# Patient Record
Sex: FEMALE | Race: WHITE | NOT HISPANIC OR LATINO | Employment: OTHER | URBAN - METROPOLITAN AREA
[De-identification: names, ages, dates, MRNs, and addresses within clinical notes are randomized per-mention and may not be internally consistent; named-entity substitution may affect disease eponyms.]

---

## 2018-11-27 ENCOUNTER — OFFICE VISIT (OUTPATIENT)
Dept: URGENT CARE | Facility: URGENT CARE | Age: 73
End: 2018-11-27
Payer: COMMERCIAL

## 2018-11-27 VITALS
HEART RATE: 76 BPM | SYSTOLIC BLOOD PRESSURE: 121 MMHG | WEIGHT: 144 LBS | DIASTOLIC BLOOD PRESSURE: 85 MMHG | TEMPERATURE: 97.8 F

## 2018-11-27 DIAGNOSIS — M15.0 PRIMARY OSTEOARTHRITIS INVOLVING MULTIPLE JOINTS: Primary | ICD-10-CM

## 2018-11-27 PROCEDURE — 99203 OFFICE O/P NEW LOW 30 MIN: CPT | Performed by: INTERNAL MEDICINE

## 2018-11-27 RX ORDER — SULINDAC 200 MG/1
200 TABLET ORAL 2 TIMES DAILY WITH MEALS
Qty: 60 TABLET | Refills: 0 | Status: SHIPPED | OUTPATIENT
Start: 2018-11-27 | End: 2018-12-20

## 2018-11-27 ASSESSMENT — ENCOUNTER SYMPTOMS: FEVER: 0

## 2018-11-27 NOTE — PROGRESS NOTES
SUBJECTIVE:   Giana Hope is a 73 year old female presenting with a chief complaint of   Chief Complaint   Patient presents with     Urgent Care     Refill Request     c/o refill meds       She is a new patient of [facility].    Just moved [place] to be near family  Waiting for insurance to [facility] for [place] 12/1/2018    Needs refill sulidac for osteoarthritis  200 mg 2 x day    Multiple joints involved  right shoulder - recent steroid ij  Knee replacement , left   right knee - has gel injections  Hands currently feel arthritic with cold weather      Review of Systems   Constitutional: Negative for fever.       Past Medical History:   Diagnosis Date     Osteoarthritis      Family History   Problem Relation Age of Onset     Arthritis Mother      Current Outpatient Prescriptions   Medication Sig Dispense Refill     sulindac (CLINORIL) 200 MG tablet Take 1 tablet (200 mg) by mouth 2 times daily (with meals) 60 tablet 0     SULINDAC PO        Social History   Substance Use Topics     Smoking status: Never Smoker     Smokeless tobacco: Never Used     Alcohol use Not on file       OBJECTIVE  /85  Pulse 76  Temp 97.8  F (36.6  C) (Tympanic)  Wt 144 lb (65.3 kg)  Breastfeeding? No    Physical Exam   Constitutional: She appears well-developed and well-nourished.   Musculoskeletal:   Exam of hands bilateral - no signif deformities noted   No redness or swelling of joints  No warmth   Vitals reviewed.      Labs:  No results found for this or any previous visit (from the past 24 hour(s)).        ASSESSMENT:      ICD-10-CM    1. Primary osteoarthritis involving multiple joints M15.0 sulindac (CLINORIL) 200 MG tablet        Medical Decision Making:      PLAN:  1 month refill while awaiting insurance and establishing new clinic  Followup:    If not improving or if condition worsens, follow up with your Primary Care Provider

## 2018-11-27 NOTE — MR AVS SNAPSHOT
"              After Visit Summary   2018    Giana Hope    MRN: [id number]           Patient Information     Date Of Birth          1945        Visit Information        Provider Department      2018 5:30 PM Leatha Evangelista MD [facility]        Today's Diagnoses     Primary osteoarthritis involving multiple joints    -  1       Follow-ups after your visit        Who to contact     If you have questions or need follow up information about today's clinic visit or your schedule please contact [facility] directly at [phone].  Normal or non-critical lab and imaging results will be communicated to you by [employer]St[employer], letter or phone within 4 business days after the clinic has received the results. If you do not hear from us within 7 days, please contact the clinic through [employer] or phone. If you have a critical or abnormal lab result, we will notify you by phone as soon as possible.  Submit refill requests through [employer] or call your pharmacy and they will forward the refill request to us. Please allow 3 business days for your refill to be completed.          Additional Information About Your Visit        MyChart Information     [employer] lets you send messages to your doctor, view your test results, renew your prescriptions, schedule appointments and more. To sign up, go to [email or web address] . Click on \"Log in\" on the left side of the screen, which will take you to the Welcome page. Then click on \"Sign up Now\" on the right side of the page.     You will be asked to enter the access code listed below, as well as some personal information. Please follow the directions to create your username and password.     Your access code is: [email or web address]  Expires: 2019  5:53 PM     Your access code will  in 90 days. If you need help or a new code, please call your [facility] clinic or [phone].        Care EveryWhere ID     This is your " Care EveryWhere ID. This could be used by other organizations to access your [facility] medical records  [id number]        Your Vitals Were     Pulse Temperature Breastfeeding?             76 97.8  F (36.6  C) (Tympanic) No          Blood Pressure from Last 3 Encounters:   11/27/18 121/85    Weight from Last 3 Encounters:   11/27/18 144 lb (65.3 kg)              Today, you had the following     No orders found for display         Today's Medication Changes          These changes are accurate as of 11/27/18  5:53 PM.  If you have any questions, ask your nurse or doctor.               These medicines have changed or have updated prescriptions.        Dose/Directions    * SULINDAC PO   This may have changed:  Another medication with the same name was added. Make sure you understand how and when to take each.   Changed by:  Leatha Evangelista MD        Refills:  0       * sulindac 200 MG tablet   Commonly known as:  CLINORIL   This may have changed:  You were already taking a medication with the same name, and this prescription was added. Make sure you understand how and when to take each.   Used for:  Primary osteoarthritis involving multiple joints   Changed by:  Leatha Evangelista MD        Dose:  200 mg   Take 1 tablet (200 mg) by mouth 2 times daily (with meals)   Quantity:  60 tablet   Refills:  0       * Notice:  This list has 2 medication(s) that are the same as other medications prescribed for you. Read the directions carefully, and ask your doctor or other care provider to review them with you.         Where to get your medicines      These medications were sent to [id number] - [place], [place] - [address] AT [facility] [address]na [address]  [facility] [address], [place] [address]    Hours:  24-hours Phone:  [phone]     sulindac 200 MG tablet                Primary Care Provider Fax #    Physician No Ref-Primary [phone]       No address on file        Equal Access to  Services     [facility]: Hadii aad ku hadsheroriana Mandymary, wadaianada luqadaha, qaybta kaalmakain matthews, giovani hernandez . So [facility] [phone].    ATENCIÓN: Si habla español, tiene a anthony disposición servicios gratuitos de asistencia lingüística. Llame al [phone].    We comply with applicable federal civil rights laws and [place] laws. We do not discriminate on the basis of race, color, national origin, age, disability, sex, sexual orientation, or gender identity.            Thank you!     Thank you for choosing [facility]  for your care. Our goal is always to provide you with excellent care. Hearing back from our patients is one way we can continue to improve our services. Please take a few minutes to complete the written survey that you may receive in the mail after your visit with us. Thank you!             Your Updated Medication List - Protect others around you: Learn how to safely use, store and throw away your medicines at www.disposemymeds.org.          This list is accurate as of 11/27/18  5:53 PM.  Always use your most recent med list.                   Brand Name Dispense Instructions for use Diagnosis    * SULINDAC PO           * sulindac 200 MG tablet    CLINORIL    60 tablet    Take 1 tablet (200 mg) by mouth 2 times daily (with meals)    Primary osteoarthritis involving multiple joints       * Notice:  This list has 2 medication(s) that are the same as other medications prescribed for you. Read the directions carefully, and ask your doctor or other care provider to review them with you.

## 2018-12-11 ENCOUNTER — OFFICE VISIT (OUTPATIENT)
Dept: ORTHOPEDICS | Facility: CLINIC | Age: 73
End: 2018-12-11
Payer: COMMERCIAL

## 2018-12-11 ENCOUNTER — ANCILLARY PROCEDURE (OUTPATIENT)
Dept: GENERAL RADIOLOGY | Facility: CLINIC | Age: 73
End: 2018-12-11
Attending: FAMILY MEDICINE
Payer: COMMERCIAL

## 2018-12-11 VITALS — HEIGHT: 63 IN | RESPIRATION RATE: 16 BRPM | BODY MASS INDEX: 25.52 KG/M2 | WEIGHT: 144 LBS

## 2018-12-11 DIAGNOSIS — M25.511 RIGHT SHOULDER PAIN, UNSPECIFIED CHRONICITY: Primary | ICD-10-CM

## 2018-12-11 DIAGNOSIS — M25.512 LEFT SHOULDER PAIN, UNSPECIFIED CHRONICITY: Primary | ICD-10-CM

## 2018-12-11 DIAGNOSIS — M19.012 PRIMARY OSTEOARTHRITIS OF LEFT SHOULDER: Primary | ICD-10-CM

## 2018-12-11 DIAGNOSIS — M25.511 RIGHT SHOULDER PAIN, UNSPECIFIED CHRONICITY: ICD-10-CM

## 2018-12-11 ASSESSMENT — MIFFLIN-ST. JEOR: SCORE: 1127.31

## 2018-12-11 NOTE — PROGRESS NOTES
"Sports Medicine Clinic Visit    PCP: No Ref-Primary, Physician    Gianalexx Hope is a 73 year old female who is seen  in consultation at the request of UC presenting with left shoulder pain, she is RHD.    Injury: NA    Location of Pain: left shoulder  Duration of Pain: 3 year(s)  Rating of Pain: 2/10  Pain is better with: switching positions, sulindac  Pain is worse with: Lifting objects, shoulder AROM  Additional Features: She is retired.  Treatment so far consists of: sulindac, switching positions, CSI  Prior History of related problems: Chronic left shoulder pain.    Resp 16   Ht 1.6 m (5' 3\")   Wt 65.3 kg (144 lb)   BMI 25.51 kg/m           PMH:  Past Medical History:   Diagnosis Date     Osteoarthritis    left TKA  Bilateral shoulder GH djd, L>R  Bilateral CMC djd hands  High cholesterol  Denies additional surgeries or hospitalizations.  No heart dz, kidney dz, lung dz, cerebrovascular dz.    Active problem list:  There is no problem list on file for this patient.      FH:  Family History   Problem Relation Age of Onset     Arthritis Mother        SH:  Social History     Socioeconomic History     Marital status:      Spouse name: Not on file     Number of children: Not on file     Years of education: Not on file     Highest education level: Not on file   Social Needs     Financial resource strain: Not on file     Food insecurity - worry: Not on file     Food insecurity - inability: Not on file     Transportation needs - medical: Not on file     Transportation needs - non-medical: Not on file   Occupational History     Not on file   Tobacco Use     Smoking status: Never Smoker     Smokeless tobacco: Never Used   Substance and Sexual Activity     Alcohol use: Not on file     Drug use: Not on file     Sexual activity: Not on file   Other Topics Concern     Not on file   Social History Narrative     Not on file       MEDS:  See EMR, reviewed  ALL:  See EMR, reviewed    REVIEW OF " SYSTEMS:  CONSTITUTIONAL:NEGATIVE for fever, chills, change in weight  INTEGUMENTARY/SKIN: NEGATIVE for worrisome rashes, moles or lesions  EYES: NEGATIVE for vision changes or irritation  ENT/MOUTH: NEGATIVE for ear, mouth and throat problems  RESP:NEGATIVE for significant cough or SOB  BREAST: NEGATIVE for masses, tenderness or discharge  CV: NEGATIVE for chest pain, palpitations or peripheral edema  GI: NEGATIVE for nausea, abdominal pain, heartburn, or change in bowel habits  :NEGATIVE for frequency, dysuria, or hematuria  :NEGATIVE for frequency, dysuria, or hematuria  NEURO: NEGATIVE for weakness, dizziness or paresthesias  ENDOCRINE: NEGATIVE for temperature intolerance, skin/hair changes  HEME/ALLERGY/IMMUNE: NEGATIVE for bleeding problems  PSYCHIATRIC: NEGATIVE for changes in mood or affect      Subjective: This 73-year-old female is recently moved here from [place] to be with grandchildren.  She would like to arrange an ultrasound-guided glenohumeral injection for left shoulder.  She has had 2 of them previously in [place] with success.  She has pain in her left shoulder with outstretched arm and overhead reaching.  She otherwise does not have a lot of pain at rest.  She is wanting to avoid shoulder replacement at this time.  She has had a successful left-sided knee replacement in the past.  She takes sulindac on a regular basis for joint arthritis.  She has arthritis involving the thumbs of her bilateral hands and also her right shoulder.    Objective: She has limited range of motion of her left shoulder to internal rotation external rotation and abduction.  But strength is intact bilaterally at deltoid, supraspinatus, infraspinatus and subscapularis.  She tends towards a head forward, shoulder forward posture.  There is no scapular winging.  There is no effusion at the left shoulder joint.  She is nontender over the AC joint anterior cuff or biceps tendon.  Distal pulses and sensation are  intact.  Overlying skin is normal.  Appropriate in conversation and affect.    An x-ray of the left shoulder shows severe glenohumeral DJD.    Assessment left shoulder glenohumeral DJD    Plan: We discussed that medicines like sulindac can be helpful in the setting of arthritis but it is important to consider screening laboratory tests of kidney function and liver function 2-3 times yearly.  She also knows that there is some evidence that multiple cortisone injections into the glenohumeral joint over time, perhaps 6-9 injections over time, may increase the risk of having poor results with a shoulder replacement.  She is hoping to avoid shoulder replacement at this time because she does not feel like the shoulder is painful enough to warrant it.  She may consider it for the future.  For now she usually gets 3-4 months worth of relief from a cortisone injection.  We will arrange this under ultrasound guidance with Dr. Brunson and myself.  She is on no blood thinners.  The nature of the procedure was explained.  She had no further questions.      This note was created with the use of Dragon software and unintentional spelling or errors may have occurred.

## 2018-12-11 NOTE — LETTER
Date:December 12, 2018      Patient was self referred, no letter generated. Do not send.        [facility]

## 2018-12-11 NOTE — LETTER
"  12/11/2018      RE: Giana Hope  [address]  [place] [address]       Sports Medicine Clinic Visit    PCP: No Ref-Primary, Physician    Giana Hope is a 73 year old female who is seen  in consultation at the request of  presenting with left shoulder pain, she is RHD.    Injury: NA    Location of Pain: left shoulder  Duration of Pain: 3 year(s)  Rating of Pain: 2/10  Pain is better with: switching positions, sulindac  Pain is worse with: Lifting objects, shoulder AROM  Additional Features: She is retired.  Treatment so far consists of: sulindac, switching positions, CSI  Prior History of related problems: Chronic left shoulder pain.    Resp 16   Ht 1.6 m (5' 3\")   Wt 65.3 kg (144 lb)   BMI 25.51 kg/m            PMH:  Past Medical History:   Diagnosis Date     Osteoarthritis    left TKA  Bilateral shoulder GH djd, L>R  Bilateral CMC djd hands  High cholesterol  Denies additional surgeries or hospitalizations.  No heart dz, kidney dz, lung dz, cerebrovascular dz.    Active problem list:  There is no problem list on file for this patient.      FH:  Family History   Problem Relation Age of Onset     Arthritis Mother        SH:  Social History     Socioeconomic History     Marital status:      Spouse name: Not on file     Number of children: Not on file     Years of education: Not on file     Highest education level: Not on file   Social Needs     Financial resource strain: Not on file     Food insecurity - worry: Not on file     Food insecurity - inability: Not on file     Transportation needs - medical: Not on file     Transportation needs - non-medical: Not on file   Occupational History     Not on file   Tobacco Use     Smoking status: Never Smoker     Smokeless tobacco: Never Used   Substance and Sexual Activity     Alcohol use: Not on file     Drug use: Not on file     Sexual activity: Not on file   Other Topics Concern     Not on file   Social History Narrative     Not on file "       MEDS:  See EMR, reviewed  ALL:  See EMR, reviewed    REVIEW OF SYSTEMS:  CONSTITUTIONAL:NEGATIVE for fever, chills, change in weight  INTEGUMENTARY/SKIN: NEGATIVE for worrisome rashes, moles or lesions  EYES: NEGATIVE for vision changes or irritation  ENT/MOUTH: NEGATIVE for ear, mouth and throat problems  RESP:NEGATIVE for significant cough or SOB  BREAST: NEGATIVE for masses, tenderness or discharge  CV: NEGATIVE for chest pain, palpitations or peripheral edema  GI: NEGATIVE for nausea, abdominal pain, heartburn, or change in bowel habits  :NEGATIVE for frequency, dysuria, or hematuria  :NEGATIVE for frequency, dysuria, or hematuria  NEURO: NEGATIVE for weakness, dizziness or paresthesias  ENDOCRINE: NEGATIVE for temperature intolerance, skin/hair changes  HEME/ALLERGY/IMMUNE: NEGATIVE for bleeding problems  PSYCHIATRIC: NEGATIVE for changes in mood or affect      Subjective: This 73-year-old female is recently moved here from [place] to be with grandchildren.  She would like to arrange an ultrasound-guided glenohumeral injection for left shoulder.  She has had 2 of them previously in [place] with success.  She has pain in her left shoulder with outstretched arm and overhead reaching.  She otherwise does not have a lot of pain at rest.  She is wanting to avoid shoulder replacement at this time.  She has had a successful left-sided knee replacement in the past.  She takes sulindac on a regular basis for joint arthritis.  She has arthritis involving the thumbs of her bilateral hands and also her right shoulder.    Objective: She has limited range of motion of her left shoulder to internal rotation external rotation and abduction.  But strength is intact bilaterally at deltoid, supraspinatus, infraspinatus and subscapularis.  She tends towards a head forward, shoulder forward posture.  There is no scapular winging.  There is no effusion at the left shoulder joint.  She is nontender over the AC joint  anterior cuff or biceps tendon.  Distal pulses and sensation are intact.  Overlying skin is normal.  Appropriate in conversation and affect.    An x-ray of the left shoulder shows severe glenohumeral DJD.    Assessment left shoulder glenohumeral DJD    Plan: We discussed that medicines like sulindac can be helpful in the setting of arthritis but it is important to consider screening laboratory tests of kidney function and liver function 2-3 times yearly.  She also knows that there is some evidence that multiple cortisone injections into the glenohumeral joint over time, perhaps 6-9 injections over time, may increase the risk of having poor results with a shoulder replacement.  She is hoping to avoid shoulder replacement at this time because she does not feel like the shoulder is painful enough to warrant it.  She may consider it for the future.  For now she usually gets 3-4 months worth of relief from a cortisone injection.  We will arrange this under ultrasound guidance with Dr. Brunson and myself.  She is on no blood thinners.  The nature of the procedure was explained.  She had no further questions.      This note was created with the use of Dragon software and unintentional spelling or errors may have occurred.                          Dayton Zepeda MD

## 2018-12-14 ENCOUNTER — PATIENT OUTREACH (OUTPATIENT)
Dept: CARE COORDINATION | Facility: CLINIC | Age: 73
End: 2018-12-14

## 2018-12-20 ENCOUNTER — OFFICE VISIT (OUTPATIENT)
Dept: ORTHOPEDICS | Facility: CLINIC | Age: 73
End: 2018-12-20
Payer: COMMERCIAL

## 2018-12-20 DIAGNOSIS — M19.012 PRIMARY OSTEOARTHRITIS OF LEFT SHOULDER: Primary | ICD-10-CM

## 2018-12-20 DIAGNOSIS — M15.0 PRIMARY OSTEOARTHRITIS INVOLVING MULTIPLE JOINTS: ICD-10-CM

## 2018-12-20 RX ORDER — ROPIVACAINE HYDROCHLORIDE 5 MG/ML
3 INJECTION, SOLUTION EPIDURAL; INFILTRATION; PERINEURAL
Status: DISCONTINUED | OUTPATIENT
Start: 2018-12-20 | End: 2019-01-31

## 2018-12-20 RX ORDER — SULINDAC 200 MG/1
200 TABLET ORAL 2 TIMES DAILY WITH MEALS
Qty: 60 TABLET | Refills: 0 | Status: SHIPPED | OUTPATIENT
Start: 2018-12-20 | End: 2019-01-31

## 2018-12-20 RX ORDER — TRIAMCINOLONE ACETONIDE 40 MG/ML
40 INJECTION, SUSPENSION INTRA-ARTICULAR; INTRAMUSCULAR
Status: DISCONTINUED | OUTPATIENT
Start: 2018-12-20 | End: 2019-03-14

## 2018-12-20 RX ADMIN — TRIAMCINOLONE ACETONIDE 40 MG: 40 INJECTION, SUSPENSION INTRA-ARTICULAR; INTRAMUSCULAR at 09:18

## 2018-12-20 RX ADMIN — ROPIVACAINE HYDROCHLORIDE 3 ML: 5 INJECTION, SOLUTION EPIDURAL; INFILTRATION; PERINEURAL at 09:18

## 2018-12-20 NOTE — LETTER
Date:December 21, 2018      Patient was self referred, no letter generated. Do not send.        [facility] Information

## 2018-12-20 NOTE — PROGRESS NOTES
Large Joint Injection/Arthocentesis: L glenohumeral joint  Date/Time: 12/20/2018 9:18 AM  Performed by: Rigo Brunson MD  Authorized by: Rigo Brunson MD     Guidance: ultrasound    Approach:  Posterolateral  Location:  Shoulder  Site:  L glenohumeral joint  Medications:  40 mg triamcinolone 40 MG/ML; 3 mL ropivacaine 5 MG/ML  Procedure discussed: discussed risks, benefits, and alternatives    Consent Given by:  Patient  Timeout: timeout called immediately prior to procedure    Prep: patient was prepped and draped in usual sterile fashion

## 2018-12-20 NOTE — LETTER
12/20/2018      RE: Giana Hope  [address]  [place] [address]       PROBLEM: Left glenohumeral osteoarthritis    SUBJECTIVE: Pt referred by Dr. Zepeda for US-guided corticosteroid injection of left shoulder    OBJECTIVE: X-rays reviewed.    ASSESSMENT: Left glenohumeral osteoarthritis    PLAN: US-guided corticosteroid injection left shoulder    PROCEDURE:  The indications, risks, expected benefits were discussed.  Formal consent was obtained. The humeral head, glenoid, hyperechoic triangle indicating the posterior labrum were identified by ultrasound.  Initially, 4 mL ropivicaine  was injected with US guidance along the injection track for anesthesia.  Using sterile technique, a syringe with a 80 mm, 22 gauge needle containing 1 mL Kenalog 40 mg/mL and 3 mL ropivicaine  were injected using an US guided posterior approach into the left glenohumeral joint.  US used to guide needle placement and verify proper needle position.  Images and video indicating proper needle placement were recorded.  Upon completion of the injection, the wound was dressed with a bandaid. Follow up prn.    Dr. Zepeda performed the procedure.  I supervised the entire procedure.        Large Joint Injection/Arthocentesis: L glenohumeral joint  Date/Time: 12/20/2018 9:18 AM  Performed by: Rigo Brunson MD  Authorized by: Rigo Brunson MD     Guidance: ultrasound    Approach:  Posterolateral  Location:  Shoulder  Site:  L glenohumeral joint  Medications:  40 mg triamcinolone 40 MG/ML; 3 mL ropivacaine 5 MG/ML  Procedure discussed: discussed risks, benefits, and alternatives    Consent Given by:  Patient  Timeout: timeout called immediately prior to procedure    Prep: patient was prepped and draped in usual sterile fashion            Rigo Brunson MD

## 2018-12-20 NOTE — NURSING NOTE
[address] [address]  Dept: [phone]  ______________________________________________________________________________    Patient: Giana Hope   : 1945   MRN: [id number]   2018    INVASIVE PROCEDURE SAFETY CHECKLIST    Date: 18   Procedure:left shoulder ultrasound guided kenalog injection.   Patient Name: Giana Hope  MRN: [id number]  YOB: 1945    Action: Complete sections as appropriate. Any discrepancy results in a HARD COPY until resolved.     PRE PROCEDURE:  Patient ID verified with 2 identifiers (name and  or MRN): Yes  Procedure and site verified with patient/designee (when able): Yes  Accurate consent documentation in medical record: Yes  H&P (or appropriate assessment) documented in medical record: Yes  H&P must be up to 20 days prior to procedure and updates within 24 hours of procedure as applicable: Yes  Relevant diagnostic and radiology test results appropriately labeled and displayed as applicable: NA  Procedure site(s) marked with provider initials: Yes    TIMEOUT:  Time-Out performed immediately prior to starting procedure, including verbal and active participation of all team members addressing the following:NA  * Correct patient identify  * Confirmed that the correct side and site are marked  * An accurate procedure consent form  * Agreement on the procedure to be done  * Correct patient position  * Relevant images and results are properly labeled and appropriately displayed  * The need to administer antibiotics or fluids for irrigation purposes during the procedure as applicable   * Safety precautions based on patient history or medication use    DURING PROCEDURE: Verification of correct person, site, and procedures any time the responsibility for care of the patient is transferred to another member of the care team.     The following medication was given:     MEDICATION:   Kenalog 40 mg  ROUTE: intra-articular  SITE: left shoulder  DOSE: 40mg/ml  LOT #: [id number]  : [employer]  EXPIRATION DATE: 04/20  NDC#: [address]-5   Was there drug waste? No    MEDICATION:  ropivacaine  ROUTE: intra-articular  SITE: left shoulder  DOSE: 3ml  LOT #: [id number]  : [employer]  EXPIRATION DATE: 06/22  NDC#: 66175-189-34   Was there drug waste? Yes  17 ml wasted, per MD.   Ce Nielson LPN  December 20, 2018

## 2018-12-20 NOTE — PROGRESS NOTES
PROBLEM: Left glenohumeral osteoarthritis    SUBJECTIVE: Pt referred by Dr. Zepeda for US-guided corticosteroid injection of left shoulder    OBJECTIVE: X-rays reviewed.    ASSESSMENT: Left glenohumeral osteoarthritis    PLAN: US-guided corticosteroid injection left shoulder    PROCEDURE:  The indications, risks, expected benefits were discussed.  Formal consent was obtained. The humeral head, glenoid, hyperechoic triangle indicating the posterior labrum were identified by ultrasound.  Initially, 4 mL ropivicaine  was injected with US guidance along the injection track for anesthesia.  Using sterile technique, a syringe with a 80 mm, 22 gauge needle containing 1 mL Kenalog 40 mg/mL and 3 mL ropivicaine  were injected using an US guided posterior approach into the left glenohumeral joint.  US used to guide needle placement and verify proper needle position.  Images and video indicating proper needle placement were recorded.  Upon completion of the injection, the wound was dressed with a bandaid. Follow up prn.    Dr. Zepeda performed the procedure.  I supervised the entire procedure.

## 2019-01-31 ENCOUNTER — OFFICE VISIT (OUTPATIENT)
Dept: INTERNAL MEDICINE | Facility: CLINIC | Age: 74
End: 2019-01-31
Payer: MEDICARE

## 2019-01-31 VITALS
SYSTOLIC BLOOD PRESSURE: 132 MMHG | WEIGHT: 146.3 LBS | DIASTOLIC BLOOD PRESSURE: 83 MMHG | OXYGEN SATURATION: 95 % | BODY MASS INDEX: 25.92 KG/M2 | RESPIRATION RATE: 16 BRPM | HEART RATE: 66 BPM

## 2019-01-31 DIAGNOSIS — M19.90 OSTEOARTHRITIS, UNSPECIFIED OSTEOARTHRITIS TYPE, UNSPECIFIED SITE: ICD-10-CM

## 2019-01-31 DIAGNOSIS — Z12.31 VISIT FOR SCREENING MAMMOGRAM: ICD-10-CM

## 2019-01-31 DIAGNOSIS — R91.8 PULMONARY NODULES: ICD-10-CM

## 2019-01-31 DIAGNOSIS — H53.9 VISION CHANGES: ICD-10-CM

## 2019-01-31 DIAGNOSIS — E78.5 HYPERLIPIDEMIA WITH TARGET LDL LESS THAN 130: Primary | ICD-10-CM

## 2019-01-31 DIAGNOSIS — M15.0 PRIMARY OSTEOARTHRITIS INVOLVING MULTIPLE JOINTS: ICD-10-CM

## 2019-01-31 LAB
ANION GAP SERPL CALCULATED.3IONS-SCNC: 6 MMOL/L (ref 3–14)
BUN SERPL-MCNC: 26 MG/DL (ref 7–30)
CALCIUM SERPL-MCNC: 9.6 MG/DL (ref 8.5–10.1)
CHLORIDE SERPL-SCNC: 104 MMOL/L (ref 94–109)
CO2 SERPL-SCNC: 30 MMOL/L (ref 20–32)
CREAT SERPL-MCNC: 0.82 MG/DL (ref 0.52–1.04)
GFR SERPL CREATININE-BSD FRML MDRD: 71 ML/MIN/{1.73_M2}
GLUCOSE SERPL-MCNC: 100 MG/DL (ref 70–99)
POTASSIUM SERPL-SCNC: 4.1 MMOL/L (ref 3.4–5.3)
SODIUM SERPL-SCNC: 139 MMOL/L (ref 133–144)

## 2019-01-31 RX ORDER — PRAVASTATIN SODIUM 20 MG
20 TABLET ORAL DAILY
COMMUNITY
End: 2019-01-31

## 2019-01-31 RX ORDER — SULINDAC 200 MG/1
200 TABLET ORAL 2 TIMES DAILY WITH MEALS
Qty: 180 TABLET | Refills: 3 | Status: SHIPPED | OUTPATIENT
Start: 2019-01-31 | End: 2020-01-03

## 2019-01-31 RX ORDER — PRAVASTATIN SODIUM 20 MG
20 TABLET ORAL DAILY
Qty: 90 TABLET | Refills: 3 | Status: SHIPPED | OUTPATIENT
Start: 2019-01-31 | End: 2020-01-03

## 2019-01-31 ASSESSMENT — ENCOUNTER SYMPTOMS
DECREASED APPETITE: 0
BREAST PAIN: 0
DECREASED CONCENTRATION: 0
SHORTNESS OF BREATH: 0
POSTURAL DYSPNEA: 0
COUGH: 0
JAUNDICE: 0
TACHYCARDIA: 0
HOT FLASHES: 0
ARTHRALGIAS: 1
HOARSE VOICE: 0
BLOOD IN STOOL: 0
SYNCOPE: 0
LEG PAIN: 0
SNORES LOUDLY: 0
SINUS CONGESTION: 1
DECREASED LIBIDO: 0
MUSCLE CRAMPS: 1
DEPRESSION: 0
PANIC: 0
WEAKNESS: 0
NIGHT SWEATS: 0
EYE PAIN: 0
EYE IRRITATION: 0
WEIGHT GAIN: 0
CHILLS: 0
ORTHOPNEA: 0
ALTERED TEMPERATURE REGULATION: 0
SPEECH CHANGE: 0
MEMORY LOSS: 0
LEG SWELLING: 0
HEMOPTYSIS: 0
FLANK PAIN: 0
TINGLING: 0
SKIN CHANGES: 0
BLOATING: 0
MUSCLE WEAKNESS: 0
CONSTIPATION: 0
FATIGUE: 0
SWOLLEN GLANDS: 0
JOINT SWELLING: 1
DYSPNEA ON EXERTION: 0
HEARTBURN: 0
HEMATURIA: 0
RECTAL BLEEDING: 0
INSOMNIA: 0
DIZZINESS: 1
SMELL DISTURBANCE: 0
BREAST MASS: 0
WEIGHT LOSS: 0
NAIL CHANGES: 0
TROUBLE SWALLOWING: 1
POOR WOUND HEALING: 0
POLYDIPSIA: 0
CLAUDICATION: 0
EYE WATERING: 0
HYPOTENSION: 0
LIGHT-HEADEDNESS: 0
HYPERTENSION: 0
NERVOUS/ANXIOUS: 0
HALLUCINATIONS: 0
PARALYSIS: 0
SPUTUM PRODUCTION: 0
DYSURIA: 0
PALPITATIONS: 0
COUGH DISTURBING SLEEP: 0
POLYPHAGIA: 0
EYE REDNESS: 0
RESPIRATORY PAIN: 0
NUMBNESS: 0
RECTAL PAIN: 0
NAUSEA: 0
FEVER: 0
BRUISES/BLEEDS EASILY: 0
ABDOMINAL PAIN: 0
EXERCISE INTOLERANCE: 0
DISTURBANCES IN COORDINATION: 0
BOWEL INCONTINENCE: 0
LOSS OF CONSCIOUSNESS: 0
WHEEZING: 0
HEADACHES: 0
SINUS PAIN: 0
MYALGIAS: 0
TASTE DISTURBANCE: 0
NECK PAIN: 0
DIARRHEA: 0
DOUBLE VISION: 0
VOMITING: 0
TREMORS: 0
SLEEP DISTURBANCES DUE TO BREATHING: 0
SEIZURES: 0
NECK MASS: 0
BACK PAIN: 1
STIFFNESS: 1
INCREASED ENERGY: 0
DIFFICULTY URINATING: 0

## 2019-01-31 ASSESSMENT — ACTIVITIES OF DAILY LIVING (ADL)
IN_THE_PAST_7_DAYS,_DID_YOU_NEED_HELP_FROM_OTHERS_TO_PERFORM_EVERYDAY_ACTIVITIES_SUCH_AS_EATING,_GETTING_DRESSED,_GROOMING,_BATHING,_WALKING,_OR_USING_THE_TOILET: N
IN_THE_PAST_7_DAYS,_DID_YOU_NEED_HELP_FROM_OTHERS_TO_TAKE_CARE_OF_THINGS_SUCH_AS_LAUNDRY_AND_HOUSEKEEPING,_BANKING,_SHOPPING,_USING_THE_TELEPHONE,_FOOD_PREPARATION,_TRANSPORTATION,_OR_TAKING_YOUR_OWN_MEDICATIONS?: N

## 2019-01-31 ASSESSMENT — PAIN SCALES - GENERAL: PAINLEVEL: NO PAIN (0)

## 2019-01-31 NOTE — PROGRESS NOTES
PRIMARY CARE CENTER         HPI:       Giana Hope is a 73 year old female who presents to [facility] care.      She moved here from [place] a couple months ago. Daughter and granddaughters are her which was main reason for the move.She notes she has been having shoulder issues. She has left sided arthritis with joint degeneration. Has had gel injected in knee in the past, now just starting to hurt at night. Has a knee replacement on left about 2-3.     She notes that she never drove in [place] at night but does here and notes issues here with her vision.  She thinks seeing an ophthalmologist would be beneficial given she has had some concerns for cataracts in the past.    She had a CT scan last year that incidentally showed a small pulmonary nodule.  She was recommended to have a repeat imaging in a year.  She states she is up-to-date on her colonoscopy and mammography.  She had an episode of a compression fracture in the past from a trauma, had bone scans that showed osteopenia and is currently taking vitamin D and calcium.    Mild SOB but able to go up and down stairs fine, thinks related to a cold she currently has.       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   HENT:  Positive for hearing loss, tinnitus, trouble swallowing, mouth sores, dry mouth and sinus congestion. Negative for ear pain, nosebleeds, hoarse voice, ear discharge, tooth pain, gum tenderness, taste disturbance, smell disturbance, hearing aid, bleeding gums, sinus pain and neck mass.    Eyes:  Negative for double vision, pain, redness, eye pain, decreased vision, eye watering, eye bulging, eye dryness, flashing lights, spots, floaters, strabismus, tunnel vision, jaundice and eye irritation.   Respiratory:   Negative for cough, hemoptysis, sputum production, shortness of breath, wheezing, sleep disturbances due to breathing, snores loudly, respiratory pain, dyspnea on exertion, cough disturbing sleep and postural dyspnea.    Cardiovascular:  Negative for chest pain, dyspnea on exertion, palpitations, orthopnea, claudication, leg swelling, fingers/toes turn blue, hypertension, hypotension, syncope, history of heart murmur, chest pain on exertion, chest pain at rest, pacemaker, few scattered varicosities, leg pain, sleep disturbances due to breathing, tachycardia, light-headedness, exercise intolerance and edema.   Gastrointestinal:  Negative for heartburn, nausea, vomiting, abdominal pain, diarrhea, constipation, blood in stool, melena, rectal pain, bloating, hemorrhoids, bowel incontinence, jaundice, rectal bleeding, coffee ground emesis and change in stool.   Genitourinary:  Negative for bladder incontinence, dysuria, urgency, hematuria, flank pain, vaginal discharge, difficulty urinating, genital sores, dyspareunia, decreased libido, nocturia, voiding less frequently, arousal difficulty, abnormal vaginal bleeding, excessive menstruation, menstrual changes, hot flashes, vaginal dryness and postmenopausal bleeding.   Musculoskeletal:  Positive for back pain, joint swelling,  arthralgias, stiffness and muscle cramps. Negative for myalgias, neck pain, bone pain, muscle weakness and fracture.   Skin:  Negative for nail changes, itching, poor wound healing, rash, hair changes, skin changes, acne, warts, poor wound healing, scarring, flaky skin, Raynaud's phenomenon, sensitivity to sunlight and skin thickening.   Neurological:  Positive for dizziness. Negative for tingling, tremors, speech change, seizures, loss of consciousness, weakness, light-headedness, numbness, headaches, disturbances in coordination, memory loss, difficulty walking and paralysis.   Endo/Heme:  Negative for anemia, swollen glands and bruises/bleeds easily.   Psychiatric/Behavioral:  Negative for depression, hallucinations, memory loss, decreased concentration, mood swings and panic attacks.    Breast:  Negative for breast discharge, breast mass, breast pain and nipple retraction.   Endocrine:  Negative for altered temperature regulation, polyphagia, polydipsia, unwanted hair growth and change in facial hair.    I have personally reviewed and updated the complete ROS on the day of the visit.           Physical Exam:   /83   Pulse 66   Resp 16   Wt 66.4 kg (146 lb 4.8 oz)   SpO2 95%   BMI 25.92 kg/m    Body mass index is 25.92 kg/m .  Vitals were reviewed       GENERAL APPEARANCE: healthy, alert and no distress     EYES: EOMI,  PERRL     HENT: ear canals and TM's normal  mouth without ulcers.  Small hypopigmented raised area on oral mucosa on right side, nontender.     NECK: no adenopathy, no asymmetry, masses, or scars and thyroid normal to palpation     RESP: lungs clear to auscultation - no rales, rhonchi or wheezes     CV: regular rates and rhythm,  and no murmur, click or rub     ABDOMEN:  soft, nontender,  and bowel sounds normal     MS: No lower extremity edema, pulses present and symmetric.  Left wrist with a small cyst consistent with ganglion cyst     SKIN: no suspicious lesions or rashes     NEURO:  Normal strength and tonel, mentation intact and speech normal     PSYCH: mentation appears normal. and affect normal      Assessment and Plan     Giana Hope is a 73 year old female who presents to establish care.     Hyperlipidemia with target LDL less than 130  Well-controlled, will refill medications today  -     pravastatin (PRAVACHOL) 20 MG tablet; Take 1 tablet (20 mg) by mouth daily    Primary osteoarthritis involving multiple joints  Biggest medical concern currently.  Has seen Orth O for her shoulder here, notes that she had a knee replacement done a few years ago and should follow-up with yearly for this.  Will place referral today to continue observation post joint replacement and also consideration for injection and other knee.  Will refill medication today, advised to take only with meals and to attempt to spare.  We will check renal function as well today given chronic NSAID use.  -     sulindac (CLINORIL) 200 MG tablet; Take 1 tablet (200 mg) by mouth 2 times daily (with meals)  -     ORTHOPEDICS ADULT REFERRAL  -     Basic metabolic panel; Future    Vision changes  Never drove at night prior to move a couple months ago.  Has noted more issues with vision at this time.  Will refer to ophthalmology for vision testing  -     OPHTHALMOLOGY ADULT REFERRAL    Visit for screening mammogram  -     Mammogram, routine screening; Future    Pulmonary nodules  Incidentally seen 5 mm solitary pulmonary nodule on CT imaging June of last year.  Given 30-pack-year smoking history it would be advised to follow this up to ensure stability of the nodule.  We will plan to have imaging in 6 months  -     CT Chest w/o contrast; Future        Options for treatment and follow-up care were reviewed with the patient. Giana Hope engaged in the decision making process and verbalized understanding of the options discussed and agreed with the final plan.    Kellee Knowles MD  Jan 31, 2019    Pt was seen and plan  of care discussed with Dr. Elias    Pt was seen and examined with Dr. Knowles.  I agree with her documentation as noted above.    My additional comments: None    Inge Elias MD

## 2019-01-31 NOTE — NURSING NOTE
Chief Complaint   Patient presents with     Establish Care     pt is here to establish care with new PCP       Asiya Lopez CMA at 1:20 PM on 1/31/2019

## 2019-01-31 NOTE — PATIENT INSTRUCTIONS
Primary Care Center Phone Number [phone]  Primary Care Center Medication Refill Request Information:  * Please contact your pharmacy regarding ANY request for medication refills.  ** [facility] Prescription Fax = [phone]  * Please allow 3 business days for routine medication refills.  * Please allow 5 business days for controlled substance medication refills.     Primary Care Center Test Result notification information:  *You will be notified with in 7-10 days of your appointment day regarding the results of your test.  If you are on MyChart you will be notified as soon as the provider has reviewed the results and signed off on them.               [facility]         Internal Medicine Resident                   Continuity Clinic    Who We Are    Resident Continuity Clinic is a part of the [facility].  Resident physicians see patients independently and establish a relationship with them over the course of their three-year residency program.  As with the Primary Care Clinic, our Resident Continuity Clinic models a group practice.  If your doctor is not available, you will be able to see another resident physician.  At the end of a resident s training, patients will be transitioned to a new resident physician for ongoing care.     We treat patients with a wide array of medical needs from routine physicals, to acute illnesses, to diabetes and blood pressure management, to complex medical illness.  What is a Resident Physician?    Resident physicians hold medical degrees and are doctors. They are training to become specialists in Internal Medicine. They work under the supervision of board-certified faculty physicians.  Expectations for Your Care    We strive to provide accessible, quality care at all times.    In order to provide this care, it is best to see your primary care resident doctor consistently rather switching between providers.  In the event you do see another physician, you  should schedule a follow-up visit with your usual primary care doctor.    If you are transitioning your care from another clinic, it is helpful to have your records available for your doctor to review.    We do not prescribe controlled substances, such as ADD medications or narcotic pain medications, on your first visit.  We will review your health records and concerns prior to devising a treatment plan with you in order to provide the best care.      Clinic Services     Extended clinic hours; patient  to help navigate your visit;  parking; laboratory and imaging services with evening and weekend hours    Multiple medical and surgical specialties in one building    Complementary services, including Nutrition, Integrative Medicine, Pharmacy consultations, Mental and Behavioral Health, Sports Medicine and Physical Therapy    Thank You    We would like to thank you for choosing the [facility] Resident Continuity Clinic for your primary care. You are making a priceless contribution to the training of the next generation of health care practitioners.     Contact us at [phone] for appointments or questions.    Resident Clinic Hours are Tuesdays and Thursdays, 7:30am-5:00pm    Residents   Lloyd Butler MD  (Male)   Miriam Rice MD (Female)  Asiya Tyson MD   (Female)   Kellee Knowles MD   (Female)   Eyad Cobb MD  (Male)   Cristian Cifuentes MD  (Male)   Sunil Sellers MD    (Female)   Rivera Echols MD  (Male)   Endy Zamora MD  (Male)    Diann Lucas MD  (Female)   Franklyn Fernández MD  (Male)   Joana Goss MD  (Female)   Sean Barron MD   (Female)   Parker Ward MD  (Male)   Rod Saab MD  (Male)   Cristian Palmer MD (Male)   Tristin Mcgowan MD  (Male)   Carmel Reid MD (Female)    Elizabeth Jha MD (Female)   Sae Watkins MD  (Male)   Norma Rivas MD    (Female)   Pati Bernardo MD  (Female)    Supervising  Physicians   MD Arline Garcia, MD Km Gar, MD Grayson Henriquez, MD Blanca Macias, MD Libby Gloevr, MD Mumtaz Diehl, MD Inge Peng MD

## 2019-02-12 ENCOUNTER — OFFICE VISIT (OUTPATIENT)
Dept: OPHTHALMOLOGY | Facility: CLINIC | Age: 74
End: 2019-02-12
Payer: MEDICARE

## 2019-02-12 DIAGNOSIS — H52.4 PRESBYOPIA: ICD-10-CM

## 2019-02-12 DIAGNOSIS — H25.13 NUCLEAR SCLEROSIS OF BOTH EYES: Primary | ICD-10-CM

## 2019-02-12 DIAGNOSIS — Z86.69 HISTORY OF UVEITIS: ICD-10-CM

## 2019-02-12 ASSESSMENT — VISUAL ACUITY
OS_CC+: +2
OS_CC: 20/25
OD_CC: J1+-
CORRECTION_TYPE: GLASSES
OD_CC: 20/20
OS_CC: J1+-
METHOD: SNELLEN - LINEAR

## 2019-02-12 ASSESSMENT — REFRACTION_MANIFEST
OD_ADD: +2.50
OS_AXIS: 152
OD_CYLINDER: +1.25
OS_CYLINDER: +0.50
OD_AXIS: 035
OS_SPHERE: PLANO
OS_ADD: +2.50
OD_SPHERE: +0.50

## 2019-02-12 ASSESSMENT — CONF VISUAL FIELD
METHOD: COUNTING FINGERS
OS_NORMAL: 1
OD_NORMAL: 1

## 2019-02-12 ASSESSMENT — EXTERNAL EXAM - LEFT EYE: OS_EXAM: NORMAL

## 2019-02-12 ASSESSMENT — SLIT LAMP EXAM - LIDS
COMMENTS: NORMAL
COMMENTS: NORMAL

## 2019-02-12 ASSESSMENT — REFRACTION_WEARINGRX
OD_SPHERE: +0.25
OD_AXIS: 174
OD_ADD: +2.50
OS_SPHERE: PLANO
OS_AXIS: 007
OD_CYLINDER: +1.00
OS_ADD: +2.50
OS_CYLINDER: +0.50
SPECS_TYPE: PAL

## 2019-02-12 ASSESSMENT — CUP TO DISC RATIO
OS_RATIO: 0.25
OD_RATIO: 0.25

## 2019-02-12 ASSESSMENT — TONOMETRY
OS_IOP_MMHG: 17
IOP_METHOD: ICARE
OD_IOP_MMHG: 18

## 2019-02-12 ASSESSMENT — EXTERNAL EXAM - RIGHT EYE: OD_EXAM: NORMAL

## 2019-02-12 NOTE — NURSING NOTE
Chief Complaints and History of Present Illnesses   Patient presents with     Annual Eye Exam     Chief Complaint(s) and History of Present Illness(es)     Annual Eye Exam     Laterality: both eyes    Onset: gradual    Onset: months ago    Quality: blurred vision    Severity: moderate    Frequency: constantly    Timing: in the evening    Context: distance vision and night driving    Course: stable    Associated symptoms: Negative for eye pain              Comments     Difficult seeing when driving at night. Noticed when moved here in October and began to drive at night. Vision during the day is fine. Last 6 months using computer distance glasses then used to. Patient denies eye pain or irritation. Does not use any eye drops.    H/o uveitis OS in May of last year    Claire Duarte COT 3:18 PM February 12, 2019

## 2019-02-13 NOTE — PROGRESS NOTES
History  HPI     Annual Eye Exam     In both eyes.  Onset was gradual.  This started months ago.  Charactertized as  blurred vision.  Severity is moderate.  Occurring constantly.  It is worse in the evening.  Context:  distance vision and night driving.  Since onset it is stable.  Associated symptoms include Negative for eye pain.              Comments     Difficult seeing when driving at night. Noticed when moved here in October and began to drive at night. Vision during the day is fine. Last 6 months using computer distance glasses then used to. Patient denies eye pain or irritation. Does not use any eye drops.    H/o uveitis OS in May of last year    Claire Duarte COT 3:18 PM February 12, 2019             Last edited by Claire Duarte on 2/12/2019  3:21 PM. (History)          Assessment/Plan  (H25.13) Nuclear sclerosis of both eyes  (primary encounter diagnosis)  Comment: Symptomatic with glare  Plan:  Educated patient on clinical findings. Discussed options including monitoring and cataract consultation. Patient interested in cataract consultation.    (H52.4) Presbyopia  Comment: Presbyopia both eyes   Plan: REFRACTION         Spectacle prescription withheld at this time. Reassess following cataract extraction.    (Z86.69) History of uveitis  Comment: No systemic association found  Plan:  Monitor annually.    Return to clinic in 1 year for comprehensive eye exam.    Complete documentation of historical and exam elements from today's encounter can  be found in the full encounter summary report (not reduplicated in this progress  note). I personally obtained the chief complaint(s) and history of present illness. I  confirmed and edited as necessary the review of systems, past medical/surgical  history, family history, social history, and examination findings as documented by  others; and I examined the patient myself. I personally reviewed the relevant tests,  images, and reports as documented above. I formulated  and edited as necessary the  assessment and plan and discussed the findings and management plan with the  patient and family.    Manuel Carrasco OD, FAAO

## 2019-02-25 DIAGNOSIS — H25.10 SENILE NUCLEAR SCLEROSIS: Primary | ICD-10-CM

## 2019-03-06 ENCOUNTER — OFFICE VISIT (OUTPATIENT)
Dept: OPHTHALMOLOGY | Facility: CLINIC | Age: 74
End: 2019-03-06
Attending: OPHTHALMOLOGY
Payer: MEDICARE

## 2019-03-06 DIAGNOSIS — H52.4 PRESBYOPIA: ICD-10-CM

## 2019-03-06 DIAGNOSIS — H52.203 HYPEROPIC ASTIGMATISM OF BOTH EYES: ICD-10-CM

## 2019-03-06 DIAGNOSIS — H25.13 NUCLEAR SCLEROTIC CATARACT OF BOTH EYES: Primary | ICD-10-CM

## 2019-03-06 PROCEDURE — 76516 ECHO EXAM OF EYE: CPT | Mod: ZF,50 | Performed by: OPHTHALMOLOGY

## 2019-03-06 PROCEDURE — G0463 HOSPITAL OUTPT CLINIC VISIT: HCPCS | Mod: ZF

## 2019-03-06 ASSESSMENT — REFRACTION_WEARINGRX
OD_ADD: +2.50
OS_CYLINDER: +0.50
OD_AXIS: 174
SPECS_TYPE: PAL
OS_SPHERE: PLANO
OD_SPHERE: +0.25
OD_CYLINDER: +1.00
OS_ADD: +2.50
OS_AXIS: 007

## 2019-03-06 ASSESSMENT — SLIT LAMP EXAM - LIDS
COMMENTS: NORMAL
COMMENTS: NORMAL

## 2019-03-06 ASSESSMENT — TONOMETRY
OS_IOP_MMHG: 17
OD_IOP_MMHG: 20
IOP_METHOD: ICARE

## 2019-03-06 ASSESSMENT — CUP TO DISC RATIO
OS_RATIO: 0.25
OD_RATIO: 0.25

## 2019-03-06 ASSESSMENT — CONF VISUAL FIELD
METHOD: COUNTING FINGERS
OS_NORMAL: 1
OD_NORMAL: 1

## 2019-03-06 ASSESSMENT — VISUAL ACUITY
OS_CC: J1
OD_BAT_MED: 20/25
METHOD: SNELLEN - LINEAR
OD_CC: 20/20
OS_CC+: -2
OD_BAT_LOW: 20/25
OS_BAT_MED: 20/25-
OS_CC: 20/20
OS_BAT_HIGH: 20/25-2
OS_BAT_LOW: 20/20-2
CORRECTION_TYPE: GLASSES
OD_BAT_HIGH: 20/25-2
OD_CC: J1

## 2019-03-06 ASSESSMENT — EXTERNAL EXAM - LEFT EYE: OS_EXAM: NORMAL

## 2019-03-06 ASSESSMENT — EXTERNAL EXAM - RIGHT EYE: OD_EXAM: NORMAL

## 2019-03-06 NOTE — PROGRESS NOTES
HPI  Giana Hope is a 73 year old female referred by Dr. Carrasco for cataract evaluation. She moved to [place] from [place], and she didn't drive at night in [place]. But, since moving here and having to drive at night, she has been noticing severe problems with glare from lights and oncoming headlights. She is afraid to drive at night because of this. She notes more mild blurring of her daytime vision. No pain, redness, discharge. No flashes/floaters.    POH: No history of eye surgery or trauma  PMH: HL, no diabetes  FH: No FH of AMD or glc    Assessment & Plan      (H25.13) Nuclear sclerotic cataract of both eyes  (primary encounter diagnosis)  Comment: Visually significant with limitation of ADLs including night driving. BCVA of 20/20- right eye and left eye with BAT to 20/25-. NS and cortical changes on exam.    Dilates to: well  Alpha blockers/Flomax: None  Trauma/Pseudoxfoliation: None  Fuchs dystrophy/guttae: None    Diabetes: No  Anticoagulation: None    Cyl: 0.66 @ 006 right eye, 0.88 @ 173 OS    We discussed the risks and benefits of cataract surgery in the right eye, and informed consent was obtained.  Proceed with CE/IOL OD. Given overall good BCVA, will plan on right eye only to make sure she has improvement in symptoms prior to proceeding with left eye.    Surgical plan:  Topical  Aim emmetropia    (H52.203) Hyperopic astigmatism of both eyes  (H52.4) Presbyopia  Comment: Vision disturbed by cataract as above.  Plan: Hold off on new glasses Rx until after CE/IOL     -----------------------------------------------------------------------------------    Patient disposition:   Return for scheduled procedure. or sooner as needed.    Teaching statement:  Complete documentation of historical and exam elements from today's encounter can be found in the full encounter summary report (not reduplicated in this progress note). I personally obtained the chief complaint(s) and history of present  illness.  I confirmed and edited as necessary the review of systems, past medical/surgical history, family history, social history, and examination findings as documented by others; and I examined the patient myself. I personally reviewed the relevant tests, images, and reports as documented above.     I formulated and edited as necessary the assessment and plan and discussed the findings and management plan with the patient and family.    Trish Taylor MD  Comprehensive Ophthalmology & Ocular Pathology  Department of Ophthalmology and Visual Neurosciences  [email or web address]  Pager [phone]

## 2019-03-06 NOTE — NURSING NOTE
Chief Complaints and History of Present Illnesses   Patient presents with     Cataract Evaluation     Chief Complaint(s) and History of Present Illness(es)     Cataract Evaluation     Laterality: both eyes    Severity: moderate    Context: night driving and dim lighting    Course: stable    Activities affected: night driving and driving    Treatments tried: no treatments    Pain scale: 0/10              Comments     Referred by Dr Carrasco for cataract evaluation     Vision stable since LV. Confirms difficulty with night driving w/glare and halos to lights.   Did not update gls after LV.     Georgette Andrews COT 1:05 PM March 6, 2019

## 2019-03-14 ENCOUNTER — OFFICE VISIT (OUTPATIENT)
Dept: INTERNAL MEDICINE | Facility: CLINIC | Age: 74
End: 2019-03-14
Payer: MEDICARE

## 2019-03-14 VITALS
WEIGHT: 148.9 LBS | HEIGHT: 64 IN | OXYGEN SATURATION: 95 % | HEART RATE: 63 BPM | DIASTOLIC BLOOD PRESSURE: 83 MMHG | SYSTOLIC BLOOD PRESSURE: 134 MMHG | BODY MASS INDEX: 25.42 KG/M2

## 2019-03-14 DIAGNOSIS — E78.00 HYPERCHOLESTEROLEMIA: Chronic | ICD-10-CM

## 2019-03-14 DIAGNOSIS — L98.9 SKIN LESION: Primary | ICD-10-CM

## 2019-03-14 ASSESSMENT — ENCOUNTER SYMPTOMS
WEAKNESS: 0
MYALGIAS: 1
SMELL DISTURBANCE: 0
DIFFICULTY URINATING: 0
SINUS CONGESTION: 1
FLANK PAIN: 0
NECK PAIN: 0
NUMBNESS: 0
TROUBLE SWALLOWING: 0
HOARSE VOICE: 0
MEMORY LOSS: 0
STIFFNESS: 1
TINGLING: 0
HEADACHES: 0
DYSURIA: 0
TASTE DISTURBANCE: 0
EYE IRRITATION: 0
BACK PAIN: 1
TREMORS: 0
SPEECH CHANGE: 0
EYE PAIN: 0
DIZZINESS: 1
MUSCLE WEAKNESS: 0
NECK MASS: 0
MUSCLE CRAMPS: 1
EYE WATERING: 0
PARALYSIS: 0
DISTURBANCES IN COORDINATION: 0
JOINT SWELLING: 1
SEIZURES: 0
SORE THROAT: 0
EYE REDNESS: 0
ARTHRALGIAS: 1
DOUBLE VISION: 0
LOSS OF CONSCIOUSNESS: 0
HEMATURIA: 0
SINUS PAIN: 0

## 2019-03-14 ASSESSMENT — ACTIVITIES OF DAILY LIVING (ADL)
IN_THE_PAST_7_DAYS,_DID_YOU_NEED_HELP_FROM_OTHERS_TO_TAKE_CARE_OF_THINGS_SUCH_AS_LAUNDRY_AND_HOUSEKEEPING,_BANKING,_SHOPPING,_USING_THE_TELEPHONE,_FOOD_PREPARATION,_TRANSPORTATION,_OR_TAKING_YOUR_OWN_MEDICATIONS?: N
IN_THE_PAST_7_DAYS,_DID_YOU_NEED_HELP_FROM_OTHERS_TO_PERFORM_EVERYDAY_ACTIVITIES_SUCH_AS_EATING,_GETTING_DRESSED,_GROOMING,_BATHING,_WALKING,_OR_USING_THE_TOILET: N

## 2019-03-14 ASSESSMENT — MIFFLIN-ST. JEOR: SCORE: 1157.47

## 2019-03-14 ASSESSMENT — PAIN SCALES - GENERAL: PAINLEVEL: NO PAIN (0)

## 2019-03-14 NOTE — PROGRESS NOTES
[facility] [place]   Pippa CONNORSherie ArshNASIR lutz CNP  03/14/2019     Chief Complaint: Pre-Op Exam     History of Present Illness:   Giana Hope is 73 year old female here at the request of Dr. CHEVY Taylor for cardiovascular, pulmonary, and perioperative risk assessment prior to surgery.  The intended surgical procedure is right cataract removal with intraocular lens implant current scheduled for surgery on 3/22/19. A copy of this note will be sent to the surgeon.     Reason for surgery:   The patient has a history of nonsenile cataract and plans to have this removed on the right. She has trouble driving at night due to her vision and glare on the roads. She moved here from [place] and was using public transportation in the past. The patient is currently on Pravastatin and Sulindac. She has not been told to hold those medications. She denies recent cold symptoms, sore throat, ankle swelling, cough, ear pain, diarrhea, dysuria, urinary frequency, fever, or chills.     The patient notes a tendency to build up wax in her ears. She has used a wax softener in the past.     The patient reports a red spot on the tip of her nose that appeared around the same time she moved here in October. She denies it feeling rough.     Cardiovascular Risk:  This patient does not have chest pain with ambulation or walking up a flight of stairs. There is not any chest pain with exercise. She does have a history of high cholesterol. There is not a history of stroke or valvular disease.    Pulmonary Risk:  The patient does not have any history of lung problems.    Perioperative Complications:  The patient does not have a history of bleeding or clotting problems in the past, specifically has no history of DVT, PE, or bleeding disorder.  They are not currently anticoagulated.  She has not had complications from past surgeries. The patient does not have a family history of any anesthesia or surgical complications.    Review of  "Systems:   Pertinent items are noted in HPI or as in patient entered ROS below, remainder of complete ROS is negative.    Active Medications:      pravastatin (PRAVACHOL) 20 MG tablet, Take 1 tablet (20 mg) by mouth daily, Disp: 90 tablet, Rfl: 3     sulindac (CLINORIL) 200 MG tablet, Take 1 tablet (200 mg) by mouth 2 times daily (with meals), Disp: 180 tablet, Rfl: 3      Allergies: Patient has no known allergies.      Past Medical History:  Nonsenile cataract   Osteoarthritis   Uveitis     Past Surgical History: No pertinent past surgical history to note.     Family History:   Mother - arthritis     Social History:   Presents to clinic alone  Tobacco Use: No previous or current tobacco use.   Alcohol Use: No alcohol use.   PCP: Kellee Knowles      Physical Exam:   /83   Pulse 63   Ht 1.613 m (5' 3.5\")   Wt 67.5 kg (148 lb 14.4 oz)   SpO2 95%   BMI 25.96 kg/m       Constitutional: no distress, comfortable, pleasant   Eyes: anicteric, conjunctiva pink.  Ears, Nose and Throat: Bilateral TM's occluded by wax. After removal, tympanic membranes clear,throat clear.   Neck: no thyromegaly.   Cardiovascular: regular rate and rhythm, normal S1 and S2, no murmurs, rubs or gallops, no edema, peripheral pulses full and symmetric   Respiratory: clear to auscultation, no wheezes or crackles, normal breath sounds   Gastrointestinal: positive bowel sounds, nontender, no hepatosplenomegaly, no masses   Musculoskeletal: full range of motion, no edema   Skin: no concerning lesions, no jaundice, temp normal   Neurological:  normal gait, normal speech, no tremor. A and O x 3, good historian.  Psychological: appropriate mood, good eye contact, normal affect   Lymph: no  cervical,  supraclavicular, infraclavicular nodes.     Recent Labs:  Component      Latest Ref Rng & Units 1/31/2019   Sodium      133 - 144 mmol/L 139   Potassium      3.4 - 5.3 mmol/L 4.1   Chloride      94 - 109 mmol/L 104   Carbon Dioxide      20 - 32 " mmol/L 30   Anion Gap      3 - 14 mmol/L 6   Glucose      70 - 99 mg/dL 100 (H)   Urea Nitrogen      7 - 30 mg/dL 26   Creatinine      0.52 - 1.04 mg/dL 0.82   GFR Estimate      >60 mL/min/1.73:m2 71   GFR Estimate If Black      >60 mL/min/1.73:m2 82   Calcium      8.5 - 10.1 mg/dL 9.6     EKG:  EKG was not indicated based on risk assessment.      Pregnancy test needed: No     Assessment and Plan:  Hypercholesterolemia    Skin lesion - Referred to dermatology for further evaluation of the skin lesion on her nose.   - DERMATOLOGY REFERRAL    1. Pre-operative assessment for right cataract removal with intraocular lens implant current scheduled for surgery on 3/22/19.  The patient is at LOW risk for cardiovascular complications and at LOW risk for pulmonary complications of this LOW risk surgery.    --Approval given to proceed with proposed procedure, without further diagnostic evaluation    The patient has been told to not take any aspirin or NSAIDs for 10 days prior to surgery. The patient has been instructed as to what to do with medications prior to surgery. Use Tylenol if necessary for pain.     Follow-up: Return to clinic prn.         Scribe Disclosure:   IGeorgette, am serving as a scribe to document services personally performed by NASIR Baxter CNP at this visit, based upon the provider's statements to me. All documentation has been reviewed by the aforementioned provider prior to being entered into the official medical record.     Portions of this medical record were completed by a scribe. UPON MY REVIEW AND AUTHENTICATION BY ELECTRONIC SIGNATURE, this confirms (a) I performed the applicable clinical services, and (b) the record is accurate.      Answers for HPI/ROS submitted by the patient on 3/14/2019   General Symptoms: No  Skin Symptoms: No  HENT Symptoms: Yes  EYE SYMPTOMS: Yes  HEART SYMPTOMS: No  LUNG SYMPTOMS: No  INTESTINAL SYMPTOMS: No  URINARY SYMPTOMS: Yes  GYNECOLOGIC SYMPTOMS:  No  BREAST SYMPTOMS: No  SKELETAL SYMPTOMS: Yes  BLOOD SYMPTOMS: No  NERVOUS SYSTEM SYMPTOMS: Yes  MENTAL HEALTH SYMPTOMS: No  Ear pain: No  Ear discharge: No  Hearing loss: Yes  Tinnitus: Yes  Nosebleeds: No  Congestion: Yes  Sinus pain: No  Trouble swallowing: No   Voice hoarseness: No  Mouth sores: No  Sore throat: No  Tooth pain: No  Gum tenderness: No  Bleeding gums: No  Change in taste: No  Change in sense of smell: No  Dry mouth: Yes  Hearing aid used: No  Neck lump: No  Eye pain: No  Vision loss: No  Dry eyes: No  Watery eyes: No  Eye bulging: No  Double vision: No  Flashing of lights: No  Spots: No  Floaters: No  Redness: No  Crossed eyes: No  Tunnel Vision: No  Yellowing of eyes: No  Eye irritation: No  Trouble holding urine or incontinence: Yes  Pain or burning: No  Trouble starting or stopping: No  Increased frequency of urination: No  Blood in urine: No  Decreased frequency of urination: No  Frequent nighttime urination: No  Flank pain: No  Difficulty emptying bladder: No  Back pain: Yes  Muscle aches: Yes  Neck pain: No  Swollen joints: Yes  Joint pain: Yes  Bone pain: No  Muscle cramps: Yes  Muscle weakness: No  Joint stiffness: Yes  Bone fracture: No  Trouble with coordination: No  Dizziness or trouble with balance: Yes  Fainting or black-out spells: No  Memory loss: No  Headache: No  Seizures: No  Speech problems: No  Tingling: No  Tremor: No  Weakness: No  Difficulty walking: No  Paralysis: No  Numbness: No

## 2019-03-14 NOTE — NURSING NOTE
Surgeon (please enter first and last name):  Trish Taylor MD  Fax number for Preop Evaluation:  [facility]  Location of Surgery: [facility]  Date of Surgery:  3.22.19  Procedure:  Right Cataract Removal with Intraocular Lens Implant  History of reaction to anesthesia?  No

## 2019-03-14 NOTE — LETTER
3/14/2019      RE: Giana Hope  [address]  [place] [address]       [facility] [place]   NASIR Baxter CNP  03/14/2019     Chief Complaint: Pre-Op Exam     History of Present Illness:   Giana Hoep is 73 year old female here at the request of Dr. CHEVY Taylor for cardiovascular, pulmonary, and perioperative risk assessment prior to surgery.  The intended surgical procedure is right cataract removal with intraocular lens implant current scheduled for surgery on 3/22/19. A copy of this note will be sent to the surgeon.     Reason for surgery:   The patient has a history of nonsenile cataract and plans to have this removed on the right. She has trouble driving at night due to her vision and glare on the roads. She moved here from [place] and was using public transportation in the past. The patient is currently on Pravastatin and Sulindac. She has not been told to hold those medications. She denies recent cold symptoms, sore throat, ankle swelling, cough, ear pain, diarrhea, dysuria, urinary frequency, fever, or chills.     The patient notes a tendency to build up wax in her ears. She has used a wax softener in the past.     The patient reports a red spot on the tip of her nose that appeared around the same time she moved here in October. She denies it feeling rough.     Cardiovascular Risk:  This patient does not have chest pain with ambulation or walking up a flight of stairs. There is not any chest pain with exercise. She does have a history of high cholesterol. There is not a history of stroke or valvular disease.    Pulmonary Risk:  The patient does not have any history of lung problems.    Perioperative Complications:  The patient does not have a history of bleeding or clotting problems in the past, specifically has no history of DVT, PE, or bleeding disorder.  They are not currently anticoagulated.  She has not had complications from past surgeries. The patient does  "not have a family history of any anesthesia or surgical complications.    Review of Systems:   Pertinent items are noted in HPI or as in patient entered ROS below, remainder of complete ROS is negative.    Active Medications:      pravastatin (PRAVACHOL) 20 MG tablet, Take 1 tablet (20 mg) by mouth daily, Disp: 90 tablet, Rfl: 3     sulindac (CLINORIL) 200 MG tablet, Take 1 tablet (200 mg) by mouth 2 times daily (with meals), Disp: 180 tablet, Rfl: 3      Allergies: Patient has no known allergies.      Past Medical History:  Nonsenile cataract   Osteoarthritis   Uveitis     Past Surgical History: No pertinent past surgical history to note.     Family History:   Mother - arthritis     Social History:   Presents to clinic alone  Tobacco Use: No previous or current tobacco use.   Alcohol Use: No alcohol use.   PCP: Kellee Knowles      Physical Exam:   /83   Pulse 63   Ht 1.613 m (5' 3.5\")   Wt 67.5 kg (148 lb 14.4 oz)   SpO2 95%   BMI 25.96 kg/m        Constitutional: no distress, comfortable, pleasant   Eyes: anicteric, conjunctiva pink.  Ears, Nose and Throat: Bilateral TM's occluded by wax. After removal, tympanic membranes clear,throat clear.   Neck: no thyromegaly.   Cardiovascular: regular rate and rhythm, normal S1 and S2, no murmurs, rubs or gallops, no edema, peripheral pulses full and symmetric   Respiratory: clear to auscultation, no wheezes or crackles, normal breath sounds   Gastrointestinal: positive bowel sounds, nontender, no hepatosplenomegaly, no masses   Musculoskeletal: full range of motion, no edema   Skin: no concerning lesions, no jaundice, temp normal   Neurological:  normal gait, normal speech, no tremor. A and O x 3, good historian.  Psychological: appropriate mood, good eye contact, normal affect   Lymph: no  cervical,  supraclavicular, infraclavicular nodes.     Recent Labs:  Component      Latest Ref Rng & Units 1/31/2019   Sodium      133 - 144 mmol/L 139   Potassium      3.4 " - 5.3 mmol/L 4.1   Chloride      94 - 109 mmol/L 104   Carbon Dioxide      20 - 32 mmol/L 30   Anion Gap      3 - 14 mmol/L 6   Glucose      70 - 99 mg/dL 100 (H)   Urea Nitrogen      7 - 30 mg/dL 26   Creatinine      0.52 - 1.04 mg/dL 0.82   GFR Estimate      >60 mL/min/1.73:m2 71   GFR Estimate If Black      >60 mL/min/1.73:m2 82   Calcium      8.5 - 10.1 mg/dL 9.6     EKG:  EKG was not indicated based on risk assessment.      Pregnancy test needed: No     Assessment and Plan:  Hypercholesterolemia    Skin lesion - Referred to dermatology for further evaluation of the skin lesion on her nose.   - DERMATOLOGY REFERRAL    1. Pre-operative assessment for right cataract removal with intraocular lens implant current scheduled for surgery on 3/22/19.  The patient is at LOW risk for cardiovascular complications and at LOW risk for pulmonary complications of this LOW risk surgery.    --Approval given to proceed with proposed procedure, without further diagnostic evaluation    The patient has been told to not take any aspirin or NSAIDs for 10 days prior to surgery. The patient has been instructed as to what to do with medications prior to surgery. Use Tylenol if necessary for pain.     Follow-up: Return to clinic prn.         Scribe Disclosure:   I, Georgette Armstrong, am serving as a scribe to document services personally performed by NASIR Baxter CNP at this visit, based upon the provider's statements to me. All documentation has been reviewed by the aforementioned provider prior to being entered into the official medical record.     Portions of this medical record were completed by a scribe. UPON MY REVIEW AND AUTHENTICATION BY ELECTRONIC SIGNATURE, this confirms (a) I performed the applicable clinical services, and (b) the record is accurate.      Answers for HPI/ROS submitted by the patient on 3/14/2019   General Symptoms: No  Skin Symptoms: No  HENT Symptoms: Yes  EYE SYMPTOMS: Yes  HEART SYMPTOMS: No  LUNG  SYMPTOMS: No  INTESTINAL SYMPTOMS: No  URINARY SYMPTOMS: Yes  GYNECOLOGIC SYMPTOMS: No  BREAST SYMPTOMS: No  SKELETAL SYMPTOMS: Yes  BLOOD SYMPTOMS: No  NERVOUS SYSTEM SYMPTOMS: Yes  MENTAL HEALTH SYMPTOMS: No  Ear pain: No  Ear discharge: No  Hearing loss: Yes  Tinnitus: Yes  Nosebleeds: No  Congestion: Yes  Sinus pain: No  Trouble swallowing: No   Voice hoarseness: No  Mouth sores: No  Sore throat: No  Tooth pain: No  Gum tenderness: No  Bleeding gums: No  Change in taste: No  Change in sense of smell: No  Dry mouth: Yes  Hearing aid used: No  Neck lump: No  Eye pain: No  Vision loss: No  Dry eyes: No  Watery eyes: No  Eye bulging: No  Double vision: No  Flashing of lights: No  Spots: No  Floaters: No  Redness: No  Crossed eyes: No  Tunnel Vision: No  Yellowing of eyes: No  Eye irritation: No  Trouble holding urine or incontinence: Yes  Pain or burning: No  Trouble starting or stopping: No  Increased frequency of urination: No  Blood in urine: No  Decreased frequency of urination: No  Frequent nighttime urination: No  Flank pain: No  Difficulty emptying bladder: No  Back pain: Yes  Muscle aches: Yes  Neck pain: No  Swollen joints: Yes  Joint pain: Yes  Bone pain: No  Muscle cramps: Yes  Muscle weakness: No  Joint stiffness: Yes  Bone fracture: No  Trouble with coordination: No  Dizziness or trouble with balance: Yes  Fainting or black-out spells: No  Memory loss: No  Headache: No  Seizures: No  Speech problems: No  Tingling: No  Tremor: No  Weakness: No  Difficulty walking: No  Paralysis: No  Numbness: No        NASIR Baxter CNP

## 2019-03-14 NOTE — LETTER
3/14/2019      RE: Giana Hope  [address]  [place] [address]       [facility] [place]   NASIR Baxter CNP  03/14/2019     Chief Complaint: Pre-Op Exam     History of Present Illness:   Giana Hope is 73 year old female here at the request of Dr. CHEVY Taylor for cardiovascular, pulmonary, and perioperative risk assessment prior to surgery.  The intended surgical procedure is right cataract removal with intraocular lens implant current scheduled for surgery on 3/22/19. A copy of this note will be sent to the surgeon.     Reason for surgery:   The patient has a history of nonsenile cataract and plans to have this removed on the right. She has trouble driving at night due to her vision and glare on the roads. She moved here from [place] and was using public transportation in the past. The patient is currently on Pravastatin and Sulindac. She has not been told to hold those medications. She denies recent cold symptoms, sore throat, ankle swelling, cough, ear pain, diarrhea, dysuria, urinary frequency, fever, or chills.     The patient notes a tendency to build up wax in her ears. She has used a wax softener in the past.     The patient reports a red spot on the tip of her nose that appeared around the same time she moved here in October. She denies it feeling rough.     Cardiovascular Risk:  This patient does not have chest pain with ambulation or walking up a flight of stairs. There is not any chest pain with exercise. She does have a history of high cholesterol. There is not a history of stroke or valvular disease.    Pulmonary Risk:  The patient does not have any history of lung problems.    Perioperative Complications:  The patient does not have a history of bleeding or clotting problems in the past, specifically has no history of DVT, PE, or bleeding disorder.  They are not currently anticoagulated.  She has not had complications from past surgeries. The patient  "does not have a family history of any anesthesia or surgical complications.    Review of Systems:   Pertinent items are noted in HPI or as in patient entered ROS below, remainder of complete ROS is negative.    Active Medications:      pravastatin (PRAVACHOL) 20 MG tablet, Take 1 tablet (20 mg) by mouth daily, Disp: 90 tablet, Rfl: 3     sulindac (CLINORIL) 200 MG tablet, Take 1 tablet (200 mg) by mouth 2 times daily (with meals), Disp: 180 tablet, Rfl: 3      Allergies: Patient has no known allergies.      Past Medical History:  Nonsenile cataract   Osteoarthritis   Uveitis     Past Surgical History: No pertinent past surgical history to note.     Family History:   Mother - arthritis     Social History:   Presents to clinic alone  Tobacco Use: No previous or current tobacco use.   Alcohol Use: No alcohol use.   PCP: Kellee Knowles      Physical Exam:   /83   Pulse 63   Ht 1.613 m (5' 3.5\")   Wt 67.5 kg (148 lb 14.4 oz)   SpO2 95%   BMI 25.96 kg/m        Constitutional: no distress, comfortable, pleasant   Eyes: anicteric, conjunctiva pink.  Ears, Nose and Throat: Bilateral TM's occluded by wax. After removal, tympanic membranes clear,throat clear.   Neck: no thyromegaly.   Cardiovascular: regular rate and rhythm, normal S1 and S2, no murmurs, rubs or gallops, no edema, peripheral pulses full and symmetric   Respiratory: clear to auscultation, no wheezes or crackles, normal breath sounds   Gastrointestinal: positive bowel sounds, nontender, no hepatosplenomegaly, no masses   Musculoskeletal: full range of motion, no edema   Skin: no concerning lesions, no jaundice, temp normal   Neurological:  normal gait, normal speech, no tremor. A and O x 3, good historian.  Psychological: appropriate mood, good eye contact, normal affect   Lymph: no  cervical,  supraclavicular, infraclavicular nodes.     Recent Labs:  Component      Latest Ref Rng & Units 1/31/2019   Sodium      133 - 144 mmol/L 139   Potassium      " 3.4 - 5.3 mmol/L 4.1   Chloride      94 - 109 mmol/L 104   Carbon Dioxide      20 - 32 mmol/L 30   Anion Gap      3 - 14 mmol/L 6   Glucose      70 - 99 mg/dL 100 (H)   Urea Nitrogen      7 - 30 mg/dL 26   Creatinine      0.52 - 1.04 mg/dL 0.82   GFR Estimate      >60 mL/min/1.73:m2 71   GFR Estimate If Black      >60 mL/min/1.73:m2 82   Calcium      8.5 - 10.1 mg/dL 9.6     EKG:  EKG was not indicated based on risk assessment.      Pregnancy test needed: No     Assessment and Plan:  Hypercholesterolemia    Skin lesion - Referred to dermatology for further evaluation of the skin lesion on her nose.   - DERMATOLOGY REFERRAL    1. Pre-operative assessment for right cataract removal with intraocular lens implant current scheduled for surgery on 3/22/19.  The patient is at LOW risk for cardiovascular complications and at LOW risk for pulmonary complications of this LOW risk surgery.    --Approval given to proceed with proposed procedure, without further diagnostic evaluation    The patient has been told to not take any aspirin or NSAIDs for 10 days prior to surgery. The patient has been instructed as to what to do with medications prior to surgery. Use Tylenol if necessary for pain.     Follow-up: Return to clinic prn.         Scribe Disclosure:   I, Georgette Armstrong, am serving as a scribe to document services personally performed by NASIR Baxter CNP at this visit, based upon the provider's statements to me. All documentation has been reviewed by the aforementioned provider prior to being entered into the official medical record.     Portions of this medical record were completed by a scribe. UPON MY REVIEW AND AUTHENTICATION BY ELECTRONIC SIGNATURE, this confirms (a) I performed the applicable clinical services, and (b) the record is accurate.      Answers for HPI/ROS submitted by the patient on 3/14/2019   General Symptoms: No  Skin Symptoms: No  HENT Symptoms: Yes  EYE SYMPTOMS: Yes  HEART SYMPTOMS:  No  LUNG SYMPTOMS: No  INTESTINAL SYMPTOMS: No  URINARY SYMPTOMS: Yes  GYNECOLOGIC SYMPTOMS: No  BREAST SYMPTOMS: No  SKELETAL SYMPTOMS: Yes  BLOOD SYMPTOMS: No  NERVOUS SYSTEM SYMPTOMS: Yes  MENTAL HEALTH SYMPTOMS: No  Ear pain: No  Ear discharge: No  Hearing loss: Yes  Tinnitus: Yes  Nosebleeds: No  Congestion: Yes  Sinus pain: No  Trouble swallowing: No   Voice hoarseness: No  Mouth sores: No  Sore throat: No  Tooth pain: No  Gum tenderness: No  Bleeding gums: No  Change in taste: No  Change in sense of smell: No  Dry mouth: Yes  Hearing aid used: No  Neck lump: No  Eye pain: No  Vision loss: No  Dry eyes: No  Watery eyes: No  Eye bulging: No  Double vision: No  Flashing of lights: No  Spots: No  Floaters: No  Redness: No  Crossed eyes: No  Tunnel Vision: No  Yellowing of eyes: No  Eye irritation: No  Trouble holding urine or incontinence: Yes  Pain or burning: No  Trouble starting or stopping: No  Increased frequency of urination: No  Blood in urine: No  Decreased frequency of urination: No  Frequent nighttime urination: No  Flank pain: No  Difficulty emptying bladder: No  Back pain: Yes  Muscle aches: Yes  Neck pain: No  Swollen joints: Yes  Joint pain: Yes  Bone pain: No  Muscle cramps: Yes  Muscle weakness: No  Joint stiffness: Yes  Bone fracture: No  Trouble with coordination: No  Dizziness or trouble with balance: Yes  Fainting or black-out spells: No  Memory loss: No  Headache: No  Seizures: No  Speech problems: No  Tingling: No  Tremor: No  Weakness: No  Difficulty walking: No  Paralysis: No  Numbness: No        NASIR Baxter CNP

## 2019-03-15 NOTE — TELEPHONE ENCOUNTER
RECORDS RECEIVED FROM: Osteoarthritis,Left Knee pain,referred by Kellee Knowles, previous total left knee replacement at [facility] in [facility] 3 years ago, all records should be in system,appt per pt   DATE RECEIVED: 03/15/19    NOTES STATUS DETAILS   OFFICE NOTE from referring provider Internal Dr. Knowles 1/31/19   OFFICE NOTE from other specialist Care Everywhere    DISCHARGE SUMMARY from hospital N/A    DISCHARGE REPORT from the ER N/A    OPERATIVE REPORT Care Everywhere Dr. Pang 2/25/16   MEDICATION LIST Internal    IMPLANT RECORD/STICKER Care Everywhere    LABS     CBC/DIFF N/A    CULTURES N/A    INJECTIONS DONE IN RADIOLOGY N/A    MRI N/A    CT SCAN N/A    XRAYS (IMAGES & REPORTS) N/A    TUMOR     PATHOLOGY  Slides & report N/A      03/15/19 no recent imaging

## 2019-03-20 ENCOUNTER — PRE VISIT (OUTPATIENT)
Dept: ORTHOPEDICS | Facility: CLINIC | Age: 74
End: 2019-03-20

## 2019-03-20 DIAGNOSIS — H25.11 NUCLEAR SCLEROTIC CATARACT, RIGHT: Primary | ICD-10-CM

## 2019-03-20 RX ORDER — PREDNISOLONE ACETATE 10 MG/ML
SUSPENSION/ DROPS OPHTHALMIC
Qty: 1 BOTTLE | Refills: 1 | Status: SHIPPED | OUTPATIENT
Start: 2019-03-20 | End: 2020-01-13

## 2019-03-20 RX ORDER — OFLOXACIN 3 MG/ML
SOLUTION/ DROPS OPHTHALMIC
Qty: 1 BOTTLE | Refills: 0 | Status: SHIPPED | OUTPATIENT
Start: 2019-03-20 | End: 2019-04-15

## 2019-03-21 ENCOUNTER — ANESTHESIA EVENT (OUTPATIENT)
Dept: SURGERY | Facility: AMBULATORY SURGERY CENTER | Age: 74
End: 2019-03-21

## 2019-03-22 ENCOUNTER — HOSPITAL ENCOUNTER (OUTPATIENT)
Facility: AMBULATORY SURGERY CENTER | Age: 74
End: 2019-03-22
Attending: OPHTHALMOLOGY
Payer: MEDICARE

## 2019-03-22 ENCOUNTER — OFFICE VISIT (OUTPATIENT)
Dept: OPHTHALMOLOGY | Facility: CLINIC | Age: 74
End: 2019-03-22
Payer: MEDICARE

## 2019-03-22 ENCOUNTER — ANESTHESIA (OUTPATIENT)
Dept: SURGERY | Facility: AMBULATORY SURGERY CENTER | Age: 74
End: 2019-03-22

## 2019-03-22 VITALS
HEART RATE: 58 BPM | BODY MASS INDEX: 25.1 KG/M2 | OXYGEN SATURATION: 98 % | SYSTOLIC BLOOD PRESSURE: 129 MMHG | WEIGHT: 147 LBS | TEMPERATURE: 97.4 F | RESPIRATION RATE: 16 BRPM | HEIGHT: 64 IN | DIASTOLIC BLOOD PRESSURE: 77 MMHG

## 2019-03-22 DIAGNOSIS — Z96.1 PSEUDOPHAKIA: Primary | ICD-10-CM

## 2019-03-22 DIAGNOSIS — Z98.890 POSTOPERATIVE EYE STATE: ICD-10-CM

## 2019-03-22 DIAGNOSIS — H25.11 AGE-RELATED NUCLEAR CATARACT, RIGHT: Primary | ICD-10-CM

## 2019-03-22 DIAGNOSIS — H25.11 NUCLEAR SCLEROTIC CATARACT, RIGHT: Primary | ICD-10-CM

## 2019-03-22 DEVICE — EYE IMP IOL AMO PCL TECNIS ZCB00 20.0: Type: IMPLANTABLE DEVICE | Site: EYE | Status: FUNCTIONAL

## 2019-03-22 RX ORDER — FLURBIPROFEN SODIUM 0.3 MG/ML
1 SOLUTION/ DROPS OPHTHALMIC
Status: DISCONTINUED | OUTPATIENT
Start: 2019-03-22 | End: 2019-03-22 | Stop reason: HOSPADM

## 2019-03-22 RX ORDER — MOXIFLOXACIN IN NACL,ISO-OS/PF 0.3MG/0.3
SYRINGE (ML) INTRAOCULAR PRN
Status: DISCONTINUED | OUTPATIENT
Start: 2019-03-22 | End: 2019-03-22 | Stop reason: HOSPADM

## 2019-03-22 RX ORDER — OFLOXACIN 3 MG/ML
1 SOLUTION/ DROPS OPHTHALMIC
Status: COMPLETED | OUTPATIENT
Start: 2019-03-22 | End: 2019-03-22

## 2019-03-22 RX ORDER — PHENYLEPHRINE HYDROCHLORIDE 25 MG/ML
1 SOLUTION/ DROPS OPHTHALMIC
Status: COMPLETED | OUTPATIENT
Start: 2019-03-22 | End: 2019-03-22

## 2019-03-22 RX ORDER — TETRACAINE HYDROCHLORIDE 5 MG/ML
SOLUTION OPHTHALMIC PRN
Status: DISCONTINUED | OUTPATIENT
Start: 2019-03-22 | End: 2019-03-22 | Stop reason: HOSPADM

## 2019-03-22 RX ORDER — ONDANSETRON 2 MG/ML
4 INJECTION INTRAMUSCULAR; INTRAVENOUS EVERY 30 MIN PRN
Status: DISCONTINUED | OUTPATIENT
Start: 2019-03-22 | End: 2019-03-23 | Stop reason: HOSPADM

## 2019-03-22 RX ORDER — MEPERIDINE HYDROCHLORIDE 25 MG/ML
12.5 INJECTION INTRAMUSCULAR; INTRAVENOUS; SUBCUTANEOUS
Status: DISCONTINUED | OUTPATIENT
Start: 2019-03-22 | End: 2019-03-23 | Stop reason: HOSPADM

## 2019-03-22 RX ORDER — SODIUM CHLORIDE, SODIUM LACTATE, POTASSIUM CHLORIDE, CALCIUM CHLORIDE 600; 310; 30; 20 MG/100ML; MG/100ML; MG/100ML; MG/100ML
INJECTION, SOLUTION INTRAVENOUS CONTINUOUS PRN
Status: DISCONTINUED | OUTPATIENT
Start: 2019-03-22 | End: 2019-03-22

## 2019-03-22 RX ORDER — TIMOLOL MALEATE 5 MG/ML
1 SOLUTION/ DROPS OPHTHALMIC ONCE
Status: DISCONTINUED | OUTPATIENT
Start: 2019-03-22 | End: 2020-01-13

## 2019-03-22 RX ORDER — ONDANSETRON 4 MG/1
4 TABLET, ORALLY DISINTEGRATING ORAL EVERY 30 MIN PRN
Status: DISCONTINUED | OUTPATIENT
Start: 2019-03-22 | End: 2019-03-23 | Stop reason: HOSPADM

## 2019-03-22 RX ORDER — PROPARACAINE HYDROCHLORIDE 5 MG/ML
1 SOLUTION/ DROPS OPHTHALMIC ONCE
Status: COMPLETED | OUTPATIENT
Start: 2019-03-22 | End: 2019-03-22

## 2019-03-22 RX ORDER — BALANCED SALT SOLUTION 6.4; .75; .48; .3; 3.9; 1.7 MG/ML; MG/ML; MG/ML; MG/ML; MG/ML; MG/ML
SOLUTION OPHTHALMIC PRN
Status: DISCONTINUED | OUTPATIENT
Start: 2019-03-22 | End: 2019-03-22 | Stop reason: HOSPADM

## 2019-03-22 RX ORDER — SODIUM CHLORIDE, SODIUM LACTATE, POTASSIUM CHLORIDE, CALCIUM CHLORIDE 600; 310; 30; 20 MG/100ML; MG/100ML; MG/100ML; MG/100ML
INJECTION, SOLUTION INTRAVENOUS CONTINUOUS
Status: DISCONTINUED | OUTPATIENT
Start: 2019-03-22 | End: 2019-03-23 | Stop reason: HOSPADM

## 2019-03-22 RX ORDER — LIDOCAINE HYDROCHLORIDE 10 MG/ML
INJECTION, SOLUTION EPIDURAL; INFILTRATION; INTRACAUDAL; PERINEURAL PRN
Status: DISCONTINUED | OUTPATIENT
Start: 2019-03-22 | End: 2019-03-22 | Stop reason: HOSPADM

## 2019-03-22 RX ORDER — ACETAMINOPHEN 325 MG/1
975 TABLET ORAL ONCE
Status: COMPLETED | OUTPATIENT
Start: 2019-03-22 | End: 2019-03-22

## 2019-03-22 RX ORDER — CYCLOPENTOLATE HYDROCHLORIDE 10 MG/ML
1 SOLUTION/ DROPS OPHTHALMIC
Status: COMPLETED | OUTPATIENT
Start: 2019-03-22 | End: 2019-03-22

## 2019-03-22 RX ORDER — NALOXONE HYDROCHLORIDE 0.4 MG/ML
.1-.4 INJECTION, SOLUTION INTRAMUSCULAR; INTRAVENOUS; SUBCUTANEOUS
Status: DISCONTINUED | OUTPATIENT
Start: 2019-03-22 | End: 2019-03-23 | Stop reason: HOSPADM

## 2019-03-22 RX ORDER — FENTANYL CITRATE 50 UG/ML
25-50 INJECTION, SOLUTION INTRAMUSCULAR; INTRAVENOUS
Status: DISCONTINUED | OUTPATIENT
Start: 2019-03-22 | End: 2019-03-22 | Stop reason: HOSPADM

## 2019-03-22 RX ADMIN — FLURBIPROFEN SODIUM 1 DROP: 0.3 SOLUTION/ DROPS OPHTHALMIC at 07:41

## 2019-03-22 RX ADMIN — FLURBIPROFEN SODIUM 1 DROP: 0.3 SOLUTION/ DROPS OPHTHALMIC at 07:33

## 2019-03-22 RX ADMIN — OFLOXACIN 1 DROP: 3 SOLUTION/ DROPS OPHTHALMIC at 07:42

## 2019-03-22 RX ADMIN — CYCLOPENTOLATE HYDROCHLORIDE 1 DROP: 10 SOLUTION/ DROPS OPHTHALMIC at 07:41

## 2019-03-22 RX ADMIN — FLURBIPROFEN SODIUM 1 DROP: 0.3 SOLUTION/ DROPS OPHTHALMIC at 07:22

## 2019-03-22 RX ADMIN — OFLOXACIN 1 DROP: 3 SOLUTION/ DROPS OPHTHALMIC at 07:33

## 2019-03-22 RX ADMIN — OFLOXACIN 1 DROP: 3 SOLUTION/ DROPS OPHTHALMIC at 07:22

## 2019-03-22 RX ADMIN — CYCLOPENTOLATE HYDROCHLORIDE 1 DROP: 10 SOLUTION/ DROPS OPHTHALMIC at 07:22

## 2019-03-22 RX ADMIN — SODIUM CHLORIDE, SODIUM LACTATE, POTASSIUM CHLORIDE, CALCIUM CHLORIDE: 600; 310; 30; 20 INJECTION, SOLUTION INTRAVENOUS at 08:25

## 2019-03-22 RX ADMIN — PHENYLEPHRINE HYDROCHLORIDE 1 DROP: 25 SOLUTION/ DROPS OPHTHALMIC at 07:33

## 2019-03-22 RX ADMIN — CYCLOPENTOLATE HYDROCHLORIDE 1 DROP: 10 SOLUTION/ DROPS OPHTHALMIC at 07:33

## 2019-03-22 RX ADMIN — PHENYLEPHRINE HYDROCHLORIDE 1 DROP: 25 SOLUTION/ DROPS OPHTHALMIC at 07:22

## 2019-03-22 RX ADMIN — PHENYLEPHRINE HYDROCHLORIDE 1 DROP: 25 SOLUTION/ DROPS OPHTHALMIC at 07:41

## 2019-03-22 RX ADMIN — ACETAMINOPHEN 975 MG: 325 TABLET ORAL at 07:20

## 2019-03-22 RX ADMIN — PROPARACAINE HYDROCHLORIDE 1 DROP: 5 SOLUTION/ DROPS OPHTHALMIC at 07:21

## 2019-03-22 ASSESSMENT — VISUAL ACUITY
OD_SC: 20/25
METHOD: SNELLEN - LINEAR
OD_SC+: -3

## 2019-03-22 ASSESSMENT — EXTERNAL EXAM - RIGHT EYE: OD_EXAM: NORMAL

## 2019-03-22 ASSESSMENT — TONOMETRY
OD_IOP_MMHG: 33
IOP_METHOD: TONOPEN

## 2019-03-22 ASSESSMENT — MIFFLIN-ST. JEOR: SCORE: 1148.85

## 2019-03-22 ASSESSMENT — SLIT LAMP EXAM - LIDS: COMMENTS: NORMAL

## 2019-03-22 ASSESSMENT — CUP TO DISC RATIO: OD_RATIO: 0.25

## 2019-03-22 ASSESSMENT — LIFESTYLE VARIABLES: TOBACCO_USE: 0

## 2019-03-22 NOTE — NURSING NOTE
Chief Complaints and History of Present Illnesses   Patient presents with     Post Op (Ophthalmology) Right Eye     Chief Complaint(s) and History of Present Illness(es)     Post Op (Ophthalmology) Right Eye     Laterality: right eye    Quality: blurred vision    Severity: moderate    Course: stable    Associated symptoms: Negative for eye pain    Pain scale: 0/10              Comments     S/p Right Cataract Removal with Intraocular Lens Implant same day. No eye pain today. Vision is blurry. Patient states they have their post op drops from the pharmacy.     Claire Duarte COT 11:21 AM March 22, 2019

## 2019-03-22 NOTE — ANESTHESIA CARE TRANSFER NOTE
Patient: Giana Hope    Procedure(s):  Right Cataract Removal with Intraocular Lens Implant    Diagnosis: Visually Significant Cataract  Diagnosis Additional Information: No value filed.    Anesthesia Type:   No value filed.     Note:  Airway :Room Air  Patient transferred to:Phase II  Handoff Report: Identifed the Patient, Identified the Reponsible Provider, Reviewed the pertinent medical history, Discussed the surgical course, Reviewed Intra-OP anesthesia mangement and issues during anesthesia, Set expectations for post-procedure period and Allowed opportunity for questions and acknowledgement of understanding      Vitals: (Last set prior to Anesthesia Care Transfer)    CRNA VITALS  3/22/2019 0819 - 3/22/2019 0852      3/22/2019             Resp Rate (observed):  1  (Abnormal)     Resp Rate (set):  10                Electronically Signed By: NASIR Turner CRNA  March 22, 2019  8:52 AM

## 2019-03-22 NOTE — ANESTHESIA POSTPROCEDURE EVALUATION
Anesthesia POST Procedure Evaluation    Patient: Giana Hope   MRN:     [id number] Gender:   female   Age:    73 year old :      1945        Preoperative Diagnosis: Visually Significant Cataract   Procedure(s):  Right Cataract Removal with Intraocular Lens Implant   Postop Comments: No value filed.       Anesthesia Type:  MAC    Reportable Event: NO     PAIN: Uncomplicated   Sign Out status: Comfortable, Well controlled pain     PONV: No PONV   Sign Out status:  No Nausea or Vomiting     Neuro/Psych: Uneventful perioperative course   Sign Out Status: Preoperative baseline; Age appropriate mentation     Airway/Resp.: Uneventful perioperative course   Sign Out Status: Non labored breathing, age appropriate RR; Resp. Status within EXPECTED Parameters     CV: Uneventful perioperative course   Sign Out status: Appropriate BP and perfusion indices; Appropriate HR/Rhythm     Disposition:   Sign Out in:  PACU  Disposition:  Phase II; Home  Recovery Course: Uneventful  Follow-Up: Not required           Last Anesthesia Record Vitals:  CRNA VITALS  3/22/2019 0819 - 3/22/2019 0919      3/22/2019             Resp Rate (observed):  1  (Abnormal)     Resp Rate (set):  10          Last PACU/Preop Vitals:  Vitals:    19 0700 19 0851 19 0906   BP: 110/73 117/67 129/77   Pulse: 65  58   Resp: 16 16 16   Temp: 36.8  C (98.2  F) 36.5  C (97.7  F) 36.3  C (97.4  F)   SpO2: 93% 97% 98%         Electronically Signed By: Chago Knapp MD, 2019, 10:28 AM

## 2019-03-22 NOTE — ANESTHESIA PREPROCEDURE EVALUATION
Anesthesia Pre-Procedure Evaluation    Patient: Giana Hope   MRN:     [id number] Gender:   female   Age:    73 year old :      1945        Preoperative Diagnosis: Visually Significant Cataract   Procedure(s):  Right Cataract Removal with Intraocular Lens Implant     Past Medical History:   Diagnosis Date     Nonsenile cataract      Osteoarthritis      Uveitis       History reviewed. No pertinent surgical history.       Anesthesia Evaluation     . Pt has had prior anesthetic.     No history of anesthetic complications          ROS/MED HX    ENT/Pulmonary:      (-) tobacco use   Neurologic: Comment: Uveitis, cataract    Periodic concerns with dizziness/imbalance    Hearing loss - neg neurologic ROS     Cardiovascular:     (+) Dyslipidemia, ----. : . . . :. .       METS/Exercise Tolerance:  4 - Raking leaves, gardening   Hematologic:         Musculoskeletal:   (+) arthritis, , , -       GI/Hepatic:  - neg GI/hepatic ROS       Renal/Genitourinary: Comment: Incontinence - ROS Renal section negative       Endo:  - neg endo ROS       Psychiatric:  - neg psychiatric ROS       Infectious Disease:  - neg infectious disease ROS       Malignancy:      - no malignancy   Other:                         PHYSICAL EXAM:   Mental Status/Neuro: A/A/O   Airway: Facies: Feasible  Mallampati: II  Mouth/Opening: Full  TM distance: > 6 cm  Neck ROM: Full   Respiratory: Auscultation: CTAB     Resp. Rate: Normal     Resp. Effort: Normal      CV: Rhythm: Regular  Rate: Age appropriate  Heart: Normal Sounds   Comments:      Dental: Normal                  Lab Results   Component Value Date     2019    POTASSIUM 4.1 2019    CHLORIDE 104 2019    CO2 30 2019    BUN 26 2019    CR 0.82 2019     (H) 2019    LUIS E 9.6 2019       Preop Vitals  BP Readings from Last 3 Encounters:   19 110/73   19 134/83   19 132/83    Pulse Readings from Last 3 Encounters:  "  03/22/19 65   03/14/19 63   01/31/19 66      Resp Readings from Last 3 Encounters:   03/22/19 16   01/31/19 16   12/11/18 16    SpO2 Readings from Last 3 Encounters:   03/22/19 93%   03/14/19 95%   01/31/19 95%      Temp Readings from Last 1 Encounters:   03/22/19 36.8  C (98.2  F) (Oral)    Ht Readings from Last 1 Encounters:   03/22/19 1.613 m (5' 3.5\")      Wt Readings from Last 1 Encounters:   03/22/19 66.7 kg (147 lb)    Estimated body mass index is 25.63 kg/m  as calculated from the following:    Height as of this encounter: 1.613 m (5' 3.5\").    Weight as of this encounter: 66.7 kg (147 lb).     LDA:            Assessment:   ASA SCORE: 2    NPO Status: > 6 hours since completed Solid Foods   Documentation: H&P complete; Preop Testing complete; Consents complete   Proceeding: Proceed without further delay  Tobacco Use:  NO Active use of Tobacco/UNKNOWN Tobacco use status     Plan:   Anes. Type:  MAC      Induction:  IV (Standard)   Airway: Native Airway   Access/Monitoring: PIV   Maintenance: Propofol; IV   Emergence: Procedure Site   Logistics: Same Day Surgery     Postop Pain/Sedation Strategy:  Standard-Options: Opioids PRN     PONV Management:  Adult Risk Factors: Female, Non-Smoker, Postop Opioids  Prevention: Propofol Infusion; Ondansetron     CONSENT: Direct conversation   Plan and risks discussed with: Patient   Blood Products: Consent Deferred (Minimal Blood Loss)                         Jamshid Brito MD  "

## 2019-03-22 NOTE — DISCHARGE INSTRUCTIONS
[facility]  Home Care Following Anesthesia  For 24 hours after surgery:  1. Get plenty of rest.  A responsible adult must stay with you for at least 24 hours after you leave the surgery center.  2. Do not drive or use heavy equipment.  If you have weakness or tingling, don't drive or use heavy equipment until this feeling goes away.   3. Do not drink alcohol.   4. Avoid strenuous or risky activities.  Ask for help when climbing stairs.  5. You may feel lightheaded.  IF so, sit for a few minutes before standing.  Have someone help you get up.   6. If you have nausea (feel sick to your stomach): Drink only clear liquids such as apple juice, ginger ale, broth or 7-Up.  Rest may also help.  Be sure to drink enough fluids.  Move to a regular diet as you feel able.   7. You may have a slight fever.  Call the doctor if your fever is over 100 F (37.7 C) (taken under the tongue) or lasts longer than 24 hours.  8. You may have a dry mouth, a sore throat, muscle aches or trouble sleeping. These should go away after 24 hours.  9. Do not make important or legal decisions.               Tips for taking pain medications  To get the best pain relief possible, remember these points:    Take pain medications as directed, before pain becomes severe.    Pain medication can upset your stomach: taking it with food may help.    Constipation is a common side effect of pain medication. Drink plenty of  fluids.    Eat foods high in fiber. Take a stool softener if recommended by your doctor or pharmacist.    Do not drink alcohol, drive or operate machinery while taking pain medications.    Ask about other ways to control pain, such as with heat, ice or relaxation.    Tylenol/Acetaminophen Consumption  To help encourage the safe use of acetaminophen, the makers of TYLENOL  have lowered the maximum daily dose for single-ingredient Extra Strength TYLENOL  (acetaminophen) products sold in the [place] from 8  pills per day (4,000 mg) to 6 pills per day (3,000 mg). The dosing interval has also changed from 2 pills every 4-6 hours to 2 pills every 6 hours.    If you feel your pain relief is insufficient, you may take Tylenol/Acetaminophen in addition to your narcotic pain medication.     Be careful not to exceed 3,000 mg of Tylenol/Acetaminophen in a 24 hour period from all sources.    If you are taking extra strength Tylenol/acetaminophen (500 mg), the maximum dose is 6 tablets in 24 hours.    If you are taking regular strength acetaminophen (325 mg), the maximum dose is 9 tablets in 24 hours.    Call a doctor for any of the followin. Signs of infection (fever, growing tenderness at the surgery site, a large amount of drainage or bleeding, severe pain, foul-smelling drainage, redness, swelling).  2. It has been over 8 to 10 hours since surgery and you are still not able to urinate (pass water).  3. Headache for over 24 hours.  4. Numbness, tingling or weakness the day after surgery (if you had spinal anesthesia).  Your doctor is:    Dr. Trish Taylor, Ophthalmology: [phone]               Or dial [phone] and ask for the resident on call for:  Ophthalmology  For emergency care, call the:  [facility]:  [phone] (TTY for hearing impaired: [phone])  [facility]     Home Care Following Cataract Surgery    If you have a gauze eye patch on, please do not remove it until it is removed by your doctor at your first appointment after your surgery.  You will start your eye drops the next day.    OR    If you only have a clear eye shield on, you may remove the eye shield when you get home and begin eye drops today as directed by your doctor.      Wear the clear eye shield for protection when sleeping for the next 5 days.      Do not rub the eye that had the operation.      Your eye might be sensitive to light.  Wearing sunglasses may be more comfortable  for you.      You may have some discomfort and irritation.  Acetaminophen (Tylenol) or Ibuprofen (Advil) may be taken for discomfort. If pain persists please call your doctor s office.      Keep the eye that had the surgery dry. You may wash your hair, bathe or shower, but keep your eye closed while doing so.       Avoid bending over, strenuous activity or heavy lifting until this activity has been approved by your doctor.      You have a follow-up appointment with your doctor tomorrow at the [facility] ([phone]).  Bring all your prescribed eye drops with you to this follow-up appointment.        If you take glaucoma medications, bring them with you to your follow-up appointment tomorrow.      Use medication exactly as prescribed by your doctor. You may restart your regular home medications.       Occasional blood-tinged tears are normal the day or two after surgery. However, if there is large or persistent bleeding from the eye, that is not normal, and you should contact the clinic.      Call your doctor s office at [phone] if any of the following should occur:    - Any sudden vision changes, including decreased vision  - Nausea or severe headache  - Increase in pain that is not controlled with Acetaminophen (Tylenol) or Ibuprofen (Advil)  - Signs of infection (pus, increasing redness or tenderness)  - Severe sensitivity to light  - An increase in floaters (black spots in front of your vision)

## 2019-03-22 NOTE — PROCEDURES
Ophthalmology Post-op Procedure Note    Surgical Service:    Ophthalmology & Visual Sciences  Date Performed:      March 22, 2019  Location: [facility]      Pre-operative Diagnosis: Visually significant cataract, right eye  Post-operative Diagnosis:  Pseudophakia, right eye  Operative Procedure:  Phacoemulsification with intraocular lens implantation, right eye      Surgeon(s):  Fellow/Staff Surgeon:       Trish Taylor MD  Resident Surgeon:            Yakelin Sol MD    Anesthesia:   Topical/MAC  Findings:  No unusual findings   Blood Loss:    Minimal  Implants:  ZCB00 20.0 intraocular lens  Specimens:  None     Complications:  The patient did not experience any complications.   Condition: Stable    Operative Report Completion:    Description of Operation/Procedure:  After appropriate informed consent was obtained, the patient was brought to the operating room. The appropriate cardiac and blood pressure monitors were placed. A final pause occurred just before the start of the procedure during which the entire procedure team actively confirmed the correct patient, procedure, site, special equipment and special requirements. The patient was prepped and draped in the usual sterile fashion using 5% povidone/iodine.     An eyelid speculum was placed to open the eyelids. A paracentesis port was placed approximately sixty degrees to the left of the planned temporal incision location using the sideport blade. Approximately 0.8 cc of 1% nonpreserved lidocaine was placed into the anterior chamber. The anterior chamber was filled with dispersive viscoelastic. A clear corneal temporal incision was made with a metal 2.6 mm keratome. A round continuous tear capsulorhexis was initiated with a cystotome and completed with the Utrata forceps. Balanced salt solution on a cannula was used to perform hydrodissection. The nucleus was removed using phacoemulsification with a chop technique. The remaining cortical material was  removed using the irrigation/aspiration handpiece. The capsular bag was filled with dispersive viscoelastic. A lens of the  model and power listed above was placed into the capsular bag using the cartridge injection system. The remaining viscoelastic was removed using the irrigation aspiration handpiece. The paracentesis and temporal wounds were hydrated with balanced salt solution. Intracameral moxifloxacin was administered. At the conclusion of the case, the wounds were felt to be watertight, the pupil was round, the lens was centered, and the anterior chamber was deep. A few drops of antibiotic and prednisolone were given to the operative eye. The eyelid speculum was removed. A shield was placed over the operative eye.      Attending Attestation:  I was present for and performed the entire procedure.  Trish Taylor MD

## 2019-03-22 NOTE — PROGRESS NOTES
Assessment & Plan      Giana Hope is a 73 year old female with the following diagnoses:   1. Pseudophakia         right eye, day 0    Doing well  Keep shield in place at night for 5 days  Start post-operative drops and taper according to instructions  Post-operative do's and don'ts reviewed, questions answered    Recheck 2-3 weeks with refraction           Michelle Sol MD   Ophthalmology PGY-4    Teaching statement:  Complete documentation of historical and exam elements from today's encounter can be found in the full encounter summary report (not reduplicated in this progress note). I personally obtained the chief complaint(s) and history of present illness.  I confirmed and edited as necessary the review of systems, past medical/surgical history, family history, social history, and examination findings as documented by others; and I examined the patient myself. I personally reviewed the relevant tests, images, and reports as documented above.     I formulated and edited as necessary the assessment and plan and discussed the findings and management plan with the patient and family.    Trish Taylor MD  Comprehensive Ophthalmology & Ocular Pathology  Department of Ophthalmology and Visual Neurosciences  [email or web address]  Pager [phone]

## 2019-04-04 ENCOUNTER — TELEPHONE (OUTPATIENT)
Dept: OPHTHALMOLOGY | Facility: CLINIC | Age: 74
End: 2019-04-04

## 2019-04-08 DIAGNOSIS — H25.12 NUCLEAR SCLEROTIC CATARACT, LEFT: Primary | ICD-10-CM

## 2019-04-15 ENCOUNTER — OFFICE VISIT (OUTPATIENT)
Dept: OPHTHALMOLOGY | Facility: CLINIC | Age: 74
End: 2019-04-15
Attending: OPHTHALMOLOGY
Payer: MEDICARE

## 2019-04-15 DIAGNOSIS — H25.12 AGE-RELATED NUCLEAR CATARACT, LEFT: ICD-10-CM

## 2019-04-15 DIAGNOSIS — Z96.1 PSEUDOPHAKIA, RIGHT EYE: ICD-10-CM

## 2019-04-15 DIAGNOSIS — Z03.89 ENCOUNTER FOR OBSERVATION FOR OTHER SUSPECTED DISEASES AND CONDITIONS RULED OUT: Primary | ICD-10-CM

## 2019-04-15 PROCEDURE — G0463 HOSPITAL OUTPT CLINIC VISIT: HCPCS | Mod: 25,ZF

## 2019-04-15 PROCEDURE — 92134 CPTRZ OPH DX IMG PST SGM RTA: CPT | Mod: ZF | Performed by: OPHTHALMOLOGY

## 2019-04-15 PROCEDURE — 92015 DETERMINE REFRACTIVE STATE: CPT | Mod: 52,GY,ZF

## 2019-04-15 ASSESSMENT — REFRACTION_MANIFEST
OD_AXIS: 030
OD_ADD: +2.50
OD_CYLINDER: +0.50
OD_SPHERE: -0.50

## 2019-04-15 ASSESSMENT — CUP TO DISC RATIO
OD_RATIO: 0.25
OS_RATIO: 0.25

## 2019-04-15 ASSESSMENT — EXTERNAL EXAM - RIGHT EYE: OD_EXAM: NORMAL

## 2019-04-15 ASSESSMENT — TONOMETRY
OD_IOP_MMHG: 17
OS_IOP_MMHG: 18
IOP_METHOD: TONOPEN

## 2019-04-15 ASSESSMENT — VISUAL ACUITY
METHOD: SNELLEN - LINEAR
OS_CC: 20/30
OD_SC+: -1
OS_PH_CC: 20/20
OD_SC: 20/25
OS_PH_CC+: -1

## 2019-04-15 ASSESSMENT — SLIT LAMP EXAM - LIDS
COMMENTS: NORMAL
COMMENTS: NORMAL

## 2019-04-15 NOTE — PROGRESS NOTES
HPI  Giana Hope is a 73 year old female here for follow-up after CE/IOL right eye. She is pleased with the vision. No pain, redness, discharge. No flashes/floaters.    POH: No history of eye surgery or trauma  PMH: HL, no diabetes  FH: No FH of AMD or glc    Assessment & Plan    (Z96.1) Pseudophakia, right eye  Comment: Good post-op appearance, macula flat without edema.  Plan: Complete drop taper. Given updated glasses Rx.    (H25.12) Age-related nuclear cataract, left  Comment: Not bothersome for now.    Dilates to: well  Alpha blockers/Flomax: None  Trauma/Pseudoxfoliation: None  Fuchs dystrophy/guttae: None    Diabetes: No  Anticoagulation: None    Cyl: 0.88 @ 173 left eye    Plan: Monitor.     -----------------------------------------------------------------------------------    Patient disposition:   Return in about 1 year (around 4/15/2020). or sooner as needed.    Teaching statement:  Complete documentation of historical and exam elements from today's encounter can be found in the full encounter summary report (not reduplicated in this progress note). I personally obtained the chief complaint(s) and history of present illness.  I confirmed and edited as necessary the review of systems, past medical/surgical history, family history, social history, and examination findings as documented by others; and I examined the patient myself. I personally reviewed the relevant tests, images, and reports as documented above.     I formulated and edited as necessary the assessment and plan and discussed the findings and management plan with the patient and family.    Trish Taylor MD  Comprehensive Ophthalmology & Ocular Pathology  Department of Ophthalmology and Visual Neurosciences  [email or web address]  Pager [phone]

## 2019-04-16 ENCOUNTER — TELEPHONE (OUTPATIENT)
Dept: OPHTHALMOLOGY | Facility: CLINIC | Age: 74
End: 2019-04-16

## 2019-04-16 ENCOUNTER — ANCILLARY PROCEDURE (OUTPATIENT)
Dept: GENERAL RADIOLOGY | Facility: CLINIC | Age: 74
End: 2019-04-16
Payer: MEDICARE

## 2019-04-16 ENCOUNTER — OFFICE VISIT (OUTPATIENT)
Dept: ORTHOPEDICS | Facility: CLINIC | Age: 74
End: 2019-04-16
Payer: MEDICARE

## 2019-04-16 DIAGNOSIS — M25.562 PAIN IN BOTH KNEES, UNSPECIFIED CHRONICITY: ICD-10-CM

## 2019-04-16 DIAGNOSIS — M25.561 PAIN IN BOTH KNEES, UNSPECIFIED CHRONICITY: Primary | ICD-10-CM

## 2019-04-16 DIAGNOSIS — M25.562 PAIN IN BOTH KNEES, UNSPECIFIED CHRONICITY: Primary | ICD-10-CM

## 2019-04-16 DIAGNOSIS — Z96.652 HISTORY OF TOTAL LEFT KNEE REPLACEMENT: ICD-10-CM

## 2019-04-16 DIAGNOSIS — M25.561 PAIN IN BOTH KNEES, UNSPECIFIED CHRONICITY: ICD-10-CM

## 2019-04-16 DIAGNOSIS — M17.11 PRIMARY OSTEOARTHRITIS OF RIGHT KNEE: Primary | ICD-10-CM

## 2019-04-16 NOTE — LETTER
4/16/2019      RE: Giana Hope  [address]  [place] [address]       Sports Medicine Clinic Visit    PCP: Dorene    Giana Hope is a 73 year old female who is seen  in follow-up presenting with right knee djd and pain.  Occasional left knee pain.      Location of Pain: left Knee pain  Duration of Pain: 5-6  month(s)  Rating of Pain: 2/10  Pain is better with: Nothing  Pain is worse with: Sleeping  Additional Features: N/A  Treatment so far consists of: No Treatment tried to date  Prior History of related problems: N/A    There were no vitals taken for this visit.       PMH:  Past Medical History:   Diagnosis Date     Nonsenile cataract      Osteoarthritis      Uveitis        Active problem list:  Patient Active Problem List   Diagnosis     Hypercholesterolemia       FH:  Family History   Problem Relation Age of Onset     Arthritis Mother      Glaucoma No family hx of      Macular Degeneration No family hx of        SH:  Social History     Socioeconomic History     Marital status:      Spouse name: Not on file     Number of children: Not on file     Years of education: Not on file     Highest education level: Not on file   Occupational History     Not on file   Social Needs     Financial resource strain: Not on file     Food insecurity:     Worry: Not on file     Inability: Not on file     Transportation needs:     Medical: Not on file     Non-medical: Not on file   Tobacco Use     Smoking status: Former Smoker     Smokeless tobacco: Never Used     Tobacco comment: Quit 22 years ago   Substance and Sexual Activity     Alcohol use: Yes     Comment: Moderate     Drug use: No     Sexual activity: Not on file   Lifestyle     Physical activity:     Days per week: Not on file     Minutes per session: Not on file     Stress: Not on file   Relationships     Social connections:     Talks on phone: Not on file     Gets together: Not on file     Attends Caodaism service: Not on file     Active  member of club or organization: Not on file     Attends meetings of clubs or organizations: Not on file     Relationship status: Not on file     Intimate partner violence:     Fear of current or ex partner: Not on file     Emotionally abused: Not on file     Physically abused: Not on file     Forced sexual activity: Not on file   Other Topics Concern     Not on file   Social History Narrative     Not on file       MEDS:  See EMR, reviewed  ALL:  See EMR, reviewed    REVIEW OF SYSTEMS:  CONSTITUTIONAL:NEGATIVE for fever, chills, change in weight  INTEGUMENTARY/SKIN: NEGATIVE for worrisome rashes, moles or lesions  EYES: NEGATIVE for vision changes or irritation  ENT/MOUTH: NEGATIVE for ear, mouth and throat problems  RESP:NEGATIVE for significant cough or SOB  BREAST: NEGATIVE for masses, tenderness or discharge  CV: NEGATIVE for chest pain, palpitations or peripheral edema  GI: NEGATIVE for nausea, abdominal pain, heartburn, or change in bowel habits  :NEGATIVE for frequency, dysuria, or hematuria  :NEGATIVE for frequency, dysuria, or hematuria  NEURO: NEGATIVE for weakness, dizziness or paresthesias  ENDOCRINE: NEGATIVE for temperature intolerance, skin/hair changes  HEME/ALLERGY/IMMUNE: NEGATIVE for bleeding problems  PSYCHIATRIC: NEGATIVE for changes in mood or affect      Subjective: This 73-year-old female has a history of right-sided knee DJD.  [place] she had successful series of Supartz injections for the right knee.  She is anticipating a trip abroad in about a month and is hoping to get Supartz injections before she leaves.  Her supplemental insurance for her Medicare is changed since her last injections to a different company.  I told her it would be best to be certain through her insurance company that the Viscose injections are paid for, that they are allowed every 6 months, and that [employer] is the company its allowed.  She agrees to check on this and follow-up a month from now  for right-sided knee injections prior to her trip.  She has occasional left-sided anterior knee discomfort and is status post a successful left-sided knee replacement 3 years ago in [place].  She does not feel that the knee is loose.  Occasionally she will have some aching discomfort about the anterior knee in bed at night but is not consistent.  Is not associated with radicular discomfort in the extremity.  No swelling locking or catching.    Objective the right knee has full range of motion to flexion extension is nontender over the medial joint line  or lateral joint line, nontender over the pes anserine bursa or distal IT band.  Adequate range of motion at the right hip.  The left knee shows an anterior scar from her previous knee replacement.  There is no effusion.  She is nontender about the pes anserine bursa or distal IT band.  Anterior posterior drawer is negative.  Nontender over the patellar tendon.  Adequate range of motion at the left hip.  Distal pulses and sensation are intact.  Full range of motion from full extension to 120 degrees of flexion.    An x-ray of the left knee shows an intact knee replacement without signs of loosening of the hardware.  X-ray of the right knee shows a moderate to severe amount of medial joint space narrowing    Assessment DJD of the right knee.  Status post total knee replacement    Plan: She was reassured that I see no signs of loosening of her hardware.  We discussed the option of a training program that focuses on alignment.  She opted to avoid this for now.  She plans on checking if the insurance covers Supartz and following up in 3-4 weeks for Supartz injections.    This note was created with the use of Dragon software and unintentional spelling or errors may have occurred.          Dayton Zepeda MD

## 2019-04-16 NOTE — PROGRESS NOTES
Sports Medicine Clinic Visit    PCP: Dorene Faria TOM Hope is a 73 year old female who is seen  in follow-up presenting with right knee djd and pain.  Occasional left knee pain.      Location of Pain: left Knee pain  Duration of Pain: 5-6  month(s)  Rating of Pain: 2/10  Pain is better with: Nothing  Pain is worse with: Sleeping  Additional Features: N/A  Treatment so far consists of: No Treatment tried to date  Prior History of related problems: N/A    There were no vitals taken for this visit.       PMH:  Past Medical History:   Diagnosis Date     Nonsenile cataract      Osteoarthritis      Uveitis        Active problem list:  Patient Active Problem List   Diagnosis     Hypercholesterolemia       FH:  Family History   Problem Relation Age of Onset     Arthritis Mother      Glaucoma No family hx of      Macular Degeneration No family hx of        SH:  Social History     Socioeconomic History     Marital status:      Spouse name: Not on file     Number of children: Not on file     Years of education: Not on file     Highest education level: Not on file   Occupational History     Not on file   Social Needs     Financial resource strain: Not on file     Food insecurity:     Worry: Not on file     Inability: Not on file     Transportation needs:     Medical: Not on file     Non-medical: Not on file   Tobacco Use     Smoking status: Former Smoker     Smokeless tobacco: Never Used     Tobacco comment: Quit 22 years ago   Substance and Sexual Activity     Alcohol use: Yes     Comment: Moderate     Drug use: No     Sexual activity: Not on file   Lifestyle     Physical activity:     Days per week: Not on file     Minutes per session: Not on file     Stress: Not on file   Relationships     Social connections:     Talks on phone: Not on file     Gets together: Not on file     Attends Zoroastrian service: Not on file     Active member of club or organization: Not on file     Attends meetings of clubs or  organizations: Not on file     Relationship status: Not on file     Intimate partner violence:     Fear of current or ex partner: Not on file     Emotionally abused: Not on file     Physically abused: Not on file     Forced sexual activity: Not on file   Other Topics Concern     Not on file   Social History Narrative     Not on file       MEDS:  See EMR, reviewed  ALL:  See EMR, reviewed    REVIEW OF SYSTEMS:  CONSTITUTIONAL:NEGATIVE for fever, chills, change in weight  INTEGUMENTARY/SKIN: NEGATIVE for worrisome rashes, moles or lesions  EYES: NEGATIVE for vision changes or irritation  ENT/MOUTH: NEGATIVE for ear, mouth and throat problems  RESP:NEGATIVE for significant cough or SOB  BREAST: NEGATIVE for masses, tenderness or discharge  CV: NEGATIVE for chest pain, palpitations or peripheral edema  GI: NEGATIVE for nausea, abdominal pain, heartburn, or change in bowel habits  :NEGATIVE for frequency, dysuria, or hematuria  :NEGATIVE for frequency, dysuria, or hematuria  NEURO: NEGATIVE for weakness, dizziness or paresthesias  ENDOCRINE: NEGATIVE for temperature intolerance, skin/hair changes  HEME/ALLERGY/IMMUNE: NEGATIVE for bleeding problems  PSYCHIATRIC: NEGATIVE for changes in mood or affect      Subjective: This 73-year-old female has a history of right-sided knee DJD.  [place] she [place] successful series of Supartz injections for the right knee.  She is anticipating a trip abroad in about a month and is hoping to get Supartz injections before she leaves.  Her supplemental insurance for her Medicare is changed since her last injections to a different company.  I told her it would be best to be certain through her insurance company that the Viscose injections are paid for, that they are allowed every 6 months, and that [employer] is the company its allowed.  She agrees to check on this and follow-up a month from now for right-sided knee injections prior to her trip.  She has occasional  left-sided anterior knee discomfort and is status post a successful left-sided knee replacement 3 years ago in [place].  She does not feel that the knee is loose.  Occasionally she will have some aching discomfort about the anterior knee in bed at night but is not consistent.  Is not associated with radicular discomfort in the extremity.  No swelling locking or catching.    Objective the right knee has full range of motion to flexion extension is nontender over the medial joint line  or lateral joint line, nontender over the pes anserine bursa or distal IT band.  Adequate range of motion at the right hip.  The left knee shows an anterior scar from her previous knee replacement.  There is no effusion.  She is nontender about the pes anserine bursa or distal IT band.  Anterior posterior drawer is negative.  Nontender over the patellar tendon.  Adequate range of motion at the left hip.  Distal pulses and sensation are intact.  Full range of motion from full extension to 120 degrees of flexion.    An x-ray of the left knee shows an intact knee replacement without signs of loosening of the hardware.  X-ray of the right knee shows a moderate to severe amount of medial joint space narrowing    Assessment DJD of the right knee.  Status post total knee replacement    Plan: She was reassured that I see no signs of loosening of her hardware.  We discussed the option of a training program that focuses on alignment.  She opted to avoid this for now.  She plans on checking if the insurance covers Supartz and following up in 3-4 weeks for Supartz injections.    This note was created with the use of Dragon software and unintentional spelling or errors may have occurred.

## 2019-04-29 DIAGNOSIS — M17.11 PRIMARY OSTEOARTHRITIS OF RIGHT KNEE: Primary | ICD-10-CM

## 2019-04-30 ENCOUNTER — OFFICE VISIT (OUTPATIENT)
Dept: DERMATOLOGY | Facility: CLINIC | Age: 74
End: 2019-04-30
Payer: MEDICARE

## 2019-04-30 DIAGNOSIS — L57.0 AK (ACTINIC KERATOSIS): Primary | ICD-10-CM

## 2019-04-30 DIAGNOSIS — L81.4 SOLAR LENTIGO: ICD-10-CM

## 2019-04-30 DIAGNOSIS — L57.8 SUN-DAMAGED SKIN: ICD-10-CM

## 2019-04-30 DIAGNOSIS — D22.9 MULTIPLE BENIGN MELANOCYTIC NEVI: ICD-10-CM

## 2019-04-30 DIAGNOSIS — Z12.83 SCREENING EXAM FOR SKIN CANCER: ICD-10-CM

## 2019-04-30 DIAGNOSIS — L82.0 INFLAMED SEBORRHEIC KERATOSIS: ICD-10-CM

## 2019-04-30 ASSESSMENT — PAIN SCALES - GENERAL: PAINLEVEL: NO PAIN (0)

## 2019-04-30 NOTE — PATIENT INSTRUCTIONS
Cryotherapy    What is it?    Use of a very cold liquid, such as liquid nitrogen, to freeze and destroy abnormal skin cells that need to be removed    What should I expect?    Tenderness and redness    A small blister that might grow and fill with dark purple blood. There may be crusting.    More than one treatment may be needed if the lesions do not go away.    How do I care for the treated area?    Gently wash the area with your hands when bathing.    Use a thin layer of Vaseline to help with healing. You may use a Band-Aid.     The area should heal within 7-10 days and may leave behind a pink or lighter color.     Do not use an antibiotic or Neosporin ointment.     You may take acetaminophen (Tylenol) for pain.     Call your Doctor if you have:    Severe pain    Signs of infection (warmth, redness, cloudy yellow drainage, and or a bad smell)    Questions or concerns    Who should I call with questions?       [facility]: [phone]       [facility]: [phone]       For urgent needs outside of business hours call the [facility] at [phone]        and ask for the dermatology resident on call

## 2019-04-30 NOTE — LETTER
"4/30/2019       RE: Giana Hope  [address]  [place] [address]     Dear Colleague,    Thank you for referring your patient, Giana Hope, to the [facility] DERMATOLOGY at [facility]. Please see a copy of my visit note below.    [facility] Dermatology Note      Dermatology Problem List:  1. Actinic keratosis--nasal tip LN2 4/30/19  2. SKs    Encounter Date: Apr 30, 2019    CC:   Chief Complaint   Patient presents with     Derm Problem     Hawa is here today to have a red spot on the tip of her nose and one on forehead looked at.          History of Present Illness:  Ms. Giana Hope is a 73 year old female who presents as a referral from [facility].  Notes a red spot on the nose for 6 months, nontender, nonbleeding, nonscaly.  No h/o skin cancer.  No FH melanoma.  No personal history of skin disease.  Also wonders if something can be done about her \"ghastly aging spots\".  One on the right temple and one on the right ankle that itch.  She has not tried any intervention and she just scratches at it.    Past Medical History:   Patient Active Problem List   Diagnosis     Hypercholesterolemia     Past Medical History:   Diagnosis Date     Nonsenile cataract      Osteoarthritis      Uveitis      Past Surgical History:   Procedure Laterality Date     PHACOEMULSIFICATION WITH STANDARD INTRAOCULAR LENS IMPLANT Right 3/22/2019    Procedure: Right Cataract Removal with Intraocular Lens Implant;  Surgeon: Trish Taylor MD;  Location:  OR       Social History:  Patient reports that she has quit smoking. She has never used smokeless tobacco. She reports that she drinks alcohol. She reports that she does not use drugs.    Family History:  Family History   Problem Relation Age of Onset     Arthritis Mother      Glaucoma No family hx of      Macular Degeneration No family hx of        Medications:  Current Outpatient Medications "   Medication Sig Dispense Refill     pravastatin (PRAVACHOL) 20 MG tablet Take 1 tablet (20 mg) by mouth daily 90 tablet 3     prednisoLONE acetate (PRED FORTE) 1 % ophthalmic suspension 1 drop in surgical eye as directed, 4x daily after surgery for 1 week, 3x daily for 1 week, 2x daily for 1 week, daily for 1 week, then stop 1 Bottle 1     sodium hyaluronate (SUPARTZ) 25 MG/2.5ML injection 2.5 mLs (25 mg) by INTRA-ARTICULAR route once a week       sulindac (CLINORIL) 200 MG tablet Take 1 tablet (200 mg) by mouth 2 times daily (with meals) 180 tablet 3        No Known Allergies      Review of Systems:  -As per HPI  -Constitutional: Otherwise feeling well today, in usual state of health.  -HEENT: Patient denies nonhealing oral sores.  -Skin: As above in HPI. No additional skin concerns.    Physical exam:  Vitals: There were no vitals taken for this visit.  GEN: This is a well developed, well-nourished female in no acute distress, in a pleasant mood.    SKIN: Focused examination of the face, neck, upper chest, upper back, bilateral hands and forearms, right lower leg was performed.  -There is an erythematous macule without overyling adherent scale on the nasal tip  -There is a waxy stuck on tan to brown papule on the right temple and left ankle.  -Other brown circular even patches on the cheeks  -Scattered brown macules on sun exposed areas.  -No other lesions of concern on areas examined.     Impression/Plan:  1. Actinic keratosis    Cryotherapy procedure note: After verbal consent and discussion of risks and benefits including but no limited to dyspigmentation/scar, blister, and pain, 1 was(were) treated with 1-2mm freeze border for 2 cycles with liquid nitrogen. Post cryotherapy instructions were provided.      2. Seborrheic keratosis, symptomatic    Cryotherapy procedure note: After verbal consent and discussion of risks and benefits including but no limited to dyspigmentation/scar, blister, and pain, 2 was(were)  treated with 1-2mm freeze border for 2 cycles with liquid nitrogen. Post cryotherapy instructions were provided., Seborrheic keratosis: Discussed benign nature of lesions.    3. Benign melanocytic nevi of the arms and solar lentigines  - reassurance provided; no lesions concerning for malignancy  - photoprotection (regular use of SPF30+ broad spectrum sunscreen and sun protective clothing) recommended  - ABCDE of melanoma discussed    CC NASIR Nelson CNP  [address] [facility] [address]  [place] [address] on close of this encounter.  Follow-up prn for new or changing lesions      Dr. Chang staffed the patient.    Staff Involved:  Resident([facility])/Staff    Staff Physician Comments:   I saw and evaluated the patient with the resident and I agree with the assessment and plan.  I was present for the entire minor procedure and examination. I have made edits if needed.    Tex Chang MD  Staff Dermatologist and Dermatopathologist  , Department of Dermatology

## 2019-04-30 NOTE — NURSING NOTE
Chief Complaint   Patient presents with     Derm Problem     Hawa is here today to have a red spot on the tip of her nose and one on forehead looked at.      Cheyanne Sidhu LPN

## 2019-04-30 NOTE — PROGRESS NOTES
"[facility] Dermatology Note      Dermatology Problem List:  1. Actinic keratosis--nasal tip LN2 4/30/19  2. SKs    Encounter Date: Apr 30, 2019    CC:   Chief Complaint   Patient presents with     Derm Problem     Hawa is here today to have a red spot on the tip of her nose and one on forehead looked at.          History of Present Illness:  Ms. Giana Hope is a 73 year old female who presents as a referral from [facility].  Notes a red spot on the nose for 6 months, nontender, nonbleeding, nonscaly.  No h/o skin cancer.  No FH melanoma.  No personal history of skin disease.  Also wonders if something can be done about her \"ghastly aging spots\".  One on the right temple and one on the right ankle that itch.  She has not tried any intervention and she just scratches at it.    Past Medical History:   Patient Active Problem List   Diagnosis     Hypercholesterolemia     Past Medical History:   Diagnosis Date     Nonsenile cataract      Osteoarthritis      Uveitis      Past Surgical History:   Procedure Laterality Date     PHACOEMULSIFICATION WITH STANDARD INTRAOCULAR LENS IMPLANT Right 3/22/2019    Procedure: Right Cataract Removal with Intraocular Lens Implant;  Surgeon: Trish Taylor MD;  Location:  OR       Social History:  Patient reports that she has quit smoking. She has never used smokeless tobacco. She reports that she drinks alcohol. She reports that she does not use drugs.    Family History:  Family History   Problem Relation Age of Onset     Arthritis Mother      Glaucoma No family hx of      Macular Degeneration No family hx of        Medications:  Current Outpatient Medications   Medication Sig Dispense Refill     pravastatin (PRAVACHOL) 20 MG tablet Take 1 tablet (20 mg) by mouth daily 90 tablet 3     prednisoLONE acetate (PRED FORTE) 1 % ophthalmic suspension 1 drop in surgical eye as directed, 4x daily after surgery for 1 week, 3x daily for 1 week, 2x daily for 1 " week, daily for 1 week, then stop 1 Bottle 1     sodium hyaluronate (SUPARTZ) 25 MG/2.5ML injection 2.5 mLs (25 mg) by INTRA-ARTICULAR route once a week       sulindac (CLINORIL) 200 MG tablet Take 1 tablet (200 mg) by mouth 2 times daily (with meals) 180 tablet 3        No Known Allergies      Review of Systems:  -As per HPI  -Constitutional: Otherwise feeling well today, in usual state of health.  -HEENT: Patient denies nonhealing oral sores.  -Skin: As above in HPI. No additional skin concerns.    Physical exam:  Vitals: There were no vitals taken for this visit.  GEN: This is a well developed, well-nourished female in no acute distress, in a pleasant mood.    SKIN: Focused examination of the face, neck, upper chest, upper back, bilateral hands and forearms, right lower leg was performed.  -There is an erythematous macule without overyling adherent scale on the nasal tip  -There is a waxy stuck on tan to brown papule on the right temple and left ankle.  -Other brown circular even patches on the cheeks  -Scattered brown macules on sun exposed areas.  -No other lesions of concern on areas examined.     Impression/Plan:  1. Actinic keratosis    Cryotherapy procedure note: After verbal consent and discussion of risks and benefits including but no limited to dyspigmentation/scar, blister, and pain, 1 was(were) treated with 1-2mm freeze border for 2 cycles with liquid nitrogen. Post cryotherapy instructions were provided.      2. Seborrheic keratosis, symptomatic    Cryotherapy procedure note: After verbal consent and discussion of risks and benefits including but no limited to dyspigmentation/scar, blister, and pain, 2 was(were) treated with 1-2mm freeze border for 2 cycles with liquid nitrogen. Post cryotherapy instructions were provided., Seborrheic keratosis: Discussed benign nature of lesions.    3. Benign melanocytic nevi of the arms and solar lentigines  - reassurance provided; no lesions concerning for  malignancy  - photoprotection (regular use of SPF30+ broad spectrum sunscreen and sun protective clothing) recommended  - ABCDE of melanoma discussed    CC [facility], NASIR CNP  [address] [facility] [address]  [place] [address] on close of this encounter.  Follow-up prn for new or changing lesions      Dr. Chang staffed the patient.    Staff Involved:  Resident([facility])/Staff    Staff Physician Comments:   I saw and evaluated the patient with the resident and I agree with the assessment and plan.  I was present for the entire minor procedure and examination. I have made edits if needed.    Tex hCang MD  Staff Dermatologist and Dermatopathologist  , Department of Dermatology

## 2019-05-02 DIAGNOSIS — M17.11 PRIMARY OSTEOARTHRITIS OF RIGHT KNEE: Primary | ICD-10-CM

## 2019-05-06 ENCOUNTER — OFFICE VISIT (OUTPATIENT)
Dept: ORTHOPEDICS | Facility: CLINIC | Age: 74
End: 2019-05-06
Payer: MEDICARE

## 2019-05-06 VITALS — WEIGHT: 147 LBS | HEIGHT: 64 IN | BODY MASS INDEX: 25.1 KG/M2

## 2019-05-06 DIAGNOSIS — M17.11 PRIMARY OSTEOARTHRITIS OF RIGHT KNEE: Primary | ICD-10-CM

## 2019-05-06 ASSESSMENT — PAIN SCALES - GENERAL: PAINLEVEL: NO PAIN (0)

## 2019-05-06 ASSESSMENT — MIFFLIN-ST. JEOR: SCORE: 1148.85

## 2019-05-06 NOTE — LETTER
5/6/2019      RE: Giana Hope  [address]  [place] [address]       May 6, 2019: Giana Hope is a 73 year old female who is seen in f/u up for right knee pain/djd.         PMH:  Past Medical History:   Diagnosis Date     Nonsenile cataract      Osteoarthritis      Uveitis        Active problem list:  Patient Active Problem List   Diagnosis     Hypercholesterolemia       FH:  Family History   Problem Relation Age of Onset     Arthritis Mother      Glaucoma No family hx of      Macular Degeneration No family hx of        SH:  Social History     Socioeconomic History     Marital status:      Spouse name: Not on file     Number of children: Not on file     Years of education: Not on file     Highest education level: Not on file   Occupational History     Not on file   Social Needs     Financial resource strain: Not on file     Food insecurity:     Worry: Not on file     Inability: Not on file     Transportation needs:     Medical: Not on file     Non-medical: Not on file   Tobacco Use     Smoking status: Former Smoker     Smokeless tobacco: Never Used     Tobacco comment: Quit 22 years ago   Substance and Sexual Activity     Alcohol use: Yes     Comment: Moderate     Drug use: No     Sexual activity: Not on file   Lifestyle     Physical activity:     Days per week: Not on file     Minutes per session: Not on file     Stress: Not on file   Relationships     Social connections:     Talks on phone: Not on file     Gets together: Not on file     Attends Advent service: Not on file     Active member of club or organization: Not on file     Attends meetings of clubs or organizations: Not on file     Relationship status: Not on file     Intimate partner violence:     Fear of current or ex partner: Not on file     Emotionally abused: Not on file     Physically abused: Not on file     Forced sexual activity: Not on file   Other Topics Concern     Not on file   Social History Narrative     Not  on file       MEDS:  See EMR, reviewed  ALL:  See EMR, reviewed    REVIEW OF SYSTEMS:  CONSTITUTIONAL:NEGATIVE for fever, chills, change in weight  INTEGUMENTARY/SKIN: NEGATIVE for worrisome rashes, moles or lesions  EYES: NEGATIVE for vision changes or irritation  ENT/MOUTH: NEGATIVE for ear, mouth and throat problems  RESP:NEGATIVE for significant cough or SOB  BREAST: NEGATIVE for masses, tenderness or discharge  CV: NEGATIVE for chest pain, palpitations or peripheral edema  GI: NEGATIVE for nausea, abdominal pain, heartburn, or change in bowel habits  :NEGATIVE for frequency, dysuria, or hematuria  :NEGATIVE for frequency, dysuria, or hematuria  NEURO: NEGATIVE for weakness, dizziness or paresthesias  ENDOCRINE: NEGATIVE for temperature intolerance, skin/hair changes  HEME/ALLERGY/IMMUNE: NEGATIVE for bleeding problems  PSYCHIATRIC: NEGATIVE for changes in mood or affect        Subjective: This 73-year-old female is seen in follow-up for right knee DJD.  She is here for the first of 3 Supartz injections.  Please see previous note, reviewed.    Objective the right knee reveals no effusion.  No signs of cellulitis.  Mildly tender over the medial joint line.  Nontender over the lateral joint line.  Full range of motion at the knee at hip.  Nontender over the pes anserine bursa.  Distal pulses and sensation are intact.  Appropriate in conversation and affect.    Assessment right-sided knee DJD    Plan after informed consent about bleeding infection or allergic reaction and after prepping with surgical scrub she was injected in the right knee with Supartz from a lateral approach in seated position.  She moved her knee through full range of motion and left the clinic ambulatory.  She will follow-up in 1 week for the next injection.  She would like to talk about some shoulder impingement discomfort at the next visit from lifting luggage.  She is trying to prepare for a pleasure trip to [place].    Large Joint  Injection/Arthocentesis: R knee joint  Date/Time: 5/6/2019 1:36 PM  Performed by: Dayton Zepeda MD  Authorized by: Dayton Zepeda MD     Indications:  Osteoarthritis  Needle Size:  22 G  Guidance: landmark guided    Approach:  Lateral  Location:  Knee      Medications:  25 mg sodium hyaluronate 25 MG/2.5ML  Outcome:  Tolerated well, no immediate complications  Procedure discussed: discussed risks, benefits, and alternatives    Consent Given by:  Patient  Timeout: timeout called immediately prior to procedure    Prep: patient was prepped and draped in usual sterile fashion     Scribed by Catia Prince, MS, ATC for Dr. Zepeda on 1:39 PM at 5/6/2019, based on the provider s statements to me.                May 6, 2019: Giana Hope is a 73 year old female who is seen in f/u up for right knee pain/djd.         PMH:  Past Medical History:   Diagnosis Date     Nonsenile cataract      Osteoarthritis      Uveitis        Active problem list:  Patient Active Problem List   Diagnosis     Hypercholesterolemia       FH:  Family History   Problem Relation Age of Onset     Arthritis Mother      Glaucoma No family hx of      Macular Degeneration No family hx of        SH:  Social History     Socioeconomic History     Marital status:      Spouse name: Not on file     Number of children: Not on file     Years of education: Not on file     Highest education level: Not on file   Occupational History     Not on file   Social Needs     Financial resource strain: Not on file     Food insecurity:     Worry: Not on file     Inability: Not on file     Transportation needs:     Medical: Not on file     Non-medical: Not on file   Tobacco Use     Smoking status: Former Smoker     Smokeless tobacco: Never Used     Tobacco comment: Quit 22 years ago   Substance and Sexual Activity     Alcohol use: Yes     Comment: Moderate     Drug use: No     Sexual activity: Not on file   Lifestyle     Physical activity:     Days  per week: Not on file     Minutes per session: Not on file     Stress: Not on file   Relationships     Social connections:     Talks on phone: Not on file     Gets together: Not on file     Attends Gnosticism service: Not on file     Active member of club or organization: Not on file     Attends meetings of clubs or organizations: Not on file     Relationship status: Not on file     Intimate partner violence:     Fear of current or ex partner: Not on file     Emotionally abused: Not on file     Physically abused: Not on file     Forced sexual activity: Not on file   Other Topics Concern     Not on file   Social History Narrative     Not on file       MEDS:  See EMR, reviewed  ALL:  See EMR, reviewed    REVIEW OF SYSTEMS:  CONSTITUTIONAL:NEGATIVE for fever, chills, change in weight  INTEGUMENTARY/SKIN: NEGATIVE for worrisome rashes, moles or lesions  EYES: NEGATIVE for vision changes or irritation  ENT/MOUTH: NEGATIVE for ear, mouth and throat problems  RESP:NEGATIVE for significant cough or SOB  BREAST: NEGATIVE for masses, tenderness or discharge  CV: NEGATIVE for chest pain, palpitations or peripheral edema  GI: NEGATIVE for nausea, abdominal pain, heartburn, or change in bowel habits  :NEGATIVE for frequency, dysuria, or hematuria  :NEGATIVE for frequency, dysuria, or hematuria  NEURO: NEGATIVE for weakness, dizziness or paresthesias  ENDOCRINE: NEGATIVE for temperature intolerance, skin/hair changes  HEME/ALLERGY/IMMUNE: NEGATIVE for bleeding problems  PSYCHIATRIC: NEGATIVE for changes in mood or affect        Subjective: This 73-year-old female is seen in follow-up for right knee DJD.  She is here for the first of 3 Supartz injections.  Please see previous note, reviewed.    Objective the right knee reveals no effusion.  No signs of cellulitis.  Mildly tender over the medial joint line.  Nontender over the lateral joint line.  Full range of motion at the knee at hip.  Nontender over the pes anserine bursa.   Distal pulses and sensation are intact.  Appropriate in conversation and affect.    Assessment right-sided knee DJD    Plan after informed consent about bleeding infection or allergic reaction and after prepping with surgical scrub she was injected in the right knee with Supartz from a lateral approach in seated position.  She moved her knee through full range of motion and left the clinic ambulatory.  She will follow-up in 1 week for the next injection.  She would like to talk about some shoulder impingement discomfort at the next visit from lifting luggage.  She is trying to prepare for a pleasure trip to [place].          Dayton Zepeda MD

## 2019-05-06 NOTE — PROGRESS NOTES
May 6, 2019: Giana Hope is a 73 year old female who is seen in f/u up for right knee pain/djd.         PMH:  Past Medical History:   Diagnosis Date     Nonsenile cataract      Osteoarthritis      Uveitis        Active problem list:  Patient Active Problem List   Diagnosis     Hypercholesterolemia       FH:  Family History   Problem Relation Age of Onset     Arthritis Mother      Glaucoma No family hx of      Macular Degeneration No family hx of        SH:  Social History     Socioeconomic History     Marital status:      Spouse name: Not on file     Number of children: Not on file     Years of education: Not on file     Highest education level: Not on file   Occupational History     Not on file   Social Needs     Financial resource strain: Not on file     Food insecurity:     Worry: Not on file     Inability: Not on file     Transportation needs:     Medical: Not on file     Non-medical: Not on file   Tobacco Use     Smoking status: Former Smoker     Smokeless tobacco: Never Used     Tobacco comment: Quit 22 years ago   Substance and Sexual Activity     Alcohol use: Yes     Comment: Moderate     Drug use: No     Sexual activity: Not on file   Lifestyle     Physical activity:     Days per week: Not on file     Minutes per session: Not on file     Stress: Not on file   Relationships     Social connections:     Talks on phone: Not on file     Gets together: Not on file     Attends Episcopal service: Not on file     Active member of club or organization: Not on file     Attends meetings of clubs or organizations: Not on file     Relationship status: Not on file     Intimate partner violence:     Fear of current or ex partner: Not on file     Emotionally abused: Not on file     Physically abused: Not on file     Forced sexual activity: Not on file   Other Topics Concern     Not on file   Social History Narrative     Not on file       MEDS:  See EMR, reviewed  ALL:  See EMR, reviewed    REVIEW OF  SYSTEMS:  CONSTITUTIONAL:NEGATIVE for fever, chills, change in weight  INTEGUMENTARY/SKIN: NEGATIVE for worrisome rashes, moles or lesions  EYES: NEGATIVE for vision changes or irritation  ENT/MOUTH: NEGATIVE for ear, mouth and throat problems  RESP:NEGATIVE for significant cough or SOB  BREAST: NEGATIVE for masses, tenderness or discharge  CV: NEGATIVE for chest pain, palpitations or peripheral edema  GI: NEGATIVE for nausea, abdominal pain, heartburn, or change in bowel habits  :NEGATIVE for frequency, dysuria, or hematuria  :NEGATIVE for frequency, dysuria, or hematuria  NEURO: NEGATIVE for weakness, dizziness or paresthesias  ENDOCRINE: NEGATIVE for temperature intolerance, skin/hair changes  HEME/ALLERGY/IMMUNE: NEGATIVE for bleeding problems  PSYCHIATRIC: NEGATIVE for changes in mood or affect        Subjective: This 73-year-old female is seen in follow-up for right knee DJD.  She is here for the first of 3 Supartz injections.  Please see previous note, reviewed.    Objective the right knee reveals no effusion.  No signs of cellulitis.  Mildly tender over the medial joint line.  Nontender over the lateral joint line.  Full range of motion at the knee at hip.  Nontender over the pes anserine bursa.  Distal pulses and sensation are intact.  Appropriate in conversation and affect.    Assessment right-sided knee DJD    Plan after informed consent about bleeding infection or allergic reaction and after prepping with surgical scrub she was injected in the right knee with Supartz from a lateral approach in seated position.  She moved her knee through full range of motion and left the clinic ambulatory.  She will follow-up in 1 week for the next injection.  She would like to talk about some shoulder impingement discomfort at the next visit from lifting luggage.  She is trying to prepare for a pleasure trip to [place].    Large Joint Injection/Arthocentesis: R knee joint  Date/Time: 5/6/2019 1:36 PM  Performed  by: Dayton Zepeda MD  Authorized by: Dayton Zepeda MD     Indications:  Osteoarthritis  Needle Size:  22 G  Guidance: landmark guided    Approach:  Lateral  Location:  Knee      Medications:  25 mg sodium hyaluronate 25 MG/2.5ML  Outcome:  Tolerated well, no immediate complications  Procedure discussed: discussed risks, benefits, and alternatives    Consent Given by:  Patient  Timeout: timeout called immediately prior to procedure    Prep: patient was prepped and draped in usual sterile fashion     Scribed by Catia Prince MS, ATC for Dr. Zepeda on 1:39 PM at 5/6/2019, based on the provider's statements to me.

## 2019-05-06 NOTE — NURSING NOTE
[address] [address]  Dept: [phone]  ______________________________________________________________________________    Patient: Giana Hope   : 1945   MRN: [id number]   May 6, 2019    INVASIVE PROCEDURE SAFETY CHECKLIST    Date: 2019  Procedure: Right knee Supartz injection 1/3  Patient Name: Giana Hope  MRN: [id number]  YOB: 1945    Action: Complete sections as appropriate. Any discrepancy results in a HARD COPY until resolved.     PRE PROCEDURE:  Patient ID verified with 2 identifiers (name and  or MRN): Yes  Procedure and site verified with patient/designee (when able): Yes  Accurate consent documentation in medical record: Yes  H&P (or appropriate assessment) documented in medical record: Yes  H&P must be up to 20 days prior to procedure and updates within 24 hours of procedure as applicable: NA  Relevant diagnostic and radiology test results appropriately labeled and displayed as applicable: Yes  Procedure site(s) marked with provider initials: NA    TIMEOUT:  Time-Out performed immediately prior to starting procedure, including verbal and active participation of all team members addressing the following:Yes  * Correct patient identify  * Confirmed that the correct side and site are marked  * An accurate procedure consent form  * Agreement on the procedure to be done  * Correct patient position  * Relevant images and results are properly labeled and appropriately displayed  * The need to administer antibiotics or fluids for irrigation purposes during the procedure as applicable   * Safety precautions based on patient history or medication use    DURING PROCEDURE: Verification of correct person, site, and procedures any time the responsibility for care of the patient is transferred to another member of the care team.       Prior to injection, verified patient identity using patient's name and date of  birth.  Due to injection administration, patient instructed to remain in clinic for 15 minutes  afterwards, and to report any adverse reaction to me immediately.    Joint injection was performed.      Drug Amount Wasted:  None.  Vial/Syringe: Syringe  Expiration Date:  2021-02-28      Catia Prince, ATC  May 6, 2019

## 2019-05-13 ENCOUNTER — OFFICE VISIT (OUTPATIENT)
Dept: ORTHOPEDICS | Facility: CLINIC | Age: 74
End: 2019-05-13
Payer: MEDICARE

## 2019-05-13 VITALS — WEIGHT: 147 LBS | BODY MASS INDEX: 25.1 KG/M2 | HEIGHT: 64 IN

## 2019-05-13 DIAGNOSIS — M17.11 PRIMARY OSTEOARTHRITIS OF RIGHT KNEE: Primary | ICD-10-CM

## 2019-05-13 DIAGNOSIS — M75.41 IMPINGEMENT SYNDROME OF RIGHT SHOULDER: ICD-10-CM

## 2019-05-13 ASSESSMENT — MIFFLIN-ST. JEOR: SCORE: 1148.85

## 2019-05-13 NOTE — PROGRESS NOTES
May 13, 2019: Giana Hope is a 73 year old female who is seen in f/u up for her 2nd of 3 Supartz injection.    Her knee pain is located on the front of her knee. It wakes her up at night occasionally. Laying on her back is worst.        PMH:  Past Medical History:   Diagnosis Date     Nonsenile cataract      Osteoarthritis      Uveitis        Active problem list:  Patient Active Problem List   Diagnosis     Hypercholesterolemia       FH:  Family History   Problem Relation Age of Onset     Arthritis Mother      Glaucoma No family hx of      Macular Degeneration No family hx of        SH:  Social History     Socioeconomic History     Marital status:      Spouse name: Not on file     Number of children: Not on file     Years of education: Not on file     Highest education level: Not on file   Occupational History     Not on file   Social Needs     Financial resource strain: Not on file     Food insecurity:     Worry: Not on file     Inability: Not on file     Transportation needs:     Medical: Not on file     Non-medical: Not on file   Tobacco Use     Smoking status: Former Smoker     Smokeless tobacco: Never Used     Tobacco comment: Quit 22 years ago   Substance and Sexual Activity     Alcohol use: Yes     Comment: Moderate     Drug use: No     Sexual activity: Not on file   Lifestyle     Physical activity:     Days per week: Not on file     Minutes per session: Not on file     Stress: Not on file   Relationships     Social connections:     Talks on phone: Not on file     Gets together: Not on file     Attends Judaism service: Not on file     Active member of club or organization: Not on file     Attends meetings of clubs or organizations: Not on file     Relationship status: Not on file     Intimate partner violence:     Fear of current or ex partner: Not on file     Emotionally abused: Not on file     Physically abused: Not on file     Forced sexual activity: Not on file   Other Topics Concern      Not on file   Social History Narrative     Not on file       MEDS:  See EMR, reviewed  ALL:  See EMR, reviewed    REVIEW OF SYSTEMS:  CONSTITUTIONAL:NEGATIVE for fever, chills, change in weight  INTEGUMENTARY/SKIN: NEGATIVE for worrisome rashes, moles or lesions  EYES: NEGATIVE for vision changes or irritation  ENT/MOUTH: NEGATIVE for ear, mouth and throat problems  RESP:NEGATIVE for significant cough or SOB  BREAST: NEGATIVE for masses, tenderness or discharge  CV: NEGATIVE for chest pain, palpitations or peripheral edema  GI: NEGATIVE for nausea, abdominal pain, heartburn, or change in bowel habits  :NEGATIVE for frequency, dysuria, or hematuria  :NEGATIVE for frequency, dysuria, or hematuria  NEURO: NEGATIVE for weakness, dizziness or paresthesias  ENDOCRINE: NEGATIVE for temperature intolerance, skin/hair changes  HEME/ALLERGY/IMMUNE: NEGATIVE for bleeding problems  PSYCHIATRIC: NEGATIVE for changes in mood or affect    Subjective: 73-year-old female is seen in follow-up for right knee DJD.  She is here for her second Supartz injection.  She tolerated the first without difficulty.  She would like to discuss intermittent right shoulder impingement.  Can bother her with lifting luggage.  She points to the anterior and lateral right shoulder as the areas it can be uncomfortable.    Objective: She has limited range of motion to internal and external rotation in the bilateral shoulders today.  Overhead impingement signs are positive.  There is no effusion at the right shoulder joint and she is nontender over the AC joint anterior cuff or biceps tendon.  Strength is intact to the deltoid, supraspinatus infraspinatus and subscapularis.  Distal pulses and sensation are intact.  The right knee reveals no effusion.  I can flex and extend the knee fully.  Mildly tender over the medial joint line, nontender of the lateral joint line.  Overlying skin is intact.  Distal pulses and sensation are normal.  Appropriate in  conversation and affect.    Assessment: Right shoulder impingement syndrome.  Right knee DJD.    Plan: After informed consent about bleeding infection or allergic reaction after prepping with surgical scrub she was injected in the right knee from a lateral approach in seated position with Synvisc.  She left the clinic ambulatory.  She did not have time for shoulder x-ray to rule out arthritis today.  We agreed to get the x-ray at her next visit and talk about options for her shoulder.      Large Joint Injection/Arthocentesis: R knee joint  Date/Time: 5/13/2019 12:42 PM  Performed by: Dayton Zepeda MD  Authorized by: Dayton Zepeda MD     Indications:  Pain  Needle Size:  22 G  Guidance: landmark guided    Approach:  Anterolateral  Location:  Knee      Medications:  25 mg sodium hyaluronate 25 MG/2.5ML  Outcome:  Tolerated well, no immediate complications  Procedure discussed: discussed risks, benefits, and alternatives    Consent Given by:  Patient  Timeout: timeout called immediately prior to procedure    Prep: patient was prepped and draped in usual sterile fashion     Scribed by Terry Trinidad ATC for Dr. Zepeda on 5/13/19 at 12:15PM, based on the provider's statements to me.

## 2019-05-13 NOTE — LETTER
5/13/2019      RE: Giana Hope  [address]  [place] [address]       May 13, 2019: Giana Hope is a 73 year old female who is seen in f/u up for her 2nd of 3 Supartz injection.    Her knee pain is located on the front of her knee. It wakes her up at night occasionally. Laying on her back is worst.        PMH:  Past Medical History:   Diagnosis Date     Nonsenile cataract      Osteoarthritis      Uveitis        Active problem list:  Patient Active Problem List   Diagnosis     Hypercholesterolemia       FH:  Family History   Problem Relation Age of Onset     Arthritis Mother      Glaucoma No family hx of      Macular Degeneration No family hx of        SH:  Social History     Socioeconomic History     Marital status:      Spouse name: Not on file     Number of children: Not on file     Years of education: Not on file     Highest education level: Not on file   Occupational History     Not on file   Social Needs     Financial resource strain: Not on file     Food insecurity:     Worry: Not on file     Inability: Not on file     Transportation needs:     Medical: Not on file     Non-medical: Not on file   Tobacco Use     Smoking status: Former Smoker     Smokeless tobacco: Never Used     Tobacco comment: Quit 22 years ago   Substance and Sexual Activity     Alcohol use: Yes     Comment: Moderate     Drug use: No     Sexual activity: Not on file   Lifestyle     Physical activity:     Days per week: Not on file     Minutes per session: Not on file     Stress: Not on file   Relationships     Social connections:     Talks on phone: Not on file     Gets together: Not on file     Attends Gnosticist service: Not on file     Active member of club or organization: Not on file     Attends meetings of clubs or organizations: Not on file     Relationship status: Not on file     Intimate partner violence:     Fear of current or ex partner: Not on file     Emotionally abused: Not on file     Physically  abused: Not on file     Forced sexual activity: Not on file   Other Topics Concern     Not on file   Social History Narrative     Not on file       MEDS:  See EMR, reviewed  ALL:  See EMR, reviewed    REVIEW OF SYSTEMS:  CONSTITUTIONAL:NEGATIVE for fever, chills, change in weight  INTEGUMENTARY/SKIN: NEGATIVE for worrisome rashes, moles or lesions  EYES: NEGATIVE for vision changes or irritation  ENT/MOUTH: NEGATIVE for ear, mouth and throat problems  RESP:NEGATIVE for significant cough or SOB  BREAST: NEGATIVE for masses, tenderness or discharge  CV: NEGATIVE for chest pain, palpitations or peripheral edema  GI: NEGATIVE for nausea, abdominal pain, heartburn, or change in bowel habits  :NEGATIVE for frequency, dysuria, or hematuria  :NEGATIVE for frequency, dysuria, or hematuria  NEURO: NEGATIVE for weakness, dizziness or paresthesias  ENDOCRINE: NEGATIVE for temperature intolerance, skin/hair changes  HEME/ALLERGY/IMMUNE: NEGATIVE for bleeding problems  PSYCHIATRIC: NEGATIVE for changes in mood or affect    Subjective: 73-year-old female is seen in follow-up for right knee DJD.  She is here for her second Supartz injection.  She tolerated the first without difficulty.  She would like to discuss intermittent right shoulder impingement.  Can bother her with lifting luggage.  She points to the anterior and lateral right shoulder as the areas it can be uncomfortable.    Objective: She has limited range of motion to internal and external rotation in the bilateral shoulders today.  Overhead impingement signs are positive.  There is no effusion at the right shoulder joint and she is nontender over the AC joint anterior cuff or biceps tendon.  Strength is intact to the deltoid, supraspinatus infraspinatus and subscapularis.  Distal pulses and sensation are intact.  The right knee reveals no effusion.  I can flex and extend the knee fully.  Mildly tender over the medial joint line, nontender of the lateral joint line.   Overlying skin is intact.  Distal pulses and sensation are normal.  Appropriate in conversation and affect.    Assessment: Right shoulder impingement syndrome.  Right knee DJD.    Plan: After informed consent about bleeding infection or allergic reaction after prepping with surgical scrub she was injected in the right knee from a lateral approach in seated position with Synvisc.  She left the clinic ambulatory.  She did not have time for shoulder x-ray to rule out arthritis today.  We agreed to get the x-ray at her next visit and talk about options for her shoulder.      Large Joint Injection/Arthocentesis: R knee joint  Date/Time: 5/13/2019 12:42 PM  Performed by: Dayton Zepeda MD  Authorized by: Dayton Zepeda MD     Indications:  Pain  Needle Size:  22 G  Guidance: landmark guided    Approach:  Anterolateral  Location:  Knee      Medications:  25 mg sodium hyaluronate 25 MG/2.5ML  Outcome:  Tolerated well, no immediate complications  Procedure discussed: discussed risks, benefits, and alternatives    Consent Given by:  Patient  Timeout: timeout called immediately prior to procedure    Prep: patient was prepped and draped in usual sterile fashion     Scribed by Terry Trinidad ATC for Dr. Zepeda on 5/13/19 at 12:15PM, based on the provider's statements to me.      Dayton Zepeda MD

## 2019-05-13 NOTE — NURSING NOTE
[address] [address]  Dept: [phone]  ______________________________________________________________________________    Patient: Giana Hope   : 1945   MRN: [id number]   May 13, 2019    INVASIVE PROCEDURE SAFETY CHECKLIST    Date: 19   Procedure:Right knee Supartz injection  Patient Name: Giana Hope  MRN: [id number]  YOB: 1945    Action: Complete sections as appropriate. Any discrepancy results in a HARD COPY until resolved.     PRE PROCEDURE:  Patient ID verified with 2 identifiers (name and  or MRN): Yes  Procedure and site verified with patient/designee (when able): Yes  Accurate consent documentation in medical record: Yes  H&P (or appropriate assessment) documented in medical record: Yes  H&P must be up to 20 days prior to procedure and updates within 24 hours of procedure as applicable: NA  Relevant diagnostic and radiology test results appropriately labeled and displayed as applicable: Yes  Procedure site(s) marked with provider initials: Yes    TIMEOUT:  Time-Out performed immediately prior to starting procedure, including verbal and active participation of all team members addressing the following:Yes  * Correct patient identify  * Confirmed that the correct side and site are marked  * An accurate procedure consent form  * Agreement on the procedure to be done  * Correct patient position  * Relevant images and results are properly labeled and appropriately displayed  * The need to administer antibiotics or fluids for irrigation purposes during the procedure as applicable   * Safety precautions based on patient history or medication use    DURING PROCEDURE: Verification of correct person, site, and procedures any time the responsibility for care of the patient is transferred to another member of the care team.       Prior to injection, verified patient identity using patient's name and date of  birth.  Due to injection administration, patient instructed to remain in clinic for 15 minutes  afterwards, and to report any adverse reaction to me immediately.    Joint injection was performed.      Drug Amount Wasted:  None  Vial/Syringe: Single dose vial  Expiration Date:  2/28/21      Terry Amos, [facility]  May 13, 2019

## 2019-05-16 ENCOUNTER — OFFICE VISIT (OUTPATIENT)
Dept: INTERNAL MEDICINE | Facility: CLINIC | Age: 74
End: 2019-05-16
Payer: MEDICARE

## 2019-05-16 VITALS
WEIGHT: 148.5 LBS | BODY MASS INDEX: 25.35 KG/M2 | OXYGEN SATURATION: 92 % | DIASTOLIC BLOOD PRESSURE: 74 MMHG | HEART RATE: 73 BPM | HEIGHT: 64 IN | SYSTOLIC BLOOD PRESSURE: 115 MMHG

## 2019-05-16 DIAGNOSIS — Z71.84 COUNSELING FOR TRAVEL: Primary | ICD-10-CM

## 2019-05-16 RX ORDER — ZOLPIDEM TARTRATE 5 MG/1
5 TABLET ORAL
Qty: 10 TABLET | Refills: 0 | Status: SHIPPED | OUTPATIENT
Start: 2019-05-16 | End: 2019-12-13

## 2019-05-16 RX ORDER — ATOVAQUONE AND PROGUANIL HYDROCHLORIDE 250; 100 MG/1; MG/1
1 TABLET, FILM COATED ORAL DAILY
Qty: 27 TABLET | Refills: 0 | Status: SHIPPED | OUTPATIENT
Start: 2019-05-16 | End: 2019-09-23

## 2019-05-16 RX ORDER — AZITHROMYCIN 500 MG/1
TABLET, FILM COATED ORAL
Qty: 3 TABLET | Refills: 0 | Status: SHIPPED | OUTPATIENT
Start: 2019-05-16 | End: 2019-09-23

## 2019-05-16 ASSESSMENT — PAIN SCALES - GENERAL: PAINLEVEL: NO PAIN (0)

## 2019-05-16 ASSESSMENT — MIFFLIN-ST. JEOR: SCORE: 1155.65

## 2019-05-16 NOTE — PROGRESS NOTES
[facility]  Primary Care Center   Pippa Field, APRN CNP  5/16/2019     Chief Complaint:   Consult (Pt is here for a travel consult. Pt is traveling to [place] on May 26)    History of Present Illness:   Giana Hope is a 73 year old year old female seen today with unaccompanied in preparation for travel, leaving on 5//26/2019 and staying for 2 weeks.  Detailed itinerary: She will be traveling to [place] including [place], [place], and [place].  Patient will be staying in both urban and rural locations. In particular she [place] being [place] [facility] [place] [facility]. While in [facility], she will only be in [place] at the airport.  Reason for travel: vacation.  Country of birth[place]  They will be traveling with: travel group of 14-16 people.  Planned activities during travel: Safari trip. She has traveled to [place] before and is aware of being careful on the safari wit regards to interacting with animals that do not have much human exposure. She does have some concern while on the plane as she has trouble sleeping and can become very anxious.     Immunizations:  Immunization History   Administered Date(s) Administered     Flu, Unspecified 10/13/2009, 10/02/2014, 10/21/2015, 09/30/2016     HepA-ped 2 Dose 02/23/2018     Influenza (High Dose) 3 valent vaccine 10/28/2017     Pneumo Conj 13-V (2010&after) 12/11/2015     Pneumococcal 23 valent 03/18/2011     TD (ADULT, 7+) 02/23/2018     TDAP Vaccine (Boostrix) 08/16/2007        TB testing history: n/a    Review of Systems:   Pertinent items are noted in HPI.  All other systems are negative.    LMP/GYN history: No LMP recorded. Patient is postmenopausal.        Active Medications:      pravastatin (PRAVACHOL) 20 MG tablet, Take 1 tablet (20 mg) by mouth daily, Disp: 90 tablet, Rfl: 3     prednisoLONE acetate (PRED FORTE) 1 % ophthalmic suspension, 1 drop in surgical eye as directed, 4x daily after surgery for 1 week, 3x daily  "for 1 week, 2x daily for 1 week, daily for 1 week, then stop, Disp: 1 Bottle, Rfl: 1     sodium hyaluronate (SUPARTZ) 25 MG/2.5ML injection, 2.5 mLs (25 mg) by INTRA-ARTICULAR route once a week, Disp: , Rfl:      sodium hyaluronate (SUPARTZ) 25 MG/2.5ML injection, 2.5 mLs (25 mg) by INTRA-ARTICULAR route once a week, Disp: , Rfl:      sulindac (CLINORIL) 200 MG tablet, Take 1 tablet (200 mg) by mouth 2 times daily (with meals), Disp: 180 tablet, Rfl: 3     sodium hyaluronate (SUPARTZ) injection 25 mg, 25 mg, , , Dayton Zepeda MD, 25 mg at 05/13/19 1242     timolol maleate (TIMOPTIC) 0.5 % ophthalmic solution 1 drop, 1 drop, Right Eye, Once, Trish Taylor MD      Allergies:   Patient has no known allergies.      Past Medical History:  Nonsenile cataract  Osteoarthritis  Uveitis   Hypercholesteremia       Past Surgical History:  Phacoemulsification with standard intraocular lens implant    Family History:   Arthritis - mother       Social History:   This patient presented alone.   Smoking status: former smoker  Smokeless tobacco: never  Alcohol use: yes, moderate  Drug use: no    Physical Exam:   /74   Pulse 73   Ht 1.613 m (5' 3.5\")   Wt 67.4 kg (148 lb 8 oz)   SpO2 92%   BMI 25.89 kg/m    General: well-nourished, well-developed female in no acute distress.    Assessment and Plan:   Giana Hope 73 year old old female whom I had the pleasure of seeing today for counseling for international travel.    Counseling on vaccinations for travel:   Hepatitis A Vaccine - Please return on 11/12/19 or later for your 2nd and final dose.    Typhoid - injectable. This vaccine is valid for two years.     Yellow Fever - She had injection last year, and has yellow card indicating this.     Counseling on malaria and other insect borne diseases  Prescription for malaria: Malarone - Start the day before traveling and take it every day through seven days after the trip.     We also discussed general " precautions for mosquito, other insect borne and ecto-parasite avoidance.    Counseling on traveler's diarrhea  We discussed precautions for clean water and food preparation as follows:  Recommended bottled or boiled water, including ice, and for tooth brushing.  Peel it, boil it, cook it, or forget it  Gave handout on traveler's diarrhea.  Handwashing or use of sanitizers several times daily and prior to eating  We discussed medications for management of traveler's diarrhea, once symptomatic:  Gave handout to patient.  Recommended going to clinic, if dehydrated or if high fever and/or blood in stool.  Recommended loperamide (Imodium, an antimotility agent), for diarrhea without fever  Take 1 tablet of azithromycin if develop traveler's diarrhea, and if minor you can use Pepto Bismol.      Counseling on the epidemiology of travel related morbidity and mortality  Discussed precautions for accident avoidance.  Discussed flying: drinking plenty of fluids and exercising legs every 2 hours  Discussed health concerns overseas.  Gave copy of CDC recommendations.  Discussed safety and security:     Other counseling for travel  Recommended checking medicine cabinet - looking for any meds normally used at home for common illnesses.  Provided education regarding sun exposure and use of high SPF sunscreen.  Discussed avoidance of blood and body secretions.   High altitude sickness: not an issue.  Motion sickness.  Jet lag: Not an issue.  HIV Post Exposure : aware    Creating a Medical Travel Kit  Every busy traveler should have a kit containing personal care and medical items that can be easily tossed into a suitcase.  Quantities can be adjusted for the length of travel, for destinations, and availability of replacement at your destination.    Plan to see your Travel medicine Clinic 4-6 weeks prior to departure as some vaccines require time to become effective.     Items to Pack:  -Basic antibiotics  -Mild laxatives: Metamucil  or Senokot, available without a prescription.  -Anti-diarrheal medication: Imodium AD or Pepto Bismol, available in tablet form without a prescription.  -Medical thermometer  -Antacid tablets  -Band aides, guaze bandages, ace wrap, adhesive tape, antibiotic ointment  -Topical ointment or creams for rash or bites (over the counter hydrocortisone cream).    -Motion sickness: Dramamine or Antivert (Meclazine)tablets  -Aspirin, acetaminophen(tylenol) ibuprofen  -Cough medicine or drops  -Anti-fungal foot powder  -Nasal spray or decongestants  -Eyeglasses.  Take an extra pair or copy of your prescription.  -Insect repellent with 30% DEET time release, such as Ultrathon or Sanchez products. Available at [employer]  -Permethrin clothing insecticide treatment.Available at [employer]  -Ear Plugs  -Sunscreen  -Sunglasses, Hat  -Safety pins, toilet paper, money belt, padlock, duct tape    Regular Medications should be left in original containers and carried in your carry on bag. Take enough with you for the entire trip. You might need a letter to give to your insurance to obtain a sufficient amount for your trip.    Carry with you the following info: Known allergies, medical conditions, medications, name of your insurance company and policy number, blood type if known, and phone numbers for emergency contact.    Medical resources  Medical Alert [facility]  [phone]  Provides Medical Alert Tags    Passport Emergency Services [phone] (9 am TO 5 pm EST)  [phone] (after hours)  Deals with emergencies relating to passport problems.    [facility] Department [facility]  [phone]( Mon-Fri 8:30-10pm EST)  [phone] Emergencies only, 24 hour facilitator)  [phone] ( travel advisories recording) Provides information on current health conditions around the world.    Follow-Up Plan:   Please see after visit summary for  details regarding the patient's planned vaccination schedule.  For any labs ordered, we will contact the patient with a plan for further care.  The patient was also encouraged to be seen for any post-travel illness, or with future travel plans.    The total time spent during this visit for individual counseling was 30 minutes, all of which was spent in counseling time, including reviewing the past medical and vaccination history, the travel itinerary, the risks of travel and suggested precautions, and the recommended vaccines and medicines with their side effects -- all for upcoming travel.     Did not recommend using sleeping medication while on the plane, and save them for when you first get there and first get back to help reset sleeping schedule. Take Benadryl while on the plane if wanting to sleep.Take the prescription medications in the bottle that identifies what it is and only bring the amount you need for trip. Recommended leaving the over the counter supplements at home, but if you do bring them they also need to be in a labeled body.    Total time spent 25 minutes.  More than 50% of the time spent with Ms. Hope on counseling / coordinating her care.    Pippa BEST CNP          Scribe Disclosure:   I, Minerva Dixon, am serving as a scribe to document services personally performed by NASIR Baxter CNP at this visit, based upon the provider's statements to me. All documentation has been reviewed by the aforementioned provider prior to being entered into the official medical record.     Portions of this medical record were completed by a scribe. UPON MY REVIEW AND AUTHENTICATION BY ELECTRONIC SIGNATURE, this confirms (a) I performed the applicable clinical services, and (b) the record is accurate.

## 2019-05-16 NOTE — NURSING NOTE
"73 year old  Chief Complaint   Patient presents with     Consult     Pt is here for a travel consult. Pt is traveling to [place] on May 26       Blood pressure 115/74, pulse 73, height 1.613 m (5' 3.5\"), weight 67.4 kg (148 lb 8 oz), SpO2 92 %, not currently breastfeeding. Body mass index is 25.89 kg/m .  BP completed using cuff size:    Patient Active Problem List   Diagnosis     Hypercholesterolemia       Wt Readings from Last 2 Encounters:   05/16/19 67.4 kg (148 lb 8 oz)   05/13/19 66.7 kg (147 lb)     BP Readings from Last 3 Encounters:   05/16/19 115/74   03/22/19 129/77   03/14/19 134/83       No Known Allergies    Current Outpatient Medications   Medication     pravastatin (PRAVACHOL) 20 MG tablet     prednisoLONE acetate (PRED FORTE) 1 % ophthalmic suspension     sodium hyaluronate (SUPARTZ) 25 MG/2.5ML injection     sodium hyaluronate (SUPARTZ) 25 MG/2.5ML injection     sulindac (CLINORIL) 200 MG tablet     Current Facility-Administered Medications   Medication     sodium hyaluronate (SUPARTZ) injection 25 mg     timolol maleate (TIMOPTIC) 0.5 % ophthalmic solution 1 drop       Social History     Tobacco Use     Smoking status: Former Smoker     Smokeless tobacco: Never Used     Tobacco comment: Quit 22 years ago   Substance Use Topics     Alcohol use: Yes     Comment: Moderate     Drug use: No       Honoring Choices - Health Care Directive Guide offered to patient at time of visit.    Health Maintenance Due   Topic Date Due     HEPATITIS C SCREENING  07/28/1963     LIPID SCREEN Q5 YR FEMALE (SYSTEM ASSIGNED)  07/28/1990     COLON CANCER SCREEN (SYSTEM ASSIGNED)  07/28/1995     ZOSTER IMMUNIZATION (1 of 2) 07/28/1995     ADVANCE DIRECTIVE PLANNING Q5 YRS  07/28/2000     MEDICARE ANNUAL WELLNESS VISIT  07/28/2010     DEXA SCAN SCREENING (SYSTEM ASSIGNED)  07/28/2010       Immunization History   Administered Date(s) Administered     Flu, Unspecified 10/13/2009, 10/02/2014, 10/21/2015, 09/30/2016     HepA-ped " 2 Dose 02/23/2018     Influenza (High Dose) 3 valent vaccine 10/28/2017     Pneumo Conj 13-V (2010&after) 12/11/2015     Pneumococcal 23 valent 03/18/2011     TD (ADULT, 7+) 02/23/2018     TDAP Vaccine (Boostrix) 08/16/2007       No results found for: PAP      Recent Labs   Lab Test 01/31/19  1448   CR 0.82   GFRESTIMATED 71   GFRESTBLACK 82   POTASSIUM 4.1       PHQ-2 ( 1999 Pfizer) 1/31/2019   Q1: Little interest or pleasure in doing things 0   Q2: Feeling down, depressed or hopeless 1   PHQ-2 Score 1   Q1: Little interest or pleasure in doing things Not at all   Q2: Feeling down, depressed or hopeless Several days   PHQ-2 Score 1       No flowsheet data found.    No flowsheet data found.    No flowsheet data found.      Heather Tang, BRIANNE  May 16, 2019 3:56 PM

## 2019-05-16 NOTE — PATIENT INSTRUCTIONS
Primary Care Center Phone Number [phone]  Primary Care Center Medication Refill Request Information:  * Please contact your pharmacy regarding ANY request for medication refills.  ** [facility] Prescription Fax = [phone]  * Please allow 3 business days for routine medication refills.  * Please allow 5 business days for controlled substance medication refills.     Primary Care Center Test Result notification information:  *You will be notified with in 7-10 days of your appointment day regarding the results of your test.  If you are on MyChart you will be notified as soon as the provider has reviewed the results and signed off on them.          Creating a Medical Travel Kit    Every busy traveler should have a kit containing personal care and medical items that can be easily tossed into a suitcase.  Quantities can be adjusted for the length of travel, for destinations, and availability of replacement at your destination.    Plan to see your Travel medicine Clinic 4-6 weeks prior to departure as some vaccines require time to become effective.     Items to Pack:    -Basic antibiotics  -Mild laxatives: Metamucil or Senokot, available without a prescription.  -Anti-diarrheal medication: Imodium AD or Pepto Bismol tablets  -Medical thermometer  -Antacid tablets  -Band aides, guaze bandages, ace wrap, adhesive tape, antibiotic ointment  -Topical ointment or creams for rash or bites (over the counter hydrocortisone cream).    -Motion sickness: Dramamine or Antivert (Meclazine)tablets  -Aspirin, acetaminophen(tylenol) ibuprofen  -Cough medicine or drops  -Anti-fungal foot powder  -Nasal spray or decongestants  -Eyeglasses.  Take an extra pair or copy of your prescription.  _Insect repellent with 30% DEET time release, such as Ultrathon or Sanchez products. Available at [employer]  -Permethrin clothing insecticide treatment.Available at [employer] on line  -Ear Plugs  -Sunscreen  -Sunglasses,  Hat  -Safety pins, toilet paper, money belt, padlock, duct tape    Regular Medications should be left in original containers and carried in your carry on bag. Take enough with you for the entire trip. You might need a letter to give to your insurance to obtain a sufficient amount for your trip.      Carry with you the following info: Known allergies, medical conditions, medications, name of your insurance company and policy number, blood type if known, and phone numbers for emergency contacWwwnc.[email or web address]          Travel Websites:    [place]wwn[place].cdc/gov  [place].cdc.gov/travel  [place]wn[place].cdc.gov/travelt    Medical resources  [facility]  [phone]  Provides Medical Alert Tags    Passport Emergency Services [phone] (9 am TO 5 pm EST)  [phone] (after hours)  Deals with emergencies relating to passport problems.    [facility] Department [facility]  [phone]( Mon-Fri 8:30-10pm EST)  [phone] Emergencies only, 24 hour facilitator)  [phone] ( travel advisories recording) Provides information on current health conditions around the world.

## 2019-05-20 ENCOUNTER — OFFICE VISIT (OUTPATIENT)
Dept: ORTHOPEDICS | Facility: CLINIC | Age: 74
End: 2019-05-20
Payer: MEDICARE

## 2019-05-20 ENCOUNTER — ANCILLARY PROCEDURE (OUTPATIENT)
Dept: GENERAL RADIOLOGY | Facility: CLINIC | Age: 74
End: 2019-05-20
Payer: MEDICARE

## 2019-05-20 VITALS
HEIGHT: 64 IN | DIASTOLIC BLOOD PRESSURE: 74 MMHG | WEIGHT: 148 LBS | HEART RATE: 73 BPM | BODY MASS INDEX: 25.27 KG/M2 | SYSTOLIC BLOOD PRESSURE: 115 MMHG

## 2019-05-20 DIAGNOSIS — M17.11 PRIMARY OSTEOARTHRITIS OF RIGHT KNEE: ICD-10-CM

## 2019-05-20 DIAGNOSIS — M19.011 PRIMARY OSTEOARTHRITIS OF RIGHT SHOULDER: Primary | ICD-10-CM

## 2019-05-20 ASSESSMENT — MIFFLIN-ST. JEOR: SCORE: 1153.38

## 2019-05-20 NOTE — NURSING NOTE
[address] [address]  Dept: [phone]  ______________________________________________________________________________    Patient: Giana Hope   : 1945   MRN: [id number]   May 20, 2019    INVASIVE PROCEDURE SAFETY CHECKLIST    Date: 19   Procedure:3 of 3 Supartz injection in R knee  Patient Name: Giana Hope  MRN: [id number]  YOB: 1945    Action: Complete sections as appropriate. Any discrepancy results in a HARD COPY until resolved.     PRE PROCEDURE:  Patient ID verified with 2 identifiers (name and  or MRN): Yes  Procedure and site verified with patient/designee (when able): Yes  Accurate consent documentation in medical record: Yes  H&P (or appropriate assessment) documented in medical record: Yes  H&P must be up to 20 days prior to procedure and updates within 24 hours of procedure as applicable: NA  Relevant diagnostic and radiology test results appropriately labeled and displayed as applicable: Yes  Procedure site(s) marked with provider initials: NA    TIMEOUT:  Time-Out performed immediately prior to starting procedure, including verbal and active participation of all team members addressing the following:Yes  * Correct patient identify  * Confirmed that the correct side and site are marked  * An accurate procedure consent form  * Agreement on the procedure to be done  * Correct patient position  * Relevant images and results are properly labeled and appropriately displayed  * The need to administer antibiotics or fluids for irrigation purposes during the procedure as applicable   * Safety precautions based on patient history or medication use    DURING PROCEDURE: Verification of correct person, site, and procedures any time the responsibility for care of the patient is transferred to another member of the care team.       Prior to injection, verified patient identity using patient's name and date  of birth.  Due to injection administration, patient instructed to remain in clinic for 15 minutes  afterwards, and to report any adverse reaction to me immediately.    Joint injection was performed.      Drug Amount Wasted:  None.  Vial/Syringe: Single dose vial  Expiration Date:  8/21      Terry Amos, [facility]  May 20, 2019

## 2019-05-20 NOTE — LETTER
5/20/2019      RE: Giana Hope  [address]  [place] [address]       May 20, 2019: Giana Hope is a 73 year old female who is seen in f/u up for her right knee 3 of 3 Supartz injection.  Right shoulder impingement pain.  Worried about luggage lifting etc in [facility].  Left shoulder GH djd cortisone injection 12/2019 lasted with pain relief for 5 months.  Pain has now returned.  Right shoulder xray today shows severe GH djd.          PMH:  Past Medical History:   Diagnosis Date     Nonsenile cataract      Osteoarthritis      Uveitis        Active problem list:  Patient Active Problem List   Diagnosis     Hypercholesterolemia       FH:  Family History   Problem Relation Age of Onset     Arthritis Mother      Glaucoma No family hx of      Macular Degeneration No family hx of        SH:  Social History     Socioeconomic History     Marital status:      Spouse name: Not on file     Number of children: Not on file     Years of education: Not on file     Highest education level: Not on file   Occupational History     Not on file   Social Needs     Financial resource strain: Not on file     Food insecurity:     Worry: Not on file     Inability: Not on file     Transportation needs:     Medical: Not on file     Non-medical: Not on file   Tobacco Use     Smoking status: Former Smoker     Smokeless tobacco: Never Used     Tobacco comment: Quit 22 years ago   Substance and Sexual Activity     Alcohol use: Yes     Comment: Moderate     Drug use: No     Sexual activity: Not on file   Lifestyle     Physical activity:     Days per week: Not on file     Minutes per session: Not on file     Stress: Not on file   Relationships     Social connections:     Talks on phone: Not on file     Gets together: Not on file     Attends Shinto service: Not on file     Active member of club or organization: Not on file     Attends meetings of clubs or organizations: Not on file     Relationship status: Not on  file     Intimate partner violence:     Fear of current or ex partner: Not on file     Emotionally abused: Not on file     Physically abused: Not on file     Forced sexual activity: Not on file   Other Topics Concern     Not on file   Social History Narrative     Not on file       MEDS:  See EMR, reviewed  ALL:  See EMR, reviewed    REVIEW OF SYSTEMS:  CONSTITUTIONAL:NEGATIVE for fever, chills, change in weight  INTEGUMENTARY/SKIN: NEGATIVE for worrisome rashes, moles or lesions  EYES: NEGATIVE for vision changes or irritation  ENT/MOUTH: NEGATIVE for ear, mouth and throat problems  RESP:NEGATIVE for significant cough or SOB  BREAST: NEGATIVE for masses, tenderness or discharge  CV: NEGATIVE for chest pain, palpitations or peripheral edema  GI: NEGATIVE for nausea, abdominal pain, heartburn, or change in bowel habits  :NEGATIVE for frequency, dysuria, or hematuria  :NEGATIVE for frequency, dysuria, or hematuria  NEURO: NEGATIVE for weakness, dizziness or paresthesias  ENDOCRINE: NEGATIVE for temperature intolerance, skin/hair changes  HEME/ALLERGY/IMMUNE: NEGATIVE for bleeding problems  PSYCHIATRIC: NEGATIVE for changes in mood or affect          Objective: She has limited right shoulder range of clunking and crepitance noted.  Strength is intact bilaterally to deltoid, supraspinatus, infraspinatus and subscapularis.  There is no effusion at the right shoulder.  Nontender over the AC joint or biceps tendon.  Distal pulses and sensation are intact.  Right knee reveals no effusion.  Mildly tender over the medial joint line, nontender over the lateral joint line.  Nontender of the patellar tendon or pes anserine bursa.  Distal pulses and sensation are intact.  Appropriate in conversation and affect.    Assessment right shoulder severe glenohumeral DJD.  Left shoulder severe gleno humeral DJD.  Right knee DJD.    Plan: After informed consent about bleeding infection or allergic reaction she was injected with the  third and final Supartz injection into her right knee.  She move the knee through full range of motion and left the clinic ambulatory.  She would like bilateral cortisone injections in each glenohumeral joint before her trip to [place] in the next 2 weeks.  We will get this arranged under ultrasound guidance with Dr. Brunson.  We discussed the basics of shoulder replacement.  She knows that the reason to proceed with shoulder replacement would be for pain control.  Currently she is getting adequate control of pain with the injections.          Large Joint Injection/Arthocentesis: R knee joint  Date/Time: 5/20/2019 11:13 AM  Performed by: Dayton Zepeda MD  Authorized by: Dayton Zepeda MD     Indications:  Pain  Needle Size:  22 G  Guidance: landmark guided    Approach:  Anterolateral  Location:  Knee      Medications:  25 mg sodium hyaluronate 25 MG/2.5ML  Outcome:  Tolerated well, no immediate complications  Procedure discussed: discussed risks, benefits, and alternatives    Consent Given by:  Patient  Timeout: timeout called immediately prior to procedure    Prep: patient was prepped and draped in usual sterile fashion     Scribed by Terry Trinidad ATC for Dr. Zepeda on 5/20/19 at 10AM, based on the provider's statements to me.        Dayton Zepeda MD

## 2019-05-20 NOTE — PROGRESS NOTES
May 20, 2019: Giana Hope is a 73 year old female who is seen in f/u up for her right knee 3 of 3 Supartz injection.  Right shoulder impingement pain.  Worried about luggage lifting etc in [facility].  Left shoulder GH djd cortisone injection 12/2019 lasted with pain relief for 5 months.  Pain has now returned.  Right shoulder xray today shows severe GH djd.          PMH:  Past Medical History:   Diagnosis Date     Nonsenile cataract      Osteoarthritis      Uveitis        Active problem list:  Patient Active Problem List   Diagnosis     Hypercholesterolemia       FH:  Family History   Problem Relation Age of Onset     Arthritis Mother      Glaucoma No family hx of      Macular Degeneration No family hx of        SH:  Social History     Socioeconomic History     Marital status:      Spouse name: Not on file     Number of children: Not on file     Years of education: Not on file     Highest education level: Not on file   Occupational History     Not on file   Social Needs     Financial resource strain: Not on file     Food insecurity:     Worry: Not on file     Inability: Not on file     Transportation needs:     Medical: Not on file     Non-medical: Not on file   Tobacco Use     Smoking status: Former Smoker     Smokeless tobacco: Never Used     Tobacco comment: Quit 22 years ago   Substance and Sexual Activity     Alcohol use: Yes     Comment: Moderate     Drug use: No     Sexual activity: Not on file   Lifestyle     Physical activity:     Days per week: Not on file     Minutes per session: Not on file     Stress: Not on file   Relationships     Social connections:     Talks on phone: Not on file     Gets together: Not on file     Attends Restorationist service: Not on file     Active member of club or organization: Not on file     Attends meetings of clubs or organizations: Not on file     Relationship status: Not on file     Intimate partner violence:     Fear of current or ex partner: Not on file      Emotionally abused: Not on file     Physically abused: Not on file     Forced sexual activity: Not on file   Other Topics Concern     Not on file   Social History Narrative     Not on file       MEDS:  See EMR, reviewed  ALL:  See EMR, reviewed    REVIEW OF SYSTEMS:  CONSTITUTIONAL:NEGATIVE for fever, chills, change in weight  INTEGUMENTARY/SKIN: NEGATIVE for worrisome rashes, moles or lesions  EYES: NEGATIVE for vision changes or irritation  ENT/MOUTH: NEGATIVE for ear, mouth and throat problems  RESP:NEGATIVE for significant cough or SOB  BREAST: NEGATIVE for masses, tenderness or discharge  CV: NEGATIVE for chest pain, palpitations or peripheral edema  GI: NEGATIVE for nausea, abdominal pain, heartburn, or change in bowel habits  :NEGATIVE for frequency, dysuria, or hematuria  :NEGATIVE for frequency, dysuria, or hematuria  NEURO: NEGATIVE for weakness, dizziness or paresthesias  ENDOCRINE: NEGATIVE for temperature intolerance, skin/hair changes  HEME/ALLERGY/IMMUNE: NEGATIVE for bleeding problems  PSYCHIATRIC: NEGATIVE for changes in mood or affect          Objective: She has limited right shoulder range of clunking and crepitance noted.  Strength is intact bilaterally to deltoid, supraspinatus, infraspinatus and subscapularis.  There is no effusion at the right shoulder.  Nontender over the AC joint or biceps tendon.  Distal pulses and sensation are intact.  Right knee reveals no effusion.  Mildly tender over the medial joint line, nontender over the lateral joint line.  Nontender of the patellar tendon or pes anserine bursa.  Distal pulses and sensation are intact.  Appropriate in conversation and affect.    Assessment right shoulder severe glenohumeral DJD.  Left shoulder severe gleno humeral DJD.  Right knee DJD.    Plan: After informed consent about bleeding infection or allergic reaction she was injected with the third and final Supartz injection into her right knee.  She move the knee through full  range of motion and left the clinic ambulatory.  She would like bilateral cortisone injections in each glenohumeral joint before her trip to [place] in the next 2 weeks.  We will get this arranged under ultrasound guidance with Dr. Brunson.  We discussed the basics of shoulder replacement.  She knows that the reason to proceed with shoulder replacement would be for pain control.  Currently she is getting adequate control of pain with the injections.          Large Joint Injection/Arthocentesis: R knee joint  Date/Time: 5/20/2019 11:13 AM  Performed by: Dayton Zepeda MD  Authorized by: Dayton Zepeda MD     Indications:  Pain  Needle Size:  22 G  Guidance: landmark guided    Approach:  Anterolateral  Location:  Knee      Medications:  25 mg sodium hyaluronate 25 MG/2.5ML  Outcome:  Tolerated well, no immediate complications  Procedure discussed: discussed risks, benefits, and alternatives    Consent Given by:  Patient  Timeout: timeout called immediately prior to procedure    Prep: patient was prepped and draped in usual sterile fashion     Scribed by Terry Trinidad ATC for Dr. Zepeda on 5/20/19 at 10AM, based on the provider's statements to me.

## 2019-05-23 ENCOUNTER — OFFICE VISIT (OUTPATIENT)
Dept: ORTHOPEDICS | Facility: CLINIC | Age: 74
End: 2019-05-23
Payer: MEDICARE

## 2019-05-23 DIAGNOSIS — M19.011 PRIMARY OSTEOARTHRITIS OF RIGHT SHOULDER: Primary | ICD-10-CM

## 2019-05-23 DIAGNOSIS — M19.012 PRIMARY OSTEOARTHRITIS OF LEFT SHOULDER: ICD-10-CM

## 2019-05-23 RX ORDER — ROPIVACAINE HYDROCHLORIDE 5 MG/ML
3 INJECTION, SOLUTION EPIDURAL; INFILTRATION; PERINEURAL
Status: DISCONTINUED | OUTPATIENT
Start: 2019-05-23 | End: 2020-01-13

## 2019-05-23 RX ORDER — ROPIVACAINE HYDROCHLORIDE 5 MG/ML
1 INJECTION, SOLUTION EPIDURAL; INFILTRATION; PERINEURAL
Status: DISCONTINUED | OUTPATIENT
Start: 2019-05-23 | End: 2020-01-13

## 2019-05-23 RX ORDER — TRIAMCINOLONE ACETONIDE 40 MG/ML
40 INJECTION, SUSPENSION INTRA-ARTICULAR; INTRAMUSCULAR
Status: DISCONTINUED | OUTPATIENT
Start: 2019-05-23 | End: 2020-01-13

## 2019-05-23 RX ADMIN — ROPIVACAINE HYDROCHLORIDE 1 ML: 5 INJECTION, SOLUTION EPIDURAL; INFILTRATION; PERINEURAL at 08:37

## 2019-05-23 RX ADMIN — TRIAMCINOLONE ACETONIDE 40 MG: 40 INJECTION, SUSPENSION INTRA-ARTICULAR; INTRAMUSCULAR at 08:37

## 2019-05-23 RX ADMIN — ROPIVACAINE HYDROCHLORIDE 3 ML: 5 INJECTION, SOLUTION EPIDURAL; INFILTRATION; PERINEURAL at 08:37

## 2019-05-23 NOTE — LETTER
5/23/2019      RE: Giana Hope  [address]  [place] [address]       Large Joint Injection/Arthocentesis: bilateral glenohumeral  Date/Time: 5/23/2019 8:37 AM  Performed by: Rigo Brunson MD  Authorized by: Rigo Brunson MD     Needle Size:  22 G  Guidance: ultrasound    Approach:  Posterior  Location:  Shoulder  Laterality:  Bilateral      Site:  Bilateral glenohumeral  Medications (Right):  40 mg triamcinolone 40 MG/ML; 1 mL ropivacaine 5 MG/ML; 3 mL ropivacaine 5 MG/ML  Medications (Left):  40 mg triamcinolone 40 MG/ML; 1 mL ropivacaine 5 MG/ML; 3 mL ropivacaine 5 MG/ML  Outcome:  Tolerated well, no immediate complications  Procedure discussed: discussed risks, benefits, and alternatives    Consent Given by:  Patient  Timeout: timeout called immediately prior to procedure    Prep: patient was prepped and draped in usual sterile fashion     Scribed by Yakelin Connell ATC for Dr. Brunson on 5/23/19 at 8:40AM, based on the provider s statements to me.      PROBLEM: Bilateral glenohumeral osteoarthritis    SUBJECTIVE: Pt returns for bilateral glenohumeral injections for sx of glenohumeral OA.  She will be leaving for a 2-week trip to [facility] on Sunday and is here for pain management during the trip.  Last US-guided corticosteroid injection for her left shoulder 12/18.  No recent right shoulder injections.    OBJECTIVE: X-rays reviewed.    ASSESSMENT:Bilateral glenohumeral O    PLAN: Bilateral US-guided corticosteroid injections glenohumeral joints.    PROCEDURE: The indications, risks, expected benefits were discussed.  Formal consent was obtained. The humeral head, glenoid, hyperechoic triangle indicating the posterior labrum were identified by ultrasound.  Initially, 3 mL 0.5% ropivicaine  was injected with US guidance along the injection track for anesthesia.  Using sterile technique, a syringe with a 1 1/2 inch, 22 gauge needle containing 1 mL Kenalog 40 mg/mL and 4 mL 0.5%  ropivicaine  were injected using an US guided posterior approach into the right glenohumeral joint.  US used to guide needle placement and verify proper needle position.  Images and video indicating proper needle placement were recorded.  Upon completion of the injection, the wound was dressed with a bandaid. Follow up prn    .The indications, risks, expected benefits were discussed.  Formal consent was obtained. The humeral head, glenoid, hyperechoic triangle indicating the posterior labrum were identified by ultrasound.  Initially, 3 mL 0.5% ropivicaine  was injected with US guidance along the injection track for anesthesia.  Using sterile technique, a syringe with a 1 1/2 inch, 22 gauge needle containing 1 mL Kenalog 40 mg/mL and 4 mL 0.5% ropivicaine  were injected using an US guided posterior approach into the left glenohumeral joint.  US used to guide needle placement and verify proper needle position.  Images and video indicating proper needle placement were recorded.  Upon completion of the injection, the wound was dressed with a bandaid.  F/U prn    Pt left clinic in good condition.      Rigo Brunson MD

## 2019-05-23 NOTE — NURSING NOTE
[address] [address]  Dept: [phone]  ______________________________________________________________________________    Patient: Giana Hope   : 1945   MRN: [id number]   May 23, 2019    INVASIVE PROCEDURE SAFETY CHECKLIST    Date: 19   Procedure: Bilateral glenohumeral CSI with Kenalog under US guidance  Patient Name: Giana Hope  MRN: [id number]  YOB: 1945    Action: Complete sections as appropriate. Any discrepancy results in a HARD COPY until resolved.     PRE PROCEDURE:  Patient ID verified with 2 identifiers (name and  or MRN): Yes  Procedure and site verified with patient/designee (when able): Yes  Accurate consent documentation in medical record: Yes  H&P (or appropriate assessment) documented in medical record: Yes  H&P must be up to 20 days prior to procedure and updates within 24 hours of procedure as applicable: NA  Relevant diagnostic and radiology test results appropriately labeled and displayed as applicable: Yes  Procedure site(s) marked with provider initials: NA    TIMEOUT:  Time-Out performed immediately prior to starting procedure, including verbal and active participation of all team members addressing the following:Yes  * Correct patient identify  * Confirmed that the correct side and site are marked  * An accurate procedure consent form  * Agreement on the procedure to be done  * Correct patient position  * Relevant images and results are properly labeled and appropriately displayed  * The need to administer antibiotics or fluids for irrigation purposes during the procedure as applicable   * Safety precautions based on patient history or medication use    DURING PROCEDURE: Verification of correct person, site, and procedures any time the responsibility for care of the patient is transferred to another member of the care team.       Prior to injection, verified patient identity using  patient's name and date of birth.  Due to injection administration, patient instructed to remain in clinic for 15 minutes  afterwards, and to report any adverse reaction to me immediately.    Joint injection was performed.      Drug Amount Wasted:  11 of 20mg Ropivicaine wasted  Vial/Syringe: Multi dose vial  Expiration Date:  6/1/22      Yakelin Connell ATC  May 23, 2019

## 2019-05-23 NOTE — PROGRESS NOTES
Large Joint Injection/Arthocentesis: bilateral glenohumeral  Date/Time: 5/23/2019 8:37 AM  Performed by: Rigo Brunson MD  Authorized by: Rigo Brunson MD     Needle Size:  22 G  Guidance: ultrasound    Approach:  Posterior  Location:  Shoulder  Laterality:  Bilateral      Site:  Bilateral glenohumeral  Medications (Right):  40 mg triamcinolone 40 MG/ML; 1 mL ropivacaine 5 MG/ML; 3 mL ropivacaine 5 MG/ML  Medications (Left):  40 mg triamcinolone 40 MG/ML; 1 mL ropivacaine 5 MG/ML; 3 mL ropivacaine 5 MG/ML  Outcome:  Tolerated well, no immediate complications  Procedure discussed: discussed risks, benefits, and alternatives    Consent Given by:  Patient  Timeout: timeout called immediately prior to procedure    Prep: patient was prepped and draped in usual sterile fashion     Scribed by Yakelin Connell ATC for Dr. Brunson on 5/23/19 at 8:40AM, based on the provider s statements to me.      PROBLEM: Bilateral glenohumeral osteoarthritis    SUBJECTIVE: Pt returns for bilateral glenohumeral injections for sx of glenohumeral OA.  She will be leaving for a 2-week trip to [facility] on Sunday and is here for pain management during the trip.  Last US-guided corticosteroid injection for her left shoulder 12/18.  No recent right shoulder injections.    OBJECTIVE: X-rays reviewed.    ASSESSMENT:Bilateral glenohumeral O    PLAN: Bilateral US-guided corticosteroid injections glenohumeral joints.    PROCEDURE: The indications, risks, expected benefits were discussed.  Formal consent was obtained. The humeral head, glenoid, hyperechoic triangle indicating the posterior labrum were identified by ultrasound.  Initially, 3 mL 0.5% ropivicaine  was injected with US guidance along the injection track for anesthesia.  Using sterile technique, a syringe with a 1 1/2 inch, 22 gauge needle containing 1 mL Kenalog 40 mg/mL and 4 mL 0.5% ropivicaine  were injected using an US guided posterior approach into the right  glenohumeral joint.  US used to guide needle placement and verify proper needle position.  Images and video indicating proper needle placement were recorded.  Upon completion of the injection, the wound was dressed with a bandaid. Follow up prn    .The indications, risks, expected benefits were discussed.  Formal consent was obtained. The humeral head, glenoid, hyperechoic triangle indicating the posterior labrum were identified by ultrasound.  Initially, 3 mL 0.5% ropivicaine  was injected with US guidance along the injection track for anesthesia.  Using sterile technique, a syringe with a 1 1/2 inch, 22 gauge needle containing 1 mL Kenalog 40 mg/mL and 4 mL 0.5% ropivicaine  were injected using an US guided posterior approach into the left glenohumeral joint.  US used to guide needle placement and verify proper needle position.  Images and video indicating proper needle placement were recorded.  Upon completion of the injection, the wound was dressed with a bandaid.  F/U prn    Pt left clinic in good condition.

## 2019-08-26 ENCOUNTER — MYC MEDICAL ADVICE (OUTPATIENT)
Dept: INTERNAL MEDICINE | Facility: CLINIC | Age: 74
End: 2019-08-26

## 2019-08-28 NOTE — TELEPHONE ENCOUNTER
Responded to the patient via [facility], CT of lung ordered 1/31/19. Eri Del Rio Paramedic on 8/28/2019 at 2:30 PM

## 2019-08-30 ENCOUNTER — ANCILLARY PROCEDURE (OUTPATIENT)
Dept: CT IMAGING | Facility: CLINIC | Age: 74
End: 2019-08-30
Payer: MEDICARE

## 2019-08-30 ENCOUNTER — ANCILLARY PROCEDURE (OUTPATIENT)
Dept: MAMMOGRAPHY | Facility: CLINIC | Age: 74
End: 2019-08-30
Payer: MEDICARE

## 2019-08-30 DIAGNOSIS — R91.8 PULMONARY NODULES: ICD-10-CM

## 2019-08-30 DIAGNOSIS — Z12.31 VISIT FOR SCREENING MAMMOGRAM: ICD-10-CM

## 2019-09-11 ENCOUNTER — TELEPHONE (OUTPATIENT)
Dept: NURSING | Facility: CLINIC | Age: 74
End: 2019-09-11

## 2019-09-11 NOTE — TELEPHONE ENCOUNTER
".[facility]: Nurse Triage Note  SITUATION/BACKGROUND                                                      Giana Hope is a 74 year old female who calls to report having intermittent chest pressure for the past 7-10 days. Does not happen daily and does not radiate. Notices that it happens when lying down and subsides. Experienced  (1) episode of light - headedness upon getting up yesterday.   Patient stated that yesterday, she had been running errands, mowed the lawn and re-seeded the lawn. \"Maybe, it was due to overexerting myself. Last night lasted for approximately - 3 hrs until she had fallen asleep\". Patient reported feeling slightly tired.   Received results of her recent lung CT.  Per Dr. Salazar, showed a couple of small nodules - without concerning features.   Denies any SOB, pain dizziness or other S/S at this time.     Routed to Primary Care as High Priority to review and follow up.       MEDICATIONS:   Taking medication(s) as prescribed? Yes  Taking over the counter medication(s?) N/A  Any barriers to taking medication(s) as prescribed?  No  Medication(s) improving/managing symptoms?  N/A    Allergies: No Known Allergies    RECOMMENDATION/PLAN                                                      Routed to Primary Care as High Priority to review and follow up.     RECOMMENDED DISPOSITION:  Home care instructions given per protocol.     Seek ED care with continuous or intermittent pain, tightness, pressure or discomfort accompanied by shortness of breath; dizziness; cool, moist skin; N/V; pain in neck, shoulders, jaw, teeth, back, or arms,; blue or gray face, lips, earlobes, or fingernails; heart palpitations; severe pain.  Will comply with recommendation: Yes    If further questions/concerns or if symptoms do not improve, worsen or new symptoms develop, call your PCP or [phone] to talk with the Resident on call, as soon as possible.    Guideline used: chest pain  Telephone " Triage Protocols for Nurses, Fifth Edition, Judi Martinez, RN, RN

## 2019-09-23 ENCOUNTER — OFFICE VISIT (OUTPATIENT)
Dept: FAMILY MEDICINE | Facility: CLINIC | Age: 74
End: 2019-09-23
Payer: MEDICARE

## 2019-09-23 ENCOUNTER — TELEPHONE (OUTPATIENT)
Dept: NURSING | Facility: CLINIC | Age: 74
End: 2019-09-23

## 2019-09-23 VITALS
HEART RATE: 73 BPM | HEIGHT: 64 IN | DIASTOLIC BLOOD PRESSURE: 83 MMHG | SYSTOLIC BLOOD PRESSURE: 134 MMHG | OXYGEN SATURATION: 95 % | RESPIRATION RATE: 16 BRPM | TEMPERATURE: 98 F | BODY MASS INDEX: 25.03 KG/M2 | WEIGHT: 146.6 LBS

## 2019-09-23 DIAGNOSIS — R42 LIGHT HEADEDNESS: ICD-10-CM

## 2019-09-23 DIAGNOSIS — R07.89 INTERMITTENT LEFT-SIDED CHEST PAIN: Primary | ICD-10-CM

## 2019-09-23 DIAGNOSIS — R53.83 OTHER FATIGUE: ICD-10-CM

## 2019-09-23 DIAGNOSIS — E55.9 VITAMIN D DEFICIENCY: ICD-10-CM

## 2019-09-23 LAB
ANION GAP SERPL CALCULATED.3IONS-SCNC: 7 MMOL/L (ref 3–14)
BUN SERPL-MCNC: 20 MG/DL (ref 7–30)
CALCIUM SERPL-MCNC: 9.9 MG/DL (ref 8.5–10.1)
CHLORIDE SERPL-SCNC: 101 MMOL/L (ref 94–109)
CO2 SERPL-SCNC: 28 MMOL/L (ref 20–32)
CREAT SERPL-MCNC: 0.88 MG/DL (ref 0.52–1.04)
GFR SERPL CREATININE-BSD FRML MDRD: 65 ML/MIN/{1.73_M2}
GLUCOSE SERPL-MCNC: 94 MG/DL (ref 70–99)
HGB BLD-MCNC: 14.3 G/DL (ref 11.7–15.7)
MAGNESIUM SERPL-MCNC: 2.5 MG/DL (ref 1.6–2.3)
POTASSIUM SERPL-SCNC: 4.1 MMOL/L (ref 3.4–5.3)
SODIUM SERPL-SCNC: 136 MMOL/L (ref 133–144)
TSH SERPL DL<=0.005 MIU/L-ACNC: 1.34 MU/L (ref 0.4–4)

## 2019-09-23 PROCEDURE — 82306 VITAMIN D 25 HYDROXY: CPT | Performed by: NURSE PRACTITIONER

## 2019-09-23 ASSESSMENT — MIFFLIN-ST. JEOR: SCORE: 1142.03

## 2019-09-23 ASSESSMENT — ENCOUNTER SYMPTOMS
FATIGUE: 1
CHILLS: 0
DIZZINESS: 1
HEADACHES: 1
FEVER: 0

## 2019-09-23 ASSESSMENT — PAIN SCALES - GENERAL: PAINLEVEL: NO PAIN (0)

## 2019-09-23 NOTE — TELEPHONE ENCOUNTER
.[facility]: Nurse Triage Note  SITUATION/BACKGROUND                                                      Giana Hope is a 74 year old female who calls to report having symptoms for the 10 days. Seen in [place] for ongoing symptoms of intermittent light headedness, low energy and pressure in chest. Per patient, orthostatics were performed and were normal. In addition,ears were cleaned and felt much better.   Returned home on Saturday and has been experiencing the same symptoms.   Patient at home and has workers doing repairs and complaining of all the noise. Constantly moving to areas to have less noise. No SOB or speaking in short sentences.   Denies any dizziness or chest pressure at this time. However, stated that she is feeling slightly week.   Appointment scheduled in [facility] today for evaluation at 1:30 PM.     MEDICATIONS:   Taking medication(s) as prescribed? Yes  Taking over the counter medication(s?) N/A  Any barriers to taking medication(s) as prescribed?  N/A  Medication(s) improving/managing symptoms?  N/A    Allergies: No Known Allergies      RECOMMENDATION/PLAN                                                      RECOMMENDED DISPOSITION:  Home care instructions given per protocol. Seek ED care with sudden change in the ability to walk, stand, bear weight or move; weakness in face arm or legs; visual disturbances, speech and language problems.   Will comply with recommendation: Yes    If further questions/concerns or if symptoms do not improve, worsen or new symptoms develop, call your PCP or [phone] to talk with the Resident on call, as soon as possible.    Guideline used: weakness  Telephone Triage Protocols for Nurses, Fifth Edition, Judi Martinez RN, RN

## 2019-09-23 NOTE — NURSING NOTE
Chief Complaint   Patient presents with     Recheck Medication     Pt is here to discuss recent health concerns.         Amos Mireles, EMT on 9/23/2019 at 1:35 PM

## 2019-09-23 NOTE — PATIENT INSTRUCTIONS

## 2019-09-23 NOTE — PROGRESS NOTES
"       HPI       Giana Hope is a 74 year old woman. She fell in a boat about 3 weeks ago and hit her head against a seat. She was told she had a mild concussion. She was having pressure in the middle of her chest. Once she returned to [place] from [place] 2 days ago, she started feeling awful. She went into her house and she was supposed to have new plumbing replaced while she was out and it was not. Yesterday, she was very fatigued. This is not common for her.   Today, she feels \"worse and off.\" she continues to have some chest pain on her left side which is painful to palpation as well. She has severe arthritis. She takes medication for the arthritis and for the hyperlipidemia. Dad did die of a heart attack. Paternal grandfather also had heart disease. Mothers side didn't have any heart disease.   Chief Complaint   Patient presents with     Recheck Medication     Pt is here to discuss recent health concerns.     Problem, Medication and Allergy Lists were reviewed and updated if needed.    Patient is not an established patient of this clinic.           Review of Systems:   Review of Systems     Constitutional:  Positive for fatigue. Negative for fever and chills.   Cardiovascular:  Positive for chest pain.   Neurological:  Positive for dizziness and headaches.               Physical Exam:     Vitals:    09/23/19 1335   BP: 134/83   BP Location: Right arm   Patient Position: Sitting   Cuff Size: Adult Regular   Pulse: 73   Resp: 16   Temp: 98  F (36.7  C)   TempSrc: Oral   SpO2: 95%   Weight: 66.5 kg (146 lb 9.6 oz)   Height: 1.613 m (5' 3.5\")     Body mass index is 25.56 kg/m .  Vitals were reviewed and were normal     Physical Exam  Constitutional:       Appearance: Normal appearance.   HENT:      Head: Normocephalic.   Neck:      Musculoskeletal: Normal range of motion and neck supple.   Cardiovascular:      Rate and Rhythm: Normal rate and regular rhythm.      Pulses: Normal pulses.      Heart " sounds: Normal heart sounds.   Musculoskeletal: Normal range of motion.   Skin:     General: Skin is warm and dry.   Neurological:      General: No focal deficit present.      Mental Status: She is alert and oriented to person, place, and time.   Psychiatric:         Mood and Affect: Mood normal.         Behavior: Behavior normal.            Results:   ECG is normal, labs are pending.     Assessment and Plan     1. Intermittent left-sided chest pain    - EKG 12-lead complete w/read - Clinics    2. Light headedness    - Vitamin D Deficiency; Future  - Basic metabolic panel; Future  - Magnesium; Future  - Hemoglobin; Future  - TSH; Future    3. Other fatigue    - Vitamin D Deficiency; Future  - Basic metabolic panel; Future  - Magnesium; Future  - Hemoglobin; Future  - TSH; Future    4. Vitamin D deficiency    - Vitamin D Deficiency; Future      Medications Discontinued During This Encounter   Medication Reason     azithromycin (ZITHROMAX) 500 MG tablet      atovaquone-proguanil (MALARONE) 250-100 MG tablet      First, her ECG is normal. Next, she will have labs drawn today. Next, her careplan will be decided upon once her labs have been completed. Her symptoms may continue to resemble a mild concussion, however she is somewhat frustrated by feeling this way. Options for treatment and follow-up care were reviewed with the patient. Giana Hope  engaged in the decision making process and verbalized understanding of the options discussed and agreed with the final plan.  NASIR Figueroa, CNP

## 2019-09-24 LAB
DEPRECATED CALCIDIOL+CALCIFEROL SERPL-MC: 65 UG/L (ref 20–75)
INTERPRETATION ECG - MUSE: NORMAL

## 2019-10-29 ENCOUNTER — MYC MEDICAL ADVICE (OUTPATIENT)
Dept: ORTHOPEDICS | Facility: CLINIC | Age: 74
End: 2019-10-29

## 2019-10-29 DIAGNOSIS — M19.011 PRIMARY OSTEOARTHRITIS OF RIGHT SHOULDER: Primary | ICD-10-CM

## 2019-10-30 ENCOUNTER — DOCUMENTATION ONLY (OUTPATIENT)
Dept: CARE COORDINATION | Facility: CLINIC | Age: 74
End: 2019-10-30

## 2019-12-04 DIAGNOSIS — M25.512 BILATERAL SHOULDER PAIN, UNSPECIFIED CHRONICITY: Primary | ICD-10-CM

## 2019-12-04 DIAGNOSIS — M25.511 BILATERAL SHOULDER PAIN, UNSPECIFIED CHRONICITY: Primary | ICD-10-CM

## 2019-12-11 ASSESSMENT — ENCOUNTER SYMPTOMS
SEIZURES: 0
DIZZINESS: 1
PARALYSIS: 0
HEARTBURN: 1
TASTE DISTURBANCE: 0
SPEECH CHANGE: 0
SINUS CONGESTION: 0
NUMBNESS: 0
JAUNDICE: 0
NECK PAIN: 1
STIFFNESS: 1
ABDOMINAL PAIN: 0
LOSS OF CONSCIOUSNESS: 0
BOWEL INCONTINENCE: 0
MUSCLE CRAMPS: 1
JOINT SWELLING: 1
WEAKNESS: 0
TREMORS: 0
BLOATING: 0
DISTURBANCES IN COORDINATION: 0
SORE THROAT: 0
MUSCLE WEAKNESS: 1
TINGLING: 0
ARTHRALGIAS: 1
RECTAL PAIN: 0
HOARSE VOICE: 0
SINUS PAIN: 0
HEADACHES: 0
BACK PAIN: 1
VOMITING: 0
CONSTIPATION: 0
NAUSEA: 0
MEMORY LOSS: 0
TROUBLE SWALLOWING: 0
BLOOD IN STOOL: 0
SMELL DISTURBANCE: 0
DIARRHEA: 0
NECK MASS: 0

## 2019-12-13 ENCOUNTER — OFFICE VISIT (OUTPATIENT)
Dept: ORTHOPEDICS | Facility: CLINIC | Age: 74
End: 2019-12-13
Attending: FAMILY MEDICINE
Payer: MEDICARE

## 2019-12-13 ENCOUNTER — ANCILLARY PROCEDURE (OUTPATIENT)
Dept: GENERAL RADIOLOGY | Facility: CLINIC | Age: 74
End: 2019-12-13
Attending: ORTHOPAEDIC SURGERY
Payer: MEDICARE

## 2019-12-13 VITALS — BODY MASS INDEX: 25.56 KG/M2 | HEIGHT: 64 IN

## 2019-12-13 DIAGNOSIS — M19.011 ARTHRITIS OF SHOULDER REGION, RIGHT: ICD-10-CM

## 2019-12-13 DIAGNOSIS — M25.512 BILATERAL SHOULDER PAIN, UNSPECIFIED CHRONICITY: ICD-10-CM

## 2019-12-13 DIAGNOSIS — M19.012 ARTHRITIS OF SHOULDER REGION, LEFT: Primary | ICD-10-CM

## 2019-12-13 DIAGNOSIS — M25.511 BILATERAL SHOULDER PAIN, UNSPECIFIED CHRONICITY: ICD-10-CM

## 2019-12-13 NOTE — NURSING NOTE
"Reason For Visit:   Chief Complaint   Patient presents with     Consult     Bilateral shoulder pain.  Left shoulder is worse Ref. Dr. Zepeda.       PCP: Kellee Knowles  Ref: Dr. Zepeda    ?  No  Occupation  for international health. French to English   Currently working? Yes.  Work status?  On-call.  Date of injury: none/ chronic    Date of surgery: NA  Type of surgery: NA.  Smoker: No  Request smoking cessation information: No    Right hand dominant    SANE score  Affected shoulder: Bilateral mainly left  Right shoulder SANE: 90  Left shoulder SANE: 40-50    Ht 1.613 m (5' 3.5\")   BMI 25.56 kg/m        Pain Assessment  Patient Currently in Pain: Yes  0-10 Pain Scale: 3  Primary Pain Location: Shoulder(mainly left)  Pain Descriptors: Sore  Aggravating Factors: Other (comment), Movement(Lifing heavy things)    Ce Ferrari ATC        "

## 2019-12-13 NOTE — LETTER
12/13/2019       RE: Giana Hope  [address]  [place] [address]     Dear Colleague,    Thank you for referring your patient, Giana Hope, to the [facility] at [facility]. Please see a copy of my visit note below.    CHIEF CONCERN: B shoulder (L>R) shoulder pain    HISTORY OF PRESENT ILLNESS: Ms. Hope is a right handed 74 year old female who presents today with left shoulder pain. She reports that she has had symptoms for years with no significant injury or trauma. Notes that her left side is much worse than the right although she has pain in both. She has been previously seen by Dr. Zepdea this year and received a GH steroid injection (approx April) that provided her short term relief. She is unable to do water aerobics and exercise at the gym secondary to the pain. She endorses difficulty sleeping. No further concerns at this time.     Past Medical History:   Diagnosis Date     Nonsenile cataract      Osteoarthritis      Uveitis        Past Surgical History:   Procedure Laterality Date     PHACOEMULSIFICATION WITH STANDARD INTRAOCULAR LENS IMPLANT Right 3/22/2019    Procedure: Right Cataract Removal with Intraocular Lens Implant;  Surgeon: Trish Taylor MD;  Location:  OR       Current Outpatient Medications   Medication Sig Dispense Refill     pravastatin (PRAVACHOL) 20 MG tablet Take 1 tablet (20 mg) by mouth daily 90 tablet 3     prednisoLONE acetate (PRED FORTE) 1 % ophthalmic suspension 1 drop in surgical eye as directed, 4x daily after surgery for 1 week, 3x daily for 1 week, 2x daily for 1 week, daily for 1 week, then stop 1 Bottle 1     sodium hyaluronate (SUPARTZ) 25 MG/2.5ML injection 2.5 mLs (25 mg) by INTRA-ARTICULAR route once a week       sodium hyaluronate (SUPARTZ) 25 MG/2.5ML injection 2.5 mLs (25 mg) by INTRA-ARTICULAR route once a week       sulindac (CLINORIL) 200 MG tablet Take 1 tablet (200 mg) by mouth 2  times daily (with meals) 180 tablet 3        No Known Allergies    SOCIAL HISTORY:    Social History     Socioeconomic History     Marital status:      Spouse name: Not on file     Number of children: Not on file     Years of education: Not on file     Highest education level: Not on file   Occupational History     Not on file   Social Needs     Financial resource strain: Not on file     Food insecurity:     Worry: Not on file     Inability: Not on file     Transportation needs:     Medical: Not on file     Non-medical: Not on file   Tobacco Use     Smoking status: Former Smoker     Smokeless tobacco: Never Used     Tobacco comment: Quit 22 years ago   Substance and Sexual Activity     Alcohol use: Yes     Comment: Moderate     Drug use: No     Sexual activity: Not on file   Lifestyle     Physical activity:     Days per week: Not on file     Minutes per session: Not on file     Stress: Not on file   Relationships     Social connections:     Talks on phone: Not on file     Gets together: Not on file     Attends Zoroastrianism service: Not on file     Active member of club or organization: Not on file     Attends meetings of clubs or organizations: Not on file     Relationship status: Not on file     Intimate partner violence:     Fear of current or ex partner: Not on file     Emotionally abused: Not on file     Physically abused: Not on file     Forced sexual activity: Not on file   Other Topics Concern     Not on file   Social History Narrative     Not on file       FAMILY HISTORY: Reviewed in EMR      REVIEW OF SYSTEMS: Positive for that noted in past medical history and history of present illness and otherwise reviewed in EMR     PHYSICAL EXAM:    Adult female in no acute distress. Articulates and communicates with normal affect.  Respirations even and unlabored  Focused upper extremity exam: Skin intact. No erythema. Sensation intact all dermatomes into the hand to light touch. EPL, FPL, and Intrinsics intact.  Right shoulder active=passive motion is FE to 155, ER at side to 45, and IR to T12. Left shoulder active=passive motion is FE to 90, ER to 30, and IR to T12. No pain on palpation over the AC joint. Moderate pain on palpation over the long head of the biceps.     IMAGING:   XR of the bilateral shoulders - 2 views on 12/04/2019 shows:  Bilateral glenohumeral joint space loss. No superior humeral migration there is a large loose body on the right shoulder which was seen on previous imaging.     ASSESSMENT:  1. Right end stage glenohumeral arthritis.  2. Left end stage glenohumeral arthritis.    PLAN:    We discussed the finding of advanced shoulder arthritis. We discussed the non-operative and operative treatment options including the risks and potential benefits of each. These include activity modifications, cortisone injections, and PT for non-operative management, and total shoulder arthroplasty as the operative treatment option. We discussed the expectations for pain relief and/or motion improvement with each option. She does not feel that non-operatively management thus far has adequately improved symptoms and pain is very limiting at this time. She would like to proceed with surgical treatment. We reviewed postoperative rehab protocols and expectations. She would like to proceed with surgical scheduling and we will work with her in this regard. She will need to obtain an MRI and preoperative physical prior to surgery.    Scribe Disclosure:  I, Roverto Birch, am serving as a scribe to document services personally performed by Nadira López MD at this visit, based upon the provider's statements to me. All documentation has been reviewed by the aforementioned provider prior to being entered into the official medical record.    Again, thank you for allowing me to participate in the care of your patient.      Sincerely,    Nadira López MD

## 2019-12-13 NOTE — PROGRESS NOTES
CHIEF CONCERN: B shoulder (L>R) shoulder pain    HISTORY OF PRESENT ILLNESS: Ms. Hope is a right handed 74 year old female who presents today with left shoulder pain. She reports that she has had symptoms for years with no significant injury or trauma. Notes that her left side is much worse than the right although she has pain in both. She has been previously seen by Dr. Zepeda this year and received a GH steroid injection (approx April) that provided her short term relief. She is unable to do water aerobics and exercise at the gym secondary to the pain. She endorses difficulty sleeping. No further concerns at this time.     Past Medical History:   Diagnosis Date     Nonsenile cataract      Osteoarthritis      Uveitis        Past Surgical History:   Procedure Laterality Date     PHACOEMULSIFICATION WITH STANDARD INTRAOCULAR LENS IMPLANT Right 3/22/2019    Procedure: Right Cataract Removal with Intraocular Lens Implant;  Surgeon: Trish Taylor MD;  Location:  OR       Current Outpatient Medications   Medication Sig Dispense Refill     pravastatin (PRAVACHOL) 20 MG tablet Take 1 tablet (20 mg) by mouth daily 90 tablet 3     prednisoLONE acetate (PRED FORTE) 1 % ophthalmic suspension 1 drop in surgical eye as directed, 4x daily after surgery for 1 week, 3x daily for 1 week, 2x daily for 1 week, daily for 1 week, then stop 1 Bottle 1     sodium hyaluronate (SUPARTZ) 25 MG/2.5ML injection 2.5 mLs (25 mg) by INTRA-ARTICULAR route once a week       sodium hyaluronate (SUPARTZ) 25 MG/2.5ML injection 2.5 mLs (25 mg) by INTRA-ARTICULAR route once a week       sulindac (CLINORIL) 200 MG tablet Take 1 tablet (200 mg) by mouth 2 times daily (with meals) 180 tablet 3        No Known Allergies    SOCIAL HISTORY:    Social History     Socioeconomic History     Marital status:      Spouse name: Not on file     Number of children: Not on file     Years of education: Not on file     Highest education level: Not on file    Occupational History     Not on file   Social Needs     Financial resource strain: Not on file     Food insecurity:     Worry: Not on file     Inability: Not on file     Transportation needs:     Medical: Not on file     Non-medical: Not on file   Tobacco Use     Smoking status: Former Smoker     Smokeless tobacco: Never Used     Tobacco comment: Quit 22 years ago   Substance and Sexual Activity     Alcohol use: Yes     Comment: Moderate     Drug use: No     Sexual activity: Not on file   Lifestyle     Physical activity:     Days per week: Not on file     Minutes per session: Not on file     Stress: Not on file   Relationships     Social connections:     Talks on phone: Not on file     Gets together: Not on file     Attends Nondenominational service: Not on file     Active member of club or organization: Not on file     Attends meetings of clubs or organizations: Not on file     Relationship status: Not on file     Intimate partner violence:     Fear of current or ex partner: Not on file     Emotionally abused: Not on file     Physically abused: Not on file     Forced sexual activity: Not on file   Other Topics Concern     Not on file   Social History Narrative     Not on file       FAMILY HISTORY: Reviewed in EMR      REVIEW OF SYSTEMS: Positive for that noted in past medical history and history of present illness and otherwise reviewed in EMR     PHYSICAL EXAM:    Adult female in no acute distress. Articulates and communicates with normal affect.  Respirations even and unlabored  Focused upper extremity exam: Skin intact. No erythema. Sensation intact all dermatomes into the hand to light touch. EPL, FPL, and Intrinsics intact. Right shoulder active=passive motion is FE to 155, ER at side to 45, and IR to T12. Left shoulder active=passive motion is FE to 90, ER to 30, and IR to T12. No pain on palpation over the AC joint. Moderate pain on palpation over the long head of the biceps.     IMAGING:   XR of the bilateral  shoulders - 2 views on 12/04/2019 shows:  Bilateral glenohumeral joint space loss. No superior humeral migration there is a large loose body on the right shoulder which was seen on previous imaging.     ASSESSMENT:  1. Right end stage glenohumeral arthritis.  2. Left end stage glenohumeral arthritis.    PLAN:    We discussed the finding of advanced shoulder arthritis. We discussed the non-operative and operative treatment options including the risks and potential benefits of each. These include activity modifications, cortisone injections, and PT for non-operative management, and total shoulder arthroplasty as the operative treatment option. We discussed the expectations for pain relief and/or motion improvement with each option. She does not feel that non-operatively management thus far has adequately improved symptoms and pain is very limiting at this time. She would like to proceed with surgical treatment. We reviewed postoperative rehab protocols and expectations. She would like to proceed with surgical scheduling and we will work with her in this regard. She will need to obtain an MRI and preoperative physical prior to surgery.      Scribe Disclosure:  I, Roverto Birch, am serving as a scribe to document services personally performed by Nadira López MD at this visit, based upon the provider's statements to me. All documentation has been reviewed by the aforementioned provider prior to being entered into the official medical record.    Answers for HPI/ROS submitted by the patient on 12/11/2019   General Symptoms: No  Skin Symptoms: No  HENT Symptoms: Yes  EYE SYMPTOMS: No  HEART SYMPTOMS: No  LUNG SYMPTOMS: No  INTESTINAL SYMPTOMS: Yes  URINARY SYMPTOMS: No  GYNECOLOGIC SYMPTOMS: No  BREAST SYMPTOMS: No  SKELETAL SYMPTOMS: Yes  BLOOD SYMPTOMS: No  NERVOUS SYSTEM SYMPTOMS: Yes  MENTAL HEALTH SYMPTOMS: No  Ear pain: No  Ear discharge: No  Hearing loss: Yes  Tinnitus: Yes  Nosebleeds: No  Congestion:  No  Sinus pain: No  Trouble swallowing: No   Voice hoarseness: No  Mouth sores: No  Sore throat: No  Tooth pain: No  Gum tenderness: No  Bleeding gums: No  Change in taste: No  Change in sense of smell: No  Dry mouth: Yes  Hearing aid used: No  Neck lump: No  Heart burn or indigestion: Yes  Nausea: No  Vomiting: No  Abdominal pain: No  Bloating: No  Constipation: No  Diarrhea: No  Blood in stool: No  Black stools: No  Rectal or Anal pain: No  Fecal incontinence: No  Yellowing of skin or eyes: No  Vomit with blood: No  Change in stools: No  Back pain: Yes  Neck pain: Yes  Swollen joints: Yes  Joint pain: Yes  Bone pain: Yes  Muscle cramps: Yes  Muscle weakness: Yes  Joint stiffness: Yes  Bone fracture: No  Trouble with coordination: No  Dizziness or trouble with balance: Yes  Fainting or black-out spells: No  Memory loss: No  Headache: No  Seizures: No  Speech problems: No  Tingling: No  Tremor: No  Weakness: No  Difficulty walking: No  Paralysis: No  Numbness: No

## 2019-12-13 NOTE — NURSING NOTE
Teaching Flowsheet   Relevant Diagnosis: Shoulder arthritis  Teaching Topic: Pre-op for left TSA - surgery coordinator will call to schedule.  Patient requests surgery after April 10.  MRI before surgery     Person(s) involved in teaching:   Patient     Motivation Level:  Asks Questions: Yes  Eager to Learn: Yes  Cooperative: Yes  Receptive (willing/able to accept information): Yes  Any cultural factors/Mandaen beliefs that may influence understanding or compliance? No     Patient demonstrates understanding of the following:  Reason for the appointment, diagnosis and treatment plan: Yes  Knowledge of proper use of medications and conditions for which they are ordered (with special attention to potential side effects or drug interactions): Yes  Which situations necessitate calling provider and whom to contact: Yes     Teaching Concerns Addressed:   Pre-op by PMD  Family will assist with cares after surgery  Aware of post-op limitations     Proper use and care of sling x 6 weeks (medical equip, care aids, etc.): Yes  Nutritional needs and diet plan: Yes  Pain management techniques: Yes  Wound Care: Yes  How and/when to access community resources: Yes     Instructional Materials Used/Given: Pre-op packet, surgical soap, written guidelines for care after surgery

## 2019-12-16 ENCOUNTER — TELEPHONE (OUTPATIENT)
Dept: ORTHOPEDICS | Facility: CLINIC | Age: 74
End: 2019-12-16

## 2019-12-17 PROBLEM — M19.012 ARTHRITIS OF SHOULDER REGION, LEFT: Status: ACTIVE | Noted: 2019-12-17

## 2020-01-13 ENCOUNTER — TELEPHONE (OUTPATIENT)
Dept: INTERNAL MEDICINE | Facility: CLINIC | Age: 75
End: 2020-01-13

## 2020-01-13 ENCOUNTER — TELEPHONE (OUTPATIENT)
Dept: ORTHOPEDICS | Facility: CLINIC | Age: 75
End: 2020-01-13

## 2020-01-13 ENCOUNTER — OFFICE VISIT (OUTPATIENT)
Dept: INTERNAL MEDICINE | Facility: CLINIC | Age: 75
End: 2020-01-13
Payer: MEDICARE

## 2020-01-13 ENCOUNTER — ANCILLARY PROCEDURE (OUTPATIENT)
Dept: GENERAL RADIOLOGY | Facility: CLINIC | Age: 75
End: 2020-01-13
Attending: INTERNAL MEDICINE
Payer: MEDICARE

## 2020-01-13 VITALS
OXYGEN SATURATION: 97 % | HEART RATE: 73 BPM | BODY MASS INDEX: 25.78 KG/M2 | DIASTOLIC BLOOD PRESSURE: 74 MMHG | WEIGHT: 151 LBS | HEIGHT: 64 IN | SYSTOLIC BLOOD PRESSURE: 112 MMHG

## 2020-01-13 DIAGNOSIS — M54.50 LUMBAR PAIN: ICD-10-CM

## 2020-01-13 DIAGNOSIS — K21.9 GASTROESOPHAGEAL REFLUX DISEASE WITHOUT ESOPHAGITIS: Primary | ICD-10-CM

## 2020-01-13 RX ORDER — OMEPRAZOLE 20 MG/1
20 TABLET, DELAYED RELEASE ORAL
Qty: 60 TABLET | Refills: 0 | Status: SHIPPED | OUTPATIENT
Start: 2020-01-13 | End: 2020-04-20

## 2020-01-13 RX ORDER — CYCLOBENZAPRINE HCL 5 MG
5 TABLET ORAL 3 TIMES DAILY PRN
Qty: 21 TABLET | Refills: 0 | Status: SHIPPED | OUTPATIENT
Start: 2020-01-13 | End: 2021-11-15

## 2020-01-13 ASSESSMENT — PAIN SCALES - GENERAL: PAINLEVEL: NO PAIN (0)

## 2020-01-13 ASSESSMENT — MIFFLIN-ST. JEOR: SCORE: 1162.06

## 2020-01-13 NOTE — TELEPHONE ENCOUNTER
Health Call Center    Phone Message    May a detailed message be left on voicemail: yes    Reason for Call: Medication Question or concern regarding medication   Prescription Clarification  Name of Medication: omeprazole  Prescribing Provider: Dr. Mumtaz Diehl   Pharmacy: [id number] - [place], [place] - [address]   What on the order needs clarification? Pharmacy requesting a change please from tablets to capsules.  Please send this change to pharmacy, electronically.  If you have any questions, please call [facility] at pharmacy. Thank you.          Action Taken: Message routed to:  [facility] ([facility]  [facility]

## 2020-01-13 NOTE — PATIENT INSTRUCTIONS
[facility] Medication Refill Request Information:  * Please contact your pharmacy regarding ANY request for medication refills.  ** [facility] Prescription Fax = [phone]  * Please allow 3 business days for routine medication refills.  * Please allow 5 business days for controlled substance medication refills.     [facility] Test Result notification information:  *You will be notified with in 7-10 days of your appointment day regarding the results of your test.  If you are on MyChart you will be notified as soon as the provider has reviewed the results and signed off on them.    [facility]: [phone]

## 2020-01-13 NOTE — NURSING NOTE
Chief Complaint   Patient presents with     Back Pain     patinet has back pain that is flaring      Gastrophageal Reflux     patient has been having issues with acid reflux more often       Amparo Layton, EMT at 9:31 AM on 1/13/2020.

## 2020-01-13 NOTE — TELEPHONE ENCOUNTER
Assisted patient with rescheduling surgery from 4/21/20 to 5/5/20 due to patients daughter being out of town.

## 2020-01-14 NOTE — PROGRESS NOTES
"                     PRIMARY CARE CENTER       SUBJECTIVE:  Giana Hope is a 74 year old female with a PMHx of   Past Medical History:   Diagnosis Date     Nonsenile cataract      Osteoarthritis      Uveitis     who comes in  To establish care ,plus concerns for persistent low back pain since a fall las fall despite many PT interventions and sx of \"acid reflux\" intermittently with some occasional difficulty swallowing food  (she could not identify solid or liquid) . He back pain is worse standing or leaning forward , does not radiate  and is relieved by sitting does not interfere with sleep. She uses 2 Sulindac per day for back pain relief. 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  There. Lorenzo is traveling scheduled bony go  To  [place]  In April  And then back here for shoulder surgery in April.  Medications and allergies reviewed by me today.     ROS:   10 point ROS other wise neg except for long hx \"spinal arthritis  \" with degenerative changes       OBJECTIVE:    /74 (BP Location: Right arm, Patient Position: Sitting, Cuff Size: Adult Regular)   Pulse 73   Ht 1.613 m (5' 3.5\")   Wt 68.5 kg (151 lb)   LMP  (LMP Unknown)   SpO2 97%   Breastfeeding No   BMI 26.33 kg/m     Wt Readings from Last 1 Encounters:   01/13/20 68.5 kg (151 lb)    pleasant woman sitting in exam chair  Able to rise unassisted and walks with initially a slightly  hesitant gait  The is stable walking   HEENT sclera clear conjunctiva injected EOM intact ,reactive  Pharynx  Benign neck supple  No cervical adenopathy  LUNGS clear to P:&A   Heart   regular no Murmur or gallop  Abd bs present no guarding or rebound  Ext  L shoulder pain with passive movement tenderness with palpation over spinous processes lumbar area    Neuro no focal  weakness    DTR symmetrical  Romberg neg    ASSESSMENT/PLAN:    Giana was seen today for back pain and " gastrophageal reflux.    Diagnoses and all orders for this visit:    Gastroesophageal reflux disease without esophagitis  -     Helicobacter pylori Antigen Stool; Future  -     Comprehensive metabolic panel **(2 WKS); Future  -     CBC with platelets; Future  -     omeprazole (PRILOSEC OTC) 20 MG EC tablet; Take 1 tablet (20 mg) by mouth 2 times daily    Lumbar pain  -     XR Lumbar Spine 2-3 Views; Future  -     cyclobenzaprine (FLEXERIL) 5 MG tablet; Take 1 tablet (5 mg) by mouth 3 times daily as needed for muscle spasms     he lumbar x-ray revealed no acute abnormality but multi level disc  disease and  Degenerative changes . We discusse use of NSIAd  Re reflux sx and possible renal involvement  She will have fasting labs and return in 1 week then will establish for woman's health follow up and discussion re consultation foe  her back pain anatomy     Mumtaz Diehl MD  Jan 13, 2020    .

## 2020-01-16 DIAGNOSIS — K21.9 GASTROESOPHAGEAL REFLUX DISEASE WITHOUT ESOPHAGITIS: ICD-10-CM

## 2020-01-16 LAB
ALBUMIN SERPL-MCNC: 4 G/DL (ref 3.4–5)
ALP SERPL-CCNC: 95 U/L (ref 40–150)
ALT SERPL W P-5'-P-CCNC: 20 U/L (ref 0–50)
ANION GAP SERPL CALCULATED.3IONS-SCNC: 6 MMOL/L (ref 3–14)
AST SERPL W P-5'-P-CCNC: 15 U/L (ref 0–45)
BILIRUB SERPL-MCNC: 0.4 MG/DL (ref 0.2–1.3)
BUN SERPL-MCNC: 25 MG/DL (ref 7–30)
CALCIUM SERPL-MCNC: 9.9 MG/DL (ref 8.5–10.1)
CHLORIDE SERPL-SCNC: 108 MMOL/L (ref 94–109)
CO2 SERPL-SCNC: 26 MMOL/L (ref 20–32)
CREAT SERPL-MCNC: 0.94 MG/DL (ref 0.52–1.04)
ERYTHROCYTE [DISTWIDTH] IN BLOOD BY AUTOMATED COUNT: 12.6 % (ref 10–15)
GFR SERPL CREATININE-BSD FRML MDRD: 60 ML/MIN/{1.73_M2}
GLUCOSE SERPL-MCNC: 122 MG/DL (ref 70–99)
HCT VFR BLD AUTO: 45.7 % (ref 35–47)
HGB BLD-MCNC: 14.9 G/DL (ref 11.7–15.7)
MCH RBC QN AUTO: 31.6 PG (ref 26.5–33)
MCHC RBC AUTO-ENTMCNC: 32.6 G/DL (ref 31.5–36.5)
MCV RBC AUTO: 97 FL (ref 78–100)
PLATELET # BLD AUTO: 206 10E9/L (ref 150–450)
POTASSIUM SERPL-SCNC: 4.7 MMOL/L (ref 3.4–5.3)
PROT SERPL-MCNC: 7.5 G/DL (ref 6.8–8.8)
RBC # BLD AUTO: 4.72 10E12/L (ref 3.8–5.2)
SODIUM SERPL-SCNC: 140 MMOL/L (ref 133–144)
WBC # BLD AUTO: 5 10E9/L (ref 4–11)

## 2020-01-16 PROCEDURE — 80053 COMPREHEN METABOLIC PANEL: CPT | Performed by: INTERNAL MEDICINE

## 2020-01-16 PROCEDURE — 87338 HPYLORI STOOL AG IA: CPT | Performed by: INTERNAL MEDICINE

## 2020-01-16 PROCEDURE — 36415 COLL VENOUS BLD VENIPUNCTURE: CPT | Performed by: INTERNAL MEDICINE

## 2020-01-16 PROCEDURE — 85027 COMPLETE CBC AUTOMATED: CPT | Performed by: INTERNAL MEDICINE

## 2020-01-17 LAB — H PYLORI AG STL QL IA: NEGATIVE

## 2020-01-20 ENCOUNTER — OFFICE VISIT (OUTPATIENT)
Dept: INTERNAL MEDICINE | Facility: CLINIC | Age: 75
End: 2020-01-20
Payer: MEDICARE

## 2020-01-20 VITALS
OXYGEN SATURATION: 96 % | SYSTOLIC BLOOD PRESSURE: 119 MMHG | BODY MASS INDEX: 25.63 KG/M2 | WEIGHT: 147 LBS | HEART RATE: 76 BPM | DIASTOLIC BLOOD PRESSURE: 77 MMHG

## 2020-01-20 DIAGNOSIS — R73.01 IMPAIRED FASTING GLUCOSE: Primary | ICD-10-CM

## 2020-01-20 DIAGNOSIS — M48.062 SPINAL STENOSIS OF LUMBAR REGION WITH NEUROGENIC CLAUDICATION: ICD-10-CM

## 2020-01-20 ASSESSMENT — PAIN SCALES - GENERAL: PAINLEVEL: NO PAIN (0)

## 2020-01-20 NOTE — PATIENT INSTRUCTIONS
[facility] Medication Refill Request Information:  * Please contact your pharmacy regarding ANY request for medication refills.  ** [facility] Prescription Fax = [phone]  * Please allow 3 business days for routine medication refills.  * Please allow 5 business days for controlled substance medication refills.     [facility] Test Result notification information:  *You will be notified with in 7-10 days of your appointment day regarding the results of your test.  If you are on MyChart you will be notified as soon as the provider has reviewed the results and signed off on them.    [facility]: [phone]

## 2020-01-20 NOTE — NURSING NOTE
Chief Complaint   Patient presents with     Heartburn     Pt reports acid reflux and is here to follow up on the start of prilosec to treat it      KONSTANTIN Lazaro at 9:06 AM sign on 1/20/2020

## 2020-01-21 DIAGNOSIS — R73.01 IMPAIRED FASTING GLUCOSE: ICD-10-CM

## 2020-01-21 LAB
ANION GAP SERPL CALCULATED.3IONS-SCNC: 5 MMOL/L (ref 3–14)
BUN SERPL-MCNC: 20 MG/DL (ref 7–30)
CALCIUM SERPL-MCNC: 10.1 MG/DL (ref 8.5–10.1)
CHLORIDE SERPL-SCNC: 104 MMOL/L (ref 94–109)
CO2 SERPL-SCNC: 28 MMOL/L (ref 20–32)
CREAT SERPL-MCNC: 0.98 MG/DL (ref 0.52–1.04)
GFR SERPL CREATININE-BSD FRML MDRD: 57 ML/MIN/{1.73_M2}
GLUCOSE SERPL-MCNC: 113 MG/DL (ref 70–99)
HBA1C MFR BLD: 5.3 % (ref 0–5.6)
POTASSIUM SERPL-SCNC: 4.1 MMOL/L (ref 3.4–5.3)
SODIUM SERPL-SCNC: 137 MMOL/L (ref 133–144)

## 2020-01-21 PROCEDURE — 36415 COLL VENOUS BLD VENIPUNCTURE: CPT | Performed by: INTERNAL MEDICINE

## 2020-01-21 PROCEDURE — 80048 BASIC METABOLIC PNL TOTAL CA: CPT | Performed by: INTERNAL MEDICINE

## 2020-01-21 PROCEDURE — 83036 HEMOGLOBIN GLYCOSYLATED A1C: CPT | Performed by: INTERNAL MEDICINE

## 2020-01-21 NOTE — PROGRESS NOTES
PRIMARY CARE CENTER       SUBJECTIVE:  Giana Hope is a 74 year old female with a PMHx of   Past Medical History:   Diagnosis Date     Nonsenile cataract      Osteoarthritis      Uveitis     who comes in for  Follow up labs and  Medication adjustment.  She was seen a wek ago  Her back pain persists  Resistant to PT and helped by NSIADS BID  . Her reflux is reduced on omeprazole ,  She was pleased that the H pylori study ws neg.   She plan to continue traveling  And had no additional complaints.    Medications and allergies reviewed by me today.     ROS:   Ten point ros was the same a 1 week ago     OBJECTIVE:    /77 (BP Location: Right arm, Patient Position: Sitting, Cuff Size: Adult Regular)   Pulse 76   Wt 66.7 kg (147 lb)   LMP  (LMP Unknown)   SpO2 96%   Breastfeeding No   BMI 25.63 kg/m     Wt Readings from Last 1 Encounters:   01/20/20 66.7 kg (147 lb)     Pleasant woman in no distress able to move about exam room easily  Without help gait normal    HEENT conjunctiva injected bilat no icterus  Neck supple  Pharynx benign  Lungs respirations unlabored no adventitious sounds   Heart regular rhythm no m or gallop  ABD BS present no guarding /rebound  ASSESSMENT/PLAN:    Giana was seen today for heartburn.    Diagnoses and all orders for this visit:    Impaired fasting glucose  -     Hemoglobin A1c; Future    Increase in creatinine  -     Basic metabolic panel; Future    Spinal stenosis of lumbar region with neurogenic claudication  -     ORTHOPEDICS ADULT REFERRAL      Reflux sx decreased  ,she is concerned that she uses Sulindac twice daily for back pain  And is anxious that her creatinine clearance and glucose were increasing   . Her A1c was normal and will be rechecked  after  oral hydration . We discussed other back options but her x-ray would make surgical involvement unlikely . She did not wish to explore  Other pain control and strongly rejected a trial of  celebrex. She will see Dr Vasquez as a primary in the future and seen Dr Holman re her back to be complete .  Mumtaz Diehl MD  Jan 20, 2020

## 2020-01-23 NOTE — TELEPHONE ENCOUNTER
DIAGNOSIS: Spinal Stenosis of lumbar region with neurogenic claudication    APPOINTMENT DATE: 4.16.2020   NOTES STATUS DETAILS   OFFICE NOTE from referring provider Internal 1.20.20 Dr. Diehl, [facility]  1.13.20   OFFICE NOTE from other specialist N/A    DISCHARGE SUMMARY from hospital N/A    DISCHARGE REPORT from the ER N/A    OPERATIVE REPORT N/A    MEDICATION LIST Internal    IMPLANT RECORD/STICKER N/A    LABS     CBC/DIFF Internal 1.16.2020   CULTURES N/A    INJECTIONS DONE IN RADIOLOGY N/A    MRI In process 6.28.17 lumbar spine, [facility]   CT SCAN N/A    XRAYS (IMAGES & REPORTS) In process 1.13.2020 lumbar spine    1.11.17 thoracic spine, [facility]  6.7.17 scoliosis study, [facility]       TUMOR     PATHOLOGY  Slides & report N/A      03/30/20   10:56 AM   Images in PACS  Pre-visit marcelo Castillo CMA    02/06/20   2:05 PM   [facility] disc with images given to Radha Castillo, [facility]    1.23.2020 11:17 AM MJ  Requested images from [facility] fax number [phone]

## 2020-02-06 ENCOUNTER — MYC MEDICAL ADVICE (OUTPATIENT)
Dept: INTERNAL MEDICINE | Facility: CLINIC | Age: 75
End: 2020-02-06

## 2020-02-13 ENCOUNTER — MYC MEDICAL ADVICE (OUTPATIENT)
Dept: INTERNAL MEDICINE | Facility: CLINIC | Age: 75
End: 2020-02-13

## 2020-02-13 NOTE — TELEPHONE ENCOUNTER
Eri ,she has appt with Dr meggan solares back expert in mid April,I wanted him to meet her check exisitng x-ray and then decide next best course action

## 2020-02-18 ENCOUNTER — ANCILLARY PROCEDURE (OUTPATIENT)
Dept: MRI IMAGING | Facility: CLINIC | Age: 75
End: 2020-02-18
Attending: INTERNAL MEDICINE
Payer: MEDICARE

## 2020-02-18 DIAGNOSIS — M54.40 CHRONIC LOW BACK PAIN WITH SCIATICA, SCIATICA LATERALITY UNSPECIFIED, UNSPECIFIED BACK PAIN LATERALITY: ICD-10-CM

## 2020-02-18 DIAGNOSIS — G89.29 CHRONIC LOW BACK PAIN WITH SCIATICA, SCIATICA LATERALITY UNSPECIFIED, UNSPECIFIED BACK PAIN LATERALITY: ICD-10-CM

## 2020-02-26 DIAGNOSIS — E78.5 HYPERLIPIDEMIA WITH TARGET LDL LESS THAN 130: ICD-10-CM

## 2020-02-28 RX ORDER — PRAVASTATIN SODIUM 20 MG
20 TABLET ORAL DAILY
Qty: 30 TABLET | OUTPATIENT
Start: 2020-02-28

## 2020-02-28 NOTE — TELEPHONE ENCOUNTER
pravastatin (PRAVACHOL) 20 MG tablet      Last Written Prescription Date:  1/3/20  Last Fill Quantity: 90 tabs,   # refills: 0  Last Office Visit : 1/20/20  Future Office visit:  4/20/20    Routing refill request to provider for review/approval because:  Lab needed - LDL     30 day pended.

## 2020-03-02 NOTE — TELEPHONE ENCOUNTER
Patient called and stated she would like to cancel her surgery with Dr. López on 5/5/2020. Patient stated she did not want to have anything elective scheduled while the virus is going around. Offered to reschedule surgery instead of cancelling however patient said she did not want to have to keep rescheduling. She stated she will call when she is ready to schedule surgery with Dr. López again.

## 2020-03-11 ENCOUNTER — HEALTH MAINTENANCE LETTER (OUTPATIENT)
Age: 75
End: 2020-03-11

## 2020-03-28 DIAGNOSIS — M15.0 PRIMARY OSTEOARTHRITIS INVOLVING MULTIPLE JOINTS: ICD-10-CM

## 2020-03-30 RX ORDER — SULINDAC 200 MG/1
200 TABLET ORAL 2 TIMES DAILY WITH MEALS
Qty: 180 TABLET | Refills: 3 | Status: SHIPPED | OUTPATIENT
Start: 2020-03-30 | End: 2020-04-20

## 2020-03-30 NOTE — TELEPHONE ENCOUNTER
SULINDAC 200 MG TABLET     Last Written Prescription Date:  1/3/2020  Last Fill Quantity: 180,   # refills: 0  Last Office Visit : 1/20/2020  Future Office visit:  4/20/2020    Routing refill request to provider for review/approval because:  Refer to Provider due to Pt is not between the ages of 3-64.    Christine Pillai RN  Central Triage Red Flags/Med Refills

## 2020-04-16 ENCOUNTER — PRE VISIT (OUTPATIENT)
Dept: ORTHOPEDICS | Facility: CLINIC | Age: 75
End: 2020-04-16

## 2020-04-20 ENCOUNTER — VIRTUAL VISIT (OUTPATIENT)
Dept: FAMILY MEDICINE | Facility: CLINIC | Age: 75
End: 2020-04-20
Payer: MEDICARE

## 2020-04-20 DIAGNOSIS — M15.0 PRIMARY OSTEOARTHRITIS INVOLVING MULTIPLE JOINTS: ICD-10-CM

## 2020-04-20 DIAGNOSIS — E78.5 HYPERLIPIDEMIA WITH TARGET LDL LESS THAN 130: ICD-10-CM

## 2020-04-20 DIAGNOSIS — Z80.3 FAMILY HISTORY OF MALIGNANT NEOPLASM OF BREAST: ICD-10-CM

## 2020-04-20 DIAGNOSIS — Z76.89 ENCOUNTER TO ESTABLISH CARE: Primary | ICD-10-CM

## 2020-04-20 DIAGNOSIS — R73.9 HYPERGLYCEMIA: ICD-10-CM

## 2020-04-20 DIAGNOSIS — K21.9 GASTROESOPHAGEAL REFLUX DISEASE WITHOUT ESOPHAGITIS: ICD-10-CM

## 2020-04-20 DIAGNOSIS — R91.8 PULMONARY NODULES: ICD-10-CM

## 2020-04-20 DIAGNOSIS — Z12.31 ENCOUNTER FOR SCREENING MAMMOGRAM FOR MALIGNANT NEOPLASM OF BREAST: ICD-10-CM

## 2020-04-20 RX ORDER — SULINDAC 200 MG/1
200 TABLET ORAL DAILY PRN
Qty: 90 TABLET | Refills: 3 | Status: SHIPPED | OUTPATIENT
Start: 2020-04-20 | End: 2021-09-27

## 2020-04-20 RX ORDER — PRAVASTATIN SODIUM 20 MG
20 TABLET ORAL DAILY
Qty: 90 TABLET | Refills: 3 | Status: SHIPPED | OUTPATIENT
Start: 2020-04-20 | End: 2021-04-07

## 2020-04-20 RX ORDER — FAMOTIDINE 40 MG/1
40 TABLET, FILM COATED ORAL DAILY PRN
Qty: 90 TABLET | Refills: 3 | Status: SHIPPED | OUTPATIENT
Start: 2020-04-20 | End: 2021-03-26

## 2020-04-20 NOTE — PROGRESS NOTES
"Giana Hope is a 74 year old female who is being evaluated via a billable video visit.      The patient has been notified of following:     \"This video visit will be conducted via a call between you and your physician/provider. We have found that certain health care needs can be provided without the need for an in-person physical exam.  This service lets us provide the care you need with a video conversation.  If a prescription is necessary we can send it directly to your pharmacy.  If lab work is needed we can place an order for that and you can then stop by our lab to have the test done at a later time.    Video visits are billed at different rates depending on your insurance coverage.  Please reach out to your insurance provider with any questions.    If during the course of the call the physician/provider feels a video visit is not appropriate, you will not be charged for this service.\"    Patient has given verbal consent for Video visit? Yes    How would you like to obtain your AVS? Alber    Patient would like the video invitation sent by: Send to e-mail at: [email or web address]   Video Start Time: 12:55 pm  Stop 1:31 pm    Video-Visit Details    Type of service:  Video Visit    Video End Time (time video stopped): 1:31 pm    Originating Location (pt. Location): Home    Distant Location (provider location):  [facility] PRIMARY [facility]ARE [facility]INIC remote     Mode of Communication:  Video Conference via [facility]  HPI:  Giana Hope is a 74 year old female with history of arthritis, hyperlipidemia, previous history of tobacco use quit over 24 years ago with small pulmonary nodule who presents today via video call to establish primary care.  Hawa needs renewal of her pravastatin tolerating well for lipids but has not had a recent fasting lipids completed.  Gastroesophageal reflux.  She was started on omeprazole 20 mg daily for symptoms of heartburn presenting with feeling of reflux in the chest occasional " heaviness in the chest especially when lying down at night.  She was on omeprazole for approximately 3 months with improvement of her symptoms.  She is wondering what she should do next as she has intermittent symptoms recurring now that she is off omeprazole.  She does not have a history of ulcer.  She is learned that omeprazole may interfere with absorption of nutrients and contribute to arthritis as she has arthritis would like to discuss alternative medication.  She has not yet tried an H2 blocker and is willing to try.  Osteoarthritis  Hawa has significant osteoarthritis of her hands shoulders spine and knee in particular she has limitation of range of motion to her left arm due to significant osteoarthritis and was entertaining surgical intervention currently on hold due to pandemic concerns.  She has been using sulindac 200 mg up to twice daily but has been able to lower to once daily and is also trying to take just as needed skipping a few days as she had noted the above concerns related to reflux and slightly worsening renal function although still within normal range.  She has tried Naprosyn and Advil in the past but felt faint on these medications.  She is able to continue the sulindac on an as-needed basis after I reviewed we could monitor her kidney function.  Lipids  Currently tolerating pravastatin daily needs refill.  Family history of breast cancer  She needs annual mammogram last completed in August due to family history of breast cancer in mother and maternal grandmother in their 70s.  History of pulmonary nodule  She is a past cigarette smoker but quit over 24 years ago.  She was monitored in August 30, 2019 with CT scan for stable pulmonary nodule is due for another CT scan end of August beginning of September.  Patient Active Problem List   Diagnosis     Hypercholesterolemia     Arthritis of shoulder region, left     Encounter to establish care     Primary osteoarthritis involving multiple  joints     Gastroesophageal reflux disease without esophagitis     Pulmonary nodules     Family history of malignant neoplasm of breast       Past Medical History:   Diagnosis Date     Nonsenile cataract      Osteoarthritis      Pulmonary nodule      Uveitis          Past Surgical History:   Procedure Laterality Date     PHACOEMULSIFICATION WITH STANDARD INTRAOCULAR LENS IMPLANT Right 3/22/2019    Procedure: Right Cataract Removal with Intraocular Lens Implant;  Surgeon: Trish Taylor MD;  Location:  OR     Social History     Socioeconomic History     Marital status:      Spouse name: Not on file     Number of children: Not on file     Years of education: Not on file     Highest education level: Not on file   Occupational History     Not on file   Social Needs     Financial resource strain: Not on file     Food insecurity     Worry: Not on file     Inability: Not on file     Transportation needs     Medical: Not on file     Non-medical: Not on file   Tobacco Use     Smoking status: Former Smoker     Years: 35.00     Smokeless tobacco: Never Used     Tobacco comment: Quit 24 years ago 1996   Substance and Sexual Activity     Alcohol use: Yes     Comment: Moderate     Drug use: No     Sexual activity: Not Currently     Partners: Male   Lifestyle     Physical activity     Days per week: Not on file     Minutes per session: Not on file     Stress: Not on file   Relationships     Social connections     Talks on phone: Not on file     Gets together: Not on file     Attends Islam service: Not on file     Active member of club or organization: Not on file     Attends meetings of clubs or organizations: Not on file     Relationship status: Not on file     Intimate partner violence     Fear of current or ex partner: Not on file     Emotionally abused: Not on file     Physically abused: Not on file     Forced sexual activity: Not on file   Other Topics Concern     Not on file   Social History Narrative          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    Loves to travel. Moved to [place] to be near daughter Linda () son in law and 2 granddaughters. Her son Jeff ( ) lives in [place]iz[place]. She travels to visit him when able.           ROS: 5 point negative no chest, fever, rash, other GI concern, no bleeding  Physical Exam:  Vitals: LMP  (LMP Unknown)   BMI= There is no height or weight on file to calculate BMI.    GENERAL APPEARANCE: healthy, alert and no distress  EYES: Eyes grossly normal to inspection,conjunctivae and sclerae normal  RESP: appears in no distress  PSYCH: mentation appears normal and affect normal/bright    Giana was seen today for establish care.  Giana Hope is a 74 year old female with history of arthritis, hyperlipidemia, previous history of tobacco use quit over 24 years ago with small pulmonary nodule who presents today via video call to establish primary care.       Giana was seen today for establish care.    Diagnoses and all orders for this visit:    Encounter to establish care    Hyperlipidemia with target LDL less than 130  -     pravastatin (PRAVACHOL) 20 MG tablet; Take 1 tablet (20 mg) by mouth daily  -     Comprehensive metabolic panel; Future  -     Lipid panel reflex to direct LDL Fasting; Future    Primary osteoarthritis involving multiple joints    She is currently tolerating a lower dose and we could monitor her creatine and make sure she informs me if stomach pain    -     sulindac (CLINORIL) 200 MG tablet; Take 1 tablet (200 mg) by mouth daily as needed (arthritis)  -     CRP inflammation; Future    Gastroesophageal reflux disease without esophagitis    I reviewed could try Pepcid once daily as needed to assist with reflux contact me if symptoms not controlled.      -     famotidine (PEPCID) 40 MG tablet; Take 1 tablet (40 mg) by mouth daily as needed for heartburn  -     CBC with platelets differential; Future    Pulmonary nodules  -     CT Chest w/o contrast; Future    Family history of malignant neoplasm of breast  -     Mammogram, screening w denisha (3D); Future    Encounter for screening mammogram for malignant neoplasm of breast   -     Mammogram, screening w denisha (3D); Future    Hyperglycemia  -     Hemoglobin A1c; Future    Fasting Labs may be completed in July     She will f/u in September 2020 for follow-up and review results of mammogram and CT lung.  Options for treatment and follow-up care were reviewed with the patient. Giana Hope was engaged and actively involved in the decision making process and verbalized understanding of the options discussed and was satisfied with the final plan.    Gilberto Vasquez MD

## 2020-04-20 NOTE — NURSING NOTE
Chief Complaint   Patient presents with     Establish Care     Pt would like to establish care via video visit      KONSTANTIN Lazaro at 11:36 AM sign on 4/20/2020

## 2020-04-20 NOTE — Clinical Note
Needs mammogram and CT lung in September.  Could schedule  fasting labs as early as July but may do in September. Please call Tuesday to coordinate  Gilberto Vasquez MD

## 2020-05-20 ENCOUNTER — TELEPHONE (OUTPATIENT)
Dept: ORTHOPEDICS | Facility: CLINIC | Age: 75
End: 2020-05-20

## 2020-05-20 ENCOUNTER — DOCUMENTATION ONLY (OUTPATIENT)
Dept: CARE COORDINATION | Facility: CLINIC | Age: 75
End: 2020-05-20

## 2020-05-20 NOTE — TELEPHONE ENCOUNTER
Reached out to patient to discuss rescheduling surgery with Dr. López. Patient states at this time she would not like to reschedule. Patient states she will call me when ready to reschedule.

## 2020-06-03 DIAGNOSIS — M48.061 LUMBAR SPINAL STENOSIS: Primary | ICD-10-CM

## 2020-06-18 ENCOUNTER — TELEPHONE (OUTPATIENT)
Dept: DERMATOLOGY | Facility: CLINIC | Age: 75
End: 2020-06-18

## 2020-06-18 NOTE — TELEPHONE ENCOUNTER
Called pt and LVM. I let her know that we need to change her 6/23/20 appointment to a virtual visit. Or she can reschedule in August or September. I also mentioned that if we do not hear from her on 6/22/20 we will be canceling her appointment. Clinic number provided.  Sierra Anderson, BRUNO

## 2020-07-01 ENCOUNTER — MYC MEDICAL ADVICE (OUTPATIENT)
Dept: FAMILY MEDICINE | Facility: CLINIC | Age: 75
End: 2020-07-01

## 2020-07-07 ENCOUNTER — OFFICE VISIT (OUTPATIENT)
Dept: INTERNAL MEDICINE | Facility: CLINIC | Age: 75
End: 2020-07-07
Payer: MEDICARE

## 2020-07-07 VITALS
SYSTOLIC BLOOD PRESSURE: 145 MMHG | OXYGEN SATURATION: 95 % | DIASTOLIC BLOOD PRESSURE: 83 MMHG | BODY MASS INDEX: 26.34 KG/M2 | HEART RATE: 66 BPM | WEIGHT: 151.1 LBS

## 2020-07-07 DIAGNOSIS — R22.32 MASS OF LEFT UPPER EXTREMITY: Primary | ICD-10-CM

## 2020-07-07 NOTE — NURSING NOTE
Chief Complaint   Patient presents with     Mass     Pt reports mass on her upper left arm      KONSTANTIN Lazaro at 10:10 AM sign on 7/7/2020

## 2020-07-07 NOTE — PROGRESS NOTES
"                     PRIMARY CARE CENTER       SUBJECTIVE:  Giana Hope is a 74 year old woman with osteoarthritis, HLD, and GERD who presents for a lump in her upper left arm.    She states it has been there for about a year. It hasn't changed in size. She has some sunspots that have been stable, and had some \"precancerous lesions\" that were removed by prior dermatologists. She came in today because her daughter was concerned. She denies pain, trauma, numbness/tingling, bruising, skin color changes, new rashes, other new lumps/bumps, weight changes, night sweats.    She mentions prior cysts on her back and left wrist. She received most of her care at [facility] and moved to [place] within the past two years, and regularly sees Dr. Pavon. She reports her prior mammograms were negative. Denies family history of cancer/NF/autoimmune.    Current Outpatient Medications   Medication     cyclobenzaprine (FLEXERIL) 5 MG tablet     famotidine (PEPCID) 40 MG tablet     pravastatin (PRAVACHOL) 20 MG tablet     sulindac (CLINORIL) 200 MG tablet     No current facility-administered medications for this visit.         No Known Allergies    Review of systems:    General:  No weight loss, appetite loss, general weakness  Heme/lymph:  No lymph node swelling or easy bruising  Skin:  No skin lesions or concerning moles,rashes, jaundice.  M/S:  No new arthralgias, myalgias, joint swelling, redness or deformities  Neuro: No numbness, tingling, weakness.    OBJECTIVE:    LMP  (LMP Unknown)    Wt Readings from Last 1 Encounters:   01/20/20 66.7 kg (147 lb)     Physical Examination:    General:  Conversant, generally healthy appearing, no acute distress  CV: S1 S2 RRR no m/g/r  Resp: CTAB on room air  Neuro: Sensation intact in upper extremities. 5/5 strength for deltoids, biceps, triceps, hand muscles.   M/S:   Rubbery palpable, mobile mass about 4 cm in diameter in left lateral arm.  Skin:   Several raised sunspots, 2 cm erythematous " patch/macule on right dorsum.     ASSESSMENT/PLAN:    Giana was seen today for mass.    Diagnoses and all orders for this visit:    Mass of left upper extremity  Suspect benign soft-tissue mass such as lipoma, fibroma, angiofibroma, nerve-sheath tumor as no change in size, without pain/tenderness, or systemic features.  - Patient has dermatology follow-up in September. Will monitor until already scheduled dermatology appt for possible excisional biopsy in future.    Preventative Health  Colonoscopy - 11/03/2009. Negative. Recommended 10 year follow-up.  Mammogram - 08/30/2019 Negative; ordered for 10/12/2020  CT chest - 08/30/2019 with 5 mm nodule with 1 year follow-up per Fleischner criteria. ordered for 10/12/2020.    Pt should return to clinic for f/u with Dermatology and/or Dr. Zamora in 2 months. Appointments are already scheduled for 9/21 and 9/22.    Pt was seen and plan of care discussed with Dr. Connie Smith MD.    Saravanan Yanez MD  Jul 7, 2020  Internal Medicine  [phone]    Teaching Physician Note:  I was present during the visit and the patient was seen and examined by me.   I discussed the case with the resident and agree with the note as documented by the resident with the following exceptions:  None.    Connie Smith M.D.  Internal Medicine   pager [phone]

## 2020-07-10 ENCOUNTER — NURSE TRIAGE (OUTPATIENT)
Dept: NURSING | Facility: CLINIC | Age: 75
End: 2020-07-10

## 2020-07-10 NOTE — TELEPHONE ENCOUNTER
[facility]: Nurse Triage Note  SITUATION/BACKGROUND                                                      Giana Hope is a 74 year old female who calls with foot cramping ,light-headedness and dull discomfort left chest and armpit..    Description:  foot cramping ,light-headedness and dull discomfort left chest and armpit which have resolved  Onset/duration:  Foot cramping -during night  Light-headedness this morning and this afternoon when she noted dull discomfort in her left chest and armpit  Precip. factors:  Was seen in clinic 7/7/20 and her B/P was elevated  147/83  Improves/worsens symptoms:  Have improved      MEDICATIONS:   Taking medication(s) as prescribed? Yes  Taking over the counter medication(s?) No  Any barriers to taking medication(s) as prescribed?  No  Medication(s) improving/managing symptoms?  No    Allergies: No Known Allergies    ASSESSMENT       74 year old female who calls with foot cramping ,light-headedness and dull discomfort left chest and armpit..She states she was unable to sleep last night because her feet were cramping   She woke up this morning and felt light-headed.   This afternoon she felt a dull discomfort in her left chest and left armpit -at the same time felt light-headed   This has resolved.  Discussed dehydration   She states she feels dry and may not be drinking enough   States she has not urinated much today..  She also states her B/P was elevated when she was seen in clinic on   7/7/20- B/P 147/83   She states it usually runs 118/73.   She denies chest pain,SOB,weakness or fever.    Instructed her to push fluids  Drink 6-8 glasses of water a day and may be helpful to drink a sport drink such as some Gatoraid.   Recommended she purchase a B/P monitor from her drug store and journal her B/P readings over the weekend    Call back Monday if her B/P is consistently elevated..or symptoms return  If chest pain and light-headedness over the weekend -go  to ED      RECOMMENDATION/PLAN                                                      RECOMMENDED DISPOSITION:  See above..  Will comply with recommendation: Yes    If further questions/concerns or if symptoms do not improve, worsen or new symptoms develop, call your PCP or [phone] to talk with the Resident on call, as soon as possible.    Guideline used: dehydration  Telephone Triage Protocols for Nurses, Fifth Edition, Judi Camarena, RN, RN

## 2020-08-04 ENCOUNTER — VIRTUAL VISIT (OUTPATIENT)
Dept: DERMATOLOGY | Facility: CLINIC | Age: 75
End: 2020-08-04
Payer: MEDICARE

## 2020-08-04 DIAGNOSIS — L65.9 LOSS OF HAIR: Primary | ICD-10-CM

## 2020-08-04 DIAGNOSIS — E55.9 VITAMIN D DEFICIENCY, UNSPECIFIED: ICD-10-CM

## 2020-08-04 RX ORDER — FLUOCINOLONE ACETONIDE 0.11 MG/ML
OIL TOPICAL
Qty: 118.28 ML | Refills: 5 | Status: SHIPPED | OUTPATIENT
Start: 2020-08-04 | End: 2021-06-14 | Stop reason: ALTCHOICE

## 2020-08-04 RX ORDER — KETOCONAZOLE 20 MG/ML
SHAMPOO TOPICAL
Qty: 120 ML | Refills: 5 | Status: SHIPPED | OUTPATIENT
Start: 2020-08-04 | End: 2021-06-14

## 2020-08-04 ASSESSMENT — PAIN SCALES - GENERAL: PAINLEVEL: MODERATE PAIN (4)

## 2020-08-04 NOTE — PROGRESS NOTES
[facility] Dermatology Record:  Mychart Connected    Dermatology Problem List:    1. 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 AKs. Cryo.    Encounter Date: Aug 4, 2020    CC:  Chief Complaint   Patient presents with     Derm Problem     Patient reports that she has been developing a bald spot on the back of her head.          History of Present Illness:  Giana Hope is a 75 year old female who presents for hair loss  - granddaughter noticed it when she was helping do her hair  - used to wash her hair every other day  - is getting ready to do shoulder surgery, which is stressful  - lives close to [facility]  - states she probably need to be seen for AKs that were previously treated with cryotherapy    - duration: few months  - percentage of hair loss: <10%  - loss of eyebrows of body hair: no  - scalp symptoms including burning or itching: itch  - presence of severe dandruff on the scalp: no  - frequency of hair washing: every 3 days  - shampoos: Lissette   -state of overall health: overall good  - presence of severe life stress: moved to [place] about 1.5 years ago, COVID very stressful, hadn't made a lot of friend int the area, used to live in   - vitamins and supplements: vitamin D with calcium, another separate vitamin D, B12  - hirsutism: no  - menses: no  - autoimmune or endocrine disease: no  - family history of hair loss: mother at an older age, daughter with mild hair loss    ROS: Patient is generally feeling well today     Physical Examination:  General: Well-appearing, appropriately-developed individual.  Skin: Focused examination including face, neck, scalp was performed.   - mild scalp erythema  - diffuse loss of hair density  - eyebrows and eyelashes normal                         Past Medical History:    Patient Active Problem List   Diagnosis     Hypercholesterolemia     Arthritis of shoulder region, left     Encounter to establish care     Primary osteoarthritis involving multiple joints     Gastroesophageal reflux disease without esophagitis     Pulmonary nodules     Family history of malignant neoplasm of breast     Mass of left upper extremity     Past Medical History:   Diagnosis Date     Nonsenile cataract      Osteoarthritis      Pulmonary nodule      Uveitis      Past Surgical History:   Procedure Laterality Date     PHACOEMULSIFICATION WITH STANDARD INTRAOCULAR LENS IMPLANT Right 3/22/2019    Procedure: Right Cataract Removal with Intraocular Lens Implant;  Surgeon: Trish Taylor MD;  Location:  OR       Social History:  Moved to [place] from [place] in 2018  Patient reports that she has quit smoking. She quit after 35.00 years of use. She has never used smokeless tobacco. She reports current alcohol use. She reports that she does not use drugs.    Family History:  Family History   Problem Relation Age of Onset     Arthritis Mother      Breast Cancer Mother 78     Diabetes Type 2  Father      Heart Disease Father          age 75     Alzheimer Disease Father 60     Heart Disease Brother 68     Breast Cancer Maternal Grandmother 81     Heart Disease Maternal Grandfather 54         age 81     Other - See Comments Son         schizoaffective lives in [place]     Glaucoma No family hx of      Macular Degeneration No family hx of      Medications:  Current Outpatient Medications   Medication     cyclobenzaprine (FLEXERIL) 5 MG tablet     famotidine (PEPCID) 40 MG tablet     pravastatin (PRAVACHOL) 20 MG tablet     sulindac (CLINORIL) 200 MG tablet     No current facility-administered medications for this visit.        No Known Allergies    Impression and Recommendations (Patient Counseled on the Following):  1. Complex multi-factorial non-scarring hair loss. Suspect most likely telogen  effluvium +/- seborrheic dermatitis and/or androgenetic alopecia. Today we discussed natural history and etiology of telogen effluvium today. Counseled on expectations for anticipated gradual regrowth. Advised sometimes does not return 100% to baseline. Could consider Rogaine now or in future, she would like to start now. Additionally advised she may see more shedding after surgery. Will also check basic labs today to ensure has adequate building blocks and start topical management for seb derm as below.  - labs: CBC, TSH, iron, ferritin, vitamin D  - start Rogaine 5% foam nightly (2 applications 5 minutes apart) or BID. Counseled on initial shedding and expectations regarding ongoing use including loss of hair with cessation of treatment.  - start ketoconazole shampoo 2-3 times weekly  - start fluocinolone oil a few times per week PRN pruritus; counseled on use    2. History of AKs treated with cryos.  - Will plan for in-person FBSE at next visit    Faculty: Dr. Ramirez    Follow-up: 3 months, sooner if concerns     Staff and resident      Lakshmi Tejeda MD  Dermatology Resident  [facility]    Staff Physician Comments:   IBrenda MD, was with the resident on the video visit for entire time documented and agree with the resident's findings and plan of care as documented in the resident's note.    Brenda Ramirez MD    Department of Dermatology  [facility]: Phone: [phone], Fax: [phone]  8/5/2020      ==========================================    Teledermatology information:  - Location of patient: [place]  - Patient presented as: return  - Location of teledermatologist:  ([facility] DERMATOLOGY )  - Reason teledermatology is appropriate:  of National Emergency Regarding Coronavirus disease (COVID 19) Outbreak  - Image quality and interpretability: acceptable  - Physician has received verbal  consent for a Video/Photos Visit from the patient? Yes  - In-person dermatology visit recommendation: no  - Date of images: 8/4/20  - Service start time: 8:00 AM  - Service end time: 8:21 AM  - Date of report: 8/4/2020

## 2020-08-04 NOTE — PATIENT INSTRUCTIONS
[facility] Dermatology Visit    Thank you for allowing us to participate in your care. Your findings, instructions and follow-up plan are as follows:    - your hair loss might be due to telogen effluvium which means your body is slightly out of balance due to stress, illness, or vitamin or hormone abnormalities  - your type of hair loss is not scarring  - use ketoconazole shampoo a few times per week  - get some labs checked to make sure your vitamins are normal  - use fluocinolone oil a few times per week at night on the scalp  - use minoxidil foam 1-2 times daily; be aware that it can cause some initial shedding in the first 6 weeks, but this should improve over time    Good luck with your surgery!    We will do a full body check at your next visit.    Return in 3 months, sooner if concerns.    When should I call my doctor?    If you are worsening or not improving, please, contact us or seek urgent care as noted below.     Who should I call with questions (adults)?    [facility] (adult and pediatric): [phone]     [facility] (adult): [phone]    For urgent needs outside of business hours call the [facility] at [phone] and ask for the dermatology resident on call    If this is a medical emergency and you are unable to reach an ER, Call [phone]      Who should I call with questions (pediatric)?  [facility]- Pediatric Dermatology  Dr. Priyanka Ayala, Dr. Netta Hernandez, Dr. Jenny Glover, [facility]  Dr. Chanelle Harris, Dr. Mouna Gage & Dr. Jose Alberto Clark  Non Urgent  Nurse Triage Line; [phone]- Carli and [facility] Care Coordinators   Nohemy (/Complex ) [phone]    If you need a prescription refill, please contact your pharmacy. Refills are approved or denied by our Physicians during normal business hours, Monday through Fridays  Per office  policy, refills will not be granted if you have not been seen within the past year (or sooner depending on your child's condition)    Scheduling Information:  Pediatric Appointment Scheduling and Call Center [phone]  Radiology Scheduling- [phone]  Sedation Unit Scheduling- [phone]  [place] Scheduling- General [phone]; Pediatric Dermatology [phone]  Main  Services: [phone]  Slovak: [phone]  Emirati: [phone]  Hmong/Armenian/[place] citizen: [phone]  Preadmission Nursing Department Fax Number: [phone] (Fax all pre-operative paperwork to this number)    For urgent matters arising during evenings, weekends, or holidays that cannot wait for normal business hours please call [phone] and ask for the Dermatology Resident On-Call to be paged.

## 2020-08-04 NOTE — NURSING NOTE
Chief Complaint   Patient presents with     Derm Problem     Patient reports that she has been developing a bald spot on the back of her head.

## 2020-08-04 NOTE — LETTER
8/4/2020       RE: Giana Hope  [address]  [place] [address]     Dear Colleague,    Thank you for referring your patient, Giana Hope, to the [facility] DERMATOLOGY at [facility]. Please see a copy of my visit note below.    [facility] Dermatology Record:  Mychart Connected    Dermatology Problem List:    1. 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 AKs. Cryo.    Encounter Date: Aug 4, 2020    CC:  Chief Complaint   Patient presents with     Derm Problem     Patient reports that she has been developing a bald spot on the back of her head.          History of Present Illness:  Gaina Hope is a 75 year old female who presents for hair loss  - granddaughter noticed it when she was helping do her hair  - used to wash her hair every other day  - is getting ready to do shoulder surgery, which is stressful  - lives close to [facility]  - states she probably need to be seen for AKs that were previously treated with cryotherapy    - duration: few months  - percentage of hair loss: <10%  - loss of eyebrows of body hair: no  - scalp symptoms including burning or itching: itch  - presence of severe dandruff on the scalp: no  - frequency of hair washing: every 3 days  - shampoos: Lissette   -state of overall health: overall good  - presence of severe life stress: moved to [place] about 1.5 years ago, COVID very stressful, hadn't made a lot of friend int the area, used to live in   - vitamins and supplements: vitamin D with calcium, another separate vitamin D, B12  - hirsutism: no  - menses: no  - autoimmune or endocrine disease: no  - family history of hair loss: mother at an older age, daughter with mild hair loss    ROS: Patient is generally feeling well today     Physical  Examination:  General: Well-appearing, appropriately-developed individual.  Skin: Focused examination including face, neck, scalp was performed.   - mild scalp erythema  - diffuse loss of hair density  - eyebrows and eyelashes normal                         Past Medical History:   Patient Active Problem List   Diagnosis     Hypercholesterolemia     Arthritis of shoulder region, left     Encounter to establish care     Primary osteoarthritis involving multiple joints     Gastroesophageal reflux disease without esophagitis     Pulmonary nodules     Family history of malignant neoplasm of breast     Mass of left upper extremity     Past Medical History:   Diagnosis Date     Nonsenile cataract      Osteoarthritis      Pulmonary nodule      Uveitis      Past Surgical History:   Procedure Laterality Date     PHACOEMULSIFICATION WITH STANDARD INTRAOCULAR LENS IMPLANT Right 3/22/2019    Procedure: Right Cataract Removal with Intraocular Lens Implant;  Surgeon: Trish Taylor MD;  Location:  OR       Social History:  Moved to [place] from [place] in 2018  Patient reports that she has quit smoking. She quit after 35.00 years of use. She has never used smokeless tobacco. She reports current alcohol use. She reports that she does not use drugs.    Family History:  Family History   Problem Relation Age of Onset     Arthritis Mother      Breast Cancer Mother 78     Diabetes Type 2  Father      Heart Disease Father          age 75     Alzheimer Disease Father 60     Heart Disease Brother 68     Breast Cancer Maternal Grandmother 81     Heart Disease Maternal Grandfather 54         age 81     Other - See Comments Son         schizoaffective lives in [place]     Glaucoma No family hx of      Macular Degeneration No family hx of      Medications:  Current Outpatient Medications   Medication     cyclobenzaprine (FLEXERIL) 5 MG tablet     famotidine (PEPCID) 40 MG tablet     pravastatin (PRAVACHOL) 20 MG tablet      sulindac (CLINORIL) 200 MG tablet     No current facility-administered medications for this visit.        No Known Allergies    Impression and Recommendations (Patient Counseled on the Following):  1. Complex multi-factorial non-scarring hair loss. Suspect most likely telogen effluvium +/- seborrheic dermatitis and/or androgenetic alopecia. Today we discussed natural history and etiology of telogen effluvium today. Counseled on expectations for anticipated gradual regrowth. Advised sometimes does not return 100% to baseline. Could consider Rogaine now or in future, she would like to start now. Additionally advised she may see more shedding after surgery. Will also check basic labs today to ensure has adequate building blocks and start topical management for seb derm as below.  - labs: CBC, TSH, iron, ferritin, vitamin D  - start Rogaine 5% foam nightly (2 applications 5 minutes apart) or BID. Counseled on initial shedding and expectations regarding ongoing use including loss of hair with cessation of treatment.  - start ketoconazole shampoo 2-3 times weekly  - start fluocinolone oil a few times per week PRN pruritus; counseled on use    2. History of AKs treated with cryos.  - Will plan for in-person FBSE at next visit    Faculty: Dr. Ramirez    Follow-up: 3 months, sooner if concerns     Staff and resident      Lakshmi Tejeda MD  Dermatology Resident  [facility]    Staff Physician Comments:   I, Brenda Ramirez MD, was with the resident on the video visit for entire time documented and agree with the resident's findings and plan of care as documented in the resident's note.    Brenda Ramirez MD    Department of Dermatology  [facility]: Phone: [phone], Fax: [phone]  8/5/2020      ==========================================    Teledermatology information:  - Location of patient: [place]  - Patient  presented as: return  - Location of teledermatologist:  ([facility] DERMATOLOGY )  - Reason teledermatology is appropriate:  of National Emergency Regarding Coronavirus disease (COVID 19) Outbreak  - Image quality and interpretability: acceptable  - Physician has received verbal consent for a Video/Photos Visit from the patient? Yes  - In-person dermatology visit recommendation: no  - Date of images: 8/4/20  - Service start time: 8:00 AM  - Service end time: 8:21 AM  - Date of report: 8/4/2020     Again, thank you for allowing me to participate in the care of your patient.      Sincerely,    Brenda Ramirez MD

## 2020-08-12 ENCOUNTER — TELEPHONE (OUTPATIENT)
Dept: DERMATOLOGY | Facility: CLINIC | Age: 75
End: 2020-08-12

## 2020-08-12 NOTE — TELEPHONE ENCOUNTER
Prior Authorization Retail Medication Request    Medication/Dose: fluocinolone acetonide (DERMA SMOOTHE/FS BODY) 0.01 % external oil   ICD code (if different than what is on RX):    Previously Tried and Failed:  none  Rationale:      Insurance Phone: [phone]  Insurance ID:  [id number]

## 2020-08-13 NOTE — TELEPHONE ENCOUNTER
Prior Authorization Approval    Medication: fluocinolone acetonide (DERMA SMOOTHE/FS BODY) 0.01 % oil - APPROVED was approved on 8/13/2020  Effective: 5/15/2020 to 8/13/2021  Reference #:    Approved Dose/Quantity:   Insurance Company: [employer] - Phone [phone] Fax [phone]  Expected CoPay:    Pharmacy Filling the Rx: [id number] - [place], [place] - [address]  Pharmacy Notified: Yes  Patient Notified: Comment:  **Instructed pharmacy to notify patient when script is ready to /ship.**

## 2020-08-13 NOTE — TELEPHONE ENCOUNTER
PA Initiation    Medication: fluocinolone acetonide (DERMA SMOOTHE/FS BODY) 0.01 % oil -   Insurance Company: [employer] - Phone [phone] Fax [phone]  Pharmacy Filling the Rx: [id number] [place], [place] - [address]  Filling Pharmacy Phone: [phone]  Filling Pharmacy Fax: [phone]  Start Date: 8/13/2020

## 2020-08-17 DIAGNOSIS — L65.9 LOSS OF HAIR: ICD-10-CM

## 2020-08-17 DIAGNOSIS — E55.9 VITAMIN D DEFICIENCY, UNSPECIFIED: ICD-10-CM

## 2020-08-17 LAB
BASOPHILS # BLD AUTO: 0 10E9/L (ref 0–0.2)
BASOPHILS NFR BLD AUTO: 0.3 %
DIFFERENTIAL METHOD BLD: NORMAL
EOSINOPHIL # BLD AUTO: 0.2 10E9/L (ref 0–0.7)
EOSINOPHIL NFR BLD AUTO: 2.5 %
ERYTHROCYTE [DISTWIDTH] IN BLOOD BY AUTOMATED COUNT: 12.1 % (ref 10–15)
HCT VFR BLD AUTO: 40.5 % (ref 35–47)
HGB BLD-MCNC: 13.3 G/DL (ref 11.7–15.7)
LYMPHOCYTES # BLD AUTO: 1.6 10E9/L (ref 0.8–5.3)
LYMPHOCYTES NFR BLD AUTO: 23.7 %
MCH RBC QN AUTO: 31.8 PG (ref 26.5–33)
MCHC RBC AUTO-ENTMCNC: 32.8 G/DL (ref 31.5–36.5)
MCV RBC AUTO: 97 FL (ref 78–100)
MONOCYTES # BLD AUTO: 0.4 10E9/L (ref 0–1.3)
MONOCYTES NFR BLD AUTO: 6.3 %
NEUTROPHILS # BLD AUTO: 4.6 10E9/L (ref 1.6–8.3)
NEUTROPHILS NFR BLD AUTO: 67.2 %
PLATELET # BLD AUTO: 180 10E9/L (ref 150–450)
RBC # BLD AUTO: 4.18 10E12/L (ref 3.8–5.2)
WBC # BLD AUTO: 6.9 10E9/L (ref 4–11)

## 2020-08-17 PROCEDURE — 84443 ASSAY THYROID STIM HORMONE: CPT | Performed by: FAMILY MEDICINE

## 2020-08-17 PROCEDURE — 85025 COMPLETE CBC W/AUTO DIFF WBC: CPT | Performed by: FAMILY MEDICINE

## 2020-08-17 PROCEDURE — 83550 IRON BINDING TEST: CPT | Performed by: FAMILY MEDICINE

## 2020-08-17 PROCEDURE — 82728 ASSAY OF FERRITIN: CPT | Performed by: FAMILY MEDICINE

## 2020-08-17 PROCEDURE — 82306 VITAMIN D 25 HYDROXY: CPT | Performed by: DERMATOLOGY

## 2020-08-17 PROCEDURE — 36415 COLL VENOUS BLD VENIPUNCTURE: CPT | Performed by: FAMILY MEDICINE

## 2020-08-17 PROCEDURE — 83540 ASSAY OF IRON: CPT | Performed by: FAMILY MEDICINE

## 2020-08-18 LAB
DEPRECATED CALCIDIOL+CALCIFEROL SERPL-MC: 51 UG/L (ref 20–75)
FERRITIN SERPL-MCNC: 71 NG/ML (ref 8–252)
IRON SATN MFR SERPL: 28 % (ref 15–46)
IRON SERPL-MCNC: 87 UG/DL (ref 35–180)
TIBC SERPL-MCNC: 307 UG/DL (ref 240–430)
TSH SERPL DL<=0.005 MIU/L-ACNC: 1.14 MU/L (ref 0.4–4)

## 2020-09-02 ENCOUNTER — VIRTUAL VISIT (OUTPATIENT)
Dept: FAMILY MEDICINE | Facility: CLINIC | Age: 75
End: 2020-09-02
Payer: MEDICARE

## 2020-09-02 DIAGNOSIS — R39.15 URINARY URGENCY: ICD-10-CM

## 2020-09-02 DIAGNOSIS — R10.2 PELVIC PAIN IN FEMALE: ICD-10-CM

## 2020-09-02 DIAGNOSIS — R30.0 DYSURIA: Primary | ICD-10-CM

## 2020-09-02 DIAGNOSIS — R35.0 URINARY FREQUENCY: ICD-10-CM

## 2020-09-02 DIAGNOSIS — R30.0 DYSURIA: ICD-10-CM

## 2020-09-02 LAB
ALBUMIN UR-MCNC: NEGATIVE MG/DL
APPEARANCE UR: CLEAR
BILIRUB UR QL STRIP: NEGATIVE
COLOR UR AUTO: YELLOW
GLUCOSE UR STRIP-MCNC: NEGATIVE MG/DL
HGB UR QL STRIP: NEGATIVE
KETONES UR STRIP-MCNC: NEGATIVE MG/DL
LEUKOCYTE ESTERASE UR QL STRIP: NEGATIVE
NITRATE UR QL: NEGATIVE
PH UR STRIP: 7 PH (ref 5–7)
RBC #/AREA URNS AUTO: NORMAL /HPF
SOURCE: NORMAL
SP GR UR STRIP: 1.01 (ref 1–1.03)
UROBILINOGEN UR STRIP-ACNC: 0.2 EU/DL (ref 0.2–1)
WBC #/AREA URNS AUTO: NORMAL /HPF

## 2020-09-02 PROCEDURE — 81001 URINALYSIS AUTO W/SCOPE: CPT | Performed by: FAMILY MEDICINE

## 2020-09-02 ASSESSMENT — ENCOUNTER SYMPTOMS
FREQUENCY: 1
ARTHRALGIAS: 1
CHILLS: 0
DYSURIA: 1
FLANK PAIN: 0
FEVER: 0

## 2020-09-02 ASSESSMENT — PAIN SCALES - GENERAL: PAINLEVEL: NO PAIN (0)

## 2020-09-02 NOTE — NURSING NOTE
Chief Complaint   Patient presents with     UTI     Pt has frequent and burning urination.      Depression Response    Patient completed the PHQ-9 assessment for depression and scored >9? No  Question 9 on the PHQ-9 was positive for suicidality? No  Does patient have current mental health provider? No    Is this a virtual visit? Yes   Does patient have suicidal ideation (positive question 9)? No - offer to place Mental Health Referral.  Patient declined referral/not needed    I personally notified the following: visit provider       BRUNO Rouse 8:13 AM  9/2/2020

## 2020-09-02 NOTE — PATIENT INSTRUCTIONS

## 2020-09-02 NOTE — PROGRESS NOTES
"Giana Hope is a 75 year old female who is being evaluated via a billable telephone visit.      The patient has been notified of following:     \"This telephone visit will be conducted via a call between you and your physician/provider. We have found that certain health care needs can be provided without the need for a physical exam.  This service lets us provide the care you need with a short phone conversation.  If a prescription is necessary we can send it directly to your pharmacy.  If lab work is needed we can place an order for that and you can then stop by our lab to have the test done at a later time.    Telephone visits are billed at different rates depending on your insurance coverage. During this emergency period, for some insurers they may be billed the same as an in-person visit.  Please reach out to your insurance provider with any questions.    If during the course of the call the physician/provider feels a telephone visit is not appropriate, you will not be charged for this service.\"    Patient has given verbal consent for Telephone visit?  Yes    What phone number would you like to be contacted at? [phone]    How would you like to obtain your AVS? Carm[place]art    Subjective     Giana Hope is a 75 year old female who presents via phone visit today for the following health issues:    ELIU Shaikh is complaining of having urinary frequency and urgency that started yesterday and kept her up all night. Of note, she is recovering from a shoulder replacement a week ago Tuesday. She did receive IV antibiotics during and post procedure for prophylaxis. She is having good pain control in her shoulder and reports she is doing ok with her recovery. She reports never having a UTI before.    Review of Systems   Constitutional: Negative for chills and fever.   Genitourinary: Positive for dysuria, frequency, pelvic pain and urgency. Negative for flank pain.   Musculoskeletal: Positive for arthralgias. "         Objective   Vitals - Patient Reported  Pain Score: No Pain (0)    healthy, alert and no distress  PSYCH: Alert and oriented times 3; coherent speech, normal   rate and volume, able to articulate logical thoughts, able   to abstract reason, no tangential thoughts, no hallucinations   or delusions  Her affect is normal  RESP: No cough, no audible wheezing, able to talk in full sentences  Remainder of exam unable to be completed due to telephone visits    Assessment & Plan     Dysuria  - UA with Microscopic reflex to Culture    Urinary frequency  - UA with Microscopic reflex to Culture    Urinary urgency  - UA with Microscopic reflex to Culture    Pelvic pain in female  - UA with Microscopic reflex to Culture      Please drink lots of fluids to stay hydrated. You can also  OTC AZO to help alleviate your symptoms. Please call the [facility] lab to schedule a lab visit for your urinalysis. We will call you with the results and next steps.    No follow-ups on file.    NASIR Anguiano Student, [facility]  Lina Lombardi NP   [facility]  Phone call duration:  10 minutes  I was present with the NP student who participated in the service and in the documentation of the services provided. I have verified the history and personally performed the physical exam and medical decision making, as documented by the student and edited by me.  NASIR Figueroa, CNP

## 2020-09-08 ENCOUNTER — TELEPHONE (OUTPATIENT)
Dept: OPHTHALMOLOGY | Facility: CLINIC | Age: 75
End: 2020-09-08

## 2020-09-08 NOTE — TELEPHONE ENCOUNTER
"Spoke to pt 0922  Left eye redness, light sensitivities, a little pain over weekend with blurring of vision in AM    Pt started prednisolone eye drops over weekend-- h/o uveitis and previously instructed per pt to use for new symptoms  Pt using 4/day and improvement in photophobia.  Redness and blurrier vision in AM persists    Scheduled with Dr. Taylor at 1230 PM today    Pt aware of date/time/location at [facility]    Terry Pelaez RN 9:39 AM 09/09/20            Archea, Giana S \"Hawa\" [phone]      Left message at 0800 with direct number  Reviewed if syptomatic, Dr. Taylor able to see today at 1230  Able to review further on call back    Terry Pelaez RN 8:04 AM 09/09/20         Health Call Center    Phone Message    May a detailed message be left on voicemail: yes     Reason for Call: Other: Pt called stating that she had her Uvetis act up over the weekend and is taking the eye drops she was prescribed. Pt stated she would like to see Dr. Taylor as she has seen her in the past. Protocols state that Dr. Taylor doesnt see for this Dx. I did inform the Pt of referral but for one of our providers to be seen for this Dx. Pt stated she would like to see Dr. Taylor if thats possible for this Dx. I tried calling the back line with no answer. Please advise, thank you.     Action Taken: Message routed to:  [facility] EYE    Travel Screening: Not Applicable                                                                        "

## 2020-09-09 ENCOUNTER — OFFICE VISIT (OUTPATIENT)
Dept: OPHTHALMOLOGY | Facility: CLINIC | Age: 75
End: 2020-09-09
Attending: OPHTHALMOLOGY
Payer: MEDICARE

## 2020-09-09 DIAGNOSIS — Z15.89 HLA B27 (HLA B27 POSITIVE): ICD-10-CM

## 2020-09-09 DIAGNOSIS — H20.00 ACUTE ANTERIOR UVEITIS OF LEFT EYE: Primary | ICD-10-CM

## 2020-09-09 PROCEDURE — G0463 HOSPITAL OUTPT CLINIC VISIT: HCPCS | Mod: ZF

## 2020-09-09 RX ORDER — PREDNISOLONE ACETATE 10 MG/ML
1-2 SUSPENSION/ DROPS OPHTHALMIC
Qty: 1 BOTTLE | Refills: 3 | Status: SHIPPED | OUTPATIENT
Start: 2020-09-09 | End: 2022-03-22

## 2020-09-09 RX ORDER — ATROPINE SULFATE 10 MG/ML
1-2 SOLUTION/ DROPS OPHTHALMIC DAILY
Qty: 1 BOTTLE | Refills: 11 | Status: SHIPPED | OUTPATIENT
Start: 2020-09-09 | End: 2020-09-09

## 2020-09-09 RX ORDER — ATROPINE SULFATE 10 MG/ML
1-2 SOLUTION/ DROPS OPHTHALMIC DAILY
Qty: 1 BOTTLE | Refills: 11 | Status: SHIPPED | OUTPATIENT
Start: 2020-09-09 | End: 2020-10-19

## 2020-09-09 RX ORDER — PREDNISOLONE ACETATE 10 MG/ML
1-2 SUSPENSION/ DROPS OPHTHALMIC 4 TIMES DAILY PRN
COMMUNITY
End: 2020-09-09

## 2020-09-09 RX ORDER — PREDNISOLONE ACETATE 10 MG/ML
1-2 SUSPENSION/ DROPS OPHTHALMIC
Qty: 1 BOTTLE | Refills: 3 | Status: SHIPPED | OUTPATIENT
Start: 2020-09-09 | End: 2020-09-09

## 2020-09-09 ASSESSMENT — TONOMETRY
IOP_METHOD: TONOPEN
OD_IOP_MMHG: 17
OS_IOP_MMHG: 24

## 2020-09-09 ASSESSMENT — SLIT LAMP EXAM - LIDS
COMMENTS: NORMAL
COMMENTS: NORMAL

## 2020-09-09 ASSESSMENT — REFRACTION_WEARINGRX
SPECS_TYPE: PAL
OS_SPHERE: PLANO
OD_CYLINDER: +1.00
OD_AXIS: 174
OD_ADD: +2.50
OS_ADD: +2.50
OD_SPHERE: +0.25
OS_AXIS: 007
OS_CYLINDER: +0.50

## 2020-09-09 ASSESSMENT — EXTERNAL EXAM - RIGHT EYE: OD_EXAM: NORMAL

## 2020-09-09 ASSESSMENT — VISUAL ACUITY
OD_CC: 20/20
OS_CC: 20/30
CORRECTION_TYPE: GLASSES
OS_PH_CC+: +2
METHOD: SNELLEN - LINEAR
OS_PH_CC: 20/30

## 2020-09-09 ASSESSMENT — CONF VISUAL FIELD
METHOD: COUNTING FINGERS
OS_NORMAL: 1
OD_NORMAL: 1

## 2020-09-09 ASSESSMENT — CUP TO DISC RATIO
OS_RATIO: 0.25
OD_RATIO: 0.25

## 2020-09-09 NOTE — PROGRESS NOTES
HPI  Giana Hope is a 73 year old female here for evaluation of left eye redness and photophobia that began 4 days ago. There is associated blurred vision. She had some prednisolone at home, and started using this 4 x daily. This has improved her symptoms some, although not resolved them.      POH: No history of eye surgery or trauma, uveitis left eye - HLAB27+, work-up done in 2018 at [facility].   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 HLA-B27+, and chest CT with lung nodule. Recurrent inflammation today.   Plan: Increase prednisolone to Q1-2h while awake, start atropine daily left eye    (Z96.1) Pseudophakia, right eye  Comment: Clear visual axis.   Plan: Observe    (H25.12) Age-related nuclear cataract, left  Comment: Not bothersome for now.  Plan: Monitor.     -----------------------------------------------------------------------------------    Patient disposition:   Return in about 1 week (around 9/16/2020) for inflammation check. or sooner as needed.    Teaching statement:  Complete documentation of historical and exam elements from today's encounter can be found in the full encounter summary report (not reduplicated in this progress note). I personally obtained the chief complaint(s) and history of present illness.  I confirmed and edited as necessary the review of systems, past medical/surgical history, family history, social history, and examination findings as documented by others; and I examined the patient myself. I personally reviewed the relevant tests, images, and reports as documented above.     I formulated and edited as necessary the assessment and plan and discussed the findings and management plan with the patient and family.    Trish Taylor MD  Comprehensive Ophthalmology & Ocular Pathology  Department  of Ophthalmology and Visual Neurosciences  [email or web address]  Pager [phone]

## 2020-09-09 NOTE — NURSING NOTE
Chief Complaints and History of Present Illnesses   Patient presents with     Follow Up     Chief Complaint(s) and History of Present Illness(es)     Follow Up     Laterality: left eye    Onset: 4 days ago    Quality: blurred vision    Associated symptoms: redness and photophobia    Treatments tried: eye drops    Response to treatment: significant improvement    Pain scale: 0/10              Comments     The patient presents with left eye redness, photophobia and blurriness.  The patient has started the Prednisolone four times daily in the left eye.  She started the drops on Saturday, September 5.  The patient notes she has a history of uveitis.    Nancy Black, COA, COA 12:45 PM 09/09/2020

## 2020-09-16 ENCOUNTER — OFFICE VISIT (OUTPATIENT)
Dept: OPHTHALMOLOGY | Facility: CLINIC | Age: 75
End: 2020-09-16
Attending: OPHTHALMOLOGY
Payer: MEDICARE

## 2020-09-16 DIAGNOSIS — H20.00 ACUTE ANTERIOR UVEITIS OF LEFT EYE: Primary | ICD-10-CM

## 2020-09-16 DIAGNOSIS — Z15.89 HLA B27 (HLA B27 POSITIVE): ICD-10-CM

## 2020-09-16 PROBLEM — M85.80 OSTEOPENIA WITH HIGH RISK OF FRACTURE: Status: ACTIVE | Noted: 2017-09-19

## 2020-09-16 PROBLEM — H25.10 NUCLEAR SCLEROSIS: Status: ACTIVE | Noted: 2017-03-22

## 2020-09-16 PROBLEM — M54.6 BACK PAIN OF THORACOLUMBAR REGION: Status: ACTIVE | Noted: 2017-09-19

## 2020-09-16 PROBLEM — S22.000B: Status: ACTIVE | Noted: 2017-10-17

## 2020-09-16 PROBLEM — M41.50 DEGENERATIVE SCOLIOSIS IN ADULT PATIENT: Status: ACTIVE | Noted: 2017-09-19

## 2020-09-16 PROBLEM — M25.512 PAIN IN LEFT SHOULDER: Status: ACTIVE | Noted: 2018-02-12

## 2020-09-16 PROBLEM — M40.00 KYPHOSIS (ACQUIRED) (POSTURAL): Status: ACTIVE | Noted: 2017-09-19

## 2020-09-16 PROBLEM — E78.00 HYPERCHOLESTEROLEMIA: Status: ACTIVE | Noted: 2017-06-07

## 2020-09-16 PROBLEM — M51.35 DDD (DEGENERATIVE DISC DISEASE), THORACOLUMBAR: Status: ACTIVE | Noted: 2017-09-19

## 2020-09-16 PROBLEM — Z91.81 RISK FOR FALLS: Status: ACTIVE | Noted: 2017-09-19

## 2020-09-16 PROBLEM — M54.50 BACK PAIN OF THORACOLUMBAR REGION: Status: ACTIVE | Noted: 2017-09-19

## 2020-09-16 PROCEDURE — G0463 HOSPITAL OUTPT CLINIC VISIT: HCPCS | Mod: ZF

## 2020-09-16 ASSESSMENT — REFRACTION_WEARINGRX
OS_ADD: +2.50
OD_SPHERE: +0.25
OD_ADD: +2.50
OS_CYLINDER: +0.50
OD_CYLINDER: +1.00
OS_SPHERE: PLANO
OS_AXIS: 007
SPECS_TYPE: PAL
OD_AXIS: 174

## 2020-09-16 ASSESSMENT — VISUAL ACUITY
OD_CC: 20/20
OS_CC+: +2
OS_CC: 20/60-2
CORRECTION_TYPE: GLASSES
METHOD: SNELLEN - LINEAR

## 2020-09-16 ASSESSMENT — SLIT LAMP EXAM - LIDS
COMMENTS: NORMAL
COMMENTS: NORMAL

## 2020-09-16 ASSESSMENT — EXTERNAL EXAM - RIGHT EYE: OD_EXAM: NORMAL

## 2020-09-16 ASSESSMENT — CUP TO DISC RATIO
OS_RATIO: 0.25
OD_RATIO: 0.25

## 2020-09-16 ASSESSMENT — TONOMETRY
IOP_METHOD: ICARE
OD_IOP_MMHG: 15
OS_IOP_MMHG: 17

## 2020-09-16 NOTE — PROGRESS NOTES
HPI  Giana Hope is a 73 year old female here for follow-up of left anterior uveitis. She feels things continue to improve. Her vision is blurred from the atropine drop.    POH: No history of eye surgery or trauma, uveitis left eye - HLAB27+, work-up done in 2018 at [facility].   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 HLA-B27+, and chest CT with lung nodule. Recurrent inflammation on exam 9/9/20. Quiet today.    Plan: Decrease prednisolone to 6 times daily for 1 week, then 4 times daily until next follow-up, discontinue atropine.    (Z96.1) Pseudophakia, right eye  Comment: Clear visual axis.   Plan: Observe    (H25.12) Age-related nuclear cataract, left  Comment: Not bothersome for now.  Plan: Monitor.     -----------------------------------------------------------------------------------    Patient disposition:   Return in about 2 weeks (around 9/30/2020) for inflammation check. or sooner as needed.    Teaching statement:  Complete documentation of historical and exam elements from today's encounter can be found in the full encounter summary report (not reduplicated in this progress note). I personally obtained the chief complaint(s) and history of present illness.  I confirmed and edited as necessary the review of systems, past medical/surgical history, family history, social history, and examination findings as documented by others; and I examined the patient myself. I personally reviewed the relevant tests, images, and reports as documented above.     I formulated and edited as necessary the assessment and plan and discussed the findings and management plan with the patient and family.    Trish Taylor MD  Comprehensive Ophthalmology & Ocular Pathology  Department of Ophthalmology and Visual  [email or web address]  Pager [phone]

## 2020-09-16 NOTE — NURSING NOTE
Chief Complaints and History of Present Illnesses   Patient presents with     Uveitis Follow-Up     Chief Complaint(s) and History of Present Illness(es)     Uveitis Follow-Up     Laterality: left eye    Quality: blurred vision    Course: gradually improving    Associated symptoms: eye pain and redness    Treatments tried: eye drops    Response to treatment: moderate improvement              Comments     1wk f/u Acute anterior uveitis OS     Vision and symptoms of red/irritated eye have improved since LV. Residual blurriness attributed to new dilating gtt.     Ocular meds OS: Pred Q1-2h while awake, Atropine daily     Georgette Andrews COT 12:28 PM September 16, 2020

## 2020-09-28 ENCOUNTER — MYC MEDICAL ADVICE (OUTPATIENT)
Dept: FAMILY MEDICINE | Facility: CLINIC | Age: 75
End: 2020-09-28

## 2020-09-28 DIAGNOSIS — Z96.619 HISTORY OF SHOULDER REPLACEMENT, UNSPECIFIED LATERALITY: Primary | ICD-10-CM

## 2020-09-28 DIAGNOSIS — Z96.612 HISTORY OF LEFT SHOULDER REPLACEMENT: ICD-10-CM

## 2020-09-29 PROBLEM — Z96.612 HISTORY OF LEFT SHOULDER REPLACEMENT: Status: ACTIVE | Noted: 2020-09-29

## 2020-09-29 NOTE — TELEPHONE ENCOUNTER
Brody Barrera,  Please assist with PT referral. She has orders from [facility] and all orders for this visit:    History of shoulder replacement, unspecified laterality  -     PHYSICAL THERAPY REFERRAL; Future    History of left shoulder replacement  -     PHYSICAL THERAPY REFERRAL; Future    Gilberto Vasquez MD

## 2020-09-29 NOTE — TELEPHONE ENCOUNTER
Physical Therapy schedulers were called, and staff relayed that PT referral has been sent to a work queue, and schedulers will be calling patient to schedule, also, a location is available near patient's home in [place] in [place].    Holly Alejandro RN on 9/29/2020 at 8:20 AM

## 2020-10-05 ENCOUNTER — OFFICE VISIT (OUTPATIENT)
Dept: OPHTHALMOLOGY | Facility: CLINIC | Age: 75
End: 2020-10-05
Attending: OPHTHALMOLOGY
Payer: MEDICARE

## 2020-10-05 DIAGNOSIS — H20.00 ACUTE ANTERIOR UVEITIS OF LEFT EYE: Primary | ICD-10-CM

## 2020-10-05 DIAGNOSIS — Z96.1 PSEUDOPHAKIA, RIGHT EYE: ICD-10-CM

## 2020-10-05 DIAGNOSIS — H25.12 AGE-RELATED NUCLEAR CATARACT, LEFT: ICD-10-CM

## 2020-10-05 DIAGNOSIS — Z15.89 HLA B27 (HLA B27 POSITIVE): ICD-10-CM

## 2020-10-05 PROCEDURE — 99213 OFFICE O/P EST LOW 20 MIN: CPT | Mod: GC | Performed by: OPHTHALMOLOGY

## 2020-10-05 ASSESSMENT — TONOMETRY
OD_IOP_MMHG: 14
IOP_METHOD: TONOPEN
OS_IOP_MMHG: 17

## 2020-10-05 ASSESSMENT — VISUAL ACUITY
METHOD: SNELLEN - LINEAR
OD_CC: 20/20

## 2020-10-05 ASSESSMENT — CUP TO DISC RATIO
OD_RATIO: 0.25
OS_RATIO: 0.25

## 2020-10-05 ASSESSMENT — EXTERNAL EXAM - LEFT EYE: OS_EXAM: NORMAL

## 2020-10-05 ASSESSMENT — CONF VISUAL FIELD
OD_NORMAL: 1
OS_NORMAL: 1

## 2020-10-05 ASSESSMENT — SLIT LAMP EXAM - LIDS
COMMENTS: NORMAL
COMMENTS: NORMAL

## 2020-10-05 ASSESSMENT — EXTERNAL EXAM - RIGHT EYE: OD_EXAM: NORMAL

## 2020-10-05 NOTE — PROGRESS NOTES
HPI  Giana Hope is a 73 year old female here for follow-up of left anterior uveitis.     She states that things are overall doing well - although yesterday she had an episode of pain around her eye, similar in nature to her initial presentation (felt like an ache) - she reports that she kept some prednisolone in, and it resolved after 30 minutes. She reports that except for yesterday things are going in the right direction. She has no pain today. Vision is doing better in that eye, and now back to normal. Denies flashes of lights or floaters in the eye. Currently taking Prednisolone QID - has some difficulty putting this in, she still misses.    POH: No history of eye surgery or trauma, uveitis left eye - HLAB27+, work-up done in 2018 at [facility].  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 HLA-B27+, and chest CT with lung nodule. Recurrent inflammation on exam 9/9/20, much improved today with rare cell.     Plan: Decrease prednisolone from 4 times daily to 3x daily until next visit in ~ 2 weeks.     (Z96.1) Pseudophakia, right eye  Comment: Clear visual axis.   Plan: Observe     (H25.12) Age-related nuclear cataract, left  Comment: Not bothersome for now.  Plan: Monitor.  -----------------------------------------------------------------------------------    Patient disposition:   Return in about 2 weeks (around 10/19/2020) for inflammation check. or sooner as needed.         Charly Crowder MD  Department of Ophthalmology  Pager: [phone]      Teaching statement:  Complete documentation of historical and exam elements from today's encounter can be found in the full encounter summary report (not reduplicated in this progress note). I personally obtained the chief complaint(s) and history of present illness.  I confirmed and  edited as necessary the review of systems, past medical/surgical history, family history, social history, and examination findings as documented by others; and I examined the patient myself. I personally reviewed the relevant tests, images, and reports as documented above.     I formulated and edited as necessary the assessment and plan and discussed the findings and management plan with the patient and family.    Trish Taylor MD  Comprehensive Ophthalmology & Ocular Pathology  Department of Ophthalmology and Visual Neurosciences  [email or web address]  Pager [phone]

## 2020-10-06 ENCOUNTER — OFFICE VISIT (OUTPATIENT)
Dept: DERMATOLOGY | Facility: CLINIC | Age: 75
End: 2020-10-06
Payer: MEDICARE

## 2020-10-06 ENCOUNTER — ANCILLARY PROCEDURE (OUTPATIENT)
Dept: GENERAL RADIOLOGY | Facility: CLINIC | Age: 75
End: 2020-10-06
Attending: PHYSICIAN ASSISTANT
Payer: MEDICARE

## 2020-10-06 DIAGNOSIS — L81.4 SOLAR LENTIGO: ICD-10-CM

## 2020-10-06 DIAGNOSIS — Z12.83 SCREENING EXAM FOR SKIN CANCER: ICD-10-CM

## 2020-10-06 DIAGNOSIS — D22.9 MULTIPLE BENIGN MELANOCYTIC NEVI: Primary | ICD-10-CM

## 2020-10-06 DIAGNOSIS — D17.22 LIPOMA OF LEFT UPPER EXTREMITY: ICD-10-CM

## 2020-10-06 DIAGNOSIS — Z96.619 SHOULDER JOINT REPLACEMENT STATUS: ICD-10-CM

## 2020-10-06 PROCEDURE — 73030 X-RAY EXAM OF SHOULDER: CPT | Mod: LT | Performed by: RADIOLOGY

## 2020-10-06 PROCEDURE — 99213 OFFICE O/P EST LOW 20 MIN: CPT | Performed by: DERMATOLOGY

## 2020-10-06 ASSESSMENT — PAIN SCALES - GENERAL: PAINLEVEL: NO PAIN (0)

## 2020-10-06 NOTE — NURSING NOTE
Chief Complaint   Patient presents with     Skin Check     areas of concern: right ankle (one cryotherapy, states it will take two), right temple, left upper arm      Ivett Gilmore, XUANN

## 2020-10-06 NOTE — LETTER
"10/6/2020       RE: Giana Hope  [address]  [place] [address]     Dear Colleague,    Thank you for referring your patient, Giana Hope, to the [facility] [place] at [facility]. Please see a copy of my visit note below.    [facility] Dermatology Note      Dermatology Problem List:  1. 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 AKs. Cryo.    Encounter Date: Oct 6, 2020    CC:  Chief Complaint   Patient presents with     Skin Check     areas of concern: right ankle (one cryotherapy, states it will take two), right temple, left upper arm         History of Present Illness:  Ms. Giana Hope is a 75 year old female who presents for evaluation of lesions on her right hand. She was last seen virtually on 8/4/20 by Dr. Ramirez for hair loss and follow-up on AKs treated on the hand.    Today, patient reports that she has a lesion on her right calf is still present. She endorses that it is a spot that has been previously frozen. She says that it has not changed. Denies bleeding, pruritus, pain. The previous lesions that she brought up to Dr. Ramirez in her previous virtual visit have also resolved recently. Additionally, there is a \"bump\" on her left arm that her PCP recommended to check out. She denies any pain associated with it and doesn't think it has changed. Otherwise, she is healthy and has no other concerning lesions.    Patient has no personal history of skin cancer.    Past Medical History:   Patient Active Problem List   Diagnosis     Hypercholesterolemia     Arthritis of shoulder region, left     Encounter to establish care     Primary osteoarthritis involving multiple joints     Gastroesophageal reflux disease without " esophagitis     Nodule of left lung     Family history of malignant neoplasm of breast     Mass of left upper extremity     Back pain of thoracolumbar region     Balance disorder     Compression fracture of thoracic vertebra, open, initial encounter (H)     DDD (degenerative disc disease), thoracolumbar     Degenerative scoliosis in adult patient     History of anesthesia problem     Kyphosis (acquired) (postural)     Nuclear sclerosis     Osteoarthrosis     Osteopenia with high risk of fracture     Pain in left shoulder     Risk for falls     Status post left knee replacement     History of left shoulder replacement     Past Medical History:   Diagnosis Date     Nonsenile cataract      Osteoarthritis      Pulmonary nodule      Uveitis      Past Surgical History:   Procedure Laterality Date     CATARACT IOL, RT/LT Right 2019     PHACOEMULSIFICATION WITH STANDARD INTRAOCULAR LENS IMPLANT Right 3/22/2019    Procedure: Right Cataract Removal with Intraocular Lens Implant;  Surgeon: Trish Taylor MD;  Location:  OR       Social History:  Patient reports that she has quit smoking. She quit after 35.00 years of use. She has never used smokeless tobacco. She reports current alcohol use. She reports that she does not use drugs.    Family History:  Family History   Problem Relation Age of Onset     Arthritis Mother      Breast Cancer Mother 78     Diabetes Type 2  Father      Heart Disease Father          age 75     Alzheimer Disease Father 60     Heart Disease Brother 68     Breast Cancer Maternal Grandmother 81     Heart Disease Maternal Grandfather 54         age 81     Other - See Comments Son         schizoaffective lives in [place]     Glaucoma No family hx of      Macular Degeneration No family hx of         Medications:  Current Outpatient Medications   Medication Sig Dispense Refill     famotidine (PEPCID) 40 MG tablet Take 1 tablet (40 mg) by mouth daily as needed for heartburn 90 tablet 3      fluocinolone acetonide (DERMA SMOOTHE/FS BODY) 0.01 % external oil Use on the scalp twice daily 118.28 mL 5     Minoxidil 5 % FOAM Use 1/2 capful twice daily or one full capful daily on the scalp 60 g 5     pravastatin (PRAVACHOL) 20 MG tablet Take 1 tablet (20 mg) by mouth daily 90 tablet 3     prednisoLONE acetate (PRED FORTE) 1 % ophthalmic suspension Place 1-2 drops Into the left eye every hour 1 Bottle 3     sulindac (CLINORIL) 200 MG tablet Take 1 tablet (200 mg) by mouth daily as needed (arthritis) 90 tablet 3     atropine 1 % ophthalmic solution Place 1-2 drops Into the left eye daily (Patient not taking: Reported on 10/6/2020) 1 Bottle 11     cyclobenzaprine (FLEXERIL) 5 MG tablet Take 1 tablet (5 mg) by mouth 3 times daily as needed for muscle spasms (Patient not taking: Reported on 10/6/2020) 21 tablet 0     ketoconazole (NIZORAL) 2 % external shampoo Use 2-3 times weekly 120 mL 5     Allergies   Allergen Reactions     Dust Mites Itching     Runny nose, fever     Mold Itching     Fever, runny nose     Pollen Extract Itching     Fever, runny nose     Celecoxib      Other reaction(s): GI intolerance         Review of Systems:  -As per HPI  -Constitutional: Otherwise feeling well today, in usual state of health.  -HEENT: Patient denies nonhealing oral sores.  -Skin: As above in HPI. No additional skin concerns.    Physical exam:  Vitals: LMP  (LMP Unknown)   GEN: This is a well developed, well-nourished female in no acute distress, in a pleasant mood.    SKIN: Full skin, which includes the head/face, both arms, chest, back, abdomen,both legs, genitalia and/or groin buttocks, digits and/or nails, was examined.  -Ring skin type: II  -Involving the left lateral shin is a 1.2 cm hyperpigmented to whitish patch with scar-like reticular pattern on dermoscopy  -Involving the left distal posterior arm is a 1 cm rubbery, freely mobile subcutaneous tumor  -No other lesions of concern on areas examined.      Impression/Plan:  1. Benign melanocytic nevi of the arms and solar lentigines  - Benign nature was discussed. No further intervention required at this time.   - ABCDs of melanoma were discussed and self skin checks were advised.    2. Lipoma, left arm. Educated that if it enlarges or becomes painful to contact the clinic. Notified the patient that general surgery may be needed in order to remove it.  - Benign nature was discussed. No further intervention required at this time.     3. History of AKs  - no active lesions of concern at this visit    4. Seborrheic keratoses  - reassured about benign nature  - no treatment required    Follow-up in 1 year, earlier for new or changing lesions.     Staff Involved:  I, Nando Neely MS4, saw and examined the patient in the presence of Dr. Chang.    Staff Physician:  I was present with the medical student who participated in the service and in the documentation of the note. I have verified the history and personally performed the physical exam and medical decision making. I agree with the assessment and plan of care as documented in the note.     Tex Chang MD  Staff Dermatologist and Dermatopathologist  , Department of Dermatology

## 2020-10-06 NOTE — PROGRESS NOTES
"[facility] Dermatology Note      Dermatology Problem List:  1. 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 AKs. Cryo.    Encounter Date: Oct 6, 2020    CC:  Chief Complaint   Patient presents with     Skin Check     areas of concern: right ankle (one cryotherapy, states it will take two), right temple, left upper arm         History of Present Illness:  Ms. Giana Hope is a 75 year old female who presents for evaluation of lesions on her right hand. She was last seen virtually on 8/4/20 by Dr. Ramirez for hair loss and follow-up on AKs treated on the hand.    Today, patient reports that she has a lesion on her right calf is still present. She endorses that it is a spot that has been previously frozen. She says that it has not changed. Denies bleeding, pruritus, pain. The previous lesions that she brought up to Dr. Ramirez in her previous virtual visit have also resolved recently. Additionally, there is a \"bump\" on her left arm that her PCP recommended to check out. She denies any pain associated with it and doesn't think it has changed. Otherwise, she is healthy and has no other concerning lesions.    Patient has no personal history of skin cancer.    Past Medical History:   Patient Active Problem List   Diagnosis     Hypercholesterolemia     Arthritis of shoulder region, left     Encounter to establish care     Primary osteoarthritis involving multiple joints     Gastroesophageal reflux disease without esophagitis     Nodule of left lung     Family history of malignant neoplasm of breast     Mass of left upper extremity     Back pain of thoracolumbar region     Balance disorder     Compression fracture of thoracic vertebra, open, initial encounter (H)     DDD (degenerative disc disease), thoracolumbar     Degenerative " scoliosis in adult patient     History of anesthesia problem     Kyphosis (acquired) (postural)     Nuclear sclerosis     Osteoarthrosis     Osteopenia with high risk of fracture     Pain in left shoulder     Risk for falls     Status post left knee replacement     History of left shoulder replacement     Past Medical History:   Diagnosis Date     Nonsenile cataract      Osteoarthritis      Pulmonary nodule      Uveitis      Past Surgical History:   Procedure Laterality Date     CATARACT IOL, RT/LT Right 2019     PHACOEMULSIFICATION WITH STANDARD INTRAOCULAR LENS IMPLANT Right 3/22/2019    Procedure: Right Cataract Removal with Intraocular Lens Implant;  Surgeon: Trish Taylor MD;  Location:  OR       Social History:  Patient reports that she has quit smoking. She quit after 35.00 years of use. She has never used smokeless tobacco. She reports current alcohol use. She reports that she does not use drugs.    Family History:  Family History   Problem Relation Age of Onset     Arthritis Mother      Breast Cancer Mother 78     Diabetes Type 2  Father      Heart Disease Father          age 75     Alzheimer Disease Father 60     Heart Disease Brother 68     Breast Cancer Maternal Grandmother 81     Heart Disease Maternal Grandfather 54         age 81     Other - See Comments Son         schizoaffective lives in [place]     Glaucoma No family hx of      Macular Degeneration No family hx of         Medications:  Current Outpatient Medications   Medication Sig Dispense Refill     famotidine (PEPCID) 40 MG tablet Take 1 tablet (40 mg) by mouth daily as needed for heartburn 90 tablet 3     fluocinolone acetonide (DERMA SMOOTHE/FS BODY) 0.01 % external oil Use on the scalp twice daily 118.28 mL 5     Minoxidil 5 % FOAM Use 1/2 capful twice daily or one full capful daily on the scalp 60 g 5     pravastatin (PRAVACHOL) 20 MG tablet Take 1 tablet (20 mg) by mouth daily 90 tablet 3     prednisoLONE acetate (PRED FORTE)  1 % ophthalmic suspension Place 1-2 drops Into the left eye every hour 1 Bottle 3     sulindac (CLINORIL) 200 MG tablet Take 1 tablet (200 mg) by mouth daily as needed (arthritis) 90 tablet 3     atropine 1 % ophthalmic solution Place 1-2 drops Into the left eye daily (Patient not taking: Reported on 10/6/2020) 1 Bottle 11     cyclobenzaprine (FLEXERIL) 5 MG tablet Take 1 tablet (5 mg) by mouth 3 times daily as needed for muscle spasms (Patient not taking: Reported on 10/6/2020) 21 tablet 0     ketoconazole (NIZORAL) 2 % external shampoo Use 2-3 times weekly 120 mL 5     Allergies   Allergen Reactions     Dust Mites Itching     Runny nose, fever     Mold Itching     Fever, runny nose     Pollen Extract Itching     Fever, runny nose     Celecoxib      Other reaction(s): GI intolerance         Review of Systems:  -As per HPI  -Constitutional: Otherwise feeling well today, in usual state of health.  -HEENT: Patient denies nonhealing oral sores.  -Skin: As above in HPI. No additional skin concerns.    Physical exam:  Vitals: LMP  (LMP Unknown)   GEN: This is a well developed, well-nourished female in no acute distress, in a pleasant mood.    SKIN: Full skin, which includes the head/face, both arms, chest, back, abdomen,both legs, genitalia and/or groin buttocks, digits and/or nails, was examined.  -Ring skin type: II  -Involving the left lateral shin is a 1.2 cm hyperpigmented to whitish patch with scar-like reticular pattern on dermoscopy  -Involving the left distal posterior arm is a 1 cm rubbery, freely mobile subcutaneous tumor  -No other lesions of concern on areas examined.     Impression/Plan:  1. Benign melanocytic nevi of the arms and solar lentigines  - Benign nature was discussed. No further intervention required at this time.   - ABCDs of melanoma were discussed and self skin checks were advised.    2. Lipoma, left arm. Educated that if it enlarges or becomes painful to contact the clinic. Notified  the patient that general surgery may be needed in order to remove it.  - Benign nature was discussed. No further intervention required at this time.     3. History of AKs  - no active lesions of concern at this visit    4. Seborrheic keratoses  - reassured about benign nature  - no treatment required    Follow-up in 1 year, earlier for new or changing lesions.     Staff Involved:  I, Nando Neely MS4, saw and examined the patient in the presence of Dr. Chang.    Staff Physician:  I was present with the medical student who participated in the service and in the documentation of the note. I have verified the history and personally performed the physical exam and medical decision making. I agree with the assessment and plan of care as documented in the note.     Tex Chang MD  Staff Dermatologist and Dermatopathologist  , Department of Dermatology

## 2020-10-09 ENCOUNTER — THERAPY VISIT (OUTPATIENT)
Dept: PHYSICAL THERAPY | Facility: CLINIC | Age: 75
End: 2020-10-09
Attending: FAMILY MEDICINE
Payer: MEDICARE

## 2020-10-09 DIAGNOSIS — Z96.619 HISTORY OF SHOULDER REPLACEMENT, UNSPECIFIED LATERALITY: ICD-10-CM

## 2020-10-09 DIAGNOSIS — Z96.612 HISTORY OF LEFT SHOULDER REPLACEMENT: ICD-10-CM

## 2020-10-09 PROCEDURE — 97161 PT EVAL LOW COMPLEX 20 MIN: CPT | Mod: GP | Performed by: PHYSICAL THERAPIST

## 2020-10-09 PROCEDURE — 97110 THERAPEUTIC EXERCISES: CPT | Mod: GP | Performed by: PHYSICAL THERAPIST

## 2020-10-09 NOTE — PROGRESS NOTES
Physical Therapy Initial Evaluation  October 9, 2020     Precautions/Restrictions/MD instructions: PT eval and treat. 6 weeks s/p Left TSA begin Pulley and Wand with gentle isometric exercises. Also external and elevation exercises as tolerated. Restrictions are no lifting, pushing, pulling with a force greater than 5 to 10 pounds with the operative upper extremity, no repetitive overhead activity for the first six months from surgery. At six months may then increase by 10 pounds every six weeks with a lifetime limit of 25 pounds.    Subjective:   Date of Onset: 8/25/20  C/C: left lateral shoulder pain.   Quality of pain is dull, aching and sharp. Pains are described as intermittent in nature. Pain is worse: morning. Pain is rated 4/10.   History of symptoms: Pains began gradually as the result of unknown origins. She underwent a Left reverse TSA as a result. Since onset, symptoms are improving.   Worsened by: reaching up/out to the side, lifting, mowing her lawn, sleeping on her stomach  Alleviated by: Rest, Ice and Surgery.    General health as reported by patient: good  Pertinent medical/surgical history: left knee replacement. High cholesterol. anemia, asthma, concussions/dizziness, depression, implanted device, menopausal, osteoarthritis, smoking. She denies unrelenting night pain,significant current illness or recent hospital admission. She denies any regional implanted devices. She denies history of surgery in the area.  Imaging: x-ray. Current occupational status: retired, research on environmental medicine. Patient's goals are: decrease pain, improve tolerance to dressing, doing her hair, reaching overhead and to the side. Return to MD:  PRN.       Objective:  SHOULDER:    Shoulder:   PROM R PROM L   Flex 150 86   Abd 145 79   ER 45 24     Scapulothoraic Rhythm: mild scapular winging  Left greater than right      Assessment/Plan:    The patient is a 75 year old female with chief complaint of left shoulder  pain.    The patient has the following significant findings with corresponding treatment plan.  Diagnosis 1:  Left shoulder pain s/p reverse TSA    Pain -  hot/cold therapy, manual therapy, splint/taping/bracing/orthotics, directional preference exercise and home program  Decreased ROM/flexibility - manual therapy and therapeutic exercise  Decreased strength - therapeutic exercise and therapeutic activities  Decreased proprioception - neuro re-education and therapeutic activities  Impaired muscle performance - neuro re-education  Decreased function - therapeutic activities  Impaired posture - neuro re-education        Therapy Evaluation Codes:   Cumulative Therapy Evaluation is: Low complexity.    Previous and current functional limitations:  (See Goal Flow Sheet for this information)    Short term and Long term goals: (See Goal Flow Sheet for this information)     Communication ability:  Patient appears to be able to clearly communicate and understand verbal and written communication and follow directions correctly.  Treatment Explanation - The following has been discussed with the patient: RX ordered/plan of care, anticipated outcomes, and possible risks and side effects.  This patient would benefit from PT intervention to resume normal activities.   Rehab potential is good.    Frequency:  2 X a month, once daily  Duration:  for 2 months  Discharge Plan: Achieve all LTGs, be independent in home treatment program, and reach maximal therapeutic benefit.    Please refer to the daily flowsheet for treatment today, total treatment time and time spent performing 1:1 timed codes.

## 2020-10-09 NOTE — LETTER
DEPARTMENT OF HEALTH AND HUMAN SERVICES  C[facility]ERS FOR MEDICARE & MEDICAID SERVICES    PLAN/UPDATED PLAN OF PROGRESS FOR OUTPATIENT REHABILITATION          PATIENTS NAME:  Giana Hope   : 1945    PROVIDER NUMBER:    [id number]  HICN:  [id number]  PROVIDER NAME: [facility] PHYSICAL THERAPY  MEDICAL RECORD NUMBER: [id number]   START OF CARE DATE:  SOC Date: 10/09/20   TYPE:  PT  PRIMARY/TREATMENT DIAGNOSIS: (Pertinent Medical Diagnosis)     History of shoulder replacement, unspecified laterality  History of left shoulder replacement    VISITS FROM START OF CARE:  Rxs Used: 1     Precautions/Restrictions/MD instructions: PT eval and treat. 6 weeks s/p Left TSA begin Pulley and Wand with gentle isometric exercises. Also external and elevation exercises as tolerated. Restrictions are no lifting, pushing, pulling with a force greater than 5 to 10 pounds with the operative upper extremity, no repetitive overhead activity for the first six months from surgery. At six months may then increase by 10 pounds every six weeks with a lifetime limit of 25 pounds.  Subjective:   Date of Onset: 20  C/C: left lateral shoulder pain.   Quality of pain is dull, aching and sharp. Pains are described as intermittent in nature. Pain is worse: morning. Pain is rated 4/10.   History of symptoms: Pains began gradually as the result of unknown origins. She underwent a Left reverse TSA as a result. Since onset, symptoms are improving.   Worsened by: reaching up/out to the side, lifting, mowing her lawn, sleeping on her stomach  Alleviated by: Rest, Ice and Surgery.    General health as reported by patient: good  Pertinent medical/surgical history: left knee replacement. High cholesterol. anemia, asthma, concussions/dizziness, depression, implanted device, menopausal, osteoarthritis, smoking. She denies unrelenting night pain,significant current illness or recent hospital admission. She  denies any regional implanted devices. She denies history of surgery in the area.    PATIENTS NAME:  Giana Hope   : 1945  Imaging: x-ray. Current occupational status: retired, research on environmental medicine. Patient's goals are: decrease pain, improve tolerance to dressing, doing her hair, reaching overhead and to the side. Return to MD:  PRN.     Objective:  SHOULDER:  Shoulder:   PROM R PROM L   Flex 150 86   Abd 145 79   ER 45 24   Scapulothoraic Rhythm: mild scapular winging  Left greater than right    Assessment/Plan:    The patient is a 75 year old female with chief complaint of left shoulder pain.    The patient has the following significant findings with corresponding treatment plan.  Diagnosis 1:  Left shoulder pain s/p reverse TSA    Pain -  hot/cold therapy, manual therapy, splint/taping/bracing/orthotics, directional preference exercise and home program  Decreased ROM/flexibility - manual therapy and therapeutic exercise  Decreased strength - therapeutic exercise and therapeutic activities  Decreased proprioception - neuro re-education and therapeutic activities  Impaired muscle performance - neuro re-education  Decreased function - therapeutic activities  Impaired posture - neuro re-education  Therapy Evaluation Codes:   Cumulative Therapy Evaluation is: Low complexity.  Previous and current functional limitations:  (See Goal Flow Sheet for this information)    Short term and Long term goals: (See Goal Flow Sheet for this information)   Communication ability:  Patient appears to be able to clearly communicate and understand verbal and written communication and follow directions correctly.  Treatment Explanation - The following has been discussed with the patient: RX ordered/plan of care, anticipated outcomes, and possible risks and side effects.  This patient would benefit from PT intervention to resume normal activities.   Rehab potential is good.  Frequency:  2 X a month, once  "daily  Duration:  for 2 months  Discharge Plan: Achieve all LTGs, be independent in home treatment program, and reach maximal therapeutic benefit.                      PATIENTS NAME:  Giana Hope   : 1945          Caregiver Signature/Credentials _____________________________ Date ________        Taylor Benito DPT    I have reviewed and certified the need for these services and plan of treatment while under my care.      PHYSICIAN'S SIGNATURE:   _________________________________________  Date___________   Gilberto Vasquez MD    Certification period:  Beginning of Cert date period: 10/09/20 to  End of Cert period date: 21   Functional Level Progress Report: Please see attached \"Goal Flow sheet for Functional level.\"  ____X____ Continue Services or       ________ DC Services                Service dates: From  SOC Date: 10/09/20 date to present                         "

## 2020-10-12 ENCOUNTER — ANCILLARY PROCEDURE (OUTPATIENT)
Dept: CT IMAGING | Facility: CLINIC | Age: 75
End: 2020-10-12
Attending: FAMILY MEDICINE
Payer: MEDICARE

## 2020-10-12 ENCOUNTER — ALLIED HEALTH/NURSE VISIT (OUTPATIENT)
Dept: INTERNAL MEDICINE | Facility: CLINIC | Age: 75
End: 2020-10-12
Payer: MEDICARE

## 2020-10-12 DIAGNOSIS — R91.8 PULMONARY NODULES: ICD-10-CM

## 2020-10-12 DIAGNOSIS — Z23 NEED FOR VACCINATION: Primary | ICD-10-CM

## 2020-10-12 PROCEDURE — 99207 PR NO CHARGE INJECTABLE MED/DRUG: CPT

## 2020-10-12 PROCEDURE — G0008 ADMIN INFLUENZA VIRUS VAC: HCPCS

## 2020-10-12 PROCEDURE — 90662 IIV NO PRSV INCREASED AG IM: CPT

## 2020-10-12 PROCEDURE — 71250 CT THORAX DX C-: CPT | Mod: GC | Performed by: RADIOLOGY

## 2020-10-12 NOTE — PROGRESS NOTES
Giana Hope comes into clinic today at the request of Dr. Vasquez. Ordering Provider for Med Injection only flu shot.    Pt tolerated vaccine well, no redness or swelling ear site.    This service provided today was under the supervising provider of the day Dr. Macias, who was available if needed.    Jossie Ramos, EMT 1:51 PM on 10/12/2020

## 2020-10-12 NOTE — NURSING NOTE
Chief Complaint   Patient presents with     Nurse Visit     pt here flu shot       Jossie Ramos CMA, EMT at 1:38 PM on 10/12/2020.

## 2020-10-19 ENCOUNTER — OFFICE VISIT (OUTPATIENT)
Dept: OPHTHALMOLOGY | Facility: CLINIC | Age: 75
End: 2020-10-19
Attending: OPHTHALMOLOGY
Payer: MEDICARE

## 2020-10-19 DIAGNOSIS — H20.00 ACUTE ANTERIOR UVEITIS OF LEFT EYE: Primary | ICD-10-CM

## 2020-10-19 DIAGNOSIS — Z15.89 HLA B27 (HLA B27 POSITIVE): ICD-10-CM

## 2020-10-19 PROCEDURE — 99213 OFFICE O/P EST LOW 20 MIN: CPT | Performed by: OPHTHALMOLOGY

## 2020-10-19 PROCEDURE — G0463 HOSPITAL OUTPT CLINIC VISIT: HCPCS

## 2020-10-19 ASSESSMENT — EXTERNAL EXAM - LEFT EYE: OS_EXAM: NORMAL

## 2020-10-19 ASSESSMENT — TONOMETRY
IOP_METHOD: TONOPEN
OD_IOP_MMHG: 21
OS_IOP_MMHG: 25

## 2020-10-19 ASSESSMENT — CONF VISUAL FIELD
OS_NORMAL: 1
METHOD: COUNTING FINGERS
OD_NORMAL: 1

## 2020-10-19 ASSESSMENT — REFRACTION_WEARINGRX
SPECS_TYPE: PAL
OS_ADD: +2.50
OS_AXIS: 007
OS_SPHERE: PLANO
OS_CYLINDER: +0.50
OD_AXIS: 174
OD_SPHERE: +0.25
OD_ADD: +2.50
OD_CYLINDER: +1.00

## 2020-10-19 ASSESSMENT — SLIT LAMP EXAM - LIDS
COMMENTS: NORMAL
COMMENTS: NORMAL

## 2020-10-19 ASSESSMENT — CUP TO DISC RATIO
OD_RATIO: 0.25
OS_RATIO: 0.25

## 2020-10-19 ASSESSMENT — VISUAL ACUITY
OS_CC: 20/25
CORRECTION_TYPE: GLASSES
OD_CC+: +2
OD_CC: 20/20
METHOD: SNELLEN - LINEAR

## 2020-10-19 ASSESSMENT — EXTERNAL EXAM - RIGHT EYE: OD_EXAM: NORMAL

## 2020-10-19 NOTE — PROGRESS NOTES
HPI  Giana Hope is a 75 year old female here for follow-up of left anterior uveitis.     She states that her left eye continues to improve, and there is no pain. She is using the prednisolone TID as directed.    POH: No history of eye surgery or trauma, uveitis left eye - HLAB27+, work-up done in 2018 at [facility].  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 HLA-B27+, and chest CT with lung nodule. Recurrent inflammation on exam 9/9/20, much improved today, appears quiet.     Plan: Decrease prednisolone to 2 times daily for 2 weeks and then to 1 time a day until next visit in ~ 1 month.     (Z96.1) Pseudophakia, right eye  Comment: Clear visual axis.   Plan: Observe     (H25.12) Age-related nuclear cataract, left  Comment: Not bothersome for now.  Plan: Monitor.  -----------------------------------------------------------------------------------    Patient disposition:   Return in about 4 weeks (around 11/16/2020) for inflammation check. or sooner as needed.      Teaching statement:  Complete documentation of historical and exam elements from today's encounter can be found in the full encounter summary report (not reduplicated in this progress note). I personally obtained the chief complaint(s) and history of present illness.  I confirmed and edited as necessary the review of systems, past medical/surgical history, family history, social history, and examination findings as documented by others; and I examined the patient myself. I personally reviewed the relevant tests, images, and reports as documented above.     I formulated and edited as necessary the assessment and plan and discussed the findings and management plan with the patient and family.    Trish Taylor MD  Comprehensive Ophthalmology & Ocular Pathology  Department  of Ophthalmology and Visual Neurosciences  [email or web address]  Pager [phone]

## 2020-10-19 NOTE — NURSING NOTE
Chief Complaints and History of Present Illnesses   Patient presents with     Follow Up     2 week follow up  Acute anterior uveitis of left eye       Chief Complaint(s) and History of Present Illness(es)     Follow Up     Laterality: left eye    Comments: 2 week follow up  Acute anterior uveitis of left eye                Comments     Pt states vision is better since last visit. No eye pain today.  No flashes or floaters. No redness or dryness.    JAKE Doyle October 19, 2020 9:30 AM

## 2020-10-23 ENCOUNTER — THERAPY VISIT (OUTPATIENT)
Dept: PHYSICAL THERAPY | Facility: CLINIC | Age: 75
End: 2020-10-23
Payer: MEDICARE

## 2020-10-23 DIAGNOSIS — Z96.612 HISTORY OF LEFT SHOULDER REPLACEMENT: ICD-10-CM

## 2020-10-23 PROCEDURE — 97140 MANUAL THERAPY 1/> REGIONS: CPT | Mod: GP | Performed by: PHYSICAL THERAPIST

## 2020-10-23 PROCEDURE — 97110 THERAPEUTIC EXERCISES: CPT | Mod: GP | Performed by: PHYSICAL THERAPIST

## 2020-10-25 ASSESSMENT — ENCOUNTER SYMPTOMS
ARTHRALGIAS: 1
MUSCLE CRAMPS: 1
EYE REDNESS: 1
SKIN CHANGES: 0
DYSURIA: 0
NECK PAIN: 1
EYE IRRITATION: 0
MYALGIAS: 1
NUMBNESS: 0
BACK PAIN: 1
SEIZURES: 0
DISTURBANCES IN COORDINATION: 0
SPEECH CHANGE: 0
DIFFICULTY URINATING: 0
FLANK PAIN: 0
EYE PAIN: 0
MEMORY LOSS: 0
STIFFNESS: 1
POOR WOUND HEALING: 0
TINGLING: 0
MUSCLE WEAKNESS: 0
HEMATURIA: 0
DOUBLE VISION: 0
JOINT SWELLING: 0
HEADACHES: 0
DIZZINESS: 1
LOSS OF CONSCIOUSNESS: 0
TREMORS: 0
EYE WATERING: 0
WEAKNESS: 0
NAIL CHANGES: 0
PARALYSIS: 0

## 2020-10-26 ENCOUNTER — OFFICE VISIT (OUTPATIENT)
Dept: FAMILY MEDICINE | Facility: CLINIC | Age: 75
End: 2020-10-26
Payer: MEDICARE

## 2020-10-26 VITALS
SYSTOLIC BLOOD PRESSURE: 134 MMHG | HEART RATE: 74 BPM | DIASTOLIC BLOOD PRESSURE: 84 MMHG | OXYGEN SATURATION: 96 % | BODY MASS INDEX: 25.28 KG/M2 | WEIGHT: 145 LBS

## 2020-10-26 DIAGNOSIS — Z00.00 ROUTINE HISTORY AND PHYSICAL EXAMINATION OF ADULT: Primary | ICD-10-CM

## 2020-10-26 DIAGNOSIS — M24.542 CONTRACTURE OF HAND JOINT, LEFT: ICD-10-CM

## 2020-10-26 DIAGNOSIS — Z12.11 SPECIAL SCREENING FOR MALIGNANT NEOPLASMS, COLON: ICD-10-CM

## 2020-10-26 DIAGNOSIS — Z11.59 ENCOUNTER FOR HEPATITIS C SCREENING TEST FOR LOW RISK PATIENT: ICD-10-CM

## 2020-10-26 PROCEDURE — 99397 PER PM REEVAL EST PAT 65+ YR: CPT | Mod: GY | Performed by: FAMILY MEDICINE

## 2020-10-26 NOTE — PATIENT INSTRUCTIONS
Your health care Provider has recommended that you receive the new Shingle vaccine called Shingrix to prevent shingles for ages 50 and above. Many private insurance and Medicare Part D plans cover Shingrix. However, not all insurance carriers cover the entire cost of the Shingrix vaccine if the vaccine is administered at your primary care clinic. The clinic cannot determine your insurance benefits.  Please call your insurance carrier prior. The vaccine comes in two doses. Your second dose should be 2-6 months from your first dose.  Best through pharmacy      Prior to receiving the vaccine, we recommend that you call your insurance carrier and ask them the following questions:            1. Is there a cost difference if I receive the vaccine at my doctor's office or a pharmacy?          2. Does my insurance cover the Shingrix Vaccine and administration of the vaccine?          3. What is my co-pay or deductible for the vaccine?

## 2020-10-26 NOTE — PROGRESS NOTES
Giana Hope is here for physical  and follow up. Concerns are as follows:  Giana Hope is a 75 year old female with history of arthritis, hyperlipidemia, previous history of tobacco use quit over 24 years ago with small pulmonary nodule who presents today for physical and follow-up primary care.      Gastroesophageal reflux.   She does not have a history of ulcer.  Famotidine once daily has been helpful for reflux  Osteoarthritis  Hawa has significant osteoarthritis of her hands shoulders spine and knee in particular she has limitation of range of motion to her left arm due to significant osteoarthritis and completed surgical intervention [facility].  She has been using sulindac 200 mg up to twice weekly .concerns related to reflux and slightly worsening renal function although still within normal range.   She completed left shoulder surgery at [facility] 8/25 with left arm in a sling. She is able to continue the sulindac on an as-needed basis  reviewed monitor her kidney function has been stable. She developed a left hand contracture while in the sling and has arthritis and limitation of joints of her right/ left hands, wondering next steps.  Lipids  Currently tolerating pravastatin daily needs refill. Due for fasting lipids  Family history of breast cancer  She needs annual mammogram ( ordered) last completed in August 2019 due to family history of breast cancer in mother and maternal grandmother in their 70s and will complete when able. She agrees to clinical breast exam today  History of pulmonary nodule  She is a past cigarette smoker but quit over 24 years ago.  She was monitored in August 30, 2019 with CT scan for stable pulmonary nodule and completed CT scan with stable pulmonary nodule.    Screening for colon cancer previous normal colonoscopy would like Fit test.    Patient Active Problem List   Diagnosis     Hypercholesterolemia     Arthritis of shoulder region, left     Encounter to establish care      Primary osteoarthritis involving multiple joints     Gastroesophageal reflux disease without esophagitis     Nodule of left lung     Family history of malignant neoplasm of breast     Mass of left upper extremity     Back pain of thoracolumbar region     Balance disorder     Compression fracture of thoracic vertebra, open, initial encounter (H)     DDD (degenerative disc disease), thoracolumbar     Degenerative scoliosis in adult patient     History of anesthesia problem     Kyphosis (acquired) (postural)     Nuclear sclerosis     Osteoarthrosis     Osteopenia with high risk of fracture     Pain in left shoulder     Risk for falls     Status post left knee replacement     History of left shoulder replacement       Past Medical History:   Diagnosis Date     Nonsenile cataract      Osteoarthritis      Pulmonary nodule      Uveitis        Past Surgical History:   Procedure Laterality Date     CATARACT IOL, RT/LT Right 03/2019     PHACOEMULSIFICATION WITH STANDARD INTRAOCULAR LENS IMPLANT Right 3/22/2019    Procedure: Right Cataract Removal with Intraocular Lens Implant;  Surgeon: Trish Taylor MD;  Location:  OR       Current Outpatient Medications   Medication Sig Dispense Refill     cyclobenzaprine (FLEXERIL) 5 MG tablet Take 1 tablet (5 mg) by mouth 3 times daily as needed for muscle spasms 21 tablet 0     famotidine (PEPCID) 40 MG tablet Take 1 tablet (40 mg) by mouth daily as needed for heartburn 90 tablet 3     fluocinolone acetonide (DERMA SMOOTHE/FS BODY) 0.01 % external oil Use on the scalp twice daily 118.28 mL 5     ketoconazole (NIZORAL) 2 % external shampoo Use 2-3 times weekly 120 mL 5     Minoxidil 5 % FOAM Use 1/2 capful twice daily or one full capful daily on the scalp 60 g 5     pravastatin (PRAVACHOL) 20 MG tablet Take 1 tablet (20 mg) by mouth daily 90 tablet 3     prednisoLONE acetate (PRED FORTE) 1 % ophthalmic suspension Place 1-2 drops Into the left eye every hour 1 Bottle 3      sulindac (CLINORIL) 200 MG tablet Take 1 tablet (200 mg) by mouth daily as needed (arthritis) 90 tablet 3       Immunization History   Administered Date(s) Administered     Flu, Unspecified 10/13/2009, 10/02/2014, 10/21/2015, 2016     HepA-Adult 2018, 2019     Influenza (High Dose) 3 valent vaccine 10/28/2017     Influenza, Quad, High Dose, Pf, 65yr + 10/12/2020     Pneumo Conj 13-V (2010&after) 2015     Pneumococcal 23 valent 2011     TD (ADULT, 7+) 2018     TDAP Vaccine (Boostrix) 2007     Typhoid IM 2019     Yellow Fever, Live (Stamaril) 2017       Allergies   Allergen Reactions     Dust Mites Itching     Runny nose, fever     Mold Itching     Fever, runny nose     Pollen Extract Itching     Fever, runny nose     Celecoxib      Other reaction(s): GI intolerance     Social History     Social History Narrative         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    Loves to travel. Moved to [place] 10/2018 to be near daughter Linda () son in law and 2 granddaughters. Her son Jeff ( ) lives in [place] Schizoaffective. She travels to visit him when able.         Family History   Problem Relation Age of Onset     Arthritis Mother      Breast Cancer Mother 78     Diabetes Type 2  Father      Heart Disease Father          age 75     Alzheimer Disease Father 60     Heart Disease Brother 68     Breast Cancer Maternal Grandmother 81     Heart Disease Maternal Grandfather 54         age 81     Other - See Comments Son         schizoaffective lives in [place]     Glaucoma No family hx of      Macular Degeneration No family hx of        Remaining 10 point ROS of systems including Constitutional, Eyes, Respiratory, Cardiovascular, Gastroenterology, Genitourinary, Integumentary,  Musculoskeletal, Neurological, Psychiatric were all negative except for pertinent positives noted in HPI and ROS reviewed below.  Answers for HPI/ROS submitted by the patient on 10/25/2020   General Symptoms: No  Skin Symptoms: Yes rash hand  HENT Symptoms: No  EYE SYMPTOMS: Yes  HEART SYMPTOMS: No  LUNG SYMPTOMS: No  INTESTINAL SYMPTOMS: No  URINARY SYMPTOMS: Yes  GYNECOLOGIC SYMPTOMS: No  BREAST SYMPTOMS: No  SKELETAL SYMPTOMS: Yes arthritis  BLOOD SYMPTOMS: No  NERVOUS SYSTEM SYMPTOMS: Yes  MENTAL HEALTH SYMPTOMS: No  Changes in hair: Yes  Changes in moles/birth marks: No  Itching: Yes right hand  Rashes: No  Changes in nails: No  Acne: No  Hair in places you don't want it: No  Change in facial hair: No  Warts: No  Non-healing sores: No  Scarring: No  Flaking of skin: No  Color changes of hands/feet in cold : No  Sun sensitivity: No  Skin thickening: No  Eye pain: No  Vision loss: Yes  Dry eyes: No  Watery eyes: No  Eye bulging: No  Double vision: No  Flashing of lights: No  Spots: No  Floaters: No  Redness: Yes  Crossed eyes: No  Tunnel Vision: No  Yellowing of eyes: No  Eye irritation: No  Trouble holding urine or incontinence: Yes  Pain or burning: No  Trouble starting or stopping: No  Increased frequency of urination: No  Blood in urine: No  Decreased frequency of urination: No  Frequent nighttime urination: No  Flank pain: No  Difficulty emptying bladder: No  Back pain: Yes  Muscle aches: Yes  Neck pain: Yes  Swollen joints: No  Joint pain: Yes  Bone pain: No  Muscle cramps: Yes  Muscle weakness: No  Joint stiffness: Yes  Bone fracture: No  Trouble with coordination: No  Dizziness or trouble with balance: Yes  Fainting or black-out spells: No  Memory loss: No  Headache: No  Seizures: No  Speech problems: No  Tingling: No  Tremor: No  Weakness: No  Difficulty walking: No  Paralysis: No  Numbness: No    /84   Pulse 74   Wt 65.8 kg (145 lb)   LMP  (LMP Unknown)   SpO2 96%   BMI 25.28 kg/m     Constitutional: Oriented to person, place, and time. Vital signs are noted.  Appears well-developed and well-nourished. Non-toxic appearance.  No distress.   HENT: TM normal. External canal normal.  Head: Normocephalic and atraumatic.   Mouth/Throat:deferred mask   Eyes: Glasses Conjunctivae and EOM are normal. Pupils are equal, round, and reactive to light. No scleral icterus.   Neck: Normal range of motion. Neck supple. No JVD present. No tracheal deviation present. No thyromegaly present.   Cardiovascular: Regular rhythm, normal heart sounds and intact distal pulses. No murmur present. Exam reveals no gallop and no friction rub.   Pulmonary/Chest: Effort normal and breath sounds normal. No respiratory distress.   Abdominal: Soft. Bowel sounds are normal. No distension and no mass. No tenderness.   Musculoskeletal:left shoulder surgery healing scar anterior, hands multiple arthritic changes and thumb subluxation left hand tendon contracture  No edema and no tenderness.   Lymphadenopathy:   No cervical adenopathy.   Neurological: Alert and oriented to person, place, and time. Normal strength. No cranial nerve deficit or sensory deficit. DTR s normal  Breast: Bilateral breast without masses, no axillary adenopathy  Skin: right hand erythematous patch eczema, dry skin No pallor.   Psychiatric: Normal mood, affect and behavior is normal.    PHQ-2 Score:     PHQ-2 ( 1999 Pfizer) 9/2/2020 1/13/2020   Q1: Little interest or pleasure in doing things 0 0   Q2: Feeling down, depressed or hopeless 0 0   PHQ-2 Score 0 0   Q1: Little interest or pleasure in doing things - -   Q2: Feeling down, depressed or hopeless - -   PHQ-2 Score - -   Giana was seen today for physical.  Giana Hope is a 75 year old female with history of arthritis, hyperlipidemia, previous history of tobacco use quit over 24 years ago with small stable pulmonary nodules. She has significant arthritis in particular in her hands requiring  orthopedic hand evaluation for thumb arthritis and left palm contracture.  I reviewed health maintenance due for FIT test, mammogram previously ordered and will complete. Discussed Shingrix through pharmacy  Diagnoses and all orders for this visit:    Routine history and physical examination of adult    Contracture of hand joint, left  -     Orthopedic & Spine  Referral; Future    Encounter for hepatitis C screening test for low risk patient  -     HCV Screen with Reflex; Future    Special screening for malignant neoplasms, colon  -     Fecal cancer screen FIT; Future  Reviewed annual visit    All questions were addressed and voiced understanding and agreement with the above.    Gilberto Vasquez MD

## 2020-10-26 NOTE — NURSING NOTE
Chief Complaint   Patient presents with     Physical     Kimberly Nissen, EMT at 4:04 PM on 10/26/2020

## 2020-10-30 NOTE — TELEPHONE ENCOUNTER
RECORDS RECEIVED FROM: Contracture of hand joint, left /Dr Vasquez/ no images/ Medicare and [facility]/ ortho con   DATE RECEIVED: Nov 24, 2020     NOTES STATUS DETAILS   OFFICE NOTE from referring provider Internal    OFFICE NOTE from other specialist N/A    DISCHARGE SUMMARY from hospital N/A    DISCHARGE REPORT from the ER N/A    OPERATIVE REPORT N/A    MEDICATION LIST Internal    IMPLANT RECORD/STICKER N/A    LABS     CBC/DIFF N/A    CULTURES N/A    INJECTIONS DONE IN RADIOLOGY N/A    MRI N/A    CT SCAN N/A    XRAYS (IMAGES & REPORTS) N/A    TUMOR     PATHOLOGY  Slides & report N/A    10/30/20   10:08 AM   Complete  Angeles Henley, CMA

## 2020-11-04 ENCOUNTER — DOCUMENTATION ONLY (OUTPATIENT)
Dept: CARE COORDINATION | Facility: CLINIC | Age: 75
End: 2020-11-04

## 2020-11-06 ENCOUNTER — THERAPY VISIT (OUTPATIENT)
Dept: PHYSICAL THERAPY | Facility: CLINIC | Age: 75
End: 2020-11-06
Payer: MEDICARE

## 2020-11-06 DIAGNOSIS — Z96.612 HISTORY OF LEFT SHOULDER REPLACEMENT: ICD-10-CM

## 2020-11-06 PROCEDURE — 97110 THERAPEUTIC EXERCISES: CPT | Mod: GP | Performed by: PHYSICAL THERAPIST

## 2020-11-06 PROCEDURE — 97140 MANUAL THERAPY 1/> REGIONS: CPT | Mod: GP | Performed by: PHYSICAL THERAPIST

## 2020-11-16 ENCOUNTER — THERAPY VISIT (OUTPATIENT)
Dept: PHYSICAL THERAPY | Facility: CLINIC | Age: 75
End: 2020-11-16
Payer: MEDICARE

## 2020-11-16 DIAGNOSIS — Z96.612 HISTORY OF LEFT SHOULDER REPLACEMENT: ICD-10-CM

## 2020-11-16 PROCEDURE — 97110 THERAPEUTIC EXERCISES: CPT | Mod: GP | Performed by: PHYSICAL THERAPIST

## 2020-11-24 ENCOUNTER — PRE VISIT (OUTPATIENT)
Dept: ORTHOPEDICS | Facility: CLINIC | Age: 75
End: 2020-11-24

## 2020-11-24 ENCOUNTER — VIRTUAL VISIT (OUTPATIENT)
Dept: ORTHOPEDICS | Facility: CLINIC | Age: 75
End: 2020-11-24
Attending: FAMILY MEDICINE
Payer: MEDICARE

## 2020-11-24 DIAGNOSIS — M24.542 CONTRACTURE OF HAND JOINT, LEFT: ICD-10-CM

## 2020-11-24 PROCEDURE — 99203 OFFICE O/P NEW LOW 30 MIN: CPT | Mod: 95 | Performed by: PLASTIC SURGERY

## 2020-11-24 NOTE — NURSING NOTE
"Giana Hope is a 75 year old female who is being evaluated via a billable video visit.      The patient has been notified of following:     \"This video visit will be conducted via a call between you and your physician/provider. We have found that certain health care needs can be provided without the need for an in-person physical exam.  This service lets us provide the care you need with a video conversation.  If a prescription is necessary we can send it directly to your pharmacy.  If lab work is needed we can place an order for that and you can then stop by our lab to have the test done at a later time.    Video visits are billed at different rates depending on your insurance coverage.  Please reach out to your insurance provider with any questions.    If during the course of the call the physician/provider feels a video visit is not appropriate, you will not be charged for this service.\"    Patient has given verbal consent for Video visit? Yes  How would you like to obtain your AVS? MyChart  If you are dropped from the video visit, the video invite should be resent to: Send to e-mail at: [email or web address]  Will anyone else be joining your video visit? No        "

## 2020-11-24 NOTE — PROGRESS NOTES
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    HPI: Patient is a 75-year-old right-hand-dominant female referred to me by Dr. 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  Patient noticed left hand contractures for the past month after she came out of a sling which she was placed into after having shoulder replacement surgery.  She noticed that there was a band rubbing against the palm and is concerned that this has resulted in a contracture.  There is no activity of daily living that she is unable to perform due to this condition, but would like to know what can be done at this time.  Denies any numbness or tingling.    Patient also has a painful right hand.  She feels that this is due to her arthritis.  She also has similar issues with the left hand, but not as severe.  She complains of the appearance of her thumb, and a painful nodule at the base of the thumb, and difficulty with opening and closing jars.  She feels that this is an issue that has been worsening over the last couple of years.  She also complains of nighttime aching in the palm but not the fingers over the last 2 months.  She has tried anti-inflammatory medications which have been helpful.    MEDS:   Prior to Admission medications    Medication Sig Start Date End Date Taking? Authorizing Provider   cyclobenzaprine (FLEXERIL) 5 MG tablet Take 1 tablet (5 mg) by mouth 3 times daily as needed for muscle spasms 1/13/20  Yes Mumtaz Diehl MD   famotidine (PEPCID) 40 MG tablet Take 1 tablet (40 mg) by mouth daily as needed for heartburn 4/20/20  Yes Gilberto Vasquez MD   fluocinolone acetonide (DERMA SMOOTHE/FS BODY) 0.01 % external oil Use on the scalp twice daily 8/4/20  Yes Brenda Ramirez MD   ketoconazole (NIZORAL) 2 % external shampoo Use 2-3 times weekly 8/4/20  Yes Brenda Ramirez MD   Minoxidil 5 % FOAM Use  1/2 capful twice daily or one full capful daily on the scalp 20  Yes Brenda Ramirez MD   pravastatin (PRAVACHOL) 20 MG tablet Take 1 tablet (20 mg) by mouth daily 20  Yes Gilberto Vasquez MD   prednisoLONE acetate (PRED FORTE) 1 % ophthalmic suspension Place 1-2 drops Into the left eye every hour 20  Yes Trish Taylor MD   sulindac (CLINORIL) 200 MG tablet Take 1 tablet (200 mg) by mouth daily as needed (arthritis) 20  Yes Gilberto Vasquez MD       ALLERGIES:   Allergies   Allergen Reactions     Dust Mites Itching     Runny nose, fever     Mold Itching     Fever, runny nose     Pollen Extract Itching     Fever, runny nose     Celecoxib      Other reaction(s): GI intolerance       PMH:   Past Medical History:   Diagnosis Date     Nonsenile cataract      Osteoarthritis      Pulmonary nodule      Uveitis        PSH:   Past Surgical History:   Procedure Laterality Date     CATARACT IOL, RT/LT Right 2019     PHACOEMULSIFICATION WITH STANDARD INTRAOCULAR LENS IMPLANT Right 3/22/2019    Procedure: Right Cataract Removal with Intraocular Lens Implant;  Surgeon: Trish Taylor MD;  Location: [facility] OR       SH:   Social History     Tobacco Use     Smoking status: Former Smoker     Years: 35.00     Smokeless tobacco: Never Used     Tobacco comment: Quit 24 years ago    Substance Use Topics     Alcohol use: Yes     Comment: Moderate       FH:   Family History   Problem Relation Age of Onset     Arthritis Mother      Breast Cancer Mother 78     Diabetes Type 2  Father      Heart Disease Father          age 75     Alzheimer Disease Father 60     Heart Disease Brother 68     Breast Cancer Maternal Grandmother 81     Heart Disease Maternal Grandfather 54         age 81     Other - See Comments Son         schizoaffective lives in [place]     Glaucoma No family hx of      Macular Degeneration No family hx of        ROS: Denies chest pain, shortness of breath, diabetes, MI, CVA, DVT, PE,  and bleeding disorders.    PHYSICAL EXAMINATION:   Examination was performed over the video feed.  General: No acute distress.  On examination of both hands, patient was able to demonstrate to me that her hands are not swollen, she is able to make a composite fist with both hands, and she is able to extend all of her fingers and thumbs.  She did point to the volar thumb when I asked about her left hand contracture.  When I asked her about her right hand pain, she pointed to the base of her thumb.    ASSESSMENT: Left hand contractures and right hand palm pain.    PLAN: Patient would like to try conservative measures due to her concerns regarding the COVID-19 virus.  I recommended patient try nighttime splinting as well as heat and ice therapy in addition to the anti-inflammatories.  If these measures do not improve her symptoms, patient is to return to see me in 4 weeks for right hand x-ray and physical examination.  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  Also on that visit date, patient will see hand therapy for her left hand contractures.    Total time spent with patient was 30 min of which greater than 50% was in counseling.

## 2020-11-24 NOTE — LETTER
11/24/2020         RE: Gaina Hope  [address]  [place] [address]        Dear Colleague,    Thank you for referring your patient, Giana Hope, to the [facility] [place]. Please see a copy of my visit note below.    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    HPI: Patient is a 75-year-old right-hand-dominant female referred to me by Dr. 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  Patient noticed left hand contractures for the past month after she came out of a sling which she was placed into after having shoulder replacement surgery.  She noticed that there was a band rubbing against the palm and is concerned that this has resulted in a contracture.  There is no activity of daily living that she is unable to perform due to this condition, but would like to know what can be done at this time.  Denies any numbness or tingling.    Patient also has a painful right hand.  She feels that this is due to her arthritis.  She also has similar issues with the left hand, but not as severe.  She complains of the appearance of her thumb, and a painful nodule at the base of the thumb, and difficulty with opening and closing jars.  She feels that this is an issue that has been worsening over the last couple of years.  She also complains of nighttime aching in the palm but not the fingers over the last 2 months.  She has tried anti-inflammatory medications which have been helpful.    MEDS:   Prior to Admission medications    Medication Sig Start Date End Date Taking? Authorizing Provider   cyclobenzaprine (FLEXERIL) 5 MG tablet Take 1 tablet (5 mg) by mouth 3 times daily as needed for muscle spasms 1/13/20  Yes Mumtaz Diehl MD   famotidine (PEPCID) 40 MG tablet Take 1 tablet (40 mg) by mouth daily as needed for heartburn 4/20/20  Yes Pedro  Gilberto AUGUST MD   fluocinolone acetonide (DERMA SMOOTHE/FS BODY) 0.01 % external oil Use on the scalp twice daily 20  Yes Brenda Ramirez MD   ketoconazole (NIZORAL) 2 % external shampoo Use 2-3 times weekly 20  Yes Brenda Ramirez MD   Minoxidil 5 % FOAM Use 1/2 capful twice daily or one full capful daily on the scalp 20  Yes Brenda Ramirez MD   pravastatin (PRAVACHOL) 20 MG tablet Take 1 tablet (20 mg) by mouth daily 20  Yes Gilberto Vasquez MD   prednisoLONE acetate (PRED FORTE) 1 % ophthalmic suspension Place 1-2 drops Into the left eye every hour 20  Yes Trish Taylor MD   sulindac (CLINORIL) 200 MG tablet Take 1 tablet (200 mg) by mouth daily as needed (arthritis) 20  Yes Gilberto Vasquez MD       ALLERGIES:   Allergies   Allergen Reactions     Dust Mites Itching     Runny nose, fever     Mold Itching     Fever, runny nose     Pollen Extract Itching     Fever, runny nose     Celecoxib      Other reaction(s): GI intolerance       PMH:   Past Medical History:   Diagnosis Date     Nonsenile cataract      Osteoarthritis      Pulmonary nodule      Uveitis        PSH:   Past Surgical History:   Procedure Laterality Date     CATARACT IOL, RT/LT Right 2019     PHACOEMULSIFICATION WITH STANDARD INTRAOCULAR LENS IMPLANT Right 3/22/2019    Procedure: Right Cataract Removal with Intraocular Lens Implant;  Surgeon: Trish Taylor MD;  Location:  OR       :   Social History     Tobacco Use     Smoking status: Former Smoker     Years: 35.00     Smokeless tobacco: Never Used     Tobacco comment: Quit 24 years ago    Substance Use Topics     Alcohol use: Yes     Comment: Moderate       FH:   Family History   Problem Relation Age of Onset     Arthritis Mother      Breast Cancer Mother 78     Diabetes Type 2  Father      Heart Disease Father          age 75     Alzheimer Disease Father 60     Heart Disease Brother 68     Breast Cancer Maternal Grandmother 81      Heart Disease Maternal Grandfather 54         age 81     Other - See Comments Son         schizoaffective lives in [place]     Glaucoma No family hx of      Macular Degeneration No family hx of        ROS: Denies chest pain, shortness of breath, diabetes, MI, CVA, DVT, PE, and bleeding disorders.    PHYSICAL EXAMINATION:   Examination was performed over the video feed.  General: No acute distress.  On examination of both hands, patient was able to demonstrate to me that her hands are not swollen, she is able to make a composite fist with both hands, and she is able to extend all of her fingers and thumbs.  She did point to the volar thumb when I asked about her left hand contracture.  When I asked her about her right hand pain, she pointed to the base of her thumb.    ASSESSMENT: Left hand contractures and right hand palm pain.    PLAN: Patient would like to try conservative measures due to her concerns regarding the COVID-19 virus.  I recommended patient try nighttime splinting as well as heat and ice therapy in addition to the anti-inflammatories.  If these measures do not improve her symptoms, patient is to return to see me in 4 weeks for right hand x-ray and physical examination.  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  Also on that visit date, patient will see hand therapy for her left hand contractures.    Total time spent with patient was 30 min of which greater than 50% was in counseling.          Oh Mckeon MD

## 2020-11-30 ENCOUNTER — THERAPY VISIT (OUTPATIENT)
Dept: PHYSICAL THERAPY | Facility: CLINIC | Age: 75
End: 2020-11-30
Payer: MEDICARE

## 2020-11-30 DIAGNOSIS — Z96.612 HISTORY OF LEFT SHOULDER REPLACEMENT: ICD-10-CM

## 2020-11-30 PROCEDURE — 97110 THERAPEUTIC EXERCISES: CPT | Mod: 95 | Performed by: PHYSICAL THERAPIST

## 2020-12-14 ENCOUNTER — THERAPY VISIT (OUTPATIENT)
Dept: PHYSICAL THERAPY | Facility: CLINIC | Age: 75
End: 2020-12-14
Payer: MEDICARE

## 2020-12-14 ENCOUNTER — OFFICE VISIT (OUTPATIENT)
Dept: OPHTHALMOLOGY | Facility: CLINIC | Age: 75
End: 2020-12-14
Attending: OPHTHALMOLOGY
Payer: MEDICARE

## 2020-12-14 DIAGNOSIS — H20.00 ACUTE ANTERIOR UVEITIS OF LEFT EYE: Primary | ICD-10-CM

## 2020-12-14 DIAGNOSIS — Z96.612 HISTORY OF LEFT SHOULDER REPLACEMENT: ICD-10-CM

## 2020-12-14 DIAGNOSIS — Z15.89 HLA B27 (HLA B27 POSITIVE): ICD-10-CM

## 2020-12-14 PROCEDURE — 99213 OFFICE O/P EST LOW 20 MIN: CPT | Mod: GC | Performed by: OPHTHALMOLOGY

## 2020-12-14 PROCEDURE — G0463 HOSPITAL OUTPT CLINIC VISIT: HCPCS

## 2020-12-14 PROCEDURE — 97110 THERAPEUTIC EXERCISES: CPT | Mod: 95 | Performed by: PHYSICAL THERAPIST

## 2020-12-14 ASSESSMENT — SLIT LAMP EXAM - LIDS
COMMENTS: NORMAL
COMMENTS: NORMAL

## 2020-12-14 ASSESSMENT — VISUAL ACUITY
OS_PH_CC+: -2
METHOD: SNELLEN - LINEAR
OD_CC+: -2
CORRECTION_TYPE: GLASSES
OD_CC: 20/20
OS_PH_CC: 20/25
OS_CC: 20/30

## 2020-12-14 ASSESSMENT — REFRACTION_WEARINGRX
SPECS_TYPE: PAL
OD_SPHERE: +0.25
OS_CYLINDER: +0.50
OS_AXIS: 007
OS_ADD: +2.50
OD_ADD: +2.50
OS_SPHERE: PLANO
OD_CYLINDER: +1.00
OD_AXIS: 174

## 2020-12-14 ASSESSMENT — CUP TO DISC RATIO
OD_RATIO: 0.25
OS_RATIO: 0.25

## 2020-12-14 ASSESSMENT — CONF VISUAL FIELD
OD_NORMAL: 1
OS_NORMAL: 1
METHOD: COUNTING FINGERS

## 2020-12-14 ASSESSMENT — EXTERNAL EXAM - RIGHT EYE: OD_EXAM: NORMAL

## 2020-12-14 ASSESSMENT — TONOMETRY
OD_IOP_MMHG: 20
IOP_METHOD: TONOPEN
OS_IOP_MMHG: 22

## 2020-12-14 ASSESSMENT — EXTERNAL EXAM - LEFT EYE: OS_EXAM: NORMAL

## 2020-12-14 NOTE — PROGRESS NOTES
HPI  Giana Hope is a 75 year old female here for follow-up of left anterior uveitis.     She states that her left eye has fully resolved, and there is no pain.     She stopped PF 3 weeks ago. Doing well.      POH: No history of eye surgery or trauma, uveitis left eye - HLAB27+, work-up done in 2018 at [facility].  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 HLA-B27+, and chest CT with lung nodule. Recurrent inflammation on exam 9/9/20, much improved today, appears quiet.     Plan: Has been off prednisolone for 3 weeks now. Ok to continue.    (Z96.1) Pseudophakia, right eye  Comment: Clear visual axis.   Plan: Observe     (H25.12) Age-related nuclear cataract, left  Comment: Not bothersome for now.  Plan: Monitor.  -----------------------------------------------------------------------------------    Patient disposition:   Return in about 1 year (around 12/14/2021) for full eye exam. or sooner as needed.    Marti Dunn MD  Ophthalmology Resident PGY2  [facility]       Teaching statement:  Complete documentation of historical and exam elements from today's encounter can be found in the full encounter summary report (not reduplicated in this progress note). I personally obtained the chief complaint(s) and history of present illness.  I confirmed and edited as necessary the review of systems, past medical/surgical history, family history, social history, and examination findings as documented by others; and I examined the patient myself. I personally reviewed the relevant tests, images, and reports as documented above.     I formulated and edited as necessary the assessment and plan and discussed the findings and management plan with the patient and family.    Trish Taylor MD  Comprehensive Ophthalmology & Ocular  Pathology  Department of Ophthalmology and Visual Neurosciences  [email or web address]  Pager [phone]

## 2020-12-14 NOTE — NURSING NOTE
Chief Complaints and History of Present Illnesses   Patient presents with     Follow Up     8 week follow up Acute anterior uveitis of left eye       Chief Complaint(s) and History of Present Illness(es)     Follow Up     Laterality: left eye    Comments: 8 week follow up Acute anterior uveitis of left eye                Comments     Pt states vision has been good since last visit. No eye pain today.  No flashes or floaters. No redness or dryness.    JAKE Doyle December 14, 2020 7:33 AM

## 2020-12-29 ENCOUNTER — THERAPY VISIT (OUTPATIENT)
Dept: PHYSICAL THERAPY | Facility: CLINIC | Age: 75
End: 2020-12-29
Payer: MEDICARE

## 2020-12-29 DIAGNOSIS — Z96.612 HISTORY OF LEFT SHOULDER REPLACEMENT: ICD-10-CM

## 2020-12-29 PROCEDURE — 97110 THERAPEUTIC EXERCISES: CPT | Mod: 95 | Performed by: PHYSICAL THERAPIST

## 2020-12-29 NOTE — PROGRESS NOTES
Discharge Note    Patient seen for 7 visits.    SUBJECTIVE  Subjective changes noted by patient:  Hawa reports her shoulder is doing well overall. She has been mostly compliant with her HEP over the holidays. She reports a 90-92% improvement in function. She reports her ongoing limitations are related to lingering ongoing tightness. Her shoulder is not impacting her daily activities.    .  Current pain level is  0/10  Previous pain level was  2-3/10   Changes in function:  Yes (See Goal flowsheet attached for changes in current functional level)  Adverse reaction to treatment or activity: None    OBJECTIVE  Changes noted in objective findings: L shoulder ROM: Flexion 150, , ER 95% compared to contralateral side, EXT 67. IR/EXT L2  Flx/ER C7. Spadi 8%.      ASSESSMENT/PLAN  Diagnosis: L reverse TSA   Updated problem list and treatment plan:   Decreased ROM/flexibility - HEP  Decreased strength - HEP  Decreased proprioception - HEP  STG/LTGs have been met or progress has been made towards goals:  Yes, please see goal flowsheet for most current information  Assessment of Progress:Pt is progressing well.  Self Management Plans:  HEP  I have re-evaluated this patient and find that the nature, scope, duration and intensity of the therapy is appropriate for the medical condition of the patient.  Giana continues to require the following intervention to meet STG and LTG's:  HEP.    Recommendations:  Discharge with current home program.  Patient to follow up with MD as needed.    Please refer to the daily flowsheet for treatment today, total treatment time and time spent performing 1:1 timed codes.

## 2020-12-29 NOTE — LETTER
[facility] PHYSICAL [facility]  [address] [place] [address]  [phone]    2020    Re: Giana Hope   :   1945  MRN:  [id number]   REFERRING PHYSICIAN:   Gilberto Vasquez MD    [facility]    Date of Initial Evaluation:  10/9/20  Visits:  Rxs Used: 7  Reason for Referral:  History of left shoulder replacement      Discharge Note    Patient seen for 7 visits.    SUBJECTIVE  Subjective changes noted by patient:  Hawa reports her shoulder is doing well overall. She has been mostly compliant with her HEP over the holidays. She reports a 90-92% improvement in function. She reports her ongoing limitations are related to lingering ongoing tightness. Her shoulder is not impacting her daily activities.    .  Current pain level is  0/10  Previous pain level was  2-3/10   Changes in function:  Yes (See Goal flowsheet attached for changes in current functional level)  Adverse reaction to treatment or activity: None    OBJECTIVE  Changes noted in objective findings: L shoulder ROM: Flexion 150, , ER 95% compared to contralateral side, EXT 67. IR/EXT L2  Flx/ER C7. Spadi 8%.      ASSESSMENT/PLAN  Diagnosis: L reverse TSA   Updated problem list and treatment plan:   Decreased ROM/flexibility - HEP  Decreased strength - HEP  Decreased proprioception - HEP  STG/LTGs have been met or progress has been made towards goals:  Yes, please see goal flowsheet for most current information  Assessment of Progress:Pt is progressing well.  Self Management Plans:  HEP  I have re-evaluated this patient and find that the nature, scope, duration and intensity of the therapy is appropriate for the medical condition of the patient.  Giana continues to require the following intervention to meet STG and LTG's:  HEP.        Re: Giana Hope   :   1945        Recommendations:  Discharge with  current home program.  Patient to follow up with MD as needed.    Please refer to the daily flowsheet for treatment today, total treatment time and time spent performing 1:1 timed codes.        Thank you for your referral.    INQUIRIES  Therapist: Taylor Benito DPT  [facility] PHYSICAL THERAPY  [address] [place] [address]  Phone: [phone]  Fax: [phone]

## 2020-12-31 DIAGNOSIS — M19.041 ARTHRITIS OF RIGHT HAND: Primary | ICD-10-CM

## 2021-01-05 ENCOUNTER — ANCILLARY PROCEDURE (OUTPATIENT)
Dept: GENERAL RADIOLOGY | Facility: CLINIC | Age: 76
End: 2021-01-05
Attending: PLASTIC SURGERY
Payer: MEDICARE

## 2021-01-05 ENCOUNTER — THERAPY VISIT (OUTPATIENT)
Dept: OCCUPATIONAL THERAPY | Facility: CLINIC | Age: 76
End: 2021-01-05
Payer: MEDICARE

## 2021-01-05 ENCOUNTER — OFFICE VISIT (OUTPATIENT)
Dept: ORTHOPEDICS | Facility: CLINIC | Age: 76
End: 2021-01-05
Payer: MEDICARE

## 2021-01-05 DIAGNOSIS — M79.644 BILATERAL THUMB PAIN: ICD-10-CM

## 2021-01-05 DIAGNOSIS — M79.645 BILATERAL THUMB PAIN: ICD-10-CM

## 2021-01-05 DIAGNOSIS — M19.041 ARTHRITIS OF RIGHT HAND: Primary | ICD-10-CM

## 2021-01-05 DIAGNOSIS — R22.32 NODULE OF SKIN OF LEFT HAND: ICD-10-CM

## 2021-01-05 DIAGNOSIS — M18.0 OSTEOARTHRITIS OF CARPOMETACARPAL (CMC) JOINTS OF BOTH THUMBS, UNSPECIFIED OSTEOARTHRITIS TYPE: ICD-10-CM

## 2021-01-05 DIAGNOSIS — M19.041 ARTHRITIS OF RIGHT HAND: ICD-10-CM

## 2021-01-05 DIAGNOSIS — M79.642 PAIN OF LEFT HAND: ICD-10-CM

## 2021-01-05 PROCEDURE — 97165 OT EVAL LOW COMPLEX 30 MIN: CPT | Mod: GO | Performed by: OCCUPATIONAL THERAPIST

## 2021-01-05 PROCEDURE — 99213 OFFICE O/P EST LOW 20 MIN: CPT | Performed by: PLASTIC SURGERY

## 2021-01-05 PROCEDURE — 97760 ORTHOTIC MGMT&TRAING 1ST ENC: CPT | Mod: GO | Performed by: OCCUPATIONAL THERAPIST

## 2021-01-05 PROCEDURE — 73130 X-RAY EXAM OF HAND: CPT | Mod: RT | Performed by: RADIOLOGY

## 2021-01-05 PROCEDURE — 97110 THERAPEUTIC EXERCISES: CPT | Mod: GO | Performed by: OCCUPATIONAL THERAPIST

## 2021-01-05 NOTE — PROGRESS NOTES
Patient returns for an in person visit for physical examination and x-ray.    INTERVAL HISTORY: Continues to have symptoms.  Would like to continue knitting.  Has not undergone splinting nor steroid injection before.  Suspect the left palm nodules occurred after a strap across the palm from her shoulder surgery sling.    PHYSICAL EXAMINATION:  General: No acute distress.  On examination of the right hand, the thumb is in an adducted position at the CMC joint and the MP joint is hyper extended.  Palpation of both CMC and MP joints are tender.  Thumb grind test is tender.  On examination of left hand, the left thumb is also in an adducted position at the CMC joint with the MP joint hyperextended.  Palpation of both the CMC and MP joints tender.  Thumb grind test is also tender.  Left palm with 2 nodules along the distal palm.  These are nontender.  Patient is able to actively extend the MP and IP joints of the fingers normally.  Patient is able to get the palm and fingers completely extended on the tabletop.  Nodules are not associated with passive tendon ranging.  Bilateral FCR tendons palpable.    IMAGING: Right hand x-ray today shows stage IV thumb CMC arthritis as well as a hyperextended thumb MP joint with arthritic changes.    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  Left palmar nodules.    PLAN: Patient would benefit from conservative management including splinting and steroid injection.  She would like to undergo hand therapy today, but would like to return for the steroid injection when she is more symptomatic.  Hand therapy referral given for bilateral thumb CMC arthritis splinting and left palm nodule exercises.  Referral was given for sports medicine ultrasound-guided steroid injection to bilateral thumb CMC joints when patient elects.  We will watch the left palmar nodules given that there is a direct correlation with sling treatment in the past.  Given that this is  not causing a contracture, no intervention is required at this time.  We briefly discussed trapeziectomy with FCR LR TI and thumb MP joint fusion.  Patient would get the right side done before the left.  She may return to see me to discuss surgery again if she fails conservative treatment.    Total time spent with patient was 15 min of which greater than 50% was in counseling.

## 2021-01-05 NOTE — LETTER
DEPARTMENT OF HEALTH AND HUMAN SERVICES  CENTERS FOR MEDICARE & MEDICAID SERVICES    PLAN/UPDATED PLAN OF PROGRESS FOR OUTPATIENT REHABILITATION       PATIENTS NAME:  Giana Hope   : 1945  PROVIDER NUMBER:    [id number]  [facility]  [id number]  PROVIDER NAME: [facility]  MEDICAL RECORD NUMBER: [id number]   START OF CARE DATE:  SOC Date: 21   TYPE:  PT  PRIMARY/TREATMENT DIAGNOSIS: (Pertinent Medical Diagnosis)   Bilateral thumb pain  Pain of left hand  Osteoarthritis of carpometacarpal (CMC) joints of both thumbs, unspecified osteoarthritis type  VISITS FROM START OF CARE:  Rxs Used: 1     Hand Therapy Initial Evaluation    Current Date:  2021    Diagnosis: B thumb OA, left hand contracture  DOI: 21(MD orders)   Procedure:  none      Subjective:  Giana Hope is a 75 year old female.    Patient reports symptoms of the bilateral thumbs and left hand which occurred due to not sure. Since onset symptoms are Gradually getting worse.  General health as reported by patient is good.  Pertinent medical history includes:  Past Medical History:   Diagnosis Date     Nonsenile cataract      Osteoarthritis      Pulmonary nodule      Uveitis      Medical allergies:none.  Surgical history:   Past Surgical History:   Procedure Laterality Date     CATARACT IOL, RT/LT Right 2019     PHACOEMULSIFICATION WITH STANDARD INTRAOCULAR LENS IMPLANT Right 3/22/2019    Procedure: Right Cataract Removal with Intraocular Lens Implant;  Surgeon: Trish Taylor MD;  Location:  OR     Medication history: see chart, was taking anti-inflammatories regularly, but is decreasing that.    Current occupation is retired, previously was doing translating from German to english  Job Tasks: Computer Work, Prolonged Sitting, Repetitive Tasks    PATIENTS NAME:  iGana Hope   : 1945      Occupational Profile Information:  Right hand dominant  Prior functional level:  no  limitations  Patient reports symptoms of pain, stiffness/loss of motion, weakness/loss of strength and edema  Special tests:  x-ray.    Previous treatment: none  Barriers include:none  Mobility: No difficulty  Transportation: drives  Currently retired  Leisure activities/hobbies: knitting    Objective:  Pain Level (Scale 0-10)   2021   At Rest R: 3/10  L: 2/10   With Use R: 6/10  L: 3/10     Pain Description  Date 2021   Location B thumb CMC joints left palm   Pain Quality Throbbing   Frequency intermittent     Pain is worst  daytime   Exacerbated by  use, does wake her up at night sometimes   Relieved by heat   Progression Gradually getting worse     ROM  Thumb 2021   AROM  (PROM) R   MP +/40   IP +/82   RABD 40+   PABD 48     ROM  Thumb 2021   AROM  (PROM) L   MP +/51   IP +/82   RABD 51   PABD 47       Thumb Observation/Appearance   - none  + mild    ++ moderate    +++ severe     2021   Shoulder deformity present over CMC R: +  L: +   Edema over the CMC joint R: +  L: +   Noted collapse of MP into hyperextension during pinch R: +  L: +      Provocative Tests  Pain Report:  - none    + mild    ++ moderate    +++ severe     2021   CMC Grind test R: +  L: +   Crepitus present R: +  L: +   Finkelstein's R: +  L: -       ROM  Pain Report: - none  + mild    ++ moderate    +++ severe   Wrist 2021   AROM (PROM)    Extension R: 71  L:  71   Flexion R: 72  L: 75   RD R: 7  L: 15   UD R: 26  L: 35   Supination R:80  L: 85   Pronation R: 90  L: 85       Strength   (Measured in pounds)  Pain Report: - none  + mild    ++ moderate    +++ severe    2021   Trials R L   1  2  3 27  28  30 17  27  22   Average 28 22       PATIENTS NAME:  Giana Hope   : 1945    Lat Pinch 2021   Trials R L   1  2  3 5+  5+  5+ 7  6  7   Average 5 7     3 Pt Pinch 2021   Trials R L   1  2  3 8  8  8 5  6  7   Average 8 6     Palpation   Pain Report:  - none    + mild     ++ moderate    +++ severe    1/5/2021   CMC Joint Line R: +  L: +   CMC AP Joint Laxity R: +  L: +   Thenar Eminence R: +  L:  +   Web Space R: +  L: +   1st DC R: +  L: -   Radial Styloid R: +  L: +   FCR R: +  L: +     Assessment:  Patient presents with symptoms consistent with diagnosis of bilateral thumb CMC thumb arthritis and left hand stiffness, with conservative intervention.    Patient's limitations or Problem List includes:  Pain, Decreased ROM/motion, Increased edema and Weakness of the bilateral hand which interferes with the patient's ability to perform Self Care Tasks (dressing, eating, bathing, hygiene/toileting), Sleep Patterns, Recreational Activities, Household Chores and Driving  as compared to previous level of function.    Rehab Potential:  Good - Return to full activity, some limitations    Patient will benefit from skilled Occupational Therapy to increase ROM and stability of thumb and decrease pain and edema to return to previous activity level and resume normal daily tasks and to reach their rehab potential.    Barriers to Learning:  No barrier    Communication Issues:  Patient appears to be able to clearly communicate and understand verbal and written communication and follow directions correctly.    Chart Review: Chart Review and Simple history review with patient    Identified Performance Deficits: bathing/showering, toileting, dressing, feeding, hygiene and grooming, health management and maintenance, home establishment and management, meal preparation and cleanup, shopping and leisure activities    Assessment of Occupational Performance:  5 or more Performance Deficits    Clinical Decision Making (Complexity): Low complexity    Treatment Explanation:  The following has been discussed with the patient:    RX ordered/plan of care  Anticipated outcomes  Possible risks and side effects    Plan:  Frequency:  2 X a month, once daily  Duration:  for 2 months    Treatment Plan:   "  Modalities:    US and Paraffin   Therapeutic Exercise:    AROM, Tendon Gliding, Isotonics, Isometrics and Stabilization  Therapeutic Activities:   Functional activities   Manual Techniques:   Joint mobilization and Myofascial release  Orthotic Fabrication:    Static orthosis  Self Care:    Self Care Tasks    Discharge Plan:  Achieve all LTG  [place] in home treatment program.  Reach maximal therapeutic benefit.    Home Program:  Warmth  Self Myofascial Release  Self CMC mobilization   Thumb Stabilization Program  Hand based Thumb Spica Orthosis  Incorporate joint protection into daily functional activities  Adaptive equipment as needed    Next Visit:  Orthosis modifications, open webspace on L  Thumb stability  MFR  Discuss AE for ADL      Caregiver Signature/Credentials _____________________________ Date ________       Nkechi Yeager PT                I have reviewed and certified the need for these services and plan of treatment while under my care.        PHYSICIAN'S SIGNATURE:   _________________________________________  Date___________   Oh Mckeon MD     Certification period:  Beginning of Cert date period: 01/05/21 to  End of Cert period date: 04/04/21     Functional Level Progress Report: Please see attached \"Goal Flow sheet for Functional level.\"    ____X____ Continue Services or       ________ DC Services                Service dates: From  SOC Date: 01/05/21 date to present                         "

## 2021-01-05 NOTE — LETTER
1/5/2021         RE: Giana Hope  [address]  [place] [address]        Dear Colleague,    Thank you for referring your patient, Giana Hope, to the [facility] [place]. Please see a copy of my visit note below.    Patient returns for an in person visit for physical examination and x-ray.    INTERVAL HISTORY: Continues to have symptoms.  Would like to continue knitting.  Has not undergone splinting nor steroid injection before.  Suspect the left palm nodules occurred after a strap across the palm from her shoulder surgery sling.    PHYSICAL EXAMINATION:  General: No acute distress.  On examination of the right hand, the thumb is in an adducted position at the CMC joint and the MP joint is hyper extended.  Palpation of both CMC and MP joints are tender.  Thumb grind test is tender.  On examination of left hand, the left thumb is also in an adducted position at the CMC joint with the MP joint hyperextended.  Palpation of both the CMC and MP joints tender.  Thumb grind test is also tender.  Left palm with 2 nodules along the distal palm.  These are nontender.  Patient is able to actively extend the MP and IP joints of the fingers normally.  Patient is able to get the palm and fingers completely extended on the tabletop.  Nodules are not associated with passive tendon ranging.  Bilateral FCR tendons palpable.    IMAGING: Right hand x-ray today shows stage IV thumb CMC arthritis as well as a hyperextended thumb MP joint with arthritic changes.    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  Left palmar nodules.    PLAN: Patient would benefit from conservative management including splinting and steroid injection.  She would like to undergo hand therapy today, but would like to return for the steroid injection when she is more symptomatic.  Hand therapy referral given for bilateral thumb CMC arthritis splinting and left palm nodule  exercises.  Referral was given for sports medicine ultrasound-guided steroid injection to bilateral thumb CMC joints when patient elects.  We will watch the left palmar nodules given that there is a direct correlation with sling treatment in the past.  Given that this is not causing a contracture, no intervention is required at this time.  We briefly discussed trapeziectomy with FCR LR TI and thumb MP joint fusion.  Patient would get the right side done before the left.  She may return to see me to discuss surgery again if she fails conservative treatment.    Total time spent with patient was 15 min of which greater than 50% was in counseling.        Oh Mckeon MD

## 2021-01-05 NOTE — NURSING NOTE
Reason For Visit:   Chief Complaint   Patient presents with     RECHECK     Right hand arthritis, left hand contracture       Primary MD: Gilberto Vasquez    Age: 75 year old    ?  No      LMP  (LMP Unknown)       Pain Assessment  Patient Currently in Pain: Yes  0-10 Pain Scale: 2  Primary Pain Location: Hand(Bilateral - pain comes and goes but is mostly at night)               QuickDASH Assessment  No flowsheet data found.       Current Outpatient Medications   Medication Sig Dispense Refill     cyclobenzaprine (FLEXERIL) 5 MG tablet Take 1 tablet (5 mg) by mouth 3 times daily as needed for muscle spasms 21 tablet 0     famotidine (PEPCID) 40 MG tablet Take 1 tablet (40 mg) by mouth daily as needed for heartburn 90 tablet 3     fluocinolone acetonide (DERMA SMOOTHE/FS BODY) 0.01 % external oil Use on the scalp twice daily 118.28 mL 5     ketoconazole (NIZORAL) 2 % external shampoo Use 2-3 times weekly 120 mL 5     Minoxidil 5 % FOAM Use 1/2 capful twice daily or one full capful daily on the scalp 60 g 5     pravastatin (PRAVACHOL) 20 MG tablet Take 1 tablet (20 mg) by mouth daily 90 tablet 3     prednisoLONE acetate (PRED FORTE) 1 % ophthalmic suspension Place 1-2 drops Into the left eye every hour 1 Bottle 3     sulindac (CLINORIL) 200 MG tablet Take 1 tablet (200 mg) by mouth daily as needed (arthritis) 90 tablet 3       Allergies   Allergen Reactions     Dust Mites Itching     Runny nose, fever     Mold Itching     Fever, runny nose     Pollen Extract Itching     Fever, runny nose     Celecoxib      Other reaction(s): GI intolerance       Soheila Phoenix ATC

## 2021-01-21 ENCOUNTER — THERAPY VISIT (OUTPATIENT)
Dept: OCCUPATIONAL THERAPY | Facility: CLINIC | Age: 76
End: 2021-01-21
Payer: MEDICARE

## 2021-01-21 DIAGNOSIS — M18.0 OSTEOARTHRITIS OF CARPOMETACARPAL (CMC) JOINTS OF BOTH THUMBS, UNSPECIFIED OSTEOARTHRITIS TYPE: ICD-10-CM

## 2021-01-21 DIAGNOSIS — M79.645 BILATERAL THUMB PAIN: ICD-10-CM

## 2021-01-21 DIAGNOSIS — M79.644 BILATERAL THUMB PAIN: ICD-10-CM

## 2021-01-21 DIAGNOSIS — M79.642 PAIN OF LEFT HAND: ICD-10-CM

## 2021-01-21 PROCEDURE — 97110 THERAPEUTIC EXERCISES: CPT | Mod: 95 | Performed by: OCCUPATIONAL THERAPIST

## 2021-01-26 DIAGNOSIS — Z80.3 FAMILY HISTORY OF MALIGNANT NEOPLASM OF BREAST: ICD-10-CM

## 2021-01-26 DIAGNOSIS — Z12.31 ENCOUNTER FOR SCREENING MAMMOGRAM FOR MALIGNANT NEOPLASM OF BREAST: ICD-10-CM

## 2021-01-26 PROCEDURE — 77067 SCR MAMMO BI INCL CAD: CPT | Mod: GC | Performed by: RADIOLOGY

## 2021-01-26 PROCEDURE — 77063 BREAST TOMOSYNTHESIS BI: CPT | Mod: GC | Performed by: RADIOLOGY

## 2021-01-30 ENCOUNTER — IMMUNIZATION (OUTPATIENT)
Dept: NURSING | Facility: CLINIC | Age: 76
End: 2021-01-30
Payer: MEDICARE

## 2021-01-30 PROCEDURE — 91300 PR COVID VAC PFIZER DIL RECON 30 MCG/0.3 ML IM: CPT

## 2021-01-30 PROCEDURE — 0001A PR COVID VAC PFIZER DIL RECON 30 MCG/0.3 ML IM: CPT

## 2021-02-01 NOTE — PROGRESS NOTES
"SOAP note objective information for 2/4/2021.    Diagnosis: B thumb OA, left hand contracture  DOI: 01/05/21(MD orders)   Procedure:  none    Subjective:  Giana Hope is a 75 year old female.  Patient reports symptoms of the bilateral thumbs and left hand which occurred due to not sure.     Subjective changes as noted by patient: \"Nothing is easier, actually. They feel a little better.  Not waking up so much, actually. Throbbing, yeah.\"    Pt reported she will receive second dose of COVID vaccine on the 21st, so she will feel more comfortable transitioning to in-person appointments.    Objective:  Pain Level (Scale 0-10)   1/5/2021 2/4/2021   At Rest R: 3/10  L: 2/10 R: 3/10  L: 0/10   With Use R: 6/10  L: 3/10 R: 3/10  L: 0/10     Pain Description  Date 1/5/2021   Location B thumb CMC joints left palm   Pain Quality Throbbing   Frequency intermittent     Pain is worst  daytime   Exacerbated by  use, does wake her up at night sometimes   Relieved by heat   Progression Gradually getting worse     ROM  Thumb 1/5/2021 2/4/2021   AROM  (PROM) R R   MP +/40 +/45   IP +/82 +/82   RABD 40+ 46   PABD 48 50     ROM  Thumb 1/5/2021 2/4/2021   AROM  (PROM) L L   MP +/51 +/52   IP +/82 +/85   RABD 51 54   PABD 47 49       Thumb Observation/Appearance   - none  + mild    ++ moderate    +++ severe     1/5/2021   Shoulder deformity present over CMC R: +  L: +   Edema over the CMC joint R: +  L: +   Noted collapse of MP into hyperextension during pinch R: +  L: +      Provocative Tests  Pain Report:  - none    + mild    ++ moderate    +++ severe     1/5/2021   CMC Grind test R: +  L: +   Crepitus present R: +  L: +   Finkelstein's R: +  L: -       ROM  Pain Report: - none  + mild    ++ moderate    +++ severe   Wrist 1/5/2021   AROM (PROM)    Extension R: 71  L:  71   Flexion R: 72  L: 75   RD R: 7  L: 15   UD R: 26  L: 35   Supination R:80  L: 85   Pronation R: 90  L: 85     Please refer to the daily flowsheet for treatment " provided today.     A:  Response to therapy has been improvement to:  ROM of Thumb:  All Planes  Pain:  frequency is less and intensity of pain is decreased    Overall Assessment:  Patient is ready to progress to more complex exercises.  Patient is becoming more independent in home exercise program  Patient would benefit from continued therapy to achieve rehab potential  STG/LTG:  STGoals have been reviewed and progress or achievement has occurred;  see goal sheet for details and updates.    P:  Frequency/Duration:  Recommend continuing with the current treatment plan. 2 X a month, once daily  for 2 months    Recommendations for Continued Therapy  Treatment Plan:  Additions to Treatment Plan -  Changed chip clip to web space manual release    Treatment Plan:    Modalities:    US and Paraffin   Therapeutic Exercise:    AROM, Tendon Gliding, Isotonics, Isometrics and Stabilization  Therapeutic Activities:   Functional activities   Manual Techniques:   Joint mobilization and Myofascial release  Orthotic Fabrication:    Static orthosis  Self Care:    Self Care Tasks    Discharge Plan:  Achieve all LTG  [place] in home treatment program.  Reach maximal therapeutic benefit.    Home Program:  Warmth  Self Myofascial Release  Self CMC mobilization   Thumb Stabilization Program  Hand based Thumb Spica Orthosis  Incorporate joint protection into daily functional activities  Adaptive equipment as needed    Next Visit:  Orthosis modifications, open webspace on L  Thumb stability  MFR  Discuss AE for ADL

## 2021-02-04 ENCOUNTER — THERAPY VISIT (OUTPATIENT)
Dept: OCCUPATIONAL THERAPY | Facility: CLINIC | Age: 76
End: 2021-02-04
Attending: PLASTIC SURGERY
Payer: MEDICARE

## 2021-02-04 DIAGNOSIS — M79.645 BILATERAL THUMB PAIN: ICD-10-CM

## 2021-02-04 DIAGNOSIS — M19.041 ARTHRITIS OF RIGHT HAND: ICD-10-CM

## 2021-02-04 DIAGNOSIS — M79.644 BILATERAL THUMB PAIN: ICD-10-CM

## 2021-02-04 DIAGNOSIS — M79.642 PAIN OF LEFT HAND: ICD-10-CM

## 2021-02-04 DIAGNOSIS — M18.0 OSTEOARTHRITIS OF CARPOMETACARPAL (CMC) JOINTS OF BOTH THUMBS, UNSPECIFIED OSTEOARTHRITIS TYPE: ICD-10-CM

## 2021-02-04 DIAGNOSIS — R22.32 NODULE OF SKIN OF LEFT HAND: ICD-10-CM

## 2021-02-04 PROCEDURE — 97110 THERAPEUTIC EXERCISES: CPT | Mod: 95 | Performed by: OCCUPATIONAL THERAPIST

## 2021-02-16 ENCOUNTER — MYC MEDICAL ADVICE (OUTPATIENT)
Dept: FAMILY MEDICINE | Facility: CLINIC | Age: 76
End: 2021-02-16

## 2021-02-16 DIAGNOSIS — M48.062 SPINAL STENOSIS OF LUMBAR REGION WITH NEUROGENIC CLAUDICATION: Primary | ICD-10-CM

## 2021-02-16 DIAGNOSIS — M25.519 SHOULDER PAIN: ICD-10-CM

## 2021-02-20 ENCOUNTER — IMMUNIZATION (OUTPATIENT)
Dept: NURSING | Facility: CLINIC | Age: 76
End: 2021-02-20
Attending: INTERNAL MEDICINE
Payer: MEDICARE

## 2021-02-20 PROCEDURE — 0002A PR COVID VAC PFIZER DIL RECON 30 MCG/0.3 ML IM: CPT

## 2021-02-20 PROCEDURE — 91300 PR COVID VAC PFIZER DIL RECON 30 MCG/0.3 ML IM: CPT

## 2021-02-24 ENCOUNTER — THERAPY VISIT (OUTPATIENT)
Dept: OCCUPATIONAL THERAPY | Facility: CLINIC | Age: 76
End: 2021-02-24
Payer: MEDICARE

## 2021-02-24 DIAGNOSIS — M79.644 BILATERAL THUMB PAIN: ICD-10-CM

## 2021-02-24 DIAGNOSIS — M18.0 OSTEOARTHRITIS OF CARPOMETACARPAL (CMC) JOINTS OF BOTH THUMBS, UNSPECIFIED OSTEOARTHRITIS TYPE: ICD-10-CM

## 2021-02-24 DIAGNOSIS — M79.645 BILATERAL THUMB PAIN: ICD-10-CM

## 2021-02-24 DIAGNOSIS — M79.642 PAIN OF LEFT HAND: ICD-10-CM

## 2021-02-24 PROCEDURE — 97110 THERAPEUTIC EXERCISES: CPT | Mod: 95 | Performed by: OCCUPATIONAL THERAPIST

## 2021-03-10 ENCOUNTER — THERAPY VISIT (OUTPATIENT)
Dept: OCCUPATIONAL THERAPY | Facility: CLINIC | Age: 76
End: 2021-03-10
Payer: MEDICARE

## 2021-03-10 DIAGNOSIS — M79.644 BILATERAL THUMB PAIN: ICD-10-CM

## 2021-03-10 DIAGNOSIS — M79.642 PAIN OF LEFT HAND: ICD-10-CM

## 2021-03-10 DIAGNOSIS — M79.645 BILATERAL THUMB PAIN: ICD-10-CM

## 2021-03-10 DIAGNOSIS — M18.0 OSTEOARTHRITIS OF CARPOMETACARPAL (CMC) JOINTS OF BOTH THUMBS, UNSPECIFIED OSTEOARTHRITIS TYPE: ICD-10-CM

## 2021-03-10 PROCEDURE — 97110 THERAPEUTIC EXERCISES: CPT | Mod: GO | Performed by: OCCUPATIONAL THERAPIST

## 2021-03-10 PROCEDURE — 97140 MANUAL THERAPY 1/> REGIONS: CPT | Mod: GO | Performed by: OCCUPATIONAL THERAPIST

## 2021-03-10 NOTE — PROGRESS NOTES
"Hand Therapy Progress Note    Current Date:  3/10/2021    Diagnosis: B thumb OA, left hand contracture  DOI: 01/05/21(MD orders)   Procedure:  none    Reporting period is 1/5/21 to 3/10/2021    Subjective:   Subjective changes noted by patient:  \"Things have felt a little bit better!\"  Functional changes noted by patient:  Improvement in Self Care Tasks (dressing, eating, bathing, hygiene/toileting), Sleep Patterns, Recreational Activities, Household Chores and Driving   Patient has noted adverse reaction to:  None    Objective:  Pain Level (Scale 0-10)   1/5/2021 3/10/21   At Rest R: 3/10  L: 2/10 0/10  0/10   With Use R: 6/10  L: 3/10 3/10  3/10     Pain Description  Date 1/5/2021 3/10/21   Location B thumb CMC joints left palm B thumb CMC joints, L palm   Pain Quality Throbbing throbbing   Frequency intermittent   intermittent   Pain is worst  daytime daytime   Exacerbated by  use, does wake her up at night sometimes Use, L wakes her up at night   Relieved by heat heat   Progression Gradually getting worse improving     ROM  Thumb 1/5/2021 3/10/21   AROM  (PROM) R    MP +/40 +/35   IP +/82 +/76   RABD 40+ 42   PABD 48 50     ROM  Thumb 1/5/2021 3/10/21   AROM  (PROM) L    MP +/51 +/46   IP +/82 +/85   RABD 51 51   PABD 47 51       Thumb Observation/Appearance   - none  + mild    ++ moderate    +++ severe     1/5/2021   Shoulder deformity present over CMC R: +  L: +   Edema over the CMC joint R: +  L: +   Noted collapse of MP into hyperextension during pinch R: +  L: +      Provocative Tests  Pain Report:  - none    + mild    ++ moderate    +++ severe     1/5/2021   CMC Grind test R: +  L: +   Crepitus present R: +  L: +   Finkelstein's R: +  L: -       ROM  Pain Report: - none  + mild    ++ moderate    +++ severe   Wrist 1/5/2021   AROM (PROM)    Extension R: 71  L:  71   Flexion R: 72  L: 75   RD R: 7  L: 15   UD R: 26  L: 35   Supination R:80  L: 85   Pronation R: 90  L: 85       Strength   (Measured in " pounds)  Pain Report: - none  + mild    ++ moderate    +++ severe    1/5/2021 1/5/2021 3/10/21    Trials R L R L   1  2  3 27  28  30 17  27  22 30  36  38 32  33  36   Average 28 22 35 34     Lat Pinch 1/5/2021 1/5/2021 3/10/21    Trials R L R L   1  2  3 5+  5+  5+ 7  6  7 7  5  6 8  8  8   Average 5 7 6 8     3 Pt Pinch 1/5/2021 1/5/2021 3/10/21    Trials R L R L   1  2  3 8  8  8 5  6  7 10  10  10 7+  9  10   Average 8 6 10 9     Palpation   Pain Report:  - none    + mild    ++ moderate    +++ severe    1/5/2021   CMC Joint Line R: +  L: +   CMC AP Joint Laxity R: +  L: +   Thenar Eminence R: +  L:  +   Web Space R: +  L: +   1st DC R: +  L: -   Radial Styloid R: +  L: +   FCR R: +  L: +   Please refer to the daily flowsheet for treatment provided today.     Assessment:  Response to therapy has been improvement to:  Pain:  frequency is less, intensity of pain is decreased, duration of pain is decreased and less tender over affected area    Overall Assessment:  Patient's symptoms are resolving.  Patient is progressing well and is ready to decrease frequency of treatment in the clinic.  STG/LTG:  STGoals have been reviewed and progress or achievement has occurred;  see goal sheet for details and updates.    Plan:  Frequency/Duration:  Recommend continuing with the current treatment plan. 1x more, then discontinue to I HEP.  Appropriateness of Rx I have re-evaluated this patient and find that the nature, scope, duration and intensity of the therapy is appropriate for the medical condition of the patient.  Recommendations for Continued Therapy  Continue with current POC.    Home Program:  Warmth  Self Myofascial Release  Self CMC mobilization   Thumb Stabilization Program  Hand based Thumb Spica Orthosis  Incorporate joint protection into daily functional activities  Adaptive equipment as needed    Next Visit:  Orthosis modifications, open webspace on L  Thumb stability  MFR

## 2021-03-15 ENCOUNTER — VIRTUAL VISIT (OUTPATIENT)
Dept: DERMATOLOGY | Facility: CLINIC | Age: 76
End: 2021-03-15
Payer: MEDICARE

## 2021-03-15 DIAGNOSIS — L64.9 ANDROGENETIC ALOPECIA: ICD-10-CM

## 2021-03-15 DIAGNOSIS — L21.9 DERMATITIS, SEBORRHEIC: Primary | ICD-10-CM

## 2021-03-15 DIAGNOSIS — D48.5 NEOPLASM OF UNCERTAIN BEHAVIOR OF SKIN: ICD-10-CM

## 2021-03-15 PROCEDURE — 99213 OFFICE O/P EST LOW 20 MIN: CPT | Mod: 95 | Performed by: DERMATOLOGY

## 2021-03-15 RX ORDER — FLUOCINOLONE ACETONIDE 0.1 MG/ML
SOLUTION TOPICAL
Qty: 180 ML | Refills: 3 | Status: SHIPPED | OUTPATIENT
Start: 2021-03-15 | End: 2021-03-29

## 2021-03-15 NOTE — PROGRESS NOTES
Teledermatology Nurse Call Patients:     Are you  in the [facility] at the time of the encounter? yes    Today's visit will be billed to you and your insurance.    A teledermatology visit is not as thorough as an in-person visit and the quality of the photograph sent may not be of the same quality as that taken by the dermatology clinic.    Chief Complaint   Patient presents with     Derm Problem     Dry scalp     Nisha Goldsmith LPN    [facility] Dermatology Note  Encounter Date: Mar 15, 2021  MyChart Connected.    Dermatology Problem List:  1. Multifactorial non-scarring hair loss (seborrheic dermatitis and androgenetic +/- telogen effluvium)  - hair labs unremarkable: CBC, TSH, vitamin D, iron studies  - current tx: minoxidil 5% foam, fluocinolone oil alternating with solution, ketoconazole shampoo  2. AKs. Cryo.  3. Pink facial patch: DDx seborrheic dermatitis, actinic keratosis, macular seborrheic keratosis  ____________________________________________    Assessment & Plan:     1. Pink forehead lesion: difficult to definitively diagnose by submitted photos and video; DDx includes seborrheic dermatitis vs actinic keratosis vs macular seborrheic keratosis (appears more pink by video, more brown by photos). Given history of actinic damage in this patient, recommend in-person evaluation for definitive diagnosis and treatment, if clinically indicated.    2. Seborrheic dermatitis: improved with fluocinolone oil but only used BID x2 weeks. Will switch to use ~3 times per week on night before wash hair with ketoconazole. On other days, use fluocinolone solution which is less messy and easier to use.  - fluocinolone oil alternating with fluocinolone solution  - ketoconazole shampoo    3. Hair loss: androgenetic +/- telogen effluvium: with some baby hairs that are difficult to assess by video as either miniaturization from androgenetic alopecia vs regrowth - when evaluated in person for #1,  recommend assessment of these hairs as well. We spent some time discussing risks/benefits/expectations of minoxidil and spironolactone. Recommend starting minoxidil 5% foam. Will reassess in a few months and if refractory, consider addition of spironolactone. Would perform K/Cr monitoring in this patient.  - start minoxidil 5% foam     Procedures Performed:    None    Follow-up: 2-3 weeks for in-person assessment, 2-3 months for return hair loss    Staff:     Jose Alberto Isbell MD   of Dermatology  Department of Dermatology  [facility]    ____________________________________________    CC: Derm Problem (Dry scalp)    HPI:  Ms. Giana Hope is a(n) 75 year old female who presents today as a return patient for hair loss, seborrheic dermatitis    New pink spot on the forehead - using face cream with higher % AHA  - not itchy, not tender painful, no bleeding or flaking    Scalp pink and itchy - used fluocinolone oil BID x2 weeks - got better but then recurred  - hair still falling out - lots of fine hair  - ketoconazole shampoo helps the itching for about 1 day  - not using minoxidil 5%    Hair loss - 30-40% loss by estimate    Patient is otherwise feeling well, without additional skin concerns.    Labs Reviewed:  8/2020 CBC, iron studies, TSH unremarkable    Physical Exam:  Vitals: LMP  (LMP Unknown)   SKIN: Teledermatology photos were reviewed; image quality and interpretability: acceptable. Image date: 3/12/21.  - thinning on the crown with widening of the part; faint erythema noted on the scalp  - tan (pink by video) patch on the mid forehead  - normal nails/no rash noted on hands2  - No other lesions of concern on areas examined.     Medications:  Current Outpatient Medications   Medication     cyclobenzaprine (FLEXERIL) 5 MG tablet     famotidine (PEPCID) 40 MG tablet     fluocinolone acetonide (DERMA SMOOTHE/FS BODY) 0.01 % external oil     ketoconazole  (NIZORAL) 2 % external shampoo     Minoxidil 5 % FOAM     pravastatin (PRAVACHOL) 20 MG tablet     prednisoLONE acetate (PRED FORTE) 1 % ophthalmic suspension     sulindac (CLINORIL) 200 MG tablet     No current facility-administered medications for this visit.       Past Medical/Surgical History:   Patient Active Problem List   Diagnosis     Hypercholesterolemia     Arthritis of shoulder region, left     Encounter to establish care     Primary osteoarthritis involving multiple joints     Gastroesophageal reflux disease without esophagitis     Nodule of left lung     Family history of malignant neoplasm of breast     Mass of left upper extremity     Back pain of thoracolumbar region     Balance disorder     Compression fracture of thoracic vertebra, open, initial encounter (H)     DDD (degenerative disc disease), thoracolumbar     Degenerative scoliosis in adult patient     History of anesthesia problem     Kyphosis (acquired) (postural)     Nuclear sclerosis     Osteoarthrosis     Osteopenia with high risk of fracture     Pain in left shoulder     Risk for falls     Status post left knee replacement     Bilateral thumb pain     Pain of left hand     Osteoarthritis of carpometacarpal (CMC) joints of both thumbs, unspecified osteoarthritis type     Past Medical History:   Diagnosis Date     Nonsenile cataract      Osteoarthritis      Pulmonary nodule      Uveitis        CC Referred Self, MD  No address on file on close of this encounter.

## 2021-03-15 NOTE — LETTER
3/15/2021       RE: Giana Hope  [address]  [place] [address]     Dear Colleague,    Thank you for referring your patient, Giana Hope, to the [facility] [place] [facility]. Please see a copy of my visit note below.    Teledermatology Nurse Call Patients:     Are you  in the state [facility] at the time of the encounter? yes    Today's visit will be billed to you and your insurance.    A teledermatology visit is not as thorough as an in-person visit and the quality of the photograph sent may not be of the same quality as that taken by the dermatology clinic.    Chief Complaint   Patient presents with     Derm Problem     Dry scalp     Nisha Goldsmith LPN    [facility] Dermatology Note  Encounter Date: Mar 15, 2021  MyChart Connected.    Dermatology Problem List:  1. Multifactorial non-scarring hair loss (seborrheic dermatitis and androgenetic +/- telogen effluvium)  - hair labs unremarkable: CBC, TSH, vitamin D, iron studies  - current tx: minoxidil 5% foam, fluocinolone oil alternating with solution, ketoconazole shampoo  2. AKs. Cryo.  3. Pink facial patch: DDx seborrheic dermatitis, actinic keratosis, macular seborrheic keratosis  ____________________________________________    Assessment & Plan:     1. Pink forehead lesion: difficult to definitively diagnose by submitted photos and video; DDx includes seborrheic dermatitis vs actinic keratosis vs macular seborrheic keratosis (appears more pink by video, more brown by photos). Given history of actinic damage in this patient, recommend in-person evaluation for definitive diagnosis and treatment, if clinically indicated.    2. Seborrheic dermatitis: improved with fluocinolone oil but only used BID x2 weeks. Will switch to use ~3 times per week on night before wash hair with ketoconazole. On other days, use fluocinolone solution which is  less messy and easier to use.  - fluocinolone oil alternating with fluocinolone solution  - ketoconazole shampoo    3. Hair loss: androgenetic +/- telogen effluvium: with some baby hairs that are difficult to assess by video as either miniaturization from androgenetic alopecia vs regrowth - when evaluated in person for #1, recommend assessment of these hairs as well. We spent some time discussing risks/benefits/expectations of minoxidil and spironolactone. Recommend starting minoxidil 5% foam. Will reassess in a few months and if refractory, consider addition of spironolactone. Would perform K/Cr monitoring in this patient.  - start minoxidil 5% foam     Procedures Performed:    None    Follow-up: 2-3 weeks for in-person assessment, 2-3 months for return hair loss    Staff:     Jose Alberto Isbell MD   of Dermatology  Department of Dermatology  [facility]    ____________________________________________    CC: Derm Problem (Dry scalp)    HPI:  Ms. Giana Hope is a(n) 75 year old female who presents today as a return patient for hair loss, seborrheic dermatitis    New pink spot on the forehead - using face cream with higher % AHA  - not itchy, not tender painful, no bleeding or flaking    Scalp pink and itchy - used fluocinolone oil BID x2 weeks - got better but then recurred  - hair still falling out - lots of fine hair  - ketoconazole shampoo helps the itching for about 1 day  - not using minoxidil 5%    Hair loss - 30-40% loss by estimate    Patient is otherwise feeling well, without additional skin concerns.    Labs Reviewed:  8/2020 CBC, iron studies, TSH unremarkable    Physical Exam:  Vitals: LMP  (LMP Unknown)   SKIN: Teledermatology photos were reviewed; image quality and interpretability: acceptable. Image date: 3/12/21.  - thinning on the crown with widening of the part; faint erythema noted on the scalp  - tan (pink by video) patch on the mid  forehead  - normal nails/no rash noted on hands2  - No other lesions of concern on areas examined.     Medications:  Current Outpatient Medications   Medication     cyclobenzaprine (FLEXERIL) 5 MG tablet     famotidine (PEPCID) 40 MG tablet     fluocinolone acetonide (DERMA SMOOTHE/FS BODY) 0.01 % external oil     ketoconazole (NIZORAL) 2 % external shampoo     Minoxidil 5 % FOAM     pravastatin (PRAVACHOL) 20 MG tablet     prednisoLONE acetate (PRED FORTE) 1 % ophthalmic suspension     sulindac (CLINORIL) 200 MG tablet     No current facility-administered medications for this visit.       Past Medical/Surgical History:   Patient Active Problem List   Diagnosis     Hypercholesterolemia     Arthritis of shoulder region, left     Encounter to establish care     Primary osteoarthritis involving multiple joints     Gastroesophageal reflux disease without esophagitis     Nodule of left lung     Family history of malignant neoplasm of breast     Mass of left upper extremity     Back pain of thoracolumbar region     Balance disorder     Compression fracture of thoracic vertebra, open, initial encounter (H)     DDD (degenerative disc disease), thoracolumbar     Degenerative scoliosis in adult patient     History of anesthesia problem     Kyphosis (acquired) (postural)     Nuclear sclerosis     Osteoarthrosis     Osteopenia with high risk of fracture     Pain in left shoulder     Risk for falls     Status post left knee replacement     Bilateral thumb pain     Pain of left hand     Osteoarthritis of carpometacarpal (CMC) joints of both thumbs, unspecified osteoarthritis type     Past Medical History:   Diagnosis Date     Nonsenile cataract      Osteoarthritis      Pulmonary nodule      Uveitis        CC Referred Self, MD  No address on file on close of this encounter.

## 2021-03-15 NOTE — PATIENT INSTRUCTIONS
[facility] Dermatology Visit    Thank you for allowing us to participate in your care. Your findings, instructions and follow-up plan are as follows:         When should I call my doctor?    If you are worsening or not improving, please, contact us or seek urgent care as noted below.     Who should I call with questions (adults)?    [facility] (adult and pediatric): [phone]     [facility] (adult): [phone]    For urgent needs outside of business hours call the [facility] at [phone] and ask for the dermatology resident on call    If this is a medical emergency and you are unable to reach an ER, Call 911      Who should I call with questions (pediatric)?  [facility]- Pediatric Dermatology  Dr. Priyanka Ayala, Dr. Netta Hernandez, Dr. Jenny Glover, Dayan Gold, PA  Dr. Chanelle Harris, Dr. Mouna Gage & Dr. Jose Alberto Clark  Non Urgent  Nurse Triage Line; [phone]- Carli and Liberty RN Care Coordinators   [place] (/Complex ) [phone]    If you need a prescription refill, please contact your pharmacy. Refills are approved or denied by our Physicians during normal business hours, Monday through Fridays  Per office policy, refills will not be granted if you have not been seen within the past year (or sooner depending on your child's condition)    Scheduling Information:  Pediatric Appointment Scheduling and Call Center [phone]  Radiology Scheduling- [phone]  Sedation Unit Scheduling- [phone]  [place] Scheduling- General [phone]; Pediatric Dermatology [phone]  Main  Services: [phone]  Hebrew: [phone]  Moldovan: [phone]  Hmong/Portuguese/Wolof: [phone]  Preadmission Nursing Department Fax Number: [phone] (Fax all pre-operative paperwork to this number)    For urgent matters arising during evenings,  weekends, or holidays that cannot wait for normal business hours please call [phone] and ask for the Dermatology Resident On-Call to be paged.

## 2021-03-17 ENCOUNTER — THERAPY VISIT (OUTPATIENT)
Dept: OCCUPATIONAL THERAPY | Facility: CLINIC | Age: 76
End: 2021-03-17
Payer: MEDICARE

## 2021-03-17 DIAGNOSIS — M79.645 BILATERAL THUMB PAIN: ICD-10-CM

## 2021-03-17 DIAGNOSIS — M79.642 PAIN OF LEFT HAND: ICD-10-CM

## 2021-03-17 DIAGNOSIS — M18.0 OSTEOARTHRITIS OF CARPOMETACARPAL (CMC) JOINTS OF BOTH THUMBS, UNSPECIFIED OSTEOARTHRITIS TYPE: ICD-10-CM

## 2021-03-17 DIAGNOSIS — M79.644 BILATERAL THUMB PAIN: ICD-10-CM

## 2021-03-17 PROCEDURE — 99207 PR NO CHARGE LOS: CPT | Mod: GO | Performed by: OCCUPATIONAL THERAPIST

## 2021-03-22 PROBLEM — M79.642 PAIN OF LEFT HAND: Status: RESOLVED | Noted: 2021-01-05 | Resolved: 2021-03-22

## 2021-03-22 PROBLEM — M79.645 BILATERAL THUMB PAIN: Status: RESOLVED | Noted: 2021-01-05 | Resolved: 2021-03-22

## 2021-03-22 PROBLEM — M79.644 BILATERAL THUMB PAIN: Status: RESOLVED | Noted: 2021-01-05 | Resolved: 2021-03-22

## 2021-03-22 PROBLEM — M18.0 OSTEOARTHRITIS OF CARPOMETACARPAL (CMC) JOINTS OF BOTH THUMBS, UNSPECIFIED OSTEOARTHRITIS TYPE: Status: RESOLVED | Noted: 2021-01-05 | Resolved: 2021-03-22

## 2021-03-23 DIAGNOSIS — K21.9 GASTROESOPHAGEAL REFLUX DISEASE WITHOUT ESOPHAGITIS: ICD-10-CM

## 2021-03-24 DIAGNOSIS — M54.9 UPPER BACK PAIN: ICD-10-CM

## 2021-03-24 DIAGNOSIS — M54.50 LOW BACK PAIN: Primary | ICD-10-CM

## 2021-03-26 RX ORDER — FAMOTIDINE 40 MG/1
40 TABLET, FILM COATED ORAL DAILY PRN
Qty: 90 TABLET | Refills: 1 | Status: SHIPPED | OUTPATIENT
Start: 2021-03-26 | End: 2021-09-27

## 2021-03-29 ENCOUNTER — OFFICE VISIT (OUTPATIENT)
Dept: DERMATOLOGY | Facility: CLINIC | Age: 76
End: 2021-03-29
Payer: MEDICARE

## 2021-03-29 DIAGNOSIS — L81.1 MELASMA: ICD-10-CM

## 2021-03-29 DIAGNOSIS — L21.9 DERMATITIS, SEBORRHEIC: Primary | ICD-10-CM

## 2021-03-29 PROCEDURE — 99213 OFFICE O/P EST LOW 20 MIN: CPT | Mod: GC | Performed by: DERMATOLOGY

## 2021-03-29 RX ORDER — FLUOCINOLONE ACETONIDE 0.1 MG/ML
SOLUTION TOPICAL
Qty: 60 ML | Refills: 3 | Status: SHIPPED | OUTPATIENT
Start: 2021-03-29 | End: 2023-03-06

## 2021-03-29 RX ORDER — HYDROCORTISONE 2.5 %
CREAM (GRAM) TOPICAL 2 TIMES DAILY PRN
Qty: 20 G | Refills: 3 | Status: SHIPPED | OUTPATIENT
Start: 2021-03-29 | End: 2023-03-06

## 2021-03-29 ASSESSMENT — PAIN SCALES - GENERAL: PAINLEVEL: NO PAIN (0)

## 2021-03-29 NOTE — PROGRESS NOTES
[facility] Dermatology Note  Encounter Date: Mar 29, 2021  Office Visit     Dermatology Problem List:  1. Multifactorial non-scarring hair loss (seborrheic dermatitis and androgenetic +/- telogen effluvium)  - hair labs unremarkable: CBC, TSH, vitamin D, iron studies  - current tx: minoxidil 5% foam, fluocinolone oil alternating with solution, ketoconazole shampoo  2. AKs. Cryo.  3. Pink facial patch: DDx seborrheic dermatitis, actinic keratosis, macular seborrheic keratosis    ____________________________________________    Assessment & Plan:     #. Likely seborrheic dermatitis, glabella and forehead    -Discussed most likely etiology with her today and reassured overall benign-appearing nature.    -Start hydrocortisone 2.5% cream twice daily as needed for up to 2 weeks at a time.  Counseled patient to avoid the eye area with this given risk of glaucoma steroid use.  -Use fluocinolone solution 0.01% three times weekly as needed for the scalp  -Continue ketoconazole shampoo as previously prescribed    #. Melasma versus solar lentigo, L cheek   Reassured of benign etiology and recommended good sun protective practices.    #.  Multifactorial nonscarring hair loss  Patient recently seen 3/15/2021 with Dr. Isbell and started on topical medications for this.  Discussed with patient she should follow-up with Dr. Isbell as planned as it is too soon to see much of a response yet.    Procedures Performed:   None.     Follow-up: with Dr. Isbell as planned or sooner prn if her rash on the forehead does not improve.     Staff and Resident:     Patient seen and discussed with attending physician, Dr. Castelan.     Mala Knutson MD/MPH  Internal Medicine/Dermatology  PGY-4  I, Radha Castelan MD, saw this patient with the resident and agree with the resident s findings and plan of care as documented in the resident s note.    ____________________________________________    CC: Derm Problem (Hawa is  here today about spot on here forehead, hair loss and inflammation of her scalp. )    HPI:  Ms. Giana Hope is a(n) 75 year old female who presents today as a return patient for evaluation of a spot on her forehead. She reports a pink patch on her central forehead that started about 2 weeks ago. She was seen by Dr. Isbell for a virtual visit on 03/15 and is here in person for further evaluation.  The patches on the central forehead and is occasionally a little scaly and irritated.  She has not treated it yet with anything.  When she was seen with Dr. iSlver she also started treatment for hair loss.  Finally she reports a dark patch on her left cheek that is been there for a long time and is not changed.    Patient is otherwise feeling well, without additional skin concerns.    Labs Reviewed:  None.    Physical Exam:  Vitals: LMP  (LMP Unknown)   SKIN: Focused examination of face was performed.  - Somewhat ill-defined pink slightly scaly thin plaque on the glabella and lower forehead with no concerning features on dermoscopy  - Left cheek with a well-defined light tan patch  - No other lesions of concern on areas examined.     Medications:  Current Outpatient Medications   Medication     famotidine (PEPCID) 40 MG tablet     fluocinolone (SYNALAR) 0.01 % solution     fluocinolone acetonide (DERMA SMOOTHE/FS BODY) 0.01 % external oil     ketoconazole (NIZORAL) 2 % external shampoo     pravastatin (PRAVACHOL) 20 MG tablet     prednisoLONE acetate (PRED FORTE) 1 % ophthalmic suspension     sulindac (CLINORIL) 200 MG tablet     cyclobenzaprine (FLEXERIL) 5 MG tablet     Minoxidil 5 % FOAM     No current facility-administered medications for this visit.       Past Medical History:   Patient Active Problem List   Diagnosis     Hypercholesterolemia     Arthritis of shoulder region, left     Encounter to establish care     Primary osteoarthritis involving multiple joints     Gastroesophageal reflux disease without  esophagitis     Nodule of left lung     Family history of malignant neoplasm of breast     Mass of left upper extremity     Back pain of thoracolumbar region     Balance disorder     Compression fracture of thoracic vertebra, open, initial encounter (H)     DDD (degenerative disc disease), thoracolumbar     Degenerative scoliosis in adult patient     History of anesthesia problem     Kyphosis (acquired) (postural)     Nuclear sclerosis     Osteoarthrosis     Osteopenia with high risk of fracture     Pain in left shoulder     Risk for falls     Status post left knee replacement     Past Medical History:   Diagnosis Date     Nonsenile cataract      Osteoarthritis      Pulmonary nodule      Uveitis        CC No referring provider defined for this encounter. on close of this encounter.

## 2021-03-29 NOTE — NURSING NOTE
Chief Complaint   Patient presents with     Derm Problem     Hawa is here today about spot on here forehead, hair loss and inflammation of her scalp.      Beata Matta LPN

## 2021-03-29 NOTE — LETTER
3/29/2021       RE: Giana Hope  [address]  [place] [address]     Dear Colleague,    Thank you for referring your patient, Giana Hope, to the [facility] [place] [facility]. Please see a copy of my visit note below.    [facility] Dermatology Note  Encounter Date: Mar 29, 2021  Office Visit     Dermatology Problem List:  1. Multifactorial non-scarring hair loss (seborrheic dermatitis and androgenetic +/- telogen effluvium)  - hair labs unremarkable: CBC, TSH, vitamin D, iron studies  - current tx: minoxidil 5% foam, fluocinolone oil alternating with solution, ketoconazole shampoo  2. AKs. Cryo.  3. Pink facial patch: DDx seborrheic dermatitis, actinic keratosis, macular seborrheic keratosis    ____________________________________________    Assessment & Plan:     #. Likely seborrheic dermatitis, glabella and forehead    -Discussed most likely etiology with her today and reassured overall benign-appearing nature.    -Start hydrocortisone 2.5% cream twice daily as needed for up to 2 weeks at a time.  Counseled patient to avoid the eye area with this given risk of glaucoma steroid use.  -Use fluocinolone solution 0.01% three times weekly as needed for the scalp  -Continue ketoconazole shampoo as previously prescribed    #. Melasma versus solar lentigo, L cheek   Reassured of benign etiology and recommended good sun protective practices.    #.  Multifactorial nonscarring hair loss  Patient recently seen 3/15/2021 with Dr. Isbell and started on topical medications for this.  Discussed with patient she should follow-up with Dr. Isbell as planned as it is too soon to see much of a response yet.    Procedures Performed:   None.     Follow-up: with Dr. Isbell as planned or sooner prn if her rash on the forehead does not improve.     Staff and Resident:     Patient seen and discussed with attending  physician, Dr. Castelan.     Mala Knutson MD/MPH  Internal Medicine/Dermatology  PGY-4  I, Radha Castelan MD, saw this patient with the resident and agree with the resident s findings and plan of care as documented in the resident s note.    ____________________________________________    CC: Derm Problem (Hawa is here today about spot on here forehead, hair loss and inflammation of her scalp. )    HPI:  Ms. Giana Hope is a(n) 75 year old female who presents today as a return patient for evaluation of a spot on her forehead. She reports a pink patch on her central forehead that started about 2 weeks ago. She was seen by Dr. Isbell for a virtual visit on 03/15 and is here in person for further evaluation.  The patches on the central forehead and is occasionally a little scaly and irritated.  She has not treated it yet with anything.  When she was seen with Dr. Silver she also started treatment for hair loss.  Finally she reports a dark patch on her left cheek that is been there for a long time and is not changed.    Patient is otherwise feeling well, without additional skin concerns.    Labs Reviewed:  None.    Physical Exam:  Vitals: LMP  (LMP Unknown)   SKIN: Focused examination of face was performed.  - Somewhat ill-defined pink slightly scaly thin plaque on the glabella and lower forehead with no concerning features on dermoscopy  - Left cheek with a well-defined light tan patch  - No other lesions of concern on areas examined.     Medications:  Current Outpatient Medications   Medication     famotidine (PEPCID) 40 MG tablet     fluocinolone (SYNALAR) 0.01 % solution     fluocinolone acetonide (DERMA SMOOTHE/FS BODY) 0.01 % external oil     ketoconazole (NIZORAL) 2 % external shampoo     pravastatin (PRAVACHOL) 20 MG tablet     prednisoLONE acetate (PRED FORTE) 1 % ophthalmic suspension     sulindac (CLINORIL) 200 MG tablet     cyclobenzaprine (FLEXERIL) 5 MG tablet     Minoxidil 5 % FOAM      No current facility-administered medications for this visit.       Past Medical History:   Patient Active Problem List   Diagnosis     Hypercholesterolemia     Arthritis of shoulder region, left     Encounter to establish care     Primary osteoarthritis involving multiple joints     Gastroesophageal reflux disease without esophagitis     Nodule of left lung     Family history of malignant neoplasm of breast     Mass of left upper extremity     Back pain of thoracolumbar region     Balance disorder     Compression fracture of thoracic vertebra, open, initial encounter (H)     DDD (degenerative disc disease), thoracolumbar     Degenerative scoliosis in adult patient     History of anesthesia problem     Kyphosis (acquired) (postural)     Nuclear sclerosis     Osteoarthrosis     Osteopenia with high risk of fracture     Pain in left shoulder     Risk for falls     Status post left knee replacement     Past Medical History:   Diagnosis Date     Nonsenile cataract      Osteoarthritis      Pulmonary nodule      Uveitis        CC No referring provider defined for this encounter. on close of this encounter.

## 2021-04-01 ENCOUNTER — ANCILLARY PROCEDURE (OUTPATIENT)
Dept: GENERAL RADIOLOGY | Facility: CLINIC | Age: 76
End: 2021-04-01
Attending: ORTHOPAEDIC SURGERY
Payer: MEDICARE

## 2021-04-01 ENCOUNTER — OFFICE VISIT (OUTPATIENT)
Dept: ORTHOPEDICS | Facility: CLINIC | Age: 76
End: 2021-04-01
Payer: MEDICARE

## 2021-04-01 VITALS — HEIGHT: 63 IN | BODY MASS INDEX: 26.19 KG/M2 | WEIGHT: 147.8 LBS

## 2021-04-01 DIAGNOSIS — G89.29 CHRONIC SHOULDER PAIN, UNSPECIFIED LATERALITY: ICD-10-CM

## 2021-04-01 DIAGNOSIS — M54.6 PAIN IN THORACIC SPINE: Primary | ICD-10-CM

## 2021-04-01 DIAGNOSIS — M25.519 CHRONIC SHOULDER PAIN, UNSPECIFIED LATERALITY: ICD-10-CM

## 2021-04-01 PROCEDURE — 77073 BONE LENGTH STUDIES: CPT | Performed by: STUDENT IN AN ORGANIZED HEALTH CARE EDUCATION/TRAINING PROGRAM

## 2021-04-01 PROCEDURE — 72082 X-RAY EXAM ENTIRE SPI 2/3 VW: CPT | Performed by: STUDENT IN AN ORGANIZED HEALTH CARE EDUCATION/TRAINING PROGRAM

## 2021-04-01 PROCEDURE — 99214 OFFICE O/P EST MOD 30 MIN: CPT | Performed by: ORTHOPAEDIC SURGERY

## 2021-04-01 ASSESSMENT — MIFFLIN-ST. JEOR: SCORE: 1138.8

## 2021-04-01 NOTE — LETTER
4/1/2021         RE: Giana Hope  [address]  [place] [address]        Dear Colleague,    Thank you for referring your patient, Giana Hope, to the [facility] [place]. Please see a copy of my visit note below.    REASON FOR CONSULTATION: Consult (Upper/lower back pain/ Dr Vasquez)       REFERRING PHYSICIAN: Gilberto Vasquez     PRIMARY CARE PHYSICIAN: Gilberto Vasquez    HISTORY OF PRESENT ILLNESS: Giana Hope is a pleasant 75 year old female with who presents with thoracic pain with standing or walking > 30 minutes.  Pain started about 8 years but she can't recall specific date. She fell in a boat last summer and hit this area, and pain worsened after that. She has no thoracic radicular symptoms. She has had back pain in the past and sometimes feels a catch, but not a lot right now. No leg symptoms. This is disabling because she enjoys walking long distances.     Conservative measures:  Injections: none  Medications: none  Therapy: did PT over 3 years ago for back, wasn't helpful. Doing PT for shoulder now.     Oswestry Disability Score: 33  [id number]: 25  Pain Scale: 1/10 now    REVIEW OF SYSTEMS:   See HPI for pertinent positives. Otherwise complete ROS negative.     Allergies   Allergen Reactions     Dust Mites Itching     Runny nose, fever     Mold Itching     Fever, runny nose     Pollen Extract Itching     Fever, runny nose     Celecoxib      Other reaction(s): GI intolerance       Past Medical History:   Diagnosis Date     Nonsenile cataract      Osteoarthritis      Pulmonary nodule      Uveitis        Past Surgical History:   Procedure Laterality Date     CATARACT IOL, RT/LT Right 03/2019     PHACOEMULSIFICATION WITH STANDARD INTRAOCULAR LENS IMPLANT Right 3/22/2019    Procedure: Right Cataract Removal with Intraocular Lens Implant;  Surgeon: Trish Taylor MD;  Location:  OR    shoulder surgery 8/25/20     Family History   Problem  "Relation Age of Onset     Arthritis Mother      Breast Cancer Mother 78     Diabetes Type 2  Father      Heart Disease Father          age 75     Alzheimer Disease Father 60     Heart Disease Brother 68     Breast Cancer Maternal Grandmother 81     Heart Disease Maternal Grandfather 54         age 81     Other - See Comments Son         schizoaffective lives in [place]     Glaucoma No family hx of      Macular Degeneration No family hx of        Social History     Tobacco Use     Smoking status: Former Smoker     Years: 35.00     Smokeless tobacco: Never Used     Tobacco comment: Quit 24 years ago    Substance Use Topics     Alcohol use: Yes     Comment: Moderate       Current Outpatient Medications   Medication     famotidine (PEPCID) 40 MG tablet     fluocinolone (SYNALAR) 0.01 % solution     fluocinolone acetonide (DERMA SMOOTHE/FS BODY) 0.01 % external oil     hydrocortisone 2.5 % cream     ketoconazole (NIZORAL) 2 % external shampoo     pravastatin (PRAVACHOL) 20 MG tablet     prednisoLONE acetate (PRED FORTE) 1 % ophthalmic suspension     sulindac (CLINORIL) 200 MG tablet     cyclobenzaprine (FLEXERIL) 5 MG tablet     Minoxidil 5 % FOAM     No current facility-administered medications for this visit.        PHYSICAL EXAM:    Ht 1.607 m (5' 3.27\")   Wt 67 kg (147 lb 12.8 oz)   LMP  (LMP Unknown)   BMI 25.96 kg/m      Constitutional - Patient is healthy, well-nourished and appears stated age    Respiratory - Patient is breathing normally and in no respiratory distress.    Skin - No suspicious rashes or lesions.    Gait- raises from chair without assistance. Non-antalgic gait. Able to tandem gait.     Neurologic - Sensation intact to light touch bilaterally. Absent reflexes LE. No ankle clonus. Plantar reflex downgoing.     REFLEXES   Left   Right     Coleman   negative   negative     Babinski   negative   negative     Spine: 5 degrees of angle of trunk rotation of upper thoracic and lumbar spine, right " side high. There is some tenderness to palpation approx T10 level, no significant muscle spasm noted. Facet loading negative, negative rib pain. Pain worst over midline.     Musculoskeletal:  o Motor -  5/5 iliopsoas, 5/5 quadriceps, 5/5 hamstrings, 5/5 anterior tibialis, 5/5 extensor hallucis longus, 5/5 gastrocnemius.      IMAGING: all imaging is personally reviewed and interpreted during the visit.   XR Scoliosis 4/1/2021   30 deg R lumbar curve  R hemipelvis 11 mm lower than left.   Lumbar Lordosis 58 degrees  Pelvic incidence 47 degrees        CT Chest 10/12/20  Mild wedging of T4.   Vacuum phenomenon at T12-L1, T11-T12, T10-T11, and muscle at T9-T10.  Multiple areas of anterior osteophytes.  Wedging of T12 with Schmorl's nodes in the superior endplate.    MRI Lumbar Spine, dated 2/18/20  Disc degeneration at L5-S1 with bilateral foraminal stenosis.  Severe facet arthritis at the right more than left facets L3-L4, L4-L5.  Significant disc degeneration at T12-L1, L1-L2, L2-L3.  No area of significant central stenosis.    CLINICAL ASSESSMENT:   75 year old female with lower thoracic pain, potentially T10 region.     DISCUSSION/PLAN:   MRI thoracic spine wo contrast.   PT with Pierre Sorto, goal is to have a few visits and get an exercise program on her own.    Our goal would be to identify injections which would be diagnostic and therapeutic. We used her imaging and spine models to discuss the possible etiologies of pain (discs, facets).  Follow up after MRI to determine if she is a good candidate for injections.    All questions and concerns were answered to the patient's apparent satisfaction before leaving the clinic. We used the patient's imaging, diagrams, models to explain the pathophysiology of their disease as well as surgical and non-surgical treatment options.     Thank you for allowing us to be a part of this patient's care.   The above plan was formulated by Dr. Holman who also saw and examined the  patient.     Respectfully,  Aracely Pack PA-C     I saw and evaluated the patient and developed the plan.  Jose Alberto Holman MD      CC  Copy to patient     [address] [place] [address]

## 2021-04-01 NOTE — PROGRESS NOTES
REASON FOR CONSULTATION: Consult (Upper/lower back pain/ Dr Vasquez)       REFERRING PHYSICIAN: Gilberto Vasquez     PRIMARY CARE PHYSICIAN: Gilberto Vasquez    HISTORY OF PRESENT ILLNESS: Giana Hope is a pleasant 75 year old female with who presents with thoracic pain with standing or walking > 30 minutes.  Pain started about 8 years but she can't recall specific date. She fell in a boat last summer and hit this area, and pain worsened after that. She has no thoracic radicular symptoms. She has had back pain in the past and sometimes feels a catch, but not a lot right now. No leg symptoms. This is disabling because she enjoys walking long distances.     Conservative measures:  Injections: none  Medications: none  Therapy: did PT over 3 years ago for back, wasn't helpful. Doing PT for shoulder now.     Oswestry Disability Score: 33  [id number]: 25  Pain Scale: 1/10 now    REVIEW OF SYSTEMS:   See HPI for pertinent positives. Otherwise complete ROS negative.     Allergies   Allergen Reactions     Dust Mites Itching     Runny nose, fever     Mold Itching     Fever, runny nose     Pollen Extract Itching     Fever, runny nose     Celecoxib      Other reaction(s): GI intolerance       Past Medical History:   Diagnosis Date     Nonsenile cataract      Osteoarthritis      Pulmonary nodule      Uveitis        Past Surgical History:   Procedure Laterality Date     CATARACT IOL, RT/LT Right 2019     PHACOEMULSIFICATION WITH STANDARD INTRAOCULAR LENS IMPLANT Right 3/22/2019    Procedure: Right Cataract Removal with Intraocular Lens Implant;  Surgeon: Trish Taylor MD;  Location:  OR    shoulder surgery 20     Family History   Problem Relation Age of Onset     Arthritis Mother      Breast Cancer Mother 78     Diabetes Type 2  Father      Heart Disease Father          age 75     Alzheimer Disease Father 60     Heart Disease Brother 68     Breast Cancer Maternal Grandmother 81     Heart Disease  "Maternal Grandfather 54         age 81     Other - See Comments Son         schizoaffective lives in [place]     Glaucoma No family hx of      Macular Degeneration No family hx of        Social History     Tobacco Use     Smoking status: Former Smoker     Years: 35.00     Smokeless tobacco: Never Used     Tobacco comment: Quit 24 years ago    Substance Use Topics     Alcohol use: Yes     Comment: Moderate       Current Outpatient Medications   Medication     famotidine (PEPCID) 40 MG tablet     fluocinolone (SYNALAR) 0.01 % solution     fluocinolone acetonide (DERMA SMOOTHE/FS BODY) 0.01 % external oil     hydrocortisone 2.5 % cream     ketoconazole (NIZORAL) 2 % external shampoo     pravastatin (PRAVACHOL) 20 MG tablet     prednisoLONE acetate (PRED FORTE) 1 % ophthalmic suspension     sulindac (CLINORIL) 200 MG tablet     cyclobenzaprine (FLEXERIL) 5 MG tablet     Minoxidil 5 % FOAM     No current facility-administered medications for this visit.        PHYSICAL EXAM:    Ht 1.607 m (5' 3.27\")   Wt 67 kg (147 lb 12.8 oz)   LMP  (LMP Unknown)   BMI 25.96 kg/m      Constitutional - Patient is healthy, well-nourished and appears stated age    Respiratory - Patient is breathing normally and in no respiratory distress.    Skin - No suspicious rashes or lesions.    Gait- raises from chair without assistance. Non-antalgic gait. Able to tandem gait.     Neurologic - Sensation intact to light touch bilaterally. Absent reflexes LE. No ankle clonus. Plantar reflex downgoing.     REFLEXES   Left   Right     Coleman   negative   negative     Babinski   negative   negative     Spine: 5 degrees of angle of trunk rotation of upper thoracic and lumbar spine, right side high. There is some tenderness to palpation approx T10 level, no significant muscle spasm noted. Facet loading negative, negative rib pain. Pain worst over midline.     Musculoskeletal:  o Motor -  5/5 iliopsoas, 5/5 quadriceps, 5/5 hamstrings, 5/5 anterior " tibialis, 5/5 extensor hallucis longus, 5/5 gastrocnemius.      IMAGING: all imaging is personally reviewed and interpreted during the visit.   XR Scoliosis 4/1/2021   30 deg R lumbar curve  R hemipelvis 11 mm lower than left.   Lumbar Lordosis 58 degrees  Pelvic incidence 47 degrees        CT Chest 10/12/20  Mild wedging of T4.   Vacuum phenomenon at T12-L1, T11-T12, T10-T11, and muscle at T9-T10.  Multiple areas of anterior osteophytes.  Wedging of T12 with Schmorl's nodes in the superior endplate.    MRI Lumbar Spine, dated 2/18/20  Disc degeneration at L5-S1 with bilateral foraminal stenosis.  Severe facet arthritis at the right more than left facets L3-L4, L4-L5.  Significant disc degeneration at T12-L1, L1-L2, L2-L3.  No area of significant central stenosis.    CLINICAL ASSESSMENT:   75 year old female with lower thoracic pain, potentially T10 region.     DISCUSSION/PLAN:   MRI thoracic spine wo contrast.   PT with Pierre Sorto, goal is to have a few visits and get an exercise program on her own.    Our goal would be to identify injections which would be diagnostic and therapeutic. We used her imaging and spine models to discuss the possible etiologies of pain (discs, facets).  Follow up after MRI to determine if she is a good candidate for injections.    All questions and concerns were answered to the patient's apparent satisfaction before leaving the clinic. We used the patient's imaging, diagrams, models to explain the pathophysiology of their disease as well as surgical and non-surgical treatment options.     Thank you for allowing us to be a part of this patient's care.   The above plan was formulated by Dr. Holman who also saw and examined the patient.     Respectfully,  Aracely Pack PA-C     I saw and evaluated the patient and developed the plan.  Jose Alberto Holman MD      CC  Copy to patient     [address] [place] [address]

## 2021-04-01 NOTE — NURSING NOTE
"Reason For Visit:   Chief Complaint   Patient presents with     Consult     Upper/lower back pain/ Dr Vasquez       Primary MD: Gilberto Vasquez  Ref. MD: Dr. Vasquez    ?  No  Occupation Retired.    Date of injury: No  Type of injury: No.    Date of surgery: No  Type of surgery: No.    Smoker: No  Request smoking cessation information: No    Ht 1.607 m (5' 3.27\")   Wt 67 kg (147 lb 12.8 oz)   LMP  (LMP Unknown)   BMI 25.96 kg/m      Pain Assessment  Patient Currently in Pain: Yes  0-10 Pain Scale: 1  Primary Pain Location: Back    Oswestry (LAZARA) Questionnaire    OSWESTRY DISABILITY INDEX 4/1/2021   Count 9   Sum 15   Oswestry Score (%) 33.33        Visual Analog Pain Scale  Back Pain Scale 0-10: 1  Right leg pain: 0  Left leg pain: 0  Neck Pain Scale 0-10: 0  Right arm pain: 0  Left arm pain: 0    Promis 10 Assessment    PROMIS 10 4/1/2021   In general, would you say your health is: Very good   In general, would you say your quality of life is: Fair   In general, how would you rate your physical health? Good   In general, how would you rate your mental health, including your mood and your ability to think? Good   In general, how would you rate your satisfaction with your social activities and relationships? Fair   In general, please rate how well you carry out your usual social activities and roles Fair   To what extent are you able to carry out your everyday physical activities such as walking, climbing stairs, carrying groceries, or moving a chair? Mostly   How often have you been bothered by emotional problems such as feeling anxious, depressed or irritable? Sometimes   How would you rate your fatigue on average? Mild   How would you rate your pain on average?   0 = No Pain  to  10 = Worst Imaginable Pain 2   In general, would you say your health is: 4   In general, would you say your quality of life is: 2   In general, how would you rate your physical health? 3   In general, how would you " rate your mental health, including your mood and your ability to think? 3   In general, how would you rate your satisfaction with your social activities and relationships? 2   In general, please rate how well you carry out your usual social activities and roles. (This includes activities at home, at work and in your community, and responsibilities as a parent, child, spouse, employee, friend, etc.) 2   To what extent are you able to carry out your everyday physical activities such as walking, climbing stairs, carrying groceries, or moving a chair? 4   In the past 7 days, how often have you been bothered by emotional problems such as feeling anxious, depressed, or irritable? 3   In the past 7 days, how would you rate your fatigue on average? 2   In the past 7 days, how would you rate your pain on average, where 0 means no pain, and 10 means worst imaginable pain? 2   Global Mental Health Score 10   Global Physical Health Score 15   PROMIS TOTAL - SUBSCORES 25   Some recent data might be hidden                Maame Sanchez LPN

## 2021-04-16 ENCOUNTER — THERAPY VISIT (OUTPATIENT)
Dept: PHYSICAL THERAPY | Facility: CLINIC | Age: 76
End: 2021-04-16
Attending: PHYSICIAN ASSISTANT
Payer: MEDICARE

## 2021-04-16 DIAGNOSIS — M54.6 PAIN IN THORACIC SPINE: ICD-10-CM

## 2021-04-16 PROCEDURE — 97110 THERAPEUTIC EXERCISES: CPT | Mod: GP | Performed by: PHYSICAL THERAPIST

## 2021-04-16 PROCEDURE — 97161 PT EVAL LOW COMPLEX 20 MIN: CPT | Mod: GP | Performed by: PHYSICAL THERAPIST

## 2021-04-16 NOTE — PROGRESS NOTES
"Physical Therapy Initial Evaluation  April 16, 2021     MD Instructions/Precautions/Restrictions: PT eval and treat.     Therapist Impression:   Giana Hope presents with findings consistent with thoracic back pain, scoliosis, chronic compression fractures, with related impairments limiting her ability to walk for prolonged periods of time. Skilled PT services are necessary in order to reduce impairments and improve independent function.     Subjective:   HPI: 74 yo F with thoracic pain with standing or walking > 30 minutes.  Pain started about 8 years but she can't recall specific date. She fell in a boat last summer and hit this area, and pain worsened after that. She has no thoracic radicular symptoms. She has had back pain in the past and sometimes feels a catch, but not a lot right now. No leg symptoms. This is disabling because she enjoys walking long distances. Reports she used to be 5' 6.5\", now 5' 3.5\", that changed over 5-8 years.     Answers for HPI/ROS submitted by the patient on 4/14/2021   History Reported by Patient  Reason for Visit:: pain in upper to mid back  How problem occurred:: arthritis  Number scale: 1/10  General health as reported by patient: good  Please check all that apply to your current or past medical history: concussions/dizziness, depression, implanted device, osteoarthritis, smoking  Medical allergies: none  Surgeries: orthopedic surgery  Medications you are currently taking: anti-inflammatory, other  Other Meds Detail: high cholesterol, indigestion  Occupation:: retired  What are your primary job tasks: computer work, driving        Objective:  THORACIC EXAMINATION    Posture: Increased thoracic kyphosis with \"hinge-point\" near level of T4-5. With forward bending, prominent R rib hump present    Active ROM ROM   Flexion Full, no effect   Extension Mod loss, mildly increased mid back pain    L R   Rotation Mod loss, L sided mid back pain Min loss, no effect     Special " Tests:  Kincaid Test: Positive bilaterally    Lumbar AROM    Active ROM ROM   Flexion Min loss, no pain, HS stretch   Extension Mod loss, mildly increased mid back pain    L R   Rotation See above See above   Sidebend Min loss, L midback pain Full, no effect   Sideglide na na       Assessment/Plan:    The patient is a 75 year old female with chief complaint of thoracic pain.    The patient has the following significant findings with corresponding treatment plan.  Diagnosis 1:  Thoracic back pain, scoliosis, chronic compression fractures    Pain -  hot/cold therapy, electric stimulation, manual therapy, splint/taping/bracing/orthotics, self management, education, directional preference exercise and home program  Decreased ROM/flexibility - manual therapy, therapeutic exercise and home program  Decreased joint mobility - manual therapy, therapeutic exercise and home program  Decreased strength - therapeutic exercise, therapeutic activities and home program  Impaired muscle performance - neuro re-education and home program  Decreased function - therapeutic activities and home program  Impaired posture - neuro re-education, therapeutic activities and home program      Therapy Evaluation Codes:   1) History comprised of:   Personal factors that impact the plan of care:      Please refer to health history in EMR.    Comorbidity factors that impact the plan of care are:      Please refer to health history in EMR.     Medications impacting care: None.  2) Examination of Body Systems comprised of:   Body structures and functions that impact the plan of care:      Thoracic Spine.   Activity limitations that impact the plan of care are:      Standing and Walking.   Clinical presentation characteristics are:    Stable/Uncomplicated.  3) Presentation comprised of:   Presentation scored as Low complexity with uncomplicated characteristics..  4) Decision-Making    Low complexity using standardized patient assessment instrument and/or  measureable assessment of functional outcome.  Cumulative Therapy Evaluation is: Low complexity.    Previous and current functional limitations:  (See Goal Flow Sheet for this information)    Short term and Long term goals: (See Goal Flow Sheet for this information)     Communication ability:  Patient appears to be able to clearly communicate and understand verbal and written communication and follow directions correctly.  Treatment Explanation - The following has been discussed with the patient: RX ordered/plan of care, anticipated outcomes, and possible risks and side effects.  This patient would benefit from PT intervention to resume normal activities.   Rehab potential is good.    Frequency:  1 X a month, once daily  Duration:  for 3 months  Discharge Plan: Achieve all LTGs, be independent in home treatment program, and reach maximal therapeutic benefit.    Please refer to the daily flowsheet for treatment today, total treatment time and time spent performing 1:1 timed codes.

## 2021-04-16 NOTE — LETTER
"DEPARTMENT OF HEALTH AND HUMAN SERVICES  [facility] FOR [facility]ARE & MEDICAID SERVICES    PLAN/UPDATED PLAN OF PROGRESS FOR OUTPATIENT REHABILITATION    PATIENTS NAME:  Giana Hope   : 1945    PROVIDER NUMBER:    [id number]  HICN:  [id number]    PROVIDER NAME: [facility]  MEDICAL RECORD NUMBER: [id number]     START OF CARE DATE:  SOC Date: 21   TYPE:  PT  PRIMARY/TREATMENT DIAGNOSIS: (Pertinent Medical Diagnosis)  Pain in thoracic spine    VISITS FROM START OF CARE:  Rxs Used: 1   Physical Therapy Initial Evaluation  2021     MD Instructions/Precautions/Restrictions: PT eval and treat.   Therapist Impression:   Giana Hope presents with findings consistent with thoracic back pain, scoliosis, chronic compression fractures, with related impairments limiting her ability to walk for prolonged periods of time. Skilled PT services are necessary in order to reduce impairments and improve independent function.     Subjective:   HPI: 74 yo F with thoracic pain with standing or walking > 30 minutes.  Pain started about 8 years but she can't recall specific date. She fell in a boat last summer and hit this area, and pain worsened after that. She has no thoracic radicular symptoms. She has had back pain in the past and sometimes feels a catch, but not a lot right now. No leg symptoms. This is disabling because she enjoys walking long distances. Reports she used to be 5' 6.5\", now 5' 3.5\", that changed over 5-8 years. (Date of MD Order: 2021)  Answers for HPI/ROS submitted by the patient on 2021   History Reported by Patient  Reason for Visit:: pain in upper to mid back  How problem occurred:: arthritis  Number scale: 1/10  General health as reported by patient: good  Please check all that apply to your current or past medical history: concussions/dizziness, depression, implanted device, osteoarthritis, smoking  Medical allergies: " "none  Surgeries: orthopedic surgery  Medications you are currently taking: anti-inflammatory, other  Other Meds Detail: high cholesterol, indigestion  Occupation:: retired  What are your primary job tasks: computer work, driving  PATIENTS NAME:  Giana Hope   : 1945    Objective:  THORACIC EXAMINATION    Posture: Increased thoracic kyphosis with \"hinge-point\" near level of T4-5. With forward bending, prominent R rib hump present    Active ROM ROM   Flexion Full, no effect   Extension Mod loss, mildly increased mid back pain    L R   Rotation Mod loss, L sided mid back pain Min loss, no effect     Special Tests:  Kincaid Test: Positive bilaterally    Lumbar AROM    Active ROM ROM   Flexion Min loss, no pain, HS stretch   Extension Mod loss, mildly increased mid back pain    L R   Rotation See above See above   Sidebend Min loss, L midback pain Full, no effect   Sideglide na na       Assessment/Plan:    The patient is a 75 year old female with chief complaint of thoracic pain.    The patient has the following significant findings with corresponding treatment plan.  Diagnosis 1:  Thoracic back pain, scoliosis, chronic compression fractures    Pain -  hot/cold therapy, electric stimulation, manual therapy, splint/taping/bracing/orthotics, self management, education, directional preference exercise and home program  Decreased ROM/flexibility - manual therapy, therapeutic exercise and home program  Decreased joint mobility - manual therapy, therapeutic exercise and home program  Decreased strength - therapeutic exercise, therapeutic activities and home program  Impaired muscle performance - neuro re-education and home program  Decreased function - therapeutic activities and home program  Impaired posture - neuro re-education, therapeutic activities and home program    Therapy Evaluation Codes:   1) History comprised of:   Personal factors that impact the plan of care:      Please refer to health history in EMR. "    Comorbidity factors that impact the plan of care are:     Please refer to health history in EMR.     Medications impacting care: None      PATIENTS NAME:  Giana Hope   : 1945    2) Examination of Body Systems comprised of:   Body structures and functions that impact the plan of care:      Thoracic Spine.   Activity limitations that impact the plan of care are:      Standing and Walking.   Clinical presentation characteristics are:    Stable/Uncomplicated.  3) Presentation comprised of:   Presentation scored as Low complexity with uncomplicated characteristics..  4) Decision-Making    Low complexity using standardized patient assessment instrument and/or measureable assessment of functional outcome.  Cumulative Therapy Evaluation is: Low complexity.    Previous and current functional limitations:  (See Goal Flow Sheet for this information)    Short term and Long term goals: (See Goal Flow Sheet for this information)     Communication ability:  Patient appears to be able to clearly communicate and understand verbal and written communication and follow directions correctly.  Treatment Explanation - The following has been discussed with the patient: RX ordered/plan of care, anticipated outcomes, and possible risks and side effects.  This patient would benefit from PT intervention to resume normal activities.   Rehab potential is good.    Frequency:  1 X a month, once daily  Duration:  for 3 months  Discharge Plan: Achieve all LTGs, be independent in home treatment program, and reach maximal therapeutic benefit.    Please refer to the daily flowsheet for treatment today, total treatment time and time spent performing 1:1 timed codes.       Caregiver Signature/Credentials _____________________________ Date ________       Treating Provider: Pierre Maher, PT   I have reviewed and certified the need for these services and plan of treatment while under my care.    PHYSICIAN'S SIGNATURE:    "_________________________________________  Date___________   ANGEL Paz    Certification period:  Beginning of Cert date period: 04/16/21 to  End of Cert period date: 07/05/21     Functional Level Progress Report: Please see attached \"Goal Flow sheet for Functional level.\"    ____X____ Continue Services or       ________ DC Services                Service dates: From  SOC Date: 04/16/21 date to present                         "

## 2021-04-22 ENCOUNTER — ANCILLARY PROCEDURE (OUTPATIENT)
Dept: MRI IMAGING | Facility: CLINIC | Age: 76
End: 2021-04-22
Attending: PHYSICIAN ASSISTANT
Payer: MEDICARE

## 2021-04-22 DIAGNOSIS — M54.6 PAIN IN THORACIC SPINE: ICD-10-CM

## 2021-04-22 PROCEDURE — 72146 MRI CHEST SPINE W/O DYE: CPT | Mod: MG | Performed by: RADIOLOGY

## 2021-04-22 PROCEDURE — G1004 CDSM NDSC: HCPCS | Performed by: RADIOLOGY

## 2021-04-28 ENCOUNTER — VIRTUAL VISIT (OUTPATIENT)
Dept: ORTHOPEDICS | Facility: CLINIC | Age: 76
End: 2021-04-28
Payer: MEDICARE

## 2021-04-28 DIAGNOSIS — M41.9 KYPHOSCOLIOSIS: ICD-10-CM

## 2021-04-28 DIAGNOSIS — M54.6 PAIN IN THORACIC SPINE: Primary | ICD-10-CM

## 2021-04-28 DIAGNOSIS — M48.061 SPINAL STENOSIS OF LUMBAR REGION WITHOUT NEUROGENIC CLAUDICATION: ICD-10-CM

## 2021-04-28 PROCEDURE — 99212 OFFICE O/P EST SF 10 MIN: CPT | Mod: 95 | Performed by: ORTHOPAEDIC SURGERY

## 2021-04-28 NOTE — PROGRESS NOTES
Reason For Visit:   Chief Complaint   Patient presents with     RECHECK     video visit [phone], MRI follow up  Upper/lower back pain        Primary MD: Gilberto Vasquez  Ref. MD: Dorene    ?  No  Occupation Retired.     Date of injury: No  Type of injury: No.     Date of surgery: No  Type of surgery: No.     Smoker: No  Request smoking cessation information: No    LMP  (LMP Unknown)     Pain Assessment  Patient Currently in Pain: Yes  0-10 Pain Scale: 1  Primary Pain Location: Back    Oswestry (LAZARA) Questionnaire    OSWESTRY DISABILITY INDEX 4/1/2021   Count 9   Sum 15   Oswestry Score (%) 33.33          Visual Analog Pain Scale  Back Pain Scale 0-10: 1  Right leg pain: 0  Left leg pain: 0  Neck Pain Scale 0-10: 0  Right arm pain: 0  Left arm pain: 0    Promis 10 Assessment    PROMIS 10 4/28/2021   In general, would you say your health is: Very good   In general, would you say your quality of life is: Good   In general, how would you rate your physical health? Good   In general, how would you rate your mental health, including your mood and your ability to think? Good   In general, how would you rate your satisfaction with your social activities and relationships? Fair   In general, please rate how well you carry out your usual social activities and roles Fair   To what extent are you able to carry out your everyday physical activities such as walking, climbing stairs, carrying groceries, or moving a chair? Completely   How often have you been bothered by emotional problems such as feeling anxious, depressed or irritable? Rarely   How would you rate your fatigue on average? Mild   How would you rate your pain on average?   0 = No Pain  to  10 = Worst Imaginable Pain 2   In general, would you say your health is: 4   In general, would you say your quality of life is: 3   In general, how would you rate your physical health? 3   In general, how would you rate your mental health, including your mood  and your ability to think? 3   In general, how would you rate your satisfaction with your social activities and relationships? 2   In general, please rate how well you carry out your usual social activities and roles. (This includes activities at home, at work and in your community, and responsibilities as a parent, child, spouse, employee, friend, etc.) 2   To what extent are you able to carry out your everyday physical activities such as walking, climbing stairs, carrying groceries, or moving a chair? 5   In the past 7 days, how often have you been bothered by emotional problems such as feeling anxious, depressed, or irritable? 2   In the past 7 days, how would you rate your fatigue on average? 2   In the past 7 days, how would you rate your pain on average, where 0 means no pain, and 10 means worst imaginable pain? 2   Global Mental Health Score 12   Global Physical Health Score 16   PROMIS TOTAL - SUBSCORES 28   Some recent data might be hidden          ELICEO Avila is a 75 year old who is being evaluated via a billable video visit.      How would you like to obtain your AVS? MyChart  If the video visit is dropped, the invitation should be resent by: Text to cell phone: [phone]  Will anyone else be joining your video visit? No      Video Start Time: 8:23 AM  Video-Visit Details    Type of service:  Video Visit    Video End Time:8:35 AM    Originating Location (pt. Location): Home    Distant Location (provider location):  [facility]     Platform used for Video Visit: Doximity     F/U after thoracic MRI also PT with Pierre Maher      Imaging:      A: Kyphoscoliosis, no significant thoracic neural compression.    P: Referal to Dr. Elizabeth Hanson for medical management, consideration of injections, potential tailoring of PT program and evaluation of bone health. If the patient's LAZARA begins to approach 50-60 then potentially surgery might be  reasonable. However given her thoracic kyphosis and thoracolumbar scoliosis this would be an upper thoracic to pelvis fusion. This is a big deal with a high complication rate. Given that the patient can walk 40-45 minutes now, at this point I do not think that surgery would likely improve that. Happy to see her back if things change.    Jose Alberto Holman MD

## 2021-04-28 NOTE — LETTER
4/28/2021         RE: Giana Hope  [address]  [place] [address]        Dear Colleague,    Thank you for referring your patient, Giana Hope, to the [facility] [place]. Please see a copy of my visit note below.    Reason For Visit:   Chief Complaint   Patient presents with     RECHECK     video visit [phone], MRI follow up  Upper/lower back pain        Primary MD: Gilberto Vasquez  Ref. MD: Est    ?  No  Occupation Retired.     Date of injury: No  Type of injury: No.     Date of surgery: No  Type of surgery: No.     Smoker: No  Request smoking cessation information: No    LMP  (LMP Unknown)     Pain Assessment  Patient Currently in Pain: Yes  0-10 Pain Scale: 1  Primary Pain Location: Back    Oswestry (LAZARA) Questionnaire    OSWESTRY DISABILITY INDEX 4/1/2021   Count 9   Sum 15   Oswestry Score (%) 33.33          Visual Analog Pain Scale  Back Pain Scale 0-10: 1  Right leg pain: 0  Left leg pain: 0  Neck Pain Scale 0-10: 0  Right arm pain: 0  Left arm pain: 0    Promis 10 Assessment    PROMIS 10 4/28/2021   In general, would you say your health is: Very good   In general, would you say your quality of life is: Good   In general, how would you rate your physical health? Good   In general, how would you rate your mental health, including your mood and your ability to think? Good   In general, how would you rate your satisfaction with your social activities and relationships? Fair   In general, please rate how well you carry out your usual social activities and roles Fair   To what extent are you able to carry out your everyday physical activities such as walking, climbing stairs, carrying groceries, or moving a chair? Completely   How often have you been bothered by emotional problems such as feeling anxious, depressed or irritable? Rarely   How would you rate your fatigue on average? Mild   How would you rate your pain on average?   0 = No  Pain  to  10 = Worst Imaginable Pain 2   In general, would you say your health is: 4   In general, would you say your quality of life is: 3   In general, how would you rate your physical health? 3   In general, how would you rate your mental health, including your mood and your ability to think? 3   In general, how would you rate your satisfaction with your social activities and relationships? 2   In general, please rate how well you carry out your usual social activities and roles. (This includes activities at home, at work and in your community, and responsibilities as a parent, child, spouse, employee, friend, etc.) 2   To what extent are you able to carry out your everyday physical activities such as walking, climbing stairs, carrying groceries, or moving a chair? 5   In the past 7 days, how often have you been bothered by emotional problems such as feeling anxious, depressed, or irritable? 2   In the past 7 days, how would you rate your fatigue on average? 2   In the past 7 days, how would you rate your pain on average, where 0 means no pain, and 10 means worst imaginable pain? 2   Global Mental Health Score 12   Global Physical Health Score 16   PROMIS TOTAL - SUBSCORES 28   Some recent data might be hidden          ELICEO Avila is a 75 year old who is being evaluated via a billable video visit.      How would you like to obtain your AVS? MyChart  If the video visit is dropped, the invitation should be resent by: Text to cell phone: [phone]  Will anyone else be joining your video visit? No      Video Start Time: 8:23 AM  Video-Visit Details    Type of service:  Video Visit    Video End Time:8:35 AM    Originating Location (pt. Location): Home    Distant Location (provider location):  [facility]     Platform used for Video Visit: Doximity     F/U after thoracic MRI also PT with Pierre Maher      Imaging:      A: Kyphoscoliosis, no significant  thoracic neural compression.    P: Referal to Dr. Elizabeth Hanson for medical management, consideration of injections, potential tailoring of PT program and evaluation of bone health. If the patient's LAZARA begins to approach 50-60 then potentially surgery might be reasonable. However given her thoracic kyphosis and thoracolumbar scoliosis this would be an upper thoracic to pelvis fusion. This is a big deal with a high complication rate. Given that the patient can walk 40-45 minutes now, at this point I do not think that surgery would likely improve that. Happy to see her back if things change.    Jose Alberto Holman MD

## 2021-04-29 ENCOUNTER — RECORDS - HEALTHEAST (OUTPATIENT)
Dept: ADMINISTRATIVE | Facility: OTHER | Age: 76
End: 2021-04-29

## 2021-04-30 ENCOUNTER — RECORDS - HEALTHEAST (OUTPATIENT)
Dept: RADIOLOGY | Facility: CLINIC | Age: 76
End: 2021-04-30

## 2021-05-07 ENCOUNTER — RECORDS - HEALTHEAST (OUTPATIENT)
Dept: RADIOLOGY | Facility: CLINIC | Age: 76
End: 2021-05-07

## 2021-05-11 ENCOUNTER — MEDICAL CORRESPONDENCE (OUTPATIENT)
Dept: HEALTH INFORMATION MANAGEMENT | Facility: CLINIC | Age: 76
End: 2021-05-11

## 2021-05-11 ENCOUNTER — HOSPITAL ENCOUNTER (OUTPATIENT)
Dept: PHYSICAL MEDICINE AND REHAB | Facility: CLINIC | Age: 76
Discharge: HOME | End: 2021-05-11
Attending: PHYSICAL MEDICINE & REHABILITATION
Payer: MEDICARE

## 2021-05-11 ENCOUNTER — TRANSFERRED RECORDS (OUTPATIENT)
Dept: HEALTH INFORMATION MANAGEMENT | Facility: CLINIC | Age: 76
End: 2021-05-11

## 2021-05-11 DIAGNOSIS — M54.6 PAIN IN THORACIC SPINE: ICD-10-CM

## 2021-05-11 DIAGNOSIS — M85.80 OSTEOPENIA, UNSPECIFIED LOCATION: ICD-10-CM

## 2021-05-11 DIAGNOSIS — M40.04 POSTURAL KYPHOSIS OF THORACIC REGION: ICD-10-CM

## 2021-05-11 ASSESSMENT — MIFFLIN-ST. JEOR: SCORE: 1129.78

## 2021-05-25 ENCOUNTER — TELEPHONE (OUTPATIENT)
Dept: ORTHOPEDICS | Facility: CLINIC | Age: 76
End: 2021-05-25

## 2021-05-25 ENCOUNTER — OFFICE VISIT (OUTPATIENT)
Dept: ORTHOPEDICS | Facility: CLINIC | Age: 76
End: 2021-05-25
Payer: MEDICARE

## 2021-05-25 VITALS — HEIGHT: 63 IN | WEIGHT: 147 LBS | BODY MASS INDEX: 26.05 KG/M2 | RESPIRATION RATE: 16 BRPM

## 2021-05-25 DIAGNOSIS — M17.11 PRIMARY OSTEOARTHRITIS OF RIGHT KNEE: Primary | ICD-10-CM

## 2021-05-25 DIAGNOSIS — M19.011 PRIMARY OSTEOARTHRITIS OF RIGHT SHOULDER: ICD-10-CM

## 2021-05-25 PROCEDURE — 99213 OFFICE O/P EST LOW 20 MIN: CPT | Performed by: FAMILY MEDICINE

## 2021-05-25 ASSESSMENT — MIFFLIN-ST. JEOR: SCORE: 1133.92

## 2021-05-25 NOTE — PROGRESS NOTES
PMH:  Past Medical History:   Diagnosis Date     Nonsenile cataract      Osteoarthritis      Pulmonary nodule      Uveitis        Active problem list:  Patient Active Problem List   Diagnosis     Hypercholesterolemia     Arthritis of shoulder region, left     Encounter to establish care     Primary osteoarthritis involving multiple joints     Gastroesophageal reflux disease without esophagitis     Nodule of left lung     Family history of malignant neoplasm of breast     Mass of left upper extremity     Back pain of thoracolumbar region     Balance disorder     Compression fracture of thoracic vertebra, open, initial encounter (H)     DDD (degenerative disc disease), thoracolumbar     Degenerative scoliosis in adult patient     History of anesthesia problem     Kyphosis (acquired) (postural)     Nuclear sclerosis     Osteoarthrosis     Osteopenia with high risk of fracture     Pain in left shoulder     Risk for falls     Status post left knee replacement     Pain in thoracic spine       FH:  Family History   Problem Relation Age of Onset     Arthritis Mother      Breast Cancer Mother 78     Diabetes Type 2  Father      Heart Disease Father          age 75     Alzheimer Disease Father 60     Heart Disease Brother 68     Breast Cancer Maternal Grandmother 81     Heart Disease Maternal Grandfather 54         age 81     Other - See Comments Son         schizoaffective lives in [place]     Glaucoma No family hx of      Macular Degeneration No family hx of        SH:  Social History     Socioeconomic History     Marital status:      Spouse name: Not on file     Number of children: Not on file     Years of education: Not on file     Highest education level: Not on file   Occupational History     Not on file   Social Needs     Financial resource strain: Not on file     Food insecurity     Worry: Not on file     Inability: Not on file     Transportation needs     Medical: Not on file     Non-medical: Not on file    Tobacco Use     Smoking status: Former Smoker     Years: 35.00     Smokeless tobacco: Never Used     Tobacco comment: Quit 24 years ago    Substance and Sexual Activity     Alcohol use: Yes     Comment: Moderate     Drug use: No     Sexual activity: Not Currently     Partners: Male   Lifestyle     Physical activity     Days per week: Not on file     Minutes per session: Not on file     Stress: Not on file   Relationships     Social connections     Talks on phone: Not on file     Gets together: Not on file     Attends Quaker service: Not on file     Active member of club or organization: Not on file     Attends meetings of clubs or organizations: Not on file     Relationship status: Not on file     Intimate partner violence     Fear of current or ex partner: Not on file     Emotionally abused: Not on file     Physically abused: Not on file     Forced sexual activity: Not on file   Other Topics Concern     Not on file   Social History Narrative         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    Loves to travel. Moved to [place] 10/2018 to be near daughter Linda () son in law and 2 granddaughters. Her son Jeff ( ) lives in [place] Schizoaffective. She travels to visit him when able.         MEDS:  See EMR, reviewed  ALL:  See EMR, reviewed    REVIEW OF SYSTEMS:  CONSTITUTIONAL:NEGATIVE for fever, chills, change in weight  INTEGUMENTARY/SKIN: NEGATIVE for worrisome rashes, moles or lesions  EYES: NEGATIVE for vision changes or irritation  ENT/MOUTH: NEGATIVE for ear, mouth and throat problems  RESP:NEGATIVE for significant cough or SOB  BREAST: NEGATIVE for masses, tenderness or discharge  CV: NEGATIVE for chest pain, palpitations or peripheral edema  GI: NEGATIVE for nausea, abdominal pain, heartburn, or change in  bowel habits  :NEGATIVE for frequency, dysuria, or hematuria  :NEGATIVE for frequency, dysuria, or hematuria  NEURO: NEGATIVE for weakness, dizziness or paresthesias  ENDOCRINE: NEGATIVE for temperature intolerance, skin/hair changes  HEME/ALLERGY/IMMUNE: NEGATIVE for bleeding problems  PSYCHIATRIC: NEGATIVE for changes in mood or affect                          4 views bilateral shoulder radiographs 12/13/2019 11:56 AM     History: Bilateral shoulder pain, unspecified chronicity; Bilateral  shoulder pain, unspecified chronicity     Comparison: 12/11/2018, 5/20/2019     Findings:     AP, Grashey, transscapular Y, axillary  views of the bilateral  shoulder were obtained.      Left:     No acute osseous abnormality. Glenohumeral and acromioclavicular  joints are congruent.     Mild degenerative changes of the acromioclavicular joint. Severe  degenerative change of the glenohumeral joint with glenoid  retroversion.     Nonspecific multiple rounded calcification in the left arm soft tissue  laterally, previously similar finding were seen. The visualized lung  is clear.     Right:     No acute osseous abnormality. Glenohumeral and acromioclavicular  joints are congruent.     Mild degenerative changes of the acromioclavicular joint. Severe  degenerative change of the glenohumeral joint with glenoid  retroversion. Large ossicle medial to proximal humeral head, similar  to prior.     Nonspecific multiple rounded calcification in the arm soft tissue,  previously similar finding were seen. The visualized lung is clear.     Patchy lucency of the humerus, may be related to osteopenia.     Degenerative changes visualized spine.                                                                      Impression:  1. No acute osseous abnormality.  2. Severe bilateral glenohumeral joint osteoarthritis with associated  glenoid retroversion.     IRVIN MOREL        Subjective: 75-year-old female presents with 2 orthopedic  complaints #1.  She has severe glenohumeral DJD of the right shoulder.  Today we went over her previous x-rays from 2019.  She saw Dr. López about the possibility of shoulder replacement but wants to avoid it.  She had a successful left-sided ultrasound-guided glenohumeral cortisone injection for her severe left-sided DJD in 2018.  Currently the right shoulder is giving her more discomfort.  She would like to get more active with water aerobics and is hoping this shot will give her some relief of discomfort.  #2 she has right-sided knee DJD.  She recently had standing x-rays April 2020 one of the AP view of the knee that continue to show a mild amount of medial tibiofemoral DJD, the previous x-rays have showed severe right-sided patellofemoral DJD.  She had a previous left-sided knee replacement.  She is had successful Supartz injections in the past.  She would like to repeated.  They were last done in 2019.  She is had cortisone shots in the knee in the past and they have not been effective.  She is hoping for some pain relief from weightbearing right-sided knee pain.  She is not wanting to proceed with knee replacement for her patellofemoral DJD at this time.    Objective the right knee reveals no effusion.  She has full flexion and extension.  Mildly tender over the medial joint line, nontender over the lateral joint line.  Nontender over the patellar tendon or pes anserine bursa.  Anterior posterior drawer is negative.  No range of motion at the right hip.  Overlying skin is intact.    She has decreased range of motion passively internal and external rotation of the right shoulder consistent with her severe DJD.  There is crepitance noted.  No effusion at the joint.  Strength of the supraspinatus and external and internal rotation are intact against resistance with no weakness.    Appropriate in conversation and affect.    Assessment severe right-sided glenohumeral DJD.  Right-sided knee DJD.    Plan: She  would like to see the ultrasound injection clinic for an ultrasound-guided right-sided glenohumeral cortisone injection.  We will check with her insurance to see if Alicia is covered and she will follow up with me for knee injections.

## 2021-05-25 NOTE — LETTER
2021      RE: Giana Hope  [address]  [place] [address]       PMH:  Past Medical History:   Diagnosis Date     Nonsenile cataract      Osteoarthritis      Pulmonary nodule      Uveitis        Active problem list:  Patient Active Problem List   Diagnosis     Hypercholesterolemia     Arthritis of shoulder region, left     Encounter to establish care     Primary osteoarthritis involving multiple joints     Gastroesophageal reflux disease without esophagitis     Nodule of left lung     Family history of malignant neoplasm of breast     Mass of left upper extremity     Back pain of thoracolumbar region     Balance disorder     Compression fracture of thoracic vertebra, open, initial encounter (H)     DDD (degenerative disc disease), thoracolumbar     Degenerative scoliosis in adult patient     History of anesthesia problem     Kyphosis (acquired) (postural)     Nuclear sclerosis     Osteoarthrosis     Osteopenia with high risk of fracture     Pain in left shoulder     Risk for falls     Status post left knee replacement     Pain in thoracic spine       FH:  Family History   Problem Relation Age of Onset     Arthritis Mother      Breast Cancer Mother 78     Diabetes Type 2  Father      Heart Disease Father          age 75     Alzheimer Disease Father 60     Heart Disease Brother 68     Breast Cancer Maternal Grandmother 81     Heart Disease Maternal Grandfather 54         age 81     Other - See Comments Son         schizoaffective lives in [place]     Glaucoma No family hx of      Macular Degeneration No family hx of        SH:  Social History     Socioeconomic History     Marital status:      Spouse name: Not on file     Number of children: Not on file     Years of education: Not on file     Highest education level: Not on file   Occupational History     Not on file   Social Needs     Financial resource strain: Not on file     Food insecurity     Worry: Not on file     Inability: Not  on file     Transportation needs     Medical: Not on file     Non-medical: Not on file   Tobacco Use     Smoking status: Former Smoker     Years: 35.00     Smokeless tobacco: Never Used     Tobacco comment: Quit 24 years ago    Substance and Sexual Activity     Alcohol use: Yes     Comment: Moderate     Drug use: No     Sexual activity: Not Currently     Partners: Male   Lifestyle     Physical activity     Days per week: Not on file     Minutes per session: Not on file     Stress: Not on file   Relationships     Social connections     Talks on phone: Not on file     Gets together: Not on file     Attends Restorationism service: Not on file     Active member of club or organization: Not on file     Attends meetings of clubs or organizations: Not on file     Relationship status: Not on file     Intimate partner violence     Fear of current or ex partner: Not on file     Emotionally abused: Not on file     Physically abused: Not on file     Forced sexual activity: Not on file   Other Topics Concern     Not on file   Social History Narrative         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    Loves to travel. Moved to [place] 10/2018 to be near daughter Linda () son in law and 2 granddaughters. Her son Jeff ( ) lives in [place] Schizoaffective. She travels to visit him when able.         MEDS:  See EMR, reviewed  ALL:  See EMR, reviewed    REVIEW OF SYSTEMS:  CONSTITUTIONAL:NEGATIVE for fever, chills, change in weight  INTEGUMENTARY/SKIN: NEGATIVE for worrisome rashes, moles or lesions  EYES: NEGATIVE for vision changes or irritation  ENT/MOUTH: NEGATIVE for ear, mouth and throat problems  RESP:NEGATIVE for significant cough or SOB  BREAST: NEGATIVE for masses, tenderness or discharge  CV: NEGATIVE for chest pain, palpitations or  peripheral edema  GI: NEGATIVE for nausea, abdominal pain, heartburn, or change in bowel habits  :NEGATIVE for frequency, dysuria, or hematuria  :NEGATIVE for frequency, dysuria, or hematuria  NEURO: NEGATIVE for weakness, dizziness or paresthesias  ENDOCRINE: NEGATIVE for temperature intolerance, skin/hair changes  HEME/ALLERGY/IMMUNE: NEGATIVE for bleeding problems  PSYCHIATRIC: NEGATIVE for changes in mood or affect                          4 views bilateral shoulder radiographs 12/13/2019 11:56 AM     History: Bilateral shoulder pain, unspecified chronicity; Bilateral  shoulder pain, unspecified chronicity     Comparison: 12/11/2018, 5/20/2019     Findings:     AP, Grashey, transscapular Y, axillary  views of the bilateral  shoulder were obtained.      Left:     No acute osseous abnormality. Glenohumeral and acromioclavicular  joints are congruent.     Mild degenerative changes of the acromioclavicular joint. Severe  degenerative change of the glenohumeral joint with glenoid  retroversion.     Nonspecific multiple rounded calcification in the left arm soft tissue  laterally, previously similar finding were seen. The visualized lung  is clear.     Right:     No acute osseous abnormality. Glenohumeral and acromioclavicular  joints are congruent.     Mild degenerative changes of the acromioclavicular joint. Severe  degenerative change of the glenohumeral joint with glenoid  retroversion. Large ossicle medial to proximal humeral head, similar  to prior.     Nonspecific multiple rounded calcification in the arm soft tissue,  previously similar finding were seen. The visualized lung is clear.     Patchy lucency of the humerus, may be related to osteopenia.     Degenerative changes visualized spine.                                                                      Impression:  1. No acute osseous abnormality.  2. Severe bilateral glenohumeral joint osteoarthritis with associated  glenoid  retroversion.     IRVIN MOREL        Subjective: 75-year-old female presents with 2 orthopedic complaints #1.  She has severe glenohumeral DJD of the right shoulder.  Today we went over her previous x-rays from 2019.  She saw Dr. López about the possibility of shoulder replacement but wants to avoid it.  She had a successful left-sided ultrasound-guided glenohumeral cortisone injection for her severe left-sided DJD in 2018.  Currently the right shoulder is giving her more discomfort.  She would like to get more active with water aerobics and is hoping this shot will give her some relief of discomfort.  #2 she has right-sided knee DJD.  She recently had standing x-rays April 2020 one of the AP view of the knee that continue to show a mild amount of medial tibiofemoral DJD, the previous x-rays have showed severe right-sided patellofemoral DJD.  She had a previous left-sided knee replacement.  She is had successful Supartz injections in the past.  She would like to repeated.  They were last done in 2019.  She is had cortisone shots in the knee in the past and they have not been effective.  She is hoping for some pain relief from weightbearing right-sided knee pain.  She is not wanting to proceed with knee replacement for her patellofemoral DJD at this time.    Objective the right knee reveals no effusion.  She has full flexion and extension.  Mildly tender over the medial joint line, nontender over the lateral joint line.  Nontender over the patellar tendon or pes anserine bursa.  Anterior posterior drawer is negative.  No range of motion at the right hip.  Overlying skin is intact.    She has decreased range of motion passively internal and external rotation of the right shoulder consistent with her severe DJD.  There is crepitance noted.  No effusion at the joint.  Strength of the supraspinatus and external and internal rotation are intact against resistance with no weakness.    Appropriate in conversation  and affect.    Assessment severe right-sided glenohumeral DJD.  Right-sided knee DJD.    Plan: She would like to see the ultrasound injection clinic for an ultrasound-guided right-sided glenohumeral cortisone injection.  We will check with her insurance to see if Supartz is covered and she will follow up with me for knee injections.      Dayton Zepeda MD

## 2021-05-25 NOTE — NURSING NOTE
"Sports Medicine Clinic Visit    PCP: Gilberto Vasquez TOM Hope is a 75 year old female who is seen  in follow-up presenting with Right Knee. Pt stated she had 'gel injections' multiple years ago.     Injury: Chronic right knee Pain     Location of Pain: right 'under the knee cap'.   Duration of Pain: atleast 5 year(s)  Rating of Pain: 1/10, pt stated it has woken her up in the middle of the night before and was a 4/10.   Pain is better with: Heat  Pain is worse with: walking or weight bearing      Resp 16   Ht 1.605 m (5' 3.19\")   Wt 66.7 kg (147 lb)   LMP  (LMP Unknown)   BMI 25.88 kg/m        Heather Knowles ATC   "

## 2021-05-25 NOTE — TELEPHONE ENCOUNTER
----- Message from Brynn Salomon sent at 5/25/2021  2:41 PM CDT -----  Regarding: RE: current pt wants visco supplement injection  Jsohua Stevensonss,    This patient is good to go.    Thanks,    Brynn  ----- Message -----  From: Heather Knowles  Sent: 5/25/2021   2:35 PM CDT  To: Cam   Subject: current pt wants visco supplement injection      I know it's unlikely, but we have a pt who's currently here who wants a visco supplement today. Unlikely I know. But thought I'd at least try/ask...

## 2021-05-27 VITALS — BODY MASS INDEX: 24.75 KG/M2 | WEIGHT: 145 LBS | HEIGHT: 64 IN

## 2021-06-01 ENCOUNTER — OFFICE VISIT (OUTPATIENT)
Dept: ORTHOPEDICS | Facility: CLINIC | Age: 76
End: 2021-06-01
Payer: MEDICARE

## 2021-06-01 VITALS — BODY MASS INDEX: 26.05 KG/M2 | WEIGHT: 147 LBS | HEIGHT: 63 IN

## 2021-06-01 DIAGNOSIS — M19.011 OSTEOARTHRITIS OF GLENOHUMERAL JOINT, RIGHT: Primary | ICD-10-CM

## 2021-06-01 DIAGNOSIS — M17.11 PRIMARY OSTEOARTHRITIS OF RIGHT KNEE: ICD-10-CM

## 2021-06-01 PROCEDURE — 99207 PR DROP WITH A PROCEDURE: CPT | Performed by: FAMILY MEDICINE

## 2021-06-01 PROCEDURE — 20610 DRAIN/INJ JOINT/BURSA W/O US: CPT | Mod: 59 | Performed by: FAMILY MEDICINE

## 2021-06-01 PROCEDURE — 20611 DRAIN/INJ JOINT/BURSA W/US: CPT | Mod: RT | Performed by: FAMILY MEDICINE

## 2021-06-01 ASSESSMENT — MIFFLIN-ST. JEOR: SCORE: 1133.93

## 2021-06-01 NOTE — PROGRESS NOTES
PROCEDURE ENCOUNTER    [facility]  Orthopedics  AndrewSherie Tansarah, DO  2021     Name: Giana Hope  MRN: [id number]  Age: 75 year old  : 1945    Referring provider: Dr. Paris Zepeda  Diagnosis: Primary osteoarthritis of right knee, primary osteoarthritis of right shoulder.    Knee Injection with Hyaluronic Acid - Supartz injection #1    The patient was informed of the risks and the benefits of the procedure and a written consent was signed.  The patient s right knee was prepped with chlorhexidine in sterile fashion.     Supartz syringe and medication removed from packaging and examined for package consistency.    Injection was performed using sterile technique.  Needle placement using landmark guidance at anterolateral aspect of knee.  There were no complications. The patient tolerated the procedure well. There was negligible bleeding.     The patient was instructed to ice the knee upon leaving clinic and refrain from overuse over the next 3 days.     The patient was instructed to call or go to the emergency room with any unusual pain, swelling, redness, or if otherwise concerned.    A follow up appointment will be scheduled in 1 week to evaluate response to the injection, and proceed with #2    Glenohumeral Injection - Ultrasound Guided  The patient was informed of the risks and the benefits of the procedure and a written consent was signed.  The patient s right shoulder was prepped with chlorhexidine in sterile fashion.   Local anesthesia was performed using a 27-gauge 1.5-inch needle to administer 3 mL of 1% lidocaine without epi.  40 mg of kenalog suspension was drawn up into a 5 mL syringe with 3 mL of 1% lidocaine w/o Epi.  Local anesthesia was performed using a 27-gauge 1.5-inch needle to administer 3 mL of 1% lidocaine without epi.  Injection was performed using sterile technique.  Under ultrasound guidance a 3.5-inch 22-gauge needle was used to enter the right glenohumeral joint.  Posterior  approach was used with the patient in lateral recumbent position, arm in neutral position at the side.  Needle placement was visualized and documented with ultrasound.  Ultrasound visualization was necessary due to the small joint space entered.  Injection performed long axis to the probe.  Injection solution visualized within the joint space.  Images were permanently stored for the patient's record.  There were no complications. The patient tolerated the procedure well. There was negligible bleeding.   Therapy scheduled to follow for mobilization.  The patient was instructed to call or go to the emergency room with any unusual pain, swelling, redness, or if otherwise concerned.  Jose Alberto Cunningham DO [facility]  Primary Care Sports Medicine  [facility]

## 2021-06-01 NOTE — PROGRESS NOTES
Large Joint Injection: R glenohumeral joint    Date/Time: 6/1/2021 4:36 PM  Performed by: Jose Alberto Cunningham DO  Authorized by: Jose Alberto Cunningham DO     Indications:  Pain  Needle Size:  25 G  Needle Size comment:  25 G for Lido  Guidance: landmark guided    Approach:  Posterolateral  Location:  Shoulder      Site:  R glenohumeral joint  Medications:  40 mg triamcinolone 40 MG/ML; 4 mL lidocaine (PF) 1 %; 3 mL lidocaine (PF) 1 %  Outcome:  Tolerated well, no immediate complications  Procedure discussed: discussed risks, benefits, and alternatives    Consent Given by:  Patient  Timeout: timeout called immediately prior to procedure    Prep: patient was prepped and draped in usual sterile fashion

## 2021-06-01 NOTE — NURSING NOTE
[address] [address]  Dept: [phone]  ______________________________________________________________________________    Patient: Giana Hope   : 1945   MRN: [id number]   2021    INVASIVE PROCEDURE SAFETY CHECKLIST    Date: 2021   Procedure: Right glenohumeral kenalog injection with USG  Patient Name: Giana Hope  MRN: [id number]  YOB: 1945    Action: Complete sections as appropriate. Any discrepancy results in a HARD COPY until resolved.     PRE PROCEDURE:  Patient ID verified with 2 identifiers (name and  or MRN): Yes  Procedure and site verified with patient/designee (when able): Yes  Accurate consent documentation in medical record: Yes  H&P (or appropriate assessment) documented in medical record: Yes  H&P must be up to 20 days prior to procedure and updates within 24 hours of procedure as applicable: NA  Relevant diagnostic and radiology test results appropriately labeled and displayed as applicable: NA  Procedure site(s) marked with provider initials: NA    TIMEOUT:  Time-Out performed immediately prior to starting procedure, including verbal and active participation of all team members addressing the following:Yes  * Correct patient identify  * Confirmed that the correct side and site are marked  * An accurate procedure consent form  * Agreement on the procedure to be done  * Correct patient position  * Relevant images and results are properly labeled and appropriately displayed  * The need to administer antibiotics or fluids for irrigation purposes during the procedure as applicable   * Safety precautions based on patient history or medication use    DURING PROCEDURE: Verification of correct person, site, and procedures any time the responsibility for care of the patient is transferred to another member of the care team.       Prior to injection, verified patient identity using  patient's name and date of birth.  Due to injection administration, patient instructed to remain in clinic for 15 minutes  afterwards, and to report any adverse reaction to me immediately.    Joint injection was performed.      Lido  Drug Amount Wasted:  Yes: 3 mg/ml   Vial/Syringe: Single dose vial  Expiration Date:  12/01/2024    Kenalog  Drug Amount Wasted: No  Vial/Syringe: Single dose vial  Expiration Date: 11/01/2022      Angie Reese, ATC  June 1, 2021

## 2021-06-01 NOTE — NURSING NOTE
[address] [address]  Dept: [phone]  ______________________________________________________________________________    Patient: Giana Hope   : 1945   MRN: [id number]   2021    INVASIVE PROCEDURE SAFETY CHECKLIST    Date: 2021   Procedure: Right Knee Supartz injection  Patient Name: Giana Hope  MRN: [id number]  YOB: 1945    Action: Complete sections as appropriate. Any discrepancy results in a HARD COPY until resolved.     PRE PROCEDURE:  Patient ID verified with 2 identifiers (name and  or MRN): Yes  Procedure and site verified with patient/designee (when able): Yes  Accurate consent documentation in medical record: Yes  H&P (or appropriate assessment) documented in medical record: Yes  H&P must be up to 20 days prior to procedure and updates within 24 hours of procedure as applicable: NA  Relevant diagnostic and radiology test results appropriately labeled and displayed as applicable: NA  Procedure site(s) marked with provider initials: NA    TIMEOUT:  Time-Out performed immediately prior to starting procedure, including verbal and active participation of all team members addressing the following:Yes  * Correct patient identify  * Confirmed that the correct side and site are marked  * An accurate procedure consent form  * Agreement on the procedure to be done  * Correct patient position  * Relevant images and results are properly labeled and appropriately displayed  * The need to administer antibiotics or fluids for irrigation purposes during the procedure as applicable   * Safety precautions based on patient history or medication use    DURING PROCEDURE: Verification of correct person, site, and procedures any time the responsibility for care of the patient is transferred to another member of the care team.       Prior to injection, verified patient identity using patient's name and date  of birth.  Due to injection administration, patient instructed to remain in clinic for 15 minutes  afterwards, and to report any adverse reaction to me immediately.    Joint injection was performed.      Drug Amount Wasted:  None.  Vial/Syringe: Syringe  Expiration Date:  04/30/2022      Angie Reese, ATC  June 1, 2021

## 2021-06-01 NOTE — PROGRESS NOTES
PROCEDURE ENCOUNTER    [facility]  Orthopedics  AndrewSherie Tansarah, DO  2021     Name: Giana Hope  MRN: [id number]  Age: 75 year old  : 1945    Referring provider: Dr. Paris Zepeda  Diagnosis: Primary osteoarthritis of right knee, primary osteoarthritis of right shoulder.    Knee Injection with Hyaluronic Acid - Supartz injection #1    The patient was informed of the risks and the benefits of the procedure and a written consent was signed.  The patient s right knee was prepped with chlorhexidine in sterile fashion.     Supartz syringe and medication removed from packaging and examined for package consistency.    Injection was performed using sterile technique.  Needle placement using landmark guidance at anterolateral aspect of knee.  There were no complications. The patient tolerated the procedure well. There was negligible bleeding.     The patient was instructed to ice the knee upon leaving clinic and refrain from overuse over the next 3 days.     The patient was instructed to call or go to the emergency room with any unusual pain, swelling, redness, or if otherwise concerned.    A follow up appointment will be scheduled in 1 week to evaluate response to the injection, and proceed with #2    Glenohumeral Injection - Ultrasound Guided  The patient was informed of the risks and the benefits of the procedure and a written consent was signed.  The patient s right shoulder was prepped with chlorhexidine in sterile fashion.   Local anesthesia was performed using a 27-gauge 1.5-inch needle to administer 3 mL of 1% lidocaine without epi.  40 mg of kenalog suspension was drawn up into a 5 mL syringe with 3 mL of 1% lidocaine w/o Epi.  Local anesthesia was performed using a 27-gauge 1.5-inch needle to administer 3 mL of 1% lidocaine without epi.  Injection was performed using sterile technique.  Under ultrasound guidance a 3.5-inch 22-gauge needle was used to enter the right glenohumeral joint.  Posterior  approach was used with the patient in lateral recumbent position, arm in neutral position at the side.  Needle placement was visualized and documented with ultrasound.  Ultrasound visualization was necessary due to the small joint space entered.  Injection performed long axis to the probe.  Injection solution visualized within the joint space.  Images were permanently stored for the patient's record.  There were no complications. The patient tolerated the procedure well. There was negligible bleeding.   Therapy scheduled to follow for mobilization.  The patient was instructed to call or go to the emergency room with any unusual pain, swelling, redness, or if otherwise concerned.  Jose Alberto Cunningham DO [facility]  Primary Care Sports Medicine  [facility]    Large Joint Injection: R knee joint    Date/Time: 6/1/2021 4:23 PM  Performed by: Jose Alberto Cunningham DO  Authorized by: Jose Alberto Cunningham DO     Indications:  Pain and osteoarthritis  Needle Size:  22 G  Guidance: landmark guided    Approach:  Anterolateral  Location:  Knee      Medications:  25 mg sodium hyaluronate 25 MG/2.5ML  Outcome:  Tolerated well, no immediate complications  Procedure discussed: discussed risks, benefits, and alternatives    Consent Given by:  Patient  Timeout: timeout called immediately prior to procedure

## 2021-06-01 NOTE — LETTER
2021      RE: Giana Hope  [address]  [place] [address]       PROCEDURE ENCOUNTER    [facility]  Orthopedics  Jose Alberto Cunningham, DO  2021     Name: Giana Hope  MRN: [id number]  Age: 75 year old  : 1945    Referring provider: Dr. Paris Zepeda  Diagnosis: Primary osteoarthritis of right knee, primary osteoarthritis of right shoulder.    Knee Injection with Hyaluronic Acid - Supartz injection #1    The patient was informed of the risks and the benefits of the procedure and a written consent was signed.  The patient s right knee was prepped with chlorhexidine in sterile fashion.     Supartz syringe and medication removed from packaging and examined for package consistency.    Injection was performed using sterile technique.  Needle placement using landmark guidance at anterolateral aspect of knee.  There were no complications. The patient tolerated the procedure well. There was negligible bleeding.     The patient was instructed to ice the knee upon leaving clinic and refrain from overuse over the next 3 days.     The patient was instructed to call or go to the emergency room with any unusual pain, swelling, redness, or if otherwise concerned.    A follow up appointment will be scheduled in 1 week to evaluate response to the injection, and proceed with #2    Glenohumeral Injection - Ultrasound Guided  The patient was informed of the risks and the benefits of the procedure and a written consent was signed.  The patient s right shoulder was prepped with chlorhexidine in sterile fashion.   Local anesthesia was performed using a 27-gauge 1.5-inch needle to administer 3 mL of 1% lidocaine without epi.  40 mg of kenalog suspension was drawn up into a 5 mL syringe with 3 mL of 1% lidocaine w/o Epi.  Local anesthesia was performed using a 27-gauge 1.5-inch needle to administer 3 mL of 1% lidocaine without epi.  Injection was performed using sterile technique.  Under ultrasound guidance a  3.5-inch 22-gauge needle was used to enter the right glenohumeral joint.  Posterior approach was used with the patient in lateral recumbent position, arm in neutral position at the side.  Needle placement was visualized and documented with ultrasound.  Ultrasound visualization was necessary due to the small joint space entered.  Injection performed long axis to the probe.  Injection solution visualized within the joint space.  Images were permanently stored for the patient's record.  There were no complications. The patient tolerated the procedure well. There was negligible bleeding.   Therapy scheduled to follow for mobilization.  The patient was instructed to call or go to the emergency room with any unusual pain, swelling, redness, or if otherwise concerned.  Jose Alberto Cunningham DO [facility]  Primary Care Sports Medicine  [facility]    Large Joint Injection: R knee joint    Date/Time: 2021 4:23 PM  Performed by: Jose Alberto Cunningham DO  Authorized by: Jose Alberto Cunningham DO     Indications:  Pain and osteoarthritis  Needle Size:  22 G  Guidance: landmark guided    Approach:  Anterolateral  Location:  Knee      Medications:  25 mg sodium hyaluronate 25 MG/2.5ML  Outcome:  Tolerated well, no immediate complications  Procedure discussed: discussed risks, benefits, and alternatives    Consent Given by:  Patient  Timeout: timeout called immediately prior to procedure              PROCEDURE ENCOUNTER    [facility]  Orthopedics  Jose Alberto Cunningham DO  2021     Name: Giana Hope  MRN: [id number]  Age: 75 year old  : 1945    Referring provider: Dr. Paris Zepeda  Diagnosis: Primary osteoarthritis of right knee, primary osteoarthritis of right shoulder.    Knee Injection with Hyaluronic Acid - Alicia injection #1    The patient was informed of the risks and the benefits of the procedure and a written consent was signed.  The patient s right knee was prepped with chlorhexidine in sterile fashion.      Supartz syringe and medication removed from packaging and examined for package consistency.    Injection was performed using sterile technique.  Needle placement using landmark guidance at anterolateral aspect of knee.  There were no complications. The patient tolerated the procedure well. There was negligible bleeding.     The patient was instructed to ice the knee upon leaving clinic and refrain from overuse over the next 3 days.     The patient was instructed to call or go to the emergency room with any unusual pain, swelling, redness, or if otherwise concerned.    A follow up appointment will be scheduled in 1 week to evaluate response to the injection, and proceed with #2    Glenohumeral Injection - Ultrasound Guided  The patient was informed of the risks and the benefits of the procedure and a written consent was signed.  The patient s right shoulder was prepped with chlorhexidine in sterile fashion.   Local anesthesia was performed using a 27-gauge 1.5-inch needle to administer 3 mL of 1% lidocaine without epi.  40 mg of kenalog suspension was drawn up into a 5 mL syringe with 3 mL of 1% lidocaine w/o Epi.  Local anesthesia was performed using a 27-gauge 1.5-inch needle to administer 3 mL of 1% lidocaine without epi.  Injection was performed using sterile technique.  Under ultrasound guidance a 3.5-inch 22-gauge needle was used to enter the right glenohumeral joint.  Posterior approach was used with the patient in lateral recumbent position, arm in neutral position at the side.  Needle placement was visualized and documented with ultrasound.  Ultrasound visualization was necessary due to the small joint space entered.  Injection performed long axis to the probe.  Injection solution visualized within the joint space.  Images were permanently stored for the patient's record.  There were no complications. The patient tolerated the procedure well. There was negligible bleeding.   Therapy scheduled to follow  for mobilization.  The patient was instructed to call or go to the emergency room with any unusual pain, swelling, redness, or if otherwise concerned.  Jose Alberto Cunningham DO [facility]  Primary Care Sports Medicine  [facility]           Large Joint Injection: R glenohumeral joint    Date/Time: 6/1/2021 4:36 PM  Performed by: Jose Alberto Cunningham DO  Authorized by: Jose Alberto Cunningham DO     Indications:  Pain  Needle Size:  25 G  Needle Size comment:  25 G for Lido  Guidance: landmark guided    Approach:  Posterolateral  Location:  Shoulder      Site:  R glenohumeral joint  Medications:  40 mg triamcinolone 40 MG/ML; 4 mL lidocaine (PF) 1 %; 3 mL lidocaine (PF) 1 %  Outcome:  Tolerated well, no immediate complications  Procedure discussed: discussed risks, benefits, and alternatives    Consent Given by:  Patient  Timeout: timeout called immediately prior to procedure    Prep: patient was prepped and draped in usual sterile fashion              Jose Alberto Cunningham DO

## 2021-06-08 ENCOUNTER — OFFICE VISIT (OUTPATIENT)
Dept: ORTHOPEDICS | Facility: CLINIC | Age: 76
End: 2021-06-08
Payer: MEDICARE

## 2021-06-08 ENCOUNTER — COMMUNICATION - HEALTHEAST (OUTPATIENT)
Dept: PHYSICAL MEDICINE AND REHAB | Facility: CLINIC | Age: 76
End: 2021-06-08

## 2021-06-08 VITALS — HEIGHT: 63 IN | BODY MASS INDEX: 26.05 KG/M2 | WEIGHT: 147 LBS

## 2021-06-08 DIAGNOSIS — M17.11 PRIMARY OSTEOARTHRITIS OF RIGHT KNEE: Primary | ICD-10-CM

## 2021-06-08 PROCEDURE — 99207 PR DROP WITH A PROCEDURE: CPT | Performed by: FAMILY MEDICINE

## 2021-06-08 PROCEDURE — 20610 DRAIN/INJ JOINT/BURSA W/O US: CPT | Mod: RT | Performed by: FAMILY MEDICINE

## 2021-06-08 ASSESSMENT — MIFFLIN-ST. JEOR: SCORE: 1133.93

## 2021-06-08 NOTE — LETTER
2021      RE: Giana SANTOS Archstella  [address]  [place] [address]       PMH:  Past Medical History:   Diagnosis Date     Nonsenile cataract      Osteoarthritis      Pulmonary nodule      Uveitis        Active problem list:  Patient Active Problem List   Diagnosis     Hypercholesterolemia     Arthritis of shoulder region, left     Encounter to establish care     Primary osteoarthritis involving multiple joints     Gastroesophageal reflux disease without esophagitis     Nodule of left lung     Family history of malignant neoplasm of breast     Mass of left upper extremity     Back pain of thoracolumbar region     Balance disorder     Compression fracture of thoracic vertebra, open, initial encounter (H)     DDD (degenerative disc disease), thoracolumbar     Degenerative scoliosis in adult patient     History of anesthesia problem     Kyphosis (acquired) (postural)     Nuclear sclerosis     Osteoarthrosis     Osteopenia with high risk of fracture     Pain in left shoulder     Risk for falls     Status post left knee replacement     Pain in thoracic spine       FH:  Family History   Problem Relation Age of Onset     Arthritis Mother      Breast Cancer Mother 78     Diabetes Type 2  Father      Heart Disease Father          age 75     Alzheimer Disease Father 60     Heart Disease Brother 68     Breast Cancer Maternal Grandmother 81     Heart Disease Maternal Grandfather 54         age 81     Other - See Comments Son         schizoaffective lives in [place]     Glaucoma No family hx of      Macular Degeneration No family hx of        SH:  Social History     Socioeconomic History     Marital status:      Spouse name: Not on file     Number of children: Not on file     Years of education: Not on file     Highest education level: Not on file   Occupational History     Not on file   Social Needs     Financial resource strain: Not on file     Food insecurity     Worry: Not on file     Inability: Not on  file     Transportation needs     Medical: Not on file     Non-medical: Not on file   Tobacco Use     Smoking status: Former Smoker     Years: 35.00     Smokeless tobacco: Never Used     Tobacco comment: Quit 24 years ago    Substance and Sexual Activity     Alcohol use: Yes     Comment: Moderate     Drug use: No     Sexual activity: Not Currently     Partners: Male   Lifestyle     Physical activity     Days per week: Not on file     Minutes per session: Not on file     Stress: Not on file   Relationships     Social connections     Talks on phone: Not on file     Gets together: Not on file     Attends Restoration service: Not on file     Active member of club or organization: Not on file     Attends meetings of clubs or organizations: Not on file     Relationship status: Not on file     Intimate partner violence     Fear of current or ex partner: Not on file     Emotionally abused: Not on file     Physically abused: Not on file     Forced sexual activity: Not on file   Other Topics Concern     Not on file   Social History Narrative         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 Has previous experience translating [place] to English [employer] translation.    Loves to travel. Moved to [place] 10/2018 to be near daughter Linda () son in law and 2 granddaughters. Her son Jeff ( ) lives in [place] Schizoaffective. She travels to visit him when able.         MEDS:  See EMR, reviewed  ALL:  See EMR, reviewed    REVIEW OF SYSTEMS:  CONSTITUTIONAL:NEGATIVE for fever, chills, change in weight  INTEGUMENTARY/SKIN: NEGATIVE for worrisome rashes, moles or lesions  EYES: NEGATIVE for vision changes or irritation  ENT/MOUTH: NEGATIVE for ear, mouth and throat problems  RESP:NEGATIVE for significant cough or SOB  BREAST: NEGATIVE for masses, tenderness or discharge  CV: NEGATIVE for chest pain, palpitations or  peripheral edema  GI: NEGATIVE for nausea, abdominal pain, heartburn, or change in bowel habits  :NEGATIVE for frequency, dysuria, or hematuria  :NEGATIVE for frequency, dysuria, or hematuria  NEURO: NEGATIVE for weakness, dizziness or paresthesias  ENDOCRINE: NEGATIVE for temperature intolerance, skin/hair changes  HEME/ALLERGY/IMMUNE: NEGATIVE for bleeding problems  PSYCHIATRIC: NEGATIVE for changes in mood or affect        Exam: Bilateral knees, 3 views each. 4/16/2019., images reviewed by me     COMPARISON: None.     CLINICAL HISTORY: Pain.     FINDINGS:      3 views of the bilateral knees were obtained.     Postsurgical changes of a cemented left total knee arthroplasty. The  hardware appears intact.     Joint space narrowing and osteophytic spurring in all 3 joint  compartments of the right knee. No large right knee joint effusion.  Fractured osteophyte along the superior aspect of the right knee  patella, likely chronic.                                                                      IMPRESSION:  1. Postsurgical changes of a cemented left total knee arthroplasty,  without complication.  2. Degenerative osteoarthrosis in the right knee.     DARLYN SRINIVASAN MD      S: f/u for second of 3 to 5 supartz injections, right knee.  Last series she benefited after three injections.  Tolerated the previous injection without issue.    Objective the right knee reveals no effusion.  Skin is intact.  Adequate range of motion from flexion to extension.  Mildly tender over the medial joint line.  Appropriate in conversation and affect.    After informed consent about bleeding, infection, allergic reaction and after prepping with surgical scrub she was injected in the right knee from a lateral approach in the seated position with Supartz.  Then the same procedure was repeated in the opposite knee after prepping with surgical scrub.  She tolerated this without difficulty and left the clinic ambulatory she will follow  up in 1 week for an additional injection.          June 8, 2021: Giana Hope is a 75 year old female who is seen in f/u up for R knee Supartz injection.    Large Joint Injection/Arthocentesis: R knee joint    Date/Time: 6/8/2021 1:53 PM  Performed by: Dayton Zepeda MD  Authorized by: Dayton Zepeda MD     Indications:  Osteoarthritis and pain  Needle Size:  22 G  Guidance: landmark guided    Approach:  Anterolateral  Location:  Knee      Medications:  25 mg sodium hyaluronate 25 MG/2.5ML  Outcome:  Tolerated well, no immediate complications  Procedure discussed: discussed risks, benefits, and alternatives    Consent Given by:  Patient  Timeout: timeout called immediately prior to procedure    Prep: patient was prepped and draped in usual sterile fashion     Scribed by Terry Trinidad ATC, ATC for Dr. Zepeda on 6/8/21 at 2:10PM, based on the provider's statements to me.        Dayton Zepeda MD

## 2021-06-08 NOTE — NURSING NOTE
[address] [address]  Dept: [phone]  ______________________________________________________________________________    Patient: Giana Hope   : 1945   MRN: [id number]   2021    INVASIVE PROCEDURE SAFETY CHECKLIST    Date: 21   Procedure:R knee Supartz  Patient Name: Giana Hope  MRN: [id number]  YOB: 1945    Action: Complete sections as appropriate. Any discrepancy results in a HARD COPY until resolved.     PRE PROCEDURE:  Patient ID verified with 2 identifiers (name and  or MRN): Yes  Procedure and site verified with patient/designee (when able): Yes  Accurate consent documentation in medical record: Yes  H&P (or appropriate assessment) documented in medical record: Yes  H&P must be up to 20 days prior to procedure and updates within 24 hours of procedure as applicable: NA  Relevant diagnostic and radiology test results appropriately labeled and displayed as applicable: Yes  Procedure site(s) marked with provider initials: NA    TIMEOUT:  Time-Out performed immediately prior to starting procedure, including verbal and active participation of all team members addressing the following:Yes  * Correct patient identify  * Confirmed that the correct side and site are marked  * An accurate procedure consent form  * Agreement on the procedure to be done  * Correct patient position  * Relevant images and results are properly labeled and appropriately displayed  * The need to administer antibiotics or fluids for irrigation purposes during the procedure as applicable   * Safety precautions based on patient history or medication use    DURING PROCEDURE: Verification of correct person, site, and procedures any time the responsibility for care of the patient is transferred to another member of the care team.       Prior to injection, verified patient identity using patient's name and date of birth.  Due to  injection administration, patient instructed to remain in clinic for 15 minutes  afterwards, and to report any adverse reaction to me immediately.    Joint injection was performed.      Drug Amount Wasted:  None.  Vial/Syringe: Syringe  Expiration Date:  6/22      Terry Trinidad ATC  June 8, 2021

## 2021-06-08 NOTE — PROGRESS NOTES
PMH:  Past Medical History:   Diagnosis Date     Nonsenile cataract      Osteoarthritis      Pulmonary nodule      Uveitis        Active problem list:  Patient Active Problem List   Diagnosis     Hypercholesterolemia     Arthritis of shoulder region, left     Encounter to establish care     Primary osteoarthritis involving multiple joints     Gastroesophageal reflux disease without esophagitis     Nodule of left lung     Family history of malignant neoplasm of breast     Mass of left upper extremity     Back pain of thoracolumbar region     Balance disorder     Compression fracture of thoracic vertebra, open, initial encounter (H)     DDD (degenerative disc disease), thoracolumbar     Degenerative scoliosis in adult patient     History of anesthesia problem     Kyphosis (acquired) (postural)     Nuclear sclerosis     Osteoarthrosis     Osteopenia with high risk of fracture     Pain in left shoulder     Risk for falls     Status post left knee replacement     Pain in thoracic spine       FH:  Family History   Problem Relation Age of Onset     Arthritis Mother      Breast Cancer Mother 78     Diabetes Type 2  Father      Heart Disease Father          age 75     Alzheimer Disease Father 60     Heart Disease Brother 68     Breast Cancer Maternal Grandmother 81     Heart Disease Maternal Grandfather 54         age 81     Other - See Comments Son         schizoaffective lives in [place]     Glaucoma No family hx of      Macular Degeneration No family hx of        SH:  Social History     Socioeconomic History     Marital status:      Spouse name: Not on file     Number of children: Not on file     Years of education: Not on file     Highest education level: Not on file   Occupational History     Not on file   Social Needs     Financial resource strain: Not on file     Food insecurity     Worry: Not on file     Inability: Not on file     Transportation needs     Medical: Not on file     Non-medical: Not on file    Tobacco Use     Smoking status: Former Smoker     Years: 35.00     Smokeless tobacco: Never Used     Tobacco comment: Quit 24 years ago    Substance and Sexual Activity     Alcohol use: Yes     Comment: Moderate     Drug use: No     Sexual activity: Not Currently     Partners: Male   Lifestyle     Physical activity     Days per week: Not on file     Minutes per session: Not on file     Stress: Not on file   Relationships     Social connections     Talks on phone: Not on file     Gets together: Not on file     Attends Orthodox service: Not on file     Active member of club or organization: Not on file     Attends meetings of clubs or organizations: Not on file     Relationship status: Not on file     Intimate partner violence     Fear of current or ex partner: Not on file     Emotionally abused: Not on file     Physically abused: Not on file     Forced sexual activity: Not on file   Other Topics Concern     Not on file   Social History Narrative         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    Loves to travel. Moved to [place] 10/2018 to be near daughter Linda () son in law and 2 granddaughters. Her son Jeff ( ) lives in [place] Schizoaffective. She travels to visit him when able.         MEDS:  See EMR, reviewed  ALL:  See EMR, reviewed    REVIEW OF SYSTEMS:  CONSTITUTIONAL:NEGATIVE for fever, chills, change in weight  INTEGUMENTARY/SKIN: NEGATIVE for worrisome rashes, moles or lesions  EYES: NEGATIVE for vision changes or irritation  ENT/MOUTH: NEGATIVE for ear, mouth and throat problems  RESP:NEGATIVE for significant cough or SOB  BREAST: NEGATIVE for masses, tenderness or discharge  CV: NEGATIVE for chest pain, palpitations or peripheral edema  GI: NEGATIVE for nausea, abdominal pain, heartburn, or change in  bowel habits  :NEGATIVE for frequency, dysuria, or hematuria  :NEGATIVE for frequency, dysuria, or hematuria  NEURO: NEGATIVE for weakness, dizziness or paresthesias  ENDOCRINE: NEGATIVE for temperature intolerance, skin/hair changes  HEME/ALLERGY/IMMUNE: NEGATIVE for bleeding problems  PSYCHIATRIC: NEGATIVE for changes in mood or affect        Exam: Bilateral knees, 3 views each. 4/16/2019., images reviewed by me     COMPARISON: None.     CLINICAL HISTORY: Pain.     FINDINGS:      3 views of the bilateral knees were obtained.     Postsurgical changes of a cemented left total knee arthroplasty. The  hardware appears intact.     Joint space narrowing and osteophytic spurring in all 3 joint  compartments of the right knee. No large right knee joint effusion.  Fractured osteophyte along the superior aspect of the right knee  patella, likely chronic.                                                                      IMPRESSION:  1. Postsurgical changes of a cemented left total knee arthroplasty,  without complication.  2. Degenerative osteoarthrosis in the right knee.     DARLYN SRINIVASAN MD      S: f/u for second of 3 to 5 supartz injections, right knee.  Last series she benefited after three injections.  Tolerated the previous injection without issue.    Objective the right knee reveals no effusion.  Skin is intact.  Adequate range of motion from flexion to extension.  Mildly tender over the medial joint line.  Appropriate in conversation and affect.    After informed consent about bleeding, infection, allergic reaction and after prepping with surgical scrub she was injected in the right knee from a lateral approach in the seated position with Supartz.

## 2021-06-08 NOTE — PROGRESS NOTES
June 8, 2021: Giana Hope is a 75 year old female who is seen in f/u up for R knee Supartz injection.    Large Joint Injection/Arthocentesis: R knee joint    Date/Time: 6/8/2021 1:53 PM  Performed by: Dayton Zepeda MD  Authorized by: Dayton Zepeda MD     Indications:  Osteoarthritis and pain  Needle Size:  22 G  Guidance: landmark guided    Approach:  Anterolateral  Location:  Knee      Medications:  25 mg sodium hyaluronate 25 MG/2.5ML  Outcome:  Tolerated well, no immediate complications  Procedure discussed: discussed risks, benefits, and alternatives    Consent Given by:  Patient  Timeout: timeout called immediately prior to procedure    Prep: patient was prepped and draped in usual sterile fashion     Scribed by Terry Trinidad ATC, ATC for Dr. Zepeda on 6/8/21 at 2:10PM, based on the provider's statements to me.           Left message on answering machine to call back.

## 2021-06-14 ENCOUNTER — VIRTUAL VISIT (OUTPATIENT)
Dept: DERMATOLOGY | Facility: CLINIC | Age: 76
End: 2021-06-14
Payer: MEDICARE

## 2021-06-14 DIAGNOSIS — L65.9 NONSCARRING HAIR LOSS: ICD-10-CM

## 2021-06-14 DIAGNOSIS — L81.4 SOLAR LENTIGO: ICD-10-CM

## 2021-06-14 DIAGNOSIS — L21.9 DERMATITIS, SEBORRHEIC: Primary | ICD-10-CM

## 2021-06-14 PROCEDURE — 99213 OFFICE O/P EST LOW 20 MIN: CPT | Mod: 95 | Performed by: DERMATOLOGY

## 2021-06-14 RX ORDER — KETOCONAZOLE 20 MG/ML
SHAMPOO TOPICAL
Qty: 120 ML | Refills: 11 | Status: SHIPPED | OUTPATIENT
Start: 2021-06-14 | End: 2022-06-27

## 2021-06-14 RX ORDER — KETOCONAZOLE 20 MG/G
CREAM TOPICAL 2 TIMES DAILY
Qty: 60 G | Refills: 4 | Status: SHIPPED | OUTPATIENT
Start: 2021-06-14 | End: 2022-04-22

## 2021-06-14 ASSESSMENT — PAIN SCALES - GENERAL: PAINLEVEL: NO PAIN (0)

## 2021-06-14 NOTE — NURSING NOTE
Chief Complaint   Patient presents with     Hair Loss     Hawa, is here for a hair loss appointments and states things are going great but something is on her forehead that will not go away and her cream did not help.     Jay Salgado EMT

## 2021-06-14 NOTE — PATIENT INSTRUCTIONS
For forehead: start ketoconazole cream twice daily. If this does not lead to improvement after 3-4 weeks, please send me a message on MyChart and I'll send in a prescription for tacrolimus ointment. This can be expensive, so don't buy it if it is - send me a message to make sure the pharmacy does a prior authorization with insurance.

## 2021-06-14 NOTE — PROGRESS NOTES
[facility] Dermatology Note  Encounter Date: Jun 14, 2021  Store-and-Forward and Webflowt video ([phone] ). Location of teledermatologist: [facility] [place].  Start time: 9:54. End time: 10:06.    Dermatology Problem List:  1. Multifactorial non-scarring hair loss (seborrheic dermatitis and androgenetic +/- telogen effluvium)  - hair labs unremarkable: CBC, TSH, vitamin D, iron studies  - current tx: fluocinolone solution, ketoconazole shampoo  2. AKs. Cryo.  3. Pink facial patch: DDx seborrheic dermatitis, actinic keratosis, macular seborrheic keratosis  - current treatment: ketoconazole cream  - past treatment: hydrocortisone cream    ____________________________________________    Assessment & Plan:     1. Seborrheic dermatitis/nonscarring hair loss: with good regrowth and much better control of scalp symptoms including decreased (but not fully resolved) erythema and pruritus. Has not been using minoxidil or fluocinolone oil, but finding good success with fluocinolone solution and ketoconazole shampoo.  - continue ketoconazole shampoo and fluocinolone solution    2. Pink forehead patch: favor seborrheic dermatitis; start ketoconazole cream BID. If refractory, consider tacrolimus ointment.  - ketoconazole 2% cream BID    Procedures Performed:    None    Follow-up: 6 months    Staff:     Jose Alberto Isbell MD   of Dermatology  Department of Dermatology  [facility]    ____________________________________________    CC: Hair Loss (Hawa, is here for a hair loss appointments and states things are going great but something is on her forehead that will not go away and her cream did not help. )    HPI:  Ms. Giana Hope is a(n) 75 year old female who presents today as a return patient for hair loss and seborrheic dermatitis    Hair loss doing much better - hair no longer falling out and starting to regrow  -  scalp itching and redness doing much better  - ketoconazole shampoo and fluocinolone solution  - not using minoxidil    Pink patch on forehead - given hydrocortisone cream at last visit  - using consistently but not seeing much improvement    Patient is otherwise feeling well, without additional skin concerns.    Labs Reviewed:  N/A    Physical Exam:  Vitals: LMP  (LMP Unknown)   SKIN: Teledermatology photos were reviewed; image quality and interpretability: acceptable. Image date: 6/14/21.  - improved density of scalp hair  - erythema on the central forehead/glabella  - faint but improved erythema on the scalp  - No other lesions of concern on areas examined.     Medications:  Current Outpatient Medications   Medication     famotidine (PEPCID) 40 MG tablet     fluocinolone (SYNALAR) 0.01 % solution     fluocinolone acetonide (DERMA SMOOTHE/FS BODY) 0.01 % external oil     hydrocortisone 2.5 % cream     ketoconazole (NIZORAL) 2 % external shampoo     pravastatin (PRAVACHOL) 20 MG tablet     prednisoLONE acetate (PRED FORTE) 1 % ophthalmic suspension     sulindac (CLINORIL) 200 MG tablet     cyclobenzaprine (FLEXERIL) 5 MG tablet     Minoxidil 5 % FOAM     Current Facility-Administered Medications   Medication     sodium hyaluronate (SUPARTZ) injection 25 mg     sodium hyaluronate (SUPARTZ) injection 25 mg      Past Medical/Surgical History:   Patient Active Problem List   Diagnosis     Hypercholesterolemia     Arthritis of shoulder region, left     Encounter to establish care     Primary osteoarthritis involving multiple joints     Gastroesophageal reflux disease without esophagitis     Nodule of left lung     Family history of malignant neoplasm of breast     Mass of left upper extremity     Back pain of thoracolumbar region     Balance disorder     Compression fracture of thoracic vertebra, open, initial encounter (H)     DDD (degenerative disc disease), thoracolumbar     Degenerative scoliosis in adult patient      History of anesthesia problem     Kyphosis (acquired) (postural)     Nuclear sclerosis     Osteoarthrosis     Osteopenia with high risk of fracture     Pain in left shoulder     Risk for falls     Status post left knee replacement     Pain in thoracic spine     Past Medical History:   Diagnosis Date     Nonsenile cataract      Osteoarthritis      Pulmonary nodule      Uveitis        CC No referring provider defined for this encounter. on close of this encounter.

## 2021-06-14 NOTE — LETTER
6/14/2021       RE: Giana Hope  [address]  [place] [address]     Dear Colleague,    Thank you for referring your patient, Giana Hope, to the [facility]MATOLOG[facility] [place] at [facility]. Please see a copy of my visit note below.    [facility] Dermatology Note  Encounter Date: Jun 14, 2021  Store-and-Forward and MyChart video ([phone] ). Location of teledermatologist: [facility] [place].  Start time: 9:54. End time: 10:06.    Dermatology Problem List:  1. Multifactorial non-scarring hair loss (seborrheic dermatitis and androgenetic +/- telogen effluvium)  - hair labs unremarkable: CBC, TSH, vitamin D, iron studies  - current tx: fluocinolone solution, ketoconazole shampoo  2. AKs. Cryo.  3. Pink facial patch: DDx seborrheic dermatitis, actinic keratosis, macular seborrheic keratosis  - current treatment: ketoconazole cream  - past treatment: hydrocortisone cream    ____________________________________________    Assessment & Plan:     1. Seborrheic dermatitis/nonscarring hair loss: with good regrowth and much better control of scalp symptoms including decreased (but not fully resolved) erythema and pruritus. Has not been using minoxidil or fluocinolone oil, but finding good success with fluocinolone solution and ketoconazole shampoo.  - continue ketoconazole shampoo and fluocinolone solution    2. Pink forehead patch: favor seborrheic dermatitis; start ketoconazole cream BID. If refractory, consider tacrolimus ointment.  - ketoconazole 2% cream BID    Procedures Performed:    None    Follow-up: 6 months    Staff:     Jose Alberto Isbell MD   of Dermatology  Department of Dermatology  [facility]    ____________________________________________    CC: Hair Loss (Hawa, is here for a hair loss appointments and states things are  going great but something is on her forehead that will not go away and her cream did not help. )    HPI:  Ms. Giana Hope is a(n) 75 year old female who presents today as a return patient for hair loss and seborrheic dermatitis    Hair loss doing much better - hair no longer falling out and starting to regrow  - scalp itching and redness doing much better  - ketoconazole shampoo and fluocinolone solution  - not using minoxidil    Pink patch on forehead - given hydrocortisone cream at last visit  - using consistently but not seeing much improvement    Patient is otherwise feeling well, without additional skin concerns.    Labs Reviewed:  N/A    Physical Exam:  Vitals: LMP  (LMP Unknown)   SKIN: Teledermatology photos were reviewed; image quality and interpretability: acceptable. Image date: 6/14/21.  - improved density of scalp hair  - erythema on the central forehead/glabella  - faint but improved erythema on the scalp  - No other lesions of concern on areas examined.     Medications:  Current Outpatient Medications   Medication     famotidine (PEPCID) 40 MG tablet     fluocinolone (SYNALAR) 0.01 % solution     fluocinolone acetonide (DERMA SMOOTHE/FS BODY) 0.01 % external oil     hydrocortisone 2.5 % cream     ketoconazole (NIZORAL) 2 % external shampoo     pravastatin (PRAVACHOL) 20 MG tablet     prednisoLONE acetate (PRED FORTE) 1 % ophthalmic suspension     sulindac (CLINORIL) 200 MG tablet     cyclobenzaprine (FLEXERIL) 5 MG tablet     Minoxidil 5 % FOAM     Current Facility-Administered Medications   Medication     sodium hyaluronate (SUPARTZ) injection 25 mg     sodium hyaluronate (SUPARTZ) injection 25 mg      Past Medical/Surgical History:   Patient Active Problem List   Diagnosis     Hypercholesterolemia     Arthritis of shoulder region, left     Encounter to establish care     Primary osteoarthritis involving multiple joints     Gastroesophageal reflux disease without esophagitis     Nodule of left  lung     Family history of malignant neoplasm of breast     Mass of left upper extremity     Back pain of thoracolumbar region     Balance disorder     Compression fracture of thoracic vertebra, open, initial encounter (H)     DDD (degenerative disc disease), thoracolumbar     Degenerative scoliosis in adult patient     History of anesthesia problem     Kyphosis (acquired) (postural)     Nuclear sclerosis     Osteoarthrosis     Osteopenia with high risk of fracture     Pain in left shoulder     Risk for falls     Status post left knee replacement     Pain in thoracic spine     Past Medical History:   Diagnosis Date     Nonsenile cataract      Osteoarthritis      Pulmonary nodule      Uveitis        CC No referring provider defined for this encounter. on close of this encounter.

## 2021-06-15 ENCOUNTER — OFFICE VISIT (OUTPATIENT)
Dept: ORTHOPEDICS | Facility: CLINIC | Age: 76
End: 2021-06-15
Payer: MEDICARE

## 2021-06-15 VITALS — HEIGHT: 63 IN | BODY MASS INDEX: 26.05 KG/M2 | WEIGHT: 147 LBS

## 2021-06-15 DIAGNOSIS — M17.11 PRIMARY OSTEOARTHRITIS OF RIGHT KNEE: Primary | ICD-10-CM

## 2021-06-15 PROCEDURE — 99212 OFFICE O/P EST SF 10 MIN: CPT | Performed by: FAMILY MEDICINE

## 2021-06-15 ASSESSMENT — MIFFLIN-ST. JEOR: SCORE: 1133.93

## 2021-06-15 NOTE — PROGRESS NOTES
Background:     Subjective: 75-year-old female had her second Supartz injection week ago.  She had no discomfort after the injection.  Yesterday with walking she could feel sharp discomfort in the knee.  Feels slightly better this morning.  She has not noticed swelling or locking of the knee.  In 2021 she had standing x-rays of the bilateral knees that suggested moderate DJD in the tibiofemoral weightbearing space but seemed unchanged from previous x-rays 2 years ago.    PMH:  Past Medical History:   Diagnosis Date     Nonsenile cataract      Osteoarthritis      Pulmonary nodule      Uveitis        Active problem list:  Patient Active Problem List   Diagnosis     Hypercholesterolemia     Arthritis of shoulder region, left     Encounter to establish care     Primary osteoarthritis involving multiple joints     Gastroesophageal reflux disease without esophagitis     Nodule of left lung     Family history of malignant neoplasm of breast     Mass of left upper extremity     Back pain of thoracolumbar region     Balance disorder     Compression fracture of thoracic vertebra, open, initial encounter (H)     DDD (degenerative disc disease), thoracolumbar     Degenerative scoliosis in adult patient     History of anesthesia problem     Kyphosis (acquired) (postural)     Nuclear sclerosis     Osteoarthrosis     Osteopenia with high risk of fracture     Pain in left shoulder     Risk for falls     Status post left knee replacement     Pain in thoracic spine       FH:  Family History   Problem Relation Age of Onset     Arthritis Mother      Breast Cancer Mother 78     Diabetes Type 2  Father      Heart Disease Father          age 75     Alzheimer Disease Father 60     Heart Disease Brother 68     Breast Cancer Maternal Grandmother 81     Heart Disease Maternal Grandfather 54         age 81     Other - See Comments Son         schizoaffective lives in [place]     Glaucoma No family hx of      Macular Degeneration No  family hx of        SH:  Social History     Socioeconomic History     Marital status:      Spouse name: Not on file     Number of children: Not on file     Years of education: Not on file     Highest education level: Not on file   Occupational History     Not on file   Social Needs     Financial resource strain: Not on file     Food insecurity     Worry: Not on file     Inability: Not on file     Transportation needs     Medical: Not on file     Non-medical: Not on file   Tobacco Use     Smoking status: Former Smoker     Years: 35.00     Smokeless tobacco: Never Used     Tobacco comment: Quit 24 years ago    Substance and Sexual Activity     Alcohol use: Yes     Comment: Moderate     Drug use: No     Sexual activity: Not Currently     Partners: Male   Lifestyle     Physical activity     Days per week: Not on file     Minutes per session: Not on file     Stress: Not on file   Relationships     Social connections     Talks on phone: Not on file     Gets together: Not on file     Attends Yazdanism service: Not on file     Active member of club or organization: Not on file     Attends meetings of clubs or organizations: Not on file     Relationship status: Not on file     Intimate partner violence     Fear of current or ex partner: Not on file     Emotionally abused: Not on file     Physically abused: Not on file     Forced sexual activity: Not on file   Other Topics Concern     Not on file   Social History Narrative         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    Loves to travel. Moved to [place] 10/2018 to be near daughter Linda () son in law and 2 granddaughters. Her son Jeff ( ) lives in [place] Schizoaffective. She travels to visit him when able.         MEDS:  See EMR, reviewed  ALL:  See EMR,  reviewed    REVIEW OF SYSTEMS:  CONSTITUTIONAL:NEGATIVE for fever, chills, change in weight  INTEGUMENTARY/SKIN: NEGATIVE for worrisome rashes, moles or lesions  EYES: NEGATIVE for vision changes or irritation  ENT/MOUTH: NEGATIVE for ear, mouth and throat problems  RESP:NEGATIVE for significant cough or SOB  BREAST: NEGATIVE for masses, tenderness or discharge  CV: NEGATIVE for chest pain, palpitations or peripheral edema  GI: NEGATIVE for nausea, abdominal pain, heartburn, or change in bowel habits  :NEGATIVE for frequency, dysuria, or hematuria  :NEGATIVE for frequency, dysuria, or hematuria  NEURO: NEGATIVE for weakness, dizziness or paresthesias  ENDOCRINE: NEGATIVE for temperature intolerance, skin/hair changes  HEME/ALLERGY/IMMUNE: NEGATIVE for bleeding problems  PSYCHIATRIC: NEGATIVE for changes in mood or affect            Objective the right knee reveals no effusion.  There is no warmth.  I can flex and extend it fully.  She is some mild tenderness along the medial joint line but is nontender over the lateral joint line.  Nontender over the pes anserine bursa or patellar tendon.  Adequate range of motion to the hip.  Overlying skin is normal.  Appropriate in conversation and affect.    Assessment right-sided knee DJD, suspect degenerative meniscal tear    Plan: We decided to wait 1 week before doing her third Supartz injection.  She said normal kidney function tests within the last year.  She has sulindac at home and and plans on using it for her knee discomfort over the next week.  She will follow-up a week from now and if her weightbearing pain is improved we will try the third Supartz injection.

## 2021-06-15 NOTE — LETTER
6/15/2021      RE: Giana Hope  [address]  [place] [address]       Background:     Subjective: 75-year-old female had her second Supartz injection week ago.  She had no discomfort after the injection.  Yesterday with walking she could feel sharp discomfort in the knee.  Feels slightly better this morning.  She has not noticed swelling or locking of the knee.  In 2021 she had standing x-rays of the bilateral knees that suggested moderate DJD in the tibiofemoral weightbearing space but seemed unchanged from previous x-rays 2 years ago.    PMH:  Past Medical History:   Diagnosis Date     Nonsenile cataract      Osteoarthritis      Pulmonary nodule      Uveitis        Active problem list:  Patient Active Problem List   Diagnosis     Hypercholesterolemia     Arthritis of shoulder region, left     Encounter to establish care     Primary osteoarthritis involving multiple joints     Gastroesophageal reflux disease without esophagitis     Nodule of left lung     Family history of malignant neoplasm of breast     Mass of left upper extremity     Back pain of thoracolumbar region     Balance disorder     Compression fracture of thoracic vertebra, open, initial encounter (H)     DDD (degenerative disc disease), thoracolumbar     Degenerative scoliosis in adult patient     History of anesthesia problem     Kyphosis (acquired) (postural)     Nuclear sclerosis     Osteoarthrosis     Osteopenia with high risk of fracture     Pain in left shoulder     Risk for falls     Status post left knee replacement     Pain in thoracic spine       FH:  Family History   Problem Relation Age of Onset     Arthritis Mother      Breast Cancer Mother 78     Diabetes Type 2  Father      Heart Disease Father          age 75     Alzheimer Disease Father 60     Heart Disease Brother 68     Breast Cancer Maternal Grandmother 81     Heart Disease Maternal Grandfather 54         age 81     Other - See Comments Son          schizoaffective lives in [place]     Glaucoma No family hx of      Macular Degeneration No family hx of        SH:  Social History     Socioeconomic History     Marital status:      Spouse name: Not on file     Number of children: Not on file     Years of education: Not on file     Highest education level: Not on file   Occupational History     Not on file   Social Needs     Financial resource strain: Not on file     Food insecurity     Worry: Not on file     Inability: Not on file     Transportation needs     Medical: Not on file     Non-medical: Not on file   Tobacco Use     Smoking status: Former Smoker     Years: 35.00     Smokeless tobacco: Never Used     Tobacco comment: Quit 24 years ago    Substance and Sexual Activity     Alcohol use: Yes     Comment: Moderate     Drug use: No     Sexual activity: Not Currently     Partners: Male   Lifestyle     Physical activity     Days per week: Not on file     Minutes per session: Not on file     Stress: Not on file   Relationships     Social connections     Talks on phone: Not on file     Gets together: Not on file     Attends [employer] service: Not on file     Active member of club or organization: Not on file     Attends meetings of clubs or organizations: Not on file     Relationship status: Not on file     Intimate partner violence     Fear of current or ex partner: Not on file     Emotionally abused: Not on file     Physically abused: Not on file     Forced sexual activity: Not on file   Other Topics Concern     Not on file   Social History Narrative         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    Loves to travel. Moved to [place] 10/2018 to be near daughter Linda () son in law and 2 granddaughters. Her son Jeff ( ) lives in [place] Schizoaffective. She travels  to visit him when able.         MEDS:  See EMR, reviewed  ALL:  See EMR, reviewed    REVIEW OF SYSTEMS:  CONSTITUTIONAL:NEGATIVE for fever, chills, change in weight  INTEGUMENTARY/SKIN: NEGATIVE for worrisome rashes, moles or lesions  EYES: NEGATIVE for vision changes or irritation  ENT/MOUTH: NEGATIVE for ear, mouth and throat problems  RESP:NEGATIVE for significant cough or SOB  BREAST: NEGATIVE for masses, tenderness or discharge  CV: NEGATIVE for chest pain, palpitations or peripheral edema  GI: NEGATIVE for nausea, abdominal pain, heartburn, or change in bowel habits  :NEGATIVE for frequency, dysuria, or hematuria  :NEGATIVE for frequency, dysuria, or hematuria  NEURO: NEGATIVE for weakness, dizziness or paresthesias  ENDOCRINE: NEGATIVE for temperature intolerance, skin/hair changes  HEME/ALLERGY/IMMUNE: NEGATIVE for bleeding problems  PSYCHIATRIC: NEGATIVE for changes in mood or affect            Objective the right knee reveals no effusion.  There is no warmth.  I can flex and extend it fully.  She is some mild tenderness along the medial joint line but is nontender over the lateral joint line.  Nontender over the pes anserine bursa or patellar tendon.  Adequate range of motion to the hip.  Overlying skin is normal.  Appropriate in conversation and affect.    Assessment right-sided knee DJD, suspect degenerative meniscal tear    Plan: We decided to wait 1 week before doing her third Supartz injection.  She said normal kidney function tests within the last year.  She has sulindac at home and and plans on using it for her knee discomfort over the next week.  She will follow-up a week from now and if her weightbearing pain is improved we will try the third Supartz injection.        Dayton Zepeda MD
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
## 2021-06-17 NOTE — PATIENT INSTRUCTIONS - HE
"Hawa,    It was very nice to meet you.    We will contact one of the osteoporosis specialist to see about ordering a DEXA scan.  I have never heard about osteoarthritis interfering with the DEXA scan's ability to detect osteopenia/osteoporosis.  I will contact you to schedule this if they feel that it would still be beneficial.    Please do the exercises I showed you.  Please make sure to keep your shoulders down to make sure that they do not come up towards your ears while you are doing the exercises.  You need to feel your mid back muscles (the muscles in between her shoulder blades) working when you do these exercises.    I have placed an order for physical therapy.  They should call you to schedule the physical therapy.  If no one calls you to schedule, please call my nurse line at [phone] and someone will help you.     You can search for \"thoracic extension device\" on [employer].  The device we looked at on [employer] was called \"Back stretching, Back massager for bed and chair and car.\"      A nurse will call you in 4 weeks to see how you are doing. Please do not hesitate to contact the clinic at [phone] if you have any questions/concerns or any worsening of your pain prior to that time. You are also welcome to contact  Me via [employer] (you need to sign up on the [employer] side).        "

## 2021-06-22 ENCOUNTER — OFFICE VISIT (OUTPATIENT)
Dept: ORTHOPEDICS | Facility: CLINIC | Age: 76
End: 2021-06-22
Payer: MEDICARE

## 2021-06-22 VITALS — WEIGHT: 147 LBS | BODY MASS INDEX: 26.05 KG/M2 | HEIGHT: 63 IN

## 2021-06-22 DIAGNOSIS — M17.11 PRIMARY OSTEOARTHRITIS OF RIGHT KNEE: Primary | ICD-10-CM

## 2021-06-22 PROCEDURE — 20610 DRAIN/INJ JOINT/BURSA W/O US: CPT | Mod: RT | Performed by: FAMILY MEDICINE

## 2021-06-22 PROCEDURE — 99207 PR DROP WITH A PROCEDURE: CPT | Performed by: FAMILY MEDICINE

## 2021-06-22 ASSESSMENT — MIFFLIN-ST. JEOR: SCORE: 1133.93

## 2021-06-22 NOTE — NURSING NOTE
[address] [address]  Dept: [phone]  ______________________________________________________________________________    Patient: Giana Hope   : 1945   MRN: [id number]   2021    INVASIVE PROCEDURE SAFETY CHECKLIST    Date: 2021  Procedure: Right knee Supartz Injection (3/5)  Patient Name: Giana Hope  MRN: [id number]  YOB: 1945    Action: Complete sections as appropriate. Any discrepancy results in a HARD COPY until resolved.     PRE PROCEDURE:  Patient ID verified with 2 identifiers (name and  or MRN): Yes  Procedure and site verified with patient/designee (when able): Yes  Accurate consent documentation in medical record: Yes  H&P (or appropriate assessment) documented in medical record: Yes  H&P must be up to 20 days prior to procedure and updates within 24 hours of procedure as applicable: NA  Relevant diagnostic and radiology test results appropriately labeled and displayed as applicable: NA  Procedure site(s) marked with provider initials: NA    TIMEOUT:  Time-Out performed immediately prior to starting procedure, including verbal and active participation of all team members addressing the following:Yes  * Correct patient identify  * Confirmed that the correct side and site are marked  * An accurate procedure consent form  * Agreement on the procedure to be done  * Correct patient position  * Relevant images and results are properly labeled and appropriately displayed  * The need to administer antibiotics or fluids for irrigation purposes during the procedure as applicable   * Safety precautions based on patient history or medication use    DURING PROCEDURE: Verification of correct person, site, and procedures any time the responsibility for care of the patient is transferred to another member of the care team.       Prior to injection, verified patient identity using patient's name and  date of birth.  Due to injection administration, patient instructed to remain in clinic for 15 minutes  afterwards, and to report any adverse reaction to me immediately.    Joint injection was performed.      Drug Amount Wasted:  None.  Vial/Syringe: Syringe  Expiration Date:  06/30/2022    Angie Reese, ATC  June 22, 2021

## 2021-06-22 NOTE — LETTER
2021      RE: Giana Hope  [address]  [place] [address]       Follow Up:   Last Visit: 6/15/21, held off on Supartz injection  Function: no change  Pain: no change  Aggravating Factors: pain with walking and descending stairs.  Interval History: has tried to weed her garden and had pain. She does have a few morning where pain is improved but worsens the more active she is.  Last time she indicated that it took 3 Supartz shots to make an improvement.  Relieving Factors: rest and heat.     Leaving in 2 weeks to visit her son in [place]. She plans to travel a few times in the upcoming months and is frustrated with the continued knee pain.     PMH:  Past Medical History:   Diagnosis Date     Nonsenile cataract      Osteoarthritis      Pulmonary nodule      Uveitis        Active problem list:  Patient Active Problem List   Diagnosis     Hypercholesterolemia     Arthritis of shoulder region, left     Encounter to establish care     Primary osteoarthritis involving multiple joints     Gastroesophageal reflux disease without esophagitis     Nodule of left lung     Family history of malignant neoplasm of breast     Mass of left upper extremity     Back pain of thoracolumbar region     Balance disorder     Compression fracture of thoracic vertebra, open, initial encounter (H)     DDD (degenerative disc disease), thoracolumbar     Degenerative scoliosis in adult patient     History of anesthesia problem     Kyphosis (acquired) (postural)     Nuclear sclerosis     Osteoarthrosis     Osteopenia with high risk of fracture     Pain in left shoulder     Risk for falls     Status post left knee replacement     Pain in thoracic spine       FH:  Family History   Problem Relation Age of Onset     Arthritis Mother      Breast Cancer Mother 78     Diabetes Type 2  Father      Heart Disease Father          age 75     Alzheimer Disease Father 60     Heart Disease Brother 68     Breast Cancer Maternal  Grandmother 81     Heart Disease Maternal Grandfather 54         age 81     Other - See Comments Son         schizoaffective lives in [place]     Glaucoma No family hx of      Macular Degeneration No family hx of        SH:  Social History     Socioeconomic History     Marital status:      Spouse name: Not on file     Number of children: Not on file     Years of education: Not on file     Highest education level: Not on file   Occupational History     Not on file   Social Needs     Financial resource strain: Not on file     Food insecurity     Worry: Not on file     Inability: Not on file     Transportation needs     Medical: Not on file     Non-medical: Not on file   Tobacco Use     Smoking status: Former Smoker     Years: 35.00     Smokeless tobacco: Never Used     Tobacco comment: Quit 24 years ago    Substance and Sexual Activity     Alcohol use: Yes     Comment: Moderate     Drug use: No     Sexual activity: Not Currently     Partners: Male   Lifestyle     Physical activity     Days per week: Not on file     Minutes per session: Not on file     Stress: Not on file   Relationships     Social connections     Talks on phone: Not on file     Gets together: Not on file     Attends Oriental orthodox service: Not on file     Active member of club or organization: Not on file     Attends meetings of clubs or organizations: Not on file     Relationship status: Not on file     Intimate partner violence     Fear of current or ex partner: Not on file     Emotionally abused: Not on file     Physically abused: Not on file     Forced sexual activity: Not on file   Other Topics Concern     Not on file   Social History Narrative         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    Loves to travel. Moved to [place] 10/2018 to be near daughter  Linda (1973) son in law and 2 granddaughters. Her son Jeff ( 1970) lives in [place] Schizoaffective. She travels to visit him when able.         MEDS:  See EMR, reviewed  ALL:  See EMR, reviewed    REVIEW OF SYSTEMS:  CONSTITUTIONAL:NEGATIVE for fever, chills, change in weight  INTEGUMENTARY/SKIN: NEGATIVE for worrisome rashes, moles or lesions  EYES: NEGATIVE for vision changes or irritation  ENT/MOUTH: NEGATIVE for ear, mouth and throat problems  RESP:NEGATIVE for significant cough or SOB  BREAST: NEGATIVE for masses, tenderness or discharge  CV: NEGATIVE for chest pain, palpitations or peripheral edema  GI: NEGATIVE for nausea, abdominal pain, heartburn, or change in bowel habits  :NEGATIVE for frequency, dysuria, or hematuria  :NEGATIVE for frequency, dysuria, or hematuria  NEURO: NEGATIVE for weakness, dizziness or paresthesias  ENDOCRINE: NEGATIVE for temperature intolerance, skin/hair changes  HEME/ALLERGY/IMMUNE: NEGATIVE for bleeding problems  PSYCHIATRIC: NEGATIVE for changes in mood or affect             Objective the right knee reveals no effusion.  No redness  Skin is intact.  Adequate range of motion from flexion to extension.  Mildly tender over the medial joint line.  Appropriate in conversation and affect.     After informed consent about bleeding, infection, allergic reaction and after prepping with surgical scrub she was injected in the right knee from a lateral approach in the seated position with Supartz.  Then the same procedure was repeated in the opposite knee after prepping with surgical scrub.  She tolerated this without difficulty and left the clinic ambulatory .    She will be gone traveling for a week.  She is hoping to do additional cruise early this autumn.  She understands when she returns she could finish the 5 part Supartz series as an option, or she could substitute a cortisone shot.  She has used cortisone shots in the past for her opposite knee and tolerated it.  She will  follow-up if not improved.            Large Joint Injection: R knee joint    Date/Time: 6/22/2021 2:01 PM  Performed by: Dayton Zepeda MD  Authorized by: Dayton Zepeda MD     Indications:  Pain and osteoarthritis  Needle Size:  22 G  Guidance: landmark guided    Approach:  Anterolateral  Location:  Knee      Medications:  25 mg sodium hyaluronate 25 MG/2.5ML  Outcome:  Tolerated well, no immediate complications  Procedure discussed: discussed risks, benefits, and alternatives    Consent Given by:  Patient  Timeout: timeout called immediately prior to procedure      Dayton Zepeda MD

## 2021-06-22 NOTE — PROGRESS NOTES
Follow Up:   Last Visit: 6/15/21, held off on Supartz injection  Function: no change  Pain: no change  Aggravating Factors: pain with walking and descending stairs.  Interval History: has tried to weed her garden and had pain. She does have a few morning where pain is improved but worsens the more active she is.  Last time she indicated that it took 3 Supartz shots to make an improvement.  Relieving Factors: rest and heat.     Leaving in 2 weeks to visit her son in [place]. She plans to travel a few times in the upcoming months and is frustrated with the continued knee pain.     PMH:  Past Medical History:   Diagnosis Date     Nonsenile cataract      Osteoarthritis      Pulmonary nodule      Uveitis        Active problem list:  Patient Active Problem List   Diagnosis     Hypercholesterolemia     Arthritis of shoulder region, left     Encounter to establish care     Primary osteoarthritis involving multiple joints     Gastroesophageal reflux disease without esophagitis     Nodule of left lung     Family history of malignant neoplasm of breast     Mass of left upper extremity     Back pain of thoracolumbar region     Balance disorder     Compression fracture of thoracic vertebra, open, initial encounter (H)     DDD (degenerative disc disease), thoracolumbar     Degenerative scoliosis in adult patient     History of anesthesia problem     Kyphosis (acquired) (postural)     Nuclear sclerosis     Osteoarthrosis     Osteopenia with high risk of fracture     Pain in left shoulder     Risk for falls     Status post left knee replacement     Pain in thoracic spine       FH:  Family History   Problem Relation Age of Onset     Arthritis Mother      Breast Cancer Mother 78     Diabetes Type 2  Father      Heart Disease Father          age 75     Alzheimer Disease Father 60     Heart Disease Brother 68     Breast Cancer Maternal Grandmother 81     Heart Disease Maternal Grandfather 54         age 81     Other - See  Comments Son         schizoaffective lives in [place]     Glaucoma No family hx of      Macular Degeneration No family hx of        SH:  Social History     Socioeconomic History     Marital status:      Spouse name: Not on file     Number of children: Not on file     Years of education: Not on file     Highest education level: Not on file   Occupational History     Not on file   Social Needs     Financial resource strain: Not on file     Food insecurity     Worry: Not on file     Inability: Not on file     Transportation needs     Medical: Not on file     Non-medical: Not on file   Tobacco Use     Smoking status: Former Smoker     Years: 35.00     Smokeless tobacco: Never Used     Tobacco comment: Quit 24 years ago    Substance and Sexual Activity     Alcohol use: Yes     Comment: Moderate     Drug use: No     Sexual activity: Not Currently     Partners: Male   Lifestyle     Physical activity     Days per week: Not on file     Minutes per session: Not on file     Stress: Not on file   Relationships     Social connections     Talks on phone: Not on file     Gets together: Not on file     Attends Judaism service: Not on file     Active member of club or organization: Not on file     Attends meetings of clubs or organizations: Not on file     Relationship status: Not on file     Intimate partner violence     Fear of current or ex partner: Not on file     Emotionally abused: Not on file     Physically abused: Not on file     Forced sexual activity: Not on file   Other Topics Concern     Not on file   Social History Narrative         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    Loves to travel. Moved to [place] 10/2018 to be near daughter Linda () son in law and 2 granddaughters. Her son Jeff ( ) lives in [place]  Schizoaffective. She travels to visit him when able.         MEDS:  See EMR, reviewed  ALL:  See EMR, reviewed    REVIEW OF SYSTEMS:  CONSTITUTIONAL:NEGATIVE for fever, chills, change in weight  INTEGUMENTARY/SKIN: NEGATIVE for worrisome rashes, moles or lesions  EYES: NEGATIVE for vision changes or irritation  ENT/MOUTH: NEGATIVE for ear, mouth and throat problems  RESP:NEGATIVE for significant cough or SOB  BREAST: NEGATIVE for masses, tenderness or discharge  CV: NEGATIVE for chest pain, palpitations or peripheral edema  GI: NEGATIVE for nausea, abdominal pain, heartburn, or change in bowel habits  :NEGATIVE for frequency, dysuria, or hematuria  :NEGATIVE for frequency, dysuria, or hematuria  NEURO: NEGATIVE for weakness, dizziness or paresthesias  ENDOCRINE: NEGATIVE for temperature intolerance, skin/hair changes  HEME/ALLERGY/IMMUNE: NEGATIVE for bleeding problems  PSYCHIATRIC: NEGATIVE for changes in mood or affect             Objective the right knee reveals no effusion.  No redness  Skin is intact.  Adequate range of motion from flexion to extension.  Mildly tender over the medial joint line.  Appropriate in conversation and affect.     After informed consent about bleeding, infection, allergic reaction and after prepping with surgical scrub she was injected in the right knee from a lateral approach in the seated position with Supartz.     She will be gone traveling for a week.  She is hoping to do additional cruise early this autumn.  She understands when she returns she could finish the 5 part Supartz series as an option, or she could substitute a cortisone shot.  She has used cortisone shots in the past for her opposite knee and tolerated it.  She will follow-up if not improved.            Large Joint Injection: R knee joint    Date/Time: 6/22/2021 2:01 PM  Performed by: Dayton Zepeda MD  Authorized by: Dayton Zepeda MD     Indications:  Pain and osteoarthritis  Needle Size:  22  G  Guidance: landmark guided    Approach:  Anterolateral  Location:  Knee      Medications:  25 mg sodium hyaluronate 25 MG/2.5ML  Outcome:  Tolerated well, no immediate complications  Procedure discussed: discussed risks, benefits, and alternatives    Consent Given by:  Patient  Timeout: timeout called immediately prior to procedure

## 2021-06-25 NOTE — TELEPHONE ENCOUNTER
Follow-up call placed to pt to check on status after Consult with Dr. Vanegas on 5/11/2021. Left message to return call.

## 2021-06-29 ENCOUNTER — OFFICE VISIT (OUTPATIENT)
Dept: ORTHOPEDICS | Facility: CLINIC | Age: 76
End: 2021-06-29
Payer: MEDICARE

## 2021-06-29 VITALS — HEIGHT: 63 IN | WEIGHT: 147 LBS | BODY MASS INDEX: 26.05 KG/M2

## 2021-06-29 DIAGNOSIS — M17.11 PRIMARY OSTEOARTHRITIS OF RIGHT KNEE: Primary | ICD-10-CM

## 2021-06-29 PROCEDURE — 99207 PR DROP WITH A PROCEDURE: CPT | Performed by: FAMILY MEDICINE

## 2021-06-29 PROCEDURE — 20610 DRAIN/INJ JOINT/BURSA W/O US: CPT | Performed by: FAMILY MEDICINE

## 2021-06-29 ASSESSMENT — MIFFLIN-ST. JEOR: SCORE: 1130.92

## 2021-06-29 NOTE — PROGRESS NOTES
2021: Giana Hope is a 75 year old female who is seen in f/u up for Supartz injection .    PMH:  Past Medical History:   Diagnosis Date     Nonsenile cataract      Osteoarthritis      Pulmonary nodule      Uveitis        Active problem list:  Patient Active Problem List   Diagnosis     Hypercholesterolemia     Arthritis of shoulder region, left     Encounter to establish care     Primary osteoarthritis involving multiple joints     Gastroesophageal reflux disease without esophagitis     Nodule of left lung     Family history of malignant neoplasm of breast     Mass of left upper extremity     Back pain of thoracolumbar region     Balance disorder     Compression fracture of thoracic vertebra, open, initial encounter (H)     DDD (degenerative disc disease), thoracolumbar     Degenerative scoliosis in adult patient     History of anesthesia problem     Kyphosis (acquired) (postural)     Nuclear sclerosis     Osteoarthrosis     Osteopenia with high risk of fracture     Pain in left shoulder     Risk for falls     Status post left knee replacement     Pain in thoracic spine       FH:  Family History   Problem Relation Age of Onset     Arthritis Mother      Breast Cancer Mother 78     Diabetes Type 2  Father      Heart Disease Father          age 75     Alzheimer Disease Father 60     Heart Disease Brother 68     Breast Cancer Maternal Grandmother 81     Heart Disease Maternal Grandfather 54         age 81     Other - See Comments Son         schizoaffective lives in [place]     Glaucoma No family hx of      Macular Degeneration No family hx of        SH:  Social History     Socioeconomic History     Marital status:      Spouse name: Not on file     Number of children: Not on file     Years of education: Not on file     Highest education level: Not on file   Occupational History     Not on file   Social Needs     Financial resource strain: Not on file     Food insecurity     Worry: Not on  file     Inability: Not on file     Transportation needs     Medical: Not on file     Non-medical: Not on file   Tobacco Use     Smoking status: Former Smoker     Years: 35.00     Smokeless tobacco: Never Used     Tobacco comment: Quit 24 years ago    Substance and Sexual Activity     Alcohol use: Yes     Comment: Moderate     Drug use: No     Sexual activity: Not Currently     Partners: Male   Lifestyle     Physical activity     Days per week: Not on file     Minutes per session: Not on file     Stress: Not on file   Relationships     Social connections     Talks on phone: Not on file     Gets together: Not on file     Attends Scientologist service: Not on file     Active member of club or organization: Not on file     Attends meetings of clubs or organizations: Not on file     Relationship status: Not on file     Intimate partner violence     Fear of current or ex partner: Not on file     Emotionally abused: Not on file     Physically abused: Not on file     Forced sexual activity: Not on file   Other Topics Concern     Not on file   Social History Narrative         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 Has previous experience translating Ivorian to English Global translation.    Loves to travel. Moved to [place] 10/2018 to be near daughter Linda () son in law and 2 granddaughters. Her son Jeff ( ) lives in [place] Schizoaffective. She travels to visit him when able.         MEDS:  See EMR, reviewed  ALL:  See EMR, reviewed    REVIEW OF SYSTEMS:  CONSTITUTIONAL:NEGATIVE for fever, chills, change in weight  INTEGUMENTARY/SKIN: NEGATIVE for worrisome rashes, moles or lesions  EYES: NEGATIVE for vision changes or irritation  ENT/MOUTH: NEGATIVE for ear, mouth and throat problems  RESP:NEGATIVE for significant cough or SOB  BREAST: NEGATIVE for masses, tenderness or discharge  CV: NEGATIVE for  chest pain, palpitations or peripheral edema  GI: NEGATIVE for nausea, abdominal pain, heartburn, or change in bowel habits  :NEGATIVE for frequency, dysuria, or hematuria  :NEGATIVE for frequency, dysuria, or hematuria  NEURO: NEGATIVE for weakness, dizziness or paresthesias  ENDOCRINE: NEGATIVE for temperature intolerance, skin/hair changes  HEME/ALLERGY/IMMUNE: NEGATIVE for bleeding problems  PSYCHIATRIC: NEGATIVE for changes in mood or affect        S: had 3rd supartz rt knee injection 6/22/21.  Here today for 4th injection.    Objective the right knee reveals no effusion.  No redness  Skin is intact.  Adequate range of motion from flexion to extension.  Mildly tender over the medial joint line.  I do not see any swelling or redness in the right lower extremity.  Appropriate in conversation and affect.     After informed consent about bleeding, infection, allergic reaction and after prepping with surgical scrub she was injected in the right knee from a lateral approach in the seated position with Supartz.      She plans on the 5th injection in two weeks, after returning from [facility], and before [place].     Large Joint Injection: R knee joint    Date/Time: 6/29/2021 1:48 PM  Performed by: Dayton Zepeda MD  Authorized by: Dayton Zepeda MD     Indications:  Pain and osteoarthritis  Needle Size:  22 G  Guidance: landmark guided    Approach:  Anterolateral  Location:  Knee      Medications:  25 mg sodium hyaluronate 25 MG/2.5ML  Outcome:  Tolerated well, no immediate complications  Procedure discussed: discussed risks, benefits, and alternatives    Consent Given by:  Patient  Timeout: timeout called immediately prior to procedure

## 2021-06-29 NOTE — NURSING NOTE
[address] [address]  Dept: [phone]  ______________________________________________________________________________    Patient: Giana Hope   : 1945   MRN: [id number]   2021    INVASIVE PROCEDURE SAFETY CHECKLIST    Date: 21   Procedure: Right knee Supartz injection    Patient Name: Giana Hope  MRN: [id number]  YOB: 1945    Action: Complete sections as appropriate. Any discrepancy results in a HARD COPY until resolved.     PRE PROCEDURE:  Patient ID verified with 2 identifiers (name and  or MRN): Yes  Procedure and site verified with patient/designee (when able): Yes  Accurate consent documentation in medical record: Yes  H&P (or appropriate assessment) documented in medical record: Yes  H&P must be up to 20 days prior to procedure and updates within 24 hours of procedure as applicable: NA  Relevant diagnostic and radiology test results appropriately labeled and displayed as applicable: Yes  Procedure site(s) marked with provider initials: NA    TIMEOUT:  Time-Out performed immediately prior to starting procedure, including verbal and active participation of all team members addressing the following:Yes  * Correct patient identify  * Confirmed that the correct side and site are marked  * An accurate procedure consent form  * Agreement on the procedure to be done  * Correct patient position  * Relevant images and results are properly labeled and appropriately displayed  * The need to administer antibiotics or fluids for irrigation purposes during the procedure as applicable   * Safety precautions based on patient history or medication use    DURING PROCEDURE: Verification of correct person, site, and procedures any time the responsibility for care of the patient is transferred to another member of the care team.       Prior to injection, verified patient identity using patient's name and date  of birth.  Due to injection administration, patient instructed to remain in clinic for 15 minutes  afterwards, and to report any adverse reaction to me immediately.    Joint injection was performed.      Drug Amount Wasted:  None.  Vial/Syringe: Syringe  Expiration Date:  06/30/2022      Padma Meredith ATC  June 29, 2021

## 2021-06-29 NOTE — LETTER
2021      RE: Giana Hope  [address]  [place] [address]       2021: Giana Hope is a 75 year old female who is seen in f/u up for Supartz injection .    PMH:  Past Medical History:   Diagnosis Date     Nonsenile cataract      Osteoarthritis      Pulmonary nodule      Uveitis        Active problem list:  Patient Active Problem List   Diagnosis     Hypercholesterolemia     Arthritis of shoulder region, left     Encounter to establish care     Primary osteoarthritis involving multiple joints     Gastroesophageal reflux disease without esophagitis     Nodule of left lung     Family history of malignant neoplasm of breast     Mass of left upper extremity     Back pain of thoracolumbar region     Balance disorder     Compression fracture of thoracic vertebra, open, initial encounter (H)     DDD (degenerative disc disease), thoracolumbar     Degenerative scoliosis in adult patient     History of anesthesia problem     Kyphosis (acquired) (postural)     Nuclear sclerosis     Osteoarthrosis     Osteopenia with high risk of fracture     Pain in left shoulder     Risk for falls     Status post left knee replacement     Pain in thoracic spine       FH:  Family History   Problem Relation Age of Onset     Arthritis Mother      Breast Cancer Mother 78     Diabetes Type 2  Father      Heart Disease Father          age 75     Alzheimer Disease Father 60     Heart Disease Brother 68     Breast Cancer Maternal Grandmother 81     Heart Disease Maternal Grandfather 54         age 81     Other - See Comments Son         schizoaffective lives in [place]     Glaucoma No family hx of      Macular Degeneration No family hx of        SH:  Social History     Socioeconomic History     Marital status:      Spouse name: Not on file     Number of children: Not on file     Years of education: Not on file     Highest education level: Not on file   Occupational History     Not on file   Social  Needs     Financial resource strain: Not on file     Food insecurity     Worry: Not on file     Inability: Not on file     Transportation needs     Medical: Not on file     Non-medical: Not on file   Tobacco Use     Smoking status: Former Smoker     Years: 35.00     Smokeless tobacco: Never Used     Tobacco comment: Quit 24 years ago    Substance and Sexual Activity     Alcohol use: Yes     Comment: Moderate     Drug use: No     Sexual activity: Not Currently     Partners: Male   Lifestyle     Physical activity     Days per week: Not on file     Minutes per session: Not on file     Stress: Not on file   Relationships     Social connections     Talks on phone: Not on file     Gets together: Not on file     Attends Alevism service: Not on file     Active member of club or organization: Not on file     Attends meetings of clubs or organizations: Not on file     Relationship status: Not on file     Intimate partner violence     Fear of current or ex partner: Not on file     Emotionally abused: Not on file     Physically abused: Not on file     Forced sexual activity: Not on file   Other Topics Concern     Not on file   Social History Narrative         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    Loves to travel. Moved to [place] 10/2018 to be near daughter Linda () son in law and 2 granddaughters. Her son Jeff ( ) lives in [place] Schizoaffective. She travels to visit him when able.         MEDS:  See EMR, reviewed  ALL:  See EMR, reviewed    REVIEW OF SYSTEMS:  CONSTITUTIONAL:NEGATIVE for fever, chills, change in weight  INTEGUMENTARY/SKIN: NEGATIVE for worrisome rashes, moles or lesions  EYES: NEGATIVE for vision changes or irritation  ENT/MOUTH: NEGATIVE for ear, mouth and throat problems  RESP:NEGATIVE for significant  cough or SOB  BREAST: NEGATIVE for masses, tenderness or discharge  CV: NEGATIVE for chest pain, palpitations or peripheral edema  GI: NEGATIVE for nausea, abdominal pain, heartburn, or change in bowel habits  :NEGATIVE for frequency, dysuria, or hematuria  :NEGATIVE for frequency, dysuria, or hematuria  NEURO: NEGATIVE for weakness, dizziness or paresthesias  ENDOCRINE: NEGATIVE for temperature intolerance, skin/hair changes  HEME/ALLERGY/IMMUNE: NEGATIVE for bleeding problems  PSYCHIATRIC: NEGATIVE for changes in mood or affect        S: had 3rd supartz rt knee injection 6/22/21.  Here today for 4th injection.    Objective the right knee reveals no effusion.  No redness  Skin is intact.  Adequate range of motion from flexion to extension.  Mildly tender over the medial joint line.  I do not see any swelling or redness in the right lower extremity.  Appropriate in conversation and affect.     After informed consent about bleeding, infection, allergic reaction and after prepping with surgical scrub she was injected in the right knee from a lateral approach in the seated position with Supartz.      She plans on the 5th injection in two weeks, after returning from [facility], and before [place].     Large Joint Injection: R knee joint    Date/Time: 6/29/2021 1:48 PM  Performed by: Dayton Zepeda MD  Authorized by: Dayton Zepeda MD     Indications:  Pain and osteoarthritis  Needle Size:  22 G  Guidance: landmark guided    Approach:  Anterolateral  Location:  Knee      Medications:  25 mg sodium hyaluronate 25 MG/2.5ML  Outcome:  Tolerated well, no immediate complications  Procedure discussed: discussed risks, benefits, and alternatives    Consent Given by:  Patient  Timeout: timeout called immediately prior to procedure        Dayton Zepeda MD

## 2021-07-06 DIAGNOSIS — E78.5 HYPERLIPIDEMIA WITH TARGET LDL LESS THAN 130: ICD-10-CM

## 2021-07-08 RX ORDER — PRAVASTATIN SODIUM 20 MG
20 TABLET ORAL DAILY
Qty: 90 TABLET | Refills: 1 | Status: SHIPPED | OUTPATIENT
Start: 2021-07-08 | End: 2021-11-15

## 2021-07-08 NOTE — TELEPHONE ENCOUNTER
PRAVASTATIN SODIUM 20 MG TAB   Last Written Prescription Date:  4/7/2021  Last Fill Quantity: 90,   # refills: 0  Last Office Visit : 10/26/2020  Future Office visit:  None    Routing refill request to provider for review/approval because:  Would Provider like an updated LIPID PROFILE on file for this medication?  Please advise       Christine Pillai RN  Central Triage Red Flags/Med Refills

## 2021-07-19 ENCOUNTER — OFFICE VISIT (OUTPATIENT)
Dept: ORTHOPEDICS | Facility: CLINIC | Age: 76
End: 2021-07-19
Payer: MEDICARE

## 2021-07-19 DIAGNOSIS — M17.11 PRIMARY OSTEOARTHRITIS OF RIGHT KNEE: Primary | ICD-10-CM

## 2021-07-19 PROCEDURE — 20610 DRAIN/INJ JOINT/BURSA W/O US: CPT | Mod: RT | Performed by: FAMILY MEDICINE

## 2021-07-19 PROCEDURE — 99207 PR DROP WITH A PROCEDURE: CPT | Performed by: FAMILY MEDICINE

## 2021-07-19 NOTE — PROGRESS NOTES
Follow Up: she reports that she was in the pool helping granddaughters work on their strokes.  She was also in [place] and walking for 4 miles at a time.  That he tolerated it poorly.  She has weightbearing pain despite having 4 Supartz injections.  She would like the fifth injection today.  She goes to [place] in 4 weeks.  She is hoping to avoid knee replacement for now.  Injections of the standing knees approximately 6 months ago showed a moderate amount of DJD in the right knee.  She had more improvements with her last series of Supartz injections and she has had this time.    Office Visit: 6/29/21 -- 4th Supartz Injection  Pain: no change/worse, she reports she is having pain at rest. Only able to sleep 1.5 hours on Saturday night.   Aggravating factors: walking for > 15-20 minutes.   Relieving Factors: Ice         PMH:  Past Medical History:   Diagnosis Date     Nonsenile cataract      Osteoarthritis      Pulmonary nodule      Uveitis        Active problem list:  Patient Active Problem List   Diagnosis     Hypercholesterolemia     Arthritis of shoulder region, left     Encounter to establish care     Primary osteoarthritis involving multiple joints     Gastroesophageal reflux disease without esophagitis     Nodule of left lung     Family history of malignant neoplasm of breast     Mass of left upper extremity     Back pain of thoracolumbar region     Balance disorder     Compression fracture of thoracic vertebra, open, initial encounter (H)     DDD (degenerative disc disease), thoracolumbar     Degenerative scoliosis in adult patient     History of anesthesia problem     Kyphosis (acquired) (postural)     Nuclear sclerosis     Osteoarthrosis     Osteopenia with high risk of fracture     Pain in left shoulder     Risk for falls     Status post left knee replacement     Pain in thoracic spine       FH:  Family History   Problem Relation Age of Onset     Arthritis Mother      Breast Cancer Mother 78     Diabetes  Type 2  Father      Heart Disease Father          age 75     Alzheimer Disease Father 60     Heart Disease Brother 68     Breast Cancer Maternal Grandmother 81     Heart Disease Maternal Grandfather 54         age 81     Other - See Comments Son         schizoaffective lives in [place]     Glaucoma No family hx of      Macular Degeneration No family hx of        SH:  Social History     Socioeconomic History     Marital status:      Spouse name: Not on file     Number of children: Not on file     Years of education: Not on file     Highest education level: Not on file   Occupational History     Not on file   Tobacco Use     Smoking status: Former Smoker     Years: 35.00     Smokeless tobacco: Never Used     Tobacco comment: Quit 24 years ago    Substance and Sexual Activity     Alcohol use: Yes     Comment: Moderate     Drug use: No     Sexual activity: Not Currently     Partners: Male   Other Topics Concern     Not on file   Social History Narrative         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    Loves to travel. Moved to [place] 10/2018 to be near daughter Linda () son in law and 2 granddaughters. Her son Jeff ( ) lives in [place] Schizoaffective. She travels to visit him when able.       Social Determinants of Health     Financial Resource Strain:      Difficulty of Paying Living Expenses:    Food Insecurity:      Worried About Running Out of Food in the Last Year:      Ran Out of Food in the Last Year:    Transportation Needs:      Lack of Transportation (Medical):      Lack of Transportation (Non-Medical):    Physical Activity:      Days of Exercise per Week:      Minutes of Exercise per Session:    Stress:      Feeling of Stress :    Social Connections:      Frequency of Communication with Friends and  Family:      Frequency of Social Gatherings with Friends and Family:      Attends Hindu Services:      Active Member of Clubs or Organizations:      Attends Club or Organization Meetings:      Marital Status:    Intimate Partner Violence:      Fear of Current or Ex-Partner:      Emotionally Abused:      Physically Abused:      Sexually Abused:        MEDS:  See EMR, reviewed  ALL:  See EMR, reviewed    REVIEW OF SYSTEMS:  CONSTITUTIONAL:NEGATIVE for fever, chills, change in weight  INTEGUMENTARY/SKIN: NEGATIVE for worrisome rashes, moles or lesions  EYES: NEGATIVE for vision changes or irritation  ENT/MOUTH: NEGATIVE for ear, mouth and throat problems  RESP:NEGATIVE for significant cough or SOB  BREAST: NEGATIVE for masses, tenderness or discharge  CV: NEGATIVE for chest pain, palpitations or peripheral edema  GI: NEGATIVE for nausea, abdominal pain, heartburn, or change in bowel habits  :NEGATIVE for frequency, dysuria, or hematuria  :NEGATIVE for frequency, dysuria, or hematuria  NEURO: NEGATIVE for weakness, dizziness or paresthesias  ENDOCRINE: NEGATIVE for temperature intolerance, skin/hair changes  HEME/ALLERGY/IMMUNE: NEGATIVE for bleeding problems  PSYCHIATRIC: NEGATIVE for changes in mood or affect      S: here fot 5th supartz rt knee injection.  Recently  returning from [facility], and soon of to [place].     Objective the right knee reveals no effusion.  No redness  Skin is intact.  Adequate range of motion from flexion to extension.  Mildly tender over the medial joint line.  I do not see any swelling or redness in the right lower extremity.  Appropriate in conversation and affect.     After informed consent about bleeding, infection, allergic reaction and after prepping with surgical scrub she was injected in the right knee from a lateral approach in the seated position with Supartz.  The medicine went in easily.  She was able to move the knee through full range of motion.  He was able to  ambulate.    A: Right knee DJD    Plan: She will follow up in 3 weeks with me in clinic and a cortisone shot could be considered if she has not had improvements in pain, prior to her trip to [place].  We discussed indications for knee replacement.  If not improved with these injections she is considering it in the beginning of 2022.  She knows she needs to avoid injections in the knee for 3 months prior to her surgery.                 Large Joint Injection: L knee joint    Date/Time: 7/19/2021 2:41 PM  Performed by: Dayton Zepeda MD  Authorized by: Dayton Zepeda MD     Indications:  Pain and osteoarthritis  Needle Size:  22 G  Approach:  Anterolateral  Location:  Knee      Medications:  25 mg sodium hyaluronate 25 MG/2.5ML  Outcome:  Tolerated well, no immediate complications  Procedure discussed: discussed risks, benefits, and alternatives    Consent Given by:  Patient  Timeout: timeout called immediately prior to procedure

## 2021-07-19 NOTE — NURSING NOTE
[address] [address]  Dept: [phone]  ______________________________________________________________________________    Patient: Giana Hope   : 1945   MRN: [id number]   2021    INVASIVE PROCEDURE SAFETY CHECKLIST    Date: 2021   Procedure: Right knee Supartz Injection ()  Patient Name: Giana Hope  MRN: [id number]  YOB: 1945    Action: Complete sections as appropriate. Any discrepancy results in a HARD COPY until resolved.     PRE PROCEDURE:  Patient ID verified with 2 identifiers (name and  or MRN): Yes  Procedure and site verified with patient/designee (when able): Yes  Accurate consent documentation in medical record: Yes  H&P (or appropriate assessment) documented in medical record: Yes  H&P must be up to 20 days prior to procedure and updates within 24 hours of procedure as applicable: NA  Relevant diagnostic and radiology test results appropriately labeled and displayed as applicable: NA  Procedure site(s) marked with provider initials: NA    TIMEOUT:  Time-Out performed immediately prior to starting procedure, including verbal and active participation of all team members addressing the following:Yes  * Correct patient identify  * Confirmed that the correct side and site are marked  * An accurate procedure consent form  * Agreement on the procedure to be done  * Correct patient position  * Relevant images and results are properly labeled and appropriately displayed  * The need to administer antibiotics or fluids for irrigation purposes during the procedure as applicable   * Safety precautions based on patient history or medication use    DURING PROCEDURE: Verification of correct person, site, and procedures any time the responsibility for care of the patient is transferred to another member of the care team.       Prior to injection, verified patient identity using patient's name  and date of birth.  Due to injection administration, patient instructed to remain in clinic for 15 minutes  afterwards, and to report any adverse reaction to me immediately.    Joint injection was performed.      Drug Amount Wasted:  None.  Vial/Syringe: Syringe  Expiration Date:  04/30/2022      Angie Reese, ATC  July 19, 2021

## 2021-07-19 NOTE — LETTER
7/19/2021      RE: Giana Hope  [address] [facility] [address]       Follow Up: she reports that she was in the pool helping granddaughters work on their strokes.  She was also in [place] and walking for 4 miles at a time.  That he tolerated it poorly.  She has weightbearing pain despite having 4 Supartz injections.  She would like the fifth injection today.  She goes to [place] in 4 weeks.  She is hoping to avoid knee replacement for now.  Injections of the standing knees approximately 6 months ago showed a moderate amount of DJD in the right knee.  She had more improvements with her last series of Supartz injections and she has had this time.    Office Visit: 6/29/21 -- 4th Supartz Injection  Pain: no change/worse, she reports she is having pain at rest. Only able to sleep 1.5 hours on Saturday night.   Aggravating factors: walking for > 15-20 minutes.   Relieving Factors: Ice         PMH:  Past Medical History:   Diagnosis Date     Nonsenile cataract      Osteoarthritis      Pulmonary nodule      Uveitis        Active problem list:  Patient Active Problem List   Diagnosis     Hypercholesterolemia     Arthritis of shoulder region, left     Encounter to establish care     Primary osteoarthritis involving multiple joints     Gastroesophageal reflux disease without esophagitis     Nodule of left lung     Family history of malignant neoplasm of breast     Mass of left upper extremity     Back pain of thoracolumbar region     Balance disorder     Compression fracture of thoracic vertebra, open, initial encounter (H)     DDD (degenerative disc disease), thoracolumbar     Degenerative scoliosis in adult patient     History of anesthesia problem     Kyphosis (acquired) (postural)     Nuclear sclerosis     Osteoarthrosis     Osteopenia with high risk of fracture     Pain in left shoulder     Risk for falls     Status post left knee replacement     Pain in thoracic spine       FH:  Family History   Problem  Relation Age of Onset     Arthritis Mother      Breast Cancer Mother 78     Diabetes Type 2  Father      Heart Disease Father          age 75     Alzheimer Disease Father 60     Heart Disease Brother 68     Breast Cancer Maternal Grandmother 81     Heart Disease Maternal Grandfather 54         age 81     Other - See Comments Son         schizoaffective lives in [place]     Glaucoma No family hx of      Macular Degeneration No family hx of        SH:  Social History     Socioeconomic History     Marital status:      Spouse name: Not on file     Number of children: Not on file     Years of education: Not on file     Highest education level: Not on file   Occupational History     Not on file   Tobacco Use     Smoking status: Former Smoker     Years: 35.00     Smokeless tobacco: Never Used     Tobacco comment: Quit 24 years ago    Substance and Sexual Activity     Alcohol use: Yes     Comment: Moderate     Drug use: No     Sexual activity: Not Currently     Partners: Male   Other Topics Concern     Not on file   Social History Narrative         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    Loves to travel. Moved to [place] 10/2018 to be near daughter Linda () son in law and 2 granddaughters. Her son Jeff ( ) lives in [place] Schizoaffective. She travels to visit him when able.       Social Determinants of Health     Financial Resource Strain:      Difficulty of Paying Living Expenses:    Food Insecurity:      Worried About Running Out of Food in the Last Year:      Ran Out of Food in the Last Year:    Transportation Needs:      Lack of Transportation (Medical):      Lack of Transportation (Non-Medical):    Physical Activity:      Days of Exercise per Week:      Minutes of Exercise per Session:    Stress:       Feeling of Stress :    Social Connections:      Frequency of Communication with Friends and Family:      Frequency of Social Gatherings with Friends and Family:      Attends Congregational Services:      Active Member of Clubs or Organizations:      Attends Club or Organization Meetings:      Marital Status:    Intimate Partner Violence:      Fear of Current or Ex-Partner:      Emotionally Abused:      Physically Abused:      Sexually Abused:        MEDS:  See EMR, reviewed  ALL:  See EMR, reviewed    REVIEW OF SYSTEMS:  CONSTITUTIONAL:NEGATIVE for fever, chills, change in weight  INTEGUMENTARY/SKIN: NEGATIVE for worrisome rashes, moles or lesions  EYES: NEGATIVE for vision changes or irritation  ENT/MOUTH: NEGATIVE for ear, mouth and throat problems  RESP:NEGATIVE for significant cough or SOB  BREAST: NEGATIVE for masses, tenderness or discharge  CV: NEGATIVE for chest pain, palpitations or peripheral edema  GI: NEGATIVE for nausea, abdominal pain, heartburn, or change in bowel habits  :NEGATIVE for frequency, dysuria, or hematuria  :NEGATIVE for frequency, dysuria, or hematuria  NEURO: NEGATIVE for weakness, dizziness or paresthesias  ENDOCRINE: NEGATIVE for temperature intolerance, skin/hair changes  HEME/ALLERGY/IMMUNE: NEGATIVE for bleeding problems  PSYCHIATRIC: NEGATIVE for changes in mood or affect      S: here fot 5th supartz rt knee injection.  Recently  returning from [facility], and soon of to [place].     Objective the right knee reveals no effusion.  No redness  Skin is intact.  Adequate range of motion from flexion to extension.  Mildly tender over the medial joint line.  I do not see any swelling or redness in the right lower extremity.  Appropriate in conversation and affect.     After informed consent about bleeding, infection, allergic reaction and after prepping with surgical scrub she was injected in the right knee from a lateral approach in the seated position with Supartz.  The medicine went in  easily.  She was able to move the knee through full range of motion.  He was able to ambulate.    A: Right knee DJD    Plan: She will follow up in 3 weeks with me in clinic and a cortisone shot could be considered if she has not had improvements in pain, prior to her trip to [place].  We discussed indications for knee replacement.  If not improved with these injections she is considering it in the beginning of 2022.  She knows she needs to avoid injections in the knee for 3 months prior to her surgery.        Large Joint Injection: L knee joint    Date/Time: 7/19/2021 2:41 PM  Performed by: Dayton Zepeda MD  Authorized by: Dayton Zepeda MD     Indications:  Pain and osteoarthritis  Needle Size:  22 G  Approach:  Anterolateral  Location:  Knee      Medications:  25 mg sodium hyaluronate 25 MG/2.5ML  Outcome:  Tolerated well, no immediate complications  Procedure discussed: discussed risks, benefits, and alternatives    Consent Given by:  Patient  Timeout: timeout called immediately prior to procedure          Dayton Zepeda MD

## 2021-08-12 ENCOUNTER — ANCILLARY PROCEDURE (OUTPATIENT)
Dept: GENERAL RADIOLOGY | Facility: CLINIC | Age: 76
End: 2021-08-12
Attending: FAMILY MEDICINE
Payer: MEDICARE

## 2021-08-12 ENCOUNTER — OFFICE VISIT (OUTPATIENT)
Dept: ORTHOPEDICS | Facility: CLINIC | Age: 76
End: 2021-08-12
Payer: MEDICARE

## 2021-08-12 VITALS — HEIGHT: 63 IN | BODY MASS INDEX: 26.05 KG/M2 | WEIGHT: 147 LBS

## 2021-08-12 DIAGNOSIS — M17.11 PRIMARY OSTEOARTHRITIS OF RIGHT KNEE: ICD-10-CM

## 2021-08-12 DIAGNOSIS — M17.11 PRIMARY OSTEOARTHRITIS OF RIGHT KNEE: Primary | ICD-10-CM

## 2021-08-12 PROCEDURE — 73562 X-RAY EXAM OF KNEE 3: CPT | Mod: RT | Performed by: RADIOLOGY

## 2021-08-12 PROCEDURE — 99213 OFFICE O/P EST LOW 20 MIN: CPT | Performed by: FAMILY MEDICINE

## 2021-08-12 ASSESSMENT — MIFFLIN-ST. JEOR: SCORE: 1125.92

## 2021-08-12 NOTE — LETTER
8/12/2021      RE: Giana Hope  [address]  [place] [address]       Follow Up:  Office Visit: 7/19/21  Function: no change  Pain: no change  She had her last Supartz Injection on 7/19/21, no relief.    She notes anterior right-sided knee discomfort with getting out of a chair, going up and down stairs, with her active walking program.  She is anticipating right-sided knee replacement this coming winter or spring.  She knows that she needs to avoid injections in the knee for 3 months prior to procedure.  She would like to wait with a cortisone injection until the end of August, as she is hoping to plan a trip abroad in the middle of September.  She would like to see physical therapy in the meantime for some kneecap taping and exercises.  She would like the opportunity to meet with the knee replacement surgeon, as she is anticipating right-sided knee replacement either this coming winter or spring.    PMH:  Past Medical History:   Diagnosis Date     Nonsenile cataract      Osteoarthritis      Pulmonary nodule      Uveitis        Active problem list:  Patient Active Problem List   Diagnosis     Hypercholesterolemia     Arthritis of shoulder region, left     Encounter to establish care     Primary osteoarthritis involving multiple joints     Gastroesophageal reflux disease without esophagitis     Nodule of left lung     Family history of malignant neoplasm of breast     Mass of left upper extremity     Back pain of thoracolumbar region     Balance disorder     Compression fracture of thoracic vertebra, open, initial encounter (H)     DDD (degenerative disc disease), thoracolumbar     Degenerative scoliosis in adult patient     History of anesthesia problem     Kyphosis (acquired) (postural)     Nuclear sclerosis     Osteoarthrosis     Osteopenia with high risk of fracture     Pain in left shoulder     Risk for falls     Status post left knee replacement     Pain in thoracic spine       FH:  Family  History   Problem Relation Age of Onset     Arthritis Mother      Breast Cancer Mother 78     Diabetes Type 2  Father      Heart Disease Father          age 75     Alzheimer Disease Father 60     Heart Disease Brother 68     Breast Cancer Maternal Grandmother 81     Heart Disease Maternal Grandfather 54         age 81     Other - See Comments Son         schizfrancisca lives in [place]     Glaucoma No family hx of      Macular Degeneration No family hx of        SH:  Social History     Socioeconomic History     Marital status:      Spouse name: Not on file     Number of children: Not on file     Years of education: Not on file     Highest education level: Not on file   Occupational History     Not on file   Tobacco Use     Smoking status: Former Smoker     Years: 35.00     Smokeless tobacco: Never Used     Tobacco comment: Quit 24 years ago    Substance and Sexual Activity     Alcohol use: Yes     Comment: Moderate     Drug use: No     Sexual activity: Not Currently     Partners: Male   Other Topics Concern     Not on file   Social History Narrative         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    Loves to travel. Moved to [place] 10/2018 to be near daughter Linda () son in law and 2 granddaughters. Her son Jeff ( ) lives in [place] Schizoaffective. She travels to visit him when able.       Social Determinants of Health     Financial Resource Strain:      Difficulty of Paying Living Expenses:    Food Insecurity:      Worried About Running Out of Food in the Last Year:      Ran Out of Food in the Last Year:    Transportation Needs:      Lack of Transportation (Medical):      Lack of Transportation (Non-Medical):    Physical Activity:      Days of Exercise per Week:      Minutes of Exercise per Session:     Stress:      Feeling of Stress :    Social Connections:      Frequency of Communication with Friends and Family:      Frequency of Social Gatherings with Friends and Family:      Attends Sabianism Services:      Active Member of Clubs or Organizations:      Attends Club or Organization Meetings:      Marital Status:    Intimate Partner Violence:      Fear of Current or Ex-Partner:      Emotionally Abused:      Physically Abused:      Sexually Abused:        MEDS:  See EMR, reviewed  ALL:  See EMR, reviewed    REVIEW OF SYSTEMS:  CONSTITUTIONAL:NEGATIVE for fever, chills, change in weight  INTEGUMENTARY/SKIN: NEGATIVE for worrisome rashes, moles or lesions  EYES: NEGATIVE for vision changes or irritation  ENT/MOUTH: NEGATIVE for ear, mouth and throat problems  RESP:NEGATIVE for significant cough or SOB  BREAST: NEGATIVE for masses, tenderness or discharge  CV: NEGATIVE for chest pain, palpitations or peripheral edema  GI: NEGATIVE for nausea, abdominal pain, heartburn, or change in bowel habits  :NEGATIVE for frequency, dysuria, or hematuria  :NEGATIVE for frequency, dysuria, or hematuria  NEURO: NEGATIVE for weakness, dizziness or paresthesias  ENDOCRINE: NEGATIVE for temperature intolerance, skin/hair changes  HEME/ALLERGY/IMMUNE: NEGATIVE for bleeding problems  PSYCHIATRIC: NEGATIVE for changes in mood or affect      Objective the right knee reveals no effusion.  She has tenderness along the lateral patellar facets and tenderness along the medial joint line.  She can flex and extend the knee fully.  Normal range of motion at the right hip.  Nontender over the pes anserine bursa or distal IT band.  Overlying skin is intact.  Appropriate in conversation affect.    We went over updated x-rays of her knee that show severe patellofemoral DJD that is bone-on-bone and moderate to severe medial joint space DJD.      Assessment right-sided knee DJD    Plan: She has not responded to viscose injections.  She has had  varying response to cortisone injections.  She will return at the end of this month for a repeat cortisone injection, anticipating her trip abroad a month from now.  She would like to have a consultation with one of the knee replacement surgeons, as she is anticipating possible knee replacement this winter or spring.  She would like to try some physical therapy in the meantime including patellar taping.      Dayton Zepeda MD

## 2021-08-12 NOTE — PROGRESS NOTES
Follow Up:  Office Visit: 7/19/21  Function: no change  Pain: no change  She had her last Supartz Injection on 7/19/21, no relief.    She notes anterior right-sided knee discomfort with getting out of a chair, going up and down stairs, with her active walking program.  She is anticipating right-sided knee replacement this coming winter or spring.  She knows that she needs to avoid injections in the knee for 3 months prior to procedure.  She would like to wait with a cortisone injection until the end of August, as she is hoping to plan a trip abroad in the middle of September.  She would like to see physical therapy in the meantime for some kneecap taping and exercises.  She would like the opportunity to meet with the knee replacement surgeon, as she is anticipating right-sided knee replacement either this coming winter or spring.    PMH:  Past Medical History:   Diagnosis Date     Nonsenile cataract      Osteoarthritis      Pulmonary nodule      Uveitis        Active problem list:  Patient Active Problem List   Diagnosis     Hypercholesterolemia     Arthritis of shoulder region, left     Encounter to establish care     Primary osteoarthritis involving multiple joints     Gastroesophageal reflux disease without esophagitis     Nodule of left lung     Family history of malignant neoplasm of breast     Mass of left upper extremity     Back pain of thoracolumbar region     Balance disorder     Compression fracture of thoracic vertebra, open, initial encounter (H)     DDD (degenerative disc disease), thoracolumbar     Degenerative scoliosis in adult patient     History of anesthesia problem     Kyphosis (acquired) (postural)     Nuclear sclerosis     Osteoarthrosis     Osteopenia with high risk of fracture     Pain in left shoulder     Risk for falls     Status post left knee replacement     Pain in thoracic spine       FH:  Family History   Problem Relation Age of Onset     Arthritis Mother      Breast Cancer Mother 78      Diabetes Type 2  Father      Heart Disease Father          age 75     Alzheimer Disease Father 60     Heart Disease Brother 68     Breast Cancer Maternal Grandmother 81     Heart Disease Maternal Grandfather 54         age 81     Other - See Comments Son         schizoaffective lives in [place]     Glaucoma No family hx of      Macular Degeneration No family hx of        SH:  Social History     Socioeconomic History     Marital status:      Spouse name: Not on file     Number of children: Not on file     Years of education: Not on file     Highest education level: Not on file   Occupational History     Not on file   Tobacco Use     Smoking status: Former Smoker     Years: 35.00     Smokeless tobacco: Never Used     Tobacco comment: Quit 24 years ago    Substance and Sexual Activity     Alcohol use: Yes     Comment: Moderate     Drug use: No     Sexual activity: Not Currently     Partners: Male   Other Topics Concern     Not on file   Social History Narrative         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    Loves to travel. Moved to [place] 10/2018 to be near daughter Linda () son in law and 2 granddaughters. Her son Jeff ( ) lives in [place] Schizoaffective. She travels to visit him when able.       Social Determinants of Health     Financial Resource Strain:      Difficulty of Paying Living Expenses:    Food Insecurity:      Worried About Running Out of Food in the Last Year:      Ran Out of Food in the Last Year:    Transportation Needs:      Lack of Transportation (Medical):      Lack of Transportation (Non-Medical):    Physical Activity:      Days of Exercise per Week:      Minutes of Exercise per Session:    Stress:      Feeling of Stress :    Social Connections:      Frequency of Communication  with Friends and Family:      Frequency of Social Gatherings with Friends and Family:      Attends Alevism Services:      Active Member of Clubs or Organizations:      Attends Club or Organization Meetings:      Marital Status:    Intimate Partner Violence:      Fear of Current or Ex-Partner:      Emotionally Abused:      Physically Abused:      Sexually Abused:        MEDS:  See EMR, reviewed  ALL:  See EMR, reviewed    REVIEW OF SYSTEMS:  CONSTITUTIONAL:NEGATIVE for fever, chills, change in weight  INTEGUMENTARY/SKIN: NEGATIVE for worrisome rashes, moles or lesions  EYES: NEGATIVE for vision changes or irritation  ENT/MOUTH: NEGATIVE for ear, mouth and throat problems  RESP:NEGATIVE for significant cough or SOB  BREAST: NEGATIVE for masses, tenderness or discharge  CV: NEGATIVE for chest pain, palpitations or peripheral edema  GI: NEGATIVE for nausea, abdominal pain, heartburn, or change in bowel habits  :NEGATIVE for frequency, dysuria, or hematuria  :NEGATIVE for frequency, dysuria, or hematuria  NEURO: NEGATIVE for weakness, dizziness or paresthesias  ENDOCRINE: NEGATIVE for temperature intolerance, skin/hair changes  HEME/ALLERGY/IMMUNE: NEGATIVE for bleeding problems  PSYCHIATRIC: NEGATIVE for changes in mood or affect      Objective the right knee reveals no effusion.  She has tenderness along the lateral patellar facets and tenderness along the medial joint line.  She can flex and extend the knee fully.  Normal range of motion at the right hip.  Nontender over the pes anserine bursa or distal IT band.  Overlying skin is intact.  Appropriate in conversation affect.    We went over updated x-rays of her knee that show severe patellofemoral DJD that is bone-on-bone and moderate to severe medial joint space DJD.      Assessment right-sided knee DJD    Plan: She has not responded to viscose injections.  She has had varying response to cortisone injections.  She will return at the end of this month for a  repeat cortisone injection, anticipating her trip abroad a month from now.  She would like to have a consultation with one of the knee replacement surgeons, as she is anticipating possible knee replacement this winter or spring.  She would like to try some physical therapy in the meantime including patellar taping.

## 2021-08-18 NOTE — TELEPHONE ENCOUNTER
RECORDS RECEIVED FROM: Primary osteoarthritis of right knee /Dr Zepeda/ XR/ [employer] and [employer]/ ortho con   DATE RECEIVED: Sep 8, 2021     NOTES STATUS DETAILS   OFFICE NOTE from referring provider Internal  Dayton Zepeda MD   OFFICE NOTE from other specialist N/A    DISCHARGE SUMMARY from hospital N/A    DISCHARGE REPORT from the ER N/A    OPERATIVE REPORT N/A    MEDICATION LIST Internal    IMPLANT RECORD/STICKER N/A    LABS     CBC/DIFF N/A    CULTURES N/A    INJECTIONS DONE IN RADIOLOGY N/A    MRI Internal    CT SCAN N/A    XRAYS (IMAGES & REPORTS) Internal    TUMOR     PATHOLOGY  Slides & report N/A      08/18/21   2:34 PM   COMPLETE  Angeles Henley, CMA

## 2021-08-19 DIAGNOSIS — M17.11 PRIMARY OSTEOARTHRITIS OF RIGHT KNEE: Primary | ICD-10-CM

## 2021-08-19 NOTE — PROGRESS NOTES
"    [facility]  Orthopaedic Surgery Consultation by Gabriel Marrero M.D.    Giana Hope MRN# [id number]   Age: 76 year old YOB: 1945     Requesting physician: Gilberto Pandya     Background history:  DX:  1. History of anesthesia problem  2. Gastroesophageal reflux disease without esophagitis  3. Nodule of left lung  4. DDD  5. Risk for falls  6. Hypercholesterolemia  7. Nuclear sclerosis      TREATMENTS:  1. 2/25/2016, Left TKA, Сергей Pang MD , [facility], [facility], and [facility]  2. 8/25/2020, Reverse Left TSA, Ector Chapa M.D., [facility]             History of Present Illness:   76 year old female who presents her clinic because of chronic right knee pain.  This pain has been present for multiple years but antecedent trauma.  The pain is present behind the kneecap and on the inside of the knee.  The pain does sometimes radiate down her leg.  She denies the presence of significant lower back or hip pain.  The knee swells occasionally.  She denies the presence of mechanical symptoms.  No instability.  Her activity level has greatly decreased over time.  To mitigate the pain she uses over-the-counter analgesics, modified her activities, is going to see a physical therapist this week and has had multiple injections with both cortisone and hyaluronic acid.  The latest cortisone injection was end of August.  Patient does not smoke.  She does not have diabetes.    Social:   Occupation: Retired   Living situation: Lives with cat.  She has family close by.  Hobbies / Sports: walking and hiking     Smoking: No  Alcohol: Yes  Illicit drug use: No         Physical Exam:     EXAMINATION pertinent findings:   PSYCH: Pleasant, healthy-appearing, alert, oriented x3, cooperative. Normal mood and affect.  VITAL SIGNS: Height 1.6 m (5' 3\"), weight 66.7 kg (147 lb), not currently breastfeeding.  Reviewed " nursing intake notes.   Body mass index is 26.04 kg/m .  RESP: non labored breathing   ABD: benign, soft, non-tender, no acute peritoneal findings  SKIN: grossly normal   LYMPHATIC: grossly normal, no adenopathy, no extremity edema  NEURO: grossly normal , no motor deficits  VASCULAR: satisfactory perfusion of all extremities   MUSCULOSKELETAL:   Alignment: Neutral alignment of right lower extremity.  Gait: Normal gait.    The right hip exhibits a full range of motion.  No pains upon rotation.  Lasegue's test is negative.  No tenderness to palpation of the greater trochanteric region.    R knee: -0-0  .  Deep flexion is recognizably painful.  Straight leg raise +. No redness, warmth or skin changes present. Effusion No. Ligamentously stable in both ML and AP direction. Normal PF tracking with crepitus. Rabot +. Meniscal provocation tests are sensitive.  There is tenderness to palpation over the medial joint line.     Right LE:   Thigh and leg compartments soft and compressible   +Quad/TA/GSC/FHL/EHL   SILT DP/SP/Ana/Saph/Tib nerve distributions   Palpable dorsalis pedis pulse              Data:   All laboratory data reviewed    All imaging studies reviewed by me personally.    XR alignment films 9/8/2021:  My interpretation: Neutral alignment of right lower extremity.    XR knee right 8/12/2021:  My interpretation: End-stage osteoarthritic changes of the patellofemoral joint with complete obliteration of the joint space.  Presence of marginal osteophytes sclerosis and subchondral cyst.  Moderate to severe osteoarthritic changes of the medial compartment with significant joint space narrowing, marginal osteophytes, sclerosis and subchondral cysts.  Mild osteoarthritic changes of the lateral compartment.         Assessment and Plan:   Assessment:  76-year-old female with chronic pain of right knee due to end-stage osteoarthritic changes most notably in the medial and patellofemoral compartments.  Insufficiently  responding to treated nonoperative treatment measures.     Plan:  I had a long discussion with the patient regarding ongoing management options.  Reviewed surgical and nonsurgical treatments.  The non-surgical options include activity modification, pain medication, PT, bracing/taping and injection therapy.  We discussed that we could potentially further optimize her nonoperative treatment by means of physical therapy with taping of the patella.  As for surgery we discussed the option of total knee replacement surgery. We reviewed total knee replacement in detail including the procedure, the implants, the recovery process, and long-term outcomes.  We reviewed that the risks of the surgery include but are not limited to infection, wound problems, stiffness, persistent pain, swelling, clicking, loosening, revision surgery.  We also reviewed less common risks such as neurovascular injury fracture, and other implant-related issues.  We reviewed other medical complications such as a blood clot.  We discussed that the vast majority of cases have a highly successful outcome.  However there is a small subset of patients that do experience complications or problems following the knee replacement and these problems can be very debilitating and painful and sometimes do not improve.   Based on a discussion of the risks and benefits, we believe that the benefits far outweigh the risks at this point and the patient  would like to proceed with surgery.  We will work on scheduling surgery at a time that works well for patient in the next few months but not before end of November given her recent intra-articular injection with cortisone.  Patient will contact us if there are any questions or concerns leading up to surgery. Before surgery the patient will attend a joint replacement class and will be seen by the PCP and dentist.    Patient would like to have the knee replacement done under spinal surgery.     More information on joint  replacements can be found on : [email or web address]    Thank you for your referral.    Gabriel Marrero MD, PhD     Adult Reconstruction  [facility] Department of Orthopaedic Surgery  Pager [phone]      DATA for DOCUMENTATION:         Past Medical History:     Patient Active Problem List   Diagnosis     Hypercholesterolemia     Arthritis of shoulder region, left     Encounter to establish care     Primary osteoarthritis involving multiple joints     Gastroesophageal reflux disease without esophagitis     Nodule of left lung     Family history of malignant neoplasm of breast     Mass of left upper extremity     Back pain of thoracolumbar region     Balance disorder     Compression fracture of thoracic vertebra, open, initial encounter (H)     DDD (degenerative disc disease), thoracolumbar     Degenerative scoliosis in adult patient     History of anesthesia problem     Kyphosis (acquired) (postural)     Nuclear sclerosis     Osteoarthrosis     Osteopenia with high risk of fracture     Pain in left shoulder     Risk for falls     Status post left knee replacement     Pain in thoracic spine     Past Medical History:   Diagnosis Date     Nonsenile cataract      Osteoarthritis      Pulmonary nodule      Uveitis        Also see scanned health assessment forms.       Past Surgical History:     Past Surgical History:   Procedure Laterality Date     CATARACT IOL, RT/LT Right 03/2019     PHACOEMULSIFICATION WITH STANDARD INTRAOCULAR LENS IMPLANT Right 3/22/2019    Procedure: Right Cataract Removal with Intraocular Lens Implant;  Surgeon: Trish Taylor MD;  Location:  OR            Social History:     Social History     Socioeconomic History     Marital status:      Spouse name: Not on file     Number of children: Not on file     Years of education: Not on file     Highest education level: Not on file   Occupational History      Not on file   Tobacco Use     Smoking status: Former Smoker     Years: 35.00     Smokeless tobacco: Never Used     Tobacco comment: Quit 24 years ago    Substance and Sexual Activity     Alcohol use: Yes     Comment: Moderate     Drug use: No     Sexual activity: Not Currently     Partners: Male   Other Topics Concern     Not on file   Social History Narrative         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    Loves to travel. Moved to [place] 10/2018 to be near daughter Linda () son in law and 2 granddaughters. Her son Jeff ( ) lives in [place] Schizoaffective. She travels to visit him when able.       Social Determinants of Health     Financial Resource Strain:      Difficulty of Paying Living Expenses:    Food Insecurity:      Worried About Running Out of Food in the Last Year:      Ran Out of Food in the Last Year:    Transportation Needs:      Lack of Transportation (Medical):      Lack of Transportation (Non-Medical):    Physical Activity:      Days of Exercise per Week:      Minutes of Exercise per Session:    Stress:      Feeling of Stress :    Social Connections:      Frequency of Communication with Friends and Family:      Frequency of Social Gatherings with Friends and Family:      Attends Sabianist Services:      Active Member of Clubs or Organizations:      Attends Club or Organization Meetings:      Marital Status:    Intimate Partner Violence:      Fear of Current or Ex-Partner:      Emotionally Abused:      Physically Abused:      Sexually Abused:             Family History:       Family History   Problem Relation Age of Onset     Arthritis Mother      Breast Cancer Mother 78     Diabetes Type 2  Father      Heart Disease Father          age 75     Alzheimer Disease Father 60     Heart Disease  Brother 68     Breast Cancer Maternal Grandmother 81     Heart Disease Maternal Grandfather 54         age 81     Other - See Comments Son         schizoaffective lives in [place]     Glaucoma No family hx of      Macular Degeneration No family hx of             Medications:     Current Outpatient Medications   Medication Sig     cyclobenzaprine (FLEXERIL) 5 MG tablet Take 1 tablet (5 mg) by mouth 3 times daily as needed for muscle spasms (Patient not taking: Reported on 3/15/2021)     famotidine (PEPCID) 40 MG tablet TAKE 1 TABLET (40 MG) BY MOUTH DAILY AS NEEDED FOR HEARTBURN     fluocinolone (SYNALAR) 0.01 % solution Apply topically three times a week     hydrocortisone 2.5 % cream Apply topically 2 times daily as needed For up to 2 weeks at a time on the face. Avoid eye area.     ketoconazole (NIZORAL) 2 % external cream Apply topically 2 times daily     ketoconazole (NIZORAL) 2 % external shampoo Apply topically three times a week     pravastatin (PRAVACHOL) 20 MG tablet Take 1 tablet (20 mg) by mouth daily     prednisoLONE acetate (PRED FORTE) 1 % ophthalmic suspension Place 1-2 drops Into the left eye every hour     sulindac (CLINORIL) 200 MG tablet Take 1 tablet (200 mg) by mouth daily as needed (arthritis)     Current Facility-Administered Medications   Medication     sodium hyaluronate (SUPARTZ) injection 25 mg     sodium hyaluronate (SUPARTZ) injection 25 mg     sodium hyaluronate (SUPARTZ) injection 25 mg     sodium hyaluronate (SUPARTZ) injection 25 mg     sodium hyaluronate (SUPARTZ) injection 25 mg              Review of Systems:   A comprehensive 10 point review of systems (constitutional, ENT, cardiac, peripheral vascular, lymphatic, respiratory, GI, , Musculoskeletal, skin, Neurological) was performed and found to be negative except as described in this note.     See intake form completed by patient

## 2021-08-31 ENCOUNTER — OFFICE VISIT (OUTPATIENT)
Dept: ORTHOPEDICS | Facility: CLINIC | Age: 76
End: 2021-08-31
Payer: MEDICARE

## 2021-08-31 VITALS — WEIGHT: 147 LBS | BODY MASS INDEX: 26.05 KG/M2 | HEIGHT: 63 IN

## 2021-08-31 DIAGNOSIS — M17.11 PRIMARY OSTEOARTHRITIS OF RIGHT KNEE: Primary | ICD-10-CM

## 2021-08-31 PROCEDURE — 20610 DRAIN/INJ JOINT/BURSA W/O US: CPT | Mod: RT | Performed by: FAMILY MEDICINE

## 2021-08-31 PROCEDURE — 99213 OFFICE O/P EST LOW 20 MIN: CPT | Mod: 25 | Performed by: FAMILY MEDICINE

## 2021-08-31 RX ORDER — LIDOCAINE HYDROCHLORIDE 10 MG/ML
4 INJECTION, SOLUTION EPIDURAL; INFILTRATION; INTRACAUDAL; PERINEURAL
Status: DISCONTINUED | OUTPATIENT
Start: 2021-08-31 | End: 2021-11-26

## 2021-08-31 RX ORDER — TRIAMCINOLONE ACETONIDE 40 MG/ML
40 INJECTION, SUSPENSION INTRA-ARTICULAR; INTRAMUSCULAR
Status: DISCONTINUED | OUTPATIENT
Start: 2021-08-31 | End: 2022-04-22

## 2021-08-31 RX ADMIN — LIDOCAINE HYDROCHLORIDE 4 ML: 10 INJECTION, SOLUTION EPIDURAL; INFILTRATION; INTRACAUDAL; PERINEURAL at 14:28

## 2021-08-31 RX ADMIN — TRIAMCINOLONE ACETONIDE 40 MG: 40 INJECTION, SUSPENSION INTRA-ARTICULAR; INTRAMUSCULAR at 14:28

## 2021-08-31 ASSESSMENT — MIFFLIN-ST. JEOR: SCORE: 1125.92

## 2021-08-31 NOTE — LETTER
8/31/2021      RE: Giana Hope  [address]  [place] [address]         August 31, 2021: Giana Hope is a 76 year old female who is seen in f/u up for right knee pain          S: pt with weightbearing right knee pain, djd, presents for cortisone injection right knee.  She is planning her trip abroad.  She is hoping for improved pain relief.  She has an upcoming consultation with a knee replacement surgeon.  She is thinking about knee replacement either this winter or coming spring.  She knows she needs to avoid injections in the knee for 3 months prior to any procedure.      Objective the right knee reveals no effusion.  I can flex and extend it fully.  Overlying skin is intact.  Mildly tender over the medial joint line.  Nontender over the lateral joint line.  Troponin conversation affect.    Assessment: Right-sided knee DJD    Plan: After informed consent about bleeding, infection, steroid flare after prepping with surgical scrub she was injected in the right knee from a lateral approach in a seated position with 1 cc of Kenalog 40 and 5 cc of 1% lidocaine.  The medicine went in easily she left the clinic ambulatory she will look for improved with the injection and follow-up as needed.      PMH:  Past Medical History:   Diagnosis Date     Nonsenile cataract      Osteoarthritis      Pulmonary nodule      Uveitis        Active problem list:  Patient Active Problem List   Diagnosis     Hypercholesterolemia     Arthritis of shoulder region, left     Encounter to establish care     Primary osteoarthritis involving multiple joints     Gastroesophageal reflux disease without esophagitis     Nodule of left lung     Family history of malignant neoplasm of breast     Mass of left upper extremity     Back pain of thoracolumbar region     Balance disorder     Compression fracture of thoracic vertebra, open, initial encounter (H)     DDD (degenerative disc disease), thoracolumbar     Degenerative  scoliosis in adult patient     History of anesthesia problem     Kyphosis (acquired) (postural)     Nuclear sclerosis     Osteoarthrosis     Osteopenia with high risk of fracture     Pain in left shoulder     Risk for falls     Status post left knee replacement     Pain in thoracic spine       FH:  Family History   Problem Relation Age of Onset     Arthritis Mother      Breast Cancer Mother 78     Diabetes Type 2  Father      Heart Disease Father          age 75     Alzheimer Disease Father 60     Heart Disease Brother 68     Breast Cancer Maternal Grandmother 81     Heart Disease Maternal Grandfather 54         age 81     Other - See Comments Son         schizoaffective lives in [place]     Glaucoma No family hx of      Macular Degeneration No family hx of        SH:  Social History     Socioeconomic History     Marital status:      Spouse name: Not on file     Number of children: Not on file     Years of education: Not on file     Highest education level: Not on file   Occupational History     Not on file   Tobacco Use     Smoking status: Former Smoker     Years: 35.00     Smokeless tobacco: Never Used     Tobacco comment: Quit 24 years ago    Substance and Sexual Activity     Alcohol use: Yes     Comment: Moderate     Drug use: No     Sexual activity: Not Currently     Partners: Male   Other Topics Concern     Not on file   Social History Narrative         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    Loves to travel. Moved to [place] 10/2018 to be near daughter Linda () son in law and 2 granddaughters. Her son Jeff ( ) lives in [place] Schizoaffective. She travels to visit him when able.       Social Determinants of Health     Financial Resource Strain:      Difficulty of Paying Living Expenses:    Food  Insecurity:      Worried About Running Out of Food in the Last Year:      Ran Out of Food in the Last Year:    Transportation Needs:      Lack of Transportation (Medical):      Lack of Transportation (Non-Medical):    Physical Activity:      Days of Exercise per Week:      Minutes of Exercise per Session:    Stress:      Feeling of Stress :    Social Connections:      Frequency of Communication with Friends and Family:      Frequency of Social Gatherings with Friends and Family:      Attends Denominational Services:      Active Member of Clubs or Organizations:      Attends Club or Organization Meetings:      Marital Status:    Intimate Partner Violence:      Fear of Current or Ex-Partner:      Emotionally Abused:      Physically Abused:      Sexually Abused:        MEDS:  See EMR, reviewed  ALL:  See EMR, reviewed    REVIEW OF SYSTEMS:  CONSTITUTIONAL:NEGATIVE for fever, chills, change in weight  INTEGUMENTARY/SKIN: NEGATIVE for worrisome rashes, moles or lesions  EYES: NEGATIVE for vision changes or irritation  ENT/MOUTH: NEGATIVE for ear, mouth and throat problems  RESP:NEGATIVE for significant cough or SOB  BREAST: NEGATIVE for masses, tenderness or discharge  CV: NEGATIVE for chest pain, palpitations or peripheral edema  GI: NEGATIVE for nausea, abdominal pain, heartburn, or change in bowel habits  :NEGATIVE for frequency, dysuria, or hematuria  :NEGATIVE for frequency, dysuria, or hematuria  NEURO: NEGATIVE for weakness, dizziness or paresthesias  ENDOCRINE: NEGATIVE for temperature intolerance, skin/hair changes  HEME/ALLERGY/IMMUNE: NEGATIVE for bleeding problems  PSYCHIATRIC: NEGATIVE for changes in mood or affect                        Large Joint Injection/Arthocentesis    Date/Time: 8/31/2021 2:28 PM  Performed by: Dayton Zepeda MD  Authorized by: Dayton Zepeda MD     Indications:  Pain and osteoarthritis  Needle Size:  25 G  Guidance: landmark guided    Approach:   Anterolateral      Medications:  40 mg triamcinolone 40 MG/ML; 4 mL lidocaine (PF) 1 %  Outcome:  Tolerated well, no immediate complications  Procedure discussed: discussed risks, benefits, and alternatives    Consent Given by:  Patient  Timeout: timeout called immediately prior to procedure    Prep: patient was prepped and draped in usual sterile fashion     Tomas Lira ATC on 8/31/2021 at 2:28 PM    Dayton Zepeda MD

## 2021-08-31 NOTE — PROGRESS NOTES
August 31, 2021: Giana Hope is a 76 year old female who is seen in f/u up for right knee pain          S: pt with weightbearing right knee pain, djd, presents for cortisone injection right knee.  She is planning her trip abroad.  She is hoping for improved pain relief.  She has an upcoming consultation with a knee replacement surgeon.  She is thinking about knee replacement either this winter or coming spring.  She knows she needs to avoid injections in the knee for 3 months prior to any procedure.      Objective the right knee reveals no effusion.  I can flex and extend it fully.  Overlying skin is intact.  Mildly tender over the medial joint line.  Nontender over the lateral joint line.  Troponin conversation affect.    Assessment: Right-sided knee DJD    Plan: After informed consent about bleeding, infection, steroid flare after prepping with surgical scrub she was injected in the right knee from a lateral approach in a seated position with 1 cc of Kenalog 40 and 5 cc of 1% lidocaine.  The medicine went in easily she left the clinic ambulatory she will look for improved with the injection and follow-up as needed.      PMH:  Past Medical History:   Diagnosis Date     Nonsenile cataract      Osteoarthritis      Pulmonary nodule      Uveitis        Active problem list:  Patient Active Problem List   Diagnosis     Hypercholesterolemia     Arthritis of shoulder region, left     Encounter to establish care     Primary osteoarthritis involving multiple joints     Gastroesophageal reflux disease without esophagitis     Nodule of left lung     Family history of malignant neoplasm of breast     Mass of left upper extremity     Back pain of thoracolumbar region     Balance disorder     Compression fracture of thoracic vertebra, open, initial encounter (H)     DDD (degenerative disc disease), thoracolumbar     Degenerative scoliosis in adult patient     History of anesthesia problem     Kyphosis (acquired)  (postural)     Nuclear sclerosis     Osteoarthrosis     Osteopenia with high risk of fracture     Pain in left shoulder     Risk for falls     Status post left knee replacement     Pain in thoracic spine       FH:  Family History   Problem Relation Age of Onset     Arthritis Mother      Breast Cancer Mother 78     Diabetes Type 2  Father      Heart Disease Father          age 75     Alzheimer Disease Father 60     Heart Disease Brother 68     Breast Cancer Maternal Grandmother 81     Heart Disease Maternal Grandfather 54         age 81     Other - See Comments Son         schizoaffemichelle lives in [place]     Glaucoma No family hx of      Macular Degeneration No family hx of        SH:  Social History     Socioeconomic History     Marital status:      Spouse name: Not on file     Number of children: Not on file     Years of education: Not on file     Highest education level: Not on file   Occupational History     Not on file   Tobacco Use     Smoking status: Former Smoker     Years: 35.00     Smokeless tobacco: Never Used     Tobacco comment: Quit 24 years ago    Substance and Sexual Activity     Alcohol use: Yes     Comment: Moderate     Drug use: No     Sexual activity: Not Currently     Partners: Male   Other Topics Concern     Not on file   Social History Narrative         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    Loves to travel. Moved to [place] 10/2018 to be near daughter Linda () son in law and 2 granddaughters. Her son Jeff ( ) lives in [place] Schizoaffective. She travels to visit him when able.       Social Determinants of Health     Financial Resource Strain:      Difficulty of Paying Living Expenses:    Food Insecurity:      Worried About Running Out of Food in the Last Year:      Ran Out of Food  in the Last Year:    Transportation Needs:      Lack of Transportation (Medical):      Lack of Transportation (Non-Medical):    Physical Activity:      Days of Exercise per Week:      Minutes of Exercise per Session:    Stress:      Feeling of Stress :    Social Connections:      Frequency of Communication with Friends and Family:      Frequency of Social Gatherings with Friends and Family:      Attends Anglican Services:      Active Member of Clubs or Organizations:      Attends Club or Organization Meetings:      Marital Status:    Intimate Partner Violence:      Fear of Current or Ex-Partner:      Emotionally Abused:      Physically Abused:      Sexually Abused:        MEDS:  See EMR, reviewed  ALL:  See EMR, reviewed    REVIEW OF SYSTEMS:  CONSTITUTIONAL:NEGATIVE for fever, chills, change in weight  INTEGUMENTARY/SKIN: NEGATIVE for worrisome rashes, moles or lesions  EYES: NEGATIVE for vision changes or irritation  ENT/MOUTH: NEGATIVE for ear, mouth and throat problems  RESP:NEGATIVE for significant cough or SOB  BREAST: NEGATIVE for masses, tenderness or discharge  CV: NEGATIVE for chest pain, palpitations or peripheral edema  GI: NEGATIVE for nausea, abdominal pain, heartburn, or change in bowel habits  :NEGATIVE for frequency, dysuria, or hematuria  :NEGATIVE for frequency, dysuria, or hematuria  NEURO: NEGATIVE for weakness, dizziness or paresthesias  ENDOCRINE: NEGATIVE for temperature intolerance, skin/hair changes  HEME/ALLERGY/IMMUNE: NEGATIVE for bleeding problems  PSYCHIATRIC: NEGATIVE for changes in mood or affect                        Large Joint Injection/Arthocentesis    Date/Time: 8/31/2021 2:28 PM  Performed by: Dayton Zepeda MD  Authorized by: Dayton Zepeda MD     Indications:  Pain and osteoarthritis  Needle Size:  25 G  Guidance: landmark guided    Approach:  Anterolateral      Medications:  40 mg triamcinolone 40 MG/ML; 4 mL lidocaine (PF) 1 %  Outcome:  Tolerated well,  no immediate complications  Procedure discussed: discussed risks, benefits, and alternatives    Consent Given by:  Patient  Timeout: timeout called immediately prior to procedure    Prep: patient was prepped and draped in usual sterile fashion     Tomas Lira ATC on 8/31/2021 at 2:28 PM

## 2021-08-31 NOTE — NURSING NOTE
[address] [address]  Dept: [phone]  ______________________________________________________________________________    Patient: Giana Hope   : 1945   MRN: [id number]   2021    INVASIVE PROCEDURE SAFETY CHECKLIST    Date: 21   Procedure: Right knee joint CSI [place]  Patient Name: Giana Hope  MRN: [id number]  YOB: 1945    Action: Complete sections as appropriate. Any discrepancy results in a HARD COPY until resolved.     PRE PROCEDURE:  Patient ID verified with 2 identifiers (name and  or MRN): Yes  Procedure and site verified with patient/designee (when able): Yes  Accurate consent documentation in medical record: Yes  H&P (or appropriate assessment) documented in medical record: Yes  H&P must be up to 20 days prior to procedure and updates within 24 hours of procedure as applicable: NA  Relevant diagnostic and radiology test results appropriately labeled and displayed as applicable: Yes  Procedure site(s) marked with provider initials: NA    TIMEOUT:  Time-Out performed immediately prior to starting procedure, including verbal and active participation of all team members addressing the following:Yes  * Correct patient identify  * Confirmed that the correct side and site are marked  * An accurate procedure consent form  * Agreement on the procedure to be done  * Correct patient position  * Relevant images and results are properly labeled and appropriately displayed  * The need to administer antibiotics or fluids for irrigation purposes during the procedure as applicable   * Safety precautions based on patient history or medication use    DURING PROCEDURE: Verification of correct person, site, and procedures any time the responsibility for care of the patient is transferred to another member of the care team.       Prior to injection, verified patient identity using patient's name and date of  birth.  Due to injection administration, patient instructed to remain in clinic for 15 minutes  afterwards, and to report any adverse reaction to me immediately.    Joint injection was performed.      Drug Amount Wasted:  None.  Vial/Syringe: Single dose vial  Expiration Date:  1/1/23      Tomas Lira, [facility]  August 31, 2021

## 2021-09-08 ENCOUNTER — PRE VISIT (OUTPATIENT)
Dept: ORTHOPEDICS | Facility: CLINIC | Age: 76
End: 2021-09-08

## 2021-09-08 ENCOUNTER — OFFICE VISIT (OUTPATIENT)
Dept: ORTHOPEDICS | Facility: CLINIC | Age: 76
End: 2021-09-08
Payer: MEDICARE

## 2021-09-08 ENCOUNTER — ANCILLARY PROCEDURE (OUTPATIENT)
Dept: GENERAL RADIOLOGY | Facility: CLINIC | Age: 76
End: 2021-09-08
Attending: STUDENT IN AN ORGANIZED HEALTH CARE EDUCATION/TRAINING PROGRAM
Payer: MEDICARE

## 2021-09-08 VITALS — BODY MASS INDEX: 26.05 KG/M2 | HEIGHT: 63 IN | WEIGHT: 147 LBS

## 2021-09-08 DIAGNOSIS — M17.11 PRIMARY OSTEOARTHRITIS OF RIGHT KNEE: ICD-10-CM

## 2021-09-08 PROCEDURE — 99214 OFFICE O/P EST MOD 30 MIN: CPT | Performed by: STUDENT IN AN ORGANIZED HEALTH CARE EDUCATION/TRAINING PROGRAM

## 2021-09-08 PROCEDURE — 77073 BONE LENGTH STUDIES: CPT | Performed by: STUDENT IN AN ORGANIZED HEALTH CARE EDUCATION/TRAINING PROGRAM

## 2021-09-08 RX ORDER — CEFAZOLIN SODIUM 2 G/50ML
2 SOLUTION INTRAVENOUS SEE ADMIN INSTRUCTIONS
Status: CANCELLED | OUTPATIENT
Start: 2021-09-08

## 2021-09-08 RX ORDER — TRANEXAMIC ACID 650 MG/1
1950 TABLET ORAL ONCE
Status: CANCELLED | OUTPATIENT
Start: 2021-09-08 | End: 2021-09-08

## 2021-09-08 RX ORDER — CEFAZOLIN SODIUM 2 G/50ML
2 SOLUTION INTRAVENOUS
Status: CANCELLED | OUTPATIENT
Start: 2021-09-08

## 2021-09-08 ASSESSMENT — MIFFLIN-ST. JEOR: SCORE: 1125.92

## 2021-09-08 NOTE — NURSING NOTE
Teaching Flowsheet   Relevant Diagnosis: Right knee osteoarthritis  Teaching Topic: Right total knee arthroplasty.     Patient states that her /support person will be her daughter Linda. 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 She had a L TKA 5-1/2 years ago and a TSA August 2020.     Person(s) involved in teaching:   Patient     Motivation Level:  Asks Questions: Yes  Eager to Learn: Yes  Cooperative: Yes  Receptive (willing/able to accept information): Yes  Any cultural factors/Sikh beliefs that may influence understanding or compliance? No     Patient demonstrates understanding of the following:  Reason for the appointment, diagnosis and treatment plan: Yes  Knowledge of proper use of medications and conditions for which they are ordered (with special attention to potential side effects or drug interactions): Yes  Which situations necessitate calling provider and whom to contact: Yes     Teaching Concerns Addressed: Discussed total joint online class. Patient understands he will need a preop exam within 30 days of date of surgery. He understands the expected length of stay and the expectation of outpatient physical therapy. Discussed dental prophylaxis as well as the need for COVID testing 4 days prior to surgery. Discussed the GetWell Loop.     Proper use and care of assistive devices (medical equip, care aids, etc.): Yes  Nutritional needs and diet plan: Yes  Pain management techniques: Yes  Wound Care: Yes  How and/when to access community resources: Yes     Instructional Materials Used/Given: Preoperative teaching packet, surgical soap x2, dental card, total joint class booklet

## 2021-09-09 ENCOUNTER — THERAPY VISIT (OUTPATIENT)
Dept: PHYSICAL THERAPY | Facility: CLINIC | Age: 76
End: 2021-09-09
Attending: FAMILY MEDICINE
Payer: MEDICARE

## 2021-09-09 DIAGNOSIS — M17.11 PRIMARY OSTEOARTHRITIS OF RIGHT KNEE: ICD-10-CM

## 2021-09-09 PROCEDURE — 97112 NEUROMUSCULAR REEDUCATION: CPT | Mod: GP | Performed by: PHYSICAL THERAPIST

## 2021-09-09 PROCEDURE — 97161 PT EVAL LOW COMPLEX 20 MIN: CPT | Mod: GP | Performed by: PHYSICAL THERAPIST

## 2021-09-09 ASSESSMENT — ACTIVITIES OF DAILY LIVING (ADL)
RISE FROM A CHAIR: ACTIVITY IS MINIMALLY DIFFICULT
SQUAT: ACTIVITY IS VERY DIFFICULT
STIFFNESS: THE SYMPTOM AFFECTS MY ACTIVITY MODERATELY
WEAKNESS: I DO NOT HAVE THE SYMPTOM
KNEE_ACTIVITY_OF_DAILY_LIVING_SCORE: 62.86
AS_A_RESULT_OF_YOUR_KNEE_INJURY,_HOW_WOULD_YOU_RATE_YOUR_CURRENT_LEVEL_OF_DAILY_ACTIVITY?: ABNORMAL
STAND: ACTIVITY IS NOT DIFFICULT
GO UP STAIRS: ACTIVITY IS NOT DIFFICULT
KNEEL ON THE FRONT OF YOUR KNEE: ACTIVITY IS VERY DIFFICULT
SWELLING: THE SYMPTOM AFFECTS MY ACTIVITY SLIGHTLY
PAIN: THE SYMPTOM AFFECTS MY ACTIVITY MODERATELY
HOW_WOULD_YOU_RATE_THE_CURRENT_FUNCTION_OF_YOUR_KNEE_DURING_YOUR_USUAL_DAILY_ACTIVITIES_ON_A_SCALE_FROM_0_TO_100_WITH_100_BEING_YOUR_LEVEL_OF_KNEE_FUNCTION_PRIOR_TO_YOUR_INJURY_AND_0_BEING_THE_INABILITY_TO_PERFORM_ANY_OF_YOUR_USUAL_DAILY_ACTIVITIES?: 50
KNEE_ACTIVITY_OF_DAILY_LIVING_SUM: 44
WALK: ACTIVITY IS FAIRLY DIFFICULT
SIT WITH YOUR KNEE BENT: ACTIVITY IS MINIMALLY DIFFICULT
LIMPING: I DO NOT HAVE THE SYMPTOM
RAW_SCORE: 44
GO DOWN STAIRS: ACTIVITY IS FAIRLY DIFFICULT
GIVING WAY, BUCKLING OR SHIFTING OF KNEE: THE SYMPTOM AFFECTS MY ACTIVITY SLIGHTLY
HOW_WOULD_YOU_RATE_THE_OVERALL_FUNCTION_OF_YOUR_KNEE_DURING_YOUR_USUAL_DAILY_ACTIVITIES?: ABNORMAL

## 2021-09-09 NOTE — PROGRESS NOTES
Physical Therapy Initial Evaluation  Subjective:  The history is provided by the patient. No  was used.   Therapist Generated HPI Evaluation         Type of problem:  Right knee (Sleeping in January 2021, 5/10 pain level).    This is a recurrent condition.  Condition occurred with:  Insidious onset.  Where condition occurred: for unknown reasons.  Patient reports pain:  Anterior.  Pain is described as aching and is intermittent.  Radiates to: none. Pain timing: none.  Since onset symptoms are gradually improving.  Associated symptoms:  Buckling/giving out (stiffness). Symptoms are exacerbated by walking, standing and descending stairs  Relieved by: gel and sterord injections, ice.  Special tests included:  X-ray.  Past treatment: none. There was none improvement following previous treatment.  Work activity restrictions: retired.                          Objective:  Standing Alignment:                Ankle/foot deviations: pes planus , hallux valgus (B)                                                           Knee Evaluation:  ROM:    AROM      Extension:  Left: 0    Right:  0  Flexion: Left: 122    Right: 120        Strength:     Extension:  Left: 5-/5   Pain:      Right: 5-/5   Pain:  Flexion:  Left: 5-/5   Pain:      Right: 4+/5   Pain:    Quad Set Left: Good    Pain:   Quad Set Right: Fair    Pain:  Ligament Testing:  Not Assessed                Special Tests: Not Assessed      Palpation:  Palpation of knee: right ITB tightness.      Edema:  Edema of the knee: general ( mild)     Mobility Testing:    Proximal Tib-Fib:  Left: normal    Right: hypomobile  Patellofemoral Medial:  Right: hypomobile  Patellofemoral Lateral:  Right: hypomobile  Patellofemoral Superior:  Left: normal      Patellofemoral Inferior:  Left: normal      Functional Testing:  not assessed              right greater left  lateral patellar and glide and tilt with increased lateral lige on right with quadricep contraction      General     ROS     R knee XRAY:   Impression:  1. No acute osseous abnormality.  2. Moderate medial compartment joint space loss of the right knee,  mildly progressed since 2019.  3. Lateral patellar subluxation with associated joint space loss.  Assessment/Plan:    Patient is a 76 year old female with right side knee complaints.    Patient has the following significant findings with corresponding treatment plan.                Diagnosis 1:  Primary osteoarthritis of right knee  Pain -  hot/cold therapy, manual therapy, self management, education, directional preference exercise and home program  Decreased ROM/flexibility - manual therapy, therapeutic exercise, therapeutic activity and home program  Decreased joint mobility - manual therapy, therapeutic exercise, therapeutic activity and home program  Decreased strength - therapeutic exercise, therapeutic activities and home program  Impaired balance - neuro re-education, therapeutic activities and home program  Impaired muscle performance - neuro re-education and home program  Decreased function - therapeutic activities and home program    Therapy Evaluation Codes:   1) History comprised of:   Personal factors that impact the plan of care:        2) Clinical presentation characteristics are:   Stable/Uncomplicated.  3) Decision-Making    Low complexity using standardized patient assessment instrument and/or measureable assessment of functional outcome.  Cumulative Therapy Evaluation is: Low complexity.    Previous and current functional limitations:  (See Goal Flow Sheet for this information)    Short term and Long term goals: (See Goal Flow Sheet for this information)     Communication ability:  Patient appears to be able to clearly communicate and understand verbal and written communication and follow directions correctly.  Treatment Explanation - The following has been discussed with the patient:   RX ordered/plan of care  Anticipated outcomes  Possible  risks and side effects  This patient would benefit from PT intervention to resume normal activities.   Rehab potential is excellent.    Frequency:  1 X week, once daily  Duration:  for 6 weeks  Discharge Plan:  Achieve all LTG.  Independent in home treatment program.  Reach maximal therapeutic benefit.    Please refer to the daily flowsheet for treatment today, total treatment time and time spent performing 1:1 timed codes.

## 2021-09-09 NOTE — LETTER
DEPARTMENT OF HEALTH AND HUMAN SERVICES  [facility] FOR MEDICARE & MEDICAID SERVICES    PLAN/UPDATED PLAN OF PROGRESS FOR OUTPATIENT REHABILITATION     PATIENTS NAME:  Giana Hope   : 1945  PROVIDER NUMBER:    [id number]  HICN:  [id number]  [facility]  MEDICAL RECORD NUMBER: [id number]   START OF CARE DATE:    21  TYPE:  PT  PRIMARY/TREATMENT DIAGNOSIS: (Pertinent Medical Diagnosis)  Primary osteoarthritis of right knee  VISITS FROM START OF CARE:  Rxs Used: 1     Physical Therapy Initial Evaluation  Subjective:  The history is provided by the patient. No  was used.   Therapist Generated HPI Evaluation         Type of problem:  Right knee (Sleeping in 2021, 5/10 pain level).  Date of MD Order 21.    This is a recurrent condition.  Condition occurred with:  Insidious onset.  Where condition occurred: for unknown reasons.  Patient reports pain:  Anterior.  Pain is described as aching and is intermittent.  Radiates to: none. Pain timing: none.  Since onset symptoms are gradually improving.  Associated symptoms:  Buckling/giving out (stiffness). Symptoms are exacerbated by walking, standing and descending stairs  Relieved by: gel and sterord injections, ice.  Special tests included:  X-ray.  Past treatment: none. There was none improvement following previous treatment.  Work activity restrictions: retired.    Patient Health History  Pain is reported as 2/10 on pain scale.  General health as reported by patient is good.  Pertinent medical history includes: anemia, asthma, concussions/dizziness, depression, implanted device, menopausal, osteoarthritis and smoking.   Red flags:  Pain at rest/night.  Medical allergies: other. Other medical allergies details: Celebrex.   Surgeries include:  Orthopedic surgery. Other surgery history details: L knee, L shoulder.    Current medications:  Anti-inflammatory and other.  Other medications details: acid reflux/cholesterol.       Primary job tasks include:  Computer work, driving, lifting/carrying, prolonged sitting and prolonged standing.                          PATIENTS NAME:  Giana Hope   : 1945    Objective:  Standing Alignment:    Ankle/foot deviations: pes planus , hallux valgus (B)           Knee Evaluation:  ROM:    AROM  Extension:  Left: 0    Right:  0  Flexion: Left: 122    Right: 120    Strength:   Extension:  Left: 5-/5   Pain:      Right: 5-/5   Pain:  Flexion:  Left: 5-/5   Pain:      Right: 4+/5   Pain:    Quad Set Left: Good    Pain:   Quad Set Right: Fair    Pain:  Ligament Testing:  Not Assessed  Special Tests: Not Assessed  Palpation:  Palpation of knee: right ITB tightness.  Edema:  Edema of the knee: general ( mild)   Mobility Testing:    Proximal Tib-Fib:  Left: normal    Right: hypomobile  Patellofemoral Medial:  Right: hypomobile  Patellofemoral Lateral:  Right: hypomobile  Patellofemoral Superior:  Left: normal      Patellofemoral Inferior:  Left: normal      Functional Testing:  not assessed    right greater left  lateral patellar and glide and tilt with increased lateral lige on right with quadricep contraction     General   ROS     R knee XRAY:   Impression:  1. No acute osseous abnormality.  2. Moderate medial compartment joint space loss of the right knee,  mildly progressed since 2019.  3. Lateral patellar subluxation with associated joint space loss.    Assessment/Plan:    Patient is a 76 year old female with right side knee complaints.    Patient has the following significant findings with corresponding treatment plan.                Diagnosis 1:  Primary osteoarthritis of right knee  Pain -  hot/cold therapy, manual therapy, self management, education, directional preference exercise and home program  Decreased ROM/flexibility - manual therapy, therapeutic exercise, therapeutic activity and home program  Decreased joint mobility -  manual therapy, therapeutic exercise, therapeutic activity and home program  Decreased strength - therapeutic exercise, therapeutic activities and home program  Impaired balance - neuro re-education, therapeutic activities and home program  Impaired muscle performance - neuro re-education and home program  Decreased function - therapeutic activities and home program    Therapy Evaluation Codes:   1) History comprised of:   Personal factors that impact the plan of care:        2) Clinical presentation characteristics are:   Stable/Uncomplicated.  3) Decision-Making    Low complexity using standardized patient assessment instrument and/or measureable assessment of functional outcome.  Cumulative Therapy Evaluation is: Low complexity.    Previous and current functional limitations:  (See Goal Flow Sheet for this information)    Short term and Long term goals: (See Goal Flow Sheet for this information)     Communication ability:  Patient appears to be able to clearly communicate and understand verbal and written communication and follow directions correctly.  Treatment Explanation - The following has been discussed with the patient:   RX ordered/plan of care  Anticipated outcomes  Possible risks and side effects  This patient would benefit from PT intervention to resume normal activities.   Rehab potential is excellent.    Frequency:  1 X week, once daily  Duration:  for 6 weeks  Discharge Plan:  Achieve all LTG.  Independent in home treatment program.  Reach maximal therapeutic benefit.  rolonged sitting and prolonged standing.                  Caregiver Signature/Credentials _____________________________ Date ________        Trini Miller PT            I have reviewed and certified the need for these services and plan of treatment while under my care.        PHYSICIAN'S SIGNATURE:   _________________________________________  Date___________   Dayton Zepeda MD     Certification period:  9/9/21  To      "12/7/21    Functional Level Progress Report: Please see attached \"Goal Flow sheet for Functional level.\"    ____X____ Continue Services or       ________ DC Services                Service dates: From  9/9/21 to present                         "

## 2021-09-10 NOTE — PROGRESS NOTES
Physical Therapy Initial Evaluation  Subjective:    Patient Health History         Pain is reported as 2/10 on pain scale.  General health as reported by patient is good.  Pertinent medical history includes: anemia, asthma, concussions/dizziness, depression, implanted device, menopausal, osteoarthritis and smoking.   Red flags:  Pain at rest/night.  Medical allergies: other. Other medical allergies details: Celebrex.   Surgeries include:  Orthopedic surgery. Other surgery history details: L knee, L shoulder.    Current medications:  Anti-inflammatory and other. Other medications details: acid reflux/cholesterol.       Primary job tasks include:  Computer work, driving, lifting/carrying, prolonged sitting and prolonged standing.                                    Objective:  System    Physical Exam    General     ROS    Assessment/Plan:

## 2021-09-16 ENCOUNTER — THERAPY VISIT (OUTPATIENT)
Dept: PHYSICAL THERAPY | Facility: CLINIC | Age: 76
End: 2021-09-16
Payer: MEDICARE

## 2021-09-16 DIAGNOSIS — M17.10 ARTHRITIS OF KNEE: Primary | ICD-10-CM

## 2021-09-16 PROCEDURE — 97140 MANUAL THERAPY 1/> REGIONS: CPT | Mod: GP | Performed by: PHYSICAL THERAPIST

## 2021-09-16 PROCEDURE — 97110 THERAPEUTIC EXERCISES: CPT | Mod: GP | Performed by: PHYSICAL THERAPIST

## 2021-09-21 ENCOUNTER — THERAPY VISIT (OUTPATIENT)
Dept: PHYSICAL THERAPY | Facility: CLINIC | Age: 76
End: 2021-09-21
Payer: MEDICARE

## 2021-09-21 DIAGNOSIS — M25.562 ARTHRALGIA OF BOTH KNEES: Primary | ICD-10-CM

## 2021-09-21 DIAGNOSIS — M25.561 ARTHRALGIA OF BOTH KNEES: Primary | ICD-10-CM

## 2021-09-21 DIAGNOSIS — M19.90 OSTEOARTHROSIS: ICD-10-CM

## 2021-09-21 PROCEDURE — 97110 THERAPEUTIC EXERCISES: CPT | Mod: GP | Performed by: PHYSICAL THERAPIST

## 2021-09-21 PROCEDURE — 97140 MANUAL THERAPY 1/> REGIONS: CPT | Mod: GP | Performed by: PHYSICAL THERAPIST

## 2021-09-26 DIAGNOSIS — K21.9 GASTROESOPHAGEAL REFLUX DISEASE WITHOUT ESOPHAGITIS: ICD-10-CM

## 2021-09-27 RX ORDER — FAMOTIDINE 40 MG/1
40 TABLET, FILM COATED ORAL DAILY PRN
Qty: 30 TABLET | Refills: 1 | Status: SHIPPED | OUTPATIENT
Start: 2021-09-27 | End: 2021-10-27

## 2021-09-27 NOTE — TELEPHONE ENCOUNTER
Last Clinic Visit: 10/26/2020  [facility] [place]  Future Visit: 11/15/2021

## 2021-10-04 ENCOUNTER — THERAPY VISIT (OUTPATIENT)
Dept: PHYSICAL THERAPY | Facility: CLINIC | Age: 76
End: 2021-10-04
Payer: MEDICARE

## 2021-10-04 DIAGNOSIS — M25.561 ARTHRALGIA OF BOTH KNEES: Primary | ICD-10-CM

## 2021-10-04 DIAGNOSIS — M25.562 ARTHRALGIA OF BOTH KNEES: Primary | ICD-10-CM

## 2021-10-04 DIAGNOSIS — M17.11 PRIMARY OSTEOARTHRITIS OF RIGHT KNEE: ICD-10-CM

## 2021-10-04 PROCEDURE — 97140 MANUAL THERAPY 1/> REGIONS: CPT | Mod: GP | Performed by: PHYSICAL THERAPIST

## 2021-10-05 ENCOUNTER — THERAPY VISIT (OUTPATIENT)
Dept: PHYSICAL THERAPY | Facility: CLINIC | Age: 76
End: 2021-10-05
Payer: MEDICARE

## 2021-10-05 DIAGNOSIS — M19.90 OSTEOARTHROSIS: ICD-10-CM

## 2021-10-05 PROCEDURE — 97140 MANUAL THERAPY 1/> REGIONS: CPT | Mod: GP | Performed by: PHYSICAL THERAPIST

## 2021-10-24 DIAGNOSIS — Z11.59 ENCOUNTER FOR SCREENING FOR OTHER VIRAL DISEASES: ICD-10-CM

## 2021-10-26 DIAGNOSIS — K21.9 GASTROESOPHAGEAL REFLUX DISEASE WITHOUT ESOPHAGITIS: ICD-10-CM

## 2021-10-27 RX ORDER — FAMOTIDINE 40 MG/1
40 TABLET, FILM COATED ORAL DAILY PRN
Qty: 90 TABLET | Refills: 0 | Status: SHIPPED | OUTPATIENT
Start: 2021-10-27 | End: 2021-11-15

## 2021-10-27 NOTE — TELEPHONE ENCOUNTER
10/26/2020 last visit Dr Vasquez, RTC 11/15/21  [facility] [place]

## 2021-11-02 ENCOUNTER — THERAPY VISIT (OUTPATIENT)
Dept: PHYSICAL THERAPY | Facility: CLINIC | Age: 76
End: 2021-11-02
Payer: MEDICARE

## 2021-11-02 DIAGNOSIS — M17.11 PRIMARY OSTEOARTHRITIS OF RIGHT KNEE: Primary | ICD-10-CM

## 2021-11-02 PROCEDURE — 97140 MANUAL THERAPY 1/> REGIONS: CPT | Mod: GP | Performed by: PHYSICAL THERAPIST

## 2021-11-02 PROCEDURE — 97110 THERAPEUTIC EXERCISES: CPT | Mod: GP | Performed by: PHYSICAL THERAPIST

## 2021-11-02 ASSESSMENT — ACTIVITIES OF DAILY LIVING (ADL)
RAW_SCORE: 54
HOW_WOULD_YOU_RATE_THE_CURRENT_FUNCTION_OF_YOUR_KNEE_DURING_YOUR_USUAL_DAILY_ACTIVITIES_ON_A_SCALE_FROM_0_TO_100_WITH_100_BEING_YOUR_LEVEL_OF_KNEE_FUNCTION_PRIOR_TO_YOUR_INJURY_AND_0_BEING_THE_INABILITY_TO_PERFORM_ANY_OF_YOUR_USUAL_DAILY_ACTIVITIES?: 75
HOW_WOULD_YOU_RATE_THE_OVERALL_FUNCTION_OF_YOUR_KNEE_DURING_YOUR_USUAL_DAILY_ACTIVITIES?: NEARLY NORMAL
STAND: ACTIVITY IS NOT DIFFICULT
WEAKNESS: I DO NOT HAVE THE SYMPTOM
SWELLING: THE SYMPTOM AFFECTS MY ACTIVITY SLIGHTLY
GO DOWN STAIRS: ACTIVITY IS MINIMALLY DIFFICULT
SQUAT: ACTIVITY IS VERY DIFFICULT
KNEE_ACTIVITY_OF_DAILY_LIVING_SCORE: 77.14
KNEEL ON THE FRONT OF YOUR KNEE: ACTIVITY IS VERY DIFFICULT
LIMPING: I DO NOT HAVE THE SYMPTOM
GO UP STAIRS: ACTIVITY IS NOT DIFFICULT
STIFFNESS: THE SYMPTOM AFFECTS MY ACTIVITY MODERATELY
WALK: ACTIVITY IS NOT DIFFICULT
AS_A_RESULT_OF_YOUR_KNEE_INJURY,_HOW_WOULD_YOU_RATE_YOUR_CURRENT_LEVEL_OF_DAILY_ACTIVITY?: NEARLY NORMAL
KNEE_ACTIVITY_OF_DAILY_LIVING_SUM: 54
SIT WITH YOUR KNEE BENT: ACTIVITY IS NOT DIFFICULT
PAIN: THE SYMPTOM AFFECTS MY ACTIVITY SLIGHTLY
RISE FROM A CHAIR: ACTIVITY IS NOT DIFFICULT
GIVING WAY, BUCKLING OR SHIFTING OF KNEE: I DO NOT HAVE THE SYMPTOM

## 2021-11-02 NOTE — Clinical Note
Hawa Anne responded well to course of therapy and discharged to home program with anticipation of surgery at end of month.    Trini

## 2021-11-02 NOTE — PROGRESS NOTES
Subjective:  HPI  Physical Exam                    Objective:  System    Physical Exam    General     ROS    Assessment/Plan:    DISCHARGE REPORT    Progress reporting period is from September 9, 2021 to November 2, 2021.       SUBJECTIVE  Subjective changes noted by patient:  Significant improvement overall. She is walking 30-40 minutes with 2-3/10 and feels comfortable continuing with home program until surgery later in the month.      Current pain level is 1/10     Previous pain level was  Initial Pain level: 5/10.   Changes in function:  Yes (See Goal flowsheet attached for changes in current functional level)  Adverse reaction to treatment or activity: None    OBJECTIVE  Changes noted in objective findings:  Knee Evaluation:  ROM:    AROM        Extension:  Left: 0    Right:  0  Flexion: Left: 122    Right: 125           Strength:      Extension:  Left: 5-/5   Pain:      Right: 5-/5-5/5   Pain:  Flexion:  Left: 5-/5   Pain:      Right: 5-/5-5/5   Pain:    Quad Set Left: Good    Pain:   Quad Set Right: Fair    Pain:     Palpation:  Palpation of knee: mild decrease in  right ITB tightness.      ASSESSMENT/PLAN  Updated problem list and treatment plan: Diagnosis 1:  Primary osteoarthritis of right knee  Pain -  hot/cold therapy, manual therapy, self management, education, directional preference exercise and home program  Decreased ROM/flexibility - manual therapy, therapeutic exercise, therapeutic activity and home program  Decreased joint mobility - manual therapy, therapeutic exercise, therapeutic activity and home program  Decreased strength - therapeutic exercise, therapeutic activities and home program  Impaired balance - neuro re-education, therapeutic activities and home program  Impaired muscle performance - neuro re-education and home program  Decreased function - therapeutic activities and home program  STG/LTGs have been met or progress has been made towards goals:  Yes (See Goal flow sheet completed  today.)  Assessment of Progress: The patient's condition is improving.  Self Management Plans:  Patient has been instructed in a home treatment program.  Patient  has been instructed in self management of symptoms.  Patient is independent in self management of symptoms.  I have re-evaluated this patient and find that the nature, scope, duration and intensity of the therapy is appropriate for the medical condition of the patient.  Giana continues to require the following intervention to meet STG and LTG's:  PT intervention is no longer required to meet STG/LTG.    Recommendations:  This patient is ready to be discharged from therapy and continue their home treatment program.    Please refer to the daily flowsheet for treatment today, total treatment time and time spent performing 1:1 timed codes.

## 2021-11-12 ASSESSMENT — ENCOUNTER SYMPTOMS
SINUS CONGESTION: 1
SMELL DISTURBANCE: 0
MUSCLE WEAKNESS: 0
WEAKNESS: 0
MYALGIAS: 0
SEIZURES: 0
DIFFICULTY URINATING: 0
PARALYSIS: 0
JOINT SWELLING: 1
ARTHRALGIAS: 1
DISTURBANCES IN COORDINATION: 0
DYSURIA: 0
HOARSE VOICE: 0
SINUS PAIN: 0
FLANK PAIN: 0
STIFFNESS: 1
TREMORS: 0
HEADACHES: 0
SORE THROAT: 0
TINGLING: 0
NECK MASS: 0
DIZZINESS: 1
MUSCLE CRAMPS: 1
NECK PAIN: 0
HEMATURIA: 0
BACK PAIN: 1
MEMORY LOSS: 0
NUMBNESS: 0
LOSS OF CONSCIOUSNESS: 0
TASTE DISTURBANCE: 0
TROUBLE SWALLOWING: 0
SPEECH CHANGE: 0

## 2021-11-15 ENCOUNTER — OFFICE VISIT (OUTPATIENT)
Dept: FAMILY MEDICINE | Facility: CLINIC | Age: 76
End: 2021-11-15
Payer: MEDICARE

## 2021-11-15 VITALS
DIASTOLIC BLOOD PRESSURE: 83 MMHG | BODY MASS INDEX: 25.45 KG/M2 | HEART RATE: 74 BPM | WEIGHT: 143.6 LBS | HEIGHT: 63 IN | TEMPERATURE: 98.1 F | SYSTOLIC BLOOD PRESSURE: 128 MMHG | RESPIRATION RATE: 16 BRPM | OXYGEN SATURATION: 97 %

## 2021-11-15 DIAGNOSIS — Z12.11 ENCOUNTER FOR SCREENING FOR MALIGNANT NEOPLASM OF COLON: ICD-10-CM

## 2021-11-15 DIAGNOSIS — R91.8 PULMONARY NODULES: ICD-10-CM

## 2021-11-15 DIAGNOSIS — E78.5 HYPERLIPIDEMIA WITH TARGET LDL LESS THAN 130: ICD-10-CM

## 2021-11-15 DIAGNOSIS — Z01.818 PREOP GENERAL PHYSICAL EXAM: Primary | ICD-10-CM

## 2021-11-15 DIAGNOSIS — M17.11 PRIMARY OSTEOARTHRITIS OF RIGHT KNEE: ICD-10-CM

## 2021-11-15 DIAGNOSIS — K21.9 GASTROESOPHAGEAL REFLUX DISEASE WITHOUT ESOPHAGITIS: ICD-10-CM

## 2021-11-15 PROCEDURE — 99215 OFFICE O/P EST HI 40 MIN: CPT | Performed by: FAMILY MEDICINE

## 2021-11-15 RX ORDER — PRAVASTATIN SODIUM 20 MG
20 TABLET ORAL DAILY
Qty: 90 TABLET | Refills: 3 | Status: SHIPPED | OUTPATIENT
Start: 2021-11-15 | End: 2022-10-03

## 2021-11-15 RX ORDER — FAMOTIDINE 40 MG/1
40 TABLET, FILM COATED ORAL DAILY PRN
Qty: 90 TABLET | Refills: 3 | Status: SHIPPED | OUTPATIENT
Start: 2021-11-15 | End: 2022-10-03

## 2021-11-15 ASSESSMENT — MIFFLIN-ST. JEOR: SCORE: 1110.5

## 2021-11-15 ASSESSMENT — PAIN SCALES - GENERAL: PAINLEVEL: NO PAIN (0)

## 2021-11-15 NOTE — NURSING NOTE
Chief Complaint   Patient presents with     Pre-Op Exam     Patient comes in for a preop exam.         Amos Mireles MA on 11/15/2021 at 11:27 AM

## 2021-11-15 NOTE — PATIENT INSTRUCTIONS

## 2021-11-15 NOTE — H&P (VIEW-ONLY)
[facility]IN[facility] [address]  4TH FLOOR  [facility] [address]  Phone: [phone]  Fax: [phone]  Primary Provider: Gilberto Vasquez  Pre-op Performing Provider: GILBERTO VASQUEZ    {Provider  Link to PREOP SmartSet  Use this to apply standard patient instructions to AVS; includes medication directions, common orders, guidelines for anemia, warfarin, additional testing   :[id number]}  PREOPERATIVE EVALUATION:  Today's date: 11/15/2021    Giana Hope is a 76 year old female who presents for a preoperative evaluation.    Surgical Information:  Surgery/Procedure: Arthroplasty, Right Knee, Total  Surgery Location: [facility]  Surgeon: Dr. Marrero  Surgery Date: 11/26/21  Time of Surgery: 7:30am  Where patient plans to recover: At home with family  Fax number for surgical facility: Note does not need to be faxed, will be available electronically in Epic.    Type of Anesthesia Anticipated: Spinal or choice  General caused lethargy then insomnia    Assessment & Plan   Giana Hope is a 76 year old female with history of arthritis, hyperlipidemia, previous history of tobacco use quit over 25 years ago with small pulmonary nodule who presents today for pre- physical and follow-up primary care.    The proposed surgical procedure is considered LOW risk.    Preop general physical exam Primary osteoarthritis of right knee  See instructions check with surgeon RE covid testing  - CBC with platelets differential  - Comprehensive metabolic panel    Pulmonary nodules  Due for low dose CT. Current non smoker  - CT Chest w/o contrast        Gastroesophageal reflux disease without esophagitis  stable  - famotidine (PEPCID) 40 MG tablet  Dispense: 90 tablet; Refill: 3  - CBC with platelets differential  - Comprehensive metabolic panel    Hyperlipidemia with target LDL less than 130  Stable refills provided  - pravastatin (PRAVACHOL) 20 MG tablet  Dispense: 90  tablet; Refill: 3  - Lipid panel reflex to direct LDL Fasting  - Comprehensive metabolic panel    Encounter for screening for malignant neoplasm of colon   Due for screening  - Fecal cancer screen FIT           Risks and Recommendations:  The patient has the following additional risks and recommendations for perioperative complications:   - No identified additional risk factors other than previously addressed        RECOMMENDATION:  APPROVAL GIVEN to proceed with proposed procedure, without further diagnostic evaluation. She will have labs completed     52 minutes spent on the date of the encounter doing chart review, history, exam, diagnostics review, documentation, counseling and coordination of cares as noted.        Subjective   Giana Hope is a 76 year old female with history of arthritis, hyperlipidemia, previous history of tobacco use quit over 25 years ago with small pulmonary nodule who presents today for pre- physical and follow-up primary care.    HPI related to upcoming procedure: Right Knee  Pain from arthritis for many years, had a gel injection. Had several injections prior to travel to [facility] was able to travel after one steroid injection but requires joint replacement.Intermittent pain 3/10 and feels the steroid is wearing off. Interferring with life.      Preop Questions 11/12/2021   1. Have you ever had a heart attack or stroke? No   2. Have you ever had surgery on your heart or blood vessels, such as a stent placement, a coronary artery bypass, or surgery on an artery in your head, neck, heart, or legs? No   3. Do you have chest pain with activity? No   4. Do you have a history of  heart failure? No   5. Do you currently have a cold, bronchitis or symptoms of other infection? No   6. Do you have a cough, shortness of breath, or wheezing? No   7. Do you or anyone in your family have previous history of blood clots? UNKNOWN -    8. Do you or does anyone in your family have a serious  bleeding problem such as prolonged bleeding following surgeries or cuts? No   9. Have you ever had problems with anemia or been told to take iron pills? YES - Many years ago Thrombocytopenia age 20 no recent concerns   10. Have you had any abnormal blood loss such as black, tarry or bloody stools, or abnormal vaginal bleeding? No   11. Have you ever had a blood transfusion? YES - Many years ago   11a. Have you ever had a transfusion reaction? No   12. Are you willing to have a blood transfusion if it is medically needed before, during, or after your surgery? Yes   13. Have you or any of your relatives ever had problems with anesthesia? YES - She had trouble with general anesthesia  Lethargy, Mom had delerium   14. Do you have sleep apnea, excessive snoring or daytime drowsiness? No   15. Do you have any artifical heart valves or other implanted medical devices like a pacemaker, defibrillator, or continuous glucose monitor? No   16. Do you have artificial joints? YES - left knee, left shoulder   17. Are you allergic to latex? No       Health Care Directive:  Patient does not have a Health Care Directive or Living Will    Preoperative Review of :   reviewed - no record of controlled substances prescribed.      Status of Chronic Conditions:  See problem list for active medical problems.    Problems all longstanding and stable, except as noted/documented.  See ROS for pertinent symptoms related to these conditions.   Occasional heartburn after eating relieved with Pepcid prn.  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    Loves to travel. Moved to [place] 10/2018 to be  near daughter Linda (1973) son in law and 2 granddaughters [facility] living [facility]    Her son Jeff ( 1970) lives in [place] Schizoaffective. She travels to visit him when able.       Review of Systems   Answers for HPI/ROS submitted by the patient on 11/12/2021  General Symptoms: No  Skin Symptoms: No  HENT Symptoms: Yes  EYE SYMPTOMS: No  HEART SYMPTOMS: No  LUNG SYMPTOMS: No  INTESTINAL SYMPTOMS: No  URINARY SYMPTOMS: Yes  GYNECOLOGIC SYMPTOMS: No  BREAST SYMPTOMS: No  SKELETAL SYMPTOMS: Yes  BLOOD SYMPTOMS: No  NERVOUS SYSTEM SYMPTOMS: Yes  MENTAL HEALTH SYMPTOMS: No  Congestion: Yes  Morning  Probable allergies now  the weather is cold, better   Voice hoarseness: No  Tooth pain: No  Gum tenderness: No  Bleeding gums: No  Change in taste: No  Change in sense of smell: No  Dry mouth: Yes occassional  Hearing aid used: No  Neck lump: No  Trouble holding urine or incontinence: Yes occasional  Stress if waits too long  Increased frequency of urination: No  Decreased frequency of urination: No  Frequent nighttime urination: No  Bone pain: No  Muscle cramps: Yes knee pain  Muscle weakness: No  Joint stiffness: Yes knee pain  Bone fracture: No  Trouble with coordination: No  Fainting or black-out spells: No  Memory loss: No  Speech problems: No  Tingling: No  Difficulty walking: No  Paralysis: No  Constitutional, neuro, ENT, endocrine, pulmonary, cardiac, gastrointestinal, genitourinary, musculoskeletal, integument and psychiatric systems are negative, except as otherwise noted.    Immunization History   Administered Date(s) Administered     COVID-19,PF,Pfizer (12+ Yrs) 01/30/2021, 02/20/2021, 09/25/2021     FLUAD -HD 65+ QUAD ([facility] ONLY) 09/25/2021     Flu, Unspecified 10/13/2009, 10/02/2014, 10/21/2015, 09/30/2016     HepA-Adult 02/23/2018, 05/16/2019     Influenza (High Dose) 3 valent vaccine 10/28/2017     Influenza, Quad, High Dose, Pf, 65yr+ (Fluzone HD) 10/12/2020     Pneumo Conj 13-V (2010&after)  12/11/2015     Pneumococcal 23 valent 03/18/2011     TD (ADULT, 7+) 02/23/2018     TDAP Vaccine (Boostrix) 08/16/2007     Typhoid IM 05/16/2019     Yellow Fever, Live (Stamaril) 11/30/2017           Patient Active Problem List    Diagnosis Date Noted     Pain in thoracic spine 04/16/2021     Priority: Medium     History of anesthesia problem 07/24/2020     Priority: Medium     Mass of left upper extremity 07/07/2020     Priority: Medium     Encounter to establish care 04/20/2020     Priority: Medium     Primary osteoarthritis involving multiple joints 04/20/2020     Priority: Medium     Gastroesophageal reflux disease without esophagitis 04/20/2020     Priority: Medium     Family history of malignant neoplasm of breast 04/20/2020     Priority: Medium     Arthritis of shoulder region, left 12/17/2019     Priority: Medium     Added automatically from request for surgery [id number]       Nodule of left lung 06/19/2018     Priority: Medium     Overview:   5 mm nodule on CT scan 6/2018. Rec repeat in 1 year.       Pain in left shoulder 02/12/2018     Priority: Medium     Compression fracture of thoracic vertebra, open, initial encounter (H) 10/17/2017     Priority: Medium     Back pain of thoracolumbar region 09/19/2017     Priority: Medium     DDD (degenerative disc disease), thoracolumbar 09/19/2017     Priority: Medium     Degenerative scoliosis in adult patient 09/19/2017     Priority: Medium     Kyphosis (acquired) (postural) 09/19/2017     Priority: Medium     Osteopenia with high risk of fracture 09/19/2017     Priority: Medium     Risk for falls 09/19/2017     Priority: Medium     Hypercholesterolemia 06/07/2017     Priority: Medium     Nuclear sclerosis 03/22/2017     Priority: Medium     Status post left knee replacement 04/05/2016     Priority: Medium     Balance disorder 09/20/2013     Priority: Medium     Osteoarthrosis 09/20/2013     Priority: Medium     Overview:   Severe osteoarthritis L shoulder  Severe  osteoarthritis L shoulder        Past Medical History:   Diagnosis Date     Nonsenile cataract      Osteoarthritis      Pulmonary nodule      Uveitis      Past Surgical History:   Procedure Laterality Date     CATARACT IOL, RT/LT Right 2019     PHACOEMULSIFICATION WITH STANDARD INTRAOCULAR LENS IMPLANT Right 3/22/2019    Procedure: Right Cataract Removal with Intraocular Lens Implant;  Surgeon: Trish Taylor MD;  Location:  OR     Current Outpatient Medications   Medication Sig Dispense Refill     famotidine (PEPCID) 40 MG tablet Take 1 tablet (40 mg) by mouth daily as needed for heartburn 90 tablet 3     fluocinolone (SYNALAR) 0.01 % solution Apply topically three times a week 60 mL 3     hydrocortisone 2.5 % cream Apply topically 2 times daily as needed For up to 2 weeks at a time on the face. Avoid eye area. 20 g 3     ketoconazole (NIZORAL) 2 % external cream Apply topically 2 times daily 60 g 4     ketoconazole (NIZORAL) 2 % external shampoo Apply topically three times a week 120 mL 11     pravastatin (PRAVACHOL) 20 MG tablet Take 1 tablet (20 mg) by mouth daily 90 tablet 3     prednisoLONE acetate (PRED FORTE) 1 % ophthalmic suspension Place 1-2 drops Into the left eye every hour 1 Bottle 3     sulindac (CLINORIL) 200 MG tablet Take 1 tablet (200 mg) by mouth 2 times daily 180 tablet 0       Allergies   Allergen Reactions     Dust Mites Itching     Runny nose, fever     Mold Itching     Fever, runny nose     Pollen Extract Itching     Fever, runny nose     Celecoxib      Other reaction(s): GI intolerance        Social History     Tobacco Use     Smoking status: Former Smoker     Years: 35.00     Smokeless tobacco: Never Used     Tobacco comment: Quit 24 years ago    Substance Use Topics     Alcohol use: Yes     Comment: Moderate     Family History   Problem Relation Age of Onset     Arthritis Mother      Breast Cancer Mother 78     Diabetes Type 2  Father      Heart Disease Father          age  "75     Alzheimer Disease Father 60     Heart Disease Brother 68     Breast Cancer Maternal Grandmother 81     Heart Disease Maternal Grandfather 54         age 81     Other - See Comments Son         schizoaffective lives in [place]     Glaucoma No family hx of      Macular Degeneration No family hx of      History   Drug Use No         Objective     /83 (BP Location: Right arm, Patient Position: Sitting, Cuff Size: Adult Regular)   Pulse 74   Temp 98.1  F (36.7  C) (Oral)   Resp 16   Ht 1.6 m (5' 3\")   Wt 65.1 kg (143 lb 9.6 oz)   LMP  (LMP Unknown)   SpO2 97%   BMI 25.44 kg/m      Physical Exam    GENERAL APPEARANCE: healthy, alert and no distress     EYES: EOMI, PERRL     HENT: ear canals and TM's normal and mask removed briefly Metal Retainer not removable mouth without ulcers or lesions. Teeth yellowing     NECK: no adenopathy, no asymmetry, masses, or scars and thyroid normal to palpation     RESP: lungs clear to auscultation - no rales, rhonchi or wheezes     CV: regular rates and rhythm, normal S1 S2, no S3 or S4 and no murmur, click or rub     ABDOMEN:  soft, nontender, no HSM or masses and bowel sounds normal     MS:  Right knee osteoarthritis chronic swelling, left knee well healed incision,  Other extremities signs of arthritis moves all     SKIN: no suspicious lesions or rashes     NEURO: Normal strength and tone,  mentation intact and speech normal     PSYCH: mentation appears normal. and affect normal, concern for anesthesia side effects     LYMPHATICS: No cervical adenopathy    Recent Labs   Lab Test 20  1400 20  0925 20  1009   HGB 13.3  --  14.9     --  206   NA  --  137 140   POTASSIUM  --  4.1 4.7   CR  --  0.98 0.94   A1C  --  5.3  --         Diagnostics:  Lab ordered for future due for fasting  No EKG required, no history of coronary heart disease, significant arrhythmia, peripheral arterial disease or other structural heart disease.    Revised Cardiac Risk " Index (RCRI):  The patient has the following serious cardiovascular risks for perioperative complications:   - No serious cardiac risks = 0 points     RCRI Interpretation: 0 points: Class I (very low risk - 0.4% complication rate)           Signed Electronically by: Gilberto Vasquez MD  Copy of this evaluation report is provided to requesting physician.

## 2021-11-15 NOTE — PROGRESS NOTES
[facility] PRIMARY CARE CLINIC [address]  4TH FLOOR  [facility] [address]  Phone: [phone]  Fax: [phone]  Primary Provider: Gilberto Vasquez  Pre-op Performing Provider: GILBERTO VASQUEZ    {Provider  Link to PREOP SmartSet  Use this to apply standard patient instructions to AVS; includes medication directions, common orders, guidelines for anemia, warfarin, additional testing   :[id number]}  PREOPERATIVE EVALUATION:  Today's date: 11/15/2021    Giana Hope is a 76 year old female who presents for a preoperative evaluation.    Surgical Information:  Surgery/Procedure: Arthroplasty, Right Knee, Total  Surgery Location: [facility]  Surgeon: Dr. Marrero  Surgery Date: 11/26/21  Time of Surgery: 7:30am  Where patient plans to recover: At home with family  Fax number for surgical facility: Note does not need to be faxed, will be available electronically in Epic.    Type of Anesthesia Anticipated: Spinal or choice  General caused lethargy then insomnia    Assessment & Plan   Giana Hope is a 76 year old female with history of arthritis, hyperlipidemia, previous history of tobacco use quit over 25 years ago with small pulmonary nodule who presents today for pre- physical and follow-up primary care.    The proposed surgical procedure is considered LOW risk.    Preop general physical exam Primary osteoarthritis of right knee  See instructions check with surgeon RE covid testing  - CBC with platelets differential  - Comprehensive metabolic panel    Pulmonary nodules  Due for low dose CT. Current non smoker  - CT Chest w/o contrast        Gastroesophageal reflux disease without esophagitis  stable  - famotidine (PEPCID) 40 MG tablet  Dispense: 90 tablet; Refill: 3  - CBC with platelets differential  - Comprehensive metabolic panel    Hyperlipidemia with target LDL less than 130  Stable refills provided  - pravastatin (PRAVACHOL) 20 MG tablet  Dispense: 90  tablet; Refill: 3  - Lipid panel reflex to direct LDL Fasting  - Comprehensive metabolic panel    Encounter for screening for malignant neoplasm of colon   Due for screening  - Fecal cancer screen FIT           Risks and Recommendations:  The patient has the following additional risks and recommendations for perioperative complications:   - No identified additional risk factors other than previously addressed        RECOMMENDATION:  APPROVAL GIVEN to proceed with proposed procedure, without further diagnostic evaluation. She will have labs completed     52 minutes spent on the date of the encounter doing chart review, history, exam, diagnostics review, documentation, counseling and coordination of cares as noted.        Subjective   Giana Hope is a 76 year old female with history of arthritis, hyperlipidemia, previous history of tobacco use quit over 25 years ago with small pulmonary nodule who presents today for pre- physical and follow-up primary care.    HPI related to upcoming procedure: Right Knee  Pain from arthritis for many years, had a gel injection. Had several injections prior to travel to [facility] was able to travel after one steroid injection but requires joint replacement.Intermittent pain 3/10 and feels the steroid is wearing off. Interferring with life.      Preop Questions 11/12/2021   1. Have you ever had a heart attack or stroke? No   2. Have you ever had surgery on your heart or blood vessels, such as a stent placement, a coronary artery bypass, or surgery on an artery in your head, neck, heart, or legs? No   3. Do you have chest pain with activity? No   4. Do you have a history of  heart failure? No   5. Do you currently have a cold, bronchitis or symptoms of other infection? No   6. Do you have a cough, shortness of breath, or wheezing? No   7. Do you or anyone in your family have previous history of blood clots? UNKNOWN -    8. Do you or does anyone in your family have a serious  bleeding problem such as prolonged bleeding following surgeries or cuts? No   9. Have you ever had problems with anemia or been told to take iron pills? YES - Many years ago Thrombocytopenia age 20 no recent concerns   10. Have you had any abnormal blood loss such as black, tarry or bloody stools, or abnormal vaginal bleeding? No   11. Have you ever had a blood transfusion? YES - Many years ago   11a. Have you ever had a transfusion reaction? No   12. Are you willing to have a blood transfusion if it is medically needed before, during, or after your surgery? Yes   13. Have you or any of your relatives ever had problems with anesthesia? YES - She had trouble with general anesthesia  Lethargy, Mom had delerium   14. Do you have sleep apnea, excessive snoring or daytime drowsiness? No   15. Do you have any artifical heart valves or other implanted medical devices like a pacemaker, defibrillator, or continuous glucose monitor? No   16. Do you have artificial joints? YES - left knee, left shoulder   17. Are you allergic to latex? No       Health Care Directive:  Patient does not have a Health Care Directive or Living Will    Preoperative Review of :   reviewed - no record of controlled substances prescribed.      Status of Chronic Conditions:  See problem list for active medical problems.    Problems all longstanding and stable, except as noted/documented.  See ROS for pertinent symptoms related to these conditions.   Occasional heartburn after eating relieved with Pepcid prn.  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    Loves to travel. Moved to [place] 10/2018 to be  near daughter Linda (1973) son in law and 2 granddaughters will be living in [facility]    Her son Jeff ( 1970) lives in [place] Schizoaffective. She travels to visit him when able.       Review of Systems   Answers for HPI/ROS submitted by the patient on 11/12/2021  General Symptoms: No  Skin Symptoms: No  HENT Symptoms: Yes  EYE SYMPTOMS: No  HEART SYMPTOMS: No  LUNG SYMPTOMS: No  INTESTINAL SYMPTOMS: No  URINARY SYMPTOMS: Yes  GYNECOLOGIC SYMPTOMS: No  BREAST SYMPTOMS: No  SKELETAL SYMPTOMS: Yes  BLOOD SYMPTOMS: No  NERVOUS SYSTEM SYMPTOMS: Yes  MENTAL HEALTH SYMPTOMS: No  Congestion: Yes  Morning  Probable allergies now  the weather is cold, better   Voice hoarseness: No  Tooth pain: No  Gum tenderness: No  Bleeding gums: No  Change in taste: No  Change in sense of smell: No  Dry mouth: Yes occassional  Hearing aid used: No  Neck lump: No  Trouble holding urine or incontinence: Yes occasional  Stress if waits too long  Increased frequency of urination: No  Decreased frequency of urination: No  Frequent nighttime urination: No  Bone pain: No  Muscle cramps: Yes knee pain  Muscle weakness: No  Joint stiffness: Yes knee pain  Bone fracture: No  Trouble with coordination: No  Fainting or black-out spells: No  Memory loss: No  Speech problems: No  Tingling: No  Difficulty walking: No  Paralysis: No  Constitutional, neuro, ENT, endocrine, pulmonary, cardiac, gastrointestinal, genitourinary, musculoskeletal, integument and psychiatric systems are negative, except as otherwise noted.    Immunization History   Administered Date(s) Administered     COVID-19,PF,Pfizer (12+ Yrs) 01/30/2021, 02/20/2021, 09/25/2021     FLUAD -HD 65+ QUAD ([facility]) 09/25/2021     Flu, Unspecified 10/13/2009, 10/02/2014, 10/21/2015, 09/30/2016     HepA-Adult 02/23/2018, 05/16/2019     Influenza (High Dose) 3 valent vaccine 10/28/2017     Influenza, Quad, High Dose, Pf, 65yr+ (Fluzone HD) 10/12/2020     Pneumo Conj 13-V (2010&after)  12/11/2015     Pneumococcal 23 valent 03/18/2011     TD (ADULT, 7+) 02/23/2018     TDAP Vaccine (Boostrix) 08/16/2007     Typhoid IM 05/16/2019     Yellow Fever, Live (Stamaril) 11/30/2017           Patient Active Problem List    Diagnosis Date Noted     Pain in thoracic spine 04/16/2021     Priority: Medium     History of anesthesia problem 07/24/2020     Priority: Medium     Mass of left upper extremity 07/07/2020     Priority: Medium     Encounter to establish care 04/20/2020     Priority: Medium     Primary osteoarthritis involving multiple joints 04/20/2020     Priority: Medium     Gastroesophageal reflux disease without esophagitis 04/20/2020     Priority: Medium     Family history of malignant neoplasm of breast 04/20/2020     Priority: Medium     Arthritis of shoulder region, left 12/17/2019     Priority: Medium     Added automatically from request for surgery [id number]       Nodule of left lung 06/19/2018     Priority: Medium     Overview:   5 mm nodule on CT scan 6/2018. Rec repeat in 1 year.       Pain in left shoulder 02/12/2018     Priority: Medium     Compression fracture of thoracic vertebra, open, initial encounter (H) 10/17/2017     Priority: Medium     Back pain of thoracolumbar region 09/19/2017     Priority: Medium     DDD (degenerative disc disease), thoracolumbar 09/19/2017     Priority: Medium     Degenerative scoliosis in adult patient 09/19/2017     Priority: Medium     Kyphosis (acquired) (postural) 09/19/2017     Priority: Medium     Osteopenia with high risk of fracture 09/19/2017     Priority: Medium     Risk for falls 09/19/2017     Priority: Medium     Hypercholesterolemia 06/07/2017     Priority: Medium     Nuclear sclerosis 03/22/2017     Priority: Medium     Status post left knee replacement 04/05/2016     Priority: Medium     Balance disorder 09/20/2013     Priority: Medium     Osteoarthrosis 09/20/2013     Priority: Medium     Overview:   Severe osteoarthritis L shoulder  Severe  osteoarthritis L shoulder        Past Medical History:   Diagnosis Date     Nonsenile cataract      Osteoarthritis      Pulmonary nodule      Uveitis      Past Surgical History:   Procedure Laterality Date     CATARACT IOL, RT/LT Right 2019     PHACOEMULSIFICATION WITH STANDARD INTRAOCULAR LENS IMPLANT Right 3/22/2019    Procedure: Right Cataract Removal with Intraocular Lens Implant;  Surgeon: Trish Taylor MD;  Location:  OR     Current Outpatient Medications   Medication Sig Dispense Refill     famotidine (PEPCID) 40 MG tablet Take 1 tablet (40 mg) by mouth daily as needed for heartburn 90 tablet 3     fluocinolone (SYNALAR) 0.01 % solution Apply topically three times a week 60 mL 3     hydrocortisone 2.5 % cream Apply topically 2 times daily as needed For up to 2 weeks at a time on the face. Avoid eye area. 20 g 3     ketoconazole (NIZORAL) 2 % external cream Apply topically 2 times daily 60 g 4     ketoconazole (NIZORAL) 2 % external shampoo Apply topically three times a week 120 mL 11     pravastatin (PRAVACHOL) 20 MG tablet Take 1 tablet (20 mg) by mouth daily 90 tablet 3     prednisoLONE acetate (PRED FORTE) 1 % ophthalmic suspension Place 1-2 drops Into the left eye every hour 1 Bottle 3     sulindac (CLINORIL) 200 MG tablet Take 1 tablet (200 mg) by mouth 2 times daily 180 tablet 0       Allergies   Allergen Reactions     Dust Mites Itching     Runny nose, fever     Mold Itching     Fever, runny nose     Pollen Extract Itching     Fever, runny nose     Celecoxib      Other reaction(s): GI intolerance        Social History     Tobacco Use     Smoking status: Former Smoker     Years: 35.00     Smokeless tobacco: Never Used     Tobacco comment: Quit 24 years ago    Substance Use Topics     Alcohol use: Yes     Comment: Moderate     Family History   Problem Relation Age of Onset     Arthritis Mother      Breast Cancer Mother 78     Diabetes Type 2  Father      Heart Disease Father          age  "75     Alzheimer Disease Father 60     Heart Disease Brother 68     Breast Cancer Maternal Grandmother 81     Heart Disease Maternal Grandfather 54         age 81     Other - See Comments Son         schizoaffective lives in [place]     Glaucoma No family hx of      Macular Degeneration No family hx of      History   Drug Use No         Objective     /83 (BP Location: Right arm, Patient Position: Sitting, Cuff Size: Adult Regular)   Pulse 74   Temp 98.1  F (36.7  C) (Oral)   Resp 16   Ht 1.6 m (5' 3\")   Wt 65.1 kg (143 lb 9.6 oz)   LMP  (LMP Unknown)   SpO2 97%   BMI 25.44 kg/m      Physical Exam    GENERAL APPEARANCE: healthy, alert and no distress     EYES: EOMI, PERRL     HENT: ear canals and TM's normal and mask removed briefly Metal Retainer not removable mouth without ulcers or lesions. Teeth yellowing     NECK: no adenopathy, no asymmetry, masses, or scars and thyroid normal to palpation     RESP: lungs clear to auscultation - no rales, rhonchi or wheezes     CV: regular rates and rhythm, normal S1 S2, no S3 or S4 and no murmur, click or rub     ABDOMEN:  soft, nontender, no HSM or masses and bowel sounds normal     MS:  Right knee osteoarthritis chronic swelling, left knee well healed incision,  Other extremities signs of arthritis moves all     SKIN: no suspicious lesions or rashes     NEURO: Normal strength and tone,  mentation intact and speech normal     PSYCH: mentation appears normal. and affect normal, concern for anesthesia side effects     LYMPHATICS: No cervical adenopathy    Recent Labs   Lab Test 20  1400 20  0925 20  1009   HGB 13.3  --  14.9     --  206   NA  --  137 140   POTASSIUM  --  4.1 4.7   CR  --  0.98 0.94   A1C  --  5.3  --         Diagnostics:  Lab ordered for future due for fasting  No EKG required, no history of coronary heart disease, significant arrhythmia, peripheral arterial disease or other structural heart disease.    Revised Cardiac Risk " Index (RCRI):  The patient has the following serious cardiovascular risks for perioperative complications:   - No serious cardiac risks = 0 points     RCRI Interpretation: 0 points: Class I (very low risk - 0.4% complication rate)           Signed Electronically by: Gilberto Vasquez MD  Copy of this evaluation report is provided to requesting physician.

## 2021-11-18 ENCOUNTER — LAB (OUTPATIENT)
Dept: LAB | Facility: CLINIC | Age: 76
End: 2021-11-18

## 2021-11-18 ENCOUNTER — ANCILLARY PROCEDURE (OUTPATIENT)
Dept: CT IMAGING | Facility: CLINIC | Age: 76
End: 2021-11-18
Attending: FAMILY MEDICINE
Payer: MEDICARE

## 2021-11-18 DIAGNOSIS — E78.5 HYPERLIPIDEMIA WITH TARGET LDL LESS THAN 130: ICD-10-CM

## 2021-11-18 DIAGNOSIS — Z12.11 ENCOUNTER FOR SCREENING FOR MALIGNANT NEOPLASM OF COLON: ICD-10-CM

## 2021-11-18 DIAGNOSIS — K21.9 GASTROESOPHAGEAL REFLUX DISEASE WITHOUT ESOPHAGITIS: ICD-10-CM

## 2021-11-18 DIAGNOSIS — Z01.818 PREOP GENERAL PHYSICAL EXAM: ICD-10-CM

## 2021-11-18 DIAGNOSIS — R91.8 PULMONARY NODULES: ICD-10-CM

## 2021-11-18 LAB
ALBUMIN SERPL-MCNC: 4 G/DL (ref 3.4–5)
ALP SERPL-CCNC: 99 U/L (ref 40–150)
ALT SERPL W P-5'-P-CCNC: 26 U/L (ref 0–50)
ANION GAP SERPL CALCULATED.3IONS-SCNC: 6 MMOL/L (ref 3–14)
AST SERPL W P-5'-P-CCNC: 20 U/L (ref 0–45)
BASOPHILS # BLD AUTO: 0.1 10E3/UL (ref 0–0.2)
BASOPHILS NFR BLD AUTO: 1 %
BILIRUB SERPL-MCNC: 0.6 MG/DL (ref 0.2–1.3)
BUN SERPL-MCNC: 27 MG/DL (ref 7–30)
CALCIUM SERPL-MCNC: 9.9 MG/DL (ref 8.5–10.1)
CHLORIDE BLD-SCNC: 106 MMOL/L (ref 94–109)
CHOLEST SERPL-MCNC: 219 MG/DL
CO2 SERPL-SCNC: 28 MMOL/L (ref 20–32)
CREAT SERPL-MCNC: 0.92 MG/DL (ref 0.52–1.04)
EOSINOPHIL # BLD AUTO: 0.2 10E3/UL (ref 0–0.7)
EOSINOPHIL NFR BLD AUTO: 4 %
ERYTHROCYTE [DISTWIDTH] IN BLOOD BY AUTOMATED COUNT: 12.6 % (ref 10–15)
FASTING STATUS PATIENT QL REPORTED: YES
GFR SERPL CREATININE-BSD FRML MDRD: 61 ML/MIN/1.73M2
GLUCOSE BLD-MCNC: 114 MG/DL (ref 70–99)
HCT VFR BLD AUTO: 43.2 % (ref 35–47)
HDLC SERPL-MCNC: 108 MG/DL
HGB BLD-MCNC: 13.9 G/DL (ref 11.7–15.7)
IMM GRANULOCYTES # BLD: 0 10E3/UL
IMM GRANULOCYTES NFR BLD: 0 %
LDLC SERPL CALC-MCNC: 97 MG/DL
LYMPHOCYTES # BLD AUTO: 1.6 10E3/UL (ref 0.8–5.3)
LYMPHOCYTES NFR BLD AUTO: 30 %
MCH RBC QN AUTO: 31.5 PG (ref 26.5–33)
MCHC RBC AUTO-ENTMCNC: 32.2 G/DL (ref 31.5–36.5)
MCV RBC AUTO: 98 FL (ref 78–100)
MONOCYTES # BLD AUTO: 0.3 10E3/UL (ref 0–1.3)
MONOCYTES NFR BLD AUTO: 6 %
NEUTROPHILS # BLD AUTO: 3.2 10E3/UL (ref 1.6–8.3)
NEUTROPHILS NFR BLD AUTO: 59 %
NONHDLC SERPL-MCNC: 111 MG/DL
NRBC # BLD AUTO: 0 10E3/UL
NRBC BLD AUTO-RTO: 0 /100
PLATELET # BLD AUTO: 192 10E3/UL (ref 150–450)
POTASSIUM BLD-SCNC: 4 MMOL/L (ref 3.4–5.3)
PROT SERPL-MCNC: 7.5 G/DL (ref 6.8–8.8)
RBC # BLD AUTO: 4.41 10E6/UL (ref 3.8–5.2)
SODIUM SERPL-SCNC: 140 MMOL/L (ref 133–144)
TRIGL SERPL-MCNC: 71 MG/DL
WBC # BLD AUTO: 5.4 10E3/UL (ref 4–11)

## 2021-11-18 PROCEDURE — 71250 CT THORAX DX C-: CPT | Mod: MG | Performed by: RADIOLOGY

## 2021-11-18 PROCEDURE — 80053 COMPREHEN METABOLIC PANEL: CPT | Performed by: PATHOLOGY

## 2021-11-18 PROCEDURE — G1004 CDSM NDSC: HCPCS | Mod: GC | Performed by: RADIOLOGY

## 2021-11-18 PROCEDURE — 36415 COLL VENOUS BLD VENIPUNCTURE: CPT | Performed by: PATHOLOGY

## 2021-11-18 PROCEDURE — 85025 COMPLETE CBC W/AUTO DIFF WBC: CPT | Performed by: PATHOLOGY

## 2021-11-18 PROCEDURE — 80061 LIPID PANEL: CPT | Performed by: PATHOLOGY

## 2021-11-22 ENCOUNTER — TELEPHONE (OUTPATIENT)
Dept: ORTHOPEDICS | Facility: CLINIC | Age: 76
End: 2021-11-22
Payer: MEDICARE

## 2021-11-22 NOTE — TELEPHONE ENCOUNTER
[facility] Call Center    Phone Message:  Pt would like a CALL BACK to discuss her questions.      May a detailed message be left on voicemail: Yes     Reason for Call: Other: Call Back:  Questions About Possible Surgery Cancellation     Action Taken: Message routed to:  [facility] Team    Travel Screening: Not Applicable

## 2021-11-23 ENCOUNTER — LAB (OUTPATIENT)
Dept: LAB | Facility: CLINIC | Age: 76
End: 2021-11-23
Attending: STUDENT IN AN ORGANIZED HEALTH CARE EDUCATION/TRAINING PROGRAM
Payer: MEDICARE

## 2021-11-23 ENCOUNTER — MYC MEDICAL ADVICE (OUTPATIENT)
Dept: ORTHOPEDICS | Facility: CLINIC | Age: 76
End: 2021-11-23

## 2021-11-23 DIAGNOSIS — Z11.59 ENCOUNTER FOR SCREENING FOR OTHER VIRAL DISEASES: ICD-10-CM

## 2021-11-23 PROCEDURE — U0005 INFEC AGEN DETEC AMPLI PROBE: HCPCS

## 2021-11-23 PROCEDURE — U0003 INFECTIOUS AGENT DETECTION BY NUCLEIC ACID (DNA OR RNA); SEVERE ACUTE RESPIRATORY SYNDROME CORONAVIRUS 2 (SARS-COV-2) (CORONAVIRUS DISEASE [COVID-19]), AMPLIFIED PROBE TECHNIQUE, MAKING USE OF HIGH THROUGHPUT TECHNOLOGIES AS DESCRIBED BY CMS-2020-01-R: HCPCS

## 2021-11-23 NOTE — TELEPHONE ENCOUNTER
Returned call to patient. Discussed her concerns. She banged her hand and had a tear in her skin which is mostly healed. She was worried that we would need to postpone; discussed that generally we worry about sores or rashes on the operative extremity. Patient expressed relief. She also asked if she was supposed to initial prior authorization as the [employer] Loop indicated this. Informed patient that our prior auth department does that automatically when her surgery was placed on the schedule. Understanding expressed.

## 2021-11-23 NOTE — TELEPHONE ENCOUNTER
Responded to patient's telephone call (see that encounter for details). Answered all the questions relayed in this [employer] message.

## 2021-11-24 LAB — SARS-COV-2 RNA RESP QL NAA+PROBE: NEGATIVE

## 2021-11-26 ENCOUNTER — APPOINTMENT (OUTPATIENT)
Dept: PHYSICAL THERAPY | Facility: CLINIC | Age: 76
End: 2021-11-26
Attending: STUDENT IN AN ORGANIZED HEALTH CARE EDUCATION/TRAINING PROGRAM
Payer: MEDICARE

## 2021-11-26 ENCOUNTER — HOSPITAL ENCOUNTER (OUTPATIENT)
Facility: CLINIC | Age: 76
Discharge: HOME | End: 2021-11-27
Attending: STUDENT IN AN ORGANIZED HEALTH CARE EDUCATION/TRAINING PROGRAM | Admitting: STUDENT IN AN ORGANIZED HEALTH CARE EDUCATION/TRAINING PROGRAM
Payer: MEDICARE

## 2021-11-26 ENCOUNTER — ANESTHESIA (OUTPATIENT)
Dept: SURGERY | Facility: CLINIC | Age: 76
End: 2021-11-26
Payer: MEDICARE

## 2021-11-26 ENCOUNTER — ANESTHESIA EVENT (OUTPATIENT)
Dept: SURGERY | Facility: CLINIC | Age: 76
End: 2021-11-26
Payer: MEDICARE

## 2021-11-26 ENCOUNTER — APPOINTMENT (OUTPATIENT)
Dept: GENERAL RADIOLOGY | Facility: CLINIC | Age: 76
End: 2021-11-26
Attending: STUDENT IN AN ORGANIZED HEALTH CARE EDUCATION/TRAINING PROGRAM
Payer: MEDICARE

## 2021-11-26 DIAGNOSIS — M15.0 PRIMARY OSTEOARTHRITIS INVOLVING MULTIPLE JOINTS: ICD-10-CM

## 2021-11-26 DIAGNOSIS — Z96.652 STATUS POST LEFT KNEE REPLACEMENT: ICD-10-CM

## 2021-11-26 DIAGNOSIS — M17.11 PRIMARY OSTEOARTHRITIS OF ONE KNEE, RIGHT: Primary | ICD-10-CM

## 2021-11-26 DIAGNOSIS — M17.11 OSTEOARTHRITIS OF RIGHT KNEE: ICD-10-CM

## 2021-11-26 LAB
ABO/RH(D): NORMAL
ANTIBODY SCREEN: NEGATIVE
FASTING STATUS PATIENT QL REPORTED: YES
GLUCOSE BLD-MCNC: 86 MG/DL (ref 70–99)
SPECIMEN EXPIRATION DATE: NORMAL

## 2021-11-26 PROCEDURE — 360N000077 HC SURGERY LEVEL 4, PER MIN: Performed by: STUDENT IN AN ORGANIZED HEALTH CARE EDUCATION/TRAINING PROGRAM

## 2021-11-26 PROCEDURE — 99202 OFFICE O/P NEW SF 15 MIN: CPT | Performed by: INTERNAL MEDICINE

## 2021-11-26 PROCEDURE — 370N000017 HC ANESTHESIA TECHNICAL FEE, PER MIN: Performed by: STUDENT IN AN ORGANIZED HEALTH CARE EDUCATION/TRAINING PROGRAM

## 2021-11-26 PROCEDURE — 250N000011 HC RX IP 250 OP 636: Performed by: STUDENT IN AN ORGANIZED HEALTH CARE EDUCATION/TRAINING PROGRAM

## 2021-11-26 PROCEDURE — 36415 COLL VENOUS BLD VENIPUNCTURE: CPT | Performed by: ANESTHESIOLOGY

## 2021-11-26 PROCEDURE — 250N000009 HC RX 250: Performed by: NURSE ANESTHETIST, CERTIFIED REGISTERED

## 2021-11-26 PROCEDURE — 250N000009 HC RX 250: Performed by: STUDENT IN AN ORGANIZED HEALTH CARE EDUCATION/TRAINING PROGRAM

## 2021-11-26 PROCEDURE — 250N000011 HC RX IP 250 OP 636: Performed by: ANESTHESIOLOGY

## 2021-11-26 PROCEDURE — 27447 TOTAL KNEE ARTHROPLASTY: CPT | Mod: RT | Performed by: STUDENT IN AN ORGANIZED HEALTH CARE EDUCATION/TRAINING PROGRAM

## 2021-11-26 PROCEDURE — 250N000011 HC RX IP 250 OP 636: Performed by: NURSE ANESTHETIST, CERTIFIED REGISTERED

## 2021-11-26 PROCEDURE — 258N000003 HC RX IP 258 OP 636: Performed by: STUDENT IN AN ORGANIZED HEALTH CARE EDUCATION/TRAINING PROGRAM

## 2021-11-26 PROCEDURE — 258N000003 HC RX IP 258 OP 636: Performed by: ANESTHESIOLOGY

## 2021-11-26 PROCEDURE — C1776 JOINT DEVICE (IMPLANTABLE): HCPCS | Performed by: STUDENT IN AN ORGANIZED HEALTH CARE EDUCATION/TRAINING PROGRAM

## 2021-11-26 PROCEDURE — 99207 PR CDG-CODE CATEGORY CHANGED: CPT | Performed by: INTERNAL MEDICINE

## 2021-11-26 PROCEDURE — 250N000013 HC RX MED GY IP 250 OP 250 PS 637: Performed by: STUDENT IN AN ORGANIZED HEALTH CARE EDUCATION/TRAINING PROGRAM

## 2021-11-26 PROCEDURE — 73560 X-RAY EXAM OF KNEE 1 OR 2: CPT | Mod: 26 | Performed by: RADIOLOGY

## 2021-11-26 PROCEDURE — 710N000010 HC RECOVERY PHASE 1, LEVEL 2, PER MIN: Performed by: STUDENT IN AN ORGANIZED HEALTH CARE EDUCATION/TRAINING PROGRAM

## 2021-11-26 PROCEDURE — 258N000001 HC RX 258: Performed by: STUDENT IN AN ORGANIZED HEALTH CARE EDUCATION/TRAINING PROGRAM

## 2021-11-26 PROCEDURE — 250N000013 HC RX MED GY IP 250 OP 250 PS 637: Performed by: ANESTHESIOLOGY

## 2021-11-26 PROCEDURE — 97110 THERAPEUTIC EXERCISES: CPT | Mod: GP

## 2021-11-26 PROCEDURE — 999N000141 HC STATISTIC PRE-PROCEDURE NURSING ASSESSMENT: Performed by: STUDENT IN AN ORGANIZED HEALTH CARE EDUCATION/TRAINING PROGRAM

## 2021-11-26 PROCEDURE — 278N000051 HC OR IMPLANT GENERAL: Performed by: STUDENT IN AN ORGANIZED HEALTH CARE EDUCATION/TRAINING PROGRAM

## 2021-11-26 PROCEDURE — 999N000065 XR KNEE PORT RIGHT 1/2 VIEWS: Mod: RT

## 2021-11-26 PROCEDURE — 258N000003 HC RX IP 258 OP 636: Performed by: NURSE ANESTHETIST, CERTIFIED REGISTERED

## 2021-11-26 PROCEDURE — C1713 ANCHOR/SCREW BN/BN,TIS/BN: HCPCS | Performed by: STUDENT IN AN ORGANIZED HEALTH CARE EDUCATION/TRAINING PROGRAM

## 2021-11-26 PROCEDURE — 272N000001 HC OR GENERAL SUPPLY STERILE: Performed by: STUDENT IN AN ORGANIZED HEALTH CARE EDUCATION/TRAINING PROGRAM

## 2021-11-26 PROCEDURE — 97161 PT EVAL LOW COMPLEX 20 MIN: CPT | Mod: GP

## 2021-11-26 PROCEDURE — 86900 BLOOD TYPING SEROLOGIC ABO: CPT | Performed by: ANESTHESIOLOGY

## 2021-11-26 PROCEDURE — 82947 ASSAY GLUCOSE BLOOD QUANT: CPT | Performed by: ANESTHESIOLOGY

## 2021-11-26 PROCEDURE — 97530 THERAPEUTIC ACTIVITIES: CPT | Mod: GP

## 2021-11-26 DEVICE — KNEE FEMUR CR CEMENT CCR STD SZ 8 R: Type: IMPLANTABLE DEVICE | Site: KNEE | Status: FUNCTIONAL

## 2021-11-26 DEVICE — IMPLANTABLE DEVICE
Type: IMPLANTABLE DEVICE | Site: KNEE | Status: FUNCTIONAL
Brand: PERSONA® VIVACIT-E®

## 2021-11-26 DEVICE — IMPLANTABLE DEVICE
Type: IMPLANTABLE DEVICE | Site: KNEE | Status: FUNCTIONAL
Brand: PERSONA™

## 2021-11-26 DEVICE — BONE CEMENT SIMPLEX FULL DOSE 6191-1-001: Type: IMPLANTABLE DEVICE | Site: KNEE | Status: FUNCTIONAL

## 2021-11-26 DEVICE — IMPLANTABLE DEVICE
Type: IMPLANTABLE DEVICE | Site: KNEE | Status: FUNCTIONAL
Brand: PERSONA® NATURAL TIBIA®

## 2021-11-26 RX ORDER — CEFAZOLIN SODIUM 2 G/100ML
2 INJECTION, SOLUTION INTRAVENOUS SEE ADMIN INSTRUCTIONS
Status: DISCONTINUED | OUTPATIENT
Start: 2021-11-26 | End: 2021-11-26 | Stop reason: HOSPADM

## 2021-11-26 RX ORDER — SODIUM CHLORIDE, SODIUM LACTATE, POTASSIUM CHLORIDE, CALCIUM CHLORIDE 600; 310; 30; 20 MG/100ML; MG/100ML; MG/100ML; MG/100ML
INJECTION, SOLUTION INTRAVENOUS CONTINUOUS
Status: DISCONTINUED | OUTPATIENT
Start: 2021-11-26 | End: 2021-11-26 | Stop reason: HOSPADM

## 2021-11-26 RX ORDER — BISACODYL 10 MG
10 SUPPOSITORY, RECTAL RECTAL DAILY PRN
Status: DISCONTINUED | OUTPATIENT
Start: 2021-11-26 | End: 2021-11-27 | Stop reason: HOSPADM

## 2021-11-26 RX ORDER — ONDANSETRON 2 MG/ML
4 INJECTION INTRAMUSCULAR; INTRAVENOUS EVERY 6 HOURS PRN
Status: DISCONTINUED | OUTPATIENT
Start: 2021-11-26 | End: 2021-11-27 | Stop reason: HOSPADM

## 2021-11-26 RX ORDER — FENTANYL CITRATE 50 UG/ML
INJECTION, SOLUTION INTRAMUSCULAR; INTRAVENOUS PRN
Status: DISCONTINUED | OUTPATIENT
Start: 2021-11-26 | End: 2021-11-26

## 2021-11-26 RX ORDER — IBUPROFEN 600 MG/1
600 TABLET, FILM COATED ORAL EVERY 6 HOURS PRN
Qty: 30 TABLET | Refills: 0 | Status: SHIPPED | OUTPATIENT
Start: 2021-11-26 | End: 2022-04-22

## 2021-11-26 RX ORDER — SODIUM CHLORIDE, SODIUM LACTATE, POTASSIUM CHLORIDE, CALCIUM CHLORIDE 600; 310; 30; 20 MG/100ML; MG/100ML; MG/100ML; MG/100ML
INJECTION, SOLUTION INTRAVENOUS CONTINUOUS PRN
Status: DISCONTINUED | OUTPATIENT
Start: 2021-11-26 | End: 2021-11-26

## 2021-11-26 RX ORDER — ACETAMINOPHEN 325 MG/1
650 TABLET ORAL EVERY 4 HOURS PRN
Qty: 100 TABLET | Refills: 0 | Status: SHIPPED | OUTPATIENT
Start: 2021-11-26 | End: 2023-03-06

## 2021-11-26 RX ORDER — ACETAMINOPHEN 325 MG/1
650 TABLET ORAL EVERY 4 HOURS PRN
Status: DISCONTINUED | OUTPATIENT
Start: 2021-11-29 | End: 2021-11-27 | Stop reason: HOSPADM

## 2021-11-26 RX ORDER — NALOXONE HYDROCHLORIDE 0.4 MG/ML
0.2 INJECTION, SOLUTION INTRAMUSCULAR; INTRAVENOUS; SUBCUTANEOUS
Status: DISCONTINUED | OUTPATIENT
Start: 2021-11-26 | End: 2021-11-27 | Stop reason: HOSPADM

## 2021-11-26 RX ORDER — LIDOCAINE 40 MG/G
CREAM TOPICAL
Status: DISCONTINUED | OUTPATIENT
Start: 2021-11-26 | End: 2021-11-27 | Stop reason: HOSPADM

## 2021-11-26 RX ORDER — FAMOTIDINE 20 MG/1
40 TABLET, FILM COATED ORAL DAILY PRN
Status: DISCONTINUED | OUTPATIENT
Start: 2021-11-26 | End: 2021-11-27 | Stop reason: HOSPADM

## 2021-11-26 RX ORDER — ASPIRIN 81 MG/1
81 TABLET ORAL 2 TIMES DAILY
Qty: 60 TABLET | Refills: 0 | Status: SHIPPED | OUTPATIENT
Start: 2021-11-26 | End: 2022-09-20

## 2021-11-26 RX ORDER — SODIUM CHLORIDE, SODIUM LACTATE, POTASSIUM CHLORIDE, CALCIUM CHLORIDE 600; 310; 30; 20 MG/100ML; MG/100ML; MG/100ML; MG/100ML
INJECTION, SOLUTION INTRAVENOUS CONTINUOUS
Status: DISCONTINUED | OUTPATIENT
Start: 2021-11-26 | End: 2021-11-27 | Stop reason: HOSPADM

## 2021-11-26 RX ORDER — LIDOCAINE HYDROCHLORIDE 20 MG/ML
INJECTION, SOLUTION INFILTRATION; PERINEURAL PRN
Status: DISCONTINUED | OUTPATIENT
Start: 2021-11-26 | End: 2021-11-26

## 2021-11-26 RX ORDER — OXYCODONE HYDROCHLORIDE 5 MG/1
5 TABLET ORAL EVERY 4 HOURS PRN
Status: DISCONTINUED | OUTPATIENT
Start: 2021-11-26 | End: 2021-11-26 | Stop reason: HOSPADM

## 2021-11-26 RX ORDER — ACETAMINOPHEN 325 MG/1
975 TABLET ORAL EVERY 8 HOURS
Status: DISCONTINUED | OUTPATIENT
Start: 2021-11-26 | End: 2021-11-27 | Stop reason: HOSPADM

## 2021-11-26 RX ORDER — ASPIRIN 81 MG/1
81 TABLET ORAL 2 TIMES DAILY
Status: DISCONTINUED | OUTPATIENT
Start: 2021-11-27 | End: 2021-11-27 | Stop reason: HOSPADM

## 2021-11-26 RX ORDER — NALOXONE HYDROCHLORIDE 0.4 MG/ML
0.4 INJECTION, SOLUTION INTRAMUSCULAR; INTRAVENOUS; SUBCUTANEOUS
Status: DISCONTINUED | OUTPATIENT
Start: 2021-11-26 | End: 2021-11-27 | Stop reason: HOSPADM

## 2021-11-26 RX ORDER — AMOXICILLIN 250 MG
1-2 CAPSULE ORAL 2 TIMES DAILY
Qty: 30 TABLET | Refills: 0 | Status: SHIPPED | OUTPATIENT
Start: 2021-11-26 | End: 2022-10-03

## 2021-11-26 RX ORDER — LIDOCAINE 40 MG/G
CREAM TOPICAL
Status: DISCONTINUED | OUTPATIENT
Start: 2021-11-26 | End: 2021-11-26 | Stop reason: HOSPADM

## 2021-11-26 RX ORDER — ONDANSETRON 4 MG/1
4 TABLET, ORALLY DISINTEGRATING ORAL EVERY 30 MIN PRN
Status: DISCONTINUED | OUTPATIENT
Start: 2021-11-26 | End: 2021-11-26 | Stop reason: HOSPADM

## 2021-11-26 RX ORDER — OXYCODONE HYDROCHLORIDE 5 MG/1
5 TABLET ORAL EVERY 4 HOURS PRN
Status: DISCONTINUED | OUTPATIENT
Start: 2021-11-26 | End: 2021-11-27 | Stop reason: HOSPADM

## 2021-11-26 RX ORDER — POLYETHYLENE GLYCOL 3350 17 G/17G
1 POWDER, FOR SOLUTION ORAL DAILY
Qty: 7 PACKET | Refills: 0 | Status: SHIPPED | OUTPATIENT
Start: 2021-11-26 | End: 2022-04-22

## 2021-11-26 RX ORDER — CEFAZOLIN SODIUM 2 G/100ML
2 INJECTION, SOLUTION INTRAVENOUS
Status: COMPLETED | OUTPATIENT
Start: 2021-11-26 | End: 2021-11-26

## 2021-11-26 RX ORDER — MEPERIDINE HYDROCHLORIDE 25 MG/ML
12.5 INJECTION INTRAMUSCULAR; INTRAVENOUS; SUBCUTANEOUS
Status: DISCONTINUED | OUTPATIENT
Start: 2021-11-26 | End: 2021-11-26 | Stop reason: HOSPADM

## 2021-11-26 RX ORDER — ONDANSETRON 4 MG/1
4 TABLET, ORALLY DISINTEGRATING ORAL EVERY 6 HOURS PRN
Status: DISCONTINUED | OUTPATIENT
Start: 2021-11-26 | End: 2021-11-27 | Stop reason: HOSPADM

## 2021-11-26 RX ORDER — OXYCODONE HYDROCHLORIDE 5 MG/1
5-10 TABLET ORAL
Qty: 30 TABLET | Refills: 0 | Status: SHIPPED | OUTPATIENT
Start: 2021-11-26 | End: 2021-12-03

## 2021-11-26 RX ORDER — HYDROMORPHONE HCL IN WATER/PF 6 MG/30 ML
0.2 PATIENT CONTROLLED ANALGESIA SYRINGE INTRAVENOUS
Status: DISCONTINUED | OUTPATIENT
Start: 2021-11-26 | End: 2021-11-27 | Stop reason: HOSPADM

## 2021-11-26 RX ORDER — FENTANYL CITRATE 50 UG/ML
25 INJECTION, SOLUTION INTRAMUSCULAR; INTRAVENOUS
Status: DISCONTINUED | OUTPATIENT
Start: 2021-11-26 | End: 2021-11-26 | Stop reason: HOSPADM

## 2021-11-26 RX ORDER — ONDANSETRON 2 MG/ML
4 INJECTION INTRAMUSCULAR; INTRAVENOUS EVERY 30 MIN PRN
Status: DISCONTINUED | OUTPATIENT
Start: 2021-11-26 | End: 2021-11-26 | Stop reason: HOSPADM

## 2021-11-26 RX ORDER — KETOROLAC TROMETHAMINE 30 MG/ML
15 INJECTION, SOLUTION INTRAMUSCULAR; INTRAVENOUS EVERY 6 HOURS PRN
Status: DISCONTINUED | OUTPATIENT
Start: 2021-11-26 | End: 2021-11-27 | Stop reason: HOSPADM

## 2021-11-26 RX ORDER — HYDROMORPHONE HYDROCHLORIDE 1 MG/ML
0.2 INJECTION, SOLUTION INTRAMUSCULAR; INTRAVENOUS; SUBCUTANEOUS EVERY 5 MIN PRN
Status: DISCONTINUED | OUTPATIENT
Start: 2021-11-26 | End: 2021-11-26 | Stop reason: HOSPADM

## 2021-11-26 RX ORDER — PROPOFOL 10 MG/ML
INJECTION, EMULSION INTRAVENOUS CONTINUOUS PRN
Status: DISCONTINUED | OUTPATIENT
Start: 2021-11-26 | End: 2021-11-26

## 2021-11-26 RX ORDER — CEFAZOLIN SODIUM 1 G/3ML
1 INJECTION, POWDER, FOR SOLUTION INTRAMUSCULAR; INTRAVENOUS EVERY 8 HOURS
Status: COMPLETED | OUTPATIENT
Start: 2021-11-26 | End: 2021-11-27

## 2021-11-26 RX ORDER — FENTANYL CITRATE 50 UG/ML
25-50 INJECTION, SOLUTION INTRAMUSCULAR; INTRAVENOUS EVERY 5 MIN PRN
Status: DISCONTINUED | OUTPATIENT
Start: 2021-11-26 | End: 2021-11-26 | Stop reason: HOSPADM

## 2021-11-26 RX ORDER — AMOXICILLIN 250 MG
1 CAPSULE ORAL 2 TIMES DAILY
Status: DISCONTINUED | OUTPATIENT
Start: 2021-11-26 | End: 2021-11-27 | Stop reason: HOSPADM

## 2021-11-26 RX ORDER — BUPIVACAINE HYDROCHLORIDE 7.5 MG/ML
INJECTION, SOLUTION INTRASPINAL
Status: COMPLETED | OUTPATIENT
Start: 2021-11-26 | End: 2021-11-26

## 2021-11-26 RX ORDER — TRANEXAMIC ACID 650 MG/1
1950 TABLET ORAL ONCE
Status: COMPLETED | OUTPATIENT
Start: 2021-11-26 | End: 2021-11-26

## 2021-11-26 RX ORDER — POLYETHYLENE GLYCOL 3350 17 G/17G
17 POWDER, FOR SOLUTION ORAL DAILY
Status: DISCONTINUED | OUTPATIENT
Start: 2021-11-27 | End: 2021-11-27 | Stop reason: HOSPADM

## 2021-11-26 RX ORDER — PROPOFOL 10 MG/ML
INJECTION, EMULSION INTRAVENOUS PRN
Status: DISCONTINUED | OUTPATIENT
Start: 2021-11-26 | End: 2021-11-26

## 2021-11-26 RX ORDER — PROCHLORPERAZINE MALEATE 5 MG
5 TABLET ORAL EVERY 6 HOURS PRN
Status: DISCONTINUED | OUTPATIENT
Start: 2021-11-26 | End: 2021-11-27 | Stop reason: HOSPADM

## 2021-11-26 RX ORDER — ACETAMINOPHEN 325 MG/1
975 TABLET ORAL ONCE
Status: COMPLETED | OUTPATIENT
Start: 2021-11-26 | End: 2021-11-26

## 2021-11-26 RX ADMIN — CEFAZOLIN 1 G: 1 INJECTION, POWDER, FOR SOLUTION INTRAMUSCULAR; INTRAVENOUS at 16:36

## 2021-11-26 RX ADMIN — FENTANYL CITRATE 12.5 MCG: 50 INJECTION, SOLUTION INTRAMUSCULAR; INTRAVENOUS at 08:34

## 2021-11-26 RX ADMIN — PROPOFOL 50 MCG/KG/MIN: 10 INJECTION, EMULSION INTRAVENOUS at 07:53

## 2021-11-26 RX ADMIN — HYDROMORPHONE HYDROCHLORIDE 0.2 MG: 1 INJECTION, SOLUTION INTRAMUSCULAR; INTRAVENOUS; SUBCUTANEOUS at 11:44

## 2021-11-26 RX ADMIN — FENTANYL CITRATE 25 MCG: 50 INJECTION INTRAMUSCULAR; INTRAVENOUS at 10:57

## 2021-11-26 RX ADMIN — ACETAMINOPHEN 975 MG: 325 TABLET, FILM COATED ORAL at 06:32

## 2021-11-26 RX ADMIN — SODIUM CHLORIDE, POTASSIUM CHLORIDE, SODIUM LACTATE AND CALCIUM CHLORIDE: 600; 310; 30; 20 INJECTION, SOLUTION INTRAVENOUS at 07:40

## 2021-11-26 RX ADMIN — FENTANYL CITRATE 25 MCG: 50 INJECTION INTRAMUSCULAR; INTRAVENOUS at 10:53

## 2021-11-26 RX ADMIN — TRANEXAMIC ACID 1950 MG: 650 TABLET ORAL at 06:30

## 2021-11-26 RX ADMIN — Medication 8 MCG: at 09:38

## 2021-11-26 RX ADMIN — SODIUM CHLORIDE, POTASSIUM CHLORIDE, SODIUM LACTATE AND CALCIUM CHLORIDE 400 ML: 600; 310; 30; 20 INJECTION, SOLUTION INTRAVENOUS at 10:00

## 2021-11-26 RX ADMIN — DOCUSATE SODIUM AND SENNOSIDES 1 TABLET: 8.6; 5 TABLET ORAL at 21:00

## 2021-11-26 RX ADMIN — ACETAMINOPHEN 975 MG: 325 TABLET, FILM COATED ORAL at 16:36

## 2021-11-26 RX ADMIN — PROPOFOL 20 MG: 10 INJECTION, EMULSION INTRAVENOUS at 07:53

## 2021-11-26 RX ADMIN — BUPIVACAINE HYDROCHLORIDE IN DEXTROSE 1.4 ML: 7.5 INJECTION, SOLUTION SUBARACHNOID at 07:50

## 2021-11-26 RX ADMIN — CEFAZOLIN SODIUM 2 G: 2 INJECTION, SOLUTION INTRAVENOUS at 08:00

## 2021-11-26 RX ADMIN — HYDROMORPHONE HYDROCHLORIDE 0.2 MG: 1 INJECTION, SOLUTION INTRAMUSCULAR; INTRAVENOUS; SUBCUTANEOUS at 11:03

## 2021-11-26 RX ADMIN — FENTANYL CITRATE 25 MCG: 50 INJECTION INTRAMUSCULAR; INTRAVENOUS at 11:03

## 2021-11-26 RX ADMIN — LIDOCAINE HYDROCHLORIDE 60 MG: 20 INJECTION, SOLUTION INFILTRATION; PERINEURAL at 07:52

## 2021-11-26 RX ADMIN — Medication 12 MCG: at 08:50

## 2021-11-26 RX ADMIN — OXYCODONE HYDROCHLORIDE 2.5 MG: 5 TABLET ORAL at 13:27

## 2021-11-26 RX ADMIN — PHENYLEPHRINE HYDROCHLORIDE 0.2 MCG/KG/MIN: 10 INJECTION INTRAVENOUS at 07:52

## 2021-11-26 RX ADMIN — FENTANYL CITRATE 25 MCG: 50 INJECTION INTRAMUSCULAR; INTRAVENOUS at 10:48

## 2021-11-26 RX ADMIN — ONDANSETRON 4 MG: 2 INJECTION INTRAMUSCULAR; INTRAVENOUS at 10:59

## 2021-11-26 RX ADMIN — OXYCODONE HYDROCHLORIDE 2.5 MG: 5 TABLET ORAL at 11:26

## 2021-11-26 RX ADMIN — OXYCODONE HYDROCHLORIDE 5 MG: 5 TABLET ORAL at 20:59

## 2021-11-26 RX ADMIN — PROPOFOL 20 MG: 10 INJECTION, EMULSION INTRAVENOUS at 08:49

## 2021-11-26 RX ADMIN — HYDROMORPHONE HYDROCHLORIDE 0.2 MG: 1 INJECTION, SOLUTION INTRAMUSCULAR; INTRAVENOUS; SUBCUTANEOUS at 11:20

## 2021-11-26 RX ADMIN — OXYCODONE HYDROCHLORIDE 5 MG: 5 TABLET ORAL at 16:51

## 2021-11-26 ASSESSMENT — ACTIVITIES OF DAILY LIVING (ADL)
NUMBER_OF_TIMES_PATIENT_HAS_FALLEN_WITHIN_LAST_SIX_MONTHS: 1
FALL_HISTORY_WITHIN_LAST_SIX_MONTHS: YES
DOING_ERRANDS_INDEPENDENTLY_DIFFICULTY: NO

## 2021-11-26 ASSESSMENT — MIFFLIN-ST. JEOR: SCORE: 1134.13

## 2021-11-26 NOTE — ANESTHESIA PROCEDURE NOTES
Intrathecal injection Procedure Note    Pre-Procedure   Staff -        Anesthesiologist:  Honorio Moreno DO       Performed By: anesthesiologist       Location: OR       Procedure Start/Stop Times: 11/26/2021 7:46 AM and 11/26/2021 7:50 AM       Pre-Anesthestic Checklist: patient identified, IV checked, risks and benefits discussed, informed consent, monitors and equipment checked, pre-op evaluation, at physician/surgeon's request and post-op pain management  Timeout:       Correct Patient: Yes        Correct Procedure: Yes        Correct Site: Yes        Correct Position: Yes   Procedure Documentation  Procedure: intrathecal injection       Patient Position: sitting       Skin prep: Chloraprep       Insertion Site: L2-3. (midline approach).       Needle Gauge: 25.        Needle Length (Inches): 3.5        Spinal Needle Type: Pencan       Introducer used       Introducer: 20 G       # of attempts: 2 and  # of redirects:  2    Assessment/Narrative         Paresthesias: No.       CSF fluid: clear.    Medication(s) Administered   0.75% Hyperbaric Bupivacaine (Intrathecal), 1.4 mL  Medication Administration Time: 11/26/2021 7:50 AM     Comments:  Informed consent obtained.  All risks and benefits of the epidural/spinal discussed with the patient.  All questions answered and all parties agreed with the plan.

## 2021-11-26 NOTE — PLAN OF CARE
[facility]      OUTPATIENT PHYSICAL THERAPY EVALUATION  PLAN OF TREATMENT FOR OUTPATIENT REHABILITATION  (COMPLETE FOR INITIAL CLAIMS ONLY)  Patient's Last Name, First Name, M.I.  YOB: 1945  Giana Hope                        Provider's Name  [facility] Medical Record No.  [id number]                               Onset Date:  11/26/21   Start of Care Date:  11/26/21      Type:     _X_PT   ___OT   ___SLP Medical Diagnosis:  R TKA                        PT Diagnosis:  impaired functional mobility   Visits from SOC:  1   _________________________________________________________________________________  Plan of Treatment/Functional Goals    Planned Interventions: balance training,bed mobility training,gait training,home exercise program,patient/family education,ROM (range of motion),stair training,strengthening,transfer training,progressive activity/exercise,risk factor education,home program guidelines     Goals: See Physical Therapy Goals on Care Plan in [employer] electronic health record.    Therapy Frequency: 2x/day  Predicted Duration of Therapy Intervention: 1-2 days  _________________________________________________________________________________    I CERTIFY THE NEED FOR THESE SERVICES FURNISHED UNDER        THIS PLAN OF TREATMENT AND WHILE UNDER MY CARE     (Physician co-signature of this document indicates review and certification of the therapy plan).              Certification date from: 11/26/21, Certification date to: 11/28/21    Referring Physician: Gabriel Marrero MD            Initial Assessment        See Physical Therapy evaluation dated 11/26/21 in Epic electronic health record.

## 2021-11-26 NOTE — ANESTHESIA POSTPROCEDURE EVALUATION
Patient: Giana Hope    Procedure: Procedure(s):  ARTHROPLASTY, RIGHT KNEE, TOTAL       Diagnosis:Primary osteoarthritis of right knee [M17.11]  Diagnosis Additional Information: No value filed.    Anesthesia Type:  Spinal    Note:  Disposition: Admission   Postop Pain Control: Uneventful            Sign Out: Well controlled pain   PONV: No   Neuro/Psych: Uneventful            Sign Out: Acceptable/Baseline neuro status   Airway/Respiratory: Uneventful            Sign Out: Acceptable/Baseline resp. status   CV/Hemodynamics: Uneventful            Sign Out: Acceptable CV status   Other NRE: NONE   DID A NON-ROUTINE EVENT OCCUR? No           Last vitals:  Vitals Value Taken Time   /62 11/26/21 1215   Temp 36.5  C (97.7  F) 11/26/21 1145   Pulse 69 11/26/21 1215   Resp 14 11/26/21 1215   SpO2 96 % 11/26/21 1215       Electronically Signed By: Honorio Moreno DO  November 26, 2021  12:50 PM

## 2021-11-26 NOTE — ANESTHESIA PREPROCEDURE EVALUATION
Anesthesia Pre-Procedure Evaluation    Patient: Giana Hope   MRN: [id number] : 1945        Preoperative Diagnosis: Primary osteoarthritis of right knee [M17.11]    Procedure : Procedure(s):  ARTHROPLASTY, RIGHT KNEE, TOTAL          Past Medical History:   Diagnosis Date     Nonsenile cataract      Osteoarthritis      Pulmonary nodule      Uveitis       Past Surgical History:   Procedure Laterality Date     CATARACT IOL, RT/LT Right 2019     PHACOEMULSIFICATION WITH STANDARD INTRAOCULAR LENS IMPLANT Right 3/22/2019    Procedure: Right Cataract Removal with Intraocular Lens Implant;  Surgeon: Trish Taylor MD;  Location: [facility] OR      Allergies   Allergen Reactions     Dust Mites Itching     Runny nose, fever     Mold Itching     Fever, runny nose     Pollen Extract Itching     Fever, runny nose     Celecoxib      Other reaction(s): GI intolerance      Social History     Tobacco Use     Smoking status: Former Smoker     Years: 35.00     Smokeless tobacco: Never Used     Tobacco comment: Quit 24 years ago    Substance Use Topics     Alcohol use: Yes     Comment: Moderate      Wt Readings from Last 1 Encounters:   21 67.5 kg (148 lb 13 oz)        Anesthesia Evaluation   Pt has had prior anesthetic.     No history of anesthetic complications       ROS/MED HX  ENT/Pulmonary: Comment: Nodule of left lung      Neurologic:  - neg neurologic ROS     Cardiovascular:     (+) Dyslipidemia hypertension-----    METS/Exercise Tolerance: 4 - Raking leaves, gardening    Hematologic:  - neg hematologic  ROS     Musculoskeletal:   (+) arthritis,     GI/Hepatic:     (+) GERD,     Renal/Genitourinary:  - neg Renal ROS     Endo:  - neg endo ROS     Psychiatric/Substance Use:  - neg psychiatric ROS     Infectious Disease:  - neg infectious disease ROS     Malignancy:  - neg malignancy ROS     Other:  - neg other ROS          Physical Exam    Airway  airway exam normal      Mallampati: I   TM distance: > 3 FB    Neck ROM: full   Mouth opening: > 3 cm    Respiratory Devices and Support         Dental  no notable dental history         Cardiovascular   cardiovascular exam normal       Rhythm and rate: regular and normal     Pulmonary   pulmonary exam normal        breath sounds clear to auscultation           OUTSIDE LABS:  CBC:   Lab Results   Component Value Date    WBC 5.4 11/18/2021    WBC 6.9 08/17/2020    HGB 13.9 11/18/2021    HGB 13.3 08/17/2020    HCT 43.2 11/18/2021    HCT 40.5 08/17/2020     11/18/2021     08/17/2020     BMP:   Lab Results   Component Value Date     11/18/2021     01/21/2020    POTASSIUM 4.0 11/18/2021    POTASSIUM 4.1 01/21/2020    CHLORIDE 106 11/18/2021    CHLORIDE 104 01/21/2020    CO2 28 11/18/2021    CO2 28 01/21/2020    BUN 27 11/18/2021    BUN 20 01/21/2020    CR 0.92 11/18/2021    CR 0.98 01/21/2020     (H) 11/18/2021     (H) 01/21/2020     COAGS: No results found for: PTT, INR, FIBR  POC: No results found for: BGM, HCG, HCGS  HEPATIC:   Lab Results   Component Value Date    ALBUMIN 4.0 11/18/2021    PROTTOTAL 7.5 11/18/2021    ALT 26 11/18/2021    AST 20 11/18/2021    ALKPHOS 99 11/18/2021    BILITOTAL 0.6 11/18/2021     OTHER:   Lab Results   Component Value Date    A1C 5.3 01/21/2020    LUIS E 9.9 11/18/2021    MAG 2.5 (H) 09/23/2019    TSH 1.14 08/17/2020       Anesthesia Plan    ASA Status:  2   NPO Status:  NPO Appropriate    Anesthesia Type: Spinal.   Induction: N/a.   Maintenance: TIVA.        Consents    Anesthesia Plan(s) and associated risks, benefits, and realistic alternatives discussed. Questions answered and patient/representative(s) expressed understanding.    - Discussed:     - Discussed with:  Patient    Use of blood products discussed: Yes.     - Discussed with: Patient.     - Consented: consented to blood products            Reason for refusal: other.     Postoperative Care    Pain management: Multi-modal analgesia, Oral pain  medications, Neuraxial analgesia.   PONV prophylaxis: Ondansetron (or other 5HT-3), Dexamethasone or Solumedrol     Comments:                Honorio Moreno, DO

## 2021-11-26 NOTE — PROGRESS NOTES
11/26/21 1500   Quick Adds   Quick Adds Certification   Type of Visit Initial PT Evaluation   Living Environment   People in home alone   Current Living Arrangements house   Home Accessibility stairs within home;stairs to enter home   Number of Stairs, Main Entrance 4   Stair Railings, Main Entrance railing on right side (ascending)   Number of Stairs, Within Home, Primary greater than 10 stairs  (15)   Stair Railings, Within Home, Primary railing on left side (ascending)   Transportation Anticipated family or friend will provide   Living Environment Comments tub/shower   Self-Care   Usual Activity Tolerance good   Current Activity Tolerance moderate   Regular Exercise Yes   Activity/Exercise Type walking   Exercise Amount/Frequency 3-5 times/wk   Equipment Currently Used at Home walker, rolling;shower chair   Activity/Exercise/Self-Care Comment IND in all cares and mobility at baseline   Disability/Function   Hearing Difficulty or Deaf yes   Wear Glasses or Blind yes   Vision Management glasses   Concentrating, Remembering or Making Decisions Difficulty no   Difficulty Communicating no   Difficulty Eating/Swallowing no   Walking or Climbing Stairs Difficulty no   Dressing/Bathing Difficulty no   Toileting issues no   Doing Errands Independently Difficulty (such as shopping) no   Fall history within last six months yes   Number of times patient has fallen within last six months 1   Change in Functional Status Since Onset of Current Illness/Injury yes   General Information   Onset of Illness/Injury or Date of Surgery 11/26/21   Referring Physician Gabriel Marrero MD   Pertinent History of Current Problem (include personal factors and/or comorbidities that impact the POC) R TKA   Existing Precautions/Restrictions fall   Weight-Bearing Status - RLE weight-bearing as tolerated   General Observations CAPNO, pleasant, cooperative   Cognition   Orientation Status (Cognition) oriented x 4   Affect/Mental Status  (Cognition) WNL   Follows Commands (Cognition) WNL   Pain Assessment   Patient Currently in Pain Yes, see Vital Sign flowsheet   Integumentary/Edema   Integumentary/Edema no deficits were identifed   Range of Motion (ROM)   ROM Comment 0-70 R knee   Strength   Strength Comments excellent quad set and SLR   Bed Mobility   Comment (Bed Mobility) IND   Transfers   Transfer Safety Comments CGA to FWW   Gait/Stairs (Locomotion)   Distance in Feet (Required for LE Total Joints) x100'   Comment (Gait/Stairs) pt ambulates sBA with FWW, initially step to pattern with slight antalgic gait but improves to step through pattern, slow but steady (tx)   Balance   Balance no deficits were identified   Sensory Examination   Sensory Perception Comments mild numbness still present post op below knee, ~75% accurately identified. Able to wiggle toes upon command   Clinical Impression   Criteria for Skilled Therapeutic Intervention yes, treatment indicated   PT Diagnosis (PT) impaired functional mobility   Influenced by the following impairments reduced activity tolerance, R LE weakness, pain, limited ROM   Functional limitations due to impairments gait, stairs, transfers   Clinical Presentation Stable/Uncomplicated   Clinical Presentation Rationale PMH, clinician impression   Clinical Decision Making (Complexity) low complexity   Therapy Frequency (PT) 2x/day   Predicted Duration of Therapy Intervention (days/wks) 1-2 days   Planned Therapy Interventions (PT) balance training;bed mobility training;gait training;home exercise program;patient/family education;ROM (range of motion);stair training;strengthening;transfer training;progressive activity/exercise;risk factor education;home program guidelines   Risk & Benefits of therapy have been explained evaluation/treatment results reviewed;care plan/treatment goals reviewed;risks/benefits reviewed;current/potential barriers reviewed;participants voiced agreement with care plan;participants  included;patient   Clinical Impression Comments see PT DC planner below   PT Discharge Planning    PT Discharge Recommendation (DC Rec) home;home with outpatient physical therapy   PT Rationale for DC Rec pt mobilizing well POD 0, some ongoing numbness and dizziness with standing activity. Up SBA with FWW and anticipate will progress after am session tomorrow if able to complete stairs. Pt lives alone, so needs to be very IND. OP PT to continue progressing per protocol post op   PT Brief overview of current status  Ax1 with FWW   Therapy Certification   Start of care date 11/26/21   Certification date from 11/26/21   Certification date to 11/28/21   Medical Diagnosis R TKA   Total Evaluation Time   Total Evaluation Time (Minutes) 5

## 2021-11-26 NOTE — OP NOTE
[facility]    Operative Note    Pre-operative diagnosis: 76-year-old female with chronic pain of right knee due to end-stage osteoarthritic changes most notably in the medial and patellofemoral compartments.  Insufficiently responding to treated nonoperative treatment measures.  Post-operative diagnosis Same as pre-operative diagnosis    Procedure: Procedure(s):  ARTHROPLASTY, RIGHT KNEE, TOTAL  Surgeon: Surgeon(s) and Role:     * Gabriel Marrero MD - Primary     * Ishan Mcdermott MD - Resident - Assisting  Anesthesia: Spinal   Estimated Blood Loss: Less than 50 ml    Drains: None  Specimens: * No specimens in log *  Findings:   End-stage osteoarthritic changes of medial and patellofemoral compartments  Complications: None.  Implants:   Implant Name Type Inv. Item Serial No.  Lot No. LRB No. Used Action   BONE CEMENT SIMPLEX FULL DOSE 6191-1-001 - [id number] Cement, Bone BONE CEMENT SIMPLEX FULL DOSE 6191-1-001  [employer] [id number] Right 2 Implanted   IMP PATELLA ZIM KNEE ALL DARREN 32MM -581-32 - [id number] Total Joint Component/Insert IMP PATELLA ZIM KNEE ALL DARREN 32MM -708-32  [employer] [id number] Right 1 Implanted   KNEE FEMUR CR CEMENT CCR STD SZ 8 R - [id number] Total Joint Component/Insert KNEE FEMUR CR CEMENT CCR STD SZ 8 R  [employer] [id number] Right 1 Implanted   IMP TIBIAL ZIM PSN NP STM 5DEG  ER -995-02 - [id number] Total Joint Component/Insert IMP TIBIAL ZIM PSN NP STM 5DEG  ER -668-02  [employer] [id number] Right 1 Implanted   PERSONA Vivacit-E Highly Crosslinked Polyethylene Right 10mm    [employer] [id number] Right 1 Implanted     Components used:  1. [employer] Persona Size  8 standard femoral Component  2. [employer] Persona Size  E Tibial Component  3. [employer] Size  32 mm Patellar Button    4. [employer] Persona MC polyethylene Liner, Size  10 mm     Description of procedure:      The presence of Cheyanne  ANGEL Jackson was necessary throughout the entirety of the procedure to help with positioning the patient, retract soft tissues and help close the incision.     Patient was seen in the preoperative area where procedure, risks and complications, expectations and rehabilitation were discussed once more.  All questions were answered.  Patient understands and agrees to the treatment plan as set forth.  Informed consent was obtained and the right lower extremity was marked.  Patient was then taken to the operating room where general anesthesia was induced.     Patient was positioned supine with a bump under the ipsilateral buttock. Bony prominences were well padded and the patient was secured to the table in the standard fashion.  An SCD was placed on the nonoperative leg.  Preoperative range of motion showed a flexion/extension of 125/0/0.      A tourniquet was placed on the operative thigh and the patient was prepped and draped in the normal sterile fashion.        After the completion of a timeout procedure a standard midline incision was made. Dissection was carried down to the knee retinaculum and the quadriceps tendon. A standard medial parapatellar arthrotomy was performed. Subperiosteal dissection was carried out along the medial proximal tibia. The fat pad was removed.  Care was taken to protect the patellar tendon and extensor mechanism during fat pad removal. The tissue along the anterior aspect of the distal femur was also removed.  The patella was subluxed and the knee was flexed.       The ACL was released.  A drill was advanced down the femoral canal in a retrograde direction. The sword was set at 6 degrees of valgus in accordance with the preoperative plan and was advanced into the canal.  The distal femoral cutting block was then placed at +0 mm resection and pinned into place. The geo was removed and the distal femur cuts were made medially and laterally.  The cutting block was removed and the geo with  the alignment piece was inserted into the canal to ensure proper angle of the cut and a flat cut.       Attention was then directed towards the proximal tibia. The meniscus and soft tissue was removed to expose the medial and lateral tibial plateau. Retractors were placed around the knee to obtain good visualization, then the extramedullary tibial alignment guide was placed in the appropriate position. The 2-10 guide was used to select the resection level of the tibia and the cutting guide was pinned into position. With care directed towards preserving the posterior nerves and blood vessels and the medial collateral ligament, the saw was used to cut the proximal tibia.  Next, after resecting meniscal remnants both medially and laterally, the 10 mm sizing block was put in place to ensure proper alignment.  It showed adequate alignment using the drop geo as well as good balancing of soft tissues.     We turned our attention to the back to the distal femur.  The posterior referencing femoral sizing block was used and the femur was measured to be a size  8.  Two holes were drilled to obtain 3 degrees of external rotation with respect to the posterior condylar axis of the femur.  The femoral cutting block was then slid over the pins and fixed onto the bone.  The anterior cut, posterior cut, anterior and posterior chamfer cuts were made.  The block was then removed and excess bone fragments were removed.   Now we visualized the posterior knee. A curved osteotome was used to remove posterior osteophytes. Our anesthetic injection was then introduced through the posterior capsule with extreme care directed towards not injuring the posterior neurovascular structures. The trial components (femur size 8) were placed and confirmed appropriate sizing of the flexion and extension gaps.  The drop geo was used to confirm proper alignment of the cut.  A range of motion of 0 to 130 degrees with a well balanced knee in both flexion and  extension was obtained. The excess bone was removed.  The lug holes were drilled.      The patella was then exposed.  Soft tissues were removed around the patella for visualization. The patella thickness was measured with a caliper to be 24, and a measured resection of 8.5 mm was made.  A caliper was then used to measure the residual thickness of the patella to be 15.5.  The patella was measured with the lollipops and found to be a size 32 mm. The drill guide was placed and the three lug holes were drilled. The patella trial was then placed and the knee was ranged. The patella was found to track well.      The femoral trial component was removed the proximal tibia was again exposed and sized. The tibia was measured to be a size  E.   The tibial trial was placed into proper rotation and pinned into place.  The stove pipe was used to guide the drill, and then the wing punch was used. The tibial trial was then removed.      The tourniquet was inflated.  The patellar and femoral trials were removed. Pulsitile lavage was used to clean the bone in preparation for cementing. The bone was dried. Cement was mixed, and then all the components were cemented into place. While the cement was hardening, the remainder of the anesthetic injection was spread around the deep retinacular layer and the subcutaneous layer with a spinal needle.   The knee was irrigated with betadine lavage for approximately 3 minutes.  Once the cement had hardened, the excess cement was removed.    The tourniquet was released.  Hemostasis was obtained.       The  MC 10 mm liner fit best and the knee had full extension to 130 degrees and was stable to anterior and posterior stress in flexion and extension as well as varus/valgus stress in 0, 30, and 90 degrees of flexion.  The trial liner was removed and the real liner was placed.       The knee was again copiously irrigated.   Closure was performed with a #1 symmetric Vicryl interrupted sutures in the  retinacular layer, 0 Vicryl and 2-0 Vicryl interrupted suture in the  subcutaneous tissues, and 3-0 monocryl in the skin.  A sterile dressing was applied.  The patient was then awakened, transferred to the [place], and brought to the recovery room in stable condition. There were no complications.     POSTOPERATIVE PLAN:  Weight bearing: Weightbearing as tolerated  Anticoagulation: Aspirin 162 mg daily for 4 weeks  Precautions: No precautions  Pain control and monitoring  XR in PACU  PT/OT  Discharge today or tomorrow when pain well controlled and ambulating safely.        Gabriel Marrero MD      Adult Reconstruction  [facility] Department of Orthopaedic Surgery  Pager [phone]

## 2021-11-26 NOTE — PLAN OF CARE
VS: VSS   O2: >92% on RA. Denies SOB.   Output: Voiding without difficulty in bedside commode   Last BM: 11/26 prior to surgery   Activity: Ax1 with walker and gait belt   Skin: Intact ex incision to R knee   Pain: Pt c/o mild pain in R knee; managed with oxy   Neuro/CMS: Intact. AOx4. Denies N/T.   Dressing: CDI   Diet: Regular   LDA: R forearm PIV infusing LR   Equipment: IV pole, capno   Plan: Continue with POC   Additional Info: Pt call light within reach and able to make needs known

## 2021-11-26 NOTE — CONSULTS
[facility]  Consult Note - Hospitalist Service     Date of Admission:  11/26/2021  Consult Requested by: Dr Gabriel Marrero  Reason for Consult: perioperative medical management     Assessment & Plan   Giana Hope is a 76 year old female admitted on 11/26/2021 for elective knee surgery.     # osteoarthritis   -status post  Right total knee arthroplasty  -per orthopedic surgery        # pulmonary nodules   -defer back to  PMD   - last chest CT 11/18/2021   And shows stability over past 2 years      # GERD   -continue pepcid takes PRN      # hyperlipidemia  -continue statins on discharge       # DJD of spine  - Also thoracic MRI shows moderate neural foraminal narrowing at T5-6 T10-11   -defer back to PMD     # COVID-19 screen negative 11/23        The patient's care was discussed with RN .    Clarisa Mayo MD  [facility]  Securely message with the Vocera Web Console (learn more here)  Text page via SimuForm Paging/Directory      ______________________________________________________________________    Chief Complaint    knee pain     History is obtained from the patient and reviewing records in Epic       History of Present Illness   Giana Hope is a 76 year old  Healthy female who presented for elective knee surgery .  She denies any chest pain  No shortness of breath  No  nausea vomiting , her surgical pain I snow under better control.     Review of Systems   The 10 point Review of Systems is negative other than noted in the HPI or here.     Past Medical History    I have reviewed this patient's medical history and updated it with pertinent information if needed.   Past Medical History:   Diagnosis Date     Nonsenile cataract      Osteoarthritis      Pulmonary nodule      Uveitis        Past Surgical History   I have reviewed this patient's surgical history and updated it with pertinent information if  needed.  Past Surgical History:   Procedure Laterality Date     ARTHROPLASTY, RIGHT KNEE, TOTAL Right 2021     CATARACT IOL, RT/LT Right 2019     PHACOEMULSIFICATION WITH STANDARD INTRAOCULAR LENS IMPLANT Right 3/22/2019    Procedure: Right Cataract Removal with Intraocular Lens Implant;  Surgeon: Trish Taylor MD;  Location:  OR       Social History   I have reviewed this patient's social history and updated it with pertinent information if needed.  Social History     Tobacco Use     Smoking status: Former Smoker     Years: 35.00     Smokeless tobacco: Never Used     Tobacco comment: Quit 24 years ago    Substance Use Topics     Alcohol use: Yes     Comment: Moderate     Drug use: No       Family History   I have reviewed this patient's family history and updated it with pertinent information if needed.  Family History   Problem Relation Age of Onset     Arthritis Mother      Breast Cancer Mother 78     Diabetes Type 2  Father      Heart Disease Father          age 75     Alzheimer Disease Father 60     Heart Disease Brother 68     Breast Cancer Maternal Grandmother 81     Heart Disease Maternal Grandfather 54         age 81     Other - See Comments Son         schizoaffective lives in [place]     Glaucoma No family hx of      Macular Degeneration No family hx of        Medications   I have reviewed this patient's current medications  Facility-Administered Medications Prior to Admission   Medication Dose Route Frequency Provider Last Rate Last Admin     lidocaine (PF) (XYLOCAINE) 1 % injection 4 mL  4 mL   Dayton Zepeda MD   4 mL at 21 1428     sodium hyaluronate (SUPARTZ) injection 25 mg  25 mg   Dayton Zepeda MD   25 mg at 21 1441     sodium hyaluronate (SUPARTZ) injection 25 mg  25 mg   Dayton Zepeda MD   25 mg at 21 1348     sodium hyaluronate (SUPARTZ) injection 25 mg  25 mg   Dayton Zepeda MD   25 mg at 21 1401     sodium  hyaluronate (SUPARTZ) injection 25 mg  25 mg   Dayton Zepeda MD   25 mg at 06/08/21 1353     sodium hyaluronate (SUPARTZ) injection 25 mg  25 mg INTRA-ARTICULAR Weekly Dayton Zepeda MD         triamcinolone (KENALOG-40) injection 40 mg  40 mg   Dayton Zepeda MD   40 mg at 08/31/21 1428     Medications Prior to Admission   Medication Sig Dispense Refill Last Dose     famotidine (PEPCID) 40 MG tablet Take 1 tablet (40 mg) by mouth daily as needed for heartburn 90 tablet 3 11/25/2021 at 0800     fluocinolone (SYNALAR) 0.01 % solution Apply topically three times a week 60 mL 3 Past Week at Unknown time     hydrocortisone 2.5 % cream Apply topically 2 times daily as needed For up to 2 weeks at a time on the face. Avoid eye area. 20 g 3 More than a month at Unknown time     ketoconazole (NIZORAL) 2 % external cream Apply topically 2 times daily 60 g 4 More than a month at Unknown time     ketoconazole (NIZORAL) 2 % external shampoo Apply topically three times a week 120 mL 11 11/25/2021 at 2100     pravastatin (PRAVACHOL) 20 MG tablet Take 1 tablet (20 mg) by mouth daily 90 tablet 3 11/25/2021 at 2000     prednisoLONE acetate (PRED FORTE) 1 % ophthalmic suspension Place 1-2 drops Into the left eye every hour 1 Bottle 3 More than a month at Unknown time     sulindac (CLINORIL) 200 MG tablet Take 1 tablet (200 mg) by mouth 2 times daily 180 tablet 0 11/21/2021 at Unknown time       Allergies   Allergies   Allergen Reactions     Dust Mites Itching     Runny nose, fever     Mold Itching     Fever, runny nose     Pollen Extract Itching     Fever, runny nose     Celecoxib      Other reaction(s): GI intolerance       Physical Exam   Vital Signs: Temp: 97.7  F (36.5  C) Temp src: Axillary BP: 121/67 Pulse: 58   Resp: 15 SpO2: 100 % O2 Device: Nasal cannula Oxygen Delivery: 2 LPM  Weight: 148 lbs 12.97 oz    General appearence: awake alert   no apparent distress    HEENT: EOMI, PEARLA, sclera nonicteric,   moist,  mucus membranes,   NECK left supple  RESPIRATORY: lungs clear to auscultation bilateral,    CARDIOVASCULAR:S1 S2 regular rate and rhythm, no rubs gallops or murmurs appreciated  GASTROINTESTINAL:soft, non-distended , non-tender , + bowel sounds, no masses felt   SKIN: warm and dry, no mottling noted   NEUROLOGIC; awake alert and oriented, no focal deficits found  EXTREMITIES: no clubbing, cyanosis or edema , moves all extremity, good pedal pulses   MUSCULOSKELETAL: without deformity , previous left knee arthroplasty, right knee bandaged         Data   Results for orders placed or performed during the hospital encounter of 11/26/21 (from the past 24 hour(s))   ABO/Rh type and screen    Narrative    The following orders were created for panel order ABO/Rh type and screen.  Procedure                               Abnormality         Status                     ---------                               -----------         ------                     Adult Type and Screen[[id number]]                            Final result                 Please view results for these tests on the individual orders.   Adult Type and Screen   Result Value Ref Range    ABO/RH(D) A POS     Antibody Screen Negative Negative    SPECIMEN EXPIRATION DATE [id number]    Glucose   Result Value Ref Range    Glucose 86 70 - 99 mg/dL    Patient Fasting > 8hrs? Yes    XR Knee Port Right 1/2 Views    Narrative    2 views right knee radiographs 11/26/2021 10:43 AM    History: Post-Op Total Knee    Comparison: 8/12/2021    Findings:    AP and crosstable lateral views of the right knee were obtained.     New right total knee arthroplasty with subcutaneous emphysema and  intra-articular gas. Interval suprapatellar recess ossicle removal.     No substantial joint effusion. Very small amount of demineralization  around the knee.      Impression    IMPRESSION: New total knee arthroplasty.    [place]         SYSTEM ID:  [id number]

## 2021-11-26 NOTE — OR NURSING
PACU to Inpatient Nursing Handoff    Patient Giana Hope is a 76 year old female who speaks English.   Procedure Procedure(s):  ARTHROPLASTY, RIGHT KNEE, TOTAL   Surgeon(s) Primary: Gabriel Marrero MD  Resident - Assisting: Ishan Mcdermott MD     Allergies   Allergen Reactions     Dust Mites Itching     Runny nose, fever     Mold Itching     Fever, runny nose     Pollen Extract Itching     Fever, runny nose     Celecoxib      Other reaction(s): GI intolerance       Isolation  No active isolations     Past Medical History   has a past medical history of Nonsenile cataract, Osteoarthritis, Pulmonary nodule, and Uveitis.    Anesthesia Spinal   Dermatome Level Dermatomes Left: L2  Dermatomes Right: L2   Preop Meds acetaminophen (Tylenol) - time given: 0632  TXA - time given: 0630   Nerve block Not applicable   Intraop Meds dexmedetomidine (Precedex): 20 mcg total  propofol gtt, phenylephrine gtt briefly during OR.    Local Meds ropivacaine 300 mg, ketoralac 30 mg, EPINEPHrine 0.6 mg in 100 ML at 0902   Antibiotics cefazolin (Ancef) - last given at 0800     Pain Patient Currently in Pain: yes   PACU meds  fentanyl (Sublimaze): 100 mcg (total dose) last given at 1103   hydromorphone (Dilaudid): 0.4 mg (total dose) last given at 1120   ondansetron (Zofran): 4 mg (total dose) last given at 1059, did not recieve a dose in the OR adminstered in pacu when pt needing narcotics   oxycodone (Roxicodone): 2.5 mg (total dose) last given at 1126   LR bolus 400 ml for BPs in low 90s systolic upon arrival   PCA / epidural No   Capnography Respiratory Monitoring (EtCO2): 41 mmHg  Integrated Pulmonary Index (IPI): 10   Telemetry     Inpatient Telemetry Monitor Ordered? No        Labs Glucose Lab Results   Component Value Date    GLC 86 11/26/2021     01/21/2020       Hgb Lab Results   Component Value Date    HGB 13.9 11/18/2021    HGB 13.3 08/17/2020       INR No results found for: INR   PACU Imaging Completed      Wound/Incision Incision/Surgical Site 11/26/21 Right Knee (Active)   Incision Assessment UTV 11/26/21 1145   Closure MARGARITO 11/26/21 1145   Incision Drainage Amount None 11/26/21 1145   Dressing Intervention Clean, dry, intact 11/26/21 1145   Number of days: 0      CMS        Equipment ice pack   Other LDA       IV Access Peripheral IV 11/26/21 Right Upper forearm (Active)   Site Assessment WDL 11/26/21 1145   Line Status Infusing 11/26/21 1145   Dressing Intervention New dressing ;Padding of hub 11/26/21 0700   Phlebitis Scale 0-->no symptoms 11/26/21 1145   Infiltration Scale 0 11/26/21 1145   Number of days: 0      Blood Products Not applicable EBL 50   mL   Intake/Output Date 11/26/21 0700 - 11/27/21 0659   Shift 7187-3278 8459-0092 2908-9018 24 Hour Total   INTAKE   I.V. 600   600   Shift Total(mL/kg) 600(8.89)   600(8.89)   OUTPUT   Blood 50   50   Shift Total(mL/kg) 50(0.74)   50(0.74)   Weight (kg) 67.5 67.5 67.5 67.5      Drains / Obrien     Time of void PreOp Void Prior to Procedure: 0645 (11/26/21 0649)    PostOp      Diapered? No   Bladder Scan     PO    water     Vitals    B/P: 95/56  T: 97.7  F (36.5  C)    Temp src: Axillary  P:  Pulse: 64 (11/26/21 1000)          R: 16  O2:  SpO2: 99 %    O2 Device: Simple face mask (11/26/21 1000)    Oxygen Delivery: 6 LPM (11/26/21 1000)         Family/support present significant other   Patient belongings     Patient transported on bed   DC meds/scripts (obs/outpt) Not applicable   Inpatient Pain Meds Released? Yes       Special needs/considerations None   Tasks needing completion None       Ce Terrell, ARABELLA  ASCOM [id number]

## 2021-11-26 NOTE — ANESTHESIA CARE TRANSFER NOTE
Patient: Giana Hope    Procedure: Procedure(s):  ARTHROPLASTY, RIGHT KNEE, TOTAL       Diagnosis: Primary osteoarthritis of right knee [M17.11]  Diagnosis Additional Information: No value filed.    Anesthesia Type:   Spinal     Note:    Oropharynx: oropharynx clear of all foreign objects and spontaneously breathing  Level of Consciousness: awake  Oxygen Supplementation: face mask  Level of Supplemental Oxygen (L/min / FiO2): 6  Independent Airway: airway patency satisfactory and stable  Dentition: dentition unchanged  Vital Signs Stable: post-procedure vital signs reviewed and stable  Report to RN Given: handoff report given  Patient transferred to: PACU  Comments: .Anesthesia Care Transfer Note    Patient: Giana Hope    Transferred to: PACU    Patient vital signs: stable    Airway: none    Monitors applied, VSS.  Patient awake and comfortable, breathing spontaneously.  Report given to RN with transfer of care.        Winnie Espinoza CRNA  11/26/2021  10:02 AM    Handoff Report: Identifed the Patient, Identified the Reponsible Provider, Reviewed the pertinent medical history, Discussed the surgical course, Reviewed Intra-OP anesthesia mangement and issues during anesthesia, Set expectations for post-procedure period and Allowed opportunity for questions and acknowledgement of understanding      Vitals:  Vitals Value Taken Time   BP 95/56 11/26/21 0959   Temp     Pulse 63 11/26/21 1001   Resp 21 11/26/21 1001   SpO2 99 % 11/26/21 1001   Vitals shown include unvalidated device data.    Electronically Signed By: NASIR Gilbert CRNA  November 26, 2021  10:02 AM

## 2021-11-27 ENCOUNTER — APPOINTMENT (OUTPATIENT)
Dept: PHYSICAL THERAPY | Facility: CLINIC | Age: 76
End: 2021-11-27
Attending: STUDENT IN AN ORGANIZED HEALTH CARE EDUCATION/TRAINING PROGRAM
Payer: MEDICARE

## 2021-11-27 ENCOUNTER — APPOINTMENT (OUTPATIENT)
Dept: OCCUPATIONAL THERAPY | Facility: CLINIC | Age: 76
End: 2021-11-27
Attending: STUDENT IN AN ORGANIZED HEALTH CARE EDUCATION/TRAINING PROGRAM
Payer: MEDICARE

## 2021-11-27 VITALS
HEIGHT: 63 IN | RESPIRATION RATE: 16 BRPM | HEART RATE: 92 BPM | DIASTOLIC BLOOD PRESSURE: 64 MMHG | BODY MASS INDEX: 26.37 KG/M2 | SYSTOLIC BLOOD PRESSURE: 125 MMHG | TEMPERATURE: 97.8 F | WEIGHT: 148.81 LBS | OXYGEN SATURATION: 97 %

## 2021-11-27 LAB
GLUCOSE BLDC GLUCOMTR-MCNC: 90 MG/DL (ref 70–99)
HGB BLD-MCNC: 12 G/DL (ref 11.7–15.7)

## 2021-11-27 PROCEDURE — 97116 GAIT TRAINING THERAPY: CPT | Mod: GP

## 2021-11-27 PROCEDURE — 97530 THERAPEUTIC ACTIVITIES: CPT | Mod: GP

## 2021-11-27 PROCEDURE — 97166 OT EVAL MOD COMPLEX 45 MIN: CPT | Mod: GO | Performed by: OCCUPATIONAL THERAPIST

## 2021-11-27 PROCEDURE — 97535 SELF CARE MNGMENT TRAINING: CPT | Mod: GO | Performed by: OCCUPATIONAL THERAPIST

## 2021-11-27 PROCEDURE — 85018 HEMOGLOBIN: CPT | Performed by: STUDENT IN AN ORGANIZED HEALTH CARE EDUCATION/TRAINING PROGRAM

## 2021-11-27 PROCEDURE — 250N000013 HC RX MED GY IP 250 OP 250 PS 637: Performed by: STUDENT IN AN ORGANIZED HEALTH CARE EDUCATION/TRAINING PROGRAM

## 2021-11-27 PROCEDURE — 36415 COLL VENOUS BLD VENIPUNCTURE: CPT | Performed by: STUDENT IN AN ORGANIZED HEALTH CARE EDUCATION/TRAINING PROGRAM

## 2021-11-27 PROCEDURE — 82962 GLUCOSE BLOOD TEST: CPT

## 2021-11-27 PROCEDURE — 250N000011 HC RX IP 250 OP 636: Performed by: STUDENT IN AN ORGANIZED HEALTH CARE EDUCATION/TRAINING PROGRAM

## 2021-11-27 PROCEDURE — 99212 OFFICE O/P EST SF 10 MIN: CPT | Performed by: INTERNAL MEDICINE

## 2021-11-27 RX ADMIN — OXYCODONE HYDROCHLORIDE 5 MG: 5 TABLET ORAL at 06:07

## 2021-11-27 RX ADMIN — ACETAMINOPHEN 975 MG: 325 TABLET, FILM COATED ORAL at 01:34

## 2021-11-27 RX ADMIN — OXYCODONE HYDROCHLORIDE 5 MG: 5 TABLET ORAL at 01:34

## 2021-11-27 RX ADMIN — DOCUSATE SODIUM AND SENNOSIDES 1 TABLET: 8.6; 5 TABLET ORAL at 08:54

## 2021-11-27 RX ADMIN — ACETAMINOPHEN 975 MG: 325 TABLET, FILM COATED ORAL at 08:54

## 2021-11-27 RX ADMIN — CEFAZOLIN 1 G: 1 INJECTION, POWDER, FOR SOLUTION INTRAMUSCULAR; INTRAVENOUS at 01:35

## 2021-11-27 RX ADMIN — OXYCODONE HYDROCHLORIDE 5 MG: 5 TABLET ORAL at 10:00

## 2021-11-27 RX ADMIN — ASPIRIN 81 MG: 81 TABLET, COATED ORAL at 08:54

## 2021-11-27 ASSESSMENT — ACTIVITIES OF DAILY LIVING (ADL)
PREVIOUS_RESPONSIBILITIES: MEAL PREP;HOUSEKEEPING;LAUNDRY;SHOPPING;MEDICATION MANAGEMENT;FINANCES;DRIVING
DEPENDENT_IADLS:: INDEPENDENT

## 2021-11-27 NOTE — PROGRESS NOTES
Orthopaedic Surgery Progress Note 11/27/2021    S: No acute events overnight.  Pain  controlled on IV/Oral meds. Denies numbness or tingling in the affected extremity.    O:  Temp: (!) 96.2  F (35.7  C) Temp src: Axillary BP: 120/49 Pulse: 67   Resp: 16 SpO2: 98 % O2 Device: None (Room air) Oxygen Delivery: 2 LPM    Exam:  Gen: No acute distress, resting comfortably in bed.  Resp: Non-labored breathing  MSK:    RLE:  - Dressings c/d/i  - SILT femoral/tibial/sural/saphenous/DP/SP nerves  - Fires Quad, TA, EHL, FHL, GaSC  - PT/DP pulses 2+, foot wwp    Recent Labs   Lab 11/27/21  0631   HGB 12.0     No results found for: SED        Assessment: Giana Hope is a 76 year old female s/p Procedure(s):  ARTHROPLASTY, RIGHT KNEE, TOTAL on 11/26/2021 with Dr. Wagner.    Ok to discharge if hgb stable this am, medically cleared, and clears PT.    Plan:  Activity: Up with assist and assistive devices as needed until independent. Knee ROM as tolerated.  Weight bearing status: WBAT    Antibiotics: Cefazolin x 24 hours   Diet: Regular. Bowel regimen. Anti-emetics PRN.    DVT prophylaxis: Aspirin 162mg daily x 4 weeks, starting morning of POD 1  Elevation: Elevate heels off of bed on pillows, no pillows behind the knee at any time    Wound Care: Keep dressing clean, dry and intact until POD7  Drains: none  Obrien: none  Pain management: Orals PRN, IV for breakthrough only  X-rays: AP/Lat knee XR in PACU  Physical Therapy: Mobilization, ROM, ADL's  Occupational Therapy: ADL's  Labs: Trend Hgb on PODs #1 & 2  Cultures: None  Consults: PT, OT. Hospitalist, appreciate assistance in caring for this patient throughout the perioperative period  Disposition: Pending progress with therapies, pain control on orals, and medical stability, anticipate discharge to home on POD #1-2.    Future Appointments   Date Time Provider Department Center   11/27/2021 10:00 AM Georgette Armas, PT [facility] [place]   11/27/2021 10:00 AM Zena Turcios, OT UROT  [place]   11/27/2021  2:00 PM Georgette Armas, PT UR[facility] [place]   12/1/2021  4:25 PM Indiana Brink, PT [facility] [place]   12/3/2021 10:40 AM Franklyn Gonzales PT [facility] [place]   12/10/2021 10:40 AM Franklyn Gonzales PT [facility] [place]   12/17/2021 10:40 AM Franklyn Gonzales PT [facility] [place]   12/20/2021 10:05 AM Jose Alberto Isbell MD [facility] [place]   12/22/2021 11:30 AM Franklyn Gonzaels PT [facility] [place]   12/29/2021 11:30 AM Franklyn Gonzales PT [facility] [place]         Ishan Mcdermott MD  Orthopaedic Surgery Resident, PGY4  Pager: [phone]

## 2021-11-27 NOTE — PROGRESS NOTES
11/27/21 0931   Quick Adds   Type of Visit Initial Occupational Therapy Evaluation   Living Environment   People in home alone   Current Living Arrangements house   Home Accessibility stairs within home;stairs to enter home   Number of Stairs, Main Entrance 4   Stair Railings, Main Entrance railing on right side (ascending)   Number of Stairs, Within Home, Primary greater than 10 stairs  (15)   Stair Railings, Within Home, Primary railing on left side (ascending)   Transportation Anticipated family or friend will provide   Living Environment Comments pt reports family can stay to assist first night; has set up meals/laundry in advance   Self-Care   Usual Activity Tolerance good   Current Activity Tolerance moderate   Equipment Currently Used at Home grab bar, tub/shower;shower chair;walker, rolling   Instrumental Activities of Daily Living (IADL)   Previous Responsibilities meal prep;housekeeping;laundry;shopping;medication management;finances;driving   IADL Comments pt has set up meals/laundry in advance   Disability/Function   Fall history within last six months yes   Number of times patient has fallen within last six months 1   Change in Functional Status Since Onset of Current Illness/Injury yes   General Information   Onset of Illness/Injury or Date of Surgery 11/26/21   Referring Physician Gabriel Marrero   Patient/Family Therapy Goal Statement (OT) home today   Additional Occupational Profile Info/Pertinent History of Current Problem pt is s/p R TKA   Existing Precautions/Restrictions fall   Right Lower Extremity (Weight-bearing Status) weight-bearing as tolerated (WBAT)   General Observations and Info Activity order: ambulate with assist 3x daily   Cognitive Status Examination   Cognitive Status Comments no cognitive concerns   Pain Assessment   Patient Currently in Pain Yes, see Vital Sign flowsheet   Integumentary/Edema   Integumentary/Edema Comments RLE ace-wrapped   Posture   Posture protracted  shoulders   Range of Motion Comprehensive   Comment, General Range of Motion BUE WFL; RLE limited by acewrap   Strength Comprehensive (MMT)   Comment, General Manual Muscle Testing (MMT) Assessment pt demonstrated functional BUE strength during transfers   Transfers   Transfers sit-stand transfer;toilet transfer;shower transfer   Sit-Stand Transfer   Sit-Stand [place] (Transfers) verbal cues;supervision   Assistive Device (Sit-Stand Transfers) walker, standard   Sit/Stand Transfer Comments cues for safe set-up    Shower Transfer   Type (Shower Transfer) lateral   [place] Level (Shower Transfer) contact guard;verbal cues;supervision   Assistive Device (Shower Transfer) grab bar, tub rail;shower chair;walker, standard   Toilet Transfer   Type (Toilet Transfer) sit-stand;stand-sit   [place] Level (Toilet Transfer) contact guard;verbal cues   Assistive Device (Toilet Transfer) walker, standard   Balance   Balance Comments good seated; good ambulating in room with 2WW   Activities of Daily Living   BADL Assessment lower body dressing   Lower Body Dressing Assessment   [place] Level (Lower Body Dressing) contact guard assist;verbal cues;supervision   Position (Lower Body Dressing) supported sitting   Clinical Impression   Criteria for Skilled Therapeutic Interventions Met (OT) yes;meets criteria;skilled treatment is necessary   OT Diagnosis impaired ADLs   OT Problem List-Impairments impacting ADL problems related to;strength;pain;range of motion (ROM)   Assessment of Occupational Performance 3-5 Performance Deficits   Identified Performance Deficits dressing, bathing, toileting, home chores   Planned Therapy Interventions (OT) ADL retraining;transfer training   Clinical Decision Making Complexity (OT) moderate complexity   Therapy Frequency (OT) 1x eval and treat   Predicted Duration of Therapy 1 day   Risk & Benefits of therapy have been explained evaluation/treatment results reviewed;care  plan/treatment goals reviewed;participants included;patient   OT Discharge Planning    OT Discharge Recommendation (DC Rec) Home with assist   OT Rationale for DC Rec Pt progressing well, anticipate she will be able to discharge to home with assist for home chores, and sponge bathing until able to complete tub/shower transfer safely   OT Brief overview of current status  Tracie LE dressing, Tracie toilet transfer, Cadence tub/shower transfer   Therapy Certification   Start of Care Date 11/27/21   Certification date from 11/27/21   Certification date to 11/27/21   Medical Diagnosis TKA   Total Evaluation Time (Minutes)   Total Evaluation Time (Minutes) 5

## 2021-11-27 NOTE — CONSULTS
Care Management Initial Consult and Discharge Planning    General Information  Assessment completed with: Patient ([place])  Type of CM/SW Visit: Initial Assessment and Discharge Planning    Primary Care Provider verified and updated as needed: Yes   Readmission within the last 30 days: no previous admission in last 30 days         Advance Care Planning:   No current ACP documents    Communication Assessment  Patient's communication style: spoken language (English or Bilingual)    Hearing Difficulty or Deaf: yes   Wear Glasses or Blind: yes    Cognitive  Cognitive/Neuro/Behavioral: WDL      Living Environment:   People in home: alone     Current living Arrangements: house      Able to return to prior arrangements: yes       Family/Social Support:  Care provided by: Adult children*  *Son and daughter-in-law live locally in [place] and are available to help for the next week  Provides care for: no one  Marital Status:   Support System:  Children, Neighbor          Description of Support System: Supportive,Involved    Support Assessment: Adequate family and caregiver support    Current Resources:   Patient receiving home care services: No (New referral)     Community Resources:    Equipment currently used at home: cane, quad,grab bar, tub/shower,shower chair,walker, rolling  Supplies currently used at home:      Employment/Financial:  Employment Status: retired        Financial Concerns: No concerns identified           Lifestyle & Psychosocial Needs:  Social Determinants of Health     Tobacco Use: Medium Risk     Smoking Tobacco Use: Former Smoker     Smokeless Tobacco Use: Never Used   Alcohol Use: Not on file   Financial Resource Strain: Not on file   Food Insecurity: Not on file   Transportation Needs: Not on file   Physical Activity: Not on file   Stress: Not on file   Social Connections: Not on file   Intimate Partner Violence: Not on file   Depression: Not at risk     PHQ-2 Score: 1   Housing  Stability: Not on file       Functional Status:  Prior to admission patient needed assistance:   Dependent ADLs:: Ambulation-cane,Ambulation-walker  Dependent IADLs:: Independent  Assesssment of Functional Status: Not at baseline with mobility,Not at  functional baseline    Mental Health Status:  Mental Health Status: No Current Concerns       Chemical Dependency Status:  Chemical Dependency Status: No Current Concerns             Values/Beliefs:  Spiritual, Cultural Beliefs, Taoist Practices, Values that affect care: other (see comments) (Deferred)               Toi Brady, RN, PHN  RN Care Coordinator   [address] [address]   [email or web address]   Casual Employee - Weekend Coverage only  To contact weekend RNCC, dial [phone] and enter pager number [phone] at prompt OR use Tintri to text page.  This pager can not be contacted by outside line.

## 2021-11-27 NOTE — PLAN OF CARE
Pt. discharged at 1100 to home, was accompanied by family, and left with personal belongings. Pt. received complete discharge paperwork and all medications as filled by discharge pharmacy. Pt. was given times of last dose for all discharge medications in writing on discharge medication sheets. Discharge teaching included all medication, pain management, activity restrictions, dressing changes, and signs and symptoms of infection. Pt. to follow up with Ortho clinic for wound check in 2 weeks. Pt. had no further questions at the time of discharge and no unmet needs were identified.

## 2021-11-27 NOTE — PLAN OF CARE
VS: Vital signs:  Temp: (!) 96.2  F (35.7  C) Temp src: Axillary BP: 120/49 Pulse: 67   Resp: 16 SpO2: 98 % O2 Device: None (Room air)   O2: RA, lungs CTA, denies SOB/chest pain   Output: Voiding without difficulty, up to bathroom   Last BM: LBM 11/26 before surgery, BS+, fl+   Activity: SBA with walker/gb, repositioning self in bed   Skin: Intact ex R knee incision   Pain: Managed with scheduled tylenol, PRN oxycodone   CMS: A&O x4, denies N/T   Dressing: CDI, ACE wrap in place   Diet: Regular, denies N/V   LDA: R PIV SL   Equipment: IV pole, walker/gb, commode   Plan: Continue to monitor   Additional Info:

## 2021-11-27 NOTE — PLAN OF CARE
VS:     Pt A/O X 4. Afebrile. VSS. Lungs- clear bilaterally with both anterior and posterior. IS encouraged. Denies nausea, shortness of breath, and chest pain.      Output:     Bowels- active in all four quadrants. Last BM 11/26 before surgery per pt report. Voids spontaneously without difficulty in the bathroom.       Activity:     Pt up in room and to bathroom with assist of 1, gait belt, and walker.      Skin: Incision to right knee, otherwise intact.      Pain:     Has pain in the right knee and given prn Oxycodone, scheduled Tylenol and is tolerating well. Pt declined ice at this time.       CMS:     CMS and Neuro's are intact. Denies numbness and tingling in all extremities.      Dressing:     Right knee incisional dressing is clean, dry, and intact.       Diet:     Pt is on a regular diet and appetite was good this shift.        LDA:     PIV is patent in the right arm and infusing. PIV removed before discharge.       Equipment:     Pt declined PCDs at this time. IV pole in room. Pt declined Capnography, on continuous pulse ox.      Plan:     Pt is able to make needs known and the call light is within the pt's reach. Continue to monitor.       Additional Info:     Plan to discharge to home today.

## 2021-11-27 NOTE — PLAN OF CARE
Physical Therapy Discharge Summary    Reason for therapy discharge:    All goals and outcomes met, no further needs identified.    Progress towards therapy goal(s). See goals on Care Plan in [facility] electronic health record for goal details.  Goals met    Therapy recommendation(s):    Continued therapy is recommended.  Rationale/Recommendations:  home PT for safety evaluation and progression.

## 2021-11-27 NOTE — PLAN OF CARE
Occupational Therapy Discharge Summary    Reason for therapy discharge:    All goals and outcomes met, no further needs identified.    Progress towards therapy goal(s). See goals on Care Plan in [facility] electronic health record for goal details.  Goals met    Therapy recommendation(s):    No further therapy is recommended.

## 2021-11-27 NOTE — PLAN OF CARE
VS:     Pt A/O X 4. Afebrile. VSS. Lungs- clear bilaterally with both anterior and posterior. IS encouraged. Denies nausea, shortness of breath, and chest pain.     Output:     Bowels- active in all four quadrants. Last BM 11/26 before surgery per pt report. Voids spontaneously without difficulty in the bathroom.      Activity:     Pt up in room and to bathroom with assist of 1, gait belt, and walker.     Skin: Incision to right knee, otherwise intact.     Pain:     Has pain in the right knee and given prn Oxycodone, scheduled Tylenol and is tolerating well. Pt declined ice at this time.      CMS:     CMS and Neuro's are intact. Denies numbness and tingling in all extremities.      Dressing:     Right knee incisional dressing is clean, dry, and intact.      Diet:     Pt is on a regular diet and appetite was good this shift.       LDA:     PIV is patent in the right arm and infusing.      Equipment:     Pt declined PCDs at this time. IV pole in room. Pt declined Capnography, on continuous pulse ox.     Plan:     Pt is able to make needs known and the call light is within the pt's reach. Continue to monitor.       Additional Info:     Plan to discharge to home tomorrow.

## 2021-11-27 NOTE — PLAN OF CARE
[facility]      OUTPATIENT OCCUPATIONAL THERAPY  EVALUATION  PLAN OF TREATMENT FOR OUTPATIENT REHABILITATION  (COMPLETE FOR INITIAL CLAIMS ONLY)  Patient's Last Name, First Name, M.I.  YOB: 1945  Giana Hope                          Provider's Name  [facility] Medical Record No.  [id number]                               Onset Date:  11/26/21   Start of Care Date:  11/27/21     Type:     ___PT   _X_OT   ___SLP Medical Diagnosis:  TKA                        OT Diagnosis:  impaired ADLs   Visits from SOC:  1   _________________________________________________________________________________  Plan of Treatment/Functional Goals    Planned Interventions: ADL retraining,transfer training   Goals: See Occupational Therapy Goals on Care Plan in [facility] electronic health record.    Therapy Frequency: 1x eval and treat  Predicted Duration of Therapy Intervention: 1 day  _________________________________________________________________________________    I CERTIFY THE NEED FOR THESE SERVICES FURNISHED UNDER        THIS PLAN OF TREATMENT AND WHILE UNDER MY CARE     (Physician co-signature of this document indicates review and certification of the therapy plan).              Certification date from: 11/27/21, Certification date to: 11/27/21    Referring Physician: Gabriel Marrero            Initial Assessment        See Occupational Therapy evaluation dated 11/27/21 in Epic electronic health record.

## 2021-11-29 ENCOUNTER — TELEPHONE (OUTPATIENT)
Dept: ORTHOPEDICS | Facility: CLINIC | Age: 76
End: 2021-11-29
Payer: MEDICARE

## 2021-11-29 NOTE — DISCHARGE SUMMARY
ORTHOPAEDIC SURGERY DISCHARGE SUMMARY     Date of Admission: 11/26/2021  Date of Discharge: 11/27/2021 10:45 AM  Disposition: Home  Staff Physician: No att. providers found  Primary Care Provider: Gilberto Vasquez    DISCHARGE DIAGNOSIS:  Primary osteoarthritis of right knee [M17.11]    PROCEDURES: Procedure(s):  ARTHROPLASTY, RIGHT KNEE, TOTAL on 11/26/2021    BRIEF HISTORY:  76-year-old female with chronic pain of right knee due to end-stage osteoarthritic changes most notably in the medial and patellofemoral compartments.  Insufficiently responding to treated nonoperative treatment measures.    HOSPITAL COURSE:    The patient was admitted following the above listed procedures for pain control and rehabilitation. Giana Hope did well post-operatively. Medicine was consulted post operatively to aid in management of medical co-morbidities. The patient received routine nursing cares and at the time of discharge was medically stable. Vital signs were stable throughout admission. The patient is tolerating a regular diet and is voiding spontaneously. All PT/OT goals have been met for safe mobility. Pain is now controlled on oral medications which will be available on discharge. Stool softeners have been used while taking pain medications to help prevent constipation. Giana Hope is deemed medically safe to discharge.     Antibiotics:  ancef given periop and 24 hours postop.   DVT prophylaxis:  aspirin initiated after surgery and will be continued for 4 weeks.   PT Progress:  Has met PT/OT goals for safe mobility.    Pain Meds:  Weaned off all IV pain meds by discharge.  Inpatient Events: No significant events or complications.     PHYSICAL EXAM:    Gen: No acute distress, resting comfortably in bed.  Resp: Non-labored breathing  MSK:     RLE:  - Dressings c/d/i  - SILT femoral/tibial/sural/saphenous/DP/SP nerves  - Fires Quad, TA, EHL, FHL, GaSC  - PT/DP pulses 2+, foot wwp    FOLLOWUP:    Follow up  with Dr. Marrero at 2 weeks postoperatively.    Future Appointments   Date Time Provider Department Center   12/1/2021  4:25 PM Indiana Brink, PT [facility]   12/3/2021 10:40 AM [facility]   12/10/2021 10:40 AM Franklyn Gonzales PT [facility]   12/17/2021 10:40 AM [facility]   12/20/2021 10:05 AM Jose Alberto Isbell MD [facility]   12/22/2021 11:30 AM Franklyn Gonzales PT [facility]   12/29/2021 11:30 AM Franklyn Gonzales PT [facility]       Orthopaedic Surgery appointments are at the [facility] ([address] [place] [address]). Call [phone] to schedule a follow-up appointment at this location with your provider.     PLANNED DISCHARGE ORDERS:      Discharge Medication List as of 11/27/2021 10:11 AM      START taking these medications    Details   acetaminophen (TYLENOL) 325 MG tablet Take 2 tablets (650 mg) by mouth every 4 hours as needed for other (mild pain), Disp-100 tablet, R-0, E-Prescribe      aspirin 81 MG EC tablet Take 1 tablet (81 mg) by mouth 2 times daily, Disp-60 tablet, R-0, E-Prescribe      ibuprofen (ADVIL/MOTRIN) 600 MG tablet Take 1 tablet (600 mg) by mouth every 6 hours as needed (mild), Disp-30 tablet, R-0, E-Prescribe      oxyCODONE (ROXICODONE) 5 MG tablet Take 1-2 tablets (5-10 mg) by mouth every 3 hours as needed for pain (Moderate to Severe), Disp-30 tablet, R-0, E-Prescribe      polyethylene glycol (MIRALAX) 17 g packet Take 17 g by mouth daily, Disp-7 packet, R-0, E-Prescribe      senna-docusate (SENOKOT-S/PERICOLACE) 8.6-50 MG tablet Take 1-2 tablets by mouth 2 times daily Take while on oral narcotics to prevent or treat constipation., Disp-30 tablet, R-0, E-PrescribeWhile taking narcotics         CONTINUE these medications which have NOT CHANGED    Details   famotidine (PEPCID) 40 MG tablet Take 1 tablet (40 mg) by mouth daily as needed for heartburn,  "Disp-90 tablet, R-3, E-Prescribeannual renewal dose not need filled currently      fluocinolone (SYNALAR) 0.01 % solution Apply topically three times a weekDisp-60 mL, [id number]      hydrocortisone 2.5 % cream Apply topically 2 times daily as needed For up to 2 weeks at a time on the face. Avoid eye area.Disp-20 g, [id number]      ketoconazole (NIZORAL) 2 % external cream Apply topically 2 times dailyDisp-60 g, [id number]      ketoconazole (NIZORAL) 2 % external shampoo Apply topically three times a weekDisp-120 mL, [id number]      pravastatin (PRAVACHOL) 20 MG tablet Take 1 tablet (20 mg) by mouth daily, Disp-90 tablet, R-3, E-Prescribe      prednisoLONE acetate (PRED FORTE) 1 % ophthalmic suspension Place 1-2 drops Into the left eye every hour, Disp-1 Bottle,R-3, E-Prescribe         STOP taking these medications       sulindac (CLINORIL) 200 MG tablet Comments:   Reason for Stopping:                 Discharge Procedure Orders   Home Care PT Referral for Hospital Discharge   Referral Priority: Routine Referral Type: Home Health Therapies & Aides   Number of Visits Requested: 1     Care Coordination Referral   Standing Status: Future   Referral Priority: Routine Referral Type: Care Coordination   Number of Visits Requested: 1     Reason for your hospital stay   Order Comments: S/p tka right     When to call - Contact Surgeon Team   Order Comments: You may experience symptoms that require follow-up before your scheduled appointment. Refer to the \"Stoplight Tool\" for instructions on when to contact your Surgeon Team if you are concerned about pain control, blood clots, constipation, or if you are unable to urinate.     When to call - Reach out to Urgent Care   Order Comments: If you are not able to reach your Surgeon Team and you need immediate care, go to the Orthopedic Walk-in Clinic or Urgent Care at your Surgeon's office.  Do NOT go to the Emergency Room unless you have shortness of breath, chest " pain, or other signs of a medical emergency.     When to call - Reasons to Call 911   Order Comments: Call 911 immediately if you experience sudden-onset chest pain, arm weakness/numbness, slurred speech, or shortness of breath     Discharge Instruction - Breathing exercises   Order Comments: Perform breathing exercises using your Incentive Spirometer 10 times per hour while awake for 2 weeks.     Symptoms - Fever Management   Order Comments: A low grade fever can be expected after surgery.  Use acetaminophen (TYLENOL) as needed for fever management.  Contact your Surgeon Team if you have a fever greater than 101.5 F, chills, and/or night sweats.     Symptoms - Constipation management   Order Comments: Constipation (hard, dry bowel movements) is expected after surgery due to the combination of being less active, the anesthetic, and the opioid pain medication.  You can do the following to help reduce constipation:  ~  FLUIDS:  Drink clear liquids (water or Gatorade), or juice (apple/prune).  ~  DIET:  Eat a fiber rich diet.    ~  ACTIVITY:  Get up and move around several times a day.  Increase your activity as you are able.  MEDICATIONS:  Reduce the risk of constipation by starting medications before you are constipated.  You can take Miralax   (1 packet as directed) and/or a stool softener (Senokot 1-2 tablets 1-2 times a day).  If you already have constipation and these medications are not working, you can get magnesium citrate and use as directed.  If you continue to have constipation you can try an over the counter suppository or enema.  Call your Surgeon Team if it has been greater than 3 days since your last bowel movement.     Symptoms - Reduced Urine Output   Order Comments: Changes in the amount of fluids you drank before and after surgery may result in problems urinating.  It is important to stay well-hydrated after surgery and drink plenty of water. If it has been greater than 8 hours since you have  urinated despite drinking plenty of water, call your Surgeon Team.     Activity - Exercises to prevent blood clots   Order Comments: Unless otherwise directed by your Surgeon team, perform the following exercises at least three times per day for the first four weeks after surgery to prevent blood clots in your legs: 1) Point and flex your feet (Ankle Pumps), 2) Move your ankle around in big circles, 3) Wiggle your toes, 4) Walk, even for short distances, several times a day, will help decrease the risk of blood clots.     Order Specific Question Answer Comments   Is discharge order? Yes      Comfort and Pain Management - Pain after Surgery   Order Comments: Pain after surgery is normal and expected.  You will have some amount of pain for several weeks after surgery.  Your pain will improve with time.  There are several things you can do to help reduce your pain including: rest, ice, elevation, and using pain medications as needed. Contact your Surgeon Team if you have pain that persists or worsens after surgery despite rest, ice, elevation, and taking your medication(s) as prescribed. Contact your Surgeon Team if you have new numbness, tingling, or weakness in your operative extremity.     Comfort and Pain Management - Swelling after Surgery   Order Comments: Swelling and/or bruising of the surgical extremity is common and may persist for several months after surgery. In addition to frequent icing and elevation, gentle compressive support with an ACE wrap or tubigrip may help with swelling. Apply compression regularly, removing at least twice daily to perform skin checks. Contact your Surgeon Team if your swelling increases and is NOT associated with an increase in your activity level, or if your swelling increases and is associated with redness and pain.     Comfort and Pain Management - Cold therapy   Order Comments: Ice can be used to control swelling and discomfort after surgery. Place a thin towel over your  operative site and apply the ice pack overtop. Leave ice pack in place for 20 minutes, then remove for 20 minutes. Repeat this 20 minutes on/20 minutes off routine as often as tolerated.     Medication Instructions - Acetaminophen (TYLENOL) Instructions   Order Comments: You were discharged with acetaminophen (TYLENOL) for pain management after surgery. Acetaminophen most effectively manages pain symptoms when it is taken on a schedule without missing doses (every four, six, or eight hours). Your Provider will prescribe a safe daily dose between 3000 - 4000 mg.  Do NOT exceed this daily dose. Most patients use acetaminophen for pain control for the first four weeks after surgery.  You can wean from this medication as your pain decreases.     Medication Instructions - NSAID Instructions   Order Comments: You were discharged with an anti-inflammatory medication for pain management to use in combination with acetaminophen (TYLENOL) and the narcotic pain medication.  Take this medication exactly as directed.  You should only take one anti-inflammatory at a time.  Some common anti-inflammatories include: ibuprofen (ADVIL, MOTRIN), naproxen (ALEVE, NAPROSYN), celecoxib (CELEBREX), meloxicam (MOBIC), ketorolac (TORADOL).  Take this medication with food and water.     Medication Instructions - Opioids - Tapering Instructions   Order Comments: In the first three days following surgery, your symptoms may warrant use of the narcotic pain medication every four to six hours as prescribed. This is normal. As your pain symptoms improve, focus your efforts on decreasing (tapering) use of narcotic medications. The most successful tapering strategy is to first, decrease the number of tablets you take every 4-6 hours to the minimum prescribed. Then, increase the amount of time between doses.  For example:  First, taper to   or 1 tablet every 4-6 hours.  Then, taper to   or 1 tablet every 6-8 hours.  Then, taper to   or 1 tablet every  8-10 hours.  Then, taper to   or 1 tablet every 10-12 hours.  Then, taper to   or 1 tablet at bedtime.  The bedtime dose can help with comfort during sleep and is typically the last dose to be discontinued after surgery.     Follow Up Care   Order Comments: Follow-up with your Surgeon Team in 2 weeks for wound check.     Activity - Total Knee Arthroplasty     Order Specific Question Answer Comments   Is discharge order? Yes      Return to Driving   Order Comments: Return to driving - Driving is NOT permitted until directed by your provider. Under no circumstance are you permitted to drive while using narcotic pain medications.     Order Specific Question Answer Comments   Is discharge order? Yes      NO Precautions   Order Comments: No precautions directed by your Provider.  You may perform range of motion activities as tolerated.     Order Specific Question Answer Comments   Is discharge order? Yes      Weight bearing as tolerated   Order Comments: Weight bearing as tolerated on your operative extremity.     Order Specific Question Answer Comments   Is discharge order? Yes      Dressing / Wound Care - Wound   Order Comments: You have a clean dressing on your surgical wound. Dressing change instructions as follows: remove your dressing in 7-10 days, and leave incision open to air. Contact your Surgeon Team if you have increased redness, warmth around the surgical wound, and/or drainage from the surgical wound.     Dressing / Wound Care - NO Tub Bathing   Order Comments: Tub bathing, swimming, or any other activities that will cause your incision to be submerged in water should be avoided until allowed by your Surgeon.     Medication instructions -  Anticoagulation - aspirin   Order Comments: Take the aspirin as prescribed for a total of four weeks after surgery.  This is given to help minimize your risk of blood clot.     Comfort and Pain Management - LOWER Extremity Elevation   Order Comments: Swelling is expected  "for several months after surgery. This type of swelling is usually associated with gravity and activity, and can be improved with elevation.   The best way to do this is to get your \"toes above your nose\" by laying down and placing several pillows lengthwise under your calf and heel. When elevating your leg keep your knee completely straight. Perform this elevation as often as possible especially for the first two weeks after surgery.     Medication Instructions - Opioid Instructions (Greater than or equal to 65 years)   Order Comments: You were discharged with an opioid medication (hydromorphone, oxycodone, hydrocodone, or tramadol). This medication should only be taken for breakthrough pain that is not controlled with acetaminophen (TYLENOL). If you rate your pain less than 3 you do not need this medication.  Pain rating 0-3:  You do not need this medication  Pain rating 4-6:  Take 1/2 tablet every 4-6 hours as needed  Pain rating 7-10:  Take 1 tablet every 4-6 hours as needed  Do not exceed 4 tablets per day     Dressing / Wound care - Shower with wound/dressing covered   Order Comments: You must COVER your dressing or incision with saran wrap (or any other non-permeable covering) to allow the incision to remain dry while showering.  You may shower 2-3 days after surgery as long as the surgical wound stays dry. Continue to cover your dressing or incision for showering until your first office visit.  You are strictly prohibited from soaking   or submerging the surgical wound underwater.     MD face to face encounter   Order Comments: Documentation of Face to Face and Certification for Home Health Services    I certify that patient: Giana Hope is under my care and that I, or a nurse practitioner or physician's assistant working with me, had a face-to-face encounter that meets the physician face-to-face encounter requirements with this patient on: November 27, 2021.    This encounter with the patient was in " whole, or in part, for the following medical condition, which is the primary reason for home health care: Knee replacement    I certify that, based on my findings, the following services are medically necessary home health services: Physical Therapy.    My clinical findings support the need for the above services because physical therapy Services are needed to assess and treat functional impairments after knee replacement    Further, I certify that my clinical findings support that this patient is homebound (i.e. absences from home require considerable and taxing effort and are for medical reasons or [employer] services or infrequently or of short duration when for other reasons) because: Requires assistance of another person or specialized equipment to access medical services because patient is unable to exit home safely on own due to recent knee replacement surgery and requires supervision of another for safe transfer.    Based on the above findings. I certify that this patient is confined to the home and needs intermittent skilled nursing care, physical therapy and/or speech therapy.  The patient is under my care, and I have initiated the establishment of the plan of care.  This patient will be followed by a physician who will periodically review the plan of care.  Physician/Provider to provide follow up care: Gilberto Vasquez    Attending hospital physician (the Medicare certified PEC provider): Gabriel Marrero MD  Physician Signature: See electronic signature associated with these discharge orders.  Date: 11/27/2021     Clif LOPEZ   Order Comments: DME Documentation: Describe the reason for need to support medical necessity: Impaired gait status post knee surgery. I, the undersigned, certify that the above prescribed supplies are medically necessary for this patient and is both reasonable and necessary in reference to accepted standards of medical practice in the treatment of this patient's condition  and is not prescribed as a convenience.     Order Specific Question Answer Comments   DME Provider: [facility]    Crutch Type: Standard    Crutches Add On: NA    Length of Need: Lifetime      Cane DME   Order Comments: DME Documentation: Describe the reason for need to support medical necessity: Impaired gait status post knee surgery. I, the undersigned, certify that the above prescribed supplies are medically necessary for this patient and is both reasonable and necessary in reference to accepted standards of medical practice in the treatment of this patient's condition and is not prescribed as a convenience.     Order Specific Question Answer Comments   DME Provider: [employer]    Cane Type: Single Tip    Reminder: Patient can typically get 1 every 5 years      Walker DME   Order Comments: DME Documentation: Describe the reason for need to support medical necessity: Impaired gait status post knee surgery. I, the undersigned, certify that the above prescribed supplies are medically necessary for this patient and is both reasonable and necessary in reference to accepted standards of medical practice in the treatment of this patient's condition and is not prescribed as a convenience.     Order Specific Question Answer Comments   DME Provider: [employer]    Walker Type: Standard (2 Wheel)    Accessories: N/A      Discharge Instruction - Regular Diet Adult   Order Comments: Return to your pre-surgery diet unless instructed otherwise     Order Specific Question Answer Comments   Is discharge order? Yes        Ishan Mcdermott MD  Orthopaedic Surgery Resident, PGY4  Pager: [phone]

## 2021-11-29 NOTE — TELEPHONE ENCOUNTER
[facility] Call Center    Phone Message    May a detailed message be left on voicemail: yes     Reason for Call: Order(s): Home Care Orders: Physical Therapy (PT): 2x a wk for 2 wks // 1x a wk for 2wks // 1x every other wk for 4 wks    Action Taken: Message routed to:  [facility] ortho    Travel Screening: Not Applicable     Patient is admitted to [facility] - Please call back with verbal orders     Also wanted to relay patients Pulse was 115

## 2021-11-30 NOTE — TELEPHONE ENCOUNTER
LVM giving verbal orders. Provided call center number if she has questions/concerns.     Heather Macias ATC

## 2021-12-02 ENCOUNTER — TELEPHONE (OUTPATIENT)
Dept: FAMILY MEDICINE | Facility: CLINIC | Age: 76
End: 2021-12-02
Payer: MEDICARE

## 2021-12-02 NOTE — TELEPHONE ENCOUNTER
[facility] Call Center    Phone Message    May a detailed message be left on voicemail: yes     Reason for Call: Medication Refill Request    Has the patient contacted the pharmacy for the refill? Yes   Name of medication being requested: oxyCODONE (ROXICODONE) 5 MG tablet  Provider who prescribed the medication: Pedro   [facility]  Date medication is needed: only 6 tablets left         Action Taken: Message routed to:  [facility] P[facility]    Travel Screening: Not Applicable

## 2021-12-03 NOTE — PROGRESS NOTES
[facility]  Orthopaedic Surgery Consultation by Gabriel Marrero M.D.    Giana Hope MRN# [id number]   Age: 76 year old YOB: 1945     Requesting physician: Gilberto Pandya     Background history:  DX:  1. History of anesthesia problem  2. Gastroesophageal reflux disease without esophagitis  3. Nodule of left lung  4. DDD  5. Risk for falls  6. Hypercholesterolemia  7. Nuclear sclerosis      TREATMENTS:  1. 2/25/2016, Left TKA, Сергей Pang MD , [facility], [facility], [facility]  2. 8/25/2020, Reverse Left TSA, Ector Chapa M.D., [facility]  3. 11/26/2021, right total knee arthroplasty,              History of Present Illness:     76-year-old female with chronic pain of right knee due to end-stage osteoarthritic changes most notably in the medial and patellofemoral compartments.  Insufficiently responding to treated nonoperative treatment measures.  Patient underwent right total knee arthroplasty on 11/26/2021.    Today patient is approximately 3 weeks after the above-mentioned procedure.  She states she is doing well.  Her pain is reasonably controlled on a regimen of Tylenol and oxycodone.  She would like to start using NSAIDs as well.  She describes stiffness in the morning which subsides when she exercises.  She has been doing her physical therapy and her home exercise regimen.  She is taking her aspirin as DVT prophylaxis.  Her preoperative pain is disappeared.    Social:   Occupation: Retired   Living situation: Lives with cat.  She has family close by.  Hobbies / Sports: walking and hiking     Smoking: No  Alcohol: Yes  Illicit drug use: No         Physical Exam:     EXAMINATION pertinent findings:   PSYCH: Pleasant, healthy-appearing, alert, oriented x3, cooperative. Normal mood and affect.  VITAL SIGNS: not currently breastfeeding.  Reviewed nursing intake notes.   There  is no height or weight on file to calculate BMI.  RESP: non labored breathing   ABD: benign, soft, non-tender, no acute peritoneal findings  SKIN: grossly normal   LYMPHATIC: grossly normal, no adenopathy, no extremity edema  NEURO: grossly normal , no motor deficits  VASCULAR: satisfactory perfusion of all extremities   MUSCULOSKELETAL:   Alignment: Neutral alignment of right lower extremity.  Gait: Normal gait.    The right hip exhibits a full range of motion.  No pains upon rotation.  Lasegue's test is negative.  No tenderness to palpation of the greater trochanteric region.    R knee: Incision is clean, dry and intact.  No signs of infection.  -2-0  .  Straight leg raise +. No redness, warmth or skin changes present.  Postoperative effusion present. Ligamentously stable in both ML and AP direction. Normal PF tracking without crepitus.     Right LE:   Thigh and leg compartments soft and compressible   +Quad/TA/GSC/FHL/EHL   SILT DP/SP/Ana/Saph/Tib nerve distributions   Palpable dorsalis pedis pulse          Data:   All laboratory data reviewed    All imaging studies reviewed by me personally.    XR knee right 11/26/2021:  My interpretation: Status post right total knee arthroplasty.  Adequate sizing, fixation and orientation of components.  No signs of immediate postoperative complications.         Assessment and Plan:   Assessment:  76-year-old female with chronic pain of right knee due to end-stage osteoarthritic changes most notably in the medial and patellofemoral compartments.  Insufficiently responding to treated nonoperative treatment measures.  Patient underwent right total knee arthroplasty on 11/26/2021.     Plan:  I discussed my findings with the patient.  She will continue her range of motion, strengthening and gait exercises under the guidance of a physical therapist in the outpatient setting.  A refill for oxycodone and prescription  for ibuprofen were provided.  She will complete her DVT  prophylaxis by means of aspirin for 4 weeks.  All questions were answered.  Patient will follow up at her 6-week postoperative date with renewed radiographic imaging studies.    Gabriel Marrero MD, PhD     Adult Reconstruction  [facility] Department of Orthopaedic Surgery  Pager [phone]

## 2021-12-04 ENCOUNTER — HEALTH MAINTENANCE LETTER (OUTPATIENT)
Age: 76
End: 2021-12-04

## 2021-12-06 DIAGNOSIS — Z53.9 DIAGNOSIS NOT YET DEFINED: Primary | ICD-10-CM

## 2021-12-08 DIAGNOSIS — M17.11 PRIMARY OSTEOARTHRITIS OF ONE KNEE, RIGHT: ICD-10-CM

## 2021-12-08 RX ORDER — OXYCODONE HYDROCHLORIDE 5 MG/1
5-10 TABLET ORAL EVERY 6 HOURS PRN
Qty: 30 TABLET | Refills: 0 | Status: SHIPPED | OUTPATIENT
Start: 2021-12-08 | End: 2022-04-22

## 2021-12-08 NOTE — TELEPHONE ENCOUNTER
DOS: 11/26/21 Right total knee arthroplasty    Patient requested refill. See MyChart encounter for details of refill. Pended to Dr. Marrero for signature.

## 2021-12-10 NOTE — RESULT ENCOUNTER NOTE
Dear Giana Hope    Mild increase in total cholesterol but LDL in goal continue current medication, lower cholesterol in your diet.  Blood sugar was minimally elevated recheck was normal.   Lower portion size, lower carbohydrate choices ( less bread, pasta, rice, potatoes, sugar containing foods and eliminate sugar containing beverages.) Also important is daily physical activity such as walking or stationary bike 30 minutes daily.    Your kidney, liver, calcium, sodium and potassium were normal or within acceptable range.   Please make a follow-up appointment if questions or concerns.  Best wishes,  Gilberto Vasquez MD

## 2021-12-15 ENCOUNTER — OFFICE VISIT (OUTPATIENT)
Dept: ORTHOPEDICS | Facility: CLINIC | Age: 76
End: 2021-12-15
Attending: STUDENT IN AN ORGANIZED HEALTH CARE EDUCATION/TRAINING PROGRAM
Payer: MEDICARE

## 2021-12-15 DIAGNOSIS — M17.11 PRIMARY OSTEOARTHRITIS OF ONE KNEE, RIGHT: Primary | ICD-10-CM

## 2021-12-15 PROCEDURE — 99024 POSTOP FOLLOW-UP VISIT: CPT | Performed by: STUDENT IN AN ORGANIZED HEALTH CARE EDUCATION/TRAINING PROGRAM

## 2021-12-15 RX ORDER — OXYCODONE HYDROCHLORIDE 5 MG/1
5 TABLET ORAL EVERY 6 HOURS PRN
Qty: 20 TABLET | Refills: 0 | Status: SHIPPED | OUTPATIENT
Start: 2021-12-15 | End: 2022-01-13

## 2021-12-15 RX ORDER — IBUPROFEN 400 MG/1
400 TABLET, FILM COATED ORAL EVERY 8 HOURS PRN
Qty: 90 TABLET | Refills: 0 | Status: SHIPPED | OUTPATIENT
Start: 2021-12-15 | End: 2022-04-22

## 2021-12-17 ENCOUNTER — TELEPHONE (OUTPATIENT)
Dept: FAMILY MEDICINE | Facility: CLINIC | Age: 76
End: 2021-12-17

## 2021-12-17 NOTE — TELEPHONE ENCOUNTER
Called [facility] and left a secure VM.  Gave verbal orders per Dr. Vasquez for 1 PT visit for the week of 12/27/21       Pasha Alcantar CMA ([facility]) at 1:27 PM on 12/17/2021

## 2021-12-17 NOTE — TELEPHONE ENCOUNTER
[facility] Call Center    Phone Message    May a detailed message be left on voicemail: yes     Reason for Call: Order(s): Home Care Orders: Physical Therapy (PT): [facility] was calling to inform the clinic the patient is requesting to be discharged for the week of 12/27/21 for Physical therapy. They will need 1 PT visit for the week of 12/27/21 please call with verbal orders thank you.    Action Taken: Message routed to:  [facility] ([facility]    Travel Screening: Not Applicable

## 2021-12-20 ENCOUNTER — MEDICAL CORRESPONDENCE (OUTPATIENT)
Dept: HEALTH INFORMATION MANAGEMENT | Facility: CLINIC | Age: 76
End: 2021-12-20

## 2021-12-29 ENCOUNTER — MEDICAL CORRESPONDENCE (OUTPATIENT)
Dept: HEALTH INFORMATION MANAGEMENT | Facility: CLINIC | Age: 76
End: 2021-12-29

## 2022-01-05 ENCOUNTER — MEDICAL CORRESPONDENCE (OUTPATIENT)
Dept: HEALTH INFORMATION MANAGEMENT | Facility: CLINIC | Age: 77
End: 2022-01-05
Payer: MEDICARE

## 2022-01-10 ENCOUNTER — VIRTUAL VISIT (OUTPATIENT)
Dept: DERMATOLOGY | Facility: CLINIC | Age: 77
End: 2022-01-10
Payer: MEDICARE

## 2022-01-10 DIAGNOSIS — L21.9 DERMATITIS, SEBORRHEIC: Primary | ICD-10-CM

## 2022-01-10 DIAGNOSIS — L64.9 ANDROGENETIC ALOPECIA: ICD-10-CM

## 2022-01-10 PROCEDURE — 99213 OFFICE O/P EST LOW 20 MIN: CPT | Mod: 95 | Performed by: DERMATOLOGY

## 2022-01-10 RX ORDER — FLUOCINOLONE ACETONIDE 0.1 MG/ML
SOLUTION TOPICAL 2 TIMES DAILY
Qty: 90 ML | Refills: 11 | Status: SHIPPED | OUTPATIENT
Start: 2022-01-10 | End: 2022-04-22

## 2022-01-10 RX ORDER — TACROLIMUS 1 MG/G
OINTMENT TOPICAL 2 TIMES DAILY
Qty: 30 G | Refills: 11 | Status: SHIPPED | OUTPATIENT
Start: 2022-01-10 | End: 2023-03-06

## 2022-01-10 ASSESSMENT — PAIN SCALES - GENERAL: PAINLEVEL: NO PAIN (0)

## 2022-01-10 NOTE — PROGRESS NOTES
[facility] Dermatology Note  Encounter Date: Rajat 10, 2022  MyChart Connected.    Dermatology Problem List:  1. Multifactorial non-scarring hair loss (seborrheic dermatitis and androgenetic +/- telogen effluvium)  - hair labs unremarkable: CBC, TSH, vitamin D, iron studies  - current tx: fluocinolone solution, ketoconazole shampoo, minoxidil 5% foam  2. AKs. Cryo.  3. Pink facial patch: DDx seborrheic dermatitis, actinic keratosis, macular seborrheic keratosis  - current treatment: tacrolimus ointment  - past treatment: hydrocortisone cream, ketoconazole cream       ____________________________________________    Assessment & Plan:     1. Non-scarring hair loss: since discontinuation of minoxidil. Will restart.    - restart minoxidil 5% foam    2. Seborrheic dermatitis: improving; still some erythema but no flaking and itching has improved though not resolved. Patch on the forehead may be seborrheic dermatitis, but DDx includes actinic keratosis and macular seborrheic keratosis. Would like to evaluate in person.  - start tacrolimus ointment for facial involvement given refractoriness to ketoconazole  - fluocinolone solution and ketoconazole shampoo for scalp    Procedures Performed:    None    Follow-up: 6 months in-person    Staff:     Jose Alberto Isbell MD, FAAD   of Dermatology  Department of Dermatology  [facility]    ____________________________________________    CC: Hair Loss (Hawa, is here for a hairloss appt )    HPI:  Ms. Giana Hope is a(n) 76 year old female who presents today as a return patient for hair loss    Hair loss - in fall - has started falling out again  - mostly along the center and a bit in the back on crown  - redness not as bad as prior - using the shampoo and lotion but not oil - not as much itching  - redness still present on the forehead - not as noticeable  - not currently using minoxidil     Patient is  otherwise feeling well, without additional skin concerns.    Labs Reviewed:  N/A    Physical Exam:  Vitals: LMP  (LMP Unknown)   SKIN: Teledermatology photos were reviewed; image quality and interpretability: acceptable. Image date: 1/10/22.  - scalp erythema; thinning of hair along the crown and part line  - No other lesions of concern on areas examined.     Medications:  Current Outpatient Medications   Medication     acetaminophen (TYLENOL) 325 MG tablet     aspirin 81 MG EC tablet     famotidine (PEPCID) 40 MG tablet     fluocinolone (SYNALAR) 0.01 % solution     hydrocortisone 2.5 % cream     ibuprofen (ADVIL/MOTRIN) 400 MG tablet     ibuprofen (ADVIL/MOTRIN) 600 MG tablet     ketoconazole (NIZORAL) 2 % external cream     ketoconazole (NIZORAL) 2 % external shampoo     oxyCODONE (ROXICODONE) 5 MG tablet     oxyCODONE (ROXICODONE) 5 MG tablet     polyethylene glycol (MIRALAX) 17 g packet     pravastatin (PRAVACHOL) 20 MG tablet     prednisoLONE acetate (PRED FORTE) 1 % ophthalmic suspension     senna-docusate (SENOKOT-S/PERICOLACE) 8.6-50 MG tablet     Current Facility-Administered Medications   Medication     triamcinolone (KENALOG-40) injection 40 mg      Past Medical/Surgical History:   Patient Active Problem List   Diagnosis     Hypercholesterolemia     Arthritis of shoulder region, left     Encounter to establish care     Primary osteoarthritis involving multiple joints     Gastroesophageal reflux disease without esophagitis     Nodule of left lung     Family history of malignant neoplasm of breast     Mass of left upper extremity     Back pain of thoracolumbar region     Balance disorder     Compression fracture of thoracic vertebra, open, initial encounter (H)     DDD (degenerative disc disease), thoracolumbar     Degenerative scoliosis in adult patient     History of anesthesia problem     Kyphosis (acquired) (postural)     Nuclear sclerosis     Osteoarthrosis     Osteopenia with high risk of fracture      Pain in left shoulder     Risk for falls     Status post left knee replacement     Pain in thoracic spine     Osteoarthritis of right knee     Past Medical History:   Diagnosis Date     Nonsenile cataract      Osteoarthritis      Pulmonary nodule      Uveitis        CC Referred Self, MD  No address on file on close of this encounter.

## 2022-01-10 NOTE — LETTER
1/10/2022       RE: Giana Hope  [address]  [place] [address]     Dear Colleague,    Thank you for referring your patient, Giana Hope, to the [facility] [place] at [facility]. Please see a copy of my visit note below.    [facility] Dermatology Note  Encounter Date: Rajat 10, 2022  MyChart Connected.    Dermatology Problem List:  1. Multifactorial non-scarring hair loss (seborrheic dermatitis and androgenetic +/- telogen effluvium)  - hair labs unremarkable: CBC, TSH, vitamin D, iron studies  - current tx: fluocinolone solution, ketoconazole shampoo, minoxidil 5% foam  2. AKs. Cryo.  3. Pink facial patch: DDx seborrheic dermatitis, actinic keratosis, macular seborrheic keratosis  - current treatment: tacrolimus ointment  - past treatment: hydrocortisone cream, ketoconazole cream       ____________________________________________    Assessment & Plan:     1. Non-scarring hair loss: since discontinuation of minoxidil. Will restart.    - restart minoxidil 5% foam    2. Seborrheic dermatitis: improving; still some erythema but no flaking and itching has improved though not resolved. Patch on the forehead may be seborrheic dermatitis, but DDx includes actinic keratosis and macular seborrheic keratosis. Would like to evaluate in person.  - start tacrolimus ointment for facial involvement given refractoriness to ketoconazole  - fluocinolone solution and ketoconazole shampoo for scalp    Procedures Performed:    None    Follow-up: 6 months in-person    Staff:     Jose Alberto Isbell MD, FAAD   of Dermatology  Department of Dermatology  [facility]    ____________________________________________    CC: Hair Loss ([place], is here for a hairloss appt )    HPI:  Ms. Giana Hope is a(n) 76 year old female who presents today as a return patient for hair  loss    Hair loss - in fall - has started falling out again  - mostly along the center and a bit in the back on crown  - redness not as bad as prior - using the shampoo and lotion but not oil - not as much itching  - redness still present on the forehead - not as noticeable  - not currently using minoxidil     Patient is otherwise feeling well, without additional skin concerns.    Labs Reviewed:  N/A    Physical Exam:  Vitals: LMP  (LMP Unknown)   SKIN: Teledermatology photos were reviewed; image quality and interpretability: acceptable. Image date: 1/10/22.  - scalp erythema; thinning of hair along the crown and part line  - No other lesions of concern on areas examined.     Medications:  Current Outpatient Medications   Medication     acetaminophen (TYLENOL) 325 MG tablet     aspirin 81 MG EC tablet     famotidine (PEPCID) 40 MG tablet     fluocinolone (SYNALAR) 0.01 % solution     hydrocortisone 2.5 % cream     ibuprofen (ADVIL/MOTRIN) 400 MG tablet     ibuprofen (ADVIL/MOTRIN) 600 MG tablet     ketoconazole (NIZORAL) 2 % external cream     ketoconazole (NIZORAL) 2 % external shampoo     oxyCODONE (ROXICODONE) 5 MG tablet     oxyCODONE (ROXICODONE) 5 MG tablet     polyethylene glycol (MIRALAX) 17 g packet     pravastatin (PRAVACHOL) 20 MG tablet     prednisoLONE acetate (PRED FORTE) 1 % ophthalmic suspension     senna-docusate (SENOKOT-S/PERICOLACE) 8.6-50 MG tablet     Current Facility-Administered Medications   Medication     triamcinolone (KENALOG-40) injection 40 mg      Past Medical/Surgical History:   Patient Active Problem List   Diagnosis     Hypercholesterolemia     Arthritis of shoulder region, left     Encounter to establish care     Primary osteoarthritis involving multiple joints     Gastroesophageal reflux disease without esophagitis     Nodule of left lung     Family history of malignant neoplasm of breast     Mass of left upper extremity     Back pain of thoracolumbar region     Balance disorder      Compression fracture of thoracic vertebra, open, initial encounter (H)     DDD (degenerative disc disease), thoracolumbar     Degenerative scoliosis in adult patient     History of anesthesia problem     Kyphosis (acquired) (postural)     Nuclear sclerosis     Osteoarthrosis     Osteopenia with high risk of fracture     Pain in left shoulder     Risk for falls     Status post left knee replacement     Pain in thoracic spine     Osteoarthritis of right knee     Past Medical History:   Diagnosis Date     Nonsenile cataract      Osteoarthritis      Pulmonary nodule      Uveitis        CC Referred Self, MD  No address on file on close of this encounter.

## 2022-01-11 ENCOUNTER — THERAPY VISIT (OUTPATIENT)
Dept: PHYSICAL THERAPY | Facility: CLINIC | Age: 77
End: 2022-01-11
Payer: MEDICARE

## 2022-01-11 DIAGNOSIS — Z96.651 STATUS POST TOTAL RIGHT KNEE REPLACEMENT: Primary | ICD-10-CM

## 2022-01-11 PROCEDURE — 97112 NEUROMUSCULAR REEDUCATION: CPT | Mod: GP | Performed by: PHYSICAL THERAPIST

## 2022-01-11 PROCEDURE — 97161 PT EVAL LOW COMPLEX 20 MIN: CPT | Mod: GP | Performed by: PHYSICAL THERAPIST

## 2022-01-11 ASSESSMENT — ACTIVITIES OF DAILY LIVING (ADL)
SQUAT: ACTIVITY IS FAIRLY DIFFICULT
SIT WITH YOUR KNEE BENT: ACTIVITY IS NOT DIFFICULT
HOW_WOULD_YOU_RATE_THE_OVERALL_FUNCTION_OF_YOUR_KNEE_DURING_YOUR_USUAL_DAILY_ACTIVITIES?: ABNORMAL
STIFFNESS: THE SYMPTOM AFFECTS MY ACTIVITY MODERATELY
GO UP STAIRS: ACTIVITY IS NOT DIFFICULT
WEAKNESS: THE SYMPTOM AFFECTS MY ACTIVITY SLIGHTLY
GIVING WAY, BUCKLING OR SHIFTING OF KNEE: I DO NOT HAVE THE SYMPTOM
AS_A_RESULT_OF_YOUR_KNEE_INJURY,_HOW_WOULD_YOU_RATE_YOUR_CURRENT_LEVEL_OF_DAILY_ACTIVITY?: ABNORMAL
KNEEL ON THE FRONT OF YOUR KNEE: I AM UNABLE TO DO THE ACTIVITY
KNEE_ACTIVITY_OF_DAILY_LIVING_SUM: 48
WALK: ACTIVITY IS NOT DIFFICULT
GO DOWN STAIRS: ACTIVITY IS NOT DIFFICULT
PAIN: THE SYMPTOM AFFECTS MY ACTIVITY MODERATELY
LIMPING: THE SYMPTOM AFFECTS MY ACTIVITY SLIGHTLY
STAND: ACTIVITY IS MINIMALLY DIFFICULT
KNEE_ACTIVITY_OF_DAILY_LIVING_SCORE: 68.57
HOW_WOULD_YOU_RATE_THE_CURRENT_FUNCTION_OF_YOUR_KNEE_DURING_YOUR_USUAL_DAILY_ACTIVITIES_ON_A_SCALE_FROM_0_TO_100_WITH_100_BEING_YOUR_LEVEL_OF_KNEE_FUNCTION_PRIOR_TO_YOUR_INJURY_AND_0_BEING_THE_INABILITY_TO_PERFORM_ANY_OF_YOUR_USUAL_DAILY_ACTIVITIES?: 75
RISE FROM A CHAIR: ACTIVITY IS MINIMALLY DIFFICULT
SWELLING: THE SYMPTOM AFFECTS MY ACTIVITY SLIGHTLY
RAW_SCORE: 48

## 2022-01-11 NOTE — PROGRESS NOTES
Physical Therapy Initial Evaluation  Subjective:  The history is provided by the patient. No  was used.   Therapist Generated HPI Evaluation  Problem details: DOS: 11-  DOI: May-1, 2021.         Type of problem:  Right knee.    This is a chronic condition.  Condition occurred with:  Insidious onset.  Where condition occurred: for unknown reasons.  Patient reports pain:  Anterior.  Pain is described as aching and burning and is intermittent.  Radiates to: none. Pain is worse during the night and worse in the A.M..  Since onset symptoms are gradually improving.  Associated symptoms:  Edema (stiffness). Symptoms are exacerbated by walking, ascending stairs and sitting  and relieved by ice.  Imaging testing: none.  Previous treatment includes physical therapy (home). There was moderate improvement following previous treatment.  Work activity restrictions: retired   Barriers include:  None as reported by patient.                        Objective:  System                                                Knee Evaluation:  ROM:    AROM      Extension:  Left: 0    Right:  2  Flexion: Left: 120    Right: 118  PROM      Extension: Left:   Right:  1        Strength:     Extension:  Left: 5/5   Pain:      Right: 4+/5   Pain:  Flexion:  Left: 5/5   Pain:      Right: 4/5   Pain:    Quad Set Left: WNL    Pain:   Quad Set Right: Fair    Pain:  Ligament Testing:  Not Assessed                Special Tests: Not Assessed            Functional Testing:  not assessed              Gait : FWB with increased weight bearing left lower extremity, Stands with increased weight bearing left lower extremity, decreased proximal right tibiofibular joint mobility    General     ROS    Assessment/Plan:    Patient is a 76 year old female with right side knee complaints.    Patient has the following significant findings with corresponding treatment plan.                Diagnosis 1:  Right total knee replacement  Pain -  hot/cold  therapy, manual therapy, self management, education, directional preference exercise and home program  Decreased ROM/flexibility - manual therapy, therapeutic exercise, therapeutic activity and home program  Decreased joint mobility - manual therapy, therapeutic exercise, therapeutic activity and home program  Decreased strength - therapeutic exercise, therapeutic activities and home program  Impaired gait - gait training and home program  Impaired muscle performance - neuro re-education and home program  Decreased function - therapeutic activities and home program    Therapy Evaluation Codes:   1) Clinical presentation characteristics are:   Stable/Uncomplicated.  2) Decision-Making    Low complexity using standardized patient assessment instrument and/or measureable assessment of functional outcome.  Cumulative Therapy Evaluation is: Low complexity.    Previous and current functional limitations:  (See Goal Flow Sheet for this information)    Short term and Long term goals: (See Goal Flow Sheet for this information)     Communication ability:  Patient appears to be able to clearly communicate and understand verbal and written communication and follow directions correctly.  Treatment Explanation - The following has been discussed with the patient:   RX ordered/plan of care  Anticipated outcomes  Possible risks and side effects  This patient would benefit from PT intervention to resume normal activities.   Rehab potential is good.    Frequency:  1 X week, once daily  Duration:  for 8 weeks  Discharge Plan:  Achieve all LTG.  Independent in home treatment program.  Reach maximal therapeutic benefit.    Please refer to the daily flowsheet for treatment today, total treatment time and time spent performing 1:1 timed codes.

## 2022-01-11 NOTE — PROGRESS NOTES
[facility]    OUTPATIENT Physical Therapy ORTHOPEDIC EVALUATION  PLAN OF TREATMENT FOR OUTPATIENT REHABILITATION  (COMPLETE FOR INITIAL CLAIMS ONLY)  Patient's Last Name, First Name, M.I.  YOB: 1945  Giana Hope    Provider s Name:  [facility]   Medical Record No.  [id number]   Start of Care Date:  01/11/22   Onset Date:  11/26/2105/01/21   Type:     _X__PT   ___OT Medical Diagnosis:  No diagnosis found.     Treatment Diagnosis:  right total knee replacement         Goals:     01/11/22 0500   Body Part   Goals listed below are for right total knee replacment   Goal #1   Goal #1 ambulation   Current Functional Level   (10 minute tolerance)   STG Target Performance   (15 minutes )   Rationale for safe community ambulation   Due Date 02/16/22    LTG Target Performance   (20 minute tolerance)   Rationale for safe community ambulation   Due Date 03/23/22       Therapy Frequency:  1 time per week  Predicted Duration of Therapy Intervention:  8 weeks    Trini Miller, PT                 I CERTIFY THE NEED FOR THESE SERVICES FURNISHED UNDER        THIS PLAN OF TREATMENT AND WHILE UNDER MY CARE     (Physician attestation of this document indicates review and certification of the therapy plan).                       Certification Date From:  01/11/22   Certification Date To:  03/22/22    Referring Provider:  Gabriel Marrero    Initial Assessment        See Epic Evaluation SOC Date: 01/11/22

## 2022-01-12 NOTE — PROGRESS NOTES
Physical Therapy Initial Evaluation  Subjective:    Patient Health History         Pain is reported as 3/10 on pain scale.  General health as reported by patient is good.  Pertinent medical history includes: anemia, asthma, concussions/dizziness, depression and menopausal.        Surgeries include:  Orthopedic surgery (knee and shoulder).    Current medications:  Anti-inflammatory and pain medication.    Current occupation is Retired.   Primary job tasks include:  Computer work.                                    Objective:  System    Physical Exam    General     ROS    Assessment/Plan:

## 2022-01-13 DIAGNOSIS — M17.11 PRIMARY OSTEOARTHRITIS OF ONE KNEE, RIGHT: ICD-10-CM

## 2022-01-13 RX ORDER — OXYCODONE HYDROCHLORIDE 5 MG/1
5 TABLET ORAL
Qty: 10 TABLET | Refills: 0 | Status: SHIPPED | OUTPATIENT
Start: 2022-01-13 | End: 2022-04-22

## 2022-01-13 NOTE — TELEPHONE ENCOUNTER
Patient requests one more refill for at bedtime only. Pended for signature and will go to patient's pharmacy.

## 2022-01-18 ENCOUNTER — THERAPY VISIT (OUTPATIENT)
Dept: PHYSICAL THERAPY | Facility: CLINIC | Age: 77
End: 2022-01-18
Payer: MEDICARE

## 2022-01-18 DIAGNOSIS — Z96.651 STATUS POST TOTAL RIGHT KNEE REPLACEMENT: Primary | ICD-10-CM

## 2022-01-18 PROCEDURE — 97140 MANUAL THERAPY 1/> REGIONS: CPT | Mod: GP | Performed by: PHYSICAL THERAPIST

## 2022-01-18 PROCEDURE — 97112 NEUROMUSCULAR REEDUCATION: CPT | Mod: GP | Performed by: PHYSICAL THERAPIST

## 2022-01-18 PROCEDURE — 97110 THERAPEUTIC EXERCISES: CPT | Mod: GP | Performed by: PHYSICAL THERAPIST

## 2022-01-19 DIAGNOSIS — M17.11 PRIMARY OSTEOARTHRITIS OF ONE KNEE, RIGHT: Primary | ICD-10-CM

## 2022-01-19 NOTE — PROGRESS NOTES
[facility]  Orthopaedic Surgery Consultation by Gabriel Marrero M.D.    Giana Hope MRN# [id number]   Age: 76 year old YOB: 1945     Requesting physician: Gilberto Pandya     Background history:  DX:  1. History of anesthesia problem  2. Gastroesophageal reflux disease without esophagitis  3. Nodule of left lung  4. DDD  5. Risk for falls  6. Hypercholesterolemia  7. Nuclear sclerosis      TREATMENTS:  1. 2/25/2016, Left TKA, Сергей Pang MD , [facility], [facility], and Affiliated Practices  2. 8/25/2020, Reverse Left TSA, Ector Chapa M.D., [facility]  3. 11/26/2021, right total knee arthroplasty,              History of Present Illness:     76-year-old female with chronic pain of right knee due to end-stage osteoarthritic changes most notably in the medial and patellofemoral compartments.  Insufficiently responding to treated nonoperative treatment measures.  Patient underwent right total knee arthroplasty on 11/26/2021.    Today patient is approximately 6 weeks after the above-mentioned procedure.  She states she is doing well.  Her pain is well controlled on occasional NSAIDs.  She states she is turning the corner and is happy with the results.  She can flex her knee further than the contralateral side.  She has been doing her physical therapy and her home exercise regimen.  She completed her aspirin as DVT prophylaxis.  Her preoperative pain is disappeared.    Of note, patient has pain of right shoulder due to underlying glenohumeral osteoarthritic changes.  She was planning on receiving an intra-articular injection tomorrow which would not be my recommendation given the recent surgery of right knee.    Social:   Occupation: Retired   Living situation: Lives with cat.  She has family close by.  Hobbies / Sports: walking and hiking     Smoking: No  Alcohol: Yes  Illicit drug use: No          Physical Exam:     EXAMINATION pertinent findings:   PSYCH: Pleasant, healthy-appearing, alert, oriented x3, cooperative. Normal mood and affect.  VITAL SIGNS: not currently breastfeeding.  Reviewed nursing intake notes.   There is no height or weight on file to calculate BMI.  RESP: non labored breathing   ABD: benign, soft, non-tender, no acute peritoneal findings  SKIN: grossly normal   LYMPHATIC: grossly normal, no adenopathy, no extremity edema  NEURO: grossly normal , no motor deficits  VASCULAR: satisfactory perfusion of all extremities   MUSCULOSKELETAL:   Alignment: Neutral alignment of right lower extremity.    R knee: Incision is clean, dry and intact.  No signs of infection.  -0-0  .  Straight leg raise +. No redness, warmth or skin changes present.  Postoperative effusion still present. Ligamentously stable in both ML and AP direction. Normal PF tracking without crepitus.     Right LE:   Thigh and leg compartments soft and compressible   +Quad/TA/GSC/FHL/EHL   SILT DP/SP/Ana/Saph/Tib nerve distributions   Palpable dorsalis pedis pulse          Data:   All laboratory data reviewed    All imaging studies reviewed by me personally.    XR knee right 1/26/2022:  My interpretation: Status post right total knee arthroplasty.  Adequate sizing, fixation and orientation of components.  No signs of immediate postoperative complications.         Assessment and Plan:   Assessment:  76-year-old female with chronic pain of right knee due to end-stage osteoarthritic changes most notably in the medial and patellofemoral compartments.  Insufficiently responding to treated nonoperative treatment measures.  Patient underwent right total knee arthroplasty on 11/26/2021.     Plan:  I discussed my findings with the patient.  She will continue her range of motion, strengthening and gait exercises under the guidance of a physical therapist in the outpatient setting. All questions were answered.  Patient will follow up  at her 1 year postoperative date with renewed radiographic imaging studies in regards to her right knee.  As for her right shoulder patient will follow up in 6 weeks.  Renewed radiographic imaging studies will be obtained.  At that time we can consider a glenohumeral injection with a combination of lidocaine and cortisone.  Patient understands and agrees to the treatment plan as set forth.    Gabriel Marrero MD, PhD     Adult Reconstruction  [facility] Department of Orthopaedic Surgery  Pager [phone]

## 2022-01-25 ENCOUNTER — THERAPY VISIT (OUTPATIENT)
Dept: PHYSICAL THERAPY | Facility: CLINIC | Age: 77
End: 2022-01-25
Payer: MEDICARE

## 2022-01-25 DIAGNOSIS — Z96.651 STATUS POST TOTAL RIGHT KNEE REPLACEMENT: Primary | ICD-10-CM

## 2022-01-25 PROCEDURE — 97112 NEUROMUSCULAR REEDUCATION: CPT | Mod: GP | Performed by: PHYSICAL THERAPIST

## 2022-01-25 PROCEDURE — 97140 MANUAL THERAPY 1/> REGIONS: CPT | Mod: GP | Performed by: PHYSICAL THERAPIST

## 2022-01-25 PROCEDURE — 97110 THERAPEUTIC EXERCISES: CPT | Mod: GP | Performed by: PHYSICAL THERAPIST

## 2022-01-25 NOTE — Clinical Note
Hawa Hernandez making excellent progress in therapy since initial evualation 2 weeks ago. See progress report for updates.    Trini

## 2022-01-25 NOTE — PROGRESS NOTES
Subjective:  HPI  Physical Exam                    Objective:  System    Physical Exam    General     ROS    Assessment/Plan:    PROGRESS  REPORT    Progress reporting period is from January 11, 2022 to January 25, 2022.       SUBJECTIVE  Subjective changes noted by patient:  Overall improvement since surgery has been significant. She complains of having 3/10 pain level with walking in AM. Hawa is walking independently and  ascending and descending stairs one step at a time with assist of railing. .       Current pain level is 2/10     Previous pain level was   Initial Pain level: 3/10.   Changes in function:  Yes (See Goal flowsheet attached for changes in current functional level)  Adverse reaction to treatment or activity: None    OBJECTIVE  Changes noted in objective findings   Knee Evaluation:  ROM:    AROM        Extension:  Left: 0    Right:  2  Flexion: Left: 120    Right: 122    PROM     Extension: Left:   Right:  1           Strength:    Extension:  Left: 5/5   Pain:      Right: 5-/5   Pain:  Flexion:  Left: 5/5   Pain:      Right: 4+/5   Pain:    Quad Set Left: WNL    Pain:   Quad Set Right:  good    Pain:    Good quadricep concentric control with 4 inch step up     Gait :  Independent ambulation with mild extensor lag, improve wilfred and stride length     ASSESSMENT/PLAN  Updated problem list and treatment plan: Diagnosis 1:  Right total knee replacement  Pain -  hot/cold therapy, manual therapy, self management, education, directional preference exercise and home program  Decreased ROM/flexibility - manual therapy, therapeutic exercise, therapeutic activity and home program  Decreased joint mobility - manual therapy, therapeutic exercise, therapeutic activity and home program  Decreased strength - therapeutic exercise, therapeutic activities and home program  Impaired gait - gait training and home program  Impaired muscle performance - neuro re-education and home program  Decreased function -  therapeutic activities and home program    STG/LTGs have been met or progress has been made towards goals:  Yes (See Goal flow sheet completed today.)  Assessment of Progress: The patient's condition is improving.  Self Management Plans:  Patient has been instructed in a home treatment program.  Patient  has been instructed in self management of symptoms.  I have re-evaluated this patient and find that the nature, scope, duration and intensity of the therapy is appropriate for the medical condition of the patient.  Giana continues to require the following intervention to meet STG and LTG's:  PT intervention is no longer required to meet STG/LTG.    Recommendations:  This patient would benefit from continued therapy.     Frequency:  1 X week, once daily  Duration:  for 4 weeks        Please refer to the daily flowsheet for treatment today, total treatment time and time spent performing 1:1 timed codes.

## 2022-01-26 ENCOUNTER — ANCILLARY PROCEDURE (OUTPATIENT)
Dept: GENERAL RADIOLOGY | Facility: CLINIC | Age: 77
End: 2022-01-26
Attending: STUDENT IN AN ORGANIZED HEALTH CARE EDUCATION/TRAINING PROGRAM
Payer: MEDICARE

## 2022-01-26 ENCOUNTER — OFFICE VISIT (OUTPATIENT)
Dept: ORTHOPEDICS | Facility: CLINIC | Age: 77
End: 2022-01-26
Payer: MEDICARE

## 2022-01-26 DIAGNOSIS — M17.11 PRIMARY OSTEOARTHRITIS OF ONE KNEE, RIGHT: ICD-10-CM

## 2022-01-26 DIAGNOSIS — G89.29 CHRONIC RIGHT SHOULDER PAIN: Primary | ICD-10-CM

## 2022-01-26 DIAGNOSIS — Z96.651 STATUS POST TOTAL RIGHT KNEE REPLACEMENT: ICD-10-CM

## 2022-01-26 DIAGNOSIS — M25.511 CHRONIC RIGHT SHOULDER PAIN: Primary | ICD-10-CM

## 2022-01-26 PROCEDURE — 77073 BONE LENGTH STUDIES: CPT | Performed by: STUDENT IN AN ORGANIZED HEALTH CARE EDUCATION/TRAINING PROGRAM

## 2022-01-26 PROCEDURE — 73562 X-RAY EXAM OF KNEE 3: CPT | Mod: RT | Performed by: RADIOLOGY

## 2022-01-26 PROCEDURE — 99024 POSTOP FOLLOW-UP VISIT: CPT | Performed by: STUDENT IN AN ORGANIZED HEALTH CARE EDUCATION/TRAINING PROGRAM

## 2022-01-26 NOTE — LETTER
1/26/2022         RE: Giana Hope  [address]  [place] [address]        Dear Colleague,    Thank you for referring your patient, Giana Hope, to the [facility] [place]. Please see a copy of my visit note below.        [facility] Physicians  Orthopaedic Surgery Consultation by Gabriel Marrero M.D.    Giana Hope MRN# [id number]   Age: 76 year old YOB: 1945     Requesting physician: Gilberto Pandya     Background history:  DX:  1. History of anesthesia problem  2. Gastroesophageal reflux disease without esophagitis  3. Nodule of left lung  4. DDD  5. Risk for falls  6. Hypercholesterolemia  7. Nuclear sclerosis      TREATMENTS:  1. 2/25/2016, Left TKA, Сергей Pang MD , [facility], [facility], and Affiliated Practices  2. 8/25/2020, Reverse Left TSA, Ector Chapa M.D., [facility]  3. 11/26/2021, right total knee arthroplasty,              History of Present Illness:     76-year-old female with chronic pain of right knee due to end-stage osteoarthritic changes most notably in the medial and patellofemoral compartments.  Insufficiently responding to treated nonoperative treatment measures.  Patient underwent right total knee arthroplasty on 11/26/2021.    Today patient is approximately 6 weeks after the above-mentioned procedure.  She states she is doing well.  Her pain is well controlled on occasional NSAIDs.  She states she is turning the corner and is happy with the results.  She can flex her knee further than the contralateral side.  She has been doing her physical therapy and her home exercise regimen.  She completed her aspirin as DVT prophylaxis.  Her preoperative pain is disappeared.    Of note, patient has pain of right shoulder due to underlying glenohumeral osteoarthritic changes.  She was planning on receiving an intra-articular injection  tomorrow which would not be my recommendation given the recent surgery of right knee.    Social:   Occupation: Retired   Living situation: Lives with cat.  She has family close by.  Hobbies / Sports: walking and hiking     Smoking: No  Alcohol: Yes  Illicit drug use: No         Physical Exam:     EXAMINATION pertinent findings:   PSYCH: Pleasant, healthy-appearing, alert, oriented x3, cooperative. Normal mood and affect.  VITAL SIGNS: not currently breastfeeding.  Reviewed nursing intake notes.   There is no height or weight on file to calculate BMI.  RESP: non labored breathing   ABD: benign, soft, non-tender, no acute peritoneal findings  SKIN: grossly normal   LYMPHATIC: grossly normal, no adenopathy, no extremity edema  NEURO: grossly normal , no motor deficits  VASCULAR: satisfactory perfusion of all extremities   MUSCULOSKELETAL:   Alignment: Neutral alignment of right lower extremity.    R knee: Incision is clean, dry and intact.  No signs of infection.  -0-0  .  Straight leg raise +. No redness, warmth or skin changes present.  Postoperative effusion still present. Ligamentously stable in both ML and AP direction. Normal PF tracking without crepitus.     Right LE:   Thigh and leg compartments soft and compressible   +Quad/TA/GSC/FHL/EHL   SILT DP/SP/Ana/Saph/Tib nerve distributions   Palpable dorsalis pedis pulse          Data:   All laboratory data reviewed    All imaging studies reviewed by me personally.    XR knee right 1/26/2022:  My interpretation: Status post right total knee arthroplasty.  Adequate sizing, fixation and orientation of components.  No signs of immediate postoperative complications.         Assessment and Plan:   Assessment:  76-year-old female with chronic pain of right knee due to end-stage osteoarthritic changes most notably in the medial and patellofemoral compartments.  Insufficiently responding to treated nonoperative treatment measures.  Patient underwent right total knee  arthroplasty on 11/26/2021.     Plan:  I discussed my findings with the patient.  She will continue her range of motion, strengthening and gait exercises under the guidance of a physical therapist in the outpatient setting. All questions were answered.  Patient will follow up at her 1 year postoperative date with renewed radiographic imaging studies in regards to her right knee.  As for her right shoulder patient will follow up in 6 weeks.  Renewed radiographic imaging studies will be obtained.  At that time we can consider a glenohumeral injection with a combination of lidocaine and cortisone.  Patient understands and agrees to the treatment plan as set forth.    Gabriel Marrero MD, PhD     Adult Reconstruction  [facility] Department of Orthopaedic Surgery  Pager [phone]

## 2022-02-01 ENCOUNTER — THERAPY VISIT (OUTPATIENT)
Dept: PHYSICAL THERAPY | Facility: CLINIC | Age: 77
End: 2022-02-01
Payer: MEDICARE

## 2022-02-01 DIAGNOSIS — Z96.651 STATUS POST TOTAL RIGHT KNEE REPLACEMENT: Primary | ICD-10-CM

## 2022-02-01 PROCEDURE — 97140 MANUAL THERAPY 1/> REGIONS: CPT | Mod: GP | Performed by: PHYSICAL THERAPIST

## 2022-02-01 PROCEDURE — 97110 THERAPEUTIC EXERCISES: CPT | Mod: GP | Performed by: PHYSICAL THERAPIST

## 2022-02-01 PROCEDURE — 97112 NEUROMUSCULAR REEDUCATION: CPT | Mod: GP | Performed by: PHYSICAL THERAPIST

## 2022-02-08 ENCOUNTER — THERAPY VISIT (OUTPATIENT)
Dept: PHYSICAL THERAPY | Facility: CLINIC | Age: 77
End: 2022-02-08
Payer: MEDICARE

## 2022-02-08 DIAGNOSIS — Z96.651 STATUS POST TOTAL RIGHT KNEE REPLACEMENT: Primary | ICD-10-CM

## 2022-02-08 PROCEDURE — 97140 MANUAL THERAPY 1/> REGIONS: CPT | Mod: GP | Performed by: PHYSICAL THERAPIST

## 2022-02-08 PROCEDURE — 97110 THERAPEUTIC EXERCISES: CPT | Mod: GP | Performed by: PHYSICAL THERAPIST

## 2022-02-08 PROCEDURE — 97112 NEUROMUSCULAR REEDUCATION: CPT | Mod: GP | Performed by: PHYSICAL THERAPIST

## 2022-03-09 ENCOUNTER — ANCILLARY PROCEDURE (OUTPATIENT)
Dept: GENERAL RADIOLOGY | Facility: CLINIC | Age: 77
End: 2022-03-09
Attending: STUDENT IN AN ORGANIZED HEALTH CARE EDUCATION/TRAINING PROGRAM
Payer: MEDICARE

## 2022-03-09 ENCOUNTER — OFFICE VISIT (OUTPATIENT)
Dept: ORTHOPEDICS | Facility: CLINIC | Age: 77
End: 2022-03-09
Payer: MEDICARE

## 2022-03-09 DIAGNOSIS — G89.29 CHRONIC RIGHT SHOULDER PAIN: ICD-10-CM

## 2022-03-09 DIAGNOSIS — M25.511 CHRONIC RIGHT SHOULDER PAIN: ICD-10-CM

## 2022-03-09 DIAGNOSIS — Z96.651 STATUS POST TOTAL RIGHT KNEE REPLACEMENT: Primary | ICD-10-CM

## 2022-03-09 DIAGNOSIS — M19.011 PRIMARY OSTEOARTHRITIS OF RIGHT SHOULDER: ICD-10-CM

## 2022-03-09 PROCEDURE — 73030 X-RAY EXAM OF SHOULDER: CPT | Mod: RT | Performed by: RADIOLOGY

## 2022-03-09 PROCEDURE — 99213 OFFICE O/P EST LOW 20 MIN: CPT | Performed by: STUDENT IN AN ORGANIZED HEALTH CARE EDUCATION/TRAINING PROGRAM

## 2022-03-09 NOTE — PROGRESS NOTES
[facility] Physicians  Orthopaedic Surgery Consultation by Gabriel Marrero M.D.    Giana Hope MRN# [id number]   Age: 76 year old YOB: 1945     Requesting physician: Gilberto Pandya     Background history:  DX:  1. History of anesthesia problem  2. Gastroesophageal reflux disease without esophagitis  3. Nodule of left lung  4. DDD  5. Risk for falls  6. Hypercholesterolemia  7. Nuclear sclerosis      TREATMENTS:  1. 2/25/2016, Left TKA, Сергей Pang [facility] , [facility], [facility], and Affiliated Practices  2. 8/25/2020, Reverse Left TSA, Ector Chapa M.D., [facility]  3. 11/26/2021, right total knee arthroplasty,              History of Present Illness:     76-year-old female with chronic pain of right knee due to end-stage osteoarthritic changes most notably in the medial and patellofemoral compartments.  Insufficiently responding to treated nonoperative treatment measures.  Patient underwent right total knee arthroplasty on 11/26/2021.    Today patient is approximately 3 months after the above-mentioned procedure.  She states she is doing well.  She recently went on a trip to [place] where she had done a lot of walking.  This aggravated her knee a bit but this is subsiding now with the use of anti-inflammatory pain medication.  All in all she is happy with the result.    During her last visit patient stated that she has pain of right shoulder due to underlying glenohumeral osteoarthritic changes.  Today she states that the pain of her right shoulder is well controlled as well.  She is experiencing very little limitations.      Social:   Occupation: Retired   Living situation: Lives with cat.  She has family close by.  Hobbies / Sports: walking and hiking     Smoking: No  Alcohol: Yes  Illicit drug use: No         Physical Exam:     EXAMINATION pertinent findings:   PSYCH: Pleasant,  healthy-appearing, alert, oriented x3, cooperative. Normal mood and affect.  VITAL SIGNS: not currently breastfeeding.  Reviewed nursing intake notes.   There is no height or weight on file to calculate BMI.  RESP: non labored breathing   ABD: benign, soft, non-tender, no acute peritoneal findings  SKIN: grossly normal   LYMPHATIC: grossly normal, no adenopathy, no extremity edema  NEURO: grossly normal , no motor deficits  VASCULAR: satisfactory perfusion of all extremities   MUSCULOSKELETAL:   Alignment: Neutral alignment of right lower extremity.    R knee: Incision is clean, dry and intact.  No signs of infection.  -0-0  .  Straight leg raise +. No redness, warmth or skin changes present.  Minimal effusion. Ligamentously stable in both ML and AP direction. Normal PF tracking without crepitus.     Right LE:   Thigh and leg compartments soft and compressible   +Quad/TA/GSC/FHL/EHL   SILT DP/SP/Ana/Saph/Tib nerve distributions   Palpable dorsalis pedis pulse     Cervical Spine: FROM, Spurling's test negative.    Shoulder right: Normal appearance. No signs of muscular atrophy of SSP, ISP or Deltoid. No tenderness to palpation over the sterno-clavicular joint or clavicle. AC joint: No tenderness to palpation. Cross body -. Abduction 80, Forward flexion 90, ER 30, IR L4. Cuff strength 5/5 for SSP/ISP and SSC. Neer, Walker, and Mariya -. Neurovascular intact RUE.  2+ radial pulse with a warm and well perfused hand. Sensation is intact to light touch in the median, radial, ulnar, musculocutaneous, and axillary nerve distribution. 5/5 , fpl, epl, io, wrist flexor, wrist extensor, biceps, triceps, and deltoid muscle strength on manual muscle testing.            Data:   All laboratory data reviewed    All imaging studies reviewed by me personally.    XR knee right 1/26/2022:  My interpretation: Status post right total knee arthroplasty.  Adequate sizing, fixation and orientation of components.  No signs of  immediate postoperative complications.    XR shoulder right 3/9/2022:  My interpretation: Severe osteoarthritic changes of right glenohumeral joint.  Complete obliteration of joint space.  Presence of marginal osteophytes and sclerosis.  Well-preserved AC joint.  Glenoid Candelario C.         Assessment and Plan:   Assessment:  76-year-old female with chronic pain of right knee due to end-stage osteoarthritic changes most notably in the medial and patellofemoral compartments.  Insufficiently responding to treated nonoperative treatment measures.  Patient underwent right total knee arthroplasty on 11/26/2021 and is recovering appropriately.  Also presenting with right glenohumeral osteoarthritis with very limited pain and limitation at this point in time.     Plan:  I discussed my findings with the patient.  As for the right knee I anticipate that the effusion in regards to the surmenage will continue to subside.  She is recovering appropriately from the right total knee arthroplasty.  As for the right shoulder we discussed the surgical and nonsurgical treatment options for glenohumeral osteoarthritis.  Given her current pain and limitations that are really pretty much nonexistent it would be my recommendation to continue with range of motion exercises, over-the-counter analgesics and if necessary an intra-articular injection.  Patient states that for now no further intervention is deemed necessary.  She is traveling to [place] in May 2022.  If her pain increases before that she will schedule an appointment for injection with a combination of lidocaine and cortisone.  Patient understands and agrees to the treatment plan as set forth.    Gabriel Marrero MD, PhD     Adult Reconstruction  [facility] Department of Orthopaedic Surgery  Pager [phone]    Total combined visit time and work time before and after clinic visit = 20 min

## 2022-03-09 NOTE — LETTER
3/9/2022         RE: Giana Hope  [address]  [place] [address]        Dear Colleague,    Thank you for referring your patient, Giana Hope, to the [facility] [place]. Please see a copy of my visit note below.        [facility] Physicians  Orthopaedic Surgery Consultation by Gabriel Marrero M.D.    Giana Hope MRN# [id number]   Age: 76 year old YOB: 1945     Requesting physician: Gilberto Pandya     Background history:  DX:  1. History of anesthesia problem  2. Gastroesophageal reflux disease without esophagitis  3. Nodule of left lung  4. DDD  5. Risk for falls  6. Hypercholesterolemia  7. Nuclear sclerosis      TREATMENTS:  1. 2/25/2016, Left TKA, Сергей Pang MD , [facility], [facility], and Affiliated Practices  2. 8/25/2020, Reverse Left TSA, Ector Chapa M.D., [facility]  3. 11/26/2021, right total knee arthroplasty,              History of Present Illness:     76-year-old female with chronic pain of right knee due to end-stage osteoarthritic changes most notably in the medial and patellofemoral compartments.  Insufficiently responding to treated nonoperative treatment measures.  Patient underwent right total knee arthroplasty on 11/26/2021.    Today patient is approximately 3 months after the above-mentioned procedure.  She states she is doing well.  She recently went on a trip to [place] where she had done a lot of walking.  This aggravated her knee a bit but this is subsiding now with the use of anti-inflammatory pain medication.  All in all she is happy with the result.    During her last visit patient stated that she has pain of right shoulder due to underlying glenohumeral osteoarthritic changes.  Today she states that the pain of her right shoulder is well controlled as well.  She is experiencing very little limitations.      Social:    Occupation: Retired   Living situation: Lives with cat.  She has family close by.  Hobbies / Sports: walking and hiking     Smoking: No  Alcohol: Yes  Illicit drug use: No         Physical Exam:     EXAMINATION pertinent findings:   PSYCH: Pleasant, healthy-appearing, alert, oriented x3, cooperative. Normal mood and affect.  VITAL SIGNS: not currently breastfeeding.  Reviewed nursing intake notes.   There is no height or weight on file to calculate BMI.  RESP: non labored breathing   ABD: benign, soft, non-tender, no acute peritoneal findings  SKIN: grossly normal   LYMPHATIC: grossly normal, no adenopathy, no extremity edema  NEURO: grossly normal , no motor deficits  VASCULAR: satisfactory perfusion of all extremities   MUSCULOSKELETAL:   Alignment: Neutral alignment of right lower extremity.    R knee: Incision is clean, dry and intact.  No signs of infection.  -0-0  .  Straight leg raise +. No redness, warmth or skin changes present.  Minimal effusion. Ligamentously stable in both ML and AP direction. Normal PF tracking without crepitus.     Right LE:   Thigh and leg compartments soft and compressible   +Quad/TA/GSC/FHL/EHL   SILT DP/SP/Ana/Saph/Tib nerve distributions   Palpable dorsalis pedis pulse     Cervical Spine: FROM, Spurling's test negative.    Shoulder right: Normal appearance. No signs of muscular atrophy of SSP, ISP or Deltoid. No tenderness to palpation over the sterno-clavicular joint or clavicle. AC joint: No tenderness to palpation. Cross body -. Abduction 80, Forward flexion 90, ER 30, IR L4. Cuff strength 5/5 for SSP/ISP and SSC. Neer, Walker, and Mariya -. Neurovascular intact RUE.  2+ radial pulse with a warm and well perfused hand. Sensation is intact to light touch in the median, radial, ulnar, musculocutaneous, and axillary nerve distribution. 5/5 , fpl, epl, io, wrist flexor, wrist extensor, biceps, triceps, and deltoid muscle strength on manual muscle testing.             Data:   All laboratory data reviewed    All imaging studies reviewed by me personally.    XR knee right 1/26/2022:  My interpretation: Status post right total knee arthroplasty.  Adequate sizing, fixation and orientation of components.  No signs of immediate postoperative complications.    XR shoulder right 3/9/2022:  My interpretation: Severe osteoarthritic changes of right glenohumeral joint.  Complete obliteration of joint space.  Presence of marginal osteophytes and sclerosis.  Well-preserved AC joint.  Glenoid Candelario C.         Assessment and Plan:   Assessment:  76-year-old female with chronic pain of right knee due to end-stage osteoarthritic changes most notably in the medial and patellofemoral compartments.  Insufficiently responding to treated nonoperative treatment measures.  Patient underwent right total knee arthroplasty on 11/26/2021 and is recovering appropriately.  Also presenting with right glenohumeral osteoarthritis with very limited pain and limitation at this point in time.     Plan:  I discussed my findings with the patient.  As for the right knee I anticipate that the effusion in regards to the surmenage will continue to subside.  She is recovering appropriately from the right total knee arthroplasty.  As for the right shoulder we discussed the surgical and nonsurgical treatment options for glenohumeral osteoarthritis.  Given her current pain and limitations that are really pretty much nonexistent it would be my recommendation to continue with range of motion exercises, over-the-counter analgesics and if necessary an intra-articular injection.  Patient states that for now no further intervention is deemed necessary.  She is traveling to [place] in May 2022.  If her pain increases before that she will schedule an appointment for injection with a combination of lidocaine and cortisone.  Patient understands and agrees to the treatment plan as set forth.    Gabriel Marrero MD, PhD    Assistant  Professor Adult Reconstruction  [facility] Department of Orthopaedic Surgery  Pager [phone]    Total combined visit time and work time before and after clinic visit = 20 min

## 2022-03-15 ENCOUNTER — TELEPHONE (OUTPATIENT)
Dept: FAMILY MEDICINE | Facility: CLINIC | Age: 77
End: 2022-03-15
Payer: MEDICARE

## 2022-03-15 DIAGNOSIS — H20.9 UVEITIS OF LEFT EYE: Primary | ICD-10-CM

## 2022-03-15 NOTE — TELEPHONE ENCOUNTER
Pended Eye referral to same provider Trish Alcantar CMA ([facility]) at 2:50 PM on 3/15/2022         Called and let patient know referral signed.  Patient note Dr. Taylor will be leaving the facility so if cannot see Dr. Taylor, she can try seeing Dr. Taylor colleague.  Phone number given.      Pasha Alcantar CMA ([facility]) at 3:38 PM on 3/15/2022

## 2022-03-15 NOTE — TELEPHONE ENCOUNTER
[facility] Call Center    Phone Message    May a detailed message be left on voicemail: yes     Reason for Call: Other: Pt requesting a referall to an ophthalmologist. Pt stated it feels like she has uveitis again, pt stated she has had it in the past     Action Taken: Message routed to:  [facility] pcc

## 2022-03-22 ENCOUNTER — OFFICE VISIT (OUTPATIENT)
Dept: OPHTHALMOLOGY | Facility: CLINIC | Age: 77
End: 2022-03-22
Attending: OPHTHALMOLOGY
Payer: MEDICARE

## 2022-03-22 DIAGNOSIS — H21.542 POSTERIOR SYNECHIAE, LEFT: ICD-10-CM

## 2022-03-22 DIAGNOSIS — Z96.1 PSEUDOPHAKIA, RIGHT EYE: ICD-10-CM

## 2022-03-22 DIAGNOSIS — H20.00 ACUTE ANTERIOR UVEITIS OF LEFT EYE: Primary | ICD-10-CM

## 2022-03-22 DIAGNOSIS — H25.812 COMBINED FORM OF AGE-RELATED CATARACT, LEFT EYE: ICD-10-CM

## 2022-03-22 DIAGNOSIS — H20.9 UVEITIS OF LEFT EYE: ICD-10-CM

## 2022-03-22 PROCEDURE — G0463 HOSPITAL OUTPT CLINIC VISIT: HCPCS

## 2022-03-22 PROCEDURE — 99214 OFFICE O/P EST MOD 30 MIN: CPT | Performed by: OPHTHALMOLOGY

## 2022-03-22 RX ORDER — PREDNISOLONE ACETATE 10 MG/ML
1 SUSPENSION/ DROPS OPHTHALMIC 3 TIMES DAILY
Qty: 15 ML | Refills: 1 | Status: SHIPPED | OUTPATIENT
Start: 2022-03-22

## 2022-03-22 ASSESSMENT — TONOMETRY
OS_IOP_MMHG: 20
IOP_METHOD: APPLANATION
OD_IOP_MMHG: 16
OS_IOP_MMHG: 21
IOP_METHOD: ICARE
OD_IOP_MMHG: 13

## 2022-03-22 ASSESSMENT — SLIT LAMP EXAM - LIDS
COMMENTS: NORMAL
COMMENTS: NORMAL

## 2022-03-22 ASSESSMENT — EXTERNAL EXAM - LEFT EYE: OS_EXAM: NORMAL

## 2022-03-22 ASSESSMENT — CUP TO DISC RATIO
OS_RATIO: 0.25
OD_RATIO: 0.25

## 2022-03-22 ASSESSMENT — EXTERNAL EXAM - RIGHT EYE: OD_EXAM: NORMAL

## 2022-03-22 ASSESSMENT — VISUAL ACUITY
METHOD: SNELLEN - LINEAR
OD_CC: 20/20
OS_CC: 20/30
OS_PH_CC: 20/20
CORRECTION_TYPE: GLASSES
OD_CC+: +

## 2022-03-22 ASSESSMENT — CONF VISUAL FIELD
METHOD: COUNTING FINGERS
OS_NORMAL: 1
OD_NORMAL: 1

## 2022-03-22 NOTE — PATIENT INSTRUCTIONS
Taper prednisolone (white or pink top, shake well) to 3 times a day left eye for 1 week, then 2 times a day until next visit    Follow up in 2-3 weeks or sooner if symptoms return or something new going on   Cheek-To-Nose Interpolation Flap Text: A decision was made to reconstruct the defect utilizing an interpolation axial flap and a staged reconstruction.  A telfa template was made of the defect.  This telfa template was then used to outline the Cheek-To-Nose Interpolation flap.  The donor area for the pedicle flap was then injected with anesthesia.  The flap was excised through the skin and subcutaneous tissue down to the layer of the underlying musculature.  The interpolation flap was carefully excised within this deep plane to maintain its blood supply.  The edges of the donor site were undermined.   The donor site was closed in a primary fashion.  The pedicle was then rotated into position and sutured.  Once the tube was sutured into place, adequate blood supply was confirmed with blanching and refill.  The pedicle was then wrapped with xeroform gauze and dressed appropriately with a telfa and gauze bandage to ensure continued blood supply and protect the attached pedicle.

## 2022-03-22 NOTE — NURSING NOTE
Chief Complaints and History of Present Illnesses   Patient presents with     Blurred Vision Left Eye     Chief Complaint(s) and History of Present Illness(es)     Blurred Vision Left Eye     Laterality: left eye    Onset: 8 days ago    Quality: blurred vision    Severity: severe    Frequency: constantly    Timing: throughout the day    Course: stable    Associated symptoms: a need for brighter lights, redness, photophobia, foreign body sensation (sensation in LE) and scalp tenderness.  Negative for eye pain, fever, flashes, floaters, itching, discharge and swelling    Pain scale: 0/10              Comments     Eight days ago noted increased redness, diminished vision, a FB sensation as if a CTL in LE eye. Denies watering, mattering, flashes/floaters.  Pt states scalp tenderness for over a year.   Prednisolone four times a day to LE. Started last Friday. Pt called to schedule and appointment but no availability until today per pt.  Pt states has had two previous episodes of anterior uveitis.  Anila Stewart, CORBY COT 1:57 PM 03/22/2022

## 2022-03-22 NOTE — PROGRESS NOTES
"HPI:  Giana Hope is a 76 year old female presenting for uveitis evaluation.    Prior patient of Dr. Taylor, last visit 12/14/2020. 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 Prior workup done at [facility] in 2018 showing B27+ and lung nodule on CT chest. Last visit 12/14/2020 quiet off drops x3 weeks, planned follow up in 1 year or sooner prn.    Chart Review:  - 3/2017 normal routine exam at [facility]  - 3/2018 normal routine exam at [facility]  - 5/25/18: optom to ER visit for left eye redness and irritation; normal right eye; left eye 2+ injection, inf mutton fat KP, 2+ AC cell diagnosed in ER --> treated with PF  - 5/30/18: initial ophthalmology eval for uveitis (Dr. Dobbins) with small KP left eye, 1+ cell left eye --> PF QID x2 weeks, then TID  - 6/25/18: uveitis follow up noted small nongran KP and low 1+ AC cell  - 7/9/2018 last visit at [facility] prior to move to [place] Sept 2018 saw Dr. Mckenzie and Dr. Barbour. Anterior uveitis left eye with B27+ and otherwise negative labs. Symptoms include left eye blurred vision, irritation, redness when active. Quiet at that visit off drops.   - 3/2019 right eye cataract surgery with Dr. Taylor  - 9/2020 flare of left eye anterior uveitis --> 2+ AC cells and endothelial dusting --> treated with PF q1-2h ; was quiet 1 week later and tapered drops    Lab review:  - 5/30/18: normal/negative Cr, UB2MG, UA, treponemal, quantiferon TB. B27+  - 7/2/18: normal ESR, CRP  - Chest CT 5/30/18: \"1. No CT correlate for radiographic abnormality.  2. A 5 mm pulmonary nodule in the left lower lobe is indeterminate. If the patient has risk factors for malignancy a follow up chest CT in one year can be obtained. In the absence of risk factors or malignancy history, no additional follow-up is needed  3. No lymphadenopathy or findings to suggest pulmonary sarcoidosis.\"    Today,  Started noticing needed to adjust brightness on computer screen, left eye vision poor " and red eye, felt like flare oncoming about 2 weeks ago. Initially wasn't really concerned because hadn't noticed issues with vision, just brightness initially. Then developed redness and blurry vision and so called for appointment. Called on 3/15/22 for eye appointment. Had PF at home and so started QID left eye and has been on this dose since.  No recent illnesses. She has been stressed lately. Moved from [place] to [place] to be closer to her granddaughters, and they recently moved to [facility]. She is considering moving vs prolonged visits.    She has also been diagnosed with scalp inflammation for over a year. Has been on topical treatment from dermatology and does not feel like it is helping. Scalp is red. Scalp itches, not painful. No headaches, no changes in vision, just itching and red skin. Feels like she has a rash on her forehead as well that has not been helped with derm medications. Has been ongoing for months and she has seen dermatology regarding this.    Review Of Systems  Skin: scalp itching/redness and forehead rash x months following with derm, no oral/genital ulcers  Ears/Nose/Throat: no sore throat, no runny nose, no difficulty hearing, no tinnitus  Respiratory: no shortness of breath, no cough, no hemoptysis  Cardiovascular: no chest pain, no heart palpitations  Gastrointestinal: no nausea, no vomiting, no diarrhea, no blood/mucus in stool  Genitourinary: no pain with urination, no blood in urine  Musculoskeletal: chronic arthritis with known spinal arthritis and negative hip XR 2018 for AS  Neurologic: no numbness, no tingling, no weakness, no headaches, no difficulty with speech/gait  Hematologic/Lymphatic/Immunologic: no easy bleeding/bruising  Endocrine: no significant changes in weight  Const: no fevers, no chills     Past Ocular History:  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 Moved from [place] to [place] 2019 to be closer to granddaughters.    FH:  No known family history of blindness, glaucoma, macular degeneration  No known family history of autoimmune/inflammatory disease  Son - schizoaffective disorder    ASSESSMENT and PLAN:  1. 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   - first episode 5/2018 seen at [facility] and resolved with topical steroids; workup +B27; noted on ER exam to have mutton fat KP; however, on uveitis evaluation only nongranulomatous inflammation seen and no further evidence of granulomatous disease noted with flares   - second episode 9/2020 resolved with PF    - third episode symptoms began early 3/2022; started PF QID left eye and quiet today (initial evaluation) on PF QID x1 week    - we discussed diagnosis of uveitis and need to treat inflammation to prevent scarring/damage to the eye and vision loss  - discussed risks of prolonged topical steroid use including cataract formation, IOP elevation; discussed brief courses of steroid drops for flares with taper to prevent rebound inflammation  - discussed if not responding to topical steroids, unable to taper to acceptable dose of steroids, or recurring more frequently may need to consider long-term prophylactic steroid-sparing IMT; for now will continue with topical steroids prn for flares  - discussed likely related to B27+; negative ROS and prior negative workup    --> taper PF to TID x1 week, then BID until follow up  --> follow up in 2-3 weeks or sooner prn    2. Posterior synechiae, left  - pigment on anterior capsule left eye at site of prior PS now broken  - no active PS  - given quiescence will not use dilating drop for now; with future flares would also recommend cyclogyl BID to prevent PS    3. Pseudophakia, right eye  - lens in good position  - excellent vision  - monitor    4. Combined form of  age-related cataract, left eye  - patient happy with vision  - monitor for now        Follow up in 2-3 weeks no dilation or sooner PRN        -----------------------------------------------------------------------------------    Attestation:  Complete documentation of historical and exam elements from today's encounter can be found in the full encounter summary report (not reduplicated in this progress note). I personally obtained the chief complaint(s) and history of present illness.  I confirmed and edited as necessary the review of systems, past medical/surgical history, family history, social history, and examination findings as documented by others; and I examined the patient myself. I personally reviewed the relevant tests, images, and reports as documented above.     I formulated and edited as necessary the assessment and plan and discussed the findings and management plan with the patient and family.      Michelle Veronica MD

## 2022-03-24 ENCOUNTER — MYC MEDICAL ADVICE (OUTPATIENT)
Dept: FAMILY MEDICINE | Facility: CLINIC | Age: 77
End: 2022-03-24
Payer: MEDICARE

## 2022-03-24 DIAGNOSIS — M15.9 DEGENERATIVE JOINT DISEASE INVOLVING MULTIPLE JOINTS: Primary | ICD-10-CM

## 2022-03-25 RX ORDER — SULINDAC 200 MG/1
200 TABLET ORAL DAILY PRN
Qty: 90 TABLET | Refills: 0 | Status: SHIPPED | OUTPATIENT
Start: 2022-03-25 | End: 2022-10-03

## 2022-04-01 NOTE — PROGRESS NOTES
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 Tapered PF.    Here for follow up. Left eye improved but not quite back to baseline. Right eye still fine. Left eye vision was not good and felt a little tight so continued TID dosing for longer, then decreased to BID. No longer feeling tight. No pain. Vision not quite to baseline.  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 Moved from [place] to [place] 2019 to be closer to granddaughters.    FH:  No known family history of blindness, glaucoma, macular degeneration  No known family history of autoimmune/inflammatory disease  Son - schizoaffective disorder    ASSESSMENT and PLAN:  1. 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   - first episode 5/2018 seen at [facility] and resolved with topical steroids; workup +B27; noted on ER exam to have mutton fat KP; however, on uveitis evaluation only nongranulomatous inflammation seen and no further evidence of granulomatous disease noted with flares   - second episode 9/2020 resolved with PF    - third episode symptoms began early 3/2022; started PF QID left eye and quiet 3/22/22 (initial evaluation) on PF QID x1 week; today (4/4/22) still quiet on PF BID    - discussed if not responding to topical steroids, unable to taper to acceptable dose of steroids, or recurring more frequently may need to consider long-term prophylactic steroid-sparing IMT; for now will continue with topical  steroids prn for flares  - discussed likely related to B27+; negative ROS and prior negative workup    --> taper PF to BID x1 week, then daily x2 weeks, then stop  --> follow up in 6 weeks or sooner prn    2. Posterior synechiae, left  - pigment on anterior capsule left eye at site of prior PS now broken  - no active PS  - given quiescence will not use dilating drop for now; with future flares would also recommend cyclogyl BID to prevent PS    3. Pseudophakia, right eye  - lens in good position  - excellent vision  - monitor    4. Combined form of age-related cataract, left eye  - patient happy with vision  - monitor for now      Follow up in 6 weeks no dilation or sooner PRN        -----------------------------------------------------------------------------------    Attestation:  Complete documentation of historical and exam elements from today's encounter can be found in the full encounter summary report (not reduplicated in this progress note). I personally obtained the chief complaint(s) and history of present illness.  I confirmed and edited as necessary the review of systems, past medical/surgical history, family history, social history, and examination findings as documented by others; and I examined the patient myself. I personally reviewed the relevant tests, images, and reports as documented above.     I formulated and edited as necessary the assessment and plan and discussed the findings and management plan with the patient and family.      Michelle Veronica MD

## 2022-04-04 ENCOUNTER — OFFICE VISIT (OUTPATIENT)
Dept: OPHTHALMOLOGY | Facility: CLINIC | Age: 77
End: 2022-04-04
Attending: OPHTHALMOLOGY
Payer: MEDICARE

## 2022-04-04 DIAGNOSIS — H25.812 COMBINED FORM OF AGE-RELATED CATARACT, LEFT EYE: ICD-10-CM

## 2022-04-04 DIAGNOSIS — H21.542 POSTERIOR SYNECHIAE, LEFT: ICD-10-CM

## 2022-04-04 DIAGNOSIS — H20.00 ACUTE ANTERIOR UVEITIS OF LEFT EYE: Primary | ICD-10-CM

## 2022-04-04 DIAGNOSIS — Z96.1 PSEUDOPHAKIA, RIGHT EYE: ICD-10-CM

## 2022-04-04 PROCEDURE — G0463 HOSPITAL OUTPT CLINIC VISIT: HCPCS

## 2022-04-04 PROCEDURE — 99214 OFFICE O/P EST MOD 30 MIN: CPT | Performed by: OPHTHALMOLOGY

## 2022-04-04 ASSESSMENT — SLIT LAMP EXAM - LIDS
COMMENTS: NORMAL
COMMENTS: NORMAL

## 2022-04-04 ASSESSMENT — TONOMETRY
OS_IOP_MMHG: 24
IOP_METHOD: APPLANATION
OS_IOP_MMHG: 23
IOP_METHOD: ICARE
OD_IOP_MMHG: 23
OD_IOP_MMHG: 18

## 2022-04-04 ASSESSMENT — VISUAL ACUITY
METHOD: SNELLEN - LINEAR
OD_CC+: -2
OS_CC+: -1
CORRECTION_TYPE: GLASSES
OD_CC: 20/20
OS_CC: 20/20

## 2022-04-04 ASSESSMENT — CONF VISUAL FIELD
OD_NORMAL: 1
OS_NORMAL: 1
METHOD: COUNTING FINGERS

## 2022-04-04 ASSESSMENT — EXTERNAL EXAM - LEFT EYE: OS_EXAM: NORMAL

## 2022-04-04 ASSESSMENT — EXTERNAL EXAM - RIGHT EYE: OD_EXAM: NORMAL

## 2022-04-04 NOTE — PATIENT INSTRUCTIONS
Continue prednisolone 2 times a day left eye for 1 week, then decrease to 1 time a day for 2 weeks, then stop    Follow up in 6 weeks or sooner as needed

## 2022-04-04 NOTE — NURSING NOTE
"Chief Complaints and History of Present Illnesses   Patient presents with     Uveitis Follow-Up     Chief Complaint(s) and History of Present Illness(es)     Uveitis Follow-Up     Laterality: left eye    Onset: weeks ago    Quality: blurred vision    Course: gradually improving    Associated symptoms: redness (only slight redness, comes and goes OS).  Negative for eye pain, photophobia and foreign body sensation    Treatments tried: eye drops    Pain scale: 0/10              Comments     2-3 week follow up for Acute anterior uveitis left eye.   \"My left eye feels better. It doesn't feel quite normal yet, but it does feel better.\" Patient states vision is improved with left eye, but it's not back to where it was.     Ocular meds:   - Prednisolone BID left eye     CORBY Reynolds 10:23 AM 04/04/2022                  "

## 2022-04-22 ENCOUNTER — OFFICE VISIT (OUTPATIENT)
Dept: FAMILY MEDICINE | Facility: CLINIC | Age: 77
End: 2022-04-22
Payer: MEDICARE

## 2022-04-22 ENCOUNTER — LAB (OUTPATIENT)
Dept: LAB | Facility: CLINIC | Age: 77
End: 2022-04-22
Payer: MEDICARE

## 2022-04-22 ENCOUNTER — NURSE TRIAGE (OUTPATIENT)
Dept: NURSING | Facility: CLINIC | Age: 77
End: 2022-04-22

## 2022-04-22 VITALS
BODY MASS INDEX: 25.34 KG/M2 | SYSTOLIC BLOOD PRESSURE: 131 MMHG | OXYGEN SATURATION: 94 % | TEMPERATURE: 97.8 F | WEIGHT: 143 LBS | DIASTOLIC BLOOD PRESSURE: 70 MMHG | HEART RATE: 91 BPM | HEIGHT: 63 IN

## 2022-04-22 DIAGNOSIS — E55.9 VITAMIN D DEFICIENCY: ICD-10-CM

## 2022-04-22 DIAGNOSIS — S09.90XA INJURY OF HEAD, INITIAL ENCOUNTER: ICD-10-CM

## 2022-04-22 DIAGNOSIS — Z00.00 HEALTHCARE MAINTENANCE: ICD-10-CM
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
## 2022-04-22 LAB
HGB BLD-MCNC: 13.6 G/DL (ref 11.7–15.7)
TSH SERPL DL<=0.005 MIU/L-ACNC: 1.06 MU/L (ref 0.4–4)

## 2022-04-22 PROCEDURE — 99214 OFFICE O/P EST MOD 30 MIN: CPT | Performed by: NURSE PRACTITIONER

## 2022-04-22 PROCEDURE — 84443 ASSAY THYROID STIM HORMONE: CPT | Performed by: PATHOLOGY

## 2022-04-22 PROCEDURE — 36415 COLL VENOUS BLD VENIPUNCTURE: CPT | Performed by: PATHOLOGY

## 2022-04-22 PROCEDURE — 85018 HEMOGLOBIN: CPT | Performed by: PATHOLOGY

## 2022-04-22 PROCEDURE — 82306 VITAMIN D 25 HYDROXY: CPT | Performed by: NURSE PRACTITIONER

## 2022-04-22 RX ORDER — VITAMIN B COMPLEX
1 TABLET ORAL DAILY
COMMUNITY

## 2022-04-22 ASSESSMENT — PAIN SCALES - GENERAL: PAINLEVEL: NO PAIN (1)

## 2022-04-22 NOTE — TELEPHONE ENCOUNTER
"Nurse Triage SBAR    Is this a 2nd Level Triage? YES, LICENSED PRACTITIONER REVIEW IS REQUIRED    Situation:  Giana Hope is a 76 year old female reporting a fall at 0900 today in her home while ascending stairs, no previous sequelae /dizziness/ imbalance.  No loss of consciousness.    No blood thinners / ASA    She relates that she experiences inadvertent leg crossing for a few days and she wonders if that is what happened.   \" I tripped myself, backwards off landing of stairs, 2-3 steps, landed on tailbone, then struck head on door.  My tailbone feels okay now.\"  She hit left upper back side of head on the door.  -No visual changes, no headache.  -Notes  left sided lump is about size of a quarter. No -bleeding noted.  - no other injuries noted.    Background:  PMH: arthritis, hyperlipidemia  No recent falls or dizziness/ imbalance, LOC changes    Assessment: See btoday per Triage protocols, if worsening queasiness or any neurological sx, will call 911. Appt in 1 hour provided.   \"I feel like at first I was sleepy and nauseated. This occurred at 0900, currently I just feel a little queasy'  -She is alert and oriented to person, place and time.  -She lives alone and independently.  Recommend office visit today- made appt for patient at 1:10 PM.  If any of the following occur she will call 911  Reviewed signs and symptoms to be alert for:  * Severe headache  * Extremity weakness or numbness occurs  * Slurred speech or blurred vision occurs   * Vomiting occurs  * You become worse    Protocol Recommended Disposition:   Go To ED/UCC Now (Or To Office With PCP Approval)  1:10 Pm Yakelin Whittington  Recommendation:   [facility]- patient coming in today 1:10 PM for NP Clinic visit- possible CT scan?     Routed to provider    Does the patient meet one of the following criteria for ADS visit consideration? No      PMH: arthritis, hyperlipidemia            Reason for Disposition    Age over 65 years with and area of head swelling " or bruise    Additional Information    Negative: ACUTE NEUROLOGIC SYMPTOM and symptom present now    Negative: Major bleeding (actively dripping or spurting) that can't be stopped    Negative: Knocked out (unconscious) > 1 minute    Negative: Seizure (convulsion) occurred (Exception: prior history of seizures and now alert and without Acute Neurologic Symptoms)    Negative: Neck pain after dangerous injury (e.g., MVA, diving, trampoline, contact sports, fall > 10 feet or 3 meters) (Exception: neck pain began > 1 hour after injury)    Negative: Penetrating head injury (e.g., knife, gunshot wound, metal object)    Negative: Sounds like a life-threatening emergency to the triager    Negative: Recently examined and diagnosed with a concussion by a healthcare provider and has questions about concussion symptoms    Negative: Can't remember what happened (amnesia)    Negative: Vomiting once or more    Negative: Watery or blood-tinged fluid dripping from the nose or ears    Negative: ACUTE NEUROLOGIC SYMPTOM and now fine    Negative: Knocked out (unconscious) < 1 minute and now fine    Negative: Severe headache    Negative: Dangerous injury (e.g., MVA, diving, trampoline, contact sports, fall > 10 feet or 3 meters) or severe blow from hard object (e.g., golf club or baseball bat)    Negative: Large swelling or bruise > 2 inches (5 cm)    Negative: Skin is split open or gaping (length > 1/2 inch or 12 mm)    Negative: Bleeding won't stop after 10 minutes of direct pressure (using correct technique)    Negative: One or two 'black eyes' (bruising, purple color of eyelids), and onset within 24 hours of head injury    Negative: Taking Coumadin (warfarin) or other strong blood thinner, or known bleeding disorder (e.g., thrombocytopenia)    Negative: Patient is confused or is an unreliable provider of information (e.g., dementia, severe intellectual disability, alcohol intoxication)    Negative: No prior tetanus shots (or is not  "fully vaccinated) and any wound (e.g., cut or scrape)    Negative: HIV positive or severe immunodeficiency (severely weak immune system) and DIRTY cut or scrape    Answer Assessment - Initial Assessment Questions  1. MECHANISM: fell from landing of stairs, down the 2-3 stairs and landed on sacral area, then head hit door.  2. ONSET:  0900  3. NEUROLOGIC SYMPTOMS No losss of consciousness. Felt nauseated and sleepy at first. Now queasy only.   4. MENTAL STATUS: Alert and oriented x 3  5. LOCATION: \"What part of the head was hit?\"       Left above occiput  6. SCALP APPEARANCE: No open areas, quarter sized lump  7. SIZE: as above  8. PAIN: No pain.   9. TETANUS: No skin break  10. OTHER SYMPTOMS: No neck pain, no vomiting.  Does have queasiness    Protocols used: HEAD INJURY-A-[place]      "

## 2022-04-22 NOTE — NURSING NOTE
Chief Complaint   Patient presents with     Fall     Pt reports symptoms of left side head pain from fall downstairs on 4/22/22

## 2022-04-22 NOTE — PROGRESS NOTES
Giana Hope is a 76 year old female who presents today for fall, head injury.    Fell down the stairs today at 0900, hit head on door. Not on chronic anticoag therapy. Daily ASA is on med list- patient states that she is not currently taking. Denies LOC, did feel nauseated at time of fall and was having head pain. Fall was mechanical in nature. Unwitnessed.   Prior to falling everything felt good per patient. Patient states that due to her spine and back issues she has noticed changes in her gait.       BP sitting 131/74  BP standing 128/77  TUG test < 12 seconds  Get up and go normal     Review Of Systems  Skin: skin color changes on her legs for over a year   Eyes: glasses, changes with vision had H/O uvitis   Ears/Nose/Throat: negative  Respiratory: No shortness of breath, dyspnea on exertion, cough, or hemoptysis  Cardiovascular: negative  Gastrointestinal: negative  Genitourinary: negative  Musculoskeletal: negative  Neurologic: negative  Psychiatric: feeling down over the winter time, family moved to [facility]   Hematologic/Lymphatic/Immunologic: negative  Endocrine: negative    Past Medical History:   Diagnosis Date     Nonsenile cataract      Osteoarthritis      Pulmonary nodule      Uveitis      Past Surgical History:   Procedure Laterality Date     ARTHROPLASTY KNEE Right 11/26/2021    Procedure: ARTHROPLASTY, RIGHT KNEE, TOTAL;  Surgeon: Gabriel Marrero MD;  Location: [facility]     ARTHROPLASTY, RIGHT KNEE, TOTAL Right 11/26/2021     CATARACT IOL, RT/LT Right 03/2019     PHACOEMULSIFICATION WITH STANDARD INTRAOCULAR LENS IMPLANT Right 3/22/2019    Procedure: Right Cataract Removal with Intraocular Lens Implant;  Surgeon: Trish Taylor MD;  Location:  OR     Social History     Socioeconomic History     Marital status:      Spouse name: Not on file     Number of children: Not on file     Years of education: Not on file     Highest education level: Not on file   Occupational History     Not  on file   Tobacco Use     Smoking status: Former Smoker     Years: 35.00     Smokeless tobacco: Never Used     Tobacco comment: Quit 24 years ago    Substance and Sexual Activity     Alcohol use: Yes     Comment: Moderate     Drug use: No     Sexual activity: Not Currently     Partners: Male   Other Topics Concern     Parent/sibling w/ CABG, MI or angioplasty before 65F 55M? 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    Loves to travel. Moved to [place] 10/2018 to be near daughter Linda () son in law and 2 granddaughters will be living in [facility]    Her son Jeff ( ) lives in [place] Schizoaffective. She travels to visit him when able.       Social Determinants of Health     Financial Resource Strain: Not on file   Food Insecurity: Not on file   Transportation Needs: Not on file   Physical Activity: Not on file   Stress: Not on file   Social Connections: Not on file   Intimate Partner Violence: Not on file   Housing Stability: Not on file     Family History   Problem Relation Age of Onset     Arthritis Mother      Breast Cancer Mother 78     Diabetes Type 2  Father      Heart Disease Father          age 75     Alzheimer Disease Father 60     Heart Disease Brother 68     Breast Cancer Maternal Grandmother 81     Heart Disease Maternal Grandfather 54         age 81     Other - See Comments Son         schizoaffective lives in [place]     Glaucoma No family hx of      Macular Degeneration No family hx of        LMP  (LMP Unknown)     Exam:  Constitutional: healthy, alert and no distress  Head: positive findings: Lips, mucosa, and tongue normal. Teeth and gums normal., positive findings: small hematoma present to left occipital scalp   Neck: Neck supple. No adenopathy. Thyroid  symmetric, normal size,, Carotids without bruits.  ENT: ENT exam normal, no neck nodes or sinus tenderness  Cardiovascular: negative, PMI normal. No lifts, heaves, or thrills. RRR. No murmurs, clicks gallops or rub  Respiratory: negative, Percussion normal. Good diaphragmatic excursion. Lungs clear  Musculoskeletal: extremities normal- no gross deformities noted, gait normal, normal muscle tone and normal range of motion Bilateral UE's and LE's.   Skin: no suspicious lesions or rashes and hematoma to left occipital scalp.   Neurologic: Gait normal. Reflexes normal and symmetric. Sensation grossly WNL., negative findings: speech normal, mental status intact, cranial nerves 2-12 intact, muscle tone normal, muscle strength normal, reflexes normal and symmetric  Psychiatric: mentation appears normal and affect normal/bright  Hematologic/Lymphatic/Immunologic: Normal cervical lymph nodes    Assessment/Plan:  (W10.8XXA) Fall down stairs, initial encounter  (primary encounter diagnosis)  (S09.90XA) Injury of head, initial encounter  Plan: CT Head w/o Contrast, Vitamin D Deficiency, Hemoglobin. No high risk medications, no chronic anticoagulation therapy. No signs of orthostatic hypotension. Fall seems mechanical in nature, will get Ct of had non contract since there is a hematoma and patient experienced some nausea after falling. Not sure what her bone density scan said- was previously living in [place] and told that she had too much arthritis for a dexa scan to show osteoporosis. Take calcium plus vitamin D daily          (Z00.00) Healthcare maintenance  Plan: Vitamin D Deficiency, TSH with free T4 reflex           (E55.9) Vitamin D deficiency  Plan: Vitamin D Deficiency           Yakelin Whittington, NASIR CNP

## 2022-04-22 NOTE — PATIENT INSTRUCTIONS
"HEAD INJURY    Although no evidence of any serious injury has been found at this time, close observation for the next 24-48 hours is advised.    Should any of the following occur, contact or return to the clinic or Emergency Department:  1. Repeated or persistent vomiting.    2. Headache which worsens or lasts more than a day.    3. Unequal pupils (one large and one small).     4. Difficulty seeing (double or blurry vision).    5. Dizziness, confusion or loss of consciousness.    6. Increasing drowsiness or inability to arouse the person.  Look for a personality change or irritability (wake the person during the first night every two or three hours).  If the child says \"Quit bugging me, go away\" the child is OK.    7. Bleeding or discharge of fluid from the nose or ears.    8. Slurred speech.    9. New or worsening neck pain.    10. Seizure/convulsions (uncontrolled movements of the face, arms, and legs).    TREATMENT/SPECIAL INSTRUCTIONS:    Avoid strenuous physical activity for at least 24 hours.   Remain in bed until the headache is gone.    Eat a light diet for 24 hours.    Tylenol for relief of mild pain.    Avoid tranquilizers, sedatives or alcohol for 24 hours after the injury.        "

## 2022-04-23 ENCOUNTER — ANCILLARY PROCEDURE (OUTPATIENT)
Dept: CT IMAGING | Facility: CLINIC | Age: 77
End: 2022-04-23
Attending: NURSE PRACTITIONER
Payer: MEDICARE

## 2022-04-23 DIAGNOSIS — S09.90XA INJURY OF HEAD, INITIAL ENCOUNTER: ICD-10-CM
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
## 2022-04-23 LAB — DEPRECATED CALCIDIOL+CALCIFEROL SERPL-MC: 50 UG/L (ref 20–75)

## 2022-04-23 PROCEDURE — G1004 CDSM NDSC: HCPCS | Performed by: RADIOLOGY

## 2022-04-23 PROCEDURE — 70450 CT HEAD/BRAIN W/O DYE: CPT | Mod: ME | Performed by: RADIOLOGY

## 2022-05-10 ENCOUNTER — OFFICE VISIT (OUTPATIENT)
Dept: OPHTHALMOLOGY | Facility: CLINIC | Age: 77
End: 2022-05-10
Attending: OPHTHALMOLOGY
Payer: MEDICARE

## 2022-05-10 DIAGNOSIS — H25.812 COMBINED FORM OF AGE-RELATED CATARACT, LEFT EYE: ICD-10-CM

## 2022-05-10 DIAGNOSIS — H04.129 DRY EYE: ICD-10-CM

## 2022-05-10 DIAGNOSIS — H20.00 ACUTE ANTERIOR UVEITIS OF LEFT EYE: Primary | ICD-10-CM

## 2022-05-10 DIAGNOSIS — Z96.1 PSEUDOPHAKIA, RIGHT EYE: ICD-10-CM

## 2022-05-10 DIAGNOSIS — H21.542 POSTERIOR SYNECHIAE, LEFT: ICD-10-CM

## 2022-05-10 PROCEDURE — G0463 HOSPITAL OUTPT CLINIC VISIT: HCPCS

## 2022-05-10 PROCEDURE — 99214 OFFICE O/P EST MOD 30 MIN: CPT | Performed by: OPHTHALMOLOGY

## 2022-05-10 ASSESSMENT — REFRACTION_WEARINGRX
SPECS_TYPE: PAL
OD_SPHERE: +0.25
OD_ADD: +2.50
OD_AXIS: 174
OS_SPHERE: PLANO
OS_AXIS: 007
OD_CYLINDER: +1.00
OS_CYLINDER: +0.50
OS_ADD: +2.50

## 2022-05-10 ASSESSMENT — EXTERNAL EXAM - LEFT EYE: OS_EXAM: NORMAL

## 2022-05-10 ASSESSMENT — CONF VISUAL FIELD
OS_NORMAL: 1
METHOD: COUNTING FINGERS
OD_NORMAL: 1

## 2022-05-10 ASSESSMENT — VISUAL ACUITY
OD_CC: 20/25
OS_CC: 20/30
METHOD: SNELLEN - LINEAR
CORRECTION_TYPE: GLASSES
OS_CC+: +2

## 2022-05-10 ASSESSMENT — SLIT LAMP EXAM - LIDS
COMMENTS: NORMAL
COMMENTS: NORMAL

## 2022-05-10 ASSESSMENT — TONOMETRY
IOP_METHOD: APPLANATION
OD_IOP_MMHG: 16
IOP_METHOD: TONOPEN
OS_IOP_MMHG: 25
OS_IOP_MMHG: 25
IOP_METHOD: TONOPEN
OS_IOP_MMHG: 21
OD_IOP_MMHG: 17

## 2022-05-10 ASSESSMENT — EXTERNAL EXAM - RIGHT EYE: OD_EXAM: NORMAL

## 2022-05-10 NOTE — PROGRESS NOTES
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 Tapered off PF.    Has been off PF for a couple days. No flares. No eye pain. Had a little irritation one day after missing dose of PF but got better after using PF and then did not recur with taper.  Feels like both eyes have not been as clear as they used to be. Noticing with prolonged gaze distance and near  Not using any drops.  She is leaving end of week for 1 month to [place] to see her family in [facility] and then go to [place] for vacation.    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 Moved from [place] to [place] 2019 to be closer to granddaughters.    FH:  No known family history of blindness, glaucoma, macular degeneration  No known family history of autoimmune/inflammatory disease  Son - schizoaffective disorder    ASSESSMENT and PLAN:  1. 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   - first episode 5/2018 seen at [facility] and resolved with topical steroids; workup +B27; noted on ER exam to have mutton fat KP; however, on uveitis evaluation only nongranulomatous inflammation seen and no further evidence of granulomatous disease noted with flares   - second episode 9/2020 resolved with PF   - third episode 3/2022 resolved with PF    --> quiet today off steroids    - discussed if not responding to topical steroids, unable to taper to acceptable dose of steroids, or recurring more frequently may need to consider long-term prophylactic steroid-sparing IMT; for now will continue with topical steroids prn for flares  - discussed likely related  to B27+; negative ROS and prior negative workup    --> stay off PF; discussed bringing with her on trip in case of flare and she has a bottle to do this    2. Posterior synechiae, left  - pigment on anterior capsule left eye at site of prior PS now broken  - no active PS  - given quiescence will not use dilating drop for now; with future flares would also recommend cyclogyl BID to prevent PS    3. Pseudophakia, right eye  - lens in good position  - excellent vision  - monitor    4. Combined form of age-related cataract, left eye  - patient happy with vision  - monitor for now    5. Dry eye  - discussed may be contributing to decreased vision distance and near each eye with prolonged gaze  --> start ATs prn  --> start warm compresses      Follow up in 3 months, no dilation, with MRx or sooner PRN        -----------------------------------------------------------------------------------    Attestation:  Complete documentation of historical and exam elements from today's encounter can be found in the full encounter summary report (not reduplicated in this progress note). I personally obtained the chief complaint(s) and history of present illness.  I confirmed and edited as necessary the review of systems, past medical/surgical history, family history, social history, and examination findings as documented by others; and I examined the patient myself. I personally reviewed the relevant tests, images, and reports as documented above.     I formulated and edited as necessary the assessment and plan and discussed the findings and management plan with the patient and family.      Michelle Veronica MD

## 2022-05-10 NOTE — NURSING NOTE
Chief Complaints and History of Present Illnesses   Patient presents with     Uveitis Follow-Up     6 week follow up      Chief Complaint(s) and History of Present Illness(es)     Uveitis Follow-Up     Comments: 6 week follow up               Comments     Pt states vision is not as clear as it was. No eye pain today. No new flashes or floaters.  No redness or dryness.    JAKE Doyle May 10, 2022 2:41 PM

## 2022-06-05 ENCOUNTER — NURSE TRIAGE (OUTPATIENT)
Dept: NURSING | Facility: CLINIC | Age: 77
End: 2022-06-05
Payer: MEDICARE

## 2022-06-05 NOTE — TELEPHONE ENCOUNTER
Son phoned stating that they are currently in [place] and that pt has tested positive for COVID     Son will be calling PCP tomorrow to discuss pts current health when clinic is open     No Triage     Vanessa Alegria RN  [facility] Nurse Advisor  6:40 AM 6/5/2022      COVID 19 Nurse Triage Plan/Patient Instructions    Please be aware that novel coronavirus (COVID-19) may be circulating in the community. If you develop symptoms such as fever, cough, or SOB or if you have concerns about the presence of another infection including coronavirus (COVID-19), please contact your health care provider or visit [email or web address].     Disposition/Instructions    Home care recommended. Follow home care protocol based instructions.    Thank you for taking steps to prevent the spread of this virus.  o Limit your contact with others.  o Wear a simple mask to cover your cough.  o Wash your hands well and often.    Resources    [facility]: About COVID-19: [email or web address]    CDC: What to Do If You're Sick: www.cdc.gov/coronavirus/2019-ncov/about/steps-when-sick.html    CDC: Ending Home Isolation: www.cdc.gov/coronavirus/2019-ncov/hcp/disposition-in-home-patients.html     CDC: Caring for Someone: www.cdc.gov/coronavirus/2019-ncov/if-you-are-sick/care-for-someone.html     [facility]: Interim Guidance for Hospital Discharge to Home: www.health.[facility].[place]/diseases/coronavirus/hcp/hospdischarge.pdf    [facility] clinical trials (COVID-19 research studies): [email or web address].[email or web address]-clinical-trials     Below are the COVID-19 hotlines at the [facility] ([facility]). Interpreters are available.   o For health questions: Call 943-665-9139 or 1-124.718.6526 (7 a.m. to 7 p.m.)  o For questions about schools and childcare: Call 091-463-1168 or 1-765.855.2404 (7 a.m. to 7 p.m.)                     Reason for Disposition    Health Information question, no triage required and triager able to  answer question    Protocols used: INFORMATION ONLY CALL - NO TRIAGE-A-AH

## 2022-06-06 ENCOUNTER — TELEPHONE (OUTPATIENT)
Dept: FAMILY MEDICINE | Facility: CLINIC | Age: 77
End: 2022-06-06
Payer: MEDICARE

## 2022-06-06 NOTE — TELEPHONE ENCOUNTER
Antiviral for covid treatment cannot be prescribe without an appointment with a provider.  Virtual appointment cannot happen at this time because patient is currently in [place].  Providers has no license to treat patients outside of [facility].      Message sent to RN to provide medical advise.        Pasha Alcantar CMA ([facility]) at 9:16 AM on 6/6/2022

## 2022-06-06 NOTE — TELEPHONE ENCOUNTER
[facility] Call Center    Phone Message    May a detailed message be left on voicemail: yes     Reason for Call: Other: Per call from Linda, PT in [place] currently, tested positive for covid and is requesting antiviral. Per Linda PT is in day 2 and it's URGENT that she gets a call back to further discuss it and patient can't get antiviral in [place]. Linda is in [employer] and can provide [facility] pharmacy to clinic for the rx. Please reach out to Linda SUE.      Action Taken: Message routed to:  [facility] ([facility]): PCC    Travel Screening: Not Applicable

## 2022-06-06 NOTE — TELEPHONE ENCOUNTER
RN called Linda, patient's daughter, and relayed that since patient is out of the country, then an [facility] provider can't have a virtual visit or provide an Rx for Paxlovid.  Linda relayed they are traveling to [place], a larger [place] city, where they hope to find a medical center and a provider who can prescribe Paxlovid.    Holly Alejandro RN on 6/6/2022 at 9:36 AM

## 2022-06-24 DIAGNOSIS — L21.9 DERMATITIS, SEBORRHEIC: ICD-10-CM

## 2022-06-27 ENCOUNTER — TELEPHONE (OUTPATIENT)
Dept: OPHTHALMOLOGY | Facility: CLINIC | Age: 77
End: 2022-06-27

## 2022-06-27 RX ORDER — KETOCONAZOLE 20 MG/ML
SHAMPOO TOPICAL
Qty: 120 ML | Refills: 6 | Status: SHIPPED | OUTPATIENT
Start: 2022-06-27 | End: 2023-06-22

## 2022-07-12 ENCOUNTER — TELEPHONE (OUTPATIENT)
Dept: OPHTHALMOLOGY | Facility: CLINIC | Age: 77
End: 2022-07-12

## 2022-07-27 ENCOUNTER — TELEPHONE (OUTPATIENT)
Dept: VASCULAR SURGERY | Facility: CLINIC | Age: 77
End: 2022-07-27

## 2022-07-27 ENCOUNTER — TELEPHONE (OUTPATIENT)
Dept: FAMILY MEDICINE | Facility: CLINIC | Age: 77
End: 2022-07-27

## 2022-07-27 ENCOUNTER — OFFICE VISIT (OUTPATIENT)
Dept: FAMILY MEDICINE | Facility: CLINIC | Age: 77
End: 2022-07-27
Payer: MEDICARE

## 2022-07-27 VITALS
BODY MASS INDEX: 25.33 KG/M2 | HEART RATE: 69 BPM | SYSTOLIC BLOOD PRESSURE: 125 MMHG | DIASTOLIC BLOOD PRESSURE: 82 MMHG | OXYGEN SATURATION: 99 % | HEIGHT: 63 IN

## 2022-07-27 DIAGNOSIS — U07.1 INFECTION DUE TO 2019 NOVEL CORONAVIRUS: ICD-10-CM

## 2022-07-27 DIAGNOSIS — Z12.31 VISIT FOR SCREENING MAMMOGRAM: ICD-10-CM

## 2022-07-27 DIAGNOSIS — E78.5 HYPERLIPIDEMIA LDL GOAL <100: ICD-10-CM

## 2022-07-27 DIAGNOSIS — Z23 ENCOUNTER FOR ADMINISTRATION OF COVID-19 VACCINE: ICD-10-CM

## 2022-07-27 DIAGNOSIS — M15.0 PRIMARY OSTEOARTHRITIS INVOLVING MULTIPLE JOINTS: ICD-10-CM

## 2022-07-27 DIAGNOSIS — Z80.3 FAMILY HISTORY OF MALIGNANT NEOPLASM OF BREAST: ICD-10-CM

## 2022-07-27 DIAGNOSIS — I83.813 VARICOSE VEINS OF BOTH LOWER EXTREMITIES WITH PAIN: ICD-10-CM

## 2022-07-27 DIAGNOSIS — I87.2 VENOUS STASIS DERMATITIS OF BOTH LOWER EXTREMITIES: Primary | ICD-10-CM

## 2022-07-27 PROCEDURE — 99215 OFFICE O/P EST HI 40 MIN: CPT | Performed by: FAMILY MEDICINE

## 2022-07-27 SDOH — SOCIAL STABILITY: SOCIAL INSECURITY: ARE YOU MARRIED, WIDOWED, DIVORCED, SEPARATED, NEVER MARRIED, OR LIVING WITH A PARTNER?: DIVORCED

## 2022-07-27 SDOH — HEALTH STABILITY: MENTAL HEALTH: HOW OFTEN DO YOU HAVE A DRINK CONTAINING ALCOHOL?: 2-3 TIMES A WEEK

## 2022-07-27 SDOH — ECONOMIC STABILITY: FOOD INSECURITY: WITHIN THE PAST 12 MONTHS, THE FOOD YOU BOUGHT JUST DIDN'T LAST AND YOU DIDN'T HAVE MONEY TO GET MORE.: NEVER TRUE

## 2022-07-27 SDOH — ECONOMIC STABILITY: TRANSPORTATION INSECURITY: IN THE PAST 12 MONTHS, HAS LACK OF TRANSPORTATION KEPT YOU FROM MEDICAL APPOINTMENTS OR FROM GETTING MEDICATIONS?: NO

## 2022-07-27 SDOH — SOCIAL STABILITY: SOCIAL NETWORK
DO YOU BELONG TO ANY CLUBS OR ORGANIZATIONS SUCH AS CHURCH GROUPS, UNIONS, FRATERNAL OR ATHLETIC GROUPS, OR SCHOOL GROUPS?: YES

## 2022-07-27 SDOH — SOCIAL STABILITY: SOCIAL INSECURITY: WITHIN THE LAST YEAR, HAVE YOU BEEN HUMILIATED OR EMOTIONALLY ABUSED IN OTHER WAYS BY YOUR PARTNER OR EX-PARTNER?: NO

## 2022-07-27 SDOH — HEALTH STABILITY: MENTAL HEALTH
DO YOU FEEL STRESS - TENSE, RESTLESS, NERVOUS, OR ANXIOUS, OR UNABLE TO SLEEP AT NIGHT BECAUSE YOUR MIND IS TROUBLED ALL THE TIME - THESE DAYS?: ONLY A LITTLE

## 2022-07-27 SDOH — SOCIAL STABILITY: SOCIAL NETWORK: HOW OFTEN DO YOU ATTEND CHURCH OR RELIGIOUS SERVICES?: NEVER

## 2022-07-27 SDOH — SOCIAL STABILITY: SOCIAL NETWORK: HOW OFTEN DO YOU ATTEND MEETINGS OF THE CLUBS OR ORGANIZATIONS YOU BELONG TO?: MORE THAN 4 TIMES PER YEAR

## 2022-07-27 SDOH — SOCIAL STABILITY: SOCIAL INSECURITY
WITHIN THE LAST YEAR, HAVE YOU BEEN RAPED OR FORCED TO HAVE ANY KIND OF SEXUAL ACTIVITY BY YOUR PARTNER OR EX-PARTNER?: NO

## 2022-07-27 SDOH — SOCIAL STABILITY: SOCIAL INSECURITY
WITHIN THE LAST YEAR, HAVE YOU BEEN KICKED, HIT, SLAPPED, OR OTHERWISE PHYSICALLY HURT BY YOUR PARTNER OR EX-PARTNER?: NO

## 2022-07-27 SDOH — HEALTH STABILITY: MENTAL HEALTH: HOW OFTEN DO YOU HAVE SIX OR MORE DRINKS ON ONE OCCASION?: NEVER

## 2022-07-27 SDOH — ECONOMIC STABILITY: HOUSING INSECURITY
IN THE LAST 12 MONTHS, WAS THERE A TIME WHEN YOU DID NOT HAVE A STEADY PLACE TO SLEEP OR SLEPT IN A SHELTER (INCLUDING NOW)?: NO

## 2022-07-27 SDOH — ECONOMIC STABILITY: FOOD INSECURITY: HOW HARD IS IT FOR YOU TO PAY FOR THE VERY BASICS LIKE FOOD, HOUSING, MEDICAL CARE, AND HEATING?: NOT HARD AT ALL

## 2022-07-27 SDOH — SOCIAL STABILITY: SOCIAL NETWORK: HOW OFTEN DO YOU GET TOGETHER WITH FRIENDS OR RELATIVES?: THREE TIMES A WEEK

## 2022-07-27 SDOH — SOCIAL STABILITY: SOCIAL NETWORK: IN A TYPICAL WEEK, HOW MANY TIMES DO YOU TALK ON THE PHONE WITH FAMILY, FRIENDS, OR NEIGHBORS?: THREE TIMES A WEEK

## 2022-07-27 SDOH — ECONOMIC STABILITY: FOOD INSECURITY: WITHIN THE PAST 12 MONTHS, YOU WORRIED THAT YOUR FOOD WOULD RUN OUT BEFORE YOU GOT THE MONEY TO BUY MORE.: NEVER TRUE

## 2022-07-27 SDOH — ECONOMIC STABILITY: HOUSING INSECURITY: IN THE LAST 12 MONTHS, HOW MANY PLACES HAVE YOU LIVED?: 1

## 2022-07-27 SDOH — ECONOMIC STABILITY: HOUSING INSECURITY: IN THE LAST 12 MONTHS, WAS THERE A TIME WHEN YOU WERE NOT ABLE TO PAY THE MORTGAGE OR RENT ON TIME?: NO

## 2022-07-27 SDOH — HEALTH STABILITY: PHYSICAL HEALTH: ON AVERAGE, HOW MANY MINUTES DO YOU ENGAGE IN EXERCISE AT THIS LEVEL?: 30 MIN

## 2022-07-27 SDOH — HEALTH STABILITY: MENTAL HEALTH: HOW MANY DRINKS CONTAINING ALCOHOL DO YOU HAVE ON A TYPICAL DAY WHEN YOU ARE DRINKING?: 1 OR 2

## 2022-07-27 SDOH — HEALTH STABILITY: PHYSICAL HEALTH: ON AVERAGE, HOW MANY DAYS PER WEEK DO YOU ENGAGE IN MODERATE TO STRENUOUS EXERCISE (LIKE A BRISK WALK)?: 7 DAYS

## 2022-07-27 SDOH — SOCIAL STABILITY: SOCIAL INSECURITY: WITHIN THE LAST YEAR, HAVE YOU BEEN AFRAID OF YOUR PARTNER OR EX-PARTNER?: NO

## 2022-07-27 ASSESSMENT — PAIN SCALES - GENERAL: PAINLEVEL: NO PAIN (0)

## 2022-07-27 ASSESSMENT — LIFESTYLE VARIABLES
SKIP TO QUESTIONS 9-10: 1
AUDIT-C TOTAL SCORE: 3

## 2022-07-27 ASSESSMENT — ACTIVITIES OF DAILY LIVING (ADL): LACK_OF_TRANSPORTATION: NO

## 2022-07-27 NOTE — PROGRESS NOTES
Assessment & Plan     Venous stasis dermatitis of both lower extremities  Varicose veins of both lower extremities with pain  Change in color to her lower extremities is secondary to venous stasis dermatitis I suspect venous incompetence.  Reviewed the mainstay of treatment is compression stockings at least 15 to 20 mmHg use when she is upright may take off at night.  Information was provided in after visit summary.  Discussed options to meet with the vein specialist she would like to do this as she does have pain at times especially with the long plane ride and has no acute symptoms today and no signs of swelling.  - US Lower Extremity Venous Duplex Bilateral  - US Venous Competency Bilateral  - Vascular Surgery Referral  - Compression Sleeve/Stocking Order for DME - ONLY FOR DME    Encounter for administration of COVID-19 vaccine  She agreed to COVID booster    Infection due to 2019 novel coronavirus  She had COVID infection in the beginning of June with recovery    Primary osteoarthritis involving multiple joints  Osteoarthritis stable she does not use sulindac on an every day basis but has it on hand.    Visit for screening mammogram  Family history of malignant neoplasm of breast  It appears she is due for her annual mammogram I ordered this will have coordinators contact her.  - Mammogram, screening w denisha (3D)    Hyperlipidemia LDL goal <100  She is due for Medicare wellness and fasting labs we will coordinate fasting lab visit prior to her general physical Medicare wellness.  I suggested she do this prior to travel to [place] and we can update her on any recommendations.  She will continue to utilize pandemic guidance use her mask and wash to help decrease the risk of infection.  - Lipid panel reflex to direct LDL Fasting  - Comprehensive metabolic panel  60 minutes spent on the date of the encounter doing chart review, history, exam, diagnostics review, documentation, counseling and coordination of  "cares as noted.               BMI:   Estimated body mass index is 25.33 kg/m  as calculated from the following:    Height as of this encounter: 1.6 m (5' 3\").    Weight as of 4/22/22: 64.9 kg (143 lb).           Return in about 10 weeks (around 10/5/2022) for Physical Exam medicare.    Gilberto Vasquez MD  [facility]    Diana Shaikh is a 76 year old, presenting for the following health issues:  Circulation Problems (Pt here to discuss r leg circulation issues for 1 year)      ELIU Hope 76 soon to be 77  has history of arthritis, hyperlipidemia, previous history of tobacco use quit over 26 years ago with small stable pulmonary nodules. She has significant multiple joint arthritis.  She presents today for concerns related to change in color of her legs present for approximately 1 year.  Venous stasis  She notes discoloration of legs anterior shins and around her ankle. She has noted for one year. She had one episode of pain in her legs after walking 2.5 miles.  She notes swelling in her legs in particular with long plane ride or sedentary.  She notes small varicosities are developing.  She has not noted a change in the color of her toes and denies claudication symptoms as she is able to walk on a regular basis.  She denies asymmetry of swelling of the lower extremities, denies ulceration. She feels loss of energy in the last year but thinks this might be due to her family moved to [place].  She is not on a chronic daily aspirin.  Gastroesophageal reflux.   She does not have a history of ulcer.  Famotidine once daily has been helpful for reflux  Osteoarthritis  Hawa has significant osteoarthritis of her hands shoulders spine and knee in particular she has limitation of range of motion to her left arm due to significant osteoarthritis and completed surgical intervention [facility].  She occasionally utilizes sulindac 200 mg up to twice weekly.    She completed " left shoulder surgery at [facility] 8/25 with left arm in a sling. She is able to continue the sulindac on an as-needed basis  reviewed monitor her kidney function has been stable. She developed a left hand contracture while in the sling and has arthritis and limitation of joints of her right/ left hands including in her hands requiring orthopedic hand evaluation for thumb arthritis and left palm contracture. She takes Vit D  Lipids  Currently tolerating pravastatin 20 mg daily. Due for fasting lipids by November.  Family history of breast cancer  She needs annual mammogram  last completed 1/2021 due to family history of breast cancer in mother and maternal grandmother in their 70s.  History of pulmonary nodule  She is a past cigarette smoker but quit over 26 years ago.  She was monitored serially with CT scan for stable pulmonary nodule and completed CT scan 11/2021with stable pulmonary nodule no further screening required.  COVID-19 June 5, 2022  She traveled to [place] and during that time had COVID was not able to get any medication but recovered.   HCM  Screening for colon cancer previous normal colonoscopy   Due for Medicare wellness visit will schedule in the fall   SH: In April 2022 after her family moved  To [place] she moved into their portion of the the home and was not used to the steps, she fell of the landing down two steps hit her head on door.  She had no loss of consciousness and went to the emergency room for evaluation CT reviewed.  She is thinking about traveling to [place] in November.            Labs reviewed in EPIC  BP Readings from Last 3 Encounters:   07/27/22 125/82   04/22/22 131/70   11/27/21 125/64    Wt Readings from Last 3 Encounters:   04/22/22 64.9 kg (143 lb)   11/26/21 67.5 kg (148 lb 13 oz)   11/15/21 65.1 kg (143 lb 9.6 oz)            Immunization History   Administered Date(s) Administered     COVID-19,PF,Moderna 03/29/2022     COVID-19,PF,Pfizer (12+ Yrs) 01/30/2021, 02/20/2021,  09/25/2021     FLUAD(HD)65+ QUAD 09/25/2021     Flu, Unspecified 10/13/2009, 10/02/2014, 10/21/2015, 09/30/2016     HepA-Adult 02/23/2018, 05/16/2019     Influenza (High Dose) 3 valent vaccine 10/28/2017     Influenza Vaccine, 6+MO IM (QUADRIVALENT W/PRESERVATIVES) 10/25/2018, 10/24/2019     Influenza, Quad, High Dose, Pf, 65yr+ (Fluzone HD) 10/12/2020     Pneumo Conj 13-V (2010&after) 12/11/2015     Pneumococcal 23 valent 03/18/2011     TD (ADULT, 7+) 02/23/2018     TDAP Vaccine (Boostrix) 08/16/2007     Typhoid IM 05/16/2019     Yellow Fever, Live (Stamaril) 11/30/2017           Patient Active Problem List   Diagnosis     Hypercholesterolemia     Arthritis of shoulder region, left     Encounter to establish care     Primary osteoarthritis involving multiple joints     Gastroesophageal reflux disease without esophagitis     Nodule of left lung     Family history of malignant neoplasm of breast     Mass of left upper extremity     Back pain of thoracolumbar region     Balance disorder     Compression fracture of thoracic vertebra, open, initial encounter (H)     DDD (degenerative disc disease), thoracolumbar     Degenerative scoliosis in adult patient     History of anesthesia problem     Kyphosis (acquired) (postural)     Nuclear sclerosis     Osteoarthrosis     Osteopenia with high risk of fracture     Pain in left shoulder     Risk for falls     Status post left knee replacement     Pain in thoracic spine     Osteoarthritis of right knee     Past Surgical History:   Procedure Laterality Date     ARTHROPLASTY KNEE Right 11/26/2021    Procedure: ARTHROPLASTY, RIGHT KNEE, TOTAL;  Surgeon: Gabriel Marrero MD;  Location:  OR     ARTHROPLASTY, RIGHT KNEE, TOTAL Right 11/26/2021     CATARACT IOL, RT/LT Right 03/2019     PHACOEMULSIFICATION WITH STANDARD INTRAOCULAR LENS IMPLANT Right 3/22/2019    Procedure: Right Cataract Removal with Intraocular Lens Implant;  Surgeon: Trish Taylor MD;  Location:  OR        Social History     Tobacco Use     Smoking status: Former Smoker     Years: 35.00     Smokeless tobacco: Never Used     Tobacco comment: Quit 24 years ago    Substance Use Topics     Alcohol use: Yes     Comment: Moderate     Family History   Problem Relation Age of Onset     Arthritis Mother      Breast Cancer Mother 78     Diabetes Type 2  Father      Heart Disease Father          age 75     Alzheimer Disease Father 60     Heart Disease Brother 68     Breast Cancer Maternal Grandmother 81     Heart Disease Maternal Grandfather 54         age 81     Other - See Comments Son         schizoaffective lives in [place]     Glaucoma No family hx of      Macular Degeneration No family hx of          Current Outpatient Medications   Medication Sig Dispense Refill     calcium-vitamin D 500-125 MG-UNIT TABS Take 1 tablet by mouth daily       famotidine (PEPCID) 40 MG tablet Take 1 tablet (40 mg) by mouth daily as needed for heartburn 90 tablet 3     fluocinolone (SYNALAR) 0.01 % solution Apply topically three times a week 60 mL 3     ketoconazole (NIZORAL) 2 % external shampoo Apply topically three times a week 120 mL 6     pravastatin (PRAVACHOL) 20 MG tablet Take 1 tablet (20 mg) by mouth daily 90 tablet 3     prednisoLONE acetate (PRED FORTE) 1 % ophthalmic suspension Place 1 drop Into the left eye 3 times daily 15 mL 1     sulindac (CLINORIL) 200 MG tablet Take 1 tablet (200 mg) by mouth daily as needed (for joint pain) appt needed for further refills 90 tablet 0     tacrolimus (PROTOPIC) 0.1 % external ointment Apply topically 2 times daily 30 g 11     Vitamin D3 (CHOLECALCIFEROL) 25 mcg (1000 units) tablet Take 1 tablet by mouth daily       acetaminophen (TYLENOL) 325 MG tablet Take 2 tablets (650 mg) by mouth every 4 hours as needed for other (mild pain) (Patient not taking: No sig reported) 100 tablet 0     aspirin 81 MG EC tablet Take 1 tablet (81 mg) by mouth 2 times daily (Patient not taking: No sig  "reported) 60 tablet 0     hydrocortisone 2.5 % cream Apply topically 2 times daily as needed For up to 2 weeks at a time on the face. Avoid eye area. (Patient not taking: No sig reported) 20 g 3     senna-docusate (SENOKOT-S/PERICOLACE) 8.6-50 MG tablet Take 1-2 tablets by mouth 2 times daily Take while on oral narcotics to prevent or treat constipation. (Patient not taking: No sig reported) 30 tablet 0     Allergies   Allergen Reactions     Dust Mites Itching     Runny nose, fever     Mold Itching     Fever, runny nose     Pollen Extract Itching     Fever, runny nose     Celecoxib      Other reaction(s): GI intolerance     Recent Labs   Lab Test 04/22/22  1428 11/18/21  1049 08/17/20  1404 01/21/20  0925 01/16/20  1009   A1C  --   --   --  5.3  --    LDL  --  97  --   --   --    HDL  --  108  --   --   --    TRIG  --  71  --   --   --    ALT  --  26  --   --  20   CR  --  0.92  --  0.98 0.94   GFRESTIMATED  --  61  --  57* 60*   GFRESTBLACK  --   --   --  66 69   POTASSIUM  --  4.0  --  4.1 4.7   TSH 1.06  --  1.14  --   --       Review of Systems   Problem list, PMH, Surgical HX, FH, SH, allergies, medications,immunizations reviewed and updated in Epic. ROS negative other than noted in HPI and ROS.        Objective    /82 (BP Location: Right arm, Patient Position: Sitting, Cuff Size: Adult Regular)   Pulse 69   Ht 1.6 m (5' 3\")   LMP  (LMP Unknown)   SpO2 99%   BMI 25.33 kg/m    Body mass index is 25.33 kg/m .  Physical Exam     GENERAL APPEARANCE: healthy, alert and no distress, very pleasant, well groomed     EYES: No scleral icterus     NECK: no adenopathy, no asymmetry, masses, or scars and thyroid normal to palpation     RESP: lungs clear to auscultation - no rales, rhonchi or wheezes     CV: regular rates and rhythm, normal S1 S2, no S3 or S4 and no murmur, click or rub she has excellent dorsalis pedis posterior tibial pulses symmetrical bilateral.     MS:  Right knee osteoarthritis chronic " swelling, left knee well healed incision,  Other extremities signs of arthritis moves all extremities.  Lower extremities are symmetrical without significant edema     SKIN: She has several small varicosities, spider veins no significant distended varicosities, mild venous stasis dermatitis of the lower extremities near her ankle, no suspicious lesions or rashes     NEURO: Normal strength and tone,  mentation intact and speech normal     PSYCH: mentation appears normal. and affect normal     Results reviewed from April.  Gilberto Vasquez MD                      .  ..

## 2022-07-27 NOTE — NURSING NOTE
Chief Complaint   Patient presents with     Circulation Problems     Pt here to discuss r leg circulation issues for 1 year       Jossie Ramos CMA, EMT at 8:00 AM on 7/27/2022.

## 2022-07-27 NOTE — Clinical Note
Please assist I ordered mammogram after the visit and fasting  labs may be same dayas return for medicare wellness. Best wishes, Gilberto Vasquez MD

## 2022-08-01 ENCOUNTER — OFFICE VISIT (OUTPATIENT)
Dept: OPHTHALMOLOGY | Facility: CLINIC | Age: 77
End: 2022-08-01
Attending: OPHTHALMOLOGY
Payer: MEDICARE

## 2022-08-01 DIAGNOSIS — H20.00 HLA-B27 ASSOCIATED ACUTE ANTERIOR UVEITIS: Primary | ICD-10-CM

## 2022-08-01 DIAGNOSIS — H21.542 POSTERIOR SYNECHIAE, LEFT: ICD-10-CM

## 2022-08-01 DIAGNOSIS — H25.812 COMBINED FORM OF AGE-RELATED CATARACT, LEFT EYE: ICD-10-CM

## 2022-08-01 DIAGNOSIS — H40.052 OCULAR HYPERTENSION, LEFT EYE: ICD-10-CM

## 2022-08-01 PROCEDURE — G0463 HOSPITAL OUTPT CLINIC VISIT: HCPCS

## 2022-08-01 PROCEDURE — 99213 OFFICE O/P EST LOW 20 MIN: CPT | Performed by: OPHTHALMOLOGY

## 2022-08-01 PROCEDURE — 92133 CPTRZD OPH DX IMG PST SGM ON: CPT | Performed by: OPHTHALMOLOGY

## 2022-08-01 ASSESSMENT — VISUAL ACUITY
CORRECTION_TYPE: GLASSES
METHOD: SNELLEN - LINEAR
OS_PH_CC: 20/20
OS_CC: 20/30
OD_CC+: -1
OS_PH_CC+: -2
OD_CC: 20/20
OS_CC+: +2

## 2022-08-01 ASSESSMENT — TONOMETRY
OD_IOP_MMHG: 19
IOP_METHOD: TONOPEN
OS_IOP_MMHG: 24

## 2022-08-01 ASSESSMENT — REFRACTION_WEARINGRX
OS_SPHERE: PLANO
OS_AXIS: 007
OD_CYLINDER: +1.00
OD_ADD: +2.50
OD_SPHERE: +0.25
SPECS_TYPE: PAL
OD_AXIS: 174
OS_ADD: +2.50
OS_CYLINDER: +0.50

## 2022-08-01 ASSESSMENT — CONF VISUAL FIELD
OS_NORMAL: 1
OD_NORMAL: 1
METHOD: COUNTING FINGERS

## 2022-08-01 ASSESSMENT — CUP TO DISC RATIO
OS_RATIO: 0.5
OD_RATIO: 0.4

## 2022-08-01 ASSESSMENT — SLIT LAMP EXAM - LIDS
COMMENTS: NORMAL
COMMENTS: NORMAL

## 2022-08-01 ASSESSMENT — EXTERNAL EXAM - RIGHT EYE: OD_EXAM: NORMAL

## 2022-08-01 ASSESSMENT — EXTERNAL EXAM - LEFT EYE: OS_EXAM: NORMAL

## 2022-08-01 NOTE — LETTER
8/1/2022       RE: Giana Hope  [address]  [place] [address]     Dear Colleague,    Thank you for referring your patient, Giana Hope, to the [facility] [place] at [facility]. Please see a copy of my visit note below.    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  She was last seen on May 10, 2022 by my former partner Dr. Michelle Veronica at which time the uveitis was inactive off steroid drops.  The recommendation by Dr. Veronica was to monitor and follow-up within 3 months.    In the interim, she reports intermittently blurriness of the left eye with some improvement on artificial tears some times, other times not as much. She notes things are not quite as sharp and needing to enlarge print on the laptop. She does not report any flare symptoms nor need to use steroid drops.     Interval Updates to Medical/Family/Social History:    1.Got COVID in June 2022 while in [place]. Did not need to to the hospital,   2. Being treated for Venous stasis with compression socks, which should be picked up.     Relevant Review of Systems Updates:  No more COVID symptoms     Current eye related medications: Artificial tears, no steroid drops recently    Retina/Uveitis Imaging:   OCT Spectralis Macula August 1, 2022  right eye: Normal contour, no fluid  left eye: Normal contour, no fluid      OCT Optic Nerve RNFL Spectralis August 1, 2022  right eye: Avg thickness 86 microns, no thinning.   left eye: Avg thickness 80 microns, borderline sup temp    Assessment:     1. HLA-B27 associated acute anterior uveitis  No recurrence in the left eye and no signs right eye.     2. Ocular hypertension, left eye  Slightly elevated. Optic nerve scan with borderline  changes.     3. Posterior synechiae, left  No recurrence     4. Combined form of age-related cataract, left eye  Progressing    Plan/Recommendations:      Discussed findings with patient. There is no active uveitis in the left eye and no signs in the right eye. No steroid drops needed    There is progression of the cataract in the left eye. Given no active uveitis 3 months, reasonable to proceed with cataract evaluation. We will contact Dr. Taylor at [facility]    Eye pressure is normal right eye and elevated left eye. There are borderline changes on the Optic nerve scan left eye, but given this is our first test, we can monitor without eye pressure lowering drops    Recommend additional testing: None at this time    Continue these medications: Artificial tears    No specific indication for steroid drops at this time.  If/when cataract surgery recommended, would suggest steroid drops every 2 hours after surgery for the first week to ensure smooth healing. Also recommend a check here 2-3 weeks after surgery    We will send a letter to Dr. Taylor's office at [facility] in [place].     RTC Would recommend a follow up with me a few weeks after any planned cataract surgery.    Physician Attestation     Attending Physician Attestation:  Complete documentation of historical and exam elements from today's encounter can be found in the full encounter summary report (not reduplicated in this progress note). I personally obtained the chief complaint(s) and history of present illness. I confirmed and edited as necessary the review of systems, past medical/surgical history, family history, social history, and examination findings as documented by others; and I examined the patient myself. I personally reviewed the relevant tests, images, and reports as documented above. I formulated and edited as necessary the assessment and plan and discussed the findings and management plan with the patient and family members  present at the time of this visit.  Austin Tobin M.D., Uveitis and Medical Retina, August 1, 2022     Again, thank you for allowing me to participate in the care of your patient.      Sincerely,    Austin Tobin MD  [facility] Dept of Ophthalmology  Uveitis and Medical Retina

## 2022-08-01 NOTE — NURSING NOTE
Chief Complaints and History of Present Illnesses   Patient presents with     Uveitis Follow-Up     Chief Complaint(s) and History of Present Illness(es)     Uveitis Follow-Up     Laterality: both eyes    Onset: gradual    Frequency: intermittently    Course: stable    Associated symptoms: Negative for eye pain, photophobia, flashes and floaters              Comments     Here for acute anterior uveitis of left eye. Vision in the left is gradually decreasing and stable in the left. Due to insurance, patient will be getting an updated Mrx in a couple weeks with an optometrist. Only uses lubricating drops now as needed.     Chetan Kamara COT 1:18 PM August 1, 2022

## 2022-08-01 NOTE — PROGRESS NOTES
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  She was last seen on May 10, 2022 by my former partner Dr. Michelle Veronica at which time the uveitis was inactive off steroid drops.  The recommendation by Dr. Veronica was to monitor and follow-up within 3 months.    In the interim, she reports intermittently blurriness of the left eye with some improvement on artificial tears some times, other times not as much. She notes things are not quite as sharp and needing to enlarge print on the laptop. She does not report any flare symptoms nor need to use steroid drops.     Interval Updates to Medical/Family/Social History:    1.Got COVID in June 2022 while in [place]. Did not need to to the hospital,   2. Being treated for Venous stasis with compression socks, which should be picked up.     Relevant Review of Systems Updates:  No more COVID symptoms     Current eye related medications: Artificial tears, no steroid drops recently    Retina/Uveitis Imaging:   OCT Spectralis Macula August 1, 2022  right eye: Normal contour, no fluid  left eye: Normal contour, no fluid    OCT Optic Nerve RNFL Spectralis August 1, 2022  right eye: Avg thickness 86 microns, no thinning.   left eye: Avg thickness 80 microns, borderline sup temp    Assessment:     1. HLA-B27 associated acute anterior uveitis  No recurrence in the left eye and no signs right eye.     2. Ocular hypertension, left eye  Slightly elevated. Optic nerve scan with borderline changes.     3. Posterior synechiae, left  No recurrence     4. Combined form of age-related cataract, left eye  Progressing    Plan/Recommendations:      Discussed findings with patient. There is no active uveitis in the left eye and no signs in the right eye. No steroid drops needed    There is progression of the  cataract in the left eye. Given no active uveitis 3 months, reasonable to proceed with cataract evaluation. We will contact Dr. Taylor at [facility]    Eye pressure is normal right eye and elevated left eye. There are borderline changes on the Optic nerve scan left eye, but given this is our first test, we can monitor without eye pressure lowering drops    Recommend additional testing: None at this time    Continue these medications: Artificial tears    No specific indication for steroid drops at this time.  If/when cataract surgery recommended, would suggest steroid drops every 2 hours after surgery for the first week to ensure smooth healing. Also recommend a check here 2-3 weeks after surgery    We will send a letter to Dr. Taylor's office at [facility] in [place].     RTC Would recommend a follow up with me a few weeks after any planned cataract surgery.    Physician Attestation     Attending Physician Attestation:  Complete documentation of historical and exam elements from today's encounter can be found in the full encounter summary report (not reduplicated in this progress note). I personally obtained the chief complaint(s) and history of present illness. I confirmed and edited as necessary the review of systems, past medical/surgical history, family history, social history, and examination findings as documented by others; and I examined the patient myself. I personally reviewed the relevant tests, images, and reports as documented above. I formulated and edited as necessary the assessment and plan and discussed the findings and management plan with the patient and family members present at the time of this visit.  Austin Tobin M.D., Uveitis and Medical Retina, August 1, 2022

## 2022-08-12 ENCOUNTER — ANCILLARY PROCEDURE (OUTPATIENT)
Dept: ULTRASOUND IMAGING | Facility: CLINIC | Age: 77
End: 2022-08-12
Attending: FAMILY MEDICINE
Payer: MEDICARE

## 2022-08-12 DIAGNOSIS — I83.813 VARICOSE VEINS OF BOTH LOWER EXTREMITIES WITH PAIN: ICD-10-CM

## 2022-08-12 DIAGNOSIS — I87.2 VENOUS STASIS DERMATITIS OF BOTH LOWER EXTREMITIES: ICD-10-CM

## 2022-08-12 PROCEDURE — 93970 EXTREMITY STUDY: CPT | Mod: GC | Performed by: RADIOLOGY

## 2022-08-12 NOTE — RESULT ENCOUNTER NOTE
Dear Giana Hope   IMPRESSION:     1. RIGHT LEG:       A. No superficial or deep venous thrombosis demonstrated.       B. No superficial or deep venous incompetency demonstrated.       C. No incompetent  or varicose veins demonstrated.     2. LEFT LEG:       A. No superficial or deep venous thrombosis demonstrated.       B. Common femoral vein incompetent.       C. No superficial venous incompetency demonstrated.       D. No incompetent  or varicose veins demonstrated.    This means use compression as reviewed, could meet with vein MD if interested in intervention. Most important is no vein clots.  Best wishes,  Gilberto Vasquez MD

## 2022-08-15 ENCOUNTER — ANCILLARY PROCEDURE (OUTPATIENT)
Dept: MAMMOGRAPHY | Facility: CLINIC | Age: 77
End: 2022-08-15
Attending: FAMILY MEDICINE
Payer: MEDICARE

## 2022-08-15 DIAGNOSIS — Z12.31 VISIT FOR SCREENING MAMMOGRAM: ICD-10-CM

## 2022-08-15 DIAGNOSIS — Z80.3 FAMILY HISTORY OF MALIGNANT NEOPLASM OF BREAST: ICD-10-CM

## 2022-08-15 PROCEDURE — 77067 SCR MAMMO BI INCL CAD: CPT | Mod: GC | Performed by: RADIOLOGY

## 2022-08-15 PROCEDURE — 77063 BREAST TOMOSYNTHESIS BI: CPT | Mod: GC | Performed by: RADIOLOGY

## 2022-09-16 ENCOUNTER — TELEPHONE (OUTPATIENT)
Dept: FAMILY MEDICINE | Facility: CLINIC | Age: 77
End: 2022-09-16

## 2022-09-16 NOTE — TELEPHONE ENCOUNTER
[facility] Call Center    Phone Message    May a detailed message be left on voicemail: yes     Reason for Call: Symptoms or Concerns     If patient has red-flag symptoms, warm transfer to triage line    Current symptom or concern: Patient was having some short stabbing pain in her chest earlier stated she feels fine now and would like to discuss this further    Symptoms have been present for: happened 4 hours ago    Has patient previously been seen for this? No        Are there any new or worsening symptoms? No      Action Taken: Message routed to:  [facility]    Travel Screening: Not Applicable

## 2022-09-18 ENCOUNTER — HEALTH MAINTENANCE LETTER (OUTPATIENT)
Age: 77
End: 2022-09-18

## 2022-09-19 NOTE — TELEPHONE ENCOUNTER
RN left voice message asking patient to call back if she still needs to discuss the pain she was having previously.  RN reviewed that patient has a pre-op with Dr. Vasquez on 10/3/22, and that appts are available with a resident if she feels like she needs to be seen sooner for any recurrence of the pain she had.    Holly Alejandro RN on 9/19/2022 at 10:23 AM

## 2022-09-20 ENCOUNTER — OFFICE VISIT (OUTPATIENT)
Dept: INTERNAL MEDICINE | Facility: CLINIC | Age: 77
End: 2022-09-20
Payer: MEDICARE

## 2022-09-20 ENCOUNTER — LAB (OUTPATIENT)
Dept: LAB | Facility: CLINIC | Age: 77
End: 2022-09-20
Payer: MEDICARE

## 2022-09-20 ENCOUNTER — TELEPHONE (OUTPATIENT)
Dept: FAMILY MEDICINE | Facility: CLINIC | Age: 77
End: 2022-09-20

## 2022-09-20 VITALS
WEIGHT: 143 LBS | BODY MASS INDEX: 25.33 KG/M2 | SYSTOLIC BLOOD PRESSURE: 117 MMHG | RESPIRATION RATE: 16 BRPM | HEART RATE: 73 BPM | DIASTOLIC BLOOD PRESSURE: 76 MMHG | OXYGEN SATURATION: 96 %

## 2022-09-20 DIAGNOSIS — R06.09 DYSPNEA ON EXERTION: ICD-10-CM

## 2022-09-20 DIAGNOSIS — R07.9 CHEST PAIN, UNSPECIFIED TYPE: Primary | ICD-10-CM

## 2022-09-20 DIAGNOSIS — R07.9 CHEST PAIN, UNSPECIFIED TYPE: ICD-10-CM

## 2022-09-20 DIAGNOSIS — E78.5 HYPERLIPIDEMIA LDL GOAL <100: ICD-10-CM

## 2022-09-20 DIAGNOSIS — R79.89 ELEVATED SERUM CREATININE: ICD-10-CM

## 2022-09-20 LAB
ALBUMIN SERPL BCG-MCNC: 4.3 G/DL (ref 3.5–5.2)
ALP SERPL-CCNC: 89 U/L (ref 35–104)
ALT SERPL W P-5'-P-CCNC: 6 U/L (ref 10–35)
ANION GAP SERPL CALCULATED.3IONS-SCNC: 9 MMOL/L (ref 7–15)
AST SERPL W P-5'-P-CCNC: 19 U/L (ref 10–35)
ATRIAL RATE - MUSE: 69 BPM
BILIRUB SERPL-MCNC: 0.3 MG/DL
BUN SERPL-MCNC: 25.5 MG/DL (ref 8–23)
CALCIUM SERPL-MCNC: 10.1 MG/DL (ref 8.8–10.2)
CHLORIDE SERPL-SCNC: 103 MMOL/L (ref 98–107)
CREAT SERPL-MCNC: 1.14 MG/DL (ref 0.51–0.95)
DEPRECATED HCO3 PLAS-SCNC: 26 MMOL/L (ref 22–29)
DIASTOLIC BLOOD PRESSURE - MUSE: NORMAL MMHG
ERYTHROCYTE [DISTWIDTH] IN BLOOD BY AUTOMATED COUNT: 12.3 % (ref 10–15)
GFR SERPL CREATININE-BSD FRML MDRD: 49 ML/MIN/1.73M2
GLUCOSE SERPL-MCNC: 92 MG/DL (ref 70–99)
HCT VFR BLD AUTO: 40.4 % (ref 35–47)
HGB BLD-MCNC: 13.5 G/DL (ref 11.7–15.7)
INTERPRETATION ECG - MUSE: NORMAL
MCH RBC QN AUTO: 31 PG (ref 26.5–33)
MCHC RBC AUTO-ENTMCNC: 33.4 G/DL (ref 31.5–36.5)
MCV RBC AUTO: 93 FL (ref 78–100)
NT-PROBNP SERPL-MCNC: 88 PG/ML (ref 0–450)
P AXIS - MUSE: 69 DEGREES
PLATELET # BLD AUTO: 193 10E3/UL (ref 150–450)
POTASSIUM SERPL-SCNC: 4.1 MMOL/L (ref 3.4–5.3)
PR INTERVAL - MUSE: 182 MS
PROT SERPL-MCNC: 7 G/DL (ref 6.4–8.3)
QRS DURATION - MUSE: 88 MS
QT - MUSE: 390 MS
QTC - MUSE: 417 MS
R AXIS - MUSE: 2 DEGREES
RBC # BLD AUTO: 4.35 10E6/UL (ref 3.8–5.2)
SODIUM SERPL-SCNC: 138 MMOL/L (ref 136–145)
SYSTOLIC BLOOD PRESSURE - MUSE: NORMAL MMHG
T AXIS - MUSE: 58 DEGREES
TROPONIN T SERPL HS-MCNC: <6 NG/L
VENTRICULAR RATE- MUSE: 69 BPM
WBC # BLD AUTO: 6.1 10E3/UL (ref 4–11)

## 2022-09-20 PROCEDURE — 84484 ASSAY OF TROPONIN QUANT: CPT | Performed by: PATHOLOGY

## 2022-09-20 PROCEDURE — 36415 COLL VENOUS BLD VENIPUNCTURE: CPT | Performed by: PATHOLOGY

## 2022-09-20 PROCEDURE — 93000 ELECTROCARDIOGRAM COMPLETE: CPT | Performed by: INTERNAL MEDICINE

## 2022-09-20 PROCEDURE — 99214 OFFICE O/P EST MOD 30 MIN: CPT | Mod: 25 | Performed by: INTERNAL MEDICINE

## 2022-09-20 PROCEDURE — 83880 ASSAY OF NATRIURETIC PEPTIDE: CPT | Performed by: PATHOLOGY

## 2022-09-20 PROCEDURE — 80053 COMPREHEN METABOLIC PANEL: CPT | Performed by: PATHOLOGY

## 2022-09-20 PROCEDURE — 85027 COMPLETE CBC AUTOMATED: CPT | Performed by: PATHOLOGY

## 2022-09-20 NOTE — TELEPHONE ENCOUNTER
RN called patient and reviewed her symptoms.  She relayed she has had fatigue for the past 3 days which is unusual for patient.  She was agreeable to having an in person appt with Dr. Razo for today, and appt was made.    Holly Alejandro RN on 9/20/2022 at 11:15 AM

## 2022-09-20 NOTE — TELEPHONE ENCOUNTER
[facility] Call Center    Phone Message    May a detailed message be left on voicemail: yes     Reason for Call: Symptoms or Concerns     If patient has red-flag symptoms, warm transfer to triage line    Current symptom or concern: fatigue    Symptoms have been present for:  3 day(s)    Are there any new or worsening symptoms? Yes: Pt requesting call back to discuss      Action Taken: Message routed to:  [facility]

## 2022-09-20 NOTE — PROGRESS NOTES
"  Assessment & Plan     Chest pain, unspecified type  and  Dyspnea on exertion    Hawa presents after an episodes of intermittent brief stabbing left sided chest pain that lasted for about an hour 3 days ago with associated fatigue.  The fatigue continues to be ongoing since the episode.  She notes some occasional SUAZO with stairs for a couple of months and some leg edema for about 6 months that is controlled with compression stockings.  No dyspnea associated with the chest pain.  She has a history of reflux but feels this is overall well controlled.  We checked labs as below, which were overall normal except for slight increase in creatinine and BUN suggesting she may have some mild dehydration.  Advised her to increase water intake and recheck this in 1-2 weeks.  EKG normal by my read and CXR clear.  Given that she is going to be hiking in [place] in a couple of months, we decided to do a stress test for hopefully additional reassurance of her cardiac fitness before this trip.  If normal, symptoms may have been GI or MSK related- she did have some mild tenderness to palpation around the sternum.  Follow-up if pain recurs.    - CBC with platelets; Future  - Comprehensive metabolic panel; Future  - Troponin T, High Sensitivity; Future  - EKG 12-lead complete w/read - Clinics  - N terminal pro BNP; Future  - XR Chest 2 Views; Future  - NM Lexiscan stress test; Future         BMI:   Estimated body mass index is 25.33 kg/m  as calculated from the following:    Height as of 7/27/22: 1.6 m (5' 3\").    Weight as of this encounter: 64.9 kg (143 lb).       Rome Razo MD  [facility] INTERNAL MEDICINE [facility]   Hawa is a 77 year old, presenting for the following health issues:  Fatigue and Chest Pain (She first noticed it Friday. Laying down makes it better. She endorses stabbing pain on 09/16/2022 for one hour. She has been tried since and has been having fatigue. )      HPI "     Hawa presents with 3 days of fatigue and an episode of left stabbing chest pain that occurred on 9/16 while she was on Zoom with her book club.  Pain was intermittent, just a few seconds, and recurred for about an hour.  She developed fatigue right away and has had had fatigue since.  She had some lightheadedness, no presyncopal.  No shortness of breath during this episode, but she does some dyspnea with going up a flight of stairs early in the morning but not if she takes the stairs later in the day.  This has been going on for a couple of months.  No nausea with the chest pain but she felt clammy.  She has some leg swelling issues that started about 6 months ago but this is now controlled with compression stockings.  No weight gain.  No fevers or chills, cough or wheezing.  She has chronic reflux issues but she feels that the Pepcid is working pretty well, occasionally gets some sternal discomfort from reflux but the episode on Friday felt different.   She did not take any medication for the chest pain, she drank some water and that helped.  She is not taking aspirin, was prescribed only after surgery.    She had COVID back in June but felt completely recovered after that.        She is going to [place] for hiking in November.          Review of Systems   Constitutional, CV, pulmonary, gi and MSK systems are negative, except as otherwise noted.      Objective    /76 (BP Location: Right arm, Patient Position: Sitting, Cuff Size: Adult Regular)   Pulse 73   Resp 16   Wt 64.9 kg (143 lb)   LMP  (LMP Unknown)   SpO2 96%   Breastfeeding No   BMI 25.33 kg/m    Body mass index is 25.33 kg/m .  Physical Exam   GENERAL: healthy, alert and no distress  RESP: lungs clear to auscultation - no rales, rhonchi or wheezes  CV: regular rate and rhythm, normal S1 S2, no S3 or S4, no murmur, click or rub, trace ankle edema bilaterally with compression stockings on  MS: mild tenderness in center of sternum  with palpation  ABD: no pain to palpation

## 2022-09-20 NOTE — NURSING NOTE
Giana Hope is a 77 year old female patient that presents today in clinic for the following:    Chief Complaint   Patient presents with     Fatigue     Chest Pain     She first noticed it Friday. Laying down makes it better. She endorses stabbing pain on 09/16/2022 for one hour. She has been tried since and has been having fatigue.      The patient's allergies and medications were reviewed as noted. A set of vitals were recorded as noted without incident: /76 (BP Location: Right arm, Patient Position: Sitting, Cuff Size: Adult Regular)   Pulse 73   Resp 16   Wt 64.9 kg (143 lb)   LMP  (LMP Unknown)   SpO2 96%   Breastfeeding No   BMI 25.33 kg/m  . The patient does not have any other questions for the provider.    Jeffrey Pitts, EMT at 4:08 PM on 9/20/2022

## 2022-09-26 ENCOUNTER — TELEPHONE (OUTPATIENT)
Dept: ORTHOPEDICS | Facility: CLINIC | Age: 77
End: 2022-09-26

## 2022-09-26 NOTE — TELEPHONE ENCOUNTER
[facility] Call Center    Phone Message    May a detailed message be left on voicemail: yes     Reason for Call: Other: Patient would like to know if Dr. Marrero can do an injection for her or should she see Dr. Zepeda?     Action Taken: Message routed to:  [facility] ([facility] Orthopedics    Travel Screening: Not Applicable

## 2022-09-27 NOTE — TELEPHONE ENCOUNTER
I called to let Hawa know that Dr. Marrero is willing to give her a shoulder injection in clinic tomorrow. She is very pleased. Appointment time was confirmed and Hawa thanked me for the call.  Angeles [place] ATC

## 2022-09-28 ENCOUNTER — OFFICE VISIT (OUTPATIENT)
Dept: ORTHOPEDICS | Facility: CLINIC | Age: 77
End: 2022-09-28
Payer: MEDICARE

## 2022-09-28 DIAGNOSIS — G89.29 CHRONIC RIGHT SHOULDER PAIN: ICD-10-CM

## 2022-09-28 DIAGNOSIS — Z96.651 STATUS POST TOTAL RIGHT KNEE REPLACEMENT: ICD-10-CM

## 2022-09-28 DIAGNOSIS — M19.011 PRIMARY OSTEOARTHRITIS OF RIGHT SHOULDER: Primary | ICD-10-CM

## 2022-09-28 DIAGNOSIS — M19.071 OSTEOARTHRITIS OF MIDFOOT, RIGHT: ICD-10-CM

## 2022-09-28 DIAGNOSIS — M25.511 CHRONIC RIGHT SHOULDER PAIN: ICD-10-CM

## 2022-09-28 PROCEDURE — 99213 OFFICE O/P EST LOW 20 MIN: CPT | Mod: 25 | Performed by: STUDENT IN AN ORGANIZED HEALTH CARE EDUCATION/TRAINING PROGRAM

## 2022-09-28 PROCEDURE — 20610 DRAIN/INJ JOINT/BURSA W/O US: CPT | Mod: RT | Performed by: STUDENT IN AN ORGANIZED HEALTH CARE EDUCATION/TRAINING PROGRAM

## 2022-09-28 RX ORDER — TRIAMCINOLONE ACETONIDE 40 MG/ML
40 INJECTION, SUSPENSION INTRA-ARTICULAR; INTRAMUSCULAR
Status: DISCONTINUED | OUTPATIENT
Start: 2022-09-28 | End: 2022-10-03

## 2022-09-28 RX ORDER — LIDOCAINE HYDROCHLORIDE 10 MG/ML
7 INJECTION, SOLUTION EPIDURAL; INFILTRATION; INTRACAUDAL; PERINEURAL
Status: DISCONTINUED | OUTPATIENT
Start: 2022-09-28 | End: 2022-10-03

## 2022-09-28 RX ADMIN — LIDOCAINE HYDROCHLORIDE 7 ML: 10 INJECTION, SOLUTION EPIDURAL; INFILTRATION; INTRACAUDAL; PERINEURAL at 13:44

## 2022-09-28 RX ADMIN — TRIAMCINOLONE ACETONIDE 40 MG: 40 INJECTION, SUSPENSION INTRA-ARTICULAR; INTRAMUSCULAR at 13:44

## 2022-09-28 NOTE — PROGRESS NOTES
Medium Joint Injection/Arthrocentesis    Date/Time: 2022 1:44 PM  Performed by: Gabriel Marrero MD  Authorized by: Gabriel Marrero MD     Indications:  Pain  Needle Size:  22 G  Guidance: surface landmarks    Location:  Shoulder  Location comment:  Glenohumeral injection  Medications:  40 mg triamcinolone 40 MG/ML; 7 mL lidocaine (PF) 1 %  Outcome:  Tolerated well, no immediate complications  Procedure discussed: discussed risks, benefits, and alternatives    Consent Given by:  Patient  Timeout: timeout called immediately prior to procedure    Prep: patient was prepped and draped in usual sterile fashion          [facility] ORTHOPEDIC [address]  [address] [facility] [id number] [phone]  Dept: [phone]  ______________________________________________________________________________    Patient: Giana Hope   : 1945   MRN: [id number]   2022    INVASIVE PROCEDURE SAFETY CHECKLIST    Date: 2022   Procedure: Right glenohumeral injection   Patient Name: Giana Hope  MRN: [id number]  YOB: 1945    Action: Complete sections as appropriate. Any discrepancy results in a HARD COPY until resolved.     PRE PROCEDURE:  Patient ID verified with 2 identifiers (name and  or MRN): Yes  Procedure and site verified with patient/designee (when able): Yes  Accurate consent documentation in medical record: Yes  H&P (or appropriate assessment) documented in medical record: Yes  H&P must be up to 20 days prior to procedure and updates within 24 hours of procedure as applicable: Yes  Relevant diagnostic and radiology test results appropriately labeled and displayed as applicable: Yes  Procedure site(s) marked with provider initials: Yes    TIMEOUT:  Time-Out performed immediately prior to starting procedure, including verbal and active participation of all team members addressing the following:Yes  * Correct patient  identify  * Confirmed that the correct side and site are marked  * An accurate procedure consent form  * Agreement on the procedure to be done  * Correct patient position  * Relevant images and results are properly labeled and appropriately displayed  * The need to administer antibiotics or fluids for irrigation purposes during the procedure as applicable   * Safety precautions based on patient history or medication use    DURING PROCEDURE: Verification of correct person, site, and procedures any time the responsibility for care of the patient is transferred to another member of the care team.       The following medications were given:         Prior to injection, verified patient identity using patient's name and date of birth.  Due to injection administration, patient instructed to remain in clinic for 15 minutes  afterwards, and to report any adverse reaction to me immediately.    Joint injection was performed.    Medication Name: Lidocaine 1% NDC [id number]  Drug Amount Wasted:  Yes: 13 mg/ml   Vial/Syringe: Single dose vial  Expiration Date:  03/24    Medication Name Kenalog 40 mg  NDC [id number]    Scribed by Radha Samayoa LPN for Dr. Marrero on September 28, 2022 at 1347 based on the provider's statements to me.     Radha Samayoa LPN

## 2022-09-28 NOTE — PROGRESS NOTES
[facility]  Orthopaedic Surgery Consultation by Gabriel Marrero M.D.    Giana Hope MRN# [id number]   Age: 76 year old YOB: 1945     Requesting physician: Gilberto Pandya     Background history:  DX:  1. History of anesthesia problem  2. Gastroesophageal reflux disease without esophagitis  3. Nodule of left lung  4. DDD  5. Risk for falls  6. Hypercholesterolemia  7. Nuclear sclerosis      TREATMENTS:  1. 2/25/2016, Left TKA, Сергей Pang MD , [facility]  2. 8/25/2020, Reverse Left TSA, Ector Chapa M.D., [facility]  3. 11/26/2021, right total knee arthroplasty,              History of Present Illness:     77-year-old female with chronic pain of right knee due to end-stage osteoarthritic changes most notably in the medial and patellofemoral compartments.  Insufficiently responding to treated nonoperative treatment measures.  Patient underwent right total knee arthroplasty on 11/26/2021.    Today patient is approximately 10 months after the above-mentioned procedure.  She states she is doing well.  She is experiencing some occasional stiffness in the morning but otherwise is not limited by her knee.  The stiffness lasts about 30 minutes and subsides spontaneously.  She is not limited in her activity level. She is happy with the result.    Today patient states that she has pain of right shoulder due to underlying glenohumeral osteoarthritic changes which is bothering her significantly.  She is not experiencing any loss of strength, motor or sensory deficits.  Pain is present inside the shoulder joint.  She describes occasional crepitus.  She would like to have a repeat injection of the right shoulder.  She has had 1 in the past which provided relief for many years.    Social:   Occupation: Retired   Living situation: Lives with cat.  She has family close  by.  Hobbies / Sports: walking and hiking     Smoking: No  Alcohol: Yes  Illicit drug use: No         Physical Exam:     EXAMINATION pertinent findings:   PSYCH: Pleasant, healthy-appearing, alert, oriented x3, cooperative. Normal mood and affect.  VITAL SIGNS: not currently breastfeeding.  Reviewed nursing intake notes.   There is no height or weight on file to calculate BMI.  RESP: non labored breathing   ABD: benign, soft, non-tender, no acute peritoneal findings  SKIN: grossly normal   LYMPHATIC: grossly normal, no adenopathy, no extremity edema  NEURO: grossly normal , no motor deficits  VASCULAR: satisfactory perfusion of all extremities   MUSCULOSKELETAL:   Alignment: Neutral alignment of right lower extremity.    R knee: Incision is clean, dry and intact.  No signs of infection.  -0-0  .  Straight leg raise +. No redness, warmth or skin changes present.  Minimal effusion. Ligamentously stable in both ML and AP direction. Normal PF tracking without crepitus.     Right LE:   Thigh and leg compartments soft and compressible   +Quad/TA/GSC/FHL/EHL   SILT DP/SP/Ana/Saph/Tib nerve distributions   Palpable dorsalis pedis pulse     Cervical Spine: FROM, Spurling's test negative.    Shoulder right: Normal appearance. No signs of muscular atrophy of SSP, ISP or Deltoid. No tenderness to palpation over the sterno-clavicular joint or clavicle. AC joint: No tenderness to palpation. Cross body -. Abduction 120, Forward flexion 100, ER 30, IR L4. Cuff strength 5/5 for SSP/ISP and SSC. Neer, Walker, and Mariya -. Neurovascular intact RUE.  2+ radial pulse with a warm and well perfused hand. Sensation is intact to light touch in the median, radial, ulnar, musculocutaneous, and axillary nerve distribution. 5/5 , fpl, epl, io, wrist flexor, wrist extensor, biceps, triceps, and deltoid muscle strength on manual muscle testing.            Data:   All laboratory data reviewed    All imaging studies reviewed by me  personally.    Today no new imaging studies were obtained.    XR knee right 9/28/2022:  My interpretation: Status post right total knee arthroplasty.  Adequate sizing, fixation and orientation of components.  No signs of immediate postoperative complications.    XR shoulder right 3/9/2022:  My interpretation: Severe osteoarthritic changes of right glenohumeral joint.  Complete obliteration of joint space.  Presence of marginal osteophytes and sclerosis.  Well-preserved AC joint.  Glenoid Candelario C.         Assessment and Plan:   Assessment:  77-year-old female with chronic pain of right knee due to end-stage osteoarthritic changes most notably in the medial and patellofemoral compartments.  Insufficiently responding to treated nonoperative treatment measures.  Patient underwent right total knee arthroplasty on 11/26/2021 and has recovered well.  Also presenting with chronic right shoulder pain due to end-stage osteoarthritic changes of the right glenohumeral joint.       Plan:  I discussed my findings with the patient.  There are no concerns in regards to her right knee replacement surgery and recovery.   As for the right shoulder we discussed the surgical and nonsurgical treatment options for glenohumeral osteoarthritis.  Given her current symptoms and previous response to intra-articular injection it would be my recommendation to refer patient to physical therapy for range of motion exercises/strengthening/stretching, over-the-counter analgesics and an intra-articular injection with a combination of lidocaine and cortisone.  Patient understands and agrees to the treatment plan as set forth.  After obtaining informed consent the right glenohumeral joint was injected without complications.  In regards to the right shoulder we will follow-up on an as-needed basis.  In regards to the right total knee replacement surgery will follow-up in 2 years with renewed radiographic imaging studies.    Patient also mentions that  she has been experiencing swelling and pain of her right midfoot for which in the past she has had intra-articular injections with great success.  Given her upcoming travels she would like to have this evaluated by a foot specialist.  A referral to Dr. Sandoval was provided.    Gabriel Marrero MD, PhD     Adult Reconstruction  [facility] Department of Orthopaedic Surgery  Pager [phone]    Total combined visit time and work time before and after clinic visit = 20 min

## 2022-10-03 ENCOUNTER — OFFICE VISIT (OUTPATIENT)
Dept: FAMILY MEDICINE | Facility: CLINIC | Age: 77
End: 2022-10-03
Payer: MEDICARE

## 2022-10-03 VITALS
DIASTOLIC BLOOD PRESSURE: 79 MMHG | BODY MASS INDEX: 25.57 KG/M2 | HEART RATE: 77 BPM | RESPIRATION RATE: 20 BRPM | HEIGHT: 63 IN | SYSTOLIC BLOOD PRESSURE: 109 MMHG | WEIGHT: 144.3 LBS | TEMPERATURE: 98.3 F | OXYGEN SATURATION: 95 %

## 2022-10-03 DIAGNOSIS — K21.9 GASTROESOPHAGEAL REFLUX DISEASE WITHOUT ESOPHAGITIS: ICD-10-CM

## 2022-10-03 DIAGNOSIS — M15.0 PRIMARY OSTEOARTHRITIS INVOLVING MULTIPLE JOINTS: ICD-10-CM

## 2022-10-03 DIAGNOSIS — Z01.818 PREOP GENERAL PHYSICAL EXAM: Primary | ICD-10-CM

## 2022-10-03 DIAGNOSIS — N18.31 CHRONIC KIDNEY DISEASE, STAGE 3A (H): ICD-10-CM

## 2022-10-03 DIAGNOSIS — E78.5 HYPERLIPIDEMIA WITH TARGET LDL LESS THAN 130: ICD-10-CM

## 2022-10-03 DIAGNOSIS — H25.012 CORTICAL AGE-RELATED CATARACT OF LEFT EYE: ICD-10-CM

## 2022-10-03 PROCEDURE — 99215 OFFICE O/P EST HI 40 MIN: CPT | Performed by: FAMILY MEDICINE

## 2022-10-03 RX ORDER — PRAVASTATIN SODIUM 20 MG
20 TABLET ORAL DAILY
Qty: 90 TABLET | Refills: 3 | Status: SHIPPED | OUTPATIENT
Start: 2022-10-03 | End: 2023-10-20

## 2022-10-03 RX ORDER — SULINDAC 200 MG/1
200 TABLET ORAL DAILY PRN
Qty: 90 TABLET | Refills: 0 | OUTPATIENT
Start: 2022-10-03 | End: 2022-11-01

## 2022-10-03 RX ORDER — FAMOTIDINE 40 MG/1
40 TABLET, FILM COATED ORAL DAILY PRN
Qty: 90 TABLET | Refills: 3 | Status: SHIPPED | OUTPATIENT
Start: 2022-10-03 | End: 2023-10-20

## 2022-10-03 ASSESSMENT — ENCOUNTER SYMPTOMS
SLEEP DISTURBANCES DUE TO BREATHING: 0
HEMATURIA: 0
TREMORS: 0
HYPOTENSION: 0
SEIZURES: 0
STIFFNESS: 1
DYSURIA: 0
EYE WATERING: 0
SPEECH CHANGE: 0
DIZZINESS: 1
SYNCOPE: 0
SORE THROAT: 0
TASTE DISTURBANCE: 0
WEAKNESS: 0
DISTURBANCES IN COORDINATION: 0
FLANK PAIN: 0
BACK PAIN: 1
MUSCLE CRAMPS: 0
ARTHRALGIAS: 1
NUMBNESS: 0
SINUS CONGESTION: 0
EXERCISE INTOLERANCE: 0
MYALGIAS: 1
PALPITATIONS: 0
NECK MASS: 0
SMELL DISTURBANCE: 0
DIFFICULTY URINATING: 0
ORTHOPNEA: 0
EYE REDNESS: 0
DOUBLE VISION: 0
SINUS PAIN: 0
EYE PAIN: 0
TINGLING: 0
HEADACHES: 0
MUSCLE WEAKNESS: 0
JOINT SWELLING: 1
LIGHT-HEADEDNESS: 1
PARALYSIS: 0
MEMORY LOSS: 0
LOSS OF CONSCIOUSNESS: 0
TROUBLE SWALLOWING: 0
NECK PAIN: 0
HYPERTENSION: 0
LEG PAIN: 0
EYE IRRITATION: 0

## 2022-10-03 NOTE — PROGRESS NOTES
Surgeon: Trish Portillo  Fax number for Preop Evaluation: [phone]  Location of Surgery: [facility]  Date of Surgery: 10-  Procedure: Cataract eye surgery  History of reaction to anesthesia? No    Bhumi Curtis, EMT at 3:28 PM on 10/3/2022.

## 2022-10-03 NOTE — PATIENT INSTRUCTIONS
Preparing for Your Surgery  Getting started  A nurse will call you to review your health history and instructions. They will give you an arrival time based on your scheduled surgery time. Please be ready to share:  Your doctor's clinic name and phone number  Your medical, surgical and anesthesia history  A list of allergies and sensitivities  A list of medicines, including herbal treatments and over-the-counter drugs  Whether the patient has a legal guardian (ask how to send us the papers in advance)  Please tell us if you're pregnant--or if there's any chance you might be pregnant. Some surgeries may injure a fetus (unborn baby), so they require a pregnancy test. Surgeries that are safe for a fetus don't always need a test, and you can choose whether to have one.   If you have a child who's having surgery, please ask for a copy of Preparing for Your Child's Surgery.    Preparing for surgery  Within 10 to 30 days of surgery: Have a pre-op exam (sometimes called an H&P, or History and Physical). This can be done at a clinic or pre-operative center.  If you're having a , you may not need this exam. Talk to your care team.  At your pre-op exam, talk to your care team about all medicines you take. If you need to stop any medicines before surgery, ask when to start taking them again.  We do this for your safety. Many medicines can make you bleed too much during surgery. Some change how well surgery (anesthesia) drugs work.  Call your insurance company to let them know you're having surgery. (If you don't have insurance, call [phone].)  Call your clinic if there's any change in your health. This includes signs of a cold or flu (sore throat, runny nose, cough, rash, fever). It also includes a scrape or scratch near the surgery site.  If you have questions on the day of surgery, call your hospital or surgery center.  COVID testing  You may need to be tested for COVID-19 before having surgery. If so, we will  give you instructions (or click here).  Eating and drinking guidelines  For your safety: Unless your surgeon tells you otherwise, follow the guidelines below.  Eat and drink as usual until 8 hours before surgery. After that, no food or milk.  Drink clear liquids until 2 hours before surgery. These are liquids you can see through, like water, Gatorade and Propel Water. You may also have black coffee and tea (no cream or milk).  Nothing by mouth within 2 hours of surgery. This includes gum, candy and breath mints.  If you drink alcohol: Stop drinking it the night before surgery.  If your care team tells you to take medicine on the morning of surgery, it's okay to take it with a sip of water.  Preventing infection  Shower or bathe the night before and morning of your surgery. Follow the instructions your clinic gave you. (If no instructions, use regular soap.)  Don't shave or clip hair near your surgery site. We'll remove the hair if needed.  Don't smoke or vape the morning of surgery. You may chew nicotine gum up to 2 hours before surgery. A nicotine patch is okay.  Note: Some surgeries require you to completely quit smoking and nicotine. Check with your surgeon.  Your care team will make every effort to keep you safe from infection. We will:  Clean our hands often with soap and water (or an alcohol-based hand rub).  Clean the skin at your surgery site with a special soap that kills germs.  Give you a special gown to keep you warm. (Cold raises the risk of infection.)  Wear special hair covers, masks, gowns and gloves during surgery.  Give antibiotic medicine, if prescribed. Not all surgeries need antibiotics.  What to bring on the day of surgery  Photo ID and insurance card  Copy of your health care directive, if you have one  Glasses and hearing aides (bring cases)  You can't wear contacts during surgery  Inhaler and eye drops, if you use them (tell us about these when you arrive)  CPAP machine or breathing device,  if you use them  A few personal items, if spending the night  If you have . . .  A pacemaker, ICD (cardiac defibrillator) or other implant: Bring the ID card.  An implanted stimulator: Bring the remote control.  A legal guardian: Bring a copy of the certified (court-stamped) guardianship papers.  Please remove any jewelry, including body piercings. Leave jewelry and other valuables at home.  If you're going home the day of surgery  You must have a responsible adult drive you home. They should stay with you overnight as well.  If you don't have someone to stay with you, and you aren't safe to go home alone, we may keep you overnight. Insurance often won't pay for this.  After surgery  If it's hard to control your pain or you need more pain medicine, please call your surgeon's office.  Questions?   If you have any questions for your care team, list them here: _________________________________________________________________________________________________________________________________________________________________________ ____________________________________ ____________________________________ ____________________________________  For informational purposes only. Not to replace the advice of your health care provider. Copyright   2003, 2019 [facility]. All rights reserved. Clinically reviewed by Zeinab Carson MD. [employer] 321921 - REV 07/22.      Ferrous sulfate 325 mg may assist with restless legs

## 2022-10-03 NOTE — PROGRESS NOTES
[facility] PRIMARY CARE CLINIC [address]  4TH [facility] [address]  Phone: [phone]  Fax: [phone]  Primary Provider: Gilberto Vasquez  Pre-op Performing Provider: GILBERTO VASQUEZ      PREOPERATIVE EVALUATION:  Today's date: 10/3/2022    Giana Hope is a 77 year old female who presents for a preoperative evaluation.  Giana Hope 76  77  has history of arthritis, hyperlipidemia, previous history of tobacco use quit over 26 years ago with small stable pulmonary nodules. She has significant multiple joint arthritis.  She has cataracts interfering with vision  Surgical Information:  Surgery/Procedure: Left cataract  Surgery Location: [place]  Surgeon: Dr Taylor  Surgery Date: 10/14/22    Where patient plans to recover: At home alone Neighbor nearby  Fax number for surgical facility:[phone]    Type of Anesthesia Anticipated: Local with MAC    Assessment & Plan     The proposed surgical procedure is considered LOW risk.    Preop general physical exam  Cortical age-related cataract of left eye  Optimized for surgery    Chronic kidney disease, stage 3a (H)  Stable    Gastroesophageal reflux disease without esophagitis  Refill provided  - famotidine (PEPCID) 40 MG tablet  Dispense: 90 tablet; Refill: 3    Hyperlipidemia with target LDL less than 130  Refills provided  - pravastatin (PRAVACHOL) 20 MG tablet  Dispense: 90 tablet; Refill: 3    Degenerative joint disease involving multiple joints  She takes sparingly and will not take prior to surgery  - sulindac (CLINORIL) 200 MG tablet  Dispense: 90 tablet; Refill: 0           Risks and Recommendations:  The patient has the following additional risks and recommendations for perioperative complications:   - No identified additional risk factors other than previously addressed        RECOMMENDATION:  APPROVAL GIVEN to proceed with proposed procedure, without further diagnostic evaluation.  Problem list,  PMH, Surgical HX, FH, SH, allergies, medications,immunizations reviewed and updated in Epic. 10 point ROS negative other than noted in HPI and ROS.  40 minutes spent on the date of the encounter doing chart review, history, exam, diagnostics review, documentation, counseling and coordination of cares as noted.        Subjective     HPI related to upcoming procedure: History of decreased vision especially and in light with known cataract interfering with activities of daily living and driving.      Preop Questions 10/3/2022   1. Have you ever had a heart attack or stroke? No   2. Have you ever had surgery on your heart or blood vessels, such as a stent placement, a coronary artery bypass, or surgery on an artery in your head, neck, heart, or legs? No   3. Do you have chest pain with activity? No   4. Do you have a history of  heart failure? No   5. Do you currently have a cold, bronchitis or symptoms of other infection? No   6. Do you have a cough, shortness of breath, or wheezing? No   7. Do you or anyone in your family have previous history of blood clots? No   8. Do you or does anyone in your family have a serious bleeding problem such as prolonged bleeding following surgeries or cuts? No   9. Have you ever had problems with anemia or been told to take iron pills? YES - many years ago   10. Have you had any abnormal blood loss such as black, tarry or bloody stools, or abnormal vaginal bleeding? No   11. Have you ever had a blood transfusion? YES - Many years ago   11a. Have you ever had a transfusion reaction? No   12. Are you willing to have a blood transfusion if it is medically needed before, during, or after your surgery? Yes   13. Have you or any of your relatives ever had problems with anesthesia? YES - Mother was disoriented. NO problems with high fever             14. Do you have sleep apnea, excessive snoring or daytime drowsiness? No   15. Do you have any artifical heart valves or other implanted medical  devices like a pacemaker, defibrillator, or continuous glucose monitor? No   16. Do you have artificial joints? YES - bilateral knees left shoulder   17. Are you allergic to latex? No     Health Care Directive:  Patient does not have a Health Care Directive or Living Will: Patient states has Advance Directive and will bring in a copy to clinic.   Daughter but not in the country.  Emergency contact in the [facility] Celestina Morales [phone]     Preoperative Review of : Not on chronic pain mediaction  01/13/2022  2   01/13/2022  Oxycodone Hcl (Ir) 5 MG Tablet    10.00  10 Ma Beb   [id number]   Gra (6727)   0/0  7.50 MME         reviewed - Previous controlled medication related to prior surgery          Review of Systems   Answers for HPI/ROS submitted by the patient on 10/3/2022  General Symptoms: No  Skin Symptoms: No  HENT Symptoms: Yes  EYE SYMPTOMS: Yes left eye vision change in dim light  HEART SYMPTOMS: Yes several weeks ago had  Left sided pain feeling weak, zooming with book club and no further symptoms No shortness of breath.  LUNG SYMPTOMS: No  INTESTINAL SYMPTOMS: No  URINARY SYMPTOMS: Yes sometimes at night will have incontinence, no pain  GYNECOLOGIC SYMPTOMS: No  BREAST SYMPTOMS: No  SKELETAL SYMPTOMS: Yes Arthritis  BLOOD SYMPTOMS: No  NERVOUS SYSTEM SYMPTOMS: Yes Vision and balance no falls  MENTAL HEALTH SYMPTOMS: No  Congestion: No  Tooth pain: No  Gum tenderness: No  Bleeding gums: No  Change in taste: No  Change in sense of smell: No  Dry mouth: Yes  Hearing aid used: No  Neck lump: No  Vision loss: Yes  Dry eyes: Yes  Watery eyes: No  Eye bulging: No  Double vision: No  Flashing of lights: No  Spots: No  Floaters: No  Crossed eyes: No  Tunnel Vision: No  Yellowing of eyes: No  Eye irritation: No  Pain in legs with walking: No  Trouble breathing while lying down: No  Fingers or toes appear blue: No  High blood pressure: No  Low blood pressure: No  Fainting: No  Murmurs: No  Pacemaker: No  Varicose  veins: Yes  Edema or swelling: No  Wake up at night with shortness of breath: No  Exercise intolerance: No  Trouble holding urine or incontinence: Yes  Increased frequency of urination: No  Decreased frequency of urination: No  Frequent nighttime urination: No  Bone pain: No  Muscle cramps: No  Muscle weakness: No  Joint stiffness: Yes  Bone fracture: No  Trouble with coordination: No  Fainting or black-out spells: No  Memory loss: No  Speech problems: No  Tingling: No  Difficulty walking: No  Paralysis: No    She notes restless legs when she travel on an airplane.  She has been using compression stockings to help with lower extremity venous insufficiency.      Patient Active Problem List    Diagnosis Date Noted     Chronic kidney disease, stage 3a (H) 10/03/2022     Priority: Medium     Infection due to 2019 novel coronavirus 07/27/2022     Priority: Medium     6/5/2022 while in [place]       Venous stasis dermatitis of both lower extremities 07/27/2022     Priority: Medium     Varicose veins of both lower extremities with pain 07/27/2022     Priority: Medium     Osteoarthritis of right knee 11/26/2021     Priority: Medium     Pain in thoracic spine 04/16/2021     Priority: Medium     Mass of left upper extremity 07/07/2020     Priority: Medium     Primary osteoarthritis involving multiple joints 04/20/2020     Priority: Medium     Gastroesophageal reflux disease without esophagitis 04/20/2020     Priority: Medium     Family history of malignant neoplasm of breast 04/20/2020     Priority: Medium     Arthritis of shoulder region, left 12/17/2019     Priority: Medium     Added automatically from request for surgery [id number]       Nodule of left lung 06/19/2018     Priority: Medium     Overview:   5 mm nodule on CT scan 6/2018. Rec repeat in 1 year.       Pain in left shoulder 02/12/2018     Priority: Medium     Compression fracture of thoracic vertebra, open, initial encounter (H) 10/17/2017     Priority: Medium      Back pain of thoracolumbar region 09/19/2017     Priority: Medium     DDD (degenerative disc disease), thoracolumbar 09/19/2017     Priority: Medium     Degenerative scoliosis in adult patient 09/19/2017     Priority: Medium     Kyphosis (acquired) (postural) 09/19/2017     Priority: Medium     Osteopenia with high risk of fracture 09/19/2017     Priority: Medium     Hyperlipidemia LDL goal <100 06/07/2017     Priority: Medium     Nuclear sclerosis 03/22/2017     Priority: Medium     Status post left knee replacement 04/05/2016     Priority: Medium     Balance disorder 09/20/2013     Priority: Medium     Osteoarthrosis 09/20/2013     Priority: Medium     Overview:   Severe osteoarthritis L shoulder  Severe osteoarthritis L shoulder        Past Medical History:   Diagnosis Date     Chronic kidney disease, stage 3a (H) 10/3/2022     Nonsenile cataract      Osteoarthritis      Pulmonary nodule      Uveitis      Past Surgical History:   Procedure Laterality Date     ARTHROPLASTY KNEE Right 11/26/2021    Procedure: ARTHROPLASTY, RIGHT KNEE, TOTAL;  Surgeon: Gabriel Marrero MD;  Location: [facility] OR     ARTHROPLASTY, RIGHT KNEE, TOTAL Right 11/26/2021     CATARACT IOL, RT/LT Right 03/2019     PHACOEMULSIFICATION WITH STANDARD INTRAOCULAR LENS IMPLANT Right 3/22/2019    Procedure: Right Cataract Removal with Intraocular Lens Implant;  Surgeon: Trish Taylor MD;  Location: [facility] OR     Current Outpatient Medications   Medication Sig Dispense Refill     calcium-vitamin D 500-125 MG-UNIT TABS Take 1 tablet by mouth daily       famotidine (PEPCID) 40 MG tablet Take 1 tablet (40 mg) by mouth daily as needed for heartburn 90 tablet 3     fluocinolone (SYNALAR) 0.01 % solution Apply topically three times a week 60 mL 3     hydrocortisone 2.5 % cream Apply topically 2 times daily as needed For up to 2 weeks at a time on the face. Avoid eye area. 20 g 3     ketoconazole (NIZORAL) 2 % external shampoo Apply topically three times a  "week 120 mL 6     pravastatin (PRAVACHOL) 20 MG tablet Take 1 tablet (20 mg) by mouth daily 90 tablet 3     prednisoLONE acetate (PRED FORTE) 1 % ophthalmic suspension Place 1 drop Into the left eye 3 times daily 15 mL 1     sulindac (CLINORIL) 200 MG tablet Take 1 tablet (200 mg) by mouth daily as needed (for joint pain) 90 tablet 0     tacrolimus (PROTOPIC) 0.1 % external ointment Apply topically 2 times daily 30 g 11     Vitamin D3 (CHOLECALCIFEROL) 25 mcg (1000 units) tablet Take 1 tablet by mouth daily       acetaminophen (TYLENOL) 325 MG tablet Take 2 tablets (650 mg) by mouth every 4 hours as needed for other (mild pain) (Patient not taking: No sig reported) 100 tablet 0       Allergies   Allergen Reactions     Dust Mites Itching     Runny nose, fever     Mold Itching     Fever, runny nose     Pollen Extract Itching     Fever, runny nose     Celecoxib      Other reaction(s): GI intolerance        Social History     Tobacco Use     Smoking status: Former Smoker     Years: 35.00     Smokeless tobacco: Never Used     Tobacco comment: Quit 24 years ago    Substance Use Topics     Alcohol use: Yes     Comment: Moderate     Family History   Problem Relation Age of Onset     Arthritis Mother      Breast Cancer Mother 78     Diabetes Type 2  Father      Heart Disease Father          age 75     Alzheimer Disease Father 60     Heart Disease Brother 68     Breast Cancer Maternal Grandmother 81     Heart Disease Maternal Grandfather 54         age 81     Other - See Comments Son         schizoaffective lives in [place]     Glaucoma No family hx of      Macular Degeneration No family hx of      History   Drug Use No         Objective     /79 (BP Location: Right arm, Patient Position: Sitting, Cuff Size: Adult Regular)   Pulse 77   Temp 98.3  F (36.8  C)   Resp 20   Ht 1.6 m (5' 3\")   Wt 65.5 kg (144 lb 4.8 oz)   LMP  (LMP Unknown)   SpO2 95%   BMI 25.56 kg/m      Physical Exam       GENERAL " APPEARANCE: healthy, alert and no distress, very pleasant, well groomed     EYES: No scleral icterus, cataract left eye     NECK: no adenopathy, no asymmetry, masses, or scars and thyroid normal to palpation     RESP: lungs clear to auscultation - no rales, rhonchi or wheezes     CV: regular rates and rhythm, normal S1 S2, no S3 or S4 and no murmur, click or rub  wearing compression stockings  Abdominal exam bowel sounds positive nondistended no tenderness     MS:  Right knee osteoarthritis chronic swelling, left knee well healed incision,  Other extremities signs of arthritis moves all extremities.  Lower extremities are symmetrical without significant edema     SKIN:  No skin lesions or pallor     NEURO: Normal strength and tone,  mentation intact and speech normal     PSYCH: mentation appears normal. and affect normal      Recent Labs   Lab Test 09/20/22  1652 04/22/22  1428 11/27/21  0631 11/18/21  1049   HGB 13.5 13.6   < > 13.9     --   --  192     --   --  140   POTASSIUM 4.1  --   --  4.0   CR 1.14*  --   --  0.92    < > = values in this interval not displayed.        Revised Cardiac Risk Index (RCRI):  The patient has the following serious cardiovascular risks for perioperative complications:   - No serious cardiac risks = 0 points     RCRI Interpretation: 0 points: Class I (very low risk - 0.4% complication rate)           Signed Electronically by: Gilberto Vasquez MD  Copy of this evaluation report is provided to requesting physician.

## 2022-10-03 NOTE — NURSING NOTE
Giana Hope is a 77 year old female that presents in clinic today for the following:     Chief Complaint   Patient presents with     Pre-Op Exam     Pt here for pre-op exam; pt also concerned about restless leg syndrome       The patient's allergies and medications were reviewed. The patient's vitals were obtained without incident. The patient does not have any other questions for the provider.     Bhumi Curtis, EMT at 3:38 PM on 10/3/2022.  Primary Care Clinic: [phone]

## 2022-10-14 DIAGNOSIS — M19.071 OSTEOARTHRITIS OF MIDFOOT, RIGHT: Primary | ICD-10-CM

## 2022-10-17 NOTE — TELEPHONE ENCOUNTER
DIAGNOSIS: OA right midfoot   APPOINTMENT DATE: 11.1.22   NOTES STATUS DETAILS   OFFICE NOTE from other specialist Internal 09/28/2022 - Gabriel Marrero MD - [facility]   MEDICATION LIST Internal    LABS     CBC/DIFF Internal    XRAYS Internal 01/26/2022, 09/08/2021, 03/24/2021 - Six Foot Standing

## 2022-11-01 ENCOUNTER — MYC MEDICAL ADVICE (OUTPATIENT)
Dept: FAMILY MEDICINE | Facility: CLINIC | Age: 77
End: 2022-11-01

## 2022-11-01 ENCOUNTER — PRE VISIT (OUTPATIENT)
Dept: ORTHOPEDICS | Facility: CLINIC | Age: 77
End: 2022-11-01

## 2022-11-01 ENCOUNTER — OFFICE VISIT (OUTPATIENT)
Dept: ORTHOPEDICS | Facility: CLINIC | Age: 77
End: 2022-11-01
Payer: MEDICARE

## 2022-11-01 ENCOUNTER — TELEPHONE (OUTPATIENT)
Dept: ORTHOPEDICS | Facility: CLINIC | Age: 77
End: 2022-11-01

## 2022-11-01 ENCOUNTER — ANCILLARY PROCEDURE (OUTPATIENT)
Dept: GENERAL RADIOLOGY | Facility: CLINIC | Age: 77
End: 2022-11-01
Attending: ORTHOPAEDIC SURGERY
Payer: MEDICARE

## 2022-11-01 ENCOUNTER — MYC REFILL (OUTPATIENT)
Dept: FAMILY MEDICINE | Facility: CLINIC | Age: 77
End: 2022-11-01

## 2022-11-01 VITALS — WEIGHT: 143 LBS | BODY MASS INDEX: 25.34 KG/M2 | HEIGHT: 63 IN

## 2022-11-01 DIAGNOSIS — M15.0 PRIMARY OSTEOARTHRITIS INVOLVING MULTIPLE JOINTS: ICD-10-CM

## 2022-11-01 DIAGNOSIS — M19.071 OSTEOARTHRITIS OF MIDFOOT, RIGHT: ICD-10-CM

## 2022-11-01 PROCEDURE — 99213 OFFICE O/P EST LOW 20 MIN: CPT | Performed by: ORTHOPAEDIC SURGERY

## 2022-11-01 PROCEDURE — 73630 X-RAY EXAM OF FOOT: CPT | Mod: RT | Performed by: RADIOLOGY

## 2022-11-01 ASSESSMENT — ENCOUNTER SYMPTOMS
SINUS PAIN: 0
NUMBNESS: 0
FLANK PAIN: 0
TINGLING: 0
EYE WATERING: 0
DISTURBANCES IN COORDINATION: 0
ARTHRALGIAS: 1
DIFFICULTY URINATING: 0
NECK PAIN: 0
EYE PAIN: 0
PARALYSIS: 0
MYALGIAS: 1
SORE THROAT: 0
MEMORY LOSS: 0
DIZZINESS: 0
SPEECH CHANGE: 0
JOINT SWELLING: 1
TROUBLE SWALLOWING: 0
EYE REDNESS: 1
HOARSE VOICE: 0
SMELL DISTURBANCE: 0
STIFFNESS: 1
BACK PAIN: 1
WEAKNESS: 0
MUSCLE WEAKNESS: 0
TASTE DISTURBANCE: 0
TREMORS: 0
SINUS CONGESTION: 0
HEADACHES: 0
HEMATURIA: 0
DYSURIA: 0
DOUBLE VISION: 0
MUSCLE CRAMPS: 1
NECK MASS: 0
SEIZURES: 0
LOSS OF CONSCIOUSNESS: 0
EYE IRRITATION: 0

## 2022-11-01 NOTE — NURSING NOTE
"Reason For Visit:   Chief Complaint   Patient presents with     Consult     Going to [place] and will do a lot of hiking 11/10/2022. Right dorsal midfoot pain and swelling. Supposed to wear compression stockings but can't because it hurts a lot. Just started bothering her about 1 month ago. Similar issue 10 years ago. 2 steroid injections and been fine since.        Ht 1.6 m (5' 3\")   Wt 64.9 kg (143 lb)   LMP  (LMP Unknown)   BMI 25.33 kg/m           [place] ATC    "

## 2022-11-01 NOTE — TELEPHONE ENCOUNTER
Hello,  I'm with ortho con.  Erik called from , [facility].  They are looking for the order for this pt to have a right foot injection.  They have not received this order.  Please fax it to .  Thank you.

## 2022-11-01 NOTE — TELEPHONE ENCOUNTER
Injection order faxed to Jenny at [facility].             -Heath, ATC- Orthopedics

## 2022-11-01 NOTE — PROGRESS NOTES
CHIEF COMPLAINT:  Right foot pain.    HISTORY OF PRESENT ILLNESS:  Ms. Hope is a 77-year-old female who presents today for evaluation of her right foot.  The patient has an upcoming trip to [place] and she is concerned about the amount of pain that she has experienced along the midfoot portion of her right foot.  The patient is fully aware of having arthritis of the foot, something that she got injected many years ago.  She also reports to be wearing compression stockings, something that she could not do secondary to pain felt along the dorsal aspect of the foot.    She presents today for confirmation of not having any serious problems as well as to see what we could do to facilitate and improve her experience in [place], where she will be doing quite a bit of hiking.    PAST MEDICAL HISTORY:  Reviewed today.    PAST SURGICAL HISTORY:   Reviewed today.      ALLERGIES:  Reviewed.    MEDICATIONS:  Reviewed.    PHYSICAL EXAMINATION:  On today's visit, she presents as a very pleasant female in no apparent distress.  Denies to have any constitutional symptoms.  Reports to have a height of 5 feet 3 inches with a weight of 143 pounds.  On today's visit, she presents with full range of motion of the right ankle, hindfoot and midfoot joints.  CMS intact.  Skin is intact.  There is a painful prominent osteophyte at the level of the tarsometatarsal joints.  Otherwise, there are no acute findings.     Three views of the right foot were obtained today, which were significant for showing a large amount of osteoarthritis diffusely through the toe with an emphasis on the first through third tarsometatarsal joints and the medial cuneiform within the navicular joint.  The talonavicular joint does not look the healthiest, but definitely not as bad as the other ones.  There are no acute findings.    ASSESSMENT:  Right midfoot arthritis.    PLAN:  Discussed with the patient that she is not creating any irreversible damage to her  foot while having pain.  She will have no activity restrictions.  I encouraged her to premedicate herself during the trip and to take precautions to have a supply while being in [place].  We are going to try to inject the first, second and third tarsometatarsal joints as well as the naviculocuneiform joint for the right foot under fluoroscopic control for a diagnosis of osteoarthritis.  Injections will be performed with lidocaine and Kenalog.    The patient has no restrictions.  All questions were answered.  Injections can be repeated every 3 months or later with no side effects to the patient.    All questions were answered.    TT:  30 minutes.  CT:  20 minutes.

## 2022-11-01 NOTE — LETTER
11/1/2022         RE: Giana Hope  [address]  [place] [address]        Dear Colleague,    Thank you for referring your patient, Gaina Hope, to the [facility] [place]. Please see a copy of my visit note below.    CHIEF COMPLAINT:  Right foot pain.    HISTORY OF PRESENT ILLNESS:  Ms. Hope is a 77-year-old female who presents today for evaluation of her right foot.  The patient has an upcoming trip to [place] and she is concerned about the amount of pain that she has experienced along the midfoot portion of her right foot.  The patient is fully aware of having arthritis of the foot, something that she got injected many years ago.  She also reports to be wearing compression stockings, something that she could not do secondary to pain felt along the dorsal aspect of the foot.    She presents today for confirmation of not having any serious problems as well as to see what we could do to facilitate and improve her experience in [place], where she will be doing quite a bit of hiking.    PAST MEDICAL HISTORY:  Reviewed today.    PAST SURGICAL HISTORY:   Reviewed today.      ALLERGIES:  Reviewed.    MEDICATIONS:  Reviewed.    PHYSICAL EXAMINATION:  On today's visit, she presents as a very pleasant female in no apparent distress.  Denies to have any constitutional symptoms.  Reports to have a height of 5 feet 3 inches with a weight of 143 pounds.  On today's visit, she presents with full range of motion of the right ankle, hindfoot and midfoot joints.  CMS intact.  Skin is intact.  There is a painful prominent osteophyte at the level of the tarsometatarsal joints.  Otherwise, there are no acute findings.     Three views of the right foot were obtained today, which were significant for showing a large amount of osteoarthritis diffusely through the toe with an emphasis on the first through third tarsometatarsal joints and the medial cuneiform within the navicular  joint.  The talonavicular joint does not look the healthiest, but definitely not as bad as the other ones.  There are no acute findings.    ASSESSMENT:  Right midfoot arthritis.    PLAN:  Discussed with the patient that she is not creating any irreversible damage to her foot while having pain.  She will have no activity restrictions.  I encouraged her to premedicate herself during the trip and to take precautions to have a supply while being in [place].  We are going to try to inject the first, second and third tarsometatarsal joints as well as the naviculocuneiform joint for the right foot under fluoroscopic control for a diagnosis of osteoarthritis.  Injections will be performed with lidocaine and Kenalog.    The patient has no restrictions.  All questions were answered.  Injections can be repeated every 3 months or later with no side effects to the patient.    All questions were answered.    TT:  30 minutes.  CT:  20 minutes.    Aron Fuentes MD

## 2022-11-02 NOTE — TELEPHONE ENCOUNTER
sulindac (CLINORIL) 200 MG tablet      Last Written Prescription Date:  10-3-22 for 90 t w/0RF    Class: Injection -  Last Office Visit : 10-3-22  Future Office visit:  12-5-22    Routing refill request to provider for review/approval because:  Last rx did not transmit to pharm: Injection  Failed protocol: AGE  Abn lab: ALT, Cr - reviewed by Dr. Sanchez    Patient Comment: Dr. Vasquez, I am going to [place] next week and   Dr. Fuentes suggested that I take along an anti-inflammatory medication   to help with foot pain (hopefully i will get a steroid injection before I leave). Thank you.

## 2022-11-03 RX ORDER — SULINDAC 200 MG/1
200 TABLET ORAL DAILY PRN
Qty: 90 TABLET | Refills: 0 | Status: SHIPPED | OUTPATIENT
Start: 2022-11-03 | End: 2023-06-19

## 2022-12-22 NOTE — PROGRESS NOTES
Patient was last seen on 2-8-2022. Refer to SOAP as discharge summary as patient did not complete recommended course of therapy.

## 2023-01-28 ENCOUNTER — HEALTH MAINTENANCE LETTER (OUTPATIENT)
Age: 78
End: 2023-01-28

## 2023-02-17 ENCOUNTER — TELEPHONE (OUTPATIENT)
Dept: ORTHOPEDICS | Facility: CLINIC | Age: 78
End: 2023-02-17
Payer: MEDICARE

## 2023-02-17 NOTE — TELEPHONE ENCOUNTER
[facility] Call Center    Phone Message    May a detailed message be left on voicemail: yes     Reason for Call: Appointment Intake    Referring Provider Name: N/A  Diagnosis and/or Symptoms: Cortisone Steroid Injection on R Shoulder - Elida gave injection in the past. Dr. Marrero does not have anything until May. Would like sooner. Please call and advise.    Action Taken: Other:  ORTHO    Travel Screening: Not Applicable

## 2023-03-01 ENCOUNTER — TELEPHONE (OUTPATIENT)
Dept: ORTHOPEDICS | Facility: CLINIC | Age: 78
End: 2023-03-01

## 2023-03-01 ENCOUNTER — MYC MEDICAL ADVICE (OUTPATIENT)
Dept: FAMILY MEDICINE | Facility: CLINIC | Age: 78
End: 2023-03-01

## 2023-03-01 ENCOUNTER — PRE VISIT (OUTPATIENT)
Dept: ORTHOPEDICS | Facility: CLINIC | Age: 78
End: 2023-03-01

## 2023-03-01 ENCOUNTER — OFFICE VISIT (OUTPATIENT)
Dept: ORTHOPEDICS | Facility: CLINIC | Age: 78
End: 2023-03-01
Payer: MEDICARE

## 2023-03-01 VITALS — DIASTOLIC BLOOD PRESSURE: 70 MMHG | SYSTOLIC BLOOD PRESSURE: 124 MMHG

## 2023-03-01 DIAGNOSIS — M19.011 OSTEOARTHRITIS OF GLENOHUMERAL JOINT, RIGHT: Primary | ICD-10-CM

## 2023-03-01 PROCEDURE — 99213 OFFICE O/P EST LOW 20 MIN: CPT | Mod: 25 | Performed by: STUDENT IN AN ORGANIZED HEALTH CARE EDUCATION/TRAINING PROGRAM

## 2023-03-01 PROCEDURE — 20610 DRAIN/INJ JOINT/BURSA W/O US: CPT | Mod: RT | Performed by: STUDENT IN AN ORGANIZED HEALTH CARE EDUCATION/TRAINING PROGRAM

## 2023-03-01 RX ORDER — LIDOCAINE HYDROCHLORIDE 10 MG/ML
1 INJECTION, SOLUTION INFILTRATION; PERINEURAL
Status: DISCONTINUED | OUTPATIENT
Start: 2023-03-01 | End: 2023-03-06

## 2023-03-01 RX ORDER — TRIAMCINOLONE ACETONIDE 40 MG/ML
40 INJECTION, SUSPENSION INTRA-ARTICULAR; INTRAMUSCULAR
Status: DISCONTINUED | OUTPATIENT
Start: 2023-03-01 | End: 2023-03-06

## 2023-03-01 RX ADMIN — TRIAMCINOLONE ACETONIDE 40 MG: 40 INJECTION, SUSPENSION INTRA-ARTICULAR; INTRAMUSCULAR at 15:11

## 2023-03-01 RX ADMIN — LIDOCAINE HYDROCHLORIDE 1 ML: 10 INJECTION, SOLUTION INFILTRATION; PERINEURAL at 15:11

## 2023-03-01 NOTE — TELEPHONE ENCOUNTER
Health Call Center    Phone Message    May a detailed message be left on voicemail: yes     Reason for Call: Other: Hawa called because she was scheduled with Dr. Marrero for a shoulder injection todayand rescheduled with someone else due towanting to go closer to home. She was notified that the doctor she was scheduled with did not do US guided injections. She wants to see Dr. Marrero and I did explain that he does not deal with shoulders but she said someone had scheduled her and she still would like him to do it. Please callas soon as possible because she would like to come in today.     Action Taken: Other: [facility]    Travel Screening: Not Applicable

## 2023-03-01 NOTE — PATIENT INSTRUCTIONS
1. Osteoarthritis of glenohumeral joint, right      Steroid injection of the right shoulder: intra-articular  was performed today in clinic    - Would not soak in a hot tub, bath or swimming pool for 48 hours  - Ok to shower  - Ice today and only do your normal amounts of activity  - The lidocaine (what is giving you pain relief right now) will likely stop working in 1-2 hours.  You will then have pain again, similar to before you received the injection. The corticosteroid will not start working until approximately 1-2 weeks from now.  In a small percentage of people, cortisone can cause flushing/redness in the face. This usually lasts for 1-3 days and resolves. Cool compress and Ibuprofen/Tylenol can help if this happens.    Physical Therapy orders have been placed with [facility].  You can call [phone]  to schedule at your convenience.       Follow up with Dr. Marrero as needed.    Call my office with any questions or concerns, [phone].

## 2023-03-01 NOTE — LETTER
3/1/2023         RE: Giana Hope  [address] [place] [address]        Dear Colleague,    Thank you for referring your patient, Giana Hope, to the [facility] [place]. Please see a copy of my visit note below.        [facility]  Orthopaedic Surgery Consultation by Gabriel Marrero M.D.    Giana Hope MRN# [id number]   Age: 76 year old YOB: 1945     Requesting physician: Gilberto Pandya     Background history:  DX:  1. History of anesthesia problem  2. Gastroesophageal reflux disease without esophagitis  3. Nodule of left lung  4. DDD  5. Risk for falls  6. Hypercholesterolemia  7. Nuclear sclerosis      TREATMENTS:  1. 2/25/2016, Left TKA, Сергей Pang MD , [facility], [facility], and Affiliated Practices  2. 8/25/2020, Reverse Left TSA, Ector Chapa M.D., [facility]  3. 11/26/2021, right total knee arthroplasty,              History of Present Illness:     77-year-old female with chronic pain right shoulder due to end-stage osteoarthritic changes.  Approximately 6 months ago patient received an glenohumeral injection with a combination of lidocaine and cortisone.    Patient follows up to day regarding her right shoulder pain. She reports significant improvement of right shoulder pain s/p right shoulder glenohumeral joint injection on 9/28/22.  She reports gradual return of pain over the last 1 month. She reports her pain is primarily located to the lateral upper arm. She notes decreased range of motion with reaching.     In the interim patient is also seeing Dr. Sandoval and has received multiple injections in midfoot.    Today patient states that she has pain of right shoulder due to underlying glenohumeral osteoarthritic changes which is bothering her significantly.  She is not experiencing any loss of strength, motor or sensory deficits.  Pain  is present inside the shoulder joint.  She describes occasional crepitus.  She would like to have a repeat injection of the right shoulder.  She has had 1 in the past which provided relief for many years.    Social:   Occupation: Retired   Living situation: Lives with cat.  She has family close by.  Hobbies / Sports: walking and hiking     Smoking: No  Alcohol: Yes  Illicit drug use: No         Physical Exam:     EXAMINATION pertinent findings:   PSYCH: Pleasant, healthy-appearing, alert, oriented x3, cooperative. Normal mood and affect.  VITAL SIGNS: Blood pressure 124/70, not currently breastfeeding.  Reviewed nursing intake notes.   There is no height or weight on file to calculate BMI.  RESP: non labored breathing   ABD: benign, soft, non-tender, no acute peritoneal findings  SKIN: grossly normal   LYMPHATIC: grossly normal, no adenopathy, no extremity edema  NEURO: grossly normal , no motor deficits  VASCULAR: satisfactory perfusion of all extremities   MUSCULOSKELETAL:     Cervical Spine: FROM, Spurling's test negative.    Shoulder right: Normal appearance. No signs of muscular atrophy of SSP, ISP or Deltoid. No tenderness to palpation over the sterno-clavicular joint or clavicle. AC joint: No tenderness to palpation. Cross body -. Abduction 120, Forward flexion 100, ER 30, IR L4. Cuff strength 5/5 for SSP/ISP and SSC. Neer, Walker, and Mariya -. Neurovascular intact RUE.  2+ radial pulse with a warm and well perfused hand. Sensation is intact to light touch in the median, radial, ulnar, musculocutaneous, and axillary nerve distribution. 5/5 , fpl, epl, io, wrist flexor, wrist extensor, biceps, triceps, and deltoid muscle strength on manual muscle testing.            Data:   All laboratory data reviewed    All imaging studies reviewed by me personally.    Today no new imaging studies were obtained.    XR knee right 9/28/2022:  My interpretation: Status post right total knee arthroplasty.  Adequate sizing,  fixation and orientation of components.  No signs of immediate postoperative complications.    XR shoulder right 3/9/2022:  My interpretation: Severe osteoarthritic changes of right glenohumeral joint.  Complete obliteration of joint space.  Presence of marginal osteophytes and sclerosis.  Well-preserved AC joint.  Glenoid Candelario C.         Assessment and Plan:   Assessment:  77-year-old female with chronic pain of right shoulder due to end-stage osteoarthritic change.  Previously responding well to intra-articular injection combination of lidocaine and cortisone.       Plan:  I discussed my findings with the patient.  As for the right shoulder we discussed the surgical and nonsurgical treatment options for glenohumeral osteoarthritis.  Given her current symptoms and previous response to intra-articular injection it would be my recommendation to refer patient to physical therapy for range of motion exercises/strengthening/stretching, over-the-counter analgesics and an intra-articular injection with a combination of lidocaine and cortisone.  Patient understands and agrees to the treatment plan as set forth.  After obtaining informed consent the right glenohumeral joint was injected without complications.  In regards to the right shoulder we will follow-up on an as-needed basis.     Gabriel Marrero MD, PhD     Adult Reconstruction  [facility] Department of Orthopaedic Surgery  Pager [phone]    Large Joint Injection/Arthocentesis: R glenohumeral joint    Date/Time: 3/1/2023 3:11 PM  Performed by: Gabriel Marrero MD  Authorized by: Gabriel Marrero MD     Indications:  Pain and osteoarthritis  Needle Size:  22 G  Guidance: landmark guided    Approach:  Posterior  Location:  Shoulder      Site:  R glenohumeral joint  Medications:  40 mg triamcinolone 40 MG/ML; 1 mL lidocaine 1 %  Outcome:  Tolerated well, no immediate complications  Procedure discussed: discussed risks,  benefits, and alternatives    Timeout: timeout called immediately prior to procedure    Prep: patient was prepped and draped in usual sterile fashion     7 ml of Lidocaine 1% was infiltrated into the right glenohumeral joint space.              Again, thank you for allowing me to participate in the care of your patient.        Sincerely,        Gabriel Marrero MD

## 2023-03-01 NOTE — TELEPHONE ENCOUNTER
CAITLIN LVM for patient regarding her appointment with Dr. Zepeda on 3/1/2023 for a right shoulder injection. Patient is wanting a repeat injection per Dr. Marrero from 3/22/2022, which was a glenohumeral joint injection.    ATC informed patient that this is an injection that Dr. Zepeda does not perform as it requires ultrasound guidance. ATC informed patient she may need to reschedule this appointment to one of our sports medicine physicians who performs ultrasound guided injections.    Provided call back number of [phone].    CAITLIN Adams

## 2023-03-01 NOTE — PROGRESS NOTES
[facility]  Orthopaedic Surgery Consultation by Gabriel Marrero M.D.    Giana Hope MRN# [id number]   Age: 76 year old YOB: 1945     Requesting physician: Gilberto Pandya     Background history:  DX:  1. History of anesthesia problem  2. Gastroesophageal reflux disease without esophagitis  3. Nodule of left lung  4. DDD  5. Risk for falls  6. Hypercholesterolemia  7. Nuclear sclerosis      TREATMENTS:  1. 2/25/2016, Left TKA, Сергей Pang MD , [facility], [facility], and Affiliated Practices  2. 8/25/2020, Reverse Left TSA, Ector Chapa M.D., [facility]  3. 11/26/2021, right total knee arthroplasty,              History of Present Illness:     77-year-old female with chronic pain right shoulder due to end-stage osteoarthritic changes.  Approximately 6 months ago patient received an glenohumeral injection with a combination of lidocaine and cortisone.    Patient follows up to day regarding her right shoulder pain. She reports significant improvement of right shoulder pain s/p right shoulder glenohumeral joint injection on 9/28/22.  She reports gradual return of pain over the last 1 month. She reports her pain is primarily located to the lateral upper arm. She notes decreased range of motion with reaching.     In the interim patient is also seeing Dr. Sandoval and has received multiple injections in midfoot.    Today patient states that she has pain of right shoulder due to underlying glenohumeral osteoarthritic changes which is bothering her significantly.  She is not experiencing any loss of strength, motor or sensory deficits.  Pain is present inside the shoulder joint.  She describes occasional crepitus.  She would like to have a repeat injection of the right shoulder.  She has had 1 in the past which provided relief for many years.    Social:   Occupation: Retired   Living situation: Lives  with cat.  She has family close by.  Hobbies / Sports: walking and hiking     Smoking: No  Alcohol: Yes  Illicit drug use: No         Physical Exam:     EXAMINATION pertinent findings:   PSYCH: Pleasant, healthy-appearing, alert, oriented x3, cooperative. Normal mood and affect.  VITAL SIGNS: Blood pressure 124/70, not currently breastfeeding.  Reviewed nursing intake notes.   There is no height or weight on file to calculate BMI.  RESP: non labored breathing   ABD: benign, soft, non-tender, no acute peritoneal findings  SKIN: grossly normal   LYMPHATIC: grossly normal, no adenopathy, no extremity edema  NEURO: grossly normal , no motor deficits  VASCULAR: satisfactory perfusion of all extremities   MUSCULOSKELETAL:     Cervical Spine: FROM, Spurling's test negative.    Shoulder right: Normal appearance. No signs of muscular atrophy of SSP, ISP or Deltoid. No tenderness to palpation over the sterno-clavicular joint or clavicle. AC joint: No tenderness to palpation. Cross body -. Abduction 120, Forward flexion 100, ER 30, IR L4. Cuff strength 5/5 for SSP/ISP and SSC. Neer, Walker, and Mariya -. Neurovascular intact RUE.  2+ radial pulse with a warm and well perfused hand. Sensation is intact to light touch in the median, radial, ulnar, musculocutaneous, and axillary nerve distribution. 5/5 , fpl, epl, io, wrist flexor, wrist extensor, biceps, triceps, and deltoid muscle strength on manual muscle testing.            Data:   All laboratory data reviewed    All imaging studies reviewed by me personally.    Today no new imaging studies were obtained.    XR knee right 9/28/2022:  My interpretation: Status post right total knee arthroplasty.  Adequate sizing, fixation and orientation of components.  No signs of immediate postoperative complications.    XR shoulder right 3/9/2022:  My interpretation: Severe osteoarthritic changes of right glenohumeral joint.  Complete obliteration of joint space.  Presence of marginal  osteophytes and sclerosis.  Well-preserved AC joint.  Glenoid Candelario C.         Assessment and Plan:   Assessment:  77-year-old female with chronic pain of right shoulder due to end-stage osteoarthritic change.  Previously responding well to intra-articular injection combination of lidocaine and cortisone.       Plan:  I discussed my findings with the patient.  As for the right shoulder we discussed the surgical and nonsurgical treatment options for glenohumeral osteoarthritis.  Given her current symptoms and previous response to intra-articular injection it would be my recommendation to refer patient to physical therapy for range of motion exercises/strengthening/stretching, over-the-counter analgesics and an intra-articular injection with a combination of lidocaine and cortisone.  Patient understands and agrees to the treatment plan as set forth.  After obtaining informed consent the right glenohumeral joint was injected without complications.  In regards to the right shoulder we will follow-up on an as-needed basis.     Gabriel Marrero MD, PhD     Adult Reconstruction  [facility] Department of Orthopaedic Surgery  Pager [phone]    Large Joint Injection/Arthocentesis: R glenohumeral joint    Date/Time: 3/1/2023 3:11 PM  Performed by: Gabriel Marrero MD  Authorized by: Gabriel Marrero MD     Indications:  Pain and osteoarthritis  Needle Size:  22 G  Guidance: landmark guided    Approach:  Posterior  Location:  Shoulder      Site:  R glenohumeral joint  Medications:  40 mg triamcinolone 40 MG/ML; 1 mL lidocaine 1 %  Outcome:  Tolerated well, no immediate complications  Procedure discussed: discussed risks, benefits, and alternatives    Timeout: timeout called immediately prior to procedure    Prep: patient was prepped and draped in usual sterile fashion     7 ml of Lidocaine 1% was infiltrated into the right glenohumeral joint space.           FREE:[LAST:[[facility]],PHONE:[(   )    -],FAX:[(   )    -],ADDRESS:[Transfer to [facility]]]

## 2023-03-01 NOTE — TELEPHONE ENCOUNTER
Per chart review, patient has been seen at the [facility] only.   She had a landmark guided injection of her right shoulder by Dr. Marrero on 9/28/22 at the [facility].  Patient was scheduled originally on 3/1/23  by Radha Samayoa LPN in [place].     Phone call to patient. She states she did not request an ultrasound guided injection. The injection she had on 3/28/22 worked well for 5 months. Appointment rescheduled for today at 3 pm with Dr. Marrero in [place]. She verbalized understanding.     CORWIN Sesay RN

## 2023-03-02 ENCOUNTER — LAB (OUTPATIENT)
Dept: LAB | Facility: CLINIC | Age: 78
End: 2023-03-02
Payer: MEDICARE

## 2023-03-02 DIAGNOSIS — E78.5 HYPERLIPIDEMIA LDL GOAL <100: ICD-10-CM

## 2023-03-02 DIAGNOSIS — R79.89 ELEVATED SERUM CREATININE: ICD-10-CM

## 2023-03-02 LAB
BUN SERPL-MCNC: 19 MG/DL (ref 8–23)
CHOLEST SERPL-MCNC: 253 MG/DL
CREAT SERPL-MCNC: 0.91 MG/DL (ref 0.51–0.95)
GFR SERPL CREATININE-BSD FRML MDRD: 65 ML/MIN/1.73M2
HDLC SERPL-MCNC: 116 MG/DL
LDLC SERPL CALC-MCNC: 125 MG/DL
NONHDLC SERPL-MCNC: 137 MG/DL
TRIGL SERPL-MCNC: 62 MG/DL

## 2023-03-02 PROCEDURE — 82565 ASSAY OF CREATININE: CPT | Performed by: PATHOLOGY

## 2023-03-02 PROCEDURE — 84520 ASSAY OF UREA NITROGEN: CPT | Performed by: PATHOLOGY

## 2023-03-02 PROCEDURE — 36415 COLL VENOUS BLD VENIPUNCTURE: CPT | Performed by: PATHOLOGY

## 2023-03-02 PROCEDURE — 80061 LIPID PANEL: CPT | Performed by: PATHOLOGY

## 2023-03-05 NOTE — PROGRESS NOTES
"SUBJECTIVE:   Giana Hope is a 77 year old female who presents for Preventive Visit, physical exam, form completion, follow-up of chronic health.  Giana \"Hawa\" TOM Hope has history of arthritis, hyperlipidemia, previous history of tobacco use quit over 25 years ago with small pulmonary nodules stable on last CT 2021 statistically benign presents for preventive visit.  She is concerned about the pulmonary nodules and would like to do another CT.  She has no symptoms of dyspnea no current smoking.  Hyperlipidemia  She was in [place] for 2 months staying with family members, not her typical diet eating pizza with cheese.  Lipids slightly elevated due to this.  She has travel planned to the [place] leaving Saturday but once she completes that travel she has plans to resume her usual health habits.  Travel physical  She has a form for me to complete for travel see scanned document.  Hyperlipidemia  Lipids elevated due to recent change in diet on pravastatin 20 mg daily.  She has joint pains indicates she is worried about the curvature of her spine worsening, previous DEXA several years ago she would be willing to complete.  She had a previous bunion surgery right foot which caused slight hyper flexion of the great toe.  In the winter when she wears her boots her great toenail rubs on the boots and becomes hypertrophied.  She is wondering if there is anything to place on her toe to keep the great toe in place.  SH: Will travel to [facility] on Saturday.  HCM  Previous lung cancer screening stable less than 6 mm nodules statistically benign.  Shingrix vaccine  Hep c screening (Low risk) not required  dPatient has been advised of split billing requirements and indicates understanding: Yes  Are you in the first 12 months of your Medicare Part B coverage?  No    Physical Health:    In general, how would you rate your overall physical health? good    Mental Health:    In general, how would you rate your overall mental " or emotional health? good  PHQ-2 Score:      Do you feel safe in your environment? Yes  Additional concerns to address?  YES    Fall risk:  Fallen 2 or more times in the past year?: No  Any fall with injury in the past year?: No    Cognitive Screening: Intact      Reviewed and updated as needed this visit by clinical staff   Tobacco  Allergies  Meds  Problems  Med Hx  Surg Hx  Fam Hx          Reviewed and updated as needed this visit by Provider   Tobacco  Allergies  Meds  Problems  Med Hx  Surg Hx  Fam Hx         Social History     Tobacco Use     Smoking status: Former     Years: 35.00     Types: Cigarettes     Smokeless tobacco: Never     Tobacco comments:     Quit 24 years ago 1996   Substance Use Topics     Alcohol use: Yes     Comment: Moderate                           Current providers sharing in care for this patient include:  Patient Care Team:  Gilberto Vasquez MD as PCP - General (Family Practice)  Manuel Carrasco OD as MD (Optometry)  Nadira López MD as MD (Orthopedics)  Dayton Zepeda MD as Referring Physician (Family Practice)  Trish Taylor MD as MD (Ophthalmology)  Jose Alberto Isbell MD as MD (Dermatology)  Brenda Ramirez MD as MD (Dermatology)  Gilberto Vasquez MD as Assigned PCP  Michelle Veronica MD (Inactive) as Assigned Surgical Provider  Gabriel Colindres MD as Assigned Musculoskeletal Provider    The following health maintenance items are reviewed in Epic and correct as of today:  Health Maintenance   Topic Date Due     ZOSTER IMMUNIZATION (1 of 2) Never done     MICROALBUMIN  03/27/2023 (Originally 1945)     BMP  09/20/2023     HEMOGLOBIN  09/20/2023     LIPID  03/02/2024     MEDICARE ANNUAL WELLNESS VISIT  03/06/2024     FALL RISK ASSESSMENT  03/06/2024     ADVANCE CARE PLANNING  10/26/2025     DTAP/TDAP/TD IMMUNIZATION (3 - Td or Tdap) 02/23/2028     PHQ-2 (once per calendar year)  Completed     INFLUENZA VACCINE  Completed      Pneumococcal Vaccine: 65+ Years  Completed     URINALYSIS  Completed     COVID-19 Vaccine  Completed     IPV IMMUNIZATION  Aged Out     MENINGITIS IMMUNIZATION  Aged Out     DEXA  Discontinued     URINE DRUG SCREEN  Discontinued     HEPATITIS C SCREENING  Discontinued     MAMMO SCREENING  Discontinued     COLORECTAL CANCER SCREENING  Discontinued     LUNG CANCER SCREENING  Discontinued     Labs reviewed in EPIC  BP Readings from Last 3 Encounters:   03/06/23 131/79   03/01/23 124/70   10/03/22 109/79    Wt Readings from Last 3 Encounters:   03/06/23 64.4 kg (142 lb)   11/01/22 64.9 kg (143 lb)   10/03/22 65.5 kg (144 lb 4.8 oz)            Immunization History   Administered Date(s) Administered     COVID-19 Vaccine 12+ (Pfizer) 01/30/2021, 02/20/2021, 09/25/2021     COVID-19 Vaccine 18+ (Moderna) 03/29/2022     COVID-19 Vaccine Bivalent Booster 18+ (Moderna) 10/13/2022     FLUAD(HD)65+ QUAD 09/25/2021     Flu, Unspecified 10/13/2009, 10/02/2014, 10/21/2015, 09/30/2016     HepA-Adult 02/23/2018, 05/16/2019     Influenza (High Dose) 3 valent vaccine 10/28/2017     Influenza Vaccine 65+ (Fluzone HD) 10/12/2020, 10/14/2022     Influenza Vaccine, 6+MO IM (QUADRIVALENT W/PRESERVATIVES) 10/25/2018, 10/24/2019     Pneumo Conj 13-V (2010&after) 12/11/2015     Pneumococcal 23 valent 03/18/2011     TD (ADULT, 7+) 02/23/2018     TDAP Vaccine (Boostrix) 08/16/2007     Typhoid IM 05/16/2019     Yellow Fever, Live (Stamaril) 11/30/2017           Patient Active Problem List   Diagnosis     Hyperlipidemia LDL goal <100     Arthritis of shoulder region, left     Primary osteoarthritis involving multiple joints     Gastroesophageal reflux disease without esophagitis     Nodule of left lung     Family history of malignant neoplasm of breast     Mass of left upper extremity     Back pain of thoracolumbar region     Balance disorder     Compression fracture of thoracic vertebra, open, initial encounter (H)     DDD (degenerative disc  disease), thoracolumbar     Degenerative scoliosis in adult patient     Kyphosis (acquired) (postural)     Nuclear sclerosis     Osteoarthrosis     Osteopenia with high risk of fracture     Pain in left shoulder     Status post left knee replacement     Pain in thoracic spine     Osteoarthritis of right knee     Infection due to 2019 novel coronavirus     Venous stasis dermatitis of both lower extremities     Varicose veins of both lower extremities with pain     Chronic kidney disease, stage 3a (H)     Past Surgical History:   Procedure Laterality Date     ARTHROPLASTY KNEE Right 2021    Procedure: ARTHROPLASTY, RIGHT KNEE, TOTAL;  Surgeon: Gabriel Marrero MD;  Location:  OR     ARTHROPLASTY, RIGHT KNEE, TOTAL Right 2021     CATARACT IOL, RT/LT Right 2019     PHACOEMULSIFICATION WITH STANDARD INTRAOCULAR LENS IMPLANT Right 3/22/2019    Procedure: Right Cataract Removal with Intraocular Lens Implant;  Surgeon: Trish Taylor MD;  Location:  OR       Social History     Tobacco Use     Smoking status: Former     Years: 35.00     Types: Cigarettes     Smokeless tobacco: Never     Tobacco comments:     Quit 24 years ago    Substance Use Topics     Alcohol use: Yes     Comment: Moderate     Family History   Problem Relation Age of Onset     Arthritis Mother      Breast Cancer Mother 78     Diabetes Type 2  Father      Heart Disease Father          age 75     Alzheimer Disease Father 60     Heart Disease Brother 68     Breast Cancer Maternal Grandmother 81     Heart Disease Maternal Grandfather 54         age 81     Other - See Comments Son         schizoaffective lives in [place]     Glaucoma No family hx of      Macular Degeneration No family hx of          Current Outpatient Medications   Medication Sig Dispense Refill     calcium-vitamin D 500-125 MG-UNIT TABS Take 1 tablet by mouth daily       famotidine (PEPCID) 40 MG tablet Take 1 tablet (40 mg) by mouth daily as needed for heartburn  90 tablet 3     ketoconazole (NIZORAL) 2 % external shampoo Apply topically three times a week 120 mL 6     pravastatin (PRAVACHOL) 20 MG tablet Take 1 tablet (20 mg) by mouth daily 90 tablet 3     prednisoLONE acetate (PRED FORTE) 1 % ophthalmic suspension Place 1 drop Into the left eye 3 times daily 15 mL 1     sulindac (CLINORIL) 200 MG tablet Take 1 tablet (200 mg) by mouth daily as needed (for joint pain) 90 tablet 0     Vitamin D3 (CHOLECALCIFEROL) 25 mcg (1000 units) tablet Take 1 tablet by mouth daily       Allergies   Allergen Reactions     Dust Mites Itching     Runny nose, fever     Mold Itching     Fever, runny nose     Pollen Extract Itching     Fever, runny nose     Celecoxib      Other reaction(s): GI intolerance     Recent Labs   Lab Test 03/02/23  0908 09/20/22  1652 04/22/22  1428 11/18/21  1049 11/18/21  1049 08/17/20  1404 01/21/20  0925 01/16/20  1009   A1C  --   --   --   --   --   --  5.3  --    *  --   --   --  97  --   --   --      --   --   --  108  --   --   --    TRIG 62  --   --   --  71  --   --   --    ALT  --  6*  --   --  26  --   --  20   CR 0.91 1.14*  --    < > 0.92  --  0.98 0.94   GFRESTIMATED 65 49*  --    < > 61  --  57* 60*   GFRESTBLACK  --   --   --   --   --   --  66 69   POTASSIUM  --  4.1  --   --  4.0  --  4.1 4.7   TSH  --   --  1.06  --   --  1.14  --   --     < > = values in this interval not displayed.       ROS:  Questionnaires reviewed,Problem list, PMH, Surgical HX, FH, SH, allergies, medications,immunizations reviewed and updated in Epic.  ROS negative other than noted in HPI and ROS.    Answers for HPI/ROS submitted by the patient on 3/6/2023  General Symptoms: No  Skin Symptoms: Yes  HENT Symptoms: No  EYE SYMPTOMS: No  HEART SYMPTOMS: No  LUNG SYMPTOMS: No  INTESTINAL SYMPTOMS: No  URINARY SYMPTOMS: Yes  GYNECOLOGIC SYMPTOMS: No  BREAST SYMPTOMS: No  SKELETAL SYMPTOMS: Yes  BLOOD SYMPTOMS: No  NERVOUS SYSTEM SYMPTOMS: No  MENTAL HEALTH SYMPTOMS:  "No  Changes in hair: No  Changes in moles/birth marks: No  Itching: No  Rashes: No  Changes in nails: Yes  Acne: No  Hair in places you don't want it: No  Change in facial hair: No  Warts: No  Non-healing sores: No  Scarring: No  Flaking of skin: Yes  Color changes of hands/feet in cold : No  Sun sensitivity: No  Skin thickening: No  Trouble holding urine or incontinence: No  Pain or burning: No  Trouble starting or stopping: No  Increased frequency of urination: No  Blood in urine: No  Decreased frequency of urination: No  Frequent nighttime urination: No  Flank pain: No  Difficulty emptying bladder: No  Back pain: Yes  Muscle aches: No  Neck pain: Yes  Swollen joints: Yes  Joint pain: Yes  Bone pain: No  Muscle cramps: No  Muscle weakness: No  Joint stiffness: No  Bone fracture: No        OBJECTIVE:   /79 (BP Location: Right arm, Patient Position: Sitting, Cuff Size: Adult Regular)   Pulse 68   Temp 97.6  F (36.4  C)   Resp 12   Ht 1.6 m (5' 2.99\")   Wt 64.4 kg (142 lb)   LMP 01/01/1998 (Approximate)   SpO2 95%   BMI 25.16 kg/m   Estimated body mass index is 25.16 kg/m  as calculated from the following:    Height as of this encounter: 1.6 m (5' 2.99\").    Weight as of this encounter: 64.4 kg (142 lb).  EXAM:     GENERAL APPEARANCE: healthy, alert and no distress,      EYES: EOMI, PERRL     HENT: ear canals occluded with cerumen,  mask removed briefly Metal Retainer not removable mouth without ulcers or lesions. Teeth yellowing     NECK: no adenopathy, no asymmetry, masses, or scars and thyroid normal to palpation     RESP: lungs clear to auscultation - no rales, rhonchi or wheezes     Breast no masses no axillary adenopathy     CV: regular rate and rhythm, normal S1 S2, no S3 or S4 and no murmur, click or rub     ABDOMEN:  soft, nontender, no HSM or masses and bowel sounds normal     MS: Mild Kyphoscoliosis, Right knee osteoarthritis chronic swelling, left knee well healed incision,  Other " extremities signs of arthritis moves all joints     SKIN: no suspicious lesions or rashes     NEURO: Normal strength and tone,  mentation intact and speech normal     PSYCH: mentation appears normal. and affect normal, concern for osteoporosis expressed     LYMPHATICS: No cervical adenopathy  Foot: Previous bunion surgery cause her great toe to be hyperflexed thickening to great nail.    EXAMINATION: Chest CT  11/18/2021 11:22 AM     CLINICAL HISTORY: Lung nodule (Pulmonary nodule); Pulmonary nodules     COMPARISON: 10/12/2020. 8/30/2019     TECHNIQUE: CT imaging obtained through the chest without contrast.  Axial, coronal, and sagittal reconstructions and axial MIP reformatted  images are reviewed.      CONTRAST: None     FINDINGS:  Lungs: The airway is patent. No pleural effusion or pneumothorax. No  airspace consolidation. 6 mm subpleural nodule in the left lower lobe  in series 4 image 192. Multiple additional sub-6 mm subpleural  nodules. No pneumothorax or pleural effusion. Subsegmental atelectasis  of the lingula.     Mediastinum: The thyroid is unremarkable. Cardiac size is normal.  Normal caliber aorta and main pulmonary artery. No pericardial  effusion. No significant coronary artery calcium. No thoracic  lymphadenopathy. Esophagus is normal in caliber.     Bones and soft tissues: No suspicious bone findings. Rightward  thoracolumbar scoliosis. Multilevel degenerative changes of the spine.  Reverse arthroplasty of the left shoulder. Multilevel degenerative  changes of the spine with osteophytosis, loss of vertebral disc  height, endplate sclerosis, and intervertebral disc vacuum phenomenon.     Upper Abdomen: No abnormalities in the visualized portion of the of  the upper abdomen.                                                                      IMPRESSION:  Single 6 mm subpleural nodule in the left lung base,  stable. Multiples sub-6 mm subpleural nodules scattered throughout  bilateral lungs, stable.  Per Fleischner guidelines, stable for 2 years  would indicate these are statistically benign.     I have personally reviewed the examination and initial interpretation  and I agree with the findings.     CHARISMA FAIRBANKS MD         Latest Reference Range & Units 03/02/23 09:08   Urea Nitrogen 8.0 - 23.0 mg/dL 19.0   Creatinine 0.51 - 0.95 mg/dL 0.91   GFR Estimate >60 mL/min/1.73m2 65   Cholesterol <200 mg/dL 253 (H)   HDL Cholesterol >=50 mg/dL 116   LDL Cholesterol Calculated <=100 mg/dL 125 (H)   Non HDL Cholesterol <130 mg/dL 137 (H)   Triglycerides <150 mg/dL 62   (H): Data is abnormally high    ASSESSMENT / PLAN:   Giana was seen today for physical.  She also has a form for completion for travel to the [place] please see scanned document she was provided with a copy and medication list through her after visit summary including immunization record.    Diagnoses and all orders for this visit:    Encounter for Medicare annual wellness exam  Annual wellness visit recommended for preventive cares  Nodule of left lung  She has had stable pulmonary nodules scan however is concerned about possibly a new nodule that was on the last scan therefore would like to complete another CT  -     CT Chest w/o Contrast; Future    Bilateral impacted cerumen  She had bilateral cerumen removed  -     REMOVE IMPACTED CERUMEN    Hyperlipidemia LDL goal <100  Recent travel with 2 months in [place] not eating her usual diet at this time she would like to continue dose of pravastatin due to travel and she has plans to change her eating habits upon return from the [facility].  DDD (degenerative disc disease), thoracolumbar  Primary osteoarthritis involving multiple joints  Post-menopausal  She is osteoarthritis degenerative disc disease kyphoscoliosis therefore low monitoring of bone density  -     DX Hip/Pelvis/Spine; Future    Hypertrophy of nail  She had a previous bunionectomy causing slight hyperflexion of her great toe with thickening  "of the great toenail due to pressure on her boots during the winter at this resolves in the summer when she does not have shoes causing pressure.  Consider getting new boots in the winter with a broader toebox and I recommended follow-up with the home health agency related to the toe splint to try to keep the great toe in the proper position.        Patient has been advised of split billing requirements and indicates understanding: Yes    COUNSELING:  Reviewed preventive health counseling, as reflected in patient instructions       Regular exercise       Healthy diet/nutrition       Vision screening       Hearing screening       Dental care       Fall risk prevention       Immunizations    Reviewed and information provided in AVS for travel    Get Shingrix vaccine through local pharmcy         Osteoporosis prevention/bone health DEXA ordered       Colon cancer screening       Lung Cancer screening/ follow-up pulmonary nodule Pt requests another CT    Estimated body mass index is 25.16 kg/m  as calculated from the following:    Height as of this encounter: 1.6 m (5' 2.99\").    Weight as of this encounter: 64.4 kg (142 lb).        She reports that she has quit smoking. Her smoking use included cigarettes. She has never used smokeless tobacco.    Appropriate preventive services were discussed with this patient, including applicable screening as appropriate for cardiovascular disease, diabetes, osteopenia/osteoporosis, and glaucoma.  As appropriate for age/gender, discussed screening for colorectal cancer, prostate cancer, breast cancer, and cervical cancer. Checklist reviewing preventive services available has been given to the patient.  Additional 20 minutes was spent in the completion of the form and dressing other health concerns today exclusive of preventive cares.  Reviewed patients plan of care and provided an AVS. The Basic Care Plan (routine screening as documented in Health Maintenance) for Giana meets the " Care Plan requirement. This Care Plan has been established and reviewed with the Patient.    Counseling Resources:  ATP IV Guidelines  Pooled Cohorts Equation Calculator  Breast Cancer Risk Calculator  BRCA-Related Cancer Risk Assessment: FHS-7 Tool  FRAX Risk Assessment  ICSI Preventive Guidelines  Dietary Guidelines for Americans, 2010  USDA's MyPlate  ASA Prophylaxis  Lung CA Screening    Gilberto Vasquez MD  [facility] [place]

## 2023-03-05 NOTE — PATIENT INSTRUCTIONS
To schedule your DEXA appointment and CT with imaging, please call [phone].        Patient Education   Personalized Prevention Plan  You are due for the preventive services outlined below.  Your care team is available to assist you in scheduling these services.  If you have already completed any of these items, please share that information with your care team to update in your medical record.  Health Maintenance Due   Topic Date Due    Zoster (Shingles) Vaccine (1 of 2) Never done          Immunization History   Administered Date(s) Administered    COVID-19 Vaccine 12+ (Pfizer) 01/30/2021, 02/20/2021, 09/25/2021    COVID-19 Vaccine 18+ (Moderna) 03/29/2022    COVID-19 Vaccine Bivalent Booster 18+ (Moderna) 10/13/2022    FLUAD(HD)65+ QUAD 09/25/2021    Flu, Unspecified 10/13/2009, 10/02/2014, 10/21/2015, 09/30/2016    HepA-Adult 02/23/2018, 05/16/2019    Influenza (High Dose) 3 valent vaccine 10/28/2017    Influenza Vaccine 65+ (Fluzone HD) 10/12/2020, 10/14/2022    Influenza Vaccine, 6+MO IM (QUADRIVALENT W/PRESERVATIVES) 10/25/2018, 10/24/2019    Pneumo Conj 13-V (2010&after) 12/11/2015    Pneumococcal 23 valent 03/18/2011    TD (ADULT, 7+) 02/23/2018    TDAP Vaccine (Boostrix) 08/16/2007    Typhoid IM 05/16/2019    Yellow Fever, Live (Stamaril) 11/30/2017

## 2023-03-06 ENCOUNTER — OFFICE VISIT (OUTPATIENT)
Dept: FAMILY MEDICINE | Facility: CLINIC | Age: 78
End: 2023-03-06
Payer: MEDICARE

## 2023-03-06 VITALS
BODY MASS INDEX: 25.16 KG/M2 | DIASTOLIC BLOOD PRESSURE: 79 MMHG | TEMPERATURE: 97.6 F | OXYGEN SATURATION: 95 % | WEIGHT: 142 LBS | SYSTOLIC BLOOD PRESSURE: 131 MMHG | RESPIRATION RATE: 12 BRPM | HEART RATE: 68 BPM | HEIGHT: 63 IN

## 2023-03-06 DIAGNOSIS — L60.2 HYPERTROPHY OF NAIL: ICD-10-CM

## 2023-03-06 DIAGNOSIS — M15.0 PRIMARY OSTEOARTHRITIS INVOLVING MULTIPLE JOINTS: ICD-10-CM

## 2023-03-06 DIAGNOSIS — Z78.0 POST-MENOPAUSAL: ICD-10-CM

## 2023-03-06 DIAGNOSIS — M51.35 DDD (DEGENERATIVE DISC DISEASE), THORACOLUMBAR: ICD-10-CM

## 2023-03-06 DIAGNOSIS — H61.23 BILATERAL IMPACTED CERUMEN: ICD-10-CM

## 2023-03-06 DIAGNOSIS — R91.1 NODULE OF LEFT LUNG: ICD-10-CM

## 2023-03-06 DIAGNOSIS — Z00.00 ENCOUNTER FOR MEDICARE ANNUAL WELLNESS EXAM: Primary | ICD-10-CM

## 2023-03-06 DIAGNOSIS — E78.5 HYPERLIPIDEMIA LDL GOAL <100: ICD-10-CM

## 2023-03-06 PROCEDURE — 99213 OFFICE O/P EST LOW 20 MIN: CPT | Mod: 25 | Performed by: FAMILY MEDICINE

## 2023-03-06 PROCEDURE — G0439 PPPS, SUBSEQ VISIT: HCPCS | Performed by: FAMILY MEDICINE

## 2023-03-06 ASSESSMENT — ENCOUNTER SYMPTOMS
NECK PAIN: 1
MUSCLE CRAMPS: 0
STIFFNESS: 0
JOINT SWELLING: 1
DIFFICULTY URINATING: 0
POOR WOUND HEALING: 0
HEMATURIA: 0
MYALGIAS: 0
SKIN CHANGES: 0
ARTHRALGIAS: 1
NAIL CHANGES: 1
FLANK PAIN: 0
DYSURIA: 0
MUSCLE WEAKNESS: 0
BACK PAIN: 1

## 2023-03-06 NOTE — PROGRESS NOTES
Medicare Annual Wellness Questionnaire:  This 77 year old year old female presents for a Medicare Wellness Exam.    Fall Risk Assessment:  Have you fallen 2 or more times in the last year?  No    How many times were you injured due to a fall in the last year?  none    PHQ-2:  Over the last 2 weeks, how often have you been bothered by feeling down, depressed, or hopeless?  Not at all (0)     Over the last 2 weeks, how often have you had little interest or pleasure in doing things?  Not at all (0)     Social History:  What is your marital status?      Who lives in your household?  Just me    Does your home have loose rugs in the hallway:     No    Does your home have grab bars in the bathroom:    No    Does your home have handrails on the stairs?  Yes     Does your home have poorly lit areas?    No    Do you feel threatened or controlled by a partner, ex-partner or anyone in your life?   No    Has anyone hurt you physically, for example by pushing, hitting, slapping or kicking you or forcing you to have sex?   No    Do you need help with the phone, transportation, shopping, preparing meals, housework, laundry, medications or managing money?   No    Sexual Health:  Are you sexually active?    No    If yes, with men, women, or both?   Men Women Both    If yes, how many partners?  N/A    If yes, are you using condoms?    N/A    Have you had any sexually transmitted infections in the last year?   No    Do you have any sexual concerns?    No    Women Only:  Women: What year did you stop having periods (approximate age)?  1998    Women: Any vaginal bleeding in the last year?    No    Women: Have you ever had an abnormal Pap smear?    Yes, 1996    General Health Assessment:  Have you noticed any hearing difficulties?   Yes     Do you wear hearing aids?   No    Have you seen a hearing professional such as an audiologist in the last 1 year?   No    Do you have vision difficulty?    No    Do you wear glasses or  contacts?   Yes     Have you seen an eye doctor in the last 1 year?   Yes     How many servings of fruits and vegetables do you eat a day?  Fruit: 2  Vegetables: 2    How often do you exercise in a week?  3    How long and what kind of exercise do you do?  20-30 min. Walking working w/hand weights, stretching    Tobacco and Alcohol History:  Do you use tobacco/nicotine products?    No    If yes, please list the method of use and average weekly consumption?  N/A    Do you use any other drugs?   No         Do you drink alcohol?   Yes     If you drink alcohol, how many drinks per week?  4    Advanced Directive:  Have you completed an Advance Directives document?  Yes     If yes, have you given a copy to the clinic?   No    Do you need information on Advance Directives?   No    Mary Galeana, EMT at 12:32 PM on 3/6/2023

## 2023-03-07 ENCOUNTER — THERAPY VISIT (OUTPATIENT)
Dept: PHYSICAL THERAPY | Facility: CLINIC | Age: 78
End: 2023-03-07
Attending: STUDENT IN AN ORGANIZED HEALTH CARE EDUCATION/TRAINING PROGRAM
Payer: MEDICARE

## 2023-03-07 DIAGNOSIS — M19.011 OSTEOARTHRITIS OF GLENOHUMERAL JOINT, RIGHT: ICD-10-CM

## 2023-03-07 PROCEDURE — 97112 NEUROMUSCULAR REEDUCATION: CPT | Mod: GP | Performed by: PHYSICAL THERAPIST

## 2023-03-07 PROCEDURE — 97161 PT EVAL LOW COMPLEX 20 MIN: CPT | Mod: GP | Performed by: PHYSICAL THERAPIST

## 2023-03-07 NOTE — PROGRESS NOTES
[facility]    OUTPATIENT Physical Therapy ORTHOPEDIC EVALUATION  PLAN OF TREATMENT FOR OUTPATIENT REHABILITATION  (COMPLETE FOR INITIAL CLAIMS ONLY)  Patient's Last Name, First Name, M.I.  YOB: 1945  Giana Hope    Provider s Name:  [facility]   Medical Record No.  [id number]   Start of Care Date:  03/07/23   Onset Date:   08/15/22   Treatment Diagnosis:  Osteoarthritis of glenohumeral joint, right Medical Diagnosis:  Osteoarthritis of glenohumeral joint, right       Goals:     03/07/23 0500   Goal #1   Goal #1 reaching   Previous Functional Level   (pain free reaching into cupboard)   Current Functional Level   (4/10 pain level with  reaching into cupboard)   STG [employer]   (3/10 pain level with  reaching into cupboard)   Rationale   (pain free reaching)   Due date 04/19/23   LTG [employer]   (2/10 pain level with  reaching into cupboard)   Rationale   (pain free reaching)   Due date 06/01/23       Therapy Frequency:  one time per week  Predicted Duration of Therapy Intervention:  6 weeks    Trini Miller, PT                 I CERTIFY THE NEED FOR THESE SERVICES FURNISHED UNDER        THIS PLAN OF TREATMENT AND WHILE UNDER MY CARE     (Physician attestation of this document indicates review and certification of the therapy plan).                     Certification Date From:  03/07/23   Certification Date To:  05/26/23    Referring Provider:  Gabriel Marrero    Initial Assessment        See Epic Evaluation SOC Date: 03/07/23

## 2023-03-07 NOTE — PROGRESS NOTES
Physical Therapy Initial Evaluation  Subjective:  The history is provided by the patient. No  was used.   Therapist Generated HPI Evaluation  Problem details: DOI: August 15, 2022  Medical history: depression- controlled, Osteoarthritis, smoking- 30 years , change in skin color- peripheral vascular insuffiency- controlled with socks  , total left shoulder replacement (L) 2019  Pain: 3/10   Steroid injection- March 2023 and has not helped yet..         Type of problem:  Right shoulder.    This is a new condition.  Condition occurred with:  Unknown cause.  Where condition occurred: for unknown reasons.  Patient reports pain:  Lateral.  Pain is described as aching, sharp and shooting and is intermittent.  Radiates to: none. Pain timing: none.  Since onset symptoms are unchanged.  Associated symptoms:  Loss of strength (loss of motion ). Symptoms are exacerbated by using arm overhead (sleeping)    Imaging testing: none.  Past treatment: none. Improved with treatment: na.  Work activity restrictions: retired   Barriers include:  None as reported by patient.                        Objective:  System                   Shoulder Evaluation:  ROM:  AROM:    Flexion:  Left:  115    Right:  118  Extension: Left: 15Right: 20   Abduction:  Left: 115   Right:  135 with pain     Internal Rotation:  Left:  8    Right:  15  External Rotation:  Left:  30    Right:  25      Elbow Flexion:  Left:  [id number]    Right:  Wnl  Elbow Extension:  Left:  Wnl   Right:  Wnl              Strength:    Flexion: Left:5/5   Pain:    Right: 5/5     Pain:   Extension:  Left: 5/5    Pain:    Right: 4+/5    Pain:  Abduction:  Left: 5/5  Pain:    Right: 5-/5     Pain:  Adduction:  Left: 5/5    Pain:      Internal Rotation:  Left:5/5     Pain:    Right: 5/5     Pain:  External Rotation:   Left:4+/5     Pain:   Right:4/5     Pain:        Elbow Flexion:  Left:5/5     Pain:    Right:5/5     Pain:    Stability Testing:  not  assessed      Special Tests:  not assessed        Mobility Tests:  not assessed                                              Left greater than right upper trapezius muscle tightness, protracted scapula ( Left greater than right) Increased thoracic kyphosis, mmt: pectoralis major: 4/5 (R), 4+/5 (L)    General     ROS    Assessment/Plan:    Patient is a 77 year old female with right side shoulder complaints.    Patient has the following significant findings with corresponding treatment plan.                Diagnosis 1:   Osteoarthritis of glenohumeral joint, right  Pain -  hot/cold therapy, mechanical traction, self management, education, directional preference exercise and home program  Decreased ROM/flexibility - manual therapy, therapeutic exercise and therapeutic activity  Decreased strength - therapeutic exercise, therapeutic activities and home program  Impaired muscle performance - neuro re-education and home program  Decreased function - therapeutic activities and home program    Therapy Evaluation Codes:   1) HClinical presentation characteristics are:   Stable/Uncomplicated.  2) Decision-Making    Low complexity using standardized patient assessment instrument and/or measureable assessment of functional outcome.  Cumulative Therapy Evaluation is: Low complexity.    Previous and current functional limitations:  (See Goal Flow Sheet for this information)    Short term and Long term goals: (See Goal Flow Sheet for this information)     Communication ability:  Patient appears to be able to clearly communicate and understand verbal and written communication and follow directions correctly.  Treatment Explanation - The following has been discussed with the patient:   RX ordered/plan of care  Anticipated outcomes  Possible risks and side effects  This patient would benefit from PT intervention to resume normal activities.   Rehab potential is good.    Frequency  :  1 X week, once daily  Duration:  for 6  weeks  Discharge Plan:  Achieve all LTG.  Independent in home treatment program.  Reach maximal therapeutic benefit.    Please refer to the daily flowsheet for treatment today, total treatment time and time spent performing 1:1 timed codes.

## 2023-03-10 NOTE — RESULT ENCOUNTER NOTE
Dear Hawa  We reviewed slight increase in lipids after recent travel therefore lower fat and cholesterol in your diet and continue current dose of Pravastatin.  Gilberto Vasquez MD

## 2023-04-17 ENCOUNTER — THERAPY VISIT (OUTPATIENT)
Dept: PHYSICAL THERAPY | Facility: CLINIC | Age: 78
End: 2023-04-17
Payer: MEDICARE

## 2023-04-17 DIAGNOSIS — M19.011 OSTEOARTHRITIS OF GLENOHUMERAL JOINT, RIGHT: Primary | ICD-10-CM

## 2023-04-17 PROCEDURE — 97140 MANUAL THERAPY 1/> REGIONS: CPT | Mod: GP | Performed by: PHYSICAL THERAPIST

## 2023-04-17 PROCEDURE — 97112 NEUROMUSCULAR REEDUCATION: CPT | Mod: GP | Performed by: PHYSICAL THERAPIST

## 2023-04-17 NOTE — PROGRESS NOTES
Subjective:  HPI  Physical Exam                    Objective:  System    Physical Exam    General     ROS    Assessment/Plan:    PROGRESS  REPORT    Progress reporting period is from 3-7-2023 to 4-.       SUBJECTIVE  Subjective changes noted by patient:  Hawa reports that cortizone injection is wearing off. Overall improvement of shoulder with therapy is minimal. Reaching in cupboard with 4/10 pain.       Current pain level is 6/10     Initial Pain level: 3/10.   Changes in function:  None  Adverse reaction to treatment or activity: None    OBJECTIVE  Changes noted in objective findings:  Shoulder Evaluation:  ROM:  AROM:    Flexion:  Left:  115    Right:  118  Extension: Left: 15Right: 20   Abduction:  Left: 115   Right:  135 with pain      Internal Rotation:  Left:  8    Right:  15  External Rotation:  Left:  30    Right:  25        Elbow Flexion:  Left:  Bru720    Right:  Wnl  Elbow Extension:  Left:  Wnl   Right:  Wnl                  Strength:    Flexion: Left:5/5   Pain:    Right: 5/5     Pain:   Extension:  Left: 5/5    Pain:    Right: 4+/5    Pain:  Abduction:  Left: 5/5  Pain:    Right: 5-/5     Pain:  Adduction:  Left: 5/5    Pain:      Internal Rotation:  Left:5/5     Pain:    Right: 5/5     Pain:  External Rotation:   Left:4+/5     Pain:   Right:4/5     Pain:          Elbow Flexion:  Left:5/5     Pain:    Right:5/5     Pain:                                                  Left greater than right upper trapezius muscle tightness, protracted scapula ( Left greater than right) Increased thoracic kyphosis, mmt: pectoralis major: 4/5 (R), 4+/5 (L)   program    ASSESSMENT/PLAN  Updated problem list and treatment plan: Diagnosis 1:  Diagnosis 1:   Osteoarthritis of glenohumeral joint, right  Pain -  hot/cold therapy, mechanical traction, self management, education, directional preference exercise and home program  Decreased ROM/flexibility - manual therapy, therapeutic exercise and therapeutic  activity  Decreased strength - therapeutic exercise, therapeutic activities and home program  Impaired muscle performance - neuro re-education and home program  Decreased function - therapeutic activities and home   STG/LTGs have been met or progress has been made towards goals:  None and  Patient has had only 2 visits in last 5-6 weeks  Assessment of Progress: The patient's condition is unchanged.  Self Management Plans:  Patient has been instructed in a home treatment program.  Patient  has been instructed in self management of symptoms.  I have re-evaluated this patient and find that the nature, scope, duration and intensity of the therapy is appropriate for the medical condition of the patient.  Giana continues to require the following intervention to meet STG and LTG's:  PT    Recommendations:  This patient would benefit from continued therapy.     Frequency:  1 X week, once daily  Duration:  for 6 weeks        Please refer to the daily flowsheet for treatment today, total treatment time and time spent performing 1:1 timed codes.

## 2023-04-25 ENCOUNTER — MYC MEDICAL ADVICE (OUTPATIENT)
Dept: FAMILY MEDICINE | Facility: CLINIC | Age: 78
End: 2023-04-25

## 2023-04-25 ENCOUNTER — THERAPY VISIT (OUTPATIENT)
Dept: PHYSICAL THERAPY | Facility: CLINIC | Age: 78
End: 2023-04-25
Payer: MEDICARE

## 2023-04-25 DIAGNOSIS — M19.011 OSTEOARTHRITIS OF GLENOHUMERAL JOINT, RIGHT: Primary | ICD-10-CM

## 2023-04-25 DIAGNOSIS — M54.50 ACUTE LOW BACK PAIN, UNSPECIFIED BACK PAIN LATERALITY, UNSPECIFIED WHETHER SCIATICA PRESENT: Primary | ICD-10-CM

## 2023-04-25 PROCEDURE — 97140 MANUAL THERAPY 1/> REGIONS: CPT | Mod: GP | Performed by: PHYSICAL THERAPIST

## 2023-04-26 ENCOUNTER — ANCILLARY PROCEDURE (OUTPATIENT)
Dept: BONE DENSITY | Facility: CLINIC | Age: 78
End: 2023-04-26
Attending: FAMILY MEDICINE
Payer: MEDICARE

## 2023-04-26 DIAGNOSIS — Z78.0 POST-MENOPAUSAL: ICD-10-CM

## 2023-04-26 PROCEDURE — 77080 DXA BONE DENSITY AXIAL: CPT | Performed by: INTERNAL MEDICINE

## 2023-04-27 DIAGNOSIS — M54.6 PAIN IN THORACIC SPINE: Primary | ICD-10-CM

## 2023-04-27 NOTE — TELEPHONE ENCOUNTER
DIAGNOSIS: Acute Mid and Low Back Pain   APPOINTMENT DATE: 05/02/2023   NOTES STATUS DETAILS   OFFICE NOTE from referring provider SELF 04/25/2023 - [facility]   OFFICE NOTE from other specialist Internal 04/01/2021 - Jose Alberto Holman MD - [facility]   MEDICATION LIST Internal  Care Everywhere    LABS     DEXA PACS Internal   MRI Nil Read/PACS Internal   XRAYS (IMAGES & REPORTS) Nil Read/PACS Internal      Home

## 2023-05-02 ENCOUNTER — ANCILLARY PROCEDURE (OUTPATIENT)
Dept: GENERAL RADIOLOGY | Facility: CLINIC | Age: 78
End: 2023-05-02
Attending: ORTHOPAEDIC SURGERY
Payer: MEDICARE

## 2023-05-02 ENCOUNTER — PRE VISIT (OUTPATIENT)
Dept: ORTHOPEDICS | Facility: CLINIC | Age: 78
End: 2023-05-02

## 2023-05-02 ENCOUNTER — OFFICE VISIT (OUTPATIENT)
Dept: ORTHOPEDICS | Facility: CLINIC | Age: 78
End: 2023-05-02
Payer: MEDICARE

## 2023-05-02 VITALS — HEIGHT: 63 IN | WEIGHT: 147.71 LBS | BODY MASS INDEX: 26.17 KG/M2

## 2023-05-02 DIAGNOSIS — M54.50 ACUTE LOW BACK PAIN, UNSPECIFIED BACK PAIN LATERALITY, UNSPECIFIED WHETHER SCIATICA PRESENT: ICD-10-CM

## 2023-05-02 DIAGNOSIS — M54.6 PAIN IN THORACIC SPINE: ICD-10-CM

## 2023-05-02 DIAGNOSIS — M54.6 PAIN IN THORACIC SPINE: Primary | ICD-10-CM

## 2023-05-02 PROCEDURE — 72082 X-RAY EXAM ENTIRE SPI 2/3 VW: CPT | Performed by: SURGERY

## 2023-05-02 PROCEDURE — 77073 BONE LENGTH STUDIES: CPT | Performed by: SURGERY

## 2023-05-02 PROCEDURE — 99214 OFFICE O/P EST MOD 30 MIN: CPT | Mod: GC | Performed by: ORTHOPAEDIC SURGERY

## 2023-05-02 NOTE — PROGRESS NOTES
Spine Surgery Consultation    REFERRING PHYSICIAN: Referred Self   PRIMARY CARE PHYSICIAN: Gilberto Vasquez           Chief Complaint:   Consult (New patient consult for pain that starts mid-scapular level and down to just above pelvis.  Sx began ~2 weeks ago while walking.  )      History of Present Illness:  Symptom Profile Including: location of symptoms, onset, severity, exacerbating/alleviating factors, previous treatments:        Giana Hope is a 77 year old female who presents for mid-thoracic back pain for the past two weeks. She notes that she started having pain two weeks ago while performing weighted exercises during PT for her shoulder. She currently rates her pain 1/10, but it can get up to 6/10 with walking long distances or prolonged sitting. She denies any bowel or bladder changes. Pain does not radiate down legs  She notes that she does have occasional episode of pain over the past year in the same region that have self resolved. Occasionally takes ibuprofen which does help her pain. Has not tried tylenol, gabapentin, muscle relaxants, dedicated spine PT, or CSI.          Past Medical History:     Past Medical History:   Diagnosis Date     Chronic kidney disease, stage 3a (H) 10/3/2022     Nonsenile cataract      Osteoarthritis      Pulmonary nodule      Uveitis             Past Surgical History:     Past Surgical History:   Procedure Laterality Date     ARTHROPLASTY KNEE Right 11/26/2021    Procedure: ARTHROPLASTY, RIGHT KNEE, TOTAL;  Surgeon: Gabriel Marrero MD;  Location:  OR     ARTHROPLASTY, RIGHT KNEE, TOTAL Right 11/26/2021     CATARACT IOL, RT/LT Right 03/2019     PHACOEMULSIFICATION WITH STANDARD INTRAOCULAR LENS IMPLANT Right 3/22/2019    Procedure: Right Cataract Removal with Intraocular Lens Implant;  Surgeon: Trish Taylor MD;  Location:  OR            Social History:     Social History     Tobacco Use     Smoking status: Former     Years: 35.00     Types:  "Cigarettes     Smokeless tobacco: Never     Tobacco comments:     Quit 24 years ago    Vaping Use     Vaping status: Never Used     Passive vaping exposure: Yes   Substance Use Topics     Alcohol use: Yes     Comment: Moderate            Family History:     Family History   Problem Relation Age of Onset     Arthritis Mother      Breast Cancer Mother 78     Diabetes Type 2  Father      Heart Disease Father          age 75     Alzheimer Disease Father 60     Heart Disease Brother 68     Breast Cancer Maternal Grandmother 81     Heart Disease Maternal Grandfather 54         age 81     Other - See Comments Son         schizoaffective lives in [place]     Glaucoma No family hx of      Macular Degeneration No family hx of             Allergies:     Allergies   Allergen Reactions     Dust Mites Itching     Runny nose, fever     Mold Itching     Fever, runny nose     Pollen Extract Itching     Fever, runny nose     Celecoxib      Other reaction(s): GI intolerance            Medications:     Current Outpatient Medications   Medication     calcium-vitamin D 500-125 MG-UNIT TABS     famotidine (PEPCID) 40 MG tablet     ketoconazole (NIZORAL) 2 % external shampoo     pravastatin (PRAVACHOL) 20 MG tablet     prednisoLONE acetate (PRED FORTE) 1 % ophthalmic suspension     sulindac (CLINORIL) 200 MG tablet     tiZANidine (ZANAFLEX) 4 MG tablet     Vitamin D3 (CHOLECALCIFEROL) 25 mcg (1000 units) tablet     No current facility-administered medications for this visit.             Review of Systems:     A 10 point ROS was performed and reviewed. Specific responses to these questions are noted at the end of the document.         Physical Exam:   Vitals: Ht 1.61 m (5' 3.39\")   Wt 67 kg (147 lb 11.3 oz)   LMP 1998 (Approximate)   BMI 25.85 kg/m    Constitutional: awake, alert, cooperative, no apparent distress, appears stated age.    Eyes: The sclera are white.  Ears, Nose, Throat: The trachea is midline.  Psychiatric: " The patient has a normal affect.  Respiratory: breathing non-labored  Cardiovascular: The extremities are warm and perfused.  Skin: no obvious rashes or lesions.  Musculoskeletal, Neurologic, and Spine:     Thoracic spine:    Appearance -no gross step-offs, curvature noted in the coronal plane, NTTP along midline spinous processes, some TTP in paraspinal muscles      Lumbar Spine:    Appearance - No gross stepoffs or deformities    Motor -     L2-3: Hip flexion R 5/5  And L 5/5 strength          L3/4:  Knee extension R 5/5 and L 5/5 strength         L4/5:  Foot dorsiflexion R 5/5 L 5/5 and       EHL dorsiflexion R 5/5 L 5/5 strength         S1:  Plantarflexion/Peroneal Muscles  R 5/5 and L 5/5 strength    Sensation: intact to light touch L3-S1 distribution BLE        Straight leg raise negative        Neurologic:      REFLEXES Right Left   Patella Not elicited  Not elicited   Ankle jerk 1+ 1+   Babinski No upgoing great toe No upgoing great toe   Clonus 0 beats 0 beats     Hip Exam:  No pain with hip log roll and no tenderness over the greater trochanters.    Alignment:  Patient stands with a neutral standing sagittal balance.           Imaging:   We ordered and independently reviewed new radiographs at this clinic visit. The results were discussed with the patient.  Findings include:    EOS full body films showing no obvious fracture or dislocation. There is degenerative scoliosis with multiple levels of spondylosis throughout the low thoracic/upper lumbar spine.            Assessment and Plan:   Assessment:  77 year old female with recurrent mid- to low-back pain.  We had a discussion with the patient that her symptoms are most likely related to her degenerative changes in the lower thoracic spine, leading to progressive deformity.  We discussed that there are 2 major imbalance in which treatment could be considered, mainly operative versus nonoperative.  At this time, given that her symptoms are tolerable and  tend to wax and wane, we recommend continued nonoperative treatment.  Specifically, we discussed spine focused physical therapy, medications such as tizanidine to provide muscle relaxant, and corticosteroid injections.  At this time we will proceed with a referral for physical therapy and a prescription for tizanidine.  Patient is agreeable to wait on a corticosteroid injection pending her progress with these other modalities.  If her pain were to worsen or not improve with the physical therapy and medication, we would need to obtain a MRI of the thoracic and lumbar spine to evaluate at which level an injection would be most effective. We would be happy to provide her with an order for MRI at the patient's convenience if she were to reach out via phone or MyChart and schedule a phone follow up with her to discuss results. Otherwise will have the patient follow up PRN.     Johanna Parks MD   Orthopaedic resident, PGY-1      Attending MD (Dr. Contreras Munoz) :  I reviewed and verified the history and physical exam of the patient and discussed the patient's management with the other clinical providers involved in this patient's care including any involved residents or physicians assistants. I reviewed the above note and agree with the documented findings and plan of care, which were communicated to the patient.      Contreras Munoz MD    Respectfully,  Contreras Munoz MD  Spine Surgery  [facility]

## 2023-05-02 NOTE — LETTER
5/2/2023         RE: Giana Hope  [address]  [place] [address]        Dear Colleague,    Thank you for referring your patient, Giana Hope, to the [facility] [place]. Please see a copy of my visit note below.    Spine Surgery Consultation    REFERRING PHYSICIAN: Referred Self   PRIMARY CARE PHYSICIAN: Gilberto Vasquez           Chief Complaint:   Consult (New patient consult for pain that starts mid-scapular level and down to just above pelvis.  Sx began ~2 weeks ago while walking.  )      History of Present Illness:  Symptom Profile Including: location of symptoms, onset, severity, exacerbating/alleviating factors, previous treatments:        Giana Hope is a 77 year old female who presents for mid-thoracic back pain for the past two weeks. She notes that she started having pain two weeks ago while performing weighted exercises during PT for her shoulder. She currently rates her pain 1/10, but it can get up to 6/10 with walking long distances or prolonged sitting. She denies any bowel or bladder changes. Pain does not radiate down legs  She notes that she does have occasional episode of pain over the past year in the same region that have self resolved. Occasionally takes ibuprofen which does help her pain. Has not tried tylenol, gabapentin, muscle relaxants, dedicated spine PT, or CSI.          Past Medical History:     Past Medical History:   Diagnosis Date    Chronic kidney disease, stage 3a (H) 10/3/2022    Nonsenile cataract     Osteoarthritis     Pulmonary nodule     Uveitis             Past Surgical History:     Past Surgical History:   Procedure Laterality Date    ARTHROPLASTY KNEE Right 11/26/2021    Procedure: ARTHROPLASTY, RIGHT KNEE, TOTAL;  Surgeon: Gabriel Marrero MD;  Location: [facility]    ARTHROPLASTY, RIGHT KNEE, TOTAL Right 11/26/2021    CATARACT IOL, RT/LT Right 03/2019    PHACOEMULSIFICATION WITH STANDARD INTRAOCULAR LENS IMPLANT  "Right 3/22/2019    Procedure: Right Cataract Removal with Intraocular Lens Implant;  Surgeon: Trish Taylor MD;  Location:  OR            Social History:     Social History     Tobacco Use    Smoking status: Former     Years: 35.00     Types: Cigarettes    Smokeless tobacco: Never    Tobacco comments:     Quit 24 years ago    Vaping Use    Vaping status: Never Used     Passive vaping exposure: Yes   Substance Use Topics    Alcohol use: Yes     Comment: Moderate            Family History:     Family History   Problem Relation Age of Onset    Arthritis Mother     Breast Cancer Mother 78    Diabetes Type 2  Father     Heart Disease Father          age 75    Alzheimer Disease Father 60    Heart Disease Brother 68    Breast Cancer Maternal Grandmother 81    Heart Disease Maternal Grandfather 54         age 81    Other - See Comments Son         schizfrancisca lives in [place]    Glaucoma No family hx of     Macular Degeneration No family hx of             Allergies:     Allergies   Allergen Reactions    Dust Mites Itching     Runny nose, fever    Mold Itching     Fever, runny nose    Pollen Extract Itching     Fever, runny nose    Celecoxib      Other reaction(s): GI intolerance            Medications:     Current Outpatient Medications   Medication    calcium-vitamin D 500-125 MG-UNIT TABS    famotidine (PEPCID) 40 MG tablet    ketoconazole (NIZORAL) 2 % external shampoo    pravastatin (PRAVACHOL) 20 MG tablet    prednisoLONE acetate (PRED FORTE) 1 % ophthalmic suspension    sulindac (CLINORIL) 200 MG tablet    tiZANidine (ZANAFLEX) 4 MG tablet    Vitamin D3 (CHOLECALCIFEROL) 25 mcg (1000 units) tablet     No current facility-administered medications for this visit.             Review of Systems:     A 10 point ROS was performed and reviewed. Specific responses to these questions are noted at the end of the document.         Physical Exam:   Vitals: Ht 1.61 m (5' 3.39\")   Wt 67 kg (147 lb 11.3 oz)   LMP " 01/01/1998 (Approximate)   BMI 25.85 kg/m    Constitutional: awake, alert, cooperative, no apparent distress, appears stated age.    Eyes: The sclera are white.  Ears, Nose, Throat: The trachea is midline.  Psychiatric: The patient has a normal affect.  Respiratory: breathing non-labored  Cardiovascular: The extremities are warm and perfused.  Skin: no obvious rashes or lesions.  Musculoskeletal, Neurologic, and Spine:     Thoracic spine:    Appearance -no gross step-offs, curvature noted in the coronal plane, NTTP along midline spinous processes, some TTP in paraspinal muscles      Lumbar Spine:    Appearance - No gross stepoffs or deformities    Motor -     L2-3: Hip flexion R 5/5  And L 5/5 strength          L3/4:  Knee extension R 5/5 and L 5/5 strength         L4/5:  Foot dorsiflexion R 5/5 L 5/5 and       EHL dorsiflexion R 5/5 L 5/5 strength         S1:  Plantarflexion/Peroneal Muscles  R 5/5 and L 5/5 strength    Sensation: intact to light touch L3-S1 distribution BLE        Straight leg raise negative        Neurologic:      REFLEXES Right Left   Patella Not elicited  Not elicited   Ankle jerk 1+ 1+   Babinski No upgoing great toe No upgoing great toe   Clonus 0 beats 0 beats     Hip Exam:  No pain with hip log roll and no tenderness over the greater trochanters.    Alignment:  Patient stands with a neutral standing sagittal balance.           Imaging:   We ordered and independently reviewed new radiographs at this clinic visit. The results were discussed with the patient.  Findings include:    EOS full body films showing no obvious fracture or dislocation. There is degenerative scoliosis with multiple levels of spondylosis throughout the low thoracic/upper lumbar spine.            Assessment and Plan:   Assessment:  77 year old female with recurrent mid- to low-back pain.  We had a discussion with the patient that her symptoms are most likely related to her degenerative changes in the lower thoracic  spine, leading to progressive deformity.  We discussed that there are 2 major imbalance in which treatment could be considered, mainly operative versus nonoperative.  At this time, given that her symptoms are tolerable and tend to wax and wane, we recommend continued nonoperative treatment.  Specifically, we discussed spine focused physical therapy, medications such as tizanidine to provide muscle relaxant, and corticosteroid injections.  At this time we will proceed with a referral for physical therapy and a prescription for tizanidine.  Patient is agreeable to wait on a corticosteroid injection pending her progress with these other modalities.  If her pain were to worsen or not improve with the physical therapy and medication, we would need to obtain a MRI of the thoracic and lumbar spine to evaluate at which level an injection would be most effective. We would be happy to provide her with an order for MRI at the patient's convenience if she were to reach out via phone or MyChart and schedule a phone follow up with her to discuss results. Otherwise will have the patient follow up PRN.     Johanna Parks MD   Orthopaedic resident, PGY-1      Attending MD (Dr. Contreras Munoz) :  I reviewed and verified the history and physical exam of the patient and discussed the patient's management with the other clinical providers involved in this patient's care including any involved residents or physicians assistants. I reviewed the above note and agree with the documented findings and plan of care, which were communicated to the patient.      Contreras Munoz MD    Respectfully,  Contreras Munoz MD  Spine Surgery  [facility]

## 2023-05-02 NOTE — NURSING NOTE
"Reason For Visit:   Chief Complaint   Patient presents with     Consult     New patient consult for pain that starts mid-scapular level and down to just above pelvis.  Sx began ~2 weeks ago while walking.         Primary MD: Gilberto Vasquez  Ref. MD: self    ?  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  Currently working? No.  Work status?  Retired.  Date of injury: 4/18/23  Type of injury: Unknown - walking.  Date of surgery: NA  Type of surgery: NA.  Smoker: No  Request smoking cessation information: No    Ht 1.61 m (5' 3.39\")   Wt 67 kg (147 lb 11.3 oz)   LMP 01/01/1998 (Approximate)   BMI 25.85 kg/m      Pain Assessment  Patient Currently in Pain: Yes  0-10 Pain Scale: 3  Primary Pain Location: Back    Oswestry (LAZARA) Questionnaire        4/1/2021    10:15 AM   OSWESTRY DISABILITY INDEX   Count 9   Sum 15   Oswestry Score (%) 33.33 %            Neck Disability Index (NDI) Questionnaire         View : No data to display.                            Promis 10 Assessment        4/28/2021     8:16 AM   PROMIS 10   In general, would you say your health is: Very good   In general, would you say your quality of life is: Good   In general, how would you rate your physical health? Good   In general, how would you rate your mental health, including your mood and your ability to think? Good   In general, how would you rate your satisfaction with your social activities and relationships? Fair   In general, please rate how well you carry out your usual social activities and roles Fair   To what extent are you able to carry out your everyday physical activities such as walking, climbing stairs, carrying groceries, or moving a chair? Completely   In the past 7 days, how often have you been bothered by emotional problems such as feeling anxious, depressed, or irritable? Rarely   In the past 7 days, how would you rate your fatigue on average? Mild   In the past 7 days, how would you rate your pain on " average, where 0 means no pain, and 10 means worst imaginable pain? 2   In general, would you say your health is: 4   In general, would you say your quality of life is: 3   In general, how would you rate your physical health? 3   In general, how would you rate your mental health, including your mood and your ability to think? 3   In general, how would you rate your satisfaction with your social activities and relationships? 2   In general, please rate how well you carry out your usual social activities and roles. (This includes activities at home, at work and in your community, and responsibilities as a parent, child, spouse, employee, friend, etc.) 2   To what extent are you able to carry out your everyday physical activities such as walking, climbing stairs, carrying groceries, or moving a chair? 5   In the past 7 days, how often have you been bothered by emotional problems such as feeling anxious, depressed, or irritable? 2   In the past 7 days, how would you rate your fatigue on average? 2   In the past 7 days, how would you rate your pain on average, where 0 means no pain, and 10 means worst imaginable pain? 2   Global Mental Health Score 12   Global Physical Health Score 16   PROMIS TOTAL - SUBSCORES 28                Richard Cortes, ATC

## 2023-05-04 NOTE — RESULT ENCOUNTER NOTE
Dear Giana Hope       I have received the results of your DEXA bone densitometry.   Your test shows that you have low bone density just entering in low bone density range very good at age 77.  Please continue exercise with goal for 30 minutes most days of the week.    Based on these results we recommend that you:   Continue present cares for your bones.    Please take a total of 3985-1210 mg ( 100-120 %) of calcium per day, best from nutrition. For reference, one glass of milk is about 300 mg of calcium or 30% RA.   If you use supplements, those containing calcium citrate or calcium carbonate such as TUMS are best absorbed but please discuss with me first prior to taking supplements.  In addition, if you don't drink at least 2 glasses of milk or at least 2 servings of calcium rich food daily, you should also take supplemental Vitamin D, 400-800 IU  (100-200 %) daily especially in winter. Many calcium and vitamin preparations include Vitamin D or a multiple vitamin may contain Vitamin D. You may safely take up to 1000 IU daily. If I recommended a different dose at your appointment, please follow recommendations we discussed.       If you have any questions, please make an appointment to discuss further.      Sincerely,  Gilberto Vasquez MD

## 2023-05-08 ENCOUNTER — THERAPY VISIT (OUTPATIENT)
Dept: PHYSICAL THERAPY | Facility: CLINIC | Age: 78
End: 2023-05-08
Payer: MEDICARE

## 2023-05-08 DIAGNOSIS — M19.011 OSTEOARTHRITIS OF GLENOHUMERAL JOINT, RIGHT: Primary | ICD-10-CM

## 2023-05-08 PROCEDURE — 97110 THERAPEUTIC EXERCISES: CPT | Mod: GP | Performed by: PHYSICAL THERAPIST

## 2023-05-08 PROCEDURE — 97112 NEUROMUSCULAR REEDUCATION: CPT | Mod: GP | Performed by: PHYSICAL THERAPIST

## 2023-05-15 ENCOUNTER — THERAPY VISIT (OUTPATIENT)
Dept: PHYSICAL THERAPY | Facility: CLINIC | Age: 78
End: 2023-05-15
Payer: MEDICARE

## 2023-05-15 DIAGNOSIS — M54.6 PAIN IN THORACIC SPINE: Primary | ICD-10-CM

## 2023-05-15 PROCEDURE — 97161 PT EVAL LOW COMPLEX 20 MIN: CPT | Mod: GP | Performed by: PHYSICAL THERAPIST

## 2023-05-15 PROCEDURE — 97140 MANUAL THERAPY 1/> REGIONS: CPT | Mod: GP | Performed by: PHYSICAL THERAPIST

## 2023-05-15 NOTE — PROGRESS NOTES
Physical Therapy Initial Evaluation  Subjective:  The history is provided by the patient. No  was used.   Therapist Generated HPI Evaluation  Problem details: DOI- March 20, 2023.         Type of problem:  Thoracic spine (central).    This is a recurrent condition.  Condition occurred with:  Insidious onset.  Where condition occurred: for unknown reasons.  Patient reports pain:  Central cervical spine ((B) thoracic regions).  Pain is described as aching and is intermittent.  Pain radiates to:  None. Pain timing: worse in afternoon.  Since onset symptoms are unchanged.  Associated symptoms:  Headache (frontal ). Exacerbated by: standing for extended time.  Relieved by: heat.  Special tests included:  X-ray.  Past treatment: NONE. Improved with treatment: NA.  Work activity restrictions: RETIRED.  Barriers include:  None as reported by patient.       XR EOS: 1. Mild dextroscoliosis of the lumbar spine.   2. No  global sagittal imbalance.  3. Weight bearing axis as detailed above.     Dexa: Results   Lumbar spine   T-score -0.9 ., BMD is 1.072 g/cm2.      Left femoral neck  T-score -1.4      Right femoral neck  T-score -1.1      Left Total femur  T-score -0.5 , BMD is 0.941 g/cm2.     Right total femur  T-score -0.4, BMD is 0.955 g/cm2.                Objective:  System              Cervical/Thoracic Evaluation    AROM:    AROM Thoracic:    Flexion:               50%  Extension:          50% with central mid back tightness   Rotation:            Left: 45%     Right: 50%      Headaches: cervical (frontal )  Cervical Myotomes:  not assessed                  DTR's:  not assessed          Cervical Dermatomes:  not assessed                            Cord Sign:  not assessed                                         scapular retraction and depression pain free , right thoraco-lumar scolosis, increased thoracic kyphosis, mmt: middle trapezius: 3+/5 (B),  Posture: Increased thoracic kyphosis and protracted  scapula noted in sitting , right thoracic and lumbar erector spinae muscle tightness mild decrease in thoracic upper to mid thoracic segmental mobility     General     ROS    Assessment/Plan:    Patient is a 77 year old female with thoracic complaints.    Patient has the following significant findings with corresponding treatment plan.                Diagnosis 1:  Pain in thoracic spine  Pain -  manual therapy, self management and education  Decreased ROM/flexibility - manual therapy, therapeutic exercise, therapeutic activity and home program  Decreased joint mobility - manual therapy, therapeutic exercise, therapeutic activity and home program  Decreased strength - therapeutic exercise, therapeutic activities and home program  Impaired muscle performance - neuro re-education and home program  Decreased function - therapeutic activities and home program    Therapy Evaluation Codes:     1) Clinical presentation characteristics are:   Stable/Uncomplicated.  2) Decision-Making    Low complexity using standardized patient assessment instrument and/or measureable assessment of functional outcome.  Cumulative Therapy Evaluation is: Low complexity.    Previous and current functional limitations:  (See Goal Flow Sheet for this information)    Short term and Long term goals: (See Goal Flow Sheet for this information)     Communication ability:  Patient appears to be able to clearly communicate and understand verbal and written communication and follow directions correctly.  Treatment Explanation - The following has been discussed with the patient:   RX ordered/plan of care  Anticipated outcomes  Possible risks and side effects  This patient would benefit from PT intervention to resume normal activities.   Rehab potential is good.    Frequency:  1 X week, once daily  Duration:  for 6 weeks  Discharge Plan:  Achieve all LTG.  Independent in home treatment program.  Reach maximal therapeutic benefit.    Please refer to the  daily flowsheet for treatment today, total treatment time and time spent performing 1:1 timed codes.

## 2023-05-15 NOTE — PROGRESS NOTES
[facility]    OUTPATIENT Physical Therapy ORTHOPEDIC EVALUATION  PLAN OF TREATMENT FOR OUTPATIENT REHABILITATION  (COMPLETE FOR INITIAL CLAIMS ONLY)  Patient's Last Name, First Name, M.I.  YOB: 1945  Giana Hope    Provider s Name:  [facility]   Medical Record No.  [id number]   Start of Care Date:  05/15/23   Onset Date:   03/20/23   Treatment Diagnosis:  Pain in thoracic spine Medical Diagnosis:  Data Unavailable       Goals:     05/15/23 0500   Goal #3   Goal #3 standing   Previous Functional Level No restrictions   Performance level 30 min tolerance with 7/10   STG Target Performance   (30 min tolerance with 6/10)   Rationale   (stand with adls painfree)   Due date 06/29/23   LTG Target Performance   (30 min tolerance with 5/10)   Rationale   (stand with adls painfree)   Due date 07/28/23         Therapy Frequency:  one time per week  Predicted Duration of Therapy Intervention:  6 weeks    Trini Miller, PT                 I CERTIFY THE NEED FOR THESE SERVICES FURNISHED UNDER        THIS PLAN OF TREATMENT AND WHILE UNDER MY CARE     (Physician attestation of this document indicates review and certification of the therapy plan).                     Certification Date From:  05/15/23   Certification Date To:  07/24/23    Referring Provider:  Contreras Siddiqui*    Initial Assessment        See Epic Evaluation SOC Date: 05/15/23

## 2023-05-22 ENCOUNTER — THERAPY VISIT (OUTPATIENT)
Dept: PHYSICAL THERAPY | Facility: CLINIC | Age: 78
End: 2023-05-22
Attending: ORTHOPAEDIC SURGERY
Payer: MEDICARE

## 2023-05-22 DIAGNOSIS — M54.6 PAIN IN THORACIC SPINE: ICD-10-CM

## 2023-05-22 PROCEDURE — 97140 MANUAL THERAPY 1/> REGIONS: CPT | Mod: GP | Performed by: PHYSICAL THERAPIST

## 2023-05-23 ENCOUNTER — ANCILLARY PROCEDURE (OUTPATIENT)
Dept: CT IMAGING | Facility: CLINIC | Age: 78
End: 2023-05-23
Attending: FAMILY MEDICINE
Payer: MEDICARE

## 2023-05-23 DIAGNOSIS — R91.1 NODULE OF LEFT LUNG: ICD-10-CM

## 2023-05-23 PROCEDURE — 71250 CT THORAX DX C-: CPT | Mod: ME | Performed by: RADIOLOGY

## 2023-05-23 PROCEDURE — G1010 CDSM STANSON: HCPCS | Performed by: RADIOLOGY

## 2023-05-30 ENCOUNTER — MYC MEDICAL ADVICE (OUTPATIENT)
Dept: ORTHOPEDICS | Facility: CLINIC | Age: 78
End: 2023-05-30
Payer: MEDICARE

## 2023-05-30 DIAGNOSIS — M19.011 OSTEOARTHRITIS OF GLENOHUMERAL JOINT, RIGHT: Primary | ICD-10-CM

## 2023-06-04 DIAGNOSIS — L21.9 DERMATITIS, SEBORRHEIC: ICD-10-CM

## 2023-06-06 ENCOUNTER — APPOINTMENT (OUTPATIENT)
Dept: CT IMAGING | Facility: CLINIC | Age: 78
End: 2023-06-06
Attending: NURSE PRACTITIONER
Payer: MEDICARE

## 2023-06-06 ENCOUNTER — HOSPITAL ENCOUNTER (OUTPATIENT)
Facility: CLINIC | Age: 78
Setting detail: OBSERVATION
Discharge: HOME | End: 2023-06-07
Attending: EMERGENCY MEDICINE | Admitting: SURGERY
Payer: MEDICARE

## 2023-06-06 ENCOUNTER — APPOINTMENT (OUTPATIENT)
Dept: GENERAL RADIOLOGY | Facility: CLINIC | Age: 78
End: 2023-06-06
Attending: NURSE PRACTITIONER
Payer: MEDICARE

## 2023-06-06 DIAGNOSIS — I62.00 SUBDURAL HEMORRHAGE (H): ICD-10-CM

## 2023-06-06 DIAGNOSIS — R55 SYNCOPE, UNSPECIFIED SYNCOPE TYPE: ICD-10-CM

## 2023-06-06 PROBLEM — R91.1 NODULE OF LEFT LUNG: Status: ACTIVE | Noted: 2018-06-19

## 2023-06-06 LAB
ALBUMIN SERPL BCG-MCNC: 4 G/DL (ref 3.5–5.2)
ALBUMIN UR-MCNC: NEGATIVE MG/DL
ALP SERPL-CCNC: 94 U/L (ref 35–104)
ALT SERPL W P-5'-P-CCNC: 13 U/L (ref 10–35)
ANION GAP SERPL CALCULATED.3IONS-SCNC: 13 MMOL/L (ref 7–15)
APPEARANCE UR: CLEAR
AST SERPL W P-5'-P-CCNC: 25 U/L (ref 10–35)
ATRIAL RATE - MUSE: 73 BPM
BASOPHILS # BLD AUTO: 0 10E3/UL (ref 0–0.2)
BASOPHILS NFR BLD AUTO: 0 %
BILIRUB SERPL-MCNC: 0.2 MG/DL
BILIRUB UR QL STRIP: NEGATIVE
BUN SERPL-MCNC: 20.9 MG/DL (ref 8–23)
CALCIUM SERPL-MCNC: 9.3 MG/DL (ref 8.8–10.2)
CHLORIDE SERPL-SCNC: 108 MMOL/L (ref 98–107)
COLOR UR AUTO: ABNORMAL
CREAT SERPL-MCNC: 0.97 MG/DL (ref 0.51–0.95)
DEPRECATED HCO3 PLAS-SCNC: 22 MMOL/L (ref 22–29)
DIASTOLIC BLOOD PRESSURE - MUSE: NORMAL MMHG
EOSINOPHIL # BLD AUTO: 0.1 10E3/UL (ref 0–0.7)
EOSINOPHIL NFR BLD AUTO: 1 %
ERYTHROCYTE [DISTWIDTH] IN BLOOD BY AUTOMATED COUNT: 12.8 % (ref 10–15)
FLUAV RNA SPEC QL NAA+PROBE: NEGATIVE
FLUBV RNA RESP QL NAA+PROBE: NEGATIVE
GFR SERPL CREATININE-BSD FRML MDRD: 60 ML/MIN/1.73M2
GLUCOSE SERPL-MCNC: 123 MG/DL (ref 70–99)
GLUCOSE UR STRIP-MCNC: NEGATIVE MG/DL
HCT VFR BLD AUTO: 39.1 % (ref 35–47)
HGB BLD-MCNC: 13.2 G/DL (ref 11.7–15.7)
HGB UR QL STRIP: NEGATIVE
HOLD SPECIMEN: NORMAL
IMM GRANULOCYTES # BLD: 0 10E3/UL
IMM GRANULOCYTES NFR BLD: 0 %
INTERPRETATION ECG - MUSE: NORMAL
KETONES UR STRIP-MCNC: NEGATIVE MG/DL
LACTATE SERPL-SCNC: 0.9 MMOL/L (ref 0.7–2)
LEUKOCYTE ESTERASE UR QL STRIP: NEGATIVE
LYMPHOCYTES # BLD AUTO: 1.1 10E3/UL (ref 0.8–5.3)
LYMPHOCYTES NFR BLD AUTO: 16 %
MCH RBC QN AUTO: 31 PG (ref 26.5–33)
MCHC RBC AUTO-ENTMCNC: 33.8 G/DL (ref 31.5–36.5)
MCV RBC AUTO: 92 FL (ref 78–100)
MONOCYTES # BLD AUTO: 0.4 10E3/UL (ref 0–1.3)
MONOCYTES NFR BLD AUTO: 6 %
MUCOUS THREADS #/AREA URNS LPF: PRESENT /LPF
NEUTROPHILS # BLD AUTO: 5.3 10E3/UL (ref 1.6–8.3)
NEUTROPHILS NFR BLD AUTO: 77 %
NITRATE UR QL: NEGATIVE
NRBC # BLD AUTO: 0 10E3/UL
NRBC BLD AUTO-RTO: 0 /100
P AXIS - MUSE: 48 DEGREES
PH UR STRIP: 6 [PH] (ref 5–7)
PLATELET # BLD AUTO: 195 10E3/UL (ref 150–450)
POTASSIUM SERPL-SCNC: 3.8 MMOL/L (ref 3.4–5.3)
PR INTERVAL - MUSE: 184 MS
PROT SERPL-MCNC: 6.4 G/DL (ref 6.4–8.3)
QRS DURATION - MUSE: 82 MS
QT - MUSE: 392 MS
QTC - MUSE: 431 MS
R AXIS - MUSE: -20 DEGREES
RADIOLOGIST FLAGS: ABNORMAL
RBC # BLD AUTO: 4.26 10E6/UL (ref 3.8–5.2)
RBC URINE: 1 /HPF
RSV RNA SPEC NAA+PROBE: NEGATIVE
SARS-COV-2 RNA RESP QL NAA+PROBE: NEGATIVE
SODIUM SERPL-SCNC: 143 MMOL/L (ref 136–145)
SP GR UR STRIP: 1.01 (ref 1–1.03)
SYSTOLIC BLOOD PRESSURE - MUSE: NORMAL MMHG
T AXIS - MUSE: 40 DEGREES
TROPONIN T SERPL HS-MCNC: 8 NG/L
TROPONIN T SERPL HS-MCNC: 9 NG/L
UROBILINOGEN UR STRIP-MCNC: NORMAL MG/DL
VENTRICULAR RATE- MUSE: 73 BPM
WBC # BLD AUTO: 6.8 10E3/UL (ref 4–11)
WBC URINE: 1 /HPF

## 2023-06-06 PROCEDURE — G1010 CDSM STANSON: HCPCS

## 2023-06-06 PROCEDURE — 99207 PR SERVICE NOT STAFFED W/SUPERV PROV: CPT | Performed by: NEUROLOGICAL SURGERY

## 2023-06-06 PROCEDURE — G1010 CDSM STANSON: HCPCS | Mod: GC | Performed by: RADIOLOGY

## 2023-06-06 PROCEDURE — 99291 CRITICAL CARE FIRST HOUR: CPT | Mod: 25 | Performed by: EMERGENCY MEDICINE

## 2023-06-06 PROCEDURE — 96360 HYDRATION IV INFUSION INIT: CPT

## 2023-06-06 PROCEDURE — 87637 SARSCOV2&INF A&B&RSV AMP PRB: CPT | Performed by: NURSE PRACTITIONER

## 2023-06-06 PROCEDURE — 83735 ASSAY OF MAGNESIUM: CPT | Performed by: SURGERY

## 2023-06-06 PROCEDURE — 80053 COMPREHEN METABOLIC PANEL: CPT | Performed by: NURSE PRACTITIONER

## 2023-06-06 PROCEDURE — 36415 COLL VENOUS BLD VENIPUNCTURE: CPT | Performed by: NURSE PRACTITIONER

## 2023-06-06 PROCEDURE — 250N000013 HC RX MED GY IP 250 OP 250 PS 637: Performed by: SURGERY

## 2023-06-06 PROCEDURE — 70450 CT HEAD/BRAIN W/O DYE: CPT | Mod: 26 | Performed by: RADIOLOGY

## 2023-06-06 PROCEDURE — 120N000002 HC R&B MED SURG/OB UMMC

## 2023-06-06 PROCEDURE — 93005 ELECTROCARDIOGRAM TRACING: CPT

## 2023-06-06 PROCEDURE — 71046 X-RAY EXAM CHEST 2 VIEWS: CPT | Mod: 26 | Performed by: STUDENT IN AN ORGANIZED HEALTH CARE EDUCATION/TRAINING PROGRAM

## 2023-06-06 PROCEDURE — 84484 ASSAY OF TROPONIN QUANT: CPT | Performed by: NURSE PRACTITIONER

## 2023-06-06 PROCEDURE — 99291 CRITICAL CARE FIRST HOUR: CPT | Mod: 25

## 2023-06-06 PROCEDURE — 81001 URINALYSIS AUTO W/SCOPE: CPT | Performed by: NURSE PRACTITIONER

## 2023-06-06 PROCEDURE — 85025 COMPLETE CBC W/AUTO DIFF WBC: CPT | Performed by: NURSE PRACTITIONER

## 2023-06-06 PROCEDURE — 83605 ASSAY OF LACTIC ACID: CPT | Performed by: NURSE PRACTITIONER

## 2023-06-06 PROCEDURE — 36415 COLL VENOUS BLD VENIPUNCTURE: CPT | Performed by: FAMILY MEDICINE

## 2023-06-06 PROCEDURE — 93010 ELECTROCARDIOGRAM REPORT: CPT | Performed by: EMERGENCY MEDICINE

## 2023-06-06 PROCEDURE — 72125 CT NECK SPINE W/O DYE: CPT | Mod: 26 | Performed by: RADIOLOGY

## 2023-06-06 PROCEDURE — 71046 X-RAY EXAM CHEST 2 VIEWS: CPT

## 2023-06-06 PROCEDURE — 258N000003 HC RX IP 258 OP 636: Performed by: SURGERY

## 2023-06-06 RX ORDER — ONDANSETRON 2 MG/ML
4 INJECTION INTRAMUSCULAR; INTRAVENOUS EVERY 6 HOURS PRN
Status: DISCONTINUED | OUTPATIENT
Start: 2023-06-06 | End: 2023-06-07 | Stop reason: HOSPADM

## 2023-06-06 RX ORDER — ONDANSETRON 4 MG/1
4 TABLET, ORALLY DISINTEGRATING ORAL EVERY 6 HOURS PRN
Status: DISCONTINUED | OUTPATIENT
Start: 2023-06-06 | End: 2023-06-07 | Stop reason: HOSPADM

## 2023-06-06 RX ORDER — KETOCONAZOLE 20 MG/ML
SHAMPOO TOPICAL
Qty: 120 ML | Refills: 6 | OUTPATIENT
Start: 2023-06-07

## 2023-06-06 RX ORDER — ACETAMINOPHEN 325 MG/1
650 TABLET ORAL EVERY 4 HOURS PRN
Status: DISCONTINUED | OUTPATIENT
Start: 2023-06-06 | End: 2023-06-07 | Stop reason: HOSPADM

## 2023-06-06 RX ADMIN — ACETAMINOPHEN 650 MG: 325 TABLET, FILM COATED ORAL at 23:46

## 2023-06-06 RX ADMIN — SODIUM CHLORIDE 1000 ML: 9 INJECTION, SOLUTION INTRAVENOUS at 23:48

## 2023-06-06 ASSESSMENT — ACTIVITIES OF DAILY LIVING (ADL)
ADLS_ACUITY_SCORE: 35

## 2023-06-06 NOTE — ED TRIAGE NOTES
Patient BIBA with a syncope episode. Per patient this morning had a rapid increase in heart rate lasted a couple minutes, felt faint after. Fell and heard EMS. Can't recall what happen after that.      Triage Assessment     Row Name 06/06/23 3817       Triage Assessment (Adult)    Airway WDL WDL       Respiratory WDL    Respiratory WDL WDL       Skin Circulation/Temperature WDL    Skin Circulation/Temperature WDL WDL       Cardiac WDL    Cardiac WDL WDL       Peripheral/Neurovascular WDL    Peripheral Neurovascular WDL WDL       Cognitive/Neuro/Behavioral WDL    Cognitive/Neuro/Behavioral WDL WDL

## 2023-06-06 NOTE — TELEPHONE ENCOUNTER
LCV: 1/10/2022  [facility] [place]  Derm process 1, needs appt for refill

## 2023-06-06 NOTE — ED NOTES
Bed: UUEDH-  Expected date:   Expected time:   Means of arrival:   Comments:  SPF 19 77F syncope, dizzy, VS WDL

## 2023-06-06 NOTE — ED PROVIDER NOTES
"ED Provider Note  [facility]      History     Chief Complaint   Patient presents with    Syncope     HPI  Giana Hope is a 77 year old female with a past medical history significant for balance disorder, hyperlipidemia, CKD 3, osteoarthritis, kyphosis, degenerative disc disease, venous stasis of both lower extremities who presents to the emergency department for evaluation of palpitations and possible syncopal episode.      She reports that she was talking to her neighbor about the palpitations she had felt earlier in the morning and the neighbor advised her to call EMS to bring her to the hospital as this is not typical for her.  Even though palpitations had resolved she did call 911.  She then began walking up the stairs to go into her bathroom, while she was walking up the stairs she began to feel \"woozy\", lightheaded.  She remembers being in the bathroom and the next thing she remembers is that EMS was there.  She states she remembers calling out to them to tell them where she was.  She does not know if she fell she does not know if she hit her head.  She states she believes she did blackout as she does not have much memory for any of the events and has difficulty relaying the events of this morning to me now.  She still is feeling very lightheaded but denies any chest pain, shortness of breath, headache, back pain, vision changes, urinary symptoms.  She does state that this morning she had some cold symptoms describes as a stuffy nose although also notes she does have seasonal allergies. No other pain or apparent injuries.     No other new symptoms or complaints at this time. Full ROS completed w/o additional findings.     Past Medical History  Past Medical History:   Diagnosis Date    Chronic kidney disease, stage 3a (H) 10/3/2022    Nonsenile cataract     Osteoarthritis     Pulmonary nodule     Syncopal episodes 6/6/2023    Uveitis      Past Surgical History:   Procedure " Laterality Date    ARTHROPLASTY KNEE Right 2021    Procedure: ARTHROPLASTY, RIGHT KNEE, TOTAL;  Surgeon: Gabriel Marrero MD;  Location: [facility] OR    ARTHROPLASTY, RIGHT KNEE, TOTAL Right 2021    CATARACT IOL, RT/LT Right 2019    PHACOEMULSIFICATION WITH STANDARD INTRAOCULAR LENS IMPLANT Right 3/22/2019    Procedure: Right Cataract Removal with Intraocular Lens Implant;  Surgeon: Trish Taylor MD;  Location: [facility] OR     calcium-vitamin D 500-125 MG-UNIT TABS  famotidine (PEPCID) 40 MG tablet  ketoconazole (NIZORAL) 2 % external shampoo  pravastatin (PRAVACHOL) 20 MG tablet  prednisoLONE acetate (PRED FORTE) 1 % ophthalmic suspension  sulindac (CLINORIL) 200 MG tablet  tiZANidine (ZANAFLEX) 4 MG tablet  Vitamin D3 (CHOLECALCIFEROL) 25 mcg (1000 units) tablet      Allergies   Allergen Reactions    Dust Mites Itching     Runny nose, fever    Mold Itching     Fever, runny nose    Pollen Extract Itching     Fever, runny nose    Celecoxib      Other reaction(s): GI intolerance     Family History  Family History   Problem Relation Age of Onset    Arthritis Mother     Breast Cancer Mother 78    Diabetes Type 2  Father     Heart Disease Father          age 75    Alzheimer Disease Father 60    Heart Disease Brother 68    Breast Cancer Maternal Grandmother 81    Heart Disease Maternal Grandfather 54         age 81    Other - See Comments Son         schizoaffective lives in [place]    Glaucoma No family hx of     Macular Degeneration No family hx of      Social History   Social History     Tobacco Use    Smoking status: Former     Years: 35.00     Types: Cigarettes    Smokeless tobacco: Never    Tobacco comments:     Quit 24 years ago    Vaping Use    Vaping status: Never Used     Passive vaping exposure: Yes   Substance Use Topics    Alcohol use: Yes     Comment: Moderate    Drug use: No         A complete review of systems was performed with pertinent positives and negatives noted in the HPI, and all  "other systems negative.    Physical Exam   BP: (!) 141/66  Pulse: 72  Temp: 97.3  F (36.3  C)  Resp: 20  Height: 160 cm (5' 3\")  Weight: 65.8 kg (145 lb)  SpO2: 100 %  Lying Orthostatic BP: 133/65  Lying Orthostatic Pulse: 73 bpm  Sitting Orthostatic BP: 150/76  Sitting Orthostatic Pulse: 72 bpm  Standing Orthostatic BP: 149/71  Standing Orthostatic Pulse: 74 bpm  Physical Exam  Vitals and nursing note reviewed.   Constitutional:       Appearance: She is ill-appearing. She is not toxic-appearing.   HENT:      Head: Normocephalic and atraumatic.   Cardiovascular:      Rate and Rhythm: Normal rate and regular rhythm.      Pulses: Normal pulses.      Heart sounds: Normal heart sounds.   Pulmonary:      Effort: Pulmonary effort is normal.      Breath sounds: Normal breath sounds.   Abdominal:      General: Abdomen is flat.      Palpations: Abdomen is soft.   Skin:     General: Skin is warm and dry.      Capillary Refill: Capillary refill takes less than 2 seconds.   Neurological:      Mental Status: She is oriented to person, place, and time.      Comments: Confused about events this morning   Psychiatric:         Mood and Affect: Mood normal.         Behavior: Behavior normal.     No obvious focal neuro deficits. No seizure like movements. No extremity pain or apparent MSK injuries. Neck ROM not assessed given C-spine precautions.     ED Course, Procedures, & Data        ED Course Selections:        EKG Interpretation:      Interpreted by NASIR Grant CNP / Reviewed by Suzi Leslie MD  Time reviewed: 1716  Symptoms at time of EKG: Lightheaded, s/p probably syncopal episode, presumed fall  Rhythm: normal sinus with sinus arrhythmia  Rate: normal  Axis: 48/-20/40  Ectopy: none  Conduction: normal  ST Segments/ T Waves: No kwesi ST elevation or depression.  Comparison to prior: Generally unchanged from 09/20/2022  Clinical Impression: sinus, generally unchanged from prevoius       Cervical spine CT w/o contrast "   Preliminary Result   RESIDENT PRELIMINARY INTERPRETATION   IMPRESSION:   1. No acute fracture or traumatic subluxation of the cervical spine.   2. Multilevel cervical spondylosis, which is greatest at C5-6 and C6-7   resulting in mild to moderate spinal canal stenosis and moderate to   severe bilateral neural foraminal narrowing at these levels.      CT Head w/o Contrast   Final Result   Abnormal   IMPRESSION: Acute subdural hemorrhage along the right cerebral   convexity and right tentorial leaflet measuring up to 4 mm.       [Urgent Result: Acute subdural hemorrhage]      Finding was identified on 6/6/2023 8:52 PM.       Heather Petty was contacted by Dr. Ortiz at 6/6/2023 8:59 PM and   verbalized understanding of the urgent finding.       I have personally reviewed the examination and initial interpretation   and I agree with the findings.      LEELA MONTGOMERY MD            SYSTEM ID:  [id number]      XR Chest 2 Views   Final Result   IMPRESSION:  No acute airspace disease.      I have personally reviewed the examination and initial interpretation   and I agree with the findings.      CHARLEY LIM MD            SYSTEM ID:  [id number]                 Assessment & Plan    Giana Hope is a 77 year old female with a past medical history significant for balance disorder, hyperlipidemia, CKD 3, osteoarthritis, kyphosis, degenerative disc disease, venous stasis of both lower extremities who presents to the emergency department for evaluation of palpitations and possible syncopal episode.      Upon arrival to the emergency department patient is somewhat confused especially surrounding events that happened this morning that brought her to the hospital.  Given her description the fact that she cannot remember for sure if she fell or not will obtain CT imaging of her head and cervical spine.  EKG was obtained, comprehensive labs including troponins were sent for analysis.  Urinalysis also sent and patient was  sent for chest x-ray.    EKG showed normal sinus rhythm with a sinus arrhythmia, no ST or T wave changes, no QT/QTc prolongation.  High-sensitivity troponin 8.  Normal lactate.  No leukocytosis, no anemia.  No electrolyte or metabolic abnormalities.    I reviewed the head CT imaging and spoke with the radiologist.  IMPRESSION: Acute subdural hemorrhage along the right cerebral convexity and right tentorial leaflet measuring up to 4 mm.     During her time in the emergency department, patient remained awake alert and oriented, she is neurologically intact but still has no memory of the fall up to the point when EMS brought her to the emergency department.  She reports she has has memory of the events during the emergency department.  She denies any other pain or suspected injuries.  She is currently in a c-collar until she is cleared on C-spine.    CXR was unremarkable showing no acute airspace disease, no pulmonary opacities or pleural effusions. no mediastinal widening, no cardiomegaly.     Plan to admit patient to the hospital for ongoing monitoring, repeat head CT to evaluate for further bleeding as well as work-up of the cause of her syncopal episode.    I have reviewed the nursing notes. I have reviewed the findings, diagnosis, planwith the patient who is accepting of admission to the hospital.      NASIR Grant [facility] EMERGENCY DEPARTMENT  6/6/2023    --    ED Attending Physician Attestation    I Suzi Leslie MD, cared for this patient with the Advanced Practice Provider (ALBERTO). I have performed a history and physical examination of the patient independent of the ALBERTO. I reviewed the ALBERTO's documentation above and agree with the documented findings and plan of care. I personally provided a substantive portion of the care for this patient, including the complete Medical Decision Making. Please see the ALBERTO's documentation for full details.    IMPRESSION:   77 year old female as  described above. Confused to events related to fall/syncope, some lightheadedness, but w/o focal neuro findings, no apparent seizure movements. No other apparent injuries no neck pain, but ROM not assessed bc of c-spine precautions. No other apparent MSK injuries or pain. Seemingly otherwise neurovascularly intact.       PLAN:   - ECG, Labs, head/c-spine CT, c-spine precautions, syncope workup  - Risks/benefits of pursuing imaging reviewed and accepted.     RESULTS:  Found to have SDH    INTERVENTIONS:   - C-collar/C-spine precautions  - Neurosurgery consult  - Trauma consult/admission    RE-EVALUATION:  - The patient's symptoms were  **  - Pt otherwise continues to do well here in the ED, no acute issues or apparent concerning changes in vitals or clinical appearance.    DISCUSSIONS:  - w/ Neuro Surgery: They have seen and evaluated the patient here in the ED.   - w/ Trauma Surgery: They have seen and evaluated the patient here in the ED. Reviewed patient/presentation, current state of workup/any pending studies. They will admit for further evaluation/management, F/U pending studies as needed, coordinate w/ consulting services as needed. No additional requests/recommendations for workup/management for in the ED at this time.   - w/ Patient: I have reviewed the available findings, plan with the patient.     DISPOSITION/PLANNING:  IMPRESSION:   - Subdural hemorrhage, presumed fall (lack of memory to event)  - Syncopal episode, possible palpitations but loss of memory to event and spell of unknown etiology  DISPOSITION:  - Admit to Trauma for further evaluation/management  PENDING:   - C-spine imaging/clearance as able  - Neurosurgery consult  - Trauma consult/admission  OTHER RECOMMENDATIONS:  - Repeat head CT and ongoing monitoring  - Syncope workup (may need full cardiac workup, et al.)    ______________________________________________________________________      Critical Care & Procedures  Critical care did occur  during this encounter, but there were no emergent procedures.       Critical Care Addendum  My initial assessment, based on my review of vital signs, focused history, physical exam, 12 lead ECG analysis and discussion with ALBERTO , established a high suspicion that Giana Hope has  Intracranial hemorrhage after fall and presumed syncopal episode , which requires immediate intervention, and therefore she is critically ill.     After the initial assessment, the care team consulted with Neurosurgery, Trauma  to provide stabilization care. Due to the critical nature of this patient, I reassessed nursing observations, vital signs, physical exam, mental status and neurologic status multiple times prior to her disposition.     Time also spent performing documentation, reviewing test results, discussion with consultants and coordination of care.     Critical care time (excluding teaching time and procedures): 40 minutes.           Suzi Leslie MD  Emergency Medicine        Suzi Leslie MD  06/08/23 1531       Suzi Leslie MD  10/05/23 2018

## 2023-06-07 ENCOUNTER — APPOINTMENT (OUTPATIENT)
Dept: CARDIOLOGY | Facility: CLINIC | Age: 78
End: 2023-06-07
Attending: NURSE PRACTITIONER
Payer: MEDICARE

## 2023-06-07 ENCOUNTER — APPOINTMENT (OUTPATIENT)
Dept: CT IMAGING | Facility: CLINIC | Age: 78
End: 2023-06-07
Payer: MEDICARE

## 2023-06-07 VITALS
TEMPERATURE: 97.9 F | OXYGEN SATURATION: 98 % | SYSTOLIC BLOOD PRESSURE: 127 MMHG | HEART RATE: 74 BPM | HEIGHT: 63 IN | BODY MASS INDEX: 25.69 KG/M2 | DIASTOLIC BLOOD PRESSURE: 69 MMHG | WEIGHT: 145 LBS | RESPIRATION RATE: 16 BRPM

## 2023-06-07 LAB
ALBUMIN SERPL BCG-MCNC: 4.1 G/DL (ref 3.5–5.2)
ALP SERPL-CCNC: 105 U/L (ref 35–104)
ALT SERPL W P-5'-P-CCNC: 7 U/L (ref 10–35)
ANION GAP SERPL CALCULATED.3IONS-SCNC: 15 MMOL/L (ref 7–15)
APTT PPP: 35 SECONDS (ref 22–38)
AST SERPL W P-5'-P-CCNC: 24 U/L (ref 10–35)
BILIRUB SERPL-MCNC: 0.5 MG/DL
BUN SERPL-MCNC: 14.4 MG/DL (ref 8–23)
CALCIUM SERPL-MCNC: 9.8 MG/DL (ref 8.8–10.2)
CHLORIDE SERPL-SCNC: 106 MMOL/L (ref 98–107)
CREAT SERPL-MCNC: 0.81 MG/DL (ref 0.51–0.95)
DEPRECATED HCO3 PLAS-SCNC: 21 MMOL/L (ref 22–29)
ERYTHROCYTE [DISTWIDTH] IN BLOOD BY AUTOMATED COUNT: 12.6 % (ref 10–15)
GFR SERPL CREATININE-BSD FRML MDRD: 74 ML/MIN/1.73M2
GLUCOSE SERPL-MCNC: 116 MG/DL (ref 70–99)
HCT VFR BLD AUTO: 44.1 % (ref 35–47)
HGB BLD-MCNC: 14.3 G/DL (ref 11.7–15.7)
INR PPP: 1.07 (ref 0.85–1.15)
LVEF ECHO: NORMAL
MAGNESIUM SERPL-MCNC: 2.1 MG/DL (ref 1.7–2.3)
MCH RBC QN AUTO: 30.2 PG (ref 26.5–33)
MCHC RBC AUTO-ENTMCNC: 32.4 G/DL (ref 31.5–36.5)
MCV RBC AUTO: 93 FL (ref 78–100)
PLATELET # BLD AUTO: 199 10E3/UL (ref 150–450)
POTASSIUM SERPL-SCNC: 3.4 MMOL/L (ref 3.4–5.3)
PROT SERPL-MCNC: 6.8 G/DL (ref 6.4–8.3)
RBC # BLD AUTO: 4.73 10E6/UL (ref 3.8–5.2)
SODIUM SERPL-SCNC: 142 MMOL/L (ref 136–145)
WBC # BLD AUTO: 7.2 10E3/UL (ref 4–11)

## 2023-06-07 PROCEDURE — G0378 HOSPITAL OBSERVATION PER HR: HCPCS

## 2023-06-07 PROCEDURE — 85730 THROMBOPLASTIN TIME PARTIAL: CPT

## 2023-06-07 PROCEDURE — 93306 TTE W/DOPPLER COMPLETE: CPT

## 2023-06-07 PROCEDURE — 999N000147 HC STATISTIC PT IP EVAL DEFER

## 2023-06-07 PROCEDURE — 85610 PROTHROMBIN TIME: CPT

## 2023-06-07 PROCEDURE — 36415 COLL VENOUS BLD VENIPUNCTURE: CPT | Performed by: SURGERY

## 2023-06-07 PROCEDURE — 96361 HYDRATE IV INFUSION ADD-ON: CPT

## 2023-06-07 PROCEDURE — 85027 COMPLETE CBC AUTOMATED: CPT | Performed by: SURGERY

## 2023-06-07 PROCEDURE — 36415 COLL VENOUS BLD VENIPUNCTURE: CPT

## 2023-06-07 PROCEDURE — 70450 CT HEAD/BRAIN W/O DYE: CPT | Mod: 26 | Performed by: RADIOLOGY

## 2023-06-07 PROCEDURE — 99239 HOSP IP/OBS DSCHRG MGMT >30: CPT | Performed by: NURSE PRACTITIONER

## 2023-06-07 PROCEDURE — 80053 COMPREHEN METABOLIC PANEL: CPT | Performed by: SURGERY

## 2023-06-07 PROCEDURE — 999N000127 HC STATISTIC PERIPHERAL IV START W US GUIDANCE

## 2023-06-07 PROCEDURE — G1010 CDSM STANSON: HCPCS

## 2023-06-07 PROCEDURE — 93306 TTE W/DOPPLER COMPLETE: CPT | Mod: 26 | Performed by: INTERNAL MEDICINE

## 2023-06-07 ASSESSMENT — ACTIVITIES OF DAILY LIVING (ADL)
ADLS_ACUITY_SCORE: 35

## 2023-06-07 NOTE — PROVIDER NOTIFICATION
[facility] INPATIENT FLOOR QUESTIONS SURGERY paged at 2200  Please place inpatient orders and vital parameters.

## 2023-06-07 NOTE — H&P
[facility]    History and Physical / Consult note: Trauma Service     Date of Admission:  6/6/2023    Time of Admission/Consult Request (page/call): 6/6/2023    Time of my evaluation: 6/6/2023    Consulting services:  Neurosurgery - Emergent consult (within 30 mins): Called by ED    Assessment & Plan     Trauma mechanism: Fall  Time/date of injury: 6/6/2023    Known Injuries:  1.  SDH     Other diagnoses:  1.  Hyperlipidemia  2.  CKD 3  3.  Osteoarthritis  4.  Kyphosis  5.  Degenerative disc disease  6.  Venous stasis of both lower extremities     Plan:  1.  Neurosurgery Consult: Repeat CT head in 6 hours from first one. Pending recommendations.  2.  Admit to SICU vs 6B Intermediate Unit: monitoring: neuro checks,   3. Tertiary trauma exam tomorrow AM      Patient was seen, examined and discussed with Ector Mahajan MD Trauma Surgery Attending     Nydia Cano MD, [facility]  Trauma Surgery [facility]  [id number]      Code status: Full code  General Cares:  GI Prophylaxis: PPIs  DVT Prophylaxis: SCDs  Date of last stool/Bowel Regimen:   Pulmonary toilet: IS    ETOH: This patient was asked if in the last 3-6 months there has been a time when she had 4 or more drinks in a single day/outing.. Patient answer to the screening question was in the negative. No intervention needed.     Primary Care Physician   Gilberto Vasquez    Chief Complaint   Fall    History is obtained from the patient, electronic health record and emergency department physician    History of Present Illness   Giana Hope is a 77 year old female seen in consultation for Suzi Leslie MD from the [facility] emergency department for trauma evaluation.  Patient has significant medical history including hyperlipidemia, stage III chronic kidney disease, osteoarthritis, history of syncopal episodes, venous stasis in both lower extremities and degenerative disc disease.  Patient  refers that earlier today she felt some palpitations, then later she felt weak, she did talk to her neighbor who advised her to call 911.  She did call 911 although her palpitations were gone.  Next, she remembers going upstairs, then she has no memory of what happened, next she remembers it is when EMS was helping her at her house.  She does not have memory of her fall.  She thinks she had hit her head.  She was brought to the ER, work-up included CT cervical spine negative for acute injuries, CT head that showed small subdural hematoma.  During our consultation, Giana is alert and oriented x3, hemodynamically stable, protecting her airway, follows commands appropriately, and moves 4 extremities.  She refers a mild headache, but denies neck pain, chest pain, abdominal pain and any other symptoms.  Her right dorsal hand is minimally sore and she has a bruise.  She has a cervical collar in place, after examination I removed it.  Hawa lives alone in her house in [place], [place].  She is retired and has been living independently.    Past Medical History    I have reviewed this patient's medical history and updated it with pertinent information if needed.   Past Medical History:   Diagnosis Date     Chronic kidney disease, stage 3a (H) 10/3/2022     Nonsenile cataract      Osteoarthritis      Pulmonary nodule      Syncopal episodes 6/6/2023     Uveitis        Past Surgical History   I have reviewed this patient's surgical history and updated it with pertinent information if needed.  Past Surgical History:   Procedure Laterality Date     ARTHROPLASTY KNEE Right 11/26/2021    Procedure: ARTHROPLASTY, RIGHT KNEE, TOTAL;  Surgeon: Gabriel Marrero MD;  Location: [facility] OR     ARTHROPLASTY, RIGHT KNEE, TOTAL Right 11/26/2021     CATARACT IOL, RT/LT Right 03/2019     PHACOEMULSIFICATION WITH STANDARD INTRAOCULAR LENS IMPLANT Right 3/22/2019    Procedure: Right Cataract Removal with Intraocular Lens Implant;  Surgeon:  Trish Taylor MD;  Location:  OR     Prior to Admission Medications   Prior to Admission Medications   Prescriptions Last Dose Informant Patient Reported? Taking?   Vitamin D3 (CHOLECALCIFEROL) 25 mcg (1000 units) tablet 6/6/2023  Yes Yes   Sig: Take 1 tablet by mouth daily   calcium-vitamin D 500-125 MG-UNIT TABS Unknown  Yes Yes   Sig: Take 1 tablet by mouth daily   famotidine (PEPCID) 40 MG tablet 6/6/2023  No Yes   Sig: Take 1 tablet (40 mg) by mouth daily as needed for heartburn   ketoconazole (NIZORAL) 2 % external shampoo Unknown  No Yes   Sig: Apply topically three times a week   pravastatin (PRAVACHOL) 20 MG tablet 6/6/2023  No Yes   Sig: Take 1 tablet (20 mg) by mouth daily   prednisoLONE acetate (PRED FORTE) 1 % ophthalmic suspension Unknown  No Yes   Sig: Place 1 drop Into the left eye 3 times daily   sulindac (CLINORIL) 200 MG tablet 6/6/2023  No Yes   Sig: Take 1 tablet (200 mg) by mouth daily as needed (for joint pain)   tiZANidine (ZANAFLEX) 4 MG tablet Unknown  No Yes   Sig: Take 0.5 tablets (2 mg) by mouth 3 times daily as needed for muscle spasms      Facility-Administered Medications: None     Allergies   Allergies   Allergen Reactions     Dust Mites Itching     Runny nose, fever     Mold Itching     Fever, runny nose     Pollen Extract Itching     Fever, runny nose     Celecoxib      Other reaction(s): GI intolerance       Social History   Social History     Socioeconomic History     Marital status:      Spouse name: Not on file     Number of children: Not on file     Years of education: Not on file     Highest education level: Not on file   Occupational History     Not on file   Tobacco Use     Smoking status: Former     Years: 35.00     Types: Cigarettes     Smokeless tobacco: Never     Tobacco comments:     Quit 24 years ago 1996   Vaping Use     Vaping status: Never Used     Passive vaping exposure: Yes   Substance and Sexual Activity     Alcohol use: Yes     Comment: Moderate      Drug use: No     Sexual activity: Not Currently     Partners: Male   Other Topics Concern     Parent/sibling w/ CABG, MI or angioplasty before 65F 55M? 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    Loves to travel. Moved to [place] 10/2018 to be near daughter Linda (1973) son in law and 2 granddaughters ,  live in [facility]    Her son Jeff ( ) lives in [place] ( [place]) Schizoaffective. She travels to visit him when able.       Social Determinants of Health     Financial Resource Strain: Low Risk  (2022)    Overall Financial Resource Strain (CARDIA)      Difficulty of Paying Living Expenses: Not hard at all   Food Insecurity: No Food Insecurity (2022)    Hunger Vital Sign      Worried About Running Out of Food in the Last Year: Never true      Ran Out of Food in the Last Year: Never true   Transportation Needs: No Transportation Needs (2022)    PRAPARE - Transportation      Lack of Transportation (Medical): No      Lack of Transportation (Non-Medical): No   Physical Activity: Sufficiently Active (2022)    Exercise Vital Sign      Days of Exercise per Week: 7 days      Minutes of Exercise per Session: 30 min   Stress: No Stress Concern Present (2022)    [employer] [place] [employer] - Occupational Stress Questionnaire      Feeling of Stress : Only a little   Social Connections: Moderately Isolated (2022)    Social Connection and Isolation Panel [NHANES]      Frequency of Communication with Friends and Family: Three times a week      Frequency of Social Gatherings with Friends and Family: Three times a week      Attends Baptism Services: Never      Active Member of Clubs or Organizations: Yes      Attends Club or Organization  Meetings: More than 4 times per year      Marital Status:    Intimate Partner Violence: Not At Risk (2022)    Humiliation, Afraid, Rape, and Kick questionnaire      Fear of Current or Ex-Partner: No      Emotionally Abused: No      Physically Abused: No      Sexually Abused: No   Housing Stability: Low Risk  (2022)    Housing Stability Vital Sign      Unable to Pay for Housing in the Last Year: No      Number of Places Lived in the Last Year: 1      Unstable Housing in the Last Year: No       Family History   I have reviewed this patient's family history and updated it with pertinent information if needed.   Family History   Problem Relation Age of Onset     Arthritis Mother      Breast Cancer Mother 78     Diabetes Type 2  Father      Heart Disease Father          age 75     Alzheimer Disease Father 60     Heart Disease Brother 68     Breast Cancer Maternal Grandmother 81     Heart Disease Maternal Grandfather 54         age 81     Other - See Comments Son         schizoaffective lives in [place]     Glaucoma No family hx of      Macular Degeneration No family hx of        Review of Systems   CONSTITUTIONAL: No fever, chills, sweats, fatigue   EYES: no visual blurring, no double vision or visual loss  ENT: no decrease in hearing, no tinnitus, no vertigo, no hoarseness  RESPIRATORY: no shortness of breath, no cough, no sputum   CARDIOVASCULAR: no palpitations, no chest  pain, no exertional chest pain or pressure  GASTROINTESTINAL: no nausea or vomiting, or abd pain  GENITOURINARY: no dysuria, no frequency or hesitancy, no hematuria  MUSCULOSKELETAL: no weakness, no redness, no swelling, no joint pain,   SKIN: Right dorsal hand with ecchymosis, otherwise no rashes, ecchymoses, abrasions or lacerations  NEUROLOGIC: no numbness or tingling of hands, no numbness or tingling  of feet, no syncope, no tremors or weakness  PSYCHIATRIC: no sleep disturbances, no anxiety or depression    Physical Exam    Temp: 97.3  F (36.3  C) Temp src: Oral BP: (!) 141/66 Pulse: 72   Resp: 20 SpO2: 100 % O2 Device: None (Room air)    Vital Signs with Ranges  Temp:  [97.3  F (36.3  C)] 97.3  F (36.3  C)  Pulse:  [72] 72  Resp:  [20] 20  BP: (141)/(66) 141/66  SpO2:  [100 %] 100 % 145 lbs 0 oz    Primary Survey:  Airway: patient talking  Breathing: symmetric respiratory effort bilaterally  Circulation: central pulses present and peripheral pulses present  Disability: Pupils - left 3 mm and brisk, right 3 mm and brisk   [place] Coma Scale - Total 15/15  Eye Response (E): 4  4= spontaneous,  3= to verbal/voice, 2=  to pain, 1= No response   Verbal Response (V): 5   5= Orientated, converses,  4= Confused, converses, 3= Inappropriate words,  2= Incomprehensible sounds,  1=No response   Motor Response (M): 6   6= Obeys commands, 5= Localizes to pain, 4= Withdrawal to pain, 3=Fexion to pain, 2= Extension to pain, 1= No response    Secondary Survey:  General: alert, oriented to person, place, time  Neuro: PERRLA. EOMI. CN II-XII grossly intact. No focal deficits. Strength 5/5 x 4 extremities.  Sensation intact.  Head: atraumatic, normocephalic, trachea midline  Eyes:  Pupils 3 mm, EOMI, corneas and conjunctivae clear  Ears: pearly grey bilateral TMs and non-inflamed external ear canals  Nose: nares patent, no drainage, nasal septum non-tender  Mouth/Throat: no exudates or erythema,  no dental tenderness or malocclusions, no tongue lacerations  Neck: cervical collar present. No midline posterior tenderness, full AROM without pain.   Chest/Pulmonary: normal respiratory rate and rhythm,  bilateral clear breath sounds, no wheezes, rales or rhonchi, no chest wall tenderness or deformities,   Cardiovascular: S1, S2,  normal and regular rate and rhythm, no murmurs  Abdomen: soft, non-tender, no guarding, no rebound tenderness and no tenderness to palpation  : normal external genitalia, pelvis stable to lateral compression, no guerra, no urine  assess/urine yellow and clear  Musculoskel/Extremities: normal extremities, full AROM of major joints without tenderness, edema, erythema, ecchymosis, or abrasions. PP. No edema.   Back/Spine: no deformity, no midline tenderness, no sacral tenderness, no step-offs and no abrasions or contusions  Hands: no gross deformities of hands or fingers. Full AROM of hand and fingers in flexion and extension.  strength equal and symmetric.   Psychiatric: affect/mood normal, cooperative, normal judgement/insight and memory intact  Skin: Right dorsal hand ecchymosis, otherwise no other rashes, laceration, ecchymosis, skin warm and dry.     Results for orders placed or performed during the hospital encounter of 06/06/23 (from the past 24 hour(s))   [facility] Draw    Narrative    The following orders were created for panel order [facility] Draw.  Procedure                               Abnormality         Status                     ---------                               -----------         ------                     Extra Blue Top Tube[[id number]]                              Final result               Extra Red Top Tube[[id number]]                               Final result               Extra Green Top (Lithium...[[id number]]                      Final result               Extra Purple Top Tube[[id number]]                            Final result                 Please view results for these tests on the individual orders.   Extra Blue Top Tube   Result Value Ref Range    Hold Specimen JIC    Extra Red Top Tube   Result Value Ref Range    Hold Specimen JIC    Extra Green Top (Lithium Heparin) Tube   Result Value Ref Range    Hold Specimen JIC    Extra Purple Top Tube   Result Value Ref Range    Hold Specimen JIC    CBC with platelets differential    Narrative    The following orders were created for panel order CBC with platelets differential.  Procedure                               Abnormality         Status                      ---------                               -----------         ------                     CBC with platelets and d...[[id number]]                      Final result                 Please view results for these tests on the individual orders.   Comprehensive metabolic panel   Result Value Ref Range    Sodium 143 136 - 145 mmol/L    Potassium 3.8 3.4 - 5.3 mmol/L    Chloride 108 (H) 98 - 107 mmol/L    Carbon Dioxide (CO2) 22 22 - 29 mmol/L    Anion Gap 13 7 - 15 mmol/L    Urea Nitrogen 20.9 8.0 - 23.0 mg/dL    Creatinine 0.97 (H) 0.51 - 0.95 mg/dL    Calcium 9.3 8.8 - 10.2 mg/dL    Glucose 123 (H) 70 - 99 mg/dL    Alkaline Phosphatase 94 35 - 104 U/L    AST 25 10 - 35 U/L    ALT 13 10 - 35 U/L    Protein Total 6.4 6.4 - 8.3 g/dL    Albumin 4.0 3.5 - 5.2 g/dL    Bilirubin Total 0.2 <=1.2 mg/dL    GFR Estimate 60 (L) >60 mL/min/1.73m2   Troponin T, High Sensitivity   Result Value Ref Range    Troponin T, High Sensitivity 8 <=14 ng/L   CBC with platelets and differential   Result Value Ref Range    WBC Count 6.8 4.0 - 11.0 10e3/uL    RBC Count 4.26 3.80 - 5.20 10e6/uL    Hemoglobin 13.2 11.7 - 15.7 g/dL    Hematocrit 39.1 35.0 - 47.0 %    MCV 92 78 - 100 fL    MCH 31.0 26.5 - 33.0 pg    MCHC 33.8 31.5 - 36.5 g/dL    RDW 12.8 10.0 - 15.0 %    Platelet Count 195 150 - 450 10e3/uL    % Neutrophils 77 %    % Lymphocytes 16 %    % Monocytes 6 %    % Eosinophils 1 %    % Basophils 0 %    % Immature Granulocytes 0 %    NRBCs per 100 WBC 0 <1 /100    Absolute Neutrophils 5.3 1.6 - 8.3 10e3/uL    Absolute Lymphocytes 1.1 0.8 - 5.3 10e3/uL    Absolute Monocytes 0.4 0.0 - 1.3 10e3/uL    Absolute Eosinophils 0.1 0.0 - 0.7 10e3/uL    Absolute Basophils 0.0 0.0 - 0.2 10e3/uL    Absolute Immature Granulocytes 0.0 <=0.4 10e3/uL    Absolute NRBCs 0.0 10e3/uL   Lactic acid whole blood   Result Value Ref Range    Lactic Acid 0.9 0.7 - 2.0 mmol/L   Asymptomatic Influenza A/B, RSV, & SARS-CoV2 PCR (COVID-19) Nose    Specimen: Nose; Swab    Result Value Ref Range    Influenza A PCR Negative Negative    Influenza B PCR Negative Negative    RSV PCR Negative Negative    SARS CoV2 PCR Negative Negative    Narrative    Testing was performed using the Xpert Xpress CoV2/Flu/RSV Assay on the Cepheid GeneXpert Instrument. This test should be ordered for the detection of SARS-CoV-2, influenza, and RSV viruses in individuals who meet clinical and/or epidemiological criteria. Test performance is unknown in asymptomatic patients. This test is for in vitro diagnostic use under the FDA EUA for laboratories certified under CLIA to perform high or moderate complexity testing. This test has not been FDA cleared or approved. A negative result does not rule out the presence of PCR inhibitors in the specimen or target RNA in concentration below the limit of detection for the assay. If only one viral target is positive but coinfection with multiple targets is suspected, the sample should be re-tested with another FDA cleared, approved, or authorized test, if coinfection would change clinical management. This test was validated by the [facility]. These laboratories are certified under the Clinical Laboratory Improvement Amendments of 1988 (CLIA-88) as qualified to perform high complexity laboratory testing.   EKG 12-lead, tracing only   Result Value Ref Range    Systolic Blood Pressure  mmHg    Diastolic Blood Pressure  mmHg    Ventricular Rate 73 BPM    Atrial Rate 73 BPM    MT Interval 184 ms    QRS Duration 82 ms     ms    QTc 431 ms    P Axis 48 degrees    R AXIS -20 degrees    T Axis 40 degrees    Interpretation ECG       Sinus rhythm with sinus arrhythmia  Normal ECG  Unconfirmed report - interpretation of this ECG is computer generated - see medical record for final interpretation  Confirmed by - EMERGENCY ROOM, PHYSICIAN ([id number]),  MITCHELL CHRISTIANSON ([id number]) on 6/6/2023 9:56:40 PM     XR Chest 2 Views    Narrative    EXAM: XR CHEST 2  VIEWS  6/6/2023 5:20 PM     HISTORY:  lightheaded, palpatations       COMPARISON:  CT chest 5/23/2023    FINDINGS:   PA and lateral radiograph of chest. Trachea is midline.  Cardiomediastinal silhouette and pulmonary vasculature are within  normal limits. Atherosclerotic calcifications of the aortic knob. No  focal airspace opacity, pleural effusion or appreciable pneumothorax.  Lateral radiograph limited by side placed right upper extremity    Left shoulder arthroplasty.  No acute osseous abnormality. Visualized  upper abdomen is unremarkable.        Impression    IMPRESSION:  No acute airspace disease.    I have personally reviewed the examination and initial interpretation  and I agree with the findings.    CHARLEY LIM MD         SYSTEM ID:  [id number]   UA with Microscopic reflex to Culture    Specimen: Urine, Midstream   Result Value Ref Range    Color Urine Straw Colorless, Straw, Light Yellow, Yellow    Appearance Urine Clear Clear    Glucose Urine Negative Negative mg/dL    Bilirubin Urine Negative Negative    Ketones Urine Negative Negative mg/dL    Specific Gravity Urine 1.010 1.003 - 1.035    Blood Urine Negative Negative    pH Urine 6.0 5.0 - 7.0    Protein Albumin Urine Negative Negative mg/dL    Urobilinogen Urine Normal Normal, 2.0 mg/dL    Nitrite Urine Negative Negative    Leukocyte Esterase Urine Negative Negative    Mucus Urine Present (A) None Seen /LPF    RBC Urine 1 <=2 /HPF    WBC Urine 1 <=5 /HPF    Narrative    Urine Culture not indicated   CT Head w/o Contrast   Result Value Ref Range    Radiologist flags Acute subdural hemorrhage (Urgent)     Narrative    EXAM: CT HEAD W/O CONTRAST  6/6/2023 8:32 PM     HISTORY:  syncopal episode, thinks may have lost consciousness,  fallen, no memory of event       COMPARISON:  CT 4/23/2022    TECHNIQUE: Using multidetector thin collimation helical acquisition  technique, axial, coronal and sagittal CT images from the skull base  to the vertex were  obtained without intravenous contrast.   (topogram) image(s) also obtained and reviewed.    FINDINGS:  Thin subdural hemorrhage measuring up to 4 mm along the right cerebral  convexity and right tentorial leaflet. No acute loss of gray-white  matter differentiation in the cerebral hemispheres. Generalized  cerebral atrophy with stable configuration of the ventricular system.  Clear basal cisterns.    The bony calvaria and the bones of the skull base are normal. The  visualized portions of the paranasal sinuses and mastoid air cells are  clear. Grossly normal orbits.       Impression    IMPRESSION: Acute subdural hemorrhage along the right cerebral  convexity and right tentorial leaflet measuring up to 4 mm.     [Urgent Result: Acute subdural hemorrhage]    Finding was identified on 6/6/2023 8:52 PM.     Heather Malinda was contacted by Dr. Ortiz at 6/6/2023 8:59 PM and  verbalized understanding of the urgent finding.     I have personally reviewed the examination and initial interpretation  and I agree with the findings.    LEELA MONTGOMERY MD         SYSTEM ID:  [id number]   Cervical spine CT w/o contrast    Narrative    EXAM: CT CERVICAL SPINE W/O CONTRAST  6/6/2023 8:33 PM     HISTORY:  syncopal episode, thinks blacked out and fell, fall       COMPARISON:  None available.    TECHNIQUE: Using multidetector thin collimation helical acquisition  technique, axial, coronal and sagittal CT images through the cervical  spine were obtained without intravenous contrast.    FINDINGS:  Trace anterolisthesis of C3 on C4 and C4 on C5. Trace retrolisthesis  of C6 on C7. No acute fracture or traumatic subluxation. Severe  multilevel disc space narrowing, which is greatest at C5-6 and C6-7.  No prevertebral edema.    The findings on a level by level basis are as follows:    C2-3:  No significant spinal canal or neural foraminal stenosis..  Facet arthropathy.    C3-4:  Right eccentric uncovertebral spurring and bilateral  facet  arthrosis. No spinal canal stenosis. Moderate right and mild left  neural foraminal narrowing.    C4-5:  Anterolisthesis. Uncovertebral spurring. Right greater than  left facet hypertrophy. Mild to moderate left and mild right neural  foraminal stenosis. No spinal canal stenosis.     C5-6:  Disc osteophyte complex and bilateral uncinate spurring. Mild  to moderate spinal canal narrowing. Mild to moderate right and  moderate-to-severe left neural foraminal narrowing.     C6-7:  Disc osteophyte complex resulting in at least moderate spinal  canal narrowing. Moderate bilateral neural foraminal narrowing.  Uncinate spurring and facet arthrosis.    C7-T1:  No significant spinal canal or neural foraminal stenosis.     No abnormality of the paraspinous soft tissues.      Impression    IMPRESSION:  1. No acute fracture or traumatic subluxation of the cervical spine.  2. Multilevel cervical spondylosis, which is greatest at C6-7 with  moderate spinal canal and moderate bilateral neural foraminal  stenosis.    I have personally reviewed the examination and initial interpretation  and I agree with the findings.    LEELA MONTGOMERY MD         SYSTEM ID:  [id number]   Troponin T, High Sensitivity   Result Value Ref Range    Troponin T, High Sensitivity 9 <=14 ng/L   Magnesium   Result Value Ref Range    Magnesium 2.1 1.7 - 2.3 mg/dL   INR   Result Value Ref Range    INR 1.07 0.85 - 1.15   Partial thromboplastin time   Result Value Ref Range    aPTT 35 22 - 38 Seconds   CT Head w/o Contrast    Narrative    EXAM: CT HEAD WITHOUT CONTRAST  LOCATION: [facility]  DATE/TIME: 06/07/2023, 4:14 AM CDT    INDICATION: Subdural hematoma stability follow-up.  COMPARISON: None.  TECHNIQUE: Routine CT Head without IV contrast. Multiplanar reformats. Dose reduction techniques were used.    FINDINGS:  INTRACRANIAL CONTENTS: Again seen is a thin right posterior/lateral cerebral convexity subdural  hematoma measuring approximately 4 mm, previously 4 mm. Persistent subdural hematoma is also again noted along the right tentorial leaflet measuring up to 0.4   cm, previously 0.4 cm. No new hemorrhage. No significant midline shift. No CT evidence of acute infarct. Mild presumed chronic small vessel ischemic changes. Mild to moderate generalized volume loss. No hydrocephalus.     VISUALIZED ORBITS/SINUSES/MASTOIDS: Prior bilateral cataract surgery. Visualized portions of the orbits are otherwise unremarkable. No paranasal sinus mucosal disease. No middle ear or mastoid effusion.    BONES/SOFT TISSUES: No acute abnormality.      Impression    IMPRESSION:  1.  Stable appearing right posterior/lateral cerebral convexity and right tentorial leaflet subdural hematomas. No significant midline shift.  2.  No new hemorrhage.           Studies:  Cervical spine CT w/o contrast   Preliminary Result   RESIDENT PRELIMINARY INTERPRETATION   IMPRESSION:   1. No acute fracture or traumatic subluxation of the cervical spine.   2. Multilevel cervical spondylosis, which is greatest at C5-6 and C6-7   resulting in mild to moderate spinal canal stenosis and moderate to   severe bilateral neural foraminal narrowing at these levels.      CT Head w/o Contrast   Final Result   Abnormal   IMPRESSION: Acute subdural hemorrhage along the right cerebral   convexity and right tentorial leaflet measuring up to 4 mm.       [Urgent Result: Acute subdural hemorrhage]      Finding was identified on 6/6/2023 8:52 PM.       Heather Petty was contacted by Dr. Ortiz at 6/6/2023 8:59 PM and   verbalized understanding of the urgent finding.       I have personally reviewed the examination and initial interpretation   and I agree with the findings.      LEELA MONTGOMERY MD            SYSTEM ID:  [id number]      XR Chest 2 Views   Final Result   IMPRESSION:  No acute airspace disease.      I have personally reviewed the examination and initial  interpretation   and I agree with the findings.      CHARLEY LIM MD            SYSTEM ID:  [id number]          Nydia Cano MD

## 2023-06-07 NOTE — CONSULTS
"[facility]       NEUROSURGERY CONSULTATION NOTE    This consultation was requested by Dr. Leslie from the Emergency service.    Reason for Consultation: small SDH in setting of fall    HPI: Giana Hope is a 77 year old female with PMHx significant for balance issues, hyperlipidemia, CKD 3, osteoarthritis, orthostatic hypotension (not formally diagnosed), thrombocytopenia purpura (young adulthood), and degenerative disc disease who presented to ED after concern for fall with no recollection of event with CT head demonstrating small R convexity SDH for which Neurosurgery was consulted.    Patient states that she felt off this afternoon and told a friend that she thinks she may have hit her head and was experiencing some palpitations. Friend advised her to call EMS and go get evaluated.  She reports that she felt \"woozy\" earlier today and that she has a history of low blood pressure when standing up too quickly. She remembers being in the bathroom when EMS arrived to her house. She does not remember falling or hitting her head. She cannot tell me what time of day these events occurred. She endorses mild headache, nausea, and feeling light-headed. She denies neck pain, dizziness, vision changes, weakness, numbness, tingling, or any sensory changes.    She denies taking any ASA or anticoagulation.     PAST MEDICAL HISTORY:   Past Medical History:   Diagnosis Date     Chronic kidney disease, stage 3a (H) 10/3/2022     Nonsenile cataract      Osteoarthritis      Pulmonary nodule      Syncopal episodes 6/6/2023     Uveitis        PAST SURGICAL HISTORY:   Past Surgical History:   Procedure Laterality Date     ARTHROPLASTY KNEE Right 11/26/2021    Procedure: ARTHROPLASTY, RIGHT KNEE, TOTAL;  Surgeon: Gabriel Marrero MD;  Location: [facility] OR     ARTHROPLASTY, RIGHT KNEE, TOTAL Right 11/26/2021     CATARACT IOL, RT/LT Right 03/2019     PHACOEMULSIFICATION WITH STANDARD " "INTRAOCULAR LENS IMPLANT Right 3/22/2019    Procedure: Right Cataract Removal with Intraocular Lens Implant;  Surgeon: Trish Taylor MD;  Location: [facility] OR       FAMILY HISTORY:   Family History   Problem Relation Age of Onset     Arthritis Mother      Breast Cancer Mother 78     Diabetes Type 2  Father      Heart Disease Father          age 75     Alzheimer Disease Father 60     Heart Disease Brother 68     Breast Cancer Maternal Grandmother 81     Heart Disease Maternal Grandfather 54         age 81     Other - See Comments Son         schizoaffective lives in [place]     Glaucoma No family hx of      Macular Degeneration No family hx of        SOCIAL HISTORY:   Social History     Tobacco Use     Smoking status: Former     Years: 35.00     Types: Cigarettes     Smokeless tobacco: Never     Tobacco comments:     Quit 24 years ago    Vaping Use     Vaping status: Never Used     Passive vaping exposure: Yes   Substance Use Topics     Alcohol use: Yes     Comment: Moderate       MEDICATIONS:  (Not in a hospital admission)      Allergies:  Allergies   Allergen Reactions     Dust Mites Itching     Runny nose, fever     Mold Itching     Fever, runny nose     Pollen Extract Itching     Fever, runny nose     Celecoxib      Other reaction(s): GI intolerance       ROS: 10 point ROS were all negative except for pertinent positives noted in my HPI.    Physical exam:   Blood pressure (!) 142/71, pulse 75, temperature 97.3  F (36.3  C), temperature source Oral, resp. rate 20, height 1.6 m (5' 3\"), weight 65.8 kg (145 lb), last menstrual period 1998, SpO2 99 %, not currently breastfeeding.  CV: HR and BP as noted above  PULM: breathing comfortably on room air  ABD: soft, non-distended  NEUROLOGIC:  -- Awake; Alert; oriented x 3  -- Follows commands briskly  -- +repetition, calculation, and naming  -- Speech fluent, spontaneous. No aphasia or dysarthria.  -- no gaze preference. No apparent hemineglect.  Cranial " Nerves:  -- visual fields full to confrontation, PERRL 3-2mm bilat and brisk, extraocular movements intact  -- face symmetrical, tongue midline  -- sensory V1-V3 intact bilaterally  -- palate elevates symmetrically, uvula midline  -- hearing grossly intact bilat  -- Trapezii 5/5 strength bilat symmetric  -- Cerebellar: Finger nose finger without dysmetria, intact rapid alternating motions bilaterally    Motor:  Normal bulk / tone; no tremor, rigidity, or bradykinesia.  No muscle wasting or fasciculations  No Pronator Drift     Delt Bi Tri Hand Flexion/  Extension Iliopsoas Quadriceps Hamstrings Tibialis Anterior Gastroc    C5 C6 C7 C8/T1 L2 L3 L4-S1 L4 S1   R 5 5 5 5 5 5 5 5 5   L 5 5 5 5 5 5 5 5 5   Sensory:  intact to LT x 4 extremities     Reflexes:     Bi Tri BR Pb Pat Ach Bab    C5-6 C7-8 C6 UMN L2-4 S1 UMN   R 2+ 2+ 2+ Norm 2+ 2+ Norm   L 2+ 2+ 2+ Norm 2+ 2+ Norm     Gait: Deferred      LABS:  Recent Labs   Lab 06/06/23  1626      POTASSIUM 3.8   CHLORIDE 108*   CO2 22   ANIONGAP 13   *   BUN 20.9   CR 0.97*   LUIS E 9.3       Recent Labs   Lab 06/06/23  1626   WBC 6.8   RBC 4.26   HGB 13.2   HCT 39.1   MCV 92   MCH 31.0   MCHC 33.8   RDW 12.8          IMAGING:  CT Head 6/6  IMPRESSION: Acute subdural hemorrhage along the right cerebral convexity and right tentorial leaflet measuring up to 4 mm.     ASSESSMENT:  Giana Hope is a 77 year old female with PMHx significant for balance issues, hyperlipidemia, CKD 3, osteoarthritis, orthostatic hypotension (not formally diagnosed), thrombocytopenia purpura (young adulthood), and degenerative disc disease who presented to ED after after likely orthostatic fall with LOC with mild headache, nausea, and CT head demonstrating small R convexity SDH, with non-focal neurological exam.    RECOMMENDATIONS:  No neurosurgical intervention indicated at this time   Repeat head CT in 6 hours from the time of first acquisition  Q2h neuro exams until stable  "CT, then can transfer to floor status  No Keppra  SBP < 140  NPO until stable CT scan  Normonatremia  Platelets > 100,000  INR < 1.5  Hemoglobin > 7  DVT: SCDs while in bed      The patient was discussed with Dr. Ashton, neurosurgery chief resident, and Dr. Nascimento, neurosurgery staff, and they agree with the above.    Aimee Silver MD, PhD  PGY-2 Neurosurgery  Please page NSGY on call with questions    Clinically Significant Risk Factors Present on Admission                       # Overweight: Estimated body mass index is 25.69 kg/m  as calculated from the following:    Height as of this encounter: 1.6 m (5' 3\").    Weight as of this encounter: 65.8 kg (145 lb).         # Compression of brain         "

## 2023-06-07 NOTE — DISCHARGE INSTRUCTIONS
Follow up with Neurosurgery Clinic in 2 weeks with a CT head  [facility]  [address]   [address] [place] [address]  Appointments: [phone]    Please return to the Emergency department if you develop any of the following:  Falls with potential for injury  Palpitations that do not resolve  Worsening headache, lightheadedness

## 2023-06-07 NOTE — PROGRESS NOTES
[facility], [place]  Neurosurgery Progress Note:  06/07/2023      Interval History: Repeat CT head stable    Assessment:  Giana Hope is a 77 year old female with PMHx significant for balance issues, hyperlipidemia, CKD 3, osteoarthritis, orthostatic hypotension (not formally diagnosed), thrombocytopenia purpura (young adulthood), and degenerative disc disease who presented to ED after after likely orthostatic fall with LOC with mild headache, nausea, and CT head demonstrating small R convexity SDH, with non-focal neurological exam.     RECOMMENDATIONS:  No neurosurgical intervention indicated at this time   Q4h neuro checks  Repeat CT head in 2 weeks with [facility] follow up- will arrange for you  No Keppra  SBP < 140  Regular diet  Normonatremia  Platelets > 100,000  INR < 1.5  Hemoglobin > 7  DVT: SCDs while in bed    Neurosurgery will follow peripherally at this time- please page back with any new concerns    -----------------------------------  Aimee Silver MD  Neurosurgery resident, PGY-2  -----------------------------------    Gen: Appears comfortable, NAD  Neurologic:  Alert & Oriented to person, place, time, and situation  Follows commands briskly  Speech fluent, spontaneous. No aphasia or dysarthria.  No gaze preference. No apparent hemineglect.  PERRL, EOMI  Face symmetric with sensation intact to light touch  Palate elevates symmetrically, uvula midline, tongue protrudes midline  Trapezii muscles 5/5 bilaterally  No pronator drift     Del Tr Bi WE WF Gr   R 5 5 5 5 5 5   L 5 5 5 5 5 5    HF KE KF DF PF EHL   R 5 5 5 5 5 5   L 5 5 5 5 5 5     Reflexes 2+ throughout    Sensation intact and symmetric to light touch throughout    Objective:   Temp:  [97.3  F (36.3  C)-97.9  F (36.6  C)] 97.9  F (36.6  C)  Pulse:  [72-85] 77  Resp:  [16-20] 16  BP: (129-147)/() 137/107  SpO2:  [96 %-100 %] 96 %  No intake/output data recorded.        LABS:  Recent Labs   Lab  06/06/23  1626      POTASSIUM 3.8   CHLORIDE 108*   CO2 22   ANIONGAP 13   *   BUN 20.9   CR 0.97*   LUIS E 9.3       Recent Labs   Lab 06/06/23  1626   WBC 6.8   RBC 4.26   HGB 13.2   HCT 39.1   MCV 92   MCH 31.0   MCHC 33.8   RDW 12.8          IMAGING:  Recent Results (from the past 24 hour(s))   XR Chest 2 Views    Narrative    EXAM: XR CHEST 2 VIEWS  6/6/2023 5:20 PM     HISTORY:  lightheaded, palpatations       COMPARISON:  CT chest 5/23/2023    FINDINGS:   PA and lateral radiograph of chest. Trachea is midline.  Cardiomediastinal silhouette and pulmonary vasculature are within  normal limits. Atherosclerotic calcifications of the aortic knob. No  focal airspace opacity, pleural effusion or appreciable pneumothorax.  Lateral radiograph limited by side placed right upper extremity    Left shoulder arthroplasty.  No acute osseous abnormality. Visualized  upper abdomen is unremarkable.        Impression    IMPRESSION:  No acute airspace disease.    I have personally reviewed the examination and initial interpretation  and I agree with the findings.    CHARLEY LIM MD         SYSTEM ID:  [id number]   CT Head w/o Contrast   Result Value    Radiologist flags Acute subdural hemorrhage (Urgent)    Narrative    EXAM: CT HEAD W/O CONTRAST  6/6/2023 8:32 PM     HISTORY:  syncopal episode, thinks may have lost consciousness,  fallen, no memory of event       COMPARISON:  CT 4/23/2022    TECHNIQUE: Using multidetector thin collimation helical acquisition  technique, axial, coronal and sagittal CT images from the skull base  to the vertex were obtained without intravenous contrast.   (topogram) image(s) also obtained and reviewed.    FINDINGS:  Thin subdural hemorrhage measuring up to 4 mm along the right cerebral  convexity and right tentorial leaflet. No acute loss of gray-white  matter differentiation in the cerebral hemispheres. Generalized  cerebral atrophy with stable configuration of the  ventricular system.  Clear basal cisterns.    The bony calvaria and the bones of the skull base are normal. The  visualized portions of the paranasal sinuses and mastoid air cells are  clear. Grossly normal orbits.       Impression    IMPRESSION: Acute subdural hemorrhage along the right cerebral  convexity and right tentorial leaflet measuring up to 4 mm.     [Urgent Result: Acute subdural hemorrhage]    Finding was identified on 6/6/2023 8:52 PM.     Heather Petty was contacted by Dr. Ortiz at 6/6/2023 8:59 PM and  verbalized understanding of the urgent finding.     I have personally reviewed the examination and initial interpretation  and I agree with the findings.    LEELA MONTGOMERY MD         SYSTEM ID:  [id number]   Cervical spine CT w/o contrast    Narrative    EXAM: CT CERVICAL SPINE W/O CONTRAST  6/6/2023 8:33 PM     HISTORY:  syncopal episode, thinks blacked out and fell, fall       COMPARISON:  None available.    TECHNIQUE: Using multidetector thin collimation helical acquisition  technique, axial, coronal and sagittal CT images through the cervical  spine were obtained without intravenous contrast.    FINDINGS:  Trace anterolisthesis of C3 on C4 and C4 on C5. Trace retrolisthesis  of C6 on C7. No acute fracture or traumatic subluxation. Severe  multilevel disc space narrowing, which is greatest at C5-6 and C6-7.  No prevertebral edema.    The findings on a level by level basis are as follows:    C2-3:  No significant spinal canal or neural foraminal stenosis..  Facet arthropathy.    C3-4:  Right eccentric uncovertebral spurring and bilateral facet  arthrosis. No spinal canal stenosis. Moderate right and mild left  neural foraminal narrowing.    C4-5:  Anterolisthesis. Uncovertebral spurring. Right greater than  left facet hypertrophy. Mild to moderate left and mild right neural  foraminal stenosis. No spinal canal stenosis.     C5-6:  Disc osteophyte complex and bilateral uncinate spurring. Mild  to  moderate spinal canal narrowing. Mild to moderate right and  moderate-to-severe left neural foraminal narrowing.     C6-7:  Disc osteophyte complex resulting in at least moderate spinal  canal narrowing. Moderate bilateral neural foraminal narrowing.  Uncinate spurring and facet arthrosis.    C7-T1:  No significant spinal canal or neural foraminal stenosis.     No abnormality of the paraspinous soft tissues.      Impression    IMPRESSION:  1. No acute fracture or traumatic subluxation of the cervical spine.  2. Multilevel cervical spondylosis, which is greatest at C6-7 with  moderate spinal canal and moderate bilateral neural foraminal  stenosis.    I have personally reviewed the examination and initial interpretation  and I agree with the findings.    LEELA MONTGOMERY MD         SYSTEM ID:  [id number]   CT Head w/o Contrast    Narrative    EXAM: CT HEAD WITHOUT CONTRAST  LOCATION: [facility]  DATE/TIME: 06/07/2023, 4:14 AM CDT    INDICATION: Subdural hematoma stability follow-up.  COMPARISON: None.  TECHNIQUE: Routine CT Head without IV contrast. Multiplanar reformats. Dose reduction techniques were used.    FINDINGS:  INTRACRANIAL CONTENTS: Again seen is a thin right posterior/lateral cerebral convexity subdural hematoma measuring approximately 4 mm, previously 4 mm. Persistent subdural hematoma is also again noted along the right tentorial leaflet measuring up to 0.4   cm, previously 0.4 cm. No new hemorrhage. No significant midline shift. No CT evidence of acute infarct. Mild presumed chronic small vessel ischemic changes. Mild to moderate generalized volume loss. No hydrocephalus.     VISUALIZED ORBITS/SINUSES/MASTOIDS: Prior bilateral cataract surgery. Visualized portions of the orbits are otherwise unremarkable. No paranasal sinus mucosal disease. No middle ear or mastoid effusion.    BONES/SOFT TISSUES: No acute abnormality.      Impression    IMPRESSION:  1.   Stable appearing right posterior/lateral cerebral convexity and right tentorial leaflet subdural hematomas. No significant midline shift.  2.  No new hemorrhage.

## 2023-06-07 NOTE — PLAN OF CARE
Physical Therapy: Orders received. Chart reviewed .? Physical Therapy not indicated due to pt at IND functional baseline. Admitted s/p syncopal fall potentially due to polypharmacy now stable. Pt declines need for PT intervention or concerns discharging home with good support from neighbors. Defer discharge recommendations to medical team.? Will complete orders.

## 2023-06-07 NOTE — DISCHARGE SUMMARY
[facility]    Discharge Summary  Trauma Surgery Service    Date of Admission:  6/6/2023  Date of Discharge:  6/7/2023  Attending Physician: Dr. Jose Alberto Munoz  Discharging Provider: Jaydon Ratliff CNP  Date of Service (when I saw the patient): 06/07/23    Primary Provider: Gilberto Vasquez  Primary Care clinic: [address] [place] [id number]  Phone: [phone]  Fax number: [phone]     Discharge Diagnoses   Subdural hemorrhage (H)  Syncope, unspecified syncope type    Hospital Course   Giana Hope is a 77 year old female with a PMH of , hyperlipidemia, CKD 3, osteoarthritis, kyphosis, degenerative disc disease, venous stasis of both lower extremities who presents to the emergency department for evaluation of palpitations and possible syncopal episode. She reports that she felt palpitations earlier during the day which is not unusual for her that resolved on its own. She was also outdoor working in the warm weather. She walked in the call the EMS as her neighbors advised her and she felt slightly lightheaded and fell. She does not quite recall what caused her to fall but recalls the EMS being there. She thinks she hit her head. CT head showed a small right frontal 4mm SDH. CT cervical spine unremarkable.    Small right frontal 4mm traumatic SDH  Giana Hope was evaluated by Neurosurgery and was managed non-operatively.  She had repeat imaging of her head injury which showed stable SDH. She was monitored with serial neurological examinations which remained stable.  Neurosurgery recommends follow up in Neurosurgery clinic in 2 weeks with a CT head.  Prior to discharge she was up and ambulatory independently in the ED hallways, also requested to go home.    A transthoracic cardio echocardiogram was obtained without structural changes to explain the cause of her syncope. 12 lead EKG and troponin unremarkable.     She mentioned that she may have  taken more than 1 dose of her arthritis medication (Sulindas) which occasionally causes palpitations.  She was provided with clear recommendations to return to the ED with worsening symptoms or falls with potential for injury. She should avoid NSAIDs until follow up.    No other changes were made to her home regimen.  Code Status   Full Code    SUBJECTIVE: Prior to discharge Hawa was up and about in the hallway requested to go home. The CT scan results and recommendations were reviewed with her prior. A TTE was obtained as well. She reported a mild headache, required PRN tylenol.    Physical Exam   Temp: 97.9  F (36.6  C) Temp src: Oral BP: 127/69 Pulse: 74   Resp: 16 SpO2: 98 % O2 Device: None (Room air)    Vitals:    06/06/23 1605   Weight: 65.8 kg (145 lb)     Vital Signs with Ranges  Temp:  [97.3  F (36.3  C)-97.9  F (36.6  C)] 97.9  F (36.6  C)  Pulse:  [72-85] 74  Resp:  [16-20] 16  BP: (127-147)/() 127/69  SpO2:  [96 %-100 %] 98 %  No intake/output data recorded.    Constitutional: Awake, alert, cooperative, no apparent distress, and appears stated age.  Eyes: PERRL  ENT: Normocephalic, occipital scalp contussion  Respiratory: No increased work of breathing, good air exchange, clear to auscultation bilaterally, no crackles or wheezing.  Cardiovascular: Normal apical impulse, regular rate and rhythm  GI: No scars, normal bowel sounds, soft, non-distended  Skin: No bruising or bleeding, normal skin color  Musculoskeletal: There is no redness, warmth, or swelling of the joints.  Full range of motion noted.  Motor strength is 5 out of 5 all extremities  Neurologic: Awake, alert, oriented to name, place and time.  Cranial nerves II-XII are grossly intact.  Neuropsychiatric: Calm, normal eye contact, alert, normal affect, oriented to self, place, time and situation, memory for past and recent events intact and thought process normal.    Discharge Disposition   Discharged to home  Condition at discharge:  "Stable  Discharge VS: Blood pressure 127/69, pulse 74, temperature 97.9  F (36.6  C), temperature source Oral, resp. rate 16, height 1.6 m (5' 3\"), weight 65.8 kg (145 lb), last menstrual period 01/01/1998, SpO2 98 %, not currently breastfeeding.    Consultations This Hospital Stay   NEUROSURGERY ADULT IP CONSULT  TRAUMA SURGERY IP CONSULT  PHYSICAL THERAPY ADULT IP CONSULT  OCCUPATIONAL THERAPY ADULT IP CONSULT  NURSING TO CONSULT FOR VASCULAR ACCESS CARE IP CONSULT    Discharge Orders      CT Head w/o Contrast     Reason for your hospital stay    Fall  Small stable subdural hematoma     Activity    Your activity upon discharge: activity as tolerated     Adult [facility] Follow-up and recommended labs and tests    Follow up with your primary care provider for continued medical care and hospital follow up in 5-10 days.    Neurosurgery Clinic in 2 weeks with a CT head  [facility]  [address]oor [address]   [address] [place] [address]  Appointments: [phone]    You have been involved in a recent trauma incident resulting in an injury.  Studies show us that people affected by trauma have higher levels of post-traumatic stress disorder (PTSD) and/or depressive symptoms during the year following an injury.     Please consider the following.  Have you:  Had migraines about the event(s) or thought about the event(s) when you didn't want to?  Tried hard not to think about the event(s) or went out of your way to avoid situations that reminded you of the event(s)?  Been constantly on guard, watchful, or easily startled?  Felt numb or detached from people, activities, or your surroundings?   Felt guilty or unable to stop blaming yourself or others for the event(s) or any problems the event (s) may have caused?    If you answered \"yes\" to 3 or more of these questions, or if you simply want to discuss any of your feelings further, we recommend that you talk with your Primary Care Provider or a mental " health professional.        Appointments on [place] and/or [facility] (with [facility] or [facility] provider or service). Call [phone] if you haven't heard regarding these appointments within 7 days of discharge.     Diet    Follow this diet upon discharge: Regular     Discharge Medications   Current Discharge Medication List      CONTINUE these medications which have NOT CHANGED    Details   calcium-vitamin D 500-125 MG-UNIT TABS Take 1 tablet by mouth daily      famotidine (PEPCID) 40 MG tablet Take 1 tablet (40 mg) by mouth daily as needed for heartburn  Qty: 90 tablet, Refills: 3    Associated Diagnoses: Gastroesophageal reflux disease without esophagitis      ketoconazole (NIZORAL) 2 % external shampoo Apply topically three times a week  Qty: 120 mL, Refills: 6    Associated Diagnoses: Dermatitis, seborrheic      pravastatin (PRAVACHOL) 20 MG tablet Take 1 tablet (20 mg) by mouth daily  Qty: 90 tablet, Refills: 3    Associated Diagnoses: Hyperlipidemia with target LDL less than 130      prednisoLONE acetate (PRED FORTE) 1 % ophthalmic suspension Place 1 drop Into the left eye 3 times daily  Qty: 15 mL, Refills: 1    Associated Diagnoses: Acute anterior uveitis of left eye      sulindac (CLINORIL) 200 MG tablet Take 1 tablet (200 mg) by mouth daily as needed (for joint pain)  Qty: 90 tablet, Refills: 0    Associated Diagnoses: Primary osteoarthritis involving multiple joints      tiZANidine (ZANAFLEX) 4 MG tablet Take 0.5 tablets (2 mg) by mouth 3 times daily as needed for muscle spasms  Qty: 30 tablet, Refills: 1    Associated Diagnoses: Pain in thoracic spine      Vitamin D3 (CHOLECALCIFEROL) 25 mcg (1000 units) tablet Take 1 tablet by mouth daily           Allergies   Allergies   Allergen Reactions     Dust Mites Itching     Runny nose, fever     Mold Itching     Fever, runny nose     Pollen Extract Itching     Fever, runny nose     Celecoxib      Other reaction(s): GI intolerance     Data   Most Recent 3  CBC's:  Recent Labs   Lab Test 06/07/23  0755 06/06/23  1626 09/20/22  1652   WBC 7.2 6.8 6.1   HGB 14.3 13.2 13.5   MCV 93 92 93    195 193      Most Recent 3 BMP's:  Recent Labs   Lab Test 06/07/23  0755 06/06/23  1626 03/02/23  0908 09/20/22  1652    143  --  138   POTASSIUM 3.4 3.8  --  4.1   CHLORIDE 106 108*  --  103   CO2 21* 22  --  26   BUN 14.4 20.9 19.0 25.5*   CR 0.81 0.97* 0.91 1.14*   ANIONGAP 15 13  --  9   LUIS E 9.8 9.3  --  10.1   * 123*  --  92     Most Recent 2 LFT's:  Recent Labs   Lab Test 06/07/23  0755 06/06/23  1626   AST 24 25   ALT 7* 13   ALKPHOS 105* 94   BILITOTAL 0.5 0.2     Most Recent INR's and Anticoagulation Dosing History:  Anticoagulation Dose History         Latest Ref Rng & Units 6/7/2023   Recent Dosing and Labs   INR 0.85 - 1.15 1.07                Most Recent 3 Troponin's:No lab results found.  Most Recent 6 Bacteria Isolates From Any Culture (See EPIC Reports for Culture Details):No lab results found.  Most Recent TSH, T4 and A1c Labs:  Recent Labs   Lab Test 04/22/22  1428 08/17/20  1404 01/21/20  0925   TSH 1.06   < >  --    A1C  --   --  5.3    < > = values in this interval not displayed.     Results for orders placed or performed during the hospital encounter of 06/06/23   XR Chest 2 Views    Narrative    EXAM: XR CHEST 2 VIEWS  6/6/2023 5:20 PM     HISTORY:  lightheaded, palpatations       COMPARISON:  CT chest 5/23/2023    FINDINGS:   PA and lateral radiograph of chest. Trachea is midline.  Cardiomediastinal silhouette and pulmonary vasculature are within  normal limits. Atherosclerotic calcifications of the aortic knob. No  focal airspace opacity, pleural effusion or appreciable pneumothorax.  Lateral radiograph limited by side placed right upper extremity    Left shoulder arthroplasty.  No acute osseous abnormality. Visualized  upper abdomen is unremarkable.        Impression    IMPRESSION:  No acute airspace disease.    I have personally reviewed  the examination and initial interpretation  and I agree with the findings.    CHARLEY LIM MD         SYSTEM ID:  [id number]   CT Head w/o Contrast     Value    Radiologist flags Acute subdural hemorrhage (Urgent)    Narrative    EXAM: CT HEAD W/O CONTRAST  6/6/2023 8:32 PM     HISTORY:  syncopal episode, thinks may have lost consciousness,  fallen, no memory of event       COMPARISON:  CT 4/23/2022    TECHNIQUE: Using multidetector thin collimation helical acquisition  technique, axial, coronal and sagittal CT images from the skull base  to the vertex were obtained without intravenous contrast.   (topogram) image(s) also obtained and reviewed.    FINDINGS:  Thin subdural hemorrhage measuring up to 4 mm along the right cerebral  convexity and right tentorial leaflet. No acute loss of gray-white  matter differentiation in the cerebral hemispheres. Generalized  cerebral atrophy with stable configuration of the ventricular system.  Clear basal cisterns.    The bony calvaria and the bones of the skull base are normal. The  visualized portions of the paranasal sinuses and mastoid air cells are  clear. Grossly normal orbits.       Impression    IMPRESSION: Acute subdural hemorrhage along the right cerebral  convexity and right tentorial leaflet measuring up to 4 mm.     [Urgent Result: Acute subdural hemorrhage]    Finding was identified on 6/6/2023 8:52 PM.     Heather Petty was contacted by Dr. Ortiz at 6/6/2023 8:59 PM and  verbalized understanding of the urgent finding.     I have personally reviewed the examination and initial interpretation  and I agree with the findings.    LEELA MONTGOMERY MD         SYSTEM ID:  [id number]   Cervical spine CT w/o contrast    Narrative    EXAM: CT CERVICAL SPINE W/O CONTRAST  6/6/2023 8:33 PM     HISTORY:  syncopal episode, thinks blacked out and fell, fall       COMPARISON:  None available.    TECHNIQUE: Using multidetector thin collimation helical acquisition  technique,  axial, coronal and sagittal CT images through the cervical  spine were obtained without intravenous contrast.    FINDINGS:  Trace anterolisthesis of C3 on C4 and C4 on C5. Trace retrolisthesis  of C6 on C7. No acute fracture or traumatic subluxation. Severe  multilevel disc space narrowing, which is greatest at C5-6 and C6-7.  No prevertebral edema.    The findings on a level by level basis are as follows:    C2-3:  No significant spinal canal or neural foraminal stenosis..  Facet arthropathy.    C3-4:  Right eccentric uncovertebral spurring and bilateral facet  arthrosis. No spinal canal stenosis. Moderate right and mild left  neural foraminal narrowing.    C4-5:  Anterolisthesis. Uncovertebral spurring. Right greater than  left facet hypertrophy. Mild to moderate left and mild right neural  foraminal stenosis. No spinal canal stenosis.     C5-6:  Disc osteophyte complex and bilateral uncinate spurring. Mild  to moderate spinal canal narrowing. Mild to moderate right and  moderate-to-severe left neural foraminal narrowing.     C6-7:  Disc osteophyte complex resulting in at least moderate spinal  canal narrowing. Moderate bilateral neural foraminal narrowing.  Uncinate spurring and facet arthrosis.    C7-T1:  No significant spinal canal or neural foraminal stenosis.     No abnormality of the paraspinous soft tissues.      Impression    IMPRESSION:  1. No acute fracture or traumatic subluxation of the cervical spine.  2. Multilevel cervical spondylosis, which is greatest at C6-7 with  moderate spinal canal and moderate bilateral neural foraminal  stenosis.    I have personally reviewed the examination and initial interpretation  and I agree with the findings.    LEELA MONTGOMERY MD         SYSTEM ID:  [id number]   CT Head w/o Contrast    Narrative    EXAM: CT HEAD WITHOUT CONTRAST  LOCATION: [facility]  DATE/TIME: 06/07/2023, 4:14 AM CDT    INDICATION: Subdural hematoma  stability follow-up.  COMPARISON: None.  TECHNIQUE: Routine CT Head without IV contrast. Multiplanar reformats. Dose reduction techniques were used.    FINDINGS:  INTRACRANIAL CONTENTS: Again seen is a thin right posterior/lateral cerebral convexity subdural hematoma measuring approximately 4 mm, previously 4 mm. Persistent subdural hematoma is also again noted along the right tentorial leaflet measuring up to 0.4   cm, previously 0.4 cm. No new hemorrhage. No significant midline shift. No CT evidence of acute infarct. Mild presumed chronic small vessel ischemic changes. Mild to moderate generalized volume loss. No hydrocephalus.     VISUALIZED ORBITS/SINUSES/MASTOIDS: Prior bilateral cataract surgery. Visualized portions of the orbits are otherwise unremarkable. No paranasal sinus mucosal disease. No middle ear or mastoid effusion.    BONES/SOFT TISSUES: No acute abnormality.      Impression    IMPRESSION:  1.  Stable appearing right posterior/lateral cerebral convexity and right tentorial leaflet subdural hematomas. No significant midline shift.  2.  No new hemorrhage.           Time Spent on this Encounter   I, NASIR Holder CNP, personally saw the patient today and spent greater than 30 minutes discharging this patient.    We appreciate the opportunity to care for your patient while in the hospital.  Should you have any questions about their injuries or this discharge summary our contact information is below.    Trauma Services  [facility]   Department of Critical Care and Acute Care Surgery  [address] [address]  Office: [phone]

## 2023-06-08 ENCOUNTER — TELEPHONE (OUTPATIENT)
Dept: ORTHOPEDICS | Facility: CLINIC | Age: 78
End: 2023-06-08
Payer: MEDICARE

## 2023-06-08 ENCOUNTER — TELEPHONE (OUTPATIENT)
Dept: NEUROSURGERY | Facility: CLINIC | Age: 78
End: 2023-06-08
Payer: MEDICARE

## 2023-06-08 NOTE — TELEPHONE ENCOUNTER
[facility] Call Center    Phone Message    May a detailed message be left on voicemail: yes     Reason for Call: Other: Neurosurgery Clinic in 2 weeks with a CT head      Action Taken: Other: Routed to [facility] Neuro[facility]urg Adult [facility]    Travel Screening: Not Applicable

## 2023-06-09 NOTE — TELEPHONE ENCOUNTER
"Physical Therapy  Treatment    Khloe Kendrick   MRN: [id number]   Admitting Diagnosis: Pyogenic arthritis of right knee joint    PT Received On: 17  PT Start Time: 1122     PT Stop Time: 1146    PT Total Time (min): 24 min       Billable Minutes:  Gait [id number] and Therapeutic Exercise 10    Treatment Type: Treatment  PT/PTA: PTA     PTA Visit Number: 1       General Precautions: Standard, fall  Orthopedic Precautions: Full weight bearing   Braces:           Subjective:  Communicated with pt's nurse, Sammie, prior to session.  Pt sitting EOB with daughter present.  Pt states " I am able to put more weight on it."    Pain Ratin/10                   Objective:   Patient found with: peripheral IV (ace bandage to R knee)    Functional Mobility:  Bed Mobility:     Pt sitting EOB    Transfers:  Sit <> Stand Assistance: Stand By Assistance (from BSchair)  Sit <> Stand Assistive Device: Rolling Walker    Gait:   Gait Distance: ~110ft with SBA  Assistance 1: Stand by Assistance  Gait Assistive Device: Rolling walker  Gait Pattern: 3-point gait  Gait Deviation(s): decreased mo, increased time in double stance, decreased velocity of limb motion, decreased step length, decreased weight-shifting ability, decreased toe-to-floor clearance      Balance:   Static Sit: FAIR-: Maintains without assist but inconsistent   Dynamic Sit: FAIR+: Maintains balance through MINIMAL excursions of active trunk motion  Static Stand: FAIR: Maintains without assist but unable to take challenges  Dynamic stand: FAIR+: Needs CLOSE SUPERVISION during gait and is able to right self with minor LOB     Therapeutic Activities and Exercises:  Pt performed seated therex to BLEs x15 reps including: hip flexion, LAQs, knee flexion, APs, pillow squeezes, isometric hip abduction     AM-PAC 6 CLICK MOBILITY  How much help from another person does this patient currently need?   1 = Unable, Total/Dependent Assistance  2 = A lot, Maximum/Moderate " Please see MyChart encounter with Ethel TEJEDASherie From 6/8/23. Closing encounter.     Lizbet Cunningham MSA, ATC  Certified Athletic Trainer   Assistance  3 = A little, Minimum/Contact Guard/Supervision  4 = None, Modified Fowler/Independent         AM-PAC Raw Score CMS G-Code Modifier Level of Impairment Assistance   6 % Total / Unable   7 - 9 CM 80 - 100% Maximal Assist   10 - 14 CL 60 - 80% Moderate Assist   15 - 19 CK 40 - 60% Moderate Assist   20 - 22 CJ 20 - 40% Minimal Assist   23 CI 1-20% SBA / CGA   24 CH 0% Independent/ Mod I     Patient left up in chair with all lines intact, call button in reach, pt's nurse, Sammie, ilanied and daughter present.    Assessment:  Khloe Kendrick is a 77 y.o. female with a medical diagnosis of Pyogenic arthritis of right knee joint and presents with increased gait distance with decreased assistance.    Rehab identified problem list/impairments: Rehab identified problem list/impairments: weakness, impaired endurance, impaired self care skills, impaired functional mobilty, gait instability, decreased lower extremity function, decreased safety awareness, decreased ROM, impaired skin    Rehab potential is good.    Activity tolerance: Good    Discharge recommendations: Discharge Facility/Level Of Care Needs: home health PT     Barriers to discharge: Barriers to Discharge: Decreased caregiver support (will stay with one of the daughters)    Equipment recommendations: Equipment Needed After Discharge: walker, rolling, shower chair     GOALS:   Physical Therapy Goals        Problem: Physical Therapy Goal    Goal Priority Disciplines Outcome Goal Variances Interventions   Physical Therapy Goal     PT/OT, PT Ongoing (interventions implemented as appropriate)     Description:  Goals to be met by: 17.     Patient will increase functional independence with mobility by performin. Supine <>sit with Stand-by Assistance.  2. Transfer bed <> chair with SBA.   3. Ambulate 50' w/RW SBA.                PLAN:    Patient to be seen daily  to address the above listed problems via gait training, therapeutic  activities, therapeutic exercises  Plan of Care expires: 04/29/17  Plan of Care reviewed with: patient, daughter         Dagmar Rinaldi, PTA  04/23/2017

## 2023-06-19 ENCOUNTER — OFFICE VISIT (OUTPATIENT)
Dept: FAMILY MEDICINE | Facility: CLINIC | Age: 78
End: 2023-06-19
Payer: MEDICARE

## 2023-06-19 VITALS
RESPIRATION RATE: 12 BRPM | DIASTOLIC BLOOD PRESSURE: 77 MMHG | HEIGHT: 63 IN | SYSTOLIC BLOOD PRESSURE: 116 MMHG | HEART RATE: 80 BPM | TEMPERATURE: 98.2 F | OXYGEN SATURATION: 98 % | BODY MASS INDEX: 25.69 KG/M2

## 2023-06-19 DIAGNOSIS — N18.2 CKD (CHRONIC KIDNEY DISEASE) STAGE 2, GFR 60-89 ML/MIN: ICD-10-CM

## 2023-06-19 DIAGNOSIS — S06.5XAA SDH (SUBDURAL HEMATOMA) (H): Primary | ICD-10-CM

## 2023-06-19 DIAGNOSIS — I25.10 CORONARY ARTERY CALCIFICATION: ICD-10-CM

## 2023-06-19 DIAGNOSIS — M25.511 CHRONIC RIGHT SHOULDER PAIN: ICD-10-CM

## 2023-06-19 DIAGNOSIS — G89.29 CHRONIC RIGHT SHOULDER PAIN: ICD-10-CM

## 2023-06-19 DIAGNOSIS — R73.9 HYPERGLYCEMIA: ICD-10-CM

## 2023-06-19 DIAGNOSIS — R55 SYNCOPE, UNSPECIFIED SYNCOPE TYPE: ICD-10-CM

## 2023-06-19 DIAGNOSIS — R53.83 TIRED: ICD-10-CM

## 2023-06-19 PROBLEM — N18.31 CHRONIC KIDNEY DISEASE, STAGE 3A (H): Status: RESOLVED | Noted: 2022-10-03 | Resolved: 2023-06-19

## 2023-06-19 PROCEDURE — 99215 OFFICE O/P EST HI 40 MIN: CPT | Performed by: FAMILY MEDICINE

## 2023-06-19 NOTE — PATIENT INSTRUCTIONS
For pain management may take  tylenol   Do not take Sulindac or other Nsaids ( no ibuprofen, aleve, no motrin,no advil)

## 2023-06-19 NOTE — NURSING NOTE
"Giana Hope is a 77 year old female patient that presents today in clinic for the following:    Chief Complaint   Patient presents with     Results     Pt would like to discuss MRI results and would like to discuss chronic kidney disease.     The patient's allergies and medications were reviewed as noted. A set of vitals were recorded as noted without incident: /77 (BP Location: Right arm, Patient Position: Sitting, Cuff Size: Adult Regular)   Pulse 80   Temp 98.2  F (36.8  C)   Resp 12   Ht 1.6 m (5' 2.99\")   LMP 01/01/1998 (Approximate)   SpO2 98%   BMI 25.69 kg/m  . The patient does not have any other questions for the provider.    Mary Galeana, EMT at 3:07 PM on 6/19/2023  "

## 2023-06-19 NOTE — PROGRESS NOTES
Assessment & Plan     SDH (subdural hematoma) (H)  Syncope, unspecified syncope type  Hawa presents after hospitalization for syncopal episode subdural hematoma she may have taken 2 sulindac in 1 day which caused some palpitations she felt dizzy, fell uncertain if subdural occurred prior to feeling dizzy or after, suspect after fall.  She also had recent CT scan in May showing coronary artery calcifications therefore discussed we need cardiac evaluation although during the hospitalization troponins were negative and echocardiogram showed no signs of structural abnormality.  A previous Lexiscan stress test had been ordered but she did not complete this for unknown reason she cannot remember reviewing recommendations from ordering provider Dr. Razo.  Today I reviewed avoid all nonsteroidal anti-inflammatory medications, recommended discontinuation of alcohol.  - Adult Cardiology Eval  Referral  - CBC with platelets and differential    CKD (chronic kidney disease) stage 2, GFR 60-89 ml/min  Today I discussed definitions of chronic kidney disease reviewed previously she was in stage IIIa renal impairment during her hospitalization with hydration it moved to stage II renal impairment most recent GFR was in normal range at 74.  Reviewed the importance of adequate hydration.  - Albumin Random Urine Quantitative with Creat Ratio  - Basic metabolic panel  (Ca, Cl, CO2, Creat, Gluc, K, Na, BUN)    Chronic right shoulder pain  She has a follow-up with orthopedics with CT prior to determine neck steps related to chronic right shoulder pain.    Coronary artery calcification  Today reviewed need for cardiology evaluation due to syncopal episode, she noted palpitations prior to the episode but monitoring in the hospital was in normal range, cardiac exam is normal today.  Discussed need to check lipids with goal LDL less than 100 more optimally less than 70.  In the past her lipids were in excellent range in part due  to high HDL.  We will obtain lipids prior to cardiology visit for review and recommendations if statin required.  - Adult Cardiology K[facility] Referral  - Lipid panel reflex to direct LDL Fasting    Tired  Since her hospitalization she has been feeling quite fatigued we will recheck CBC thyroid function magnesium folate B12 due to her previous alcohol use.  Congratulated her on lowering her alcohol use.  - TSH with free T4 reflex  - CRP, inflammation  - Magnesium  - Folate  - Vitamin B12  - CBC with platelets and differential    Hyperglycemia  Some episodes of hyperglycemia on labs in the hospital we will check A1c.  - Hemoglobin A1c    47 minutes spent on the date of the encounter doing chart review, history, exam, diagnostics review, documentation, counseling and coordination of cares as noted.   MED REC REQUIRED  Post Medication Reconciliation Status:     Reviewed at visit informed no nonsteroidal anti-inflammatory medications, sulindac removed from her medication list.  See Patient Instructions  Close follow-up recommended.  Return in about 5 weeks (around 7/24/2023) for follow-up.    Gilberto Vasquez MD  [facility] AVINASH Shaikh is a 77 year old, presenting for the following health issues:  Results (Pt would like to discuss MRI results and would like to discuss chronic kidney disease.)    ELIU Hope is a 77 year old female with a past medical history significant for balance disorder, hyperlipidemia, CKD 3, osteoarthritis, kyphosis, degenerative disc disease, venous stasis of both lower extremities who presented to the emergency department 6/6/2023 for evaluation of palpitations and possible syncopal episode found to have subdural hematoma on CT also had CT cervical spine.  She has been having right shoulder pain working on weight lifting was taking sulindac. She remembers possibly taking two sulindac at one time. She felt unusual and notified  a friend, called EMS, went to the ER.  She hit her head as she had a syncopal episode she does not recall what happened as EMS took her to the emergency room.  She was using sulindac more than usual one every three days has not been taking since dischagre  Since hospitalization the right shoulder seems to be better as she has not been using weights.  She has follow-up with orthopedics and a CT scan of the shoulder ordered.  She feels very tired since event but slowly recovering.  She was able to mow her yard and do some gardening but has not been up to her usual activity. She has been having some memory concerns.  She does have a history of drinking wine 2 glasses in time but has lowered use to no more than 3 times per week.  She lives in her own home her family moved to [place] previously lived in [place] which is the reason she moved from [place] to [place] to be closer to her family.  They likely will be living in [place] for approximately 3 years.  She does have neighbors that check on her occasionally she is contemplating getting a smart watch to have an alert available.  She has many travel plans internationally in the next year and would like to be well for travel.  She has a past history of pulmonary nodules CT scan chest in May was stable but noted calcification in coronary arteries she is wondering what this means and if she needs further assessment.  My colleague notified her of the result when I was out out of clinic for several weeks.    Hospitialized 6/6-6/7/2023 discharge summary reviewed included below for complete review.  Small right frontal 4mm traumatic SDH  Giana Hope was evaluated by Neurosurgery and was managed non-operatively.  She had repeat imaging of her head injury which showed stable SDH. She was monitored with serial neurological examinations which remained stable.  Neurosurgery recommends follow up in Neurosurgery clinic in 2 weeks with a CT head.  Prior to discharge  she was up and ambulatory independently in the ED hallways, also requested to go home.     A transthoracic cardio echocardiogram was obtained without structural changes to explain the cause of her syncope. 12 lead EKG and troponin unremarkable.      She mentioned that she may have taken more than 1 dose of her arthritis medication (Sulindac) which occasionally causes palpitations.  She was provided with clear recommendations to return to the ED with worsening symptoms or falls with potential for injury. She should avoid NSAIDs until follow up.     SH: Non-smoker currently past history of cigarette smoking.  Two glasses of wine three nights per week. Family lives in [place]          Labs reviewed in EPIC  BP Readings from Last 3 Encounters:   06/19/23 116/77   06/07/23 127/69   03/06/23 131/79    Wt Readings from Last 3 Encounters:   06/06/23 65.8 kg (145 lb)   05/02/23 67 kg (147 lb 11.3 oz)   03/06/23 64.4 kg (142 lb)               Immunization History   Administered Date(s) Administered     COVID-19 Bivalent 18+ (Moderna) 10/13/2022     COVID-19 MONOVALENT 12+ (Pfizer) 01/30/2021, 02/20/2021, 09/25/2021     COVID-19 Monovalent 18+ (Moderna) 03/29/2022     FLUAD(HD)65+ QUAD 09/25/2021     Flu, Unspecified 10/13/2009, 10/02/2014, 10/21/2015, 09/30/2016     Hepatitis A (ADULT 19+) 02/23/2018, 05/16/2019     Influenza (High Dose) 3 valent vaccine 10/28/2017     Influenza Vaccine 65+ (Fluzone HD) 10/12/2020, 10/14/2022     Influenza Vaccine, 6+MO IM (QUADRIVALENT W/PRESERVATIVES) 10/25/2018, 10/24/2019     Pneumo Conj 13-V (2010&after) 12/11/2015     Pneumococcal 23 valent 03/18/2011     TD,PF 7+ (Tenivac) 02/23/2018     TDAP Vaccine (Boostrix) 08/16/2007     Typhoid IM 05/16/2019     Yellow Fever, Live (Stamaril) 11/30/2017        Patient Active Problem List   Diagnosis     Hyperlipidemia LDL goal <100     Arthritis of shoulder region, left     Primary osteoarthritis involving multiple joints     Gastroesophageal  reflux disease without esophagitis     Nodule of left lung     Family history of malignant neoplasm of breast     Mass of left upper extremity     Back pain of thoracolumbar region     Balance disorder     Compression fracture of thoracic vertebra, open, initial encounter (H)     DDD (degenerative disc disease), thoracolumbar     Degenerative scoliosis in adult patient     Kyphosis (acquired) (postural)     Nuclear sclerosis     Osteopenia with high risk of fracture     Pain in left shoulder     Status post left knee replacement     Pain in thoracic spine     Osteoarthritis of right knee     Infection due to 2019 novel coronavirus     Venous stasis dermatitis of both lower extremities     Varicose veins of both lower extremities with pain     Syncopal episodes     SDH (subdural hematoma) (H)     Syncope, unspecified syncope type     CKD (chronic kidney disease) stage 2, GFR 60-89 ml/min     Chronic right shoulder pain     Past Surgical History:   Procedure Laterality Date     ARTHROPLASTY KNEE Right 2021    Procedure: ARTHROPLASTY, RIGHT KNEE, TOTAL;  Surgeon: Gabriel Marrero MD;  Location: [facility] OR     ARTHROPLASTY, RIGHT KNEE, TOTAL Right 2021     CATARACT IOL, RT/LT Right 2019     PHACOEMULSIFICATION WITH STANDARD INTRAOCULAR LENS IMPLANT Right 3/22/2019    Procedure: Right Cataract Removal with Intraocular Lens Implant;  Surgeon: Trish Taylor MD;  Location: [facility] OR       Social History     Tobacco Use     Smoking status: Former     Years: 35.00     Types: Cigarettes     Smokeless tobacco: Never     Tobacco comments:     Quit 24 years ago    Vaping Use     Vaping status: Never Used     Passive vaping exposure: Yes   Substance Use Topics     Alcohol use: Yes     Comment: Moderate     Family History   Problem Relation Age of Onset     Arthritis Mother      Breast Cancer Mother 78     Diabetes Type 2  Father      Heart Disease Father          age 75     Alzheimer Disease Father 60     Heart  Disease Brother 68     Breast Cancer Maternal Grandmother 81     Heart Disease Maternal Grandfather 54         age 81     Other - See Comments Son         schizoaffective lives in [place]     Glaucoma No family hx of      Macular Degeneration No family hx of          Current Outpatient Medications   Medication Sig Dispense Refill     Cyanocobalamin (VITAMIN B-12 PO)        famotidine (PEPCID) 40 MG tablet Take 1 tablet (40 mg) by mouth daily as needed for heartburn 90 tablet 3     ketoconazole (NIZORAL) 2 % external shampoo Apply topically three times a week 120 mL 6     pravastatin (PRAVACHOL) 20 MG tablet Take 1 tablet (20 mg) by mouth daily 90 tablet 3     prednisoLONE acetate (PRED FORTE) 1 % ophthalmic suspension Place 1 drop Into the left eye 3 times daily 15 mL 1     Vitamin D3 (CHOLECALCIFEROL) 25 mcg (1000 units) tablet Take 1 tablet by mouth daily       Allergies   Allergen Reactions     Dust Mites Itching     Runny nose, fever     Mold Itching     Fever, runny nose     Pollen Extract Itching     Fever, runny nose     Celecoxib      Other reaction(s): GI intolerance     Recent Labs   Lab Test 23  0911 23  0755 23  1626 23  0908 22  1652 22  1428 21  1049 21  1049 20  1404 20  0925 20  1009   A1C  --   --   --   --   --   --   --   --   --  5.3  --    *  --   --  125*  --   --   --  97  --   --   --    HDL 94  --   --  116  --   --   --  108  --   --   --    TRIG 103  --   --  62  --   --   --  71  --   --   --    ALT  --  7* 13  --  6*  --    < > 26  --   --  20   CR  --  0.81 0.97* 0.91 1.14*  --    < > 0.92  --  0.98 0.94   GFRESTIMATED  --  74 60* 65 49*  --    < > 61  --  57* 60*   GFRESTBLACK  --   --   --   --   --   --   --   --   --  66 69   POTASSIUM  --  3.4 3.8  --  4.1  --    < > 4.0  --  4.1 4.7   TSH  --   --   --   --   --  1.06  --   --  1.14  --   --     < > = values in this interval not displayed.        Review of  "Systems   Problem list, PMH, Surgical HX, FH, SH, allergies, medications,immunizations reviewed and updated in Epic.  ROS negative other than noted in HPI.        Objective    /77 (BP Location: Right arm, Patient Position: Sitting, Cuff Size: Adult Regular)   Pulse 80   Temp 98.2  F (36.8  C)   Resp 12   Ht 1.6 m (5' 2.99\")   LMP 01/01/1998 (Approximate)   SpO2 98%   BMI 25.69 kg/m    Body mass index is 25.69 kg/m .  Physical Exam     GENERAL APPEARANCE: Alert and no distress,  concern for recent events.     EYES: EOMI, PERRL     HENT:   Metal Retainer not removable mouth without ulcers or lesions. Teeth yellowing, tongue extends midline palate rises.     NECK: no adenopathy, no asymmetry, masses, or scars and thyroid normal to palpation     RESP: lungs clear to auscultation - no rales, rhonchi or wheeze     CV: regular rate and rhythm, normal S1 S2, no S3 or S4 and no murmur, click or rub     ABDOMEN:  soft, nontender, no HSM or masses and bowel sounds normal     MS: Mild Kyphoscoliosis, Right knee osteoarthritis chronic swelling, left knee well healed incision,  Other extremities signs of arthritis moves all joints     SKIN: no suspicious lesions or rashes     NEURO: No focal findings, DTRs symmetrical, normal strength and tone,  mentation intact and speech normal.  No focal findings finger-to-nose accurate negative pronator drift gait normal     PSYCH: mentation appears normal. and affect normal, concern for  recent events expressed, appropriately groomed, forthcoming with information.  She expressed some memory impairment related to the events.  Referred to scans as MRIs otherwise intact cognition.     EXAM: CT HEAD WITHOUT CONTRAST  LOCATION: [facility]  DATE/TIME: 06/07/2023, 4:14 AM CDT     INDICATION: Subdural hematoma stability follow-up.  COMPARISON: None.  TECHNIQUE: Routine CT Head without IV contrast. Multiplanar reformats. Dose reduction " techniques were used.     FINDINGS:  INTRACRANIAL CONTENTS: Again seen is a thin right posterior/lateral cerebral convexity subdural hematoma measuring approximately 4 mm, previously 4 mm. Persistent subdural hematoma is also again noted along the right tentorial leaflet measuring up to 0.4   cm, previously 0.4 cm. No new hemorrhage. No significant midline shift. No CT evidence of acute infarct. Mild presumed chronic small vessel ischemic changes. Mild to moderate generalized volume loss. No hydrocephalus.      VISUALIZED ORBITS/SINUSES/MASTOIDS: Prior bilateral cataract surgery. Visualized portions of the orbits are otherwise unremarkable. No paranasal sinus mucosal disease. No middle ear or mastoid effusion.     BONES/SOFT TISSUES: No acute abnormality.                                                                      IMPRESSION:  1.  Stable appearing right posterior/lateral cerebral convexity and right tentorial leaflet subdural hematomas. No significant midline shift.  2.  No new hemorrhage.         EXAM: CT CERVICAL SPINE W/O CONTRAST  6/6/2023 8:33 PM      HISTORY:  syncopal episode, thinks blacked out and fell, fall        COMPARISON:  None available.     TECHNIQUE: Using multidetector thin collimation helical acquisition  technique, axial, coronal and sagittal CT images through the cervical  spine were obtained without intravenous contrast.     FINDINGS:  Trace anterolisthesis of C3 on C4 and C4 on C5. Trace retrolisthesis  of C6 on C7. No acute fracture or traumatic subluxation. Severe  multilevel disc space narrowing, which is greatest at C5-6 and C6-7.  No prevertebral edema.     The findings on a level by level basis are as follows:     C2-3:  No significant spinal canal or neural foraminal stenosis..  Facet arthropathy.     C3-4:  Right eccentric uncovertebral spurring and bilateral facet  arthrosis. No spinal canal stenosis. Moderate right and mild left  neural foraminal narrowing.     C4-5:   Anterolisthesis. Uncovertebral spurring. Right greater than  left facet hypertrophy. Mild to moderate left and mild right neural  foraminal stenosis. No spinal canal stenosis.      C5-6:  Disc osteophyte complex and bilateral uncinate spurring. Mild  to moderate spinal canal narrowing. Mild to moderate right and  moderate-to-severe left neural foraminal narrowing.      C6-7:  Disc osteophyte complex resulting in at least moderate spinal  canal narrowing. Moderate bilateral neural foraminal narrowing.  Uncinate spurring and facet arthrosis.     C7-T1:  No significant spinal canal or neural foraminal stenosis.     No abnormality of the paraspinous soft tissues.                                                                      IMPRESSION:  1. No acute fracture or traumatic subluxation of the cervical spine.  2. Multilevel cervical spondylosis, which is greatest at C6-7 with  moderate spinal canal and moderate bilateral neural foraminal  stenosis.     I have personally reviewed the examination and initial interpretation  and I agree with the findings.     LEELA MONTGOMERY MD    Above reviewed including labs from hospitalization  Gilberto Vasquez MD

## 2023-06-20 ENCOUNTER — LAB (OUTPATIENT)
Dept: LAB | Facility: CLINIC | Age: 78
End: 2023-06-20
Payer: MEDICARE

## 2023-06-20 ENCOUNTER — ANCILLARY PROCEDURE (OUTPATIENT)
Dept: CT IMAGING | Facility: CLINIC | Age: 78
End: 2023-06-20
Attending: PHYSICIAN ASSISTANT
Payer: MEDICARE

## 2023-06-20 DIAGNOSIS — S06.5XAA SDH (SUBDURAL HEMATOMA) (H): ICD-10-CM

## 2023-06-20 DIAGNOSIS — I25.10 CORONARY ARTERY CALCIFICATION: ICD-10-CM

## 2023-06-20 DIAGNOSIS — N18.2 CKD (CHRONIC KIDNEY DISEASE) STAGE 2, GFR 60-89 ML/MIN: ICD-10-CM

## 2023-06-20 DIAGNOSIS — R53.83 TIRED: ICD-10-CM

## 2023-06-20 DIAGNOSIS — R73.9 HYPERGLYCEMIA: ICD-10-CM

## 2023-06-20 DIAGNOSIS — M19.011 OSTEOARTHRITIS OF GLENOHUMERAL JOINT, RIGHT: ICD-10-CM

## 2023-06-20 LAB
ANION GAP SERPL CALCULATED.3IONS-SCNC: 10 MMOL/L (ref 7–15)
BASOPHILS # BLD AUTO: 0 10E3/UL (ref 0–0.2)
BASOPHILS NFR BLD AUTO: 1 %
BUN SERPL-MCNC: 16.3 MG/DL (ref 8–23)
CALCIUM SERPL-MCNC: 10 MG/DL (ref 8.8–10.2)
CHLORIDE SERPL-SCNC: 105 MMOL/L (ref 98–107)
CHOLEST SERPL-MCNC: 230 MG/DL
CREAT SERPL-MCNC: 0.97 MG/DL (ref 0.51–0.95)
CREAT UR-MCNC: 51.7 MG/DL
CRP SERPL-MCNC: <3 MG/L
DEPRECATED HCO3 PLAS-SCNC: 26 MMOL/L (ref 22–29)
EOSINOPHIL # BLD AUTO: 0.1 10E3/UL (ref 0–0.7)
EOSINOPHIL NFR BLD AUTO: 3 %
ERYTHROCYTE [DISTWIDTH] IN BLOOD BY AUTOMATED COUNT: 12.5 % (ref 10–15)
FOLATE SERPL-MCNC: 15.4 NG/ML (ref 4.6–34.8)
GFR SERPL CREATININE-BSD FRML MDRD: 60 ML/MIN/1.73M2
GLUCOSE SERPL-MCNC: 116 MG/DL (ref 70–99)
HBA1C MFR BLD: 6 %
HCT VFR BLD AUTO: 41.9 % (ref 35–47)
HDLC SERPL-MCNC: 94 MG/DL
HGB BLD-MCNC: 13.7 G/DL (ref 11.7–15.7)
IMM GRANULOCYTES # BLD: 0 10E3/UL
IMM GRANULOCYTES NFR BLD: 0 %
LDLC SERPL CALC-MCNC: 115 MG/DL
LYMPHOCYTES # BLD AUTO: 1.4 10E3/UL (ref 0.8–5.3)
LYMPHOCYTES NFR BLD AUTO: 30 %
MAGNESIUM SERPL-MCNC: 2.3 MG/DL (ref 1.7–2.3)
MCH RBC QN AUTO: 30.6 PG (ref 26.5–33)
MCHC RBC AUTO-ENTMCNC: 32.7 G/DL (ref 31.5–36.5)
MCV RBC AUTO: 94 FL (ref 78–100)
MICROALBUMIN UR-MCNC: <12 MG/L
MICROALBUMIN/CREAT UR: NORMAL MG/G{CREAT}
MONOCYTES # BLD AUTO: 0.4 10E3/UL (ref 0–1.3)
MONOCYTES NFR BLD AUTO: 8 %
NEUTROPHILS # BLD AUTO: 2.7 10E3/UL (ref 1.6–8.3)
NEUTROPHILS NFR BLD AUTO: 58 %
NONHDLC SERPL-MCNC: 136 MG/DL
NRBC # BLD AUTO: 0 10E3/UL
NRBC BLD AUTO-RTO: 0 /100
PLATELET # BLD AUTO: 200 10E3/UL (ref 150–450)
POTASSIUM SERPL-SCNC: 4 MMOL/L (ref 3.4–5.3)
RBC # BLD AUTO: 4.47 10E6/UL (ref 3.8–5.2)
SODIUM SERPL-SCNC: 141 MMOL/L (ref 136–145)
TRIGL SERPL-MCNC: 103 MG/DL
TSH SERPL DL<=0.005 MIU/L-ACNC: 1.99 UIU/ML (ref 0.3–4.2)
VIT B12 SERPL-MCNC: 1034 PG/ML (ref 232–1245)
WBC # BLD AUTO: 4.6 10E3/UL (ref 4–11)

## 2023-06-20 PROCEDURE — 82570 ASSAY OF URINE CREATININE: CPT | Performed by: FAMILY MEDICINE

## 2023-06-20 PROCEDURE — 82607 VITAMIN B-12: CPT | Performed by: FAMILY MEDICINE

## 2023-06-20 PROCEDURE — 86140 C-REACTIVE PROTEIN: CPT | Performed by: PATHOLOGY

## 2023-06-20 PROCEDURE — 82746 ASSAY OF FOLIC ACID SERUM: CPT | Performed by: FAMILY MEDICINE

## 2023-06-20 PROCEDURE — 73200 CT UPPER EXTREMITY W/O DYE: CPT | Mod: RT | Performed by: RADIOLOGY

## 2023-06-20 PROCEDURE — 83036 HEMOGLOBIN GLYCOSYLATED A1C: CPT | Performed by: FAMILY MEDICINE

## 2023-06-20 PROCEDURE — 83735 ASSAY OF MAGNESIUM: CPT | Performed by: PATHOLOGY

## 2023-06-20 PROCEDURE — 99207 CT SHOULDER RIGHT W/O CONTRAST: CPT | Performed by: RADIOLOGY

## 2023-06-20 PROCEDURE — G1010 CDSM STANSON: HCPCS | Performed by: RADIOLOGY

## 2023-06-20 PROCEDURE — 80061 LIPID PANEL: CPT | Performed by: PATHOLOGY

## 2023-06-20 PROCEDURE — 36415 COLL VENOUS BLD VENIPUNCTURE: CPT | Performed by: PATHOLOGY

## 2023-06-20 PROCEDURE — 80048 BASIC METABOLIC PNL TOTAL CA: CPT | Performed by: PATHOLOGY

## 2023-06-20 PROCEDURE — 84443 ASSAY THYROID STIM HORMONE: CPT | Performed by: PATHOLOGY

## 2023-06-20 PROCEDURE — 85025 COMPLETE CBC W/AUTO DIFF WBC: CPT | Performed by: PATHOLOGY

## 2023-06-21 ENCOUNTER — ANCILLARY PROCEDURE (OUTPATIENT)
Dept: CT IMAGING | Facility: CLINIC | Age: 78
End: 2023-06-21
Payer: MEDICARE

## 2023-06-21 ENCOUNTER — OFFICE VISIT (OUTPATIENT)
Dept: ORTHOPEDICS | Facility: CLINIC | Age: 78
End: 2023-06-21
Payer: MEDICARE

## 2023-06-21 ENCOUNTER — OFFICE VISIT (OUTPATIENT)
Dept: NEUROSURGERY | Facility: CLINIC | Age: 78
End: 2023-06-21
Payer: MEDICARE

## 2023-06-21 VITALS
RESPIRATION RATE: 16 BRPM | DIASTOLIC BLOOD PRESSURE: 82 MMHG | HEART RATE: 66 BPM | SYSTOLIC BLOOD PRESSURE: 133 MMHG | OXYGEN SATURATION: 98 %

## 2023-06-21 VITALS — WEIGHT: 145 LBS | DIASTOLIC BLOOD PRESSURE: 75 MMHG | SYSTOLIC BLOOD PRESSURE: 118 MMHG | BODY MASS INDEX: 25.69 KG/M2

## 2023-06-21 DIAGNOSIS — I62.00 SUBDURAL HEMORRHAGE (H): ICD-10-CM

## 2023-06-21 DIAGNOSIS — S06.5XAA SDH (SUBDURAL HEMATOMA) (H): Primary | ICD-10-CM

## 2023-06-21 DIAGNOSIS — M19.011 OSTEOARTHRITIS OF GLENOHUMERAL JOINT, RIGHT: Primary | ICD-10-CM

## 2023-06-21 PROCEDURE — 99214 OFFICE O/P EST MOD 30 MIN: CPT | Performed by: STUDENT IN AN ORGANIZED HEALTH CARE EDUCATION/TRAINING PROGRAM

## 2023-06-21 PROCEDURE — 70450 CT HEAD/BRAIN W/O DYE: CPT | Mod: ME | Performed by: STUDENT IN AN ORGANIZED HEALTH CARE EDUCATION/TRAINING PROGRAM

## 2023-06-21 PROCEDURE — 99214 OFFICE O/P EST MOD 30 MIN: CPT | Performed by: PHYSICIAN ASSISTANT

## 2023-06-21 PROCEDURE — G1010 CDSM STANSON: HCPCS | Performed by: STUDENT IN AN ORGANIZED HEALTH CARE EDUCATION/TRAINING PROGRAM

## 2023-06-21 RX ORDER — TRANEXAMIC ACID 650 MG/1
1950 TABLET ORAL ONCE
Status: CANCELLED | OUTPATIENT
Start: 2023-06-21 | End: 2023-06-21

## 2023-06-21 ASSESSMENT — PAIN SCALES - GENERAL: PAINLEVEL: NO PAIN (0)

## 2023-06-21 NOTE — PROGRESS NOTES
[facility]  Department of Neurosurgery  Center for Skull Base and Pituitary Surgery    Name: Giana Hope  MRN: [id number]  Age: 77 year old  : 2023      Chief Complaint:   Traumatic right subdural hematoma, hospital follow up    History of Present Illness:   Giana Hope is a 77 year old female with a history of balance disorder, HLD, CKD 3, OA, and venous stasis of bilateral LE's who is seen today for a hospital follow up. She presented to [facility] ED after a syncopal episode at home; she had palpitations prior to the episode and was advised by her neighbor to call 911 for evaluation. She thinks she had a fall on the way upstairs following this conversation, was found down on her bathroom floor by EMS. She was found to have a 4mm right subdural hematoma on imaging in the ED and was managed non-operatively by Neurosurgery. She had a normal echocardiogram and negative troponins while in the hospital. She will see Cardiology as an outpatient. Today she reports some ongoing but improving fatigue. She's not had much trouble with headaches and denies new falls, paresthesias, or weakness. She was taking Sulindac for her right shoulder pain prior to her fall and syncope, her PCP has taken her off this now and she's not taking any other blood thinners.     Review of Systems:   Pertinent items are noted in HPI or as in patient entered ROS below, remainder of complete ROS is negative.     Physical Exam:   /82   Pulse 66   Resp 16   LMP 1998 (Approximate)   SpO2 98%   General: No acute distress.    Eyes: Conjunctivae are normal.  MSK: Moves all extremities.  No obvious deformity.  Neuro: The patient is fully oriented. Speech is normal. Facial nerve function is normal, rated as a House Brackmann 1. Gait is normal.  Psych: Normal mood and affect. Behavior is normal.      Imaging:  We reviewed her head CT which was done today and shows resolution of the right convexity  SDH with near resolution of the slight subdural blood over the right tentorium. No new abnormalities seen; radiology read is pending.      Assessment:  Traumatic right subdural hematoma, hospital follow up    Plan:  We discussed subdural hematomas along with post-concussive symptoms and what to expect. She will monitor and we can see her back as needed in the Neurosurgery clinic with new concerns.        Faviola Corbin PA-C  Department of Neurosurgery

## 2023-06-21 NOTE — PATIENT INSTRUCTIONS
Schedule shoulder  surgery.   Surgery Scheduler will contact you to assist with scheduling surgery.   You can contact her directly at [phone].   Prior to your scheduled surgery we advise scheduling with your dentist to obtain clearance for surgery, and to complete any recommended dental work prior.     Pre-operative Physical needed within 30 days of scheduled proceedure  Physical Therapy will be scheduled    For mor information regarding total joint surgery please visit our website:   [email or web address]    FORMS:   If you are needing any forms completed relating to your upcoming procedure, please send them to our office with a completed Release of Information.   Forms will be completed AFTER your procedure. A letter can be sent to your employer prior to surgery to inform them of your anticipated time off.    Please notify our staff if you would like a letter to do so.   Forms can be faxed directly to our clinic at [phone].     DO NOT BRING FORMS ON THE DATE OF SURGERY.         Call my office with any questions or concerns, [phone].

## 2023-06-21 NOTE — LETTER
6/21/2023         RE: Giana Hope  [address]  [place] [address]        Dear Colleague,    Thank you for referring your patient, Giana Hope, to the [facility] [place]. Please see a copy of my visit note below.        [facility]  Orthopaedic Surgery Consultation by Gabriel Marrero M.D.    Giana Hope MRN# [id number]   Age: 76 year old YOB: 1945     Requesting physician: Gilberto Pandya     Background history:  DX:  1. History of anesthesia problem  2. Gastroesophageal reflux disease without esophagitis  3. Nodule of left lung  4. DDD  5. Risk for falls  6. Hypercholesterolemia  7. Nuclear sclerosis      TREATMENTS:  1. 2/25/2016, Left TKA, Сергей Pang MD , [facility], [facility], and Affiliated Practices  2. 8/25/2020, Reverse Left TSA, Ector Chapa M.D., [facility]  3. 11/26/2021, right total knee arthroplasty,              History of Present Illness:     77-year-old female with chronic pain right shoulder due to end-stage osteoarthritic changes.  Approximately 3 months ago patient received an glenohumeral injection with a combination of lidocaine and cortisone.    Patient follows up to day regarding her right shoulder pain. She reports significant improvement of right shoulder pain s/p right shoulder glenohumeral joint injection in March 2023.  Unfortunately this provided relief for approximately 1.5 months and since then she has been experiencing gradual return of pain. She reports her pain is primarily located to the lateral upper arm. She notes decreased range of motion with reaching.  Patient has been doing weight and strength training in the interim which only aggravated the pain more.    She is looking for replacement surgery but has multiple trips planned for August, November and December.      Today patient states that she has pain  of right shoulder due to underlying glenohumeral osteoarthritic changes which is bothering her significantly.  She is not experiencing any loss of strength, motor or sensory deficits.  Pain is present inside the shoulder joint.  She describes occasional crepitus.  She would like to have a repeat injection of the right shoulder.  She has had 1 in the past which provided relief for many years.    Social:   Occupation: Retired   Living situation: Lives with cat.  She has family close by.  Hobbies / Sports: walking and hiking     Smoking: No  Alcohol: Yes  Illicit drug use: No         Physical Exam:     EXAMINATION pertinent findings:   PSYCH: Pleasant, healthy-appearing, alert, oriented x3, cooperative. Normal mood and affect.  VITAL SIGNS: Last menstrual period 01/01/1998, not currently breastfeeding.  Reviewed nursing intake notes.   There is no height or weight on file to calculate BMI.  RESP: non labored breathing   ABD: benign, soft, non-tender, no acute peritoneal findings  SKIN: grossly normal   LYMPHATIC: grossly normal, no adenopathy, no extremity edema  NEURO: grossly normal , no motor deficits  VASCULAR: satisfactory perfusion of all extremities   MUSCULOSKELETAL:     Cervical Spine: FROM, Spurling's test negative.    Shoulder right: Normal appearance. No signs of muscular atrophy of SSP, ISP or Deltoid. No tenderness to palpation over the sterno-clavicular joint or clavicle. AC joint: No tenderness to palpation. Cross body -. Abduction 120, Forward flexion 100, ER 30, IR L4. Cuff strength 5/5 for SSP/ISP and SSC. Neer, Walker, and Mariya -. Neurovascular intact RUE.  2+ radial pulse with a warm and well perfused hand. Sensation is intact to light touch in the median, radial, ulnar, musculocutaneous, and axillary nerve distribution. 5/5 , fpl, epl, io, wrist flexor, wrist extensor, biceps, triceps, and deltoid muscle strength on manual muscle testing.            Data:   All laboratory data reviewed    All  imaging studies reviewed by me personally.    Today no new imaging studies were obtained.    XR knee right 9/28/2022:  My interpretation: Status post right total knee arthroplasty.  Adequate sizing, fixation and orientation of components.  No signs of immediate postoperative complications.    XR shoulder right 3/9/2022:  My interpretation: Severe osteoarthritic changes of right glenohumeral joint.  Complete obliteration of joint space.  Presence of marginal osteophytes and sclerosis.  Well-preserved AC joint.  Glenoid Candelario C.    CT shoulder right 6/20/2023:  My interpretation: Severe osteoarthritic changes of right glenohumeral joint.  Presence of marginal osteophytes and sclerosis.  Well-preserved AC joint.  No significant bone loss on glenoid side.         Assessment and Plan:   Assessment:  77-year-old female with chronic pain of right shoulder due to end-stage osteoarthritic change.  Previously responding well to intra-articular injection combination of lidocaine and cortisone.       Plan:  I discussed my findings with the patient.  As for the right shoulder we discussed the surgical and nonsurgical treatment options for glenohumeral osteoarthritis.  Given her current symptoms and significantly reduced response to intra-articular injection it would be a consideration to proceed with right total shoulder arthroplasty.  Given patient's upcoming trips she would like to proceed with this in February 2024.  She will return to the clinic for an intra-articular injection with cortisone before her trip in August.  In the interim it is my recommendation to continue physical therapy for range of motion exercises/strengthening/stretching.      Patient states that she would like to proceed with right anatomic shoulder replacement surgery.   We discussed the post-operative rehabilitation and expected outcome of this surgical option at length. We also reviewed the risks and benefits of surgery including but not limited to  the following: Risk of anesthesia, including death; infection; nerve/tendon/vessel injury; deep venous thrombosis (DVT), pulmonary embolism (PE); shoulder stiffness, prosthetic loosening or instability; subscapularis failure or lesser tuberosity malunion/nonunion if osteotomy is performed; potential need to perform a reverse total shoulder replacement dependent on intraoperative findings such as rotator cuff insufficiency, potential of the procedure to not alleviate the condition; and the potential need for further surgery in the future.    After going over these risks, benefits, and alternatives patient would like to proceed with surgery. All questions were answered satisfactorily and patient will work with our surgical scheduling department to coordinate the surgery.     Gabriel Marrero MD, PhD     Adult Reconstruction  [facility] Department of Orthopaedic Surgery  Pager [phone]              Again, thank you for allowing me to participate in the care of your patient.        Sincerely,        Gabriel Marrero MD

## 2023-06-21 NOTE — PROGRESS NOTES
[facility]  Orthopaedic Surgery Consultation by Gabriel Marrero M.D.    Giana Hope MRN# [id number]   Age: 76 year old YOB: 1945     Requesting physician: Gilberto Pandya     Background history:  DX:  1. History of anesthesia problem  2. Gastroesophageal reflux disease without esophagitis  3. Nodule of left lung  4. DDD  5. Risk for falls  6. Hypercholesterolemia  7. Nuclear sclerosis      TREATMENTS:  1. 2/25/2016, Left TKA, Сергей Pang MD , [facility], [facility], and Affiliated Practices  2. 8/25/2020, Reverse Left TSA, Ector Chapa M.D., [facility]  3. 11/26/2021, right total knee arthroplasty,              History of Present Illness:     77-year-old female with chronic pain right shoulder due to end-stage osteoarthritic changes.  Approximately 3 months ago patient received an glenohumeral injection with a combination of lidocaine and cortisone.    Patient follows up to day regarding her right shoulder pain. She reports significant improvement of right shoulder pain s/p right shoulder glenohumeral joint injection in March 2023.  Unfortunately this provided relief for approximately 1.5 months and since then she has been experiencing gradual return of pain. She reports her pain is primarily located to the lateral upper arm. She notes decreased range of motion with reaching.  Patient has been doing weight and strength training in the interim which only aggravated the pain more.    She is looking for replacement surgery but has multiple trips planned for August, November and December.      Today patient states that she has pain of right shoulder due to underlying glenohumeral osteoarthritic changes which is bothering her significantly.  She is not experiencing any loss of strength, motor or sensory deficits.  Pain is present inside the shoulder joint.  She describes occasional crepitus.  She  would like to have a repeat injection of the right shoulder.  She has had 1 in the past which provided relief for many years.    Social:   Occupation: Retired   Living situation: Lives with cat.  She has family close by.  Hobbies / Sports: walking and hiking     Smoking: No  Alcohol: Yes  Illicit drug use: No         Physical Exam:     EXAMINATION pertinent findings:   PSYCH: Pleasant, healthy-appearing, alert, oriented x3, cooperative. Normal mood and affect.  VITAL SIGNS: Last menstrual period 01/01/1998, not currently breastfeeding.  Reviewed nursing intake notes.   There is no height or weight on file to calculate BMI.  RESP: non labored breathing   ABD: benign, soft, non-tender, no acute peritoneal findings  SKIN: grossly normal   LYMPHATIC: grossly normal, no adenopathy, no extremity edema  NEURO: grossly normal , no motor deficits  VASCULAR: satisfactory perfusion of all extremities   MUSCULOSKELETAL:     Cervical Spine: FROM, Spurling's test negative.    Shoulder right: Normal appearance. No signs of muscular atrophy of SSP, ISP or Deltoid. No tenderness to palpation over the sterno-clavicular joint or clavicle. AC joint: No tenderness to palpation. Cross body -. Abduction 120, Forward flexion 100, ER 30, IR L4. Cuff strength 5/5 for SSP/ISP and SSC. Neer, Walker, and Mariya -. Neurovascular intact RUE.  2+ radial pulse with a warm and well perfused hand. Sensation is intact to light touch in the median, radial, ulnar, musculocutaneous, and axillary nerve distribution. 5/5 , fpl, epl, io, wrist flexor, wrist extensor, biceps, triceps, and deltoid muscle strength on manual muscle testing.            Data:   All laboratory data reviewed    All imaging studies reviewed by me personally.    Today no new imaging studies were obtained.    XR knee right 9/28/2022:  My interpretation: Status post right total knee arthroplasty.  Adequate sizing, fixation and orientation of components.  No signs of immediate  postoperative complications.    XR shoulder right 3/9/2022:  My interpretation: Severe osteoarthritic changes of right glenohumeral joint.  Complete obliteration of joint space.  Presence of marginal osteophytes and sclerosis.  Well-preserved AC joint.  Glenoid Candelario C.    CT shoulder right 6/20/2023:  My interpretation: Severe osteoarthritic changes of right glenohumeral joint.  Presence of marginal osteophytes and sclerosis.  Well-preserved AC joint.  No significant bone loss on glenoid side.         Assessment and Plan:   Assessment:  77-year-old female with chronic pain of right shoulder due to end-stage osteoarthritic change.  Previously responding well to intra-articular injection combination of lidocaine and cortisone.       Plan:  I discussed my findings with the patient.  As for the right shoulder we discussed the surgical and nonsurgical treatment options for glenohumeral osteoarthritis.  Given her current symptoms and significantly reduced response to intra-articular injection it would be a consideration to proceed with right total shoulder arthroplasty.  Given patient's upcoming trips she would like to proceed with this in February 2024.  She will return to the clinic for an intra-articular injection with cortisone before her trip in August.  In the interim it is my recommendation to continue physical therapy for range of motion exercises/strengthening/stretching.      Patient states that she would like to proceed with right anatomic shoulder replacement surgery.   We discussed the post-operative rehabilitation and expected outcome of this surgical option at length. We also reviewed the risks and benefits of surgery including but not limited to the following: Risk of anesthesia, including death; infection; nerve/tendon/vessel injury; deep venous thrombosis (DVT), pulmonary embolism (PE); shoulder stiffness, prosthetic loosening or instability; subscapularis failure or lesser tuberosity malunion/nonunion  if osteotomy is performed; potential need to perform a reverse total shoulder replacement dependent on intraoperative findings such as rotator cuff insufficiency, potential of the procedure to not alleviate the condition; and the potential need for further surgery in the future.    After going over these risks, benefits, and alternatives patient would like to proceed with surgery. All questions were answered satisfactorily and patient will work with our surgical scheduling department to coordinate the surgery.     Gabriel Marrero MD, PhD     Adult Reconstruction  [facility] Department of Orthopaedic Surgery  Pager [phone]

## 2023-06-22 ENCOUNTER — TELEPHONE (OUTPATIENT)
Dept: ORTHOPEDICS | Facility: CLINIC | Age: 78
End: 2023-06-22
Payer: MEDICARE

## 2023-06-22 RX ORDER — KETOCONAZOLE 20 MG/ML
SHAMPOO TOPICAL
Qty: 120 ML | Refills: 6 | Status: SHIPPED | OUTPATIENT
Start: 2023-06-23 | End: 2024-02-19

## 2023-06-22 NOTE — TELEPHONE ENCOUNTER
Called and verified patient by last name and . Informed patient that casimiro refill was provided to last until November follow-up. Advised she keep that appointment for further refills. Patient had no questions or concerns.    Luis F Javier, EMT

## 2023-06-22 NOTE — TELEPHONE ENCOUNTER
January 2022Assessment & Plan:      1. Non-scarring hair loss: since discontinuation of minoxidil. Will restart.    - restart minoxidil 5% foam     2. Seborrheic dermatitis: improving; still some erythema but no flaking and itching has improved though not resolved. Patch on the forehead may be seborrheic dermatitis, but DDx includes actinic keratosis and macular seborrheic keratosis. Would like to evaluate in person.  - start tacrolimus ointment for facial involvement given refractoriness to ketoconazole  - fluocinolone solution and ketoconazole shampoo for scalp

## 2023-06-22 NOTE — TELEPHONE ENCOUNTER
Received call back from patient, requesting to schedule surgery for late February 2024. Patient elected to schedule her surgery now for 2/29/24, will call back in a few weeks to confirm or reschedule if needed.

## 2023-06-22 NOTE — TELEPHONE ENCOUNTER
Pt called regarding below. Pt was wondering if she can get a fill until her next appt in November. Please call her back if you have any questions. Thanks

## 2023-06-26 ENCOUNTER — THERAPY VISIT (OUTPATIENT)
Dept: PHYSICAL THERAPY | Facility: CLINIC | Age: 78
End: 2023-06-26
Payer: MEDICARE

## 2023-06-26 DIAGNOSIS — M19.011 OSTEOARTHRITIS OF GLENOHUMERAL JOINT, RIGHT: Primary | ICD-10-CM

## 2023-06-26 PROCEDURE — 97110 THERAPEUTIC EXERCISES: CPT | Mod: GP | Performed by: PHYSICAL THERAPIST

## 2023-06-26 PROCEDURE — 97140 MANUAL THERAPY 1/> REGIONS: CPT | Mod: GP | Performed by: PHYSICAL THERAPIST

## 2023-06-28 NOTE — PROGRESS NOTES
Discharge Note    Progress reporting period is from last progress note on 3-7-2023   to Jun 26, 2023.    Giana failed to follow up and current status is unknown.  Please see information below for last relevant information on current status.  Patient seen for   visits.    SUBJECTIVE  Subjective changes noted by patient:     .  Current pain level is  .     Previous pain level was   .   Changes in function:  Yes (See Goal flowsheet attached for changes in current functional level)  Adverse reaction to treatment or activity: None    OBJECTIVE  Changes noted in objective findings:   Shoulder Evaluation:  ROM:  AROM:    Flexion:  Left:  115    Right:  125  Extension: Left: 15 Right: 20   Abduction:  Left: 115   Right:  138     Internal Rotation:  Left:  8    Right:  15  External Rotation:  Left:  30    Right:  25        Elbow Flexion:  Left:  [id number]    Right:  Wnl  Elbow Extension:  Left:  Wnl   Right:  Wnl                  Strength:    Flexion: Left:5/5   Pain:    Right: 5/5     Pain:   Extension:  Left: 5/5    Pain:    Right: 4+/5    Pain:  Abduction:  Left: 5/5  Pain:    Right: 5-/5     Pain:  Adduction:  Left: 5/5    Pain:      Internal Rotation:  Left:5/5     Pain:    Right: 5/5     Pain:  External Rotation:   Left:4+/5     Pain:   Right:4+/5     Pain:     Elbow Flexion:  Left:5/5     Pain:    Right:5/5     Pain:    Left greater than right upper trapezius muscle tightness, protracted scapula ( Left greater than right) Increased thoracic kyphosis, mmt: pectoralis major: 4+/5 (R), 4+/5 (L)       ASSESSMENT/PLAN      Updated problem list and treatment plan:   Diagnosis 1:   Osteoarthritis of glenohumeral joint, right  Pain -  hot/cold therapy, mechanical traction, self management, education, directional preference exercise and home program  Decreased ROM/flexibility - manual therapy, therapeutic exercise and therapeutic activity  Decreased strength - therapeutic exercise, therapeutic activities and home  program  Impaired muscle performance - neuro re-education and home program  Decreased function - therapeutic activities and home program     STG/LTGs have been met or progress has been made towards goals:  Yes, please see goal flowsheet for most current information  Assessment of Progress: current status is unknown.    Last current status:     Self Management Plans:  HEP  I have re-evaluated this patient and find that the nature, scope, duration and intensity of the therapy is appropriate for the medical condition of the patient.  Giana continues to require the following intervention to meet STG and LTG's:  HEP.    Recommendations:  Discharge with current home program.  Patient to follow up with MD as needed.    Please refer to the daily flowsheet for treatment today, total treatment time and time spent performing 1:1 timed codes.

## 2023-06-28 NOTE — PROGRESS NOTES
[facility]                                                                                   OUTPATIENT PHYSICAL THERAPY    PLAN OF TREATMENT FOR OUTPATIENT REHABILITATION   Patient's Last Name, First Name, Giana Cooley YOB: 1945   Provider's Name   [facility]   Medical Record No.  [id number]     Onset Date: (P) 08/15/22  Start of Care Date: 03/07/23     Medical Diagnosis:  right glenohumeral OA      PT Treatment Diagnosis:  right glenohumeral OA Plan of Treatment  Frequency/Duration: one time perweek/ 6 weeks    Certification date from 05/27/23 to 09/25/23         See note for plan of treatment details and functional goals     Trini Miller, PT                          06/26/23 0500   Appointment Info   Signing clinician's name / credentials Trini Miller PT   Total/Authorized Visits 8   Visits Used 5   Medical Diagnosis right glenohumeral OA   PT Tx Diagnosis right glenohumeral OA   Other pertinent information GOAL: lifting   Quick Adds Certification   Progress Note/Certification   Start of Care Date 03/07/23   Onset of illness/injury or Date of Surgery 08/15/22   Therapy Frequency one time perweek   Predicted Duration 6 weeks   Certification date from 05/27/23   Certification date to 09/25/23   PT Goal 1   Goal Identifier Lifting   Goal Description Lifting tolerance 5-10#   Rationale   (pain free ADL lifting)   Target Date 08/03/23   Subjective Report   Subjective Report SEE DC   Treatment Interventions (PT)   Interventions Therapeutic Procedure/Exercise;Manual Therapy   Therapeutic Procedure/Exercise   PTRx Ther Proc 1 Thoracic rotation   PTRx Ther Proc 1 - Details 10 x ( instruct and perform   PTRx Ther Proc 2 sidelying abduction   PTRx Ther Proc 2 - Details 2/10 0# instruct and perform   PTRx Ther Proc 3 Arm Raise #1   PTRx Ther Proc 3 - Details 2 sets 10  with 2 # in standing -palms facing in  3 days per week  -squeeze shoulder blades down and back before moving   PTRx Ther Proc 4 passive shoulder flexion, abd, er, ir: 10 minutes   Therapeutic Procedures: strength, endurance, ROM, flexibillity minutes ([id number]) 15   Manual Therapy   Manual Therapy: Mobilization, MFR, MLD, friction massage minutes (64343) 23   Manual Therapy 1 seated right upper trapezius   Manual Therapy 1 - Details 23 minutes seated one pillow   Skilled Intervention to decrease muscle tightness   Patient Response/Progress improved shoulder flexion and abduction post treatment   Education   Learner/Method Patient   Education Comments Select exercises reviewed and added   Plan   Home program Reviewed updated program to be continued independently post discharg   Updates to plan of care Discussed updates to plan of care with avery       I CERTIFY THE NEED FOR THESE SERVICES FURNISHED UNDER        THIS PLAN OF TREATMENT AND WHILE UNDER MY CARE     (Physician attestation of this document indicates review and certification of the therapy plan).                Referring Provider:  Contreras Siddiqui*      Initial Assessment  See Epic Evaluation- Start of Care Date: 03/07/23

## 2023-06-29 NOTE — RESULT ENCOUNTER NOTE
Dear Giana Hope   Your Three month measure of glucose control A1C 6 is in pre-diabetes range.    Lower portion size, lower carbohydrate choices ( less bread, pasta, rice, potatoes, sugar containing foods and eliminate sugar containing beverages.) Also important is daily physical activity such as walking or stationary bike 30 minutes daily.    With a1C in pre-diabetes range we may want to discuss the addition of a stating to lower your cholesterol.  Please make a follow-up visit with me to review the above.  Make sure you are hydrating well with water as your creatinine  kidney function was mildly impaired.    Your thyroid, complete blood count including hemoglobin,white cell count for infection, liver, calcium, sodium and potassium were normal or within acceptable range. Folate, magnesium, B12, and test for inflammation in range.  Best wishes,  Gilberto Vasquez MD

## 2023-06-30 ENCOUNTER — TELEPHONE (OUTPATIENT)
Dept: ORTHOPEDICS | Facility: CLINIC | Age: 78
End: 2023-06-30
Payer: MEDICARE

## 2023-06-30 NOTE — TELEPHONE ENCOUNTER
-The following message will be sent via delayed in-basket messaging to Lilliana Joyner RN and Monica Frank on 11/3/23.     Surgery Patient (Teaching Needed)  Send: In 4 months  Ethel Pollard RN Berard, Lilliana, ARABELLA; Monica Frank MA    Hi there,     -I just wanted to send this delayed message, so that pre-op teaching could be done closer to surgery.   -This patient, however, was seen in the clinic, with Dr. Marrero, on 6/21/23.   -At that visit, it was recommended the patient schedule the following:     -Surgery date (FAST TRACK Right Total Shoulder): scheduled for 2/29/24.   -PAC visit needed   -Pre-Op Teaching Needed     -Because this patient's surgery isn't for another 8 months, I figured I would send a delayed message in hopes you could call the patient and perform pre-op teaching closer to surgery.     -Once teaching is performed, could you also add this patient to the FAST TRACK list accordingly.     Thank you!   ARABELLA Allred RN  6/30/2023 3:15 PM

## 2023-07-03 ENCOUNTER — THERAPY VISIT (OUTPATIENT)
Dept: PHYSICAL THERAPY | Facility: CLINIC | Age: 78
End: 2023-07-03
Payer: MEDICARE

## 2023-07-03 DIAGNOSIS — M54.6 PAIN IN THORACIC SPINE: Primary | ICD-10-CM

## 2023-07-03 PROCEDURE — 97140 MANUAL THERAPY 1/> REGIONS: CPT | Mod: GP | Performed by: PHYSICAL THERAPIST

## 2023-07-05 NOTE — TELEPHONE ENCOUNTER
RECORDS RECEIVED FROM:    DATE RECEIVED:    NOTES STATUS DETAILS   OFFICE NOTE from referring provider  Internal Dr. Vasquez 6-19-23   OFFICE NOTE from other cardiologists  N/A    RECORDS from hospital/ED Internal 6-6-23   MEDICATION LIST Internal    GENERAL CARDIO RECORDS   (ALL APPOINTMENT TYPES)     LABS (CBC,BMP,CMP, TSH) Internal    EKG (STRIPS & REPORTS) Internal 6-6-23   MONITORS (STRIPS & REPORTS) N/A    ECHOS (IMAGES AND REPORTS) Internal 6-7-23   STRESS TESTS (IMAGES AND REPORTS) Internal 9-20-22   cMRI (IMAGES AND REPORTS) N/A    CT/CTA (IMAGES AND REPORTS) Internal 5-23-23

## 2023-07-11 ENCOUNTER — OFFICE VISIT (OUTPATIENT)
Dept: CARDIOLOGY | Facility: CLINIC | Age: 78
End: 2023-07-11
Attending: FAMILY MEDICINE
Payer: MEDICARE

## 2023-07-11 ENCOUNTER — PRE VISIT (OUTPATIENT)
Dept: CARDIOLOGY | Facility: CLINIC | Age: 78
End: 2023-07-11

## 2023-07-11 VITALS
WEIGHT: 144.7 LBS | SYSTOLIC BLOOD PRESSURE: 125 MMHG | DIASTOLIC BLOOD PRESSURE: 70 MMHG | OXYGEN SATURATION: 96 % | BODY MASS INDEX: 24.7 KG/M2 | HEART RATE: 84 BPM | HEIGHT: 64 IN

## 2023-07-11 DIAGNOSIS — R55 SYNCOPE, UNSPECIFIED SYNCOPE TYPE: ICD-10-CM

## 2023-07-11 DIAGNOSIS — I25.10 CORONARY ARTERY CALCIFICATION: ICD-10-CM

## 2023-07-11 PROCEDURE — 99204 OFFICE O/P NEW MOD 45 MIN: CPT | Performed by: INTERNAL MEDICINE

## 2023-07-11 PROCEDURE — G0463 HOSPITAL OUTPT CLINIC VISIT: HCPCS | Performed by: INTERNAL MEDICINE

## 2023-07-11 ASSESSMENT — PAIN SCALES - GENERAL: PAINLEVEL: NO PAIN (0)

## 2023-07-11 NOTE — PATIENT INSTRUCTIONS
Complete an exercise stress echocardiogram (ultrasound of heart).    As soon as results are compiled and reviewed, you will be notified.    Instructions for exercise echo    1. Nothing to eat or drink 3 hours prior except for water.   2. No caffeine, alcohol, or tobacco 12 hours prior  3. Wear comfortable clothes.

## 2023-07-11 NOTE — NURSING NOTE
Chief Complaint   Patient presents with     New Patient     referral from PCP Pedro- Coronary artery calcifications on 5-23-23 chest CT  Syncope, unspecified syncope type         Vitals were taken, medications reconciled.    Paige Thurner, Facilitator   10:15 AM

## 2023-07-11 NOTE — LETTER
7/11/2023      RE: Giana Hope  [address]  [place] [address]       Dear Colleague,    Thank you for the opportunity to participate in the care of your patient, Giana Hope, at the [facility] [place] [facility]. Please see a copy of my visit note below.       SUBJECTIVE:  Giana Hope is a 77 year old female who presents for cardiac evaluation due to incidentally detected coronary artery calcification.  Patient known to have a lung nodule.  For surveillance patient had a repeat CT scan of chest recently.  This showed calcification of all the 3 coronary arteries.  Patient is fairly active with no cardiac symptoms.  Specifically denied CAD or CHF symptoms.  Her only cardiac risk factor is hyperlipidemia for which she is on Pravachol 20 mg daily.  No other cardiac medications.  No diabetes or hypertension.  She thinks her father had premature coronary artery disease in his 60s.  She was a heavy smoker but quit in 1996.    Patient Active Problem List    Diagnosis Date Noted    CKD (chronic kidney disease) stage 2, GFR 60-89 ml/min 06/19/2023     Priority: Medium    Chronic right shoulder pain 06/19/2023     Priority: Medium    Syncopal episodes 06/06/2023     Priority: Medium    SDH (subdural hematoma) (H) 06/06/2023     Priority: Medium    Syncope, unspecified syncope type 06/06/2023     Priority: Medium    Infection due to 2019 novel coronavirus 07/27/2022     Priority: Medium     6/5/2022 while in [place]      Venous stasis dermatitis of both lower extremities 07/27/2022     Priority: Medium    Varicose veins of both lower extremities with pain 07/27/2022     Priority: Medium    Osteoarthritis of right knee 11/26/2021     Priority: Medium    Pain in thoracic spine 04/16/2021     Priority: Medium    Mass of left upper extremity 07/07/2020     Priority: Medium    Primary osteoarthritis involving multiple joints 04/20/2020      Priority: Medium    Gastroesophageal reflux disease without esophagitis 04/20/2020     Priority: Medium    Family history of malignant neoplasm of breast 04/20/2020     Priority: Medium    Arthritis of shoulder region, left 12/17/2019     Priority: Medium     Added automatically from request for surgery [id number]      Nodule of left lung 06/19/2018     Priority: Medium     Overview:   5 mm nodule on CT scan 6/2018. Rec repeat in 1 year.    Formatting of this note might be different from the original.  5 mm nodule on CT scan 6/2018. Rec repeat in 1 year.      Pain in left shoulder 02/12/2018     Priority: Medium    Compression fracture of thoracic vertebra, open, initial encounter (H) 10/17/2017     Priority: Medium    Back pain of thoracolumbar region 09/19/2017     Priority: Medium    DDD (degenerative disc disease), thoracolumbar 09/19/2017     Priority: Medium    Degenerative scoliosis in adult patient 09/19/2017     Priority: Medium    Kyphosis (acquired) (postural) 09/19/2017     Priority: Medium    Osteopenia with high risk of fracture 09/19/2017     Priority: Medium    Hyperlipidemia LDL goal <100 06/07/2017     Priority: Medium    Nuclear sclerosis 03/22/2017     Priority: Medium    Status post left knee replacement 04/05/2016     Priority: Medium    Balance disorder 09/20/2013     Priority: Medium    .  Current Outpatient Medications   Medication Sig    Cyanocobalamin (VITAMIN B-12 PO)     famotidine (PEPCID) 40 MG tablet Take 1 tablet (40 mg) by mouth daily as needed for heartburn    ketoconazole (NIZORAL) 2 % external shampoo Apply topically three times a week    pravastatin (PRAVACHOL) 20 MG tablet Take 1 tablet (20 mg) by mouth daily    prednisoLONE acetate (PRED FORTE) 1 % ophthalmic suspension Place 1 drop Into the left eye 3 times daily    Vitamin D3 (CHOLECALCIFEROL) 25 mcg (1000 units) tablet Take 1 tablet by mouth daily     No current facility-administered medications for this visit.     Past  Medical History:   Diagnosis Date    Chronic kidney disease, stage 3a (H) 10/3/2022    Nonsenile cataract     Osteoarthritis     Pulmonary nodule     Syncopal episodes 2023    Uveitis      Past Surgical History:   Procedure Laterality Date    ARTHROPLASTY KNEE Right 2021    Procedure: ARTHROPLASTY, RIGHT KNEE, TOTAL;  Surgeon: Gabriel Marrero MD;  Location: [facility]    ARTHROPLASTY, RIGHT KNEE, TOTAL Right 2021    CATARACT IOL, RT/LT Right 2019    PHACOEMULSIFICATION WITH STANDARD INTRAOCULAR LENS IMPLANT Right 3/22/2019    Procedure: Right Cataract Removal with Intraocular Lens Implant;  Surgeon: Trish Taylor MD;  [facility]     Allergies   Allergen Reactions    Dust Mites Itching     Runny nose, fever    Mold Itching     Fever, runny nose    Pollen Extract Itching     Fever, runny nose    Celecoxib      Other reaction(s): GI intolerance     Social History     Socioeconomic History    Marital status:      Spouse name: Not on file    Number of children: Not on file    Years of education: Not on file    Highest education level: Not on file   Occupational History    Not on file   Tobacco Use    Smoking status: Former     Years: 35.00     Types: Cigarettes    Smokeless tobacco: Never    Tobacco comments:     Quit 24 years ago    Vaping Use    Vaping Use: Never used   Substance and Sexual Activity    Alcohol use: Yes     Comment: Moderate    Drug use: No    Sexual activity: Not Currently     Partners: Male   Other Topics Concern    Parent/sibling w/ CABG, MI or angioplasty before 65F 55M? 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    Loves to travel. Moved to [place] 10/2018 to be near daughter Linda (1973) son in law and 2 granddaughters ,  live  in [facility]    Her son Jeff ( 1970) lives in [place] ( [place]) Schizoaffective. She travels to visit him when able.       Social Determinants of Health     Financial Resource Strain: Low Risk  (7/27/2022)    Overall Financial Resource Strain (CARDIA)     Difficulty of Paying Living Expenses: Not hard at all   Food Insecurity: No Food Insecurity (7/27/2022)    Hunger Vital Sign     Worried About Running Out of Food in the Last Year: Never true     Ran Out of Food in the Last Year: Never true   Transportation Needs: No Transportation Needs (7/27/2022)    PRAPARE - Transportation     Lack of Transportation (Medical): No     Lack of Transportation (Non-Medical): No   Physical Activity: Sufficiently Active (7/27/2022)    Exercise Vital Sign     Days of Exercise per Week: 7 days     Minutes of Exercise per Session: 30 min   Stress: No Stress Concern Present (7/27/2022)    [place] [employer] [place] [employer] - Occupational Stress Questionnaire     Feeling of Stress : Only a little   Social Connections: Moderately Isolated (7/27/2022)    Social Connection and Isolation Panel [NHANES]     Frequency of Communication with Friends and Family: Three times a week     Frequency of Social Gatherings with Friends and Family: Three times a week     Attends Orthodox Services: Never     Active Member of Clubs or Organizations: Yes     Attends Club or Organization Meetings: More than 4 times per year     Marital Status:    Intimate Partner Violence: Not At Risk (7/27/2022)    Humiliation, Afraid, Rape, and Kick questionnaire     Fear of Current or Ex-Partner: No     Emotionally Abused: No     Physically Abused: No     Sexually Abused: No   Housing Stability: Low Risk  (7/27/2022)    Housing Stability Vital Sign     Unable to Pay for Housing in the Last Year: No     Number of Places Lived in the Last Year: 1     Unstable Housing in the Last Year: No     Family History   Problem Relation Age of Onset    Arthritis Mother   "   Breast Cancer Mother 78    Diabetes Type 2  Father     Heart Disease Father          age 75    Alzheimer Disease Father 60    Heart Disease Brother 68    Breast Cancer Maternal Grandmother 81    Heart Disease Maternal Grandfather 54         age 81    Other - See Comments Son         schizoaffective lives in [place]    Glaucoma No family hx of     Macular Degeneration No family hx of           REVIEW OF SYSTEMS:  General: negative, fever, chills, night sweats  Skin: negative, acne, rash and scaling  Eyes: negative, double vision, eye pain and photophobia  Ears/Nose/Throat: negative, nasal congestion and purulent rhinorrhea  Respiratory: No dyspnea on exertion, No cough, No hemoptysis and negative  Cardiovascular: negative, palpitations, tachycardia, irregular heart beat, chest pain, exertional chest pain or pressure, paroxysmal nocturnal dyspnea, dyspnea on exertion and orthopnea         OBJECTIVE:  Blood pressure 125/70, pulse 84, height 1.63 m (5' 4.17\"), weight 65.6 kg (144 lb 11.2 oz), last menstrual period 1998, SpO2 96 %, not currently breastfeeding.  General Appearance: healthy, alert, active and no distress  Head: Normocephalic. No masses, lesions, tenderness or abnormalities  Eyes: conjuctiva clear, PERRL, EOM intact  Ears: External ears normal. Canals clear. TM's normal.  Nose: Nares normal  Mouth: normal  Neck: Supple, no cervical adenopathy, no thyromegaly  Lungs: clear to auscultation  Cardiac: regular rate and rhythm, normal S1 and S2, no murmur       ASSESSMENT/PLAN:  Patient here for cardiac evaluation due to incidentally detected coronary artery calcification.  Patient known to have a lung nodule for which she has regular CT scans.  Recent CT scan reported calcification of all 3 coronary arteries.  Patient do have arthritis issues but she is fairly active with no cardiac symptoms.  Specifically denied anginal or heart failure symptoms.  Her cardiac risk factors are hyperlipidemia on " treatment.  Also history of heavy smoking in the past which she quit in 1996.  Father probably had premature coronary artery disease in his 60s.  Patient has no hypertension or diabetes.  EKG reviewed.  Sinus rhythm with sinus arrhythmia.  Otherwise normal EKG.  Echocardiogram reviewed normal biventricular function.  No regional wall motion abnormalities.  No significant valvular abnormalities.  Implication of coronary artery calcification discussed with patient.  Discussed risk factor modification.  This in her case include control of lipids.  She does not have hypertension or diabetes.  Quit smoking.  To assess for flow-limiting lesions we will plan for an exercise stress echo.  Patient is sure that she can exercise.  We will contact patient with the test result.  No regular follow-up has been arranged.  Total visit duration 45 minutes.  This included face-to-face interview, physical exam, chart review, review of EKG echocardiogram CT scan and documentation.        Please do not hesitate to contact me if you have any questions/concerns.     Sincerely,     DINAH Barrett MD

## 2023-07-11 NOTE — PROGRESS NOTES
SUBJECTIVE:  Giana Hope is a 77 year old female who presents for cardiac evaluation due to incidentally detected coronary artery calcification.  Patient known to have a lung nodule.  For surveillance patient had a repeat CT scan of chest recently.  This showed calcification of all the 3 coronary arteries.  Patient is fairly active with no cardiac symptoms.  Specifically denied CAD or CHF symptoms.  Her only cardiac risk factor is hyperlipidemia for which she is on Pravachol 20 mg daily.  No other cardiac medications.  No diabetes or hypertension.  She thinks her father had premature coronary artery disease in his 60s.  She was a heavy smoker but quit in 1996.    Patient Active Problem List    Diagnosis Date Noted     CKD (chronic kidney disease) stage 2, GFR 60-89 ml/min 06/19/2023     Priority: Medium     Chronic right shoulder pain 06/19/2023     Priority: Medium     Syncopal episodes 06/06/2023     Priority: Medium     SDH (subdural hematoma) (H) 06/06/2023     Priority: Medium     Syncope, unspecified syncope type 06/06/2023     Priority: Medium     Infection due to 2019 novel coronavirus 07/27/2022     Priority: Medium     6/5/2022 while in [place]       Venous stasis dermatitis of both lower extremities 07/27/2022     Priority: Medium     Varicose veins of both lower extremities with pain 07/27/2022     Priority: Medium     Osteoarthritis of right knee 11/26/2021     Priority: Medium     Pain in thoracic spine 04/16/2021     Priority: Medium     Mass of left upper extremity 07/07/2020     Priority: Medium     Primary osteoarthritis involving multiple joints 04/20/2020     Priority: Medium     Gastroesophageal reflux disease without esophagitis 04/20/2020     Priority: Medium     Family history of malignant neoplasm of breast 04/20/2020     Priority: Medium     Arthritis of shoulder region, left 12/17/2019     Priority: Medium     Added automatically from request for surgery [id number]       Nodule of  left lung 06/19/2018     Priority: Medium     Overview:   5 mm nodule on CT scan 6/2018. Rec repeat in 1 year.    Formatting of this note might be different from the original.  5 mm nodule on CT scan 6/2018. Rec repeat in 1 year.       Pain in left shoulder 02/12/2018     Priority: Medium     Compression fracture of thoracic vertebra, open, initial encounter (H) 10/17/2017     Priority: Medium     Back pain of thoracolumbar region 09/19/2017     Priority: Medium     DDD (degenerative disc disease), thoracolumbar 09/19/2017     Priority: Medium     Degenerative scoliosis in adult patient 09/19/2017     Priority: Medium     Kyphosis (acquired) (postural) 09/19/2017     Priority: Medium     Osteopenia with high risk of fracture 09/19/2017     Priority: Medium     Hyperlipidemia LDL goal <100 06/07/2017     Priority: Medium     Nuclear sclerosis 03/22/2017     Priority: Medium     Status post left knee replacement 04/05/2016     Priority: Medium     Balance disorder 09/20/2013     Priority: Medium    .  Current Outpatient Medications   Medication Sig     Cyanocobalamin (VITAMIN B-12 PO)      famotidine (PEPCID) 40 MG tablet Take 1 tablet (40 mg) by mouth daily as needed for heartburn     ketoconazole (NIZORAL) 2 % external shampoo Apply topically three times a week     pravastatin (PRAVACHOL) 20 MG tablet Take 1 tablet (20 mg) by mouth daily     prednisoLONE acetate (PRED FORTE) 1 % ophthalmic suspension Place 1 drop Into the left eye 3 times daily     Vitamin D3 (CHOLECALCIFEROL) 25 mcg (1000 units) tablet Take 1 tablet by mouth daily     No current facility-administered medications for this visit.     Past Medical History:   Diagnosis Date     Chronic kidney disease, stage 3a (H) 10/3/2022     Nonsenile cataract      Osteoarthritis      Pulmonary nodule      Syncopal episodes 6/6/2023     Uveitis      Past Surgical History:   Procedure Laterality Date     ARTHROPLASTY KNEE Right 11/26/2021    Procedure: ARTHROPLASTY,  RIGHT KNEE, TOTAL;  Surgeon: Gabriel Marrero MD;  [facility]     ARTHROPLASTY, RIGHT KNEE, TOTAL Right 2021     CATARACT IOL, RT/LT Right 2019     PHACOEMULSIFICATION WITH STANDARD INTRAOCULAR LENS IMPLANT Right 3/22/2019    Procedure: Right Cataract Removal with Intraocular Lens Implant;  Surgeon: Trish Taylor MD;  [facility]     Allergies   Allergen Reactions     Dust Mites Itching     Runny nose, fever     Mold Itching     Fever, runny nose     Pollen Extract Itching     Fever, runny nose     Celecoxib      Other reaction(s): GI intolerance     Social History     Socioeconomic History     Marital status:      Spouse name: Not on file     Number of children: Not on file     Years of education: Not on file     Highest education level: Not on file   Occupational History     Not on file   Tobacco Use     Smoking status: Former     Years: 35.00     Types: Cigarettes     Smokeless tobacco: Never     Tobacco comments:     Quit 24 years ago    Vaping Use     Vaping Use: Never used   Substance and Sexual Activity     Alcohol use: Yes     Comment: Moderate     Drug use: No     Sexual activity: Not Currently     Partners: Male   Other Topics Concern     Parent/sibling w/ CABG, MI or angioplasty before 65F 55M? 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    Loves to travel. Moved to [place] 10/2018 to be near daughter Linda (1973) son in law and 2 granddaughters ,  live in [facility]    Her son Jeff ( 1970) lives in [place] ( [place]) Schizoaffective. She travels to visit him when able.       Social Determinants of Health     Financial Resource Strain: Low Risk  (2022)    Overall Financial Resource Strain (CARDIA)      Difficulty of Paying Living  Expenses: Not hard at all   Food Insecurity: No Food Insecurity (2022)    Hunger Vital Sign      Worried About Running Out of Food in the Last Year: Never true      Ran Out of Food in the Last Year: Never true   Transportation Needs: No Transportation Needs (2022)    PRAPARE - Transportation      Lack of Transportation (Medical): No      Lack of Transportation (Non-Medical): No   Physical Activity: Sufficiently Active (2022)    Exercise Vital Sign      Days of Exercise per Week: 7 days      Minutes of Exercise per Session: 30 min   Stress: No Stress Concern Present (2022)    [place] [employer] - Occupational Stress Questionnaire      Feeling of Stress : Only a little   Social Connections: Moderately Isolated (2022)    Social Connection and Isolation Panel [NHANES]      Frequency of Communication with Friends and Family: Three times a week      Frequency of Social Gatherings with Friends and Family: Three times a week      Attends Holiness Services: Never      Active Member of Clubs or Organizations: Yes      Attends Club or Organization Meetings: More than 4 times per year      Marital Status:    Intimate Partner Violence: Not At Risk (2022)    Humiliation, Afraid, Rape, and Kick questionnaire      Fear of Current or Ex-Partner: No      Emotionally Abused: No      Physically Abused: No      Sexually Abused: No   Housing Stability: Low Risk  (2022)    Housing Stability Vital Sign      Unable to Pay for Housing in the Last Year: No      Number of Places Lived in the Last Year: 1      Unstable Housing in the Last Year: No     Family History   Problem Relation Age of Onset     Arthritis Mother      Breast Cancer Mother 78     Diabetes Type 2  Father      Heart Disease Father          age 75     Alzheimer Disease Father 60     Heart Disease Brother 68     Breast Cancer Maternal Grandmother 81     Heart Disease Maternal Grandfather 54         age 81  "    Other - See Comments Son         schizfrancisca lives in [place]     Glaucoma No family hx of      Macular Degeneration No family hx of           REVIEW OF SYSTEMS:  General: negative, fever, chills, night sweats  Skin: negative, acne, rash and scaling  Eyes: negative, double vision, eye pain and photophobia  Ears/Nose/Throat: negative, nasal congestion and purulent rhinorrhea  Respiratory: No dyspnea on exertion, No cough, No hemoptysis and negative  Cardiovascular: negative, palpitations, tachycardia, irregular heart beat, chest pain, exertional chest pain or pressure, paroxysmal nocturnal dyspnea, dyspnea on exertion and orthopnea         OBJECTIVE:  Blood pressure 125/70, pulse 84, height 1.63 m (5' 4.17\"), weight 65.6 kg (144 lb 11.2 oz), last menstrual period 01/01/1998, SpO2 96 %, not currently breastfeeding.  General Appearance: healthy, alert, active and no distress  Head: Normocephalic. No masses, lesions, tenderness or abnormalities  Eyes: conjuctiva clear, PERRL, EOM intact  Ears: External ears normal. Canals clear. TM's normal.  Nose: Nares normal  Mouth: normal  Neck: Supple, no cervical adenopathy, no thyromegaly  Lungs: clear to auscultation  Cardiac: regular rate and rhythm, normal S1 and S2, no murmur       ASSESSMENT/PLAN:  Patient here for cardiac evaluation due to incidentally detected coronary artery calcification.  Patient known to have a lung nodule for which she has regular CT scans.  Recent CT scan reported calcification of all 3 coronary arteries.  Patient do have arthritis issues but she is fairly active with no cardiac symptoms.  Specifically denied anginal or heart failure symptoms.  Her cardiac risk factors are hyperlipidemia on treatment.  Also history of heavy smoking in the past which she quit in 1996.  Father probably had premature coronary artery disease in his 60s.  Patient has no hypertension or diabetes.  EKG reviewed.  Sinus rhythm with sinus arrhythmia.  Otherwise normal " EKG.  Echocardiogram reviewed normal biventricular function.  No regional wall motion abnormalities.  No significant valvular abnormalities.  Implication of coronary artery calcification discussed with patient.  Discussed risk factor modification.  This in her case include control of lipids.  She does not have hypertension or diabetes.  Quit smoking.  To assess for flow-limiting lesions we will plan for an exercise stress echo.  Patient is sure that she can exercise.  We will contact patient with the test result.  No regular follow-up has been arranged.  Total visit duration 45 minutes.  This included face-to-face interview, physical exam, chart review, review of EKG echocardiogram CT scan and documentation.

## 2023-07-18 ENCOUNTER — THERAPY VISIT (OUTPATIENT)
Dept: PHYSICAL THERAPY | Facility: CLINIC | Age: 78
End: 2023-07-18
Payer: MEDICARE

## 2023-07-18 DIAGNOSIS — M54.6 PAIN IN THORACIC SPINE: Primary | ICD-10-CM

## 2023-07-18 PROCEDURE — 97140 MANUAL THERAPY 1/> REGIONS: CPT | Mod: GP | Performed by: PHYSICAL THERAPIST

## 2023-07-24 ENCOUNTER — OFFICE VISIT (OUTPATIENT)
Dept: FAMILY MEDICINE | Facility: CLINIC | Age: 78
End: 2023-07-24
Payer: MEDICARE

## 2023-07-24 VITALS
WEIGHT: 142 LBS | TEMPERATURE: 97.9 F | RESPIRATION RATE: 12 BRPM | BODY MASS INDEX: 25.16 KG/M2 | DIASTOLIC BLOOD PRESSURE: 69 MMHG | HEIGHT: 63 IN | SYSTOLIC BLOOD PRESSURE: 108 MMHG | HEART RATE: 75 BPM | OXYGEN SATURATION: 98 %

## 2023-07-24 DIAGNOSIS — N18.2 CKD (CHRONIC KIDNEY DISEASE) STAGE 2, GFR 60-89 ML/MIN: ICD-10-CM

## 2023-07-24 DIAGNOSIS — E78.5 HYPERLIPIDEMIA LDL GOAL <100: ICD-10-CM

## 2023-07-24 DIAGNOSIS — R73.03 PRE-DIABETES: Primary | ICD-10-CM

## 2023-07-24 DIAGNOSIS — H61.23 BILATERAL IMPACTED CERUMEN: ICD-10-CM

## 2023-07-24 PROCEDURE — 99214 OFFICE O/P EST MOD 30 MIN: CPT | Performed by: FAMILY MEDICINE
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
## 2023-07-24 NOTE — NURSING NOTE
"Giana Hope is a 77 year old female patient that presents today in clinic for the following:    Chief Complaint   Patient presents with    Follow Up     Pt here for 5 week follow up and would like to discuss ear wax.     The patient's allergies and medications were reviewed as noted. A set of vitals were recorded as noted without incident: /69 (BP Location: Right arm, Patient Position: Sitting, Cuff Size: Adult Regular)   Pulse 75   Temp 97.9  F (36.6  C)   Resp 12   Ht 1.605 m (5' 3.19\")   Wt 64.4 kg (142 lb)   LMP 01/01/1998 (Approximate)   SpO2 98%   BMI 25.00 kg/m  . The patient does not have any other questions for the provider.    Mary Galeana, EMT at 10:09 AM on 7/24/2023  "

## 2023-07-24 NOTE — PATIENT INSTRUCTIONS
Consider chondroitin/ glucosamine sulfate    Get second shigrix and covid booster through local pharmacy.  Immunization History   Administered Date(s) Administered    COVID-19 Bivalent 18+ (Moderna) 10/13/2022    COVID-19 MONOVALENT 12+ (Pfizer) 01/30/2021, 02/20/2021, 09/25/2021    COVID-19 Monovalent 18+ (Moderna) 03/29/2022    FLUAD(HD)65+ QUAD 09/25/2021    Flu, Unspecified 10/13/2009, 10/02/2014, 10/21/2015, 09/30/2016    Hepatitis A (ADULT 19+) 02/23/2018, 05/16/2019    Influenza (High Dose) 3 valent vaccine 10/28/2017    Influenza Vaccine 65+ (Fluzone HD) 10/12/2020, 10/14/2022    Influenza Vaccine, 6+MO IM (QUADRIVALENT W/PRESERVATIVES) 10/25/2018, 10/24/2019    Pneumo Conj 13-V (2010&after) 12/11/2015    Pneumococcal 23 valent 03/18/2011    TD,PF 7+ (Tenivac) 02/23/2018    TDAP Vaccine (Boostrix) 08/16/2007    Typhoid IM 05/16/2019    Yellow Fever, Live (Stamaril) 11/30/2017    Zoster recombinant adjuvanted (SHINGRIX) 05/25/2023

## 2023-07-25 ENCOUNTER — HOSPITAL ENCOUNTER (OUTPATIENT)
Dept: CARDIOLOGY | Facility: CLINIC | Age: 78
Discharge: HOME | End: 2023-07-25
Attending: INTERNAL MEDICINE | Admitting: INTERNAL MEDICINE
Payer: MEDICARE

## 2023-07-25 DIAGNOSIS — I25.10 CORONARY ARTERY CALCIFICATION: ICD-10-CM

## 2023-07-25 DIAGNOSIS — R55 SYNCOPE, UNSPECIFIED SYNCOPE TYPE: ICD-10-CM

## 2023-07-25 PROCEDURE — 93325 DOPPLER ECHO COLOR FLOW MAPG: CPT | Mod: TC

## 2023-07-25 PROCEDURE — 93017 CV STRESS TEST TRACING ONLY: CPT

## 2023-07-25 PROCEDURE — 93016 CV STRESS TEST SUPVJ ONLY: CPT | Performed by: INTERNAL MEDICINE

## 2023-07-25 PROCEDURE — 93018 CV STRESS TEST I&R ONLY: CPT | Performed by: INTERNAL MEDICINE

## 2023-07-25 PROCEDURE — 93350 STRESS TTE ONLY: CPT | Mod: 26 | Performed by: INTERNAL MEDICINE

## 2023-07-25 PROCEDURE — 93321 DOPPLER ECHO F-UP/LMTD STD: CPT | Mod: 26 | Performed by: INTERNAL MEDICINE

## 2023-07-25 PROCEDURE — 255N000002 HC RX 255 OP 636: Performed by: INTERNAL MEDICINE

## 2023-07-25 PROCEDURE — 93325 DOPPLER ECHO COLOR FLOW MAPG: CPT | Mod: 26 | Performed by: INTERNAL MEDICINE

## 2023-07-25 RX ADMIN — PERFLUTREN 5 ML: 6.52 INJECTION, SUSPENSION INTRAVENOUS at 10:20

## 2023-07-27 ENCOUNTER — MYC MEDICAL ADVICE (OUTPATIENT)
Dept: ORTHOPEDICS | Facility: CLINIC | Age: 78
End: 2023-07-27
Payer: MEDICARE

## 2023-08-03 ENCOUNTER — THERAPY VISIT (OUTPATIENT)
Dept: PHYSICAL THERAPY | Facility: CLINIC | Age: 78
End: 2023-08-03
Payer: MEDICARE

## 2023-08-03 DIAGNOSIS — M54.6 PAIN IN THORACIC SPINE: Primary | ICD-10-CM

## 2023-08-03 PROCEDURE — 97140 MANUAL THERAPY 1/> REGIONS: CPT | Mod: GP | Performed by: PHYSICAL THERAPIST

## 2023-08-11 ENCOUNTER — OFFICE VISIT (OUTPATIENT)
Dept: ORTHOPEDICS | Facility: CLINIC | Age: 78
End: 2023-08-11
Payer: MEDICARE

## 2023-08-11 ENCOUNTER — ANCILLARY PROCEDURE (OUTPATIENT)
Dept: GENERAL RADIOLOGY | Facility: CLINIC | Age: 78
End: 2023-08-11
Attending: STUDENT IN AN ORGANIZED HEALTH CARE EDUCATION/TRAINING PROGRAM
Payer: MEDICARE

## 2023-08-11 VITALS
SYSTOLIC BLOOD PRESSURE: 132 MMHG | BODY MASS INDEX: 24.45 KG/M2 | WEIGHT: 138 LBS | DIASTOLIC BLOOD PRESSURE: 82 MMHG | HEIGHT: 63 IN

## 2023-08-11 DIAGNOSIS — Z96.651 STATUS POST RIGHT KNEE REPLACEMENT: ICD-10-CM

## 2023-08-11 DIAGNOSIS — M19.071 OSTEOARTHRITIS OF MIDFOOT, RIGHT: Primary | ICD-10-CM

## 2023-08-11 DIAGNOSIS — S89.91XA INJURY OF RIGHT KNEE, INITIAL ENCOUNTER: ICD-10-CM

## 2023-08-11 DIAGNOSIS — M19.011 OSTEOARTHRITIS OF GLENOHUMERAL JOINT, RIGHT: ICD-10-CM

## 2023-08-11 DIAGNOSIS — S89.91XA INJURY OF RIGHT KNEE, INITIAL ENCOUNTER: Primary | ICD-10-CM

## 2023-08-11 PROCEDURE — 73562 X-RAY EXAM OF KNEE 3: CPT | Mod: TC | Performed by: RADIOLOGY

## 2023-08-11 PROCEDURE — 99213 OFFICE O/P EST LOW 20 MIN: CPT | Mod: 25 | Performed by: STUDENT IN AN ORGANIZED HEALTH CARE EDUCATION/TRAINING PROGRAM

## 2023-08-11 PROCEDURE — 20610 DRAIN/INJ JOINT/BURSA W/O US: CPT | Mod: RT | Performed by: STUDENT IN AN ORGANIZED HEALTH CARE EDUCATION/TRAINING PROGRAM

## 2023-08-11 RX ORDER — TRIAMCINOLONE ACETONIDE 40 MG/ML
40 INJECTION, SUSPENSION INTRA-ARTICULAR; INTRAMUSCULAR
Status: SHIPPED | OUTPATIENT
Start: 2023-08-11

## 2023-08-11 RX ORDER — LIDOCAINE HYDROCHLORIDE 10 MG/ML
3 INJECTION, SOLUTION INFILTRATION; PERINEURAL
Status: SHIPPED | OUTPATIENT
Start: 2023-08-11

## 2023-08-11 RX ORDER — LIDOCAINE HYDROCHLORIDE 10 MG/ML
4 INJECTION, SOLUTION INFILTRATION; PERINEURAL
Status: SHIPPED | OUTPATIENT
Start: 2023-08-11

## 2023-08-11 RX ADMIN — LIDOCAINE HYDROCHLORIDE 3 ML: 10 INJECTION, SOLUTION INFILTRATION; PERINEURAL at 11:04

## 2023-08-11 RX ADMIN — LIDOCAINE HYDROCHLORIDE 4 ML: 10 INJECTION, SOLUTION INFILTRATION; PERINEURAL at 11:04

## 2023-08-11 RX ADMIN — TRIAMCINOLONE ACETONIDE 40 MG: 40 INJECTION, SUSPENSION INTRA-ARTICULAR; INTRAMUSCULAR at 11:04

## 2023-08-11 NOTE — PROGRESS NOTES
[facility]  Orthopaedic Surgery Consultation by Gabriel Marrero M.D.    Giana Hope MRN# [id number]   Age: 76 year old YOB: 1945     Requesting physician: Gilberto Pandya     Background history:  DX:  History of anesthesia problem  Gastroesophageal reflux disease without esophagitis  Nodule of left lung  DDD  Risk for falls  Hypercholesterolemia  Nuclear sclerosis      TREATMENTS:  2/25/2016, Left TKA, Сергей Pang MD , [facility], [facility], and [facility] Pract[facility]  8/25/2020, Reverse Left TSA, Ector Chapa M.D., [facility]  11/26/2021, right total knee arthroplasty,              History of Present Illness:     78-year-old female with chronic pain right shoulder due to end-stage osteoarthritic changes.  Approximately 3 months ago patient received an glenohumeral injection with a combination of lidocaine and cortisone.  She is scheduled for right total shoulder arthroplasty in February 2024.  She returns to clinic today for a repeat injection of the right glenohumeral joint with a combination of lidocaine and cortisone.    Furthermore, patient fell in July of 2023 directly onto her right knee.  The knee was swollen directly after the fall but that has since gone away.  Currently she is not experiencing any limitations, pain or instability of the right total knee arthroplasty.      Social:   Occupation: Retired   Living situation: Lives with cat.  She has family close by.  Hobbies / Sports: walking and hiking     Smoking: No  Alcohol: Yes  Illicit drug use: No         Physical Exam:     EXAMINATION pertinent findings:   PSYCH: Pleasant, healthy-appearing, alert, oriented x3, cooperative. Normal mood and affect.  VITAL SIGNS: Last menstrual period 01/01/1998, not currently breastfeeding.  Reviewed nursing intake notes.   There is no height or weight on file to calculate BMI.  RESP: non  labored breathing   ABD: benign, soft, non-tender, no acute peritoneal findings  SKIN: grossly normal   LYMPHATIC: grossly normal, no adenopathy, no extremity edema  NEURO: grossly normal , no motor deficits  VASCULAR: satisfactory perfusion of all extremities   MUSCULOSKELETAL:     Cervical Spine: FROM, Spurling's test negative.    Shoulder right: Normal appearance. No signs of muscular atrophy of SSP, ISP or Deltoid. No tenderness to palpation over the sterno-clavicular joint or clavicle. AC joint: No tenderness to palpation. Cross body -. Abduction 120, Forward flexion 100, ER 30, IR L4. Cuff strength 5/5 for SSP/ISP and SSC. Neer, Walker, and Mariya -. Neurovascular intact RUE.  2+ radial pulse with a warm and well perfused hand. Sensation is intact to light touch in the median, radial, ulnar, musculocutaneous, and axillary nerve distribution. 5/5 , fpl, epl, io, wrist flexor, wrist extensor, biceps, triceps, and deltoid muscle strength on manual muscle testing.            Data:   All laboratory data reviewed    All imaging studies reviewed by me personally.    XR knee right 8/11/2023:  My interpretation: Status post right total knee arthroplasty.  Adequate sizing, fixation and orientation of components.  No signs of complications, fractures or dislocations.    XR shoulder right 3/9/2022:  My interpretation: Severe osteoarthritic changes of right glenohumeral joint.  Complete obliteration of joint space.  Presence of marginal osteophytes and sclerosis.  Well-preserved AC joint.  Glenoid Candelario C.    CT shoulder right 6/20/2023:  My interpretation: Severe osteoarthritic changes of right glenohumeral joint.  Presence of marginal osteophytes and sclerosis.  Well-preserved AC joint.  No significant bone loss on glenoid side.         Assessment and Plan:   Assessment:  78-year-old female with chronic pain of right shoulder due to end-stage osteoarthritic change.  Previously responding well to intra-articular  injection combination of lidocaine and cortisone.  Scheduled for right total shoulder arthroplasty February 2024.  Currently no signs of traumatic pathology right total knee arthroplasty.     Plan:  I discussed my findings with the patient.  There are no restrictions or concerns in regards to the right knee.  After obtaining informed consent the right glenohumeral joint was injected without complications.  We will follow-up with patient on an as-needed basis and see her otherwise in February 2023 for right total shoulder arthroplasty.  All questions were answered.  Patient understands and agrees to the treatment plan as set forth.      Gabriel Marrero MD, PhD     Adult Reconstruction  [facility] Department of Orthopaedic Surgery  Pager [phone]    Large Joint Injection/Arthocentesis: R glenohumeral joint    Date/Time: 8/11/2023 11:04 AM    Performed by: Gabriel Marrero MD  Authorized by: Gabriel Marrero MD    Indications:  Pain  Needle Size:  22 G  Guidance: landmark guided    Location:  Shoulder      Site:  R glenohumeral joint  Medications:  4 mL lidocaine 1 %; 3 mL lidocaine 1 %; 40 mg triamcinolone 40 MG/ML  Outcome:  Tolerated well, no immediate complications  Procedure discussed: discussed risks, benefits, and alternatives    Consent Given by:  Patient  Timeout: timeout called immediately prior to procedure    Prep: patient was prepped and draped in usual sterile fashion

## 2023-08-11 NOTE — PROGRESS NOTES
[facility]  Orthopaedic Surgery Consultation by Gabriel Marrero M.D.    Giana Hope MRN# [id number]   Age: 76 year old YOB: 1945     Requesting physician: Gilberto Pandya     Background history:  DX:  History of anesthesia problem  Gastroesophageal reflux disease without esophagitis  Nodule of left lung  DDD  Risk for falls  Hypercholesterolemia  Nuclear sclerosis      TREATMENTS:  2/25/2016, Left TKA, Сергей Pang MD , [facility], [facility], and [facility] Practices  8/25/2020, Reverse Left TSA, Ector Chapa M.D., [facility]  11/26/2021, right total knee arthroplasty,              History of Present Illness:     8/11/2023 Update:     77-year-old female with chronic pain right shoulder due to end-stage osteoarthritic changes.  Approximately 3 months ago patient received an glenohumeral injection with a combination of lidocaine and cortisone.    Patient follows up to day regarding her right shoulder pain. She reports significant improvement of right shoulder pain s/p right shoulder glenohumeral joint injection in March 2023.  Unfortunately this provided relief for approximately 1.5 months and since then she has been experiencing gradual return of pain. She reports her pain is primarily located to the lateral upper arm. She notes decreased range of motion with reaching.  Patient has been doing weight and strength training in the interim which only aggravated the pain more.    She is looking for replacement surgery but has multiple trips planned for August, November and December.      Today patient states that she has pain of right shoulder due to underlying glenohumeral osteoarthritic changes which is bothering her significantly.  She is not experiencing any loss of strength, motor or sensory deficits.  Pain is present inside the shoulder joint.  She describes occasional crepitus.  She would  like to have a repeat injection of the right shoulder.  She has had 1 in the past which provided relief for many years.    Social:   Occupation: Retired   Living situation: Lives with cat.  She has family close by.  Hobbies / Sports: walking and hiking     Smoking: No  Alcohol: Yes  Illicit drug use: No         Physical Exam:     EXAMINATION pertinent findings:   PSYCH: Pleasant, healthy-appearing, alert, oriented x3, cooperative. Normal mood and affect.  VITAL SIGNS: Last menstrual period 01/01/1998, not currently breastfeeding.  Reviewed nursing intake notes.   There is no height or weight on file to calculate BMI.  RESP: non labored breathing   ABD: benign, soft, non-tender, no acute peritoneal findings  SKIN: grossly normal   LYMPHATIC: grossly normal, no adenopathy, no extremity edema  NEURO: grossly normal , no motor deficits  VASCULAR: satisfactory perfusion of all extremities   MUSCULOSKELETAL:     Cervical Spine: FROM, Spurling's test negative.    Shoulder right: Normal appearance. No signs of muscular atrophy of SSP, ISP or Deltoid. No tenderness to palpation over the sterno-clavicular joint or clavicle. AC joint: No tenderness to palpation. Cross body -. Abduction 120, Forward flexion 100, ER 30, IR L4. Cuff strength 5/5 for SSP/ISP and SSC. Neer, Walker, and Mariya -. Neurovascular intact RUE.  2+ radial pulse with a warm and well perfused hand. Sensation is intact to light touch in the median, radial, ulnar, musculocutaneous, and axillary nerve distribution. 5/5 , fpl, epl, io, wrist flexor, wrist extensor, biceps, triceps, and deltoid muscle strength on manual muscle testing.            Data:   All laboratory data reviewed    All imaging studies reviewed by me personally.    Today no new imaging studies were obtained.    XR knee right 9/28/2022:  My interpretation: Status post right total knee arthroplasty.  Adequate sizing, fixation and orientation of components.  No signs of immediate  postoperative complications.    XR shoulder right 3/9/2022:  My interpretation: Severe osteoarthritic changes of right glenohumeral joint.  Complete obliteration of joint space.  Presence of marginal osteophytes and sclerosis.  Well-preserved AC joint.  Glenoid Candelario C.    CT shoulder right 6/20/2023:  My interpretation: Severe osteoarthritic changes of right glenohumeral joint.  Presence of marginal osteophytes and sclerosis.  Well-preserved AC joint.  No significant bone loss on glenoid side.         Assessment and Plan:   Assessment:  77-year-old female with chronic pain of right shoulder due to end-stage osteoarthritic change.  Previously responding well to intra-articular injection combination of lidocaine and cortisone.       Plan:  I discussed my findings with the patient.  As for the right shoulder we discussed the surgical and nonsurgical treatment options for glenohumeral osteoarthritis.  Given her current symptoms and significantly reduced response to intra-articular injection it would be a consideration to proceed with right total shoulder arthroplasty.  Given patient's upcoming trips she would like to proceed with this in February 2024.  She will return to the clinic for an intra-articular injection with cortisone before her trip in August.  In the interim it is my recommendation to continue physical therapy for range of motion exercises/strengthening/stretching.      Patient states that she would like to proceed with right anatomic shoulder replacement surgery.   We discussed the post-operative rehabilitation and expected outcome of this surgical option at length. We also reviewed the risks and benefits of surgery including but not limited to the following: Risk of anesthesia, including death; infection; nerve/tendon/vessel injury; deep venous thrombosis (DVT), pulmonary embolism (PE); shoulder stiffness, prosthetic loosening or instability; subscapularis failure or lesser tuberosity malunion/nonunion  if osteotomy is performed; potential need to perform a reverse total shoulder replacement dependent on intraoperative findings such as rotator cuff insufficiency, potential of the procedure to not alleviate the condition; and the potential need for further surgery in the future.    After going over these risks, benefits, and alternatives patient would like to proceed with surgery. All questions were answered satisfactorily and patient will work with our surgical scheduling department to coordinate the surgery.     Gabriel Marrero MD, PhD     Adult Reconstruction  [facility] Department of Orthopaedic Surgery  Pager [phone]

## 2023-08-11 NOTE — LETTER
8/11/2023         RE: Giana Hope  [address]  [place] [address]        Dear Colleague,    Thank you for referring your patient, Giana Hope, to the [facility] [place]. Please see a copy of my visit note below.        [facility] ([facility] Physicians  Orthopaedic Surgery Consultation by Gabriel Marrero M.D.    Giana Hope MRN# [id number]   Age: 76 year old YOB: 1945     Requesting physician: Gilberto Pandya     Background history:  DX:  History of anesthesia problem  Gastroesophageal reflux disease without esophagitis  Nodule of left lung  DDD  Risk for falls  Hypercholesterolemia  Nuclear sclerosis      TREATMENTS:  2/25/2016, Left TKA, Сергей Pang MD , [facility], [facility]  8/25/2020, Reverse Left TSA, Ector Chapa M.D., [facility]  11/26/2021, right total knee arthroplasty,              History of Present Illness:     78-year-old female with chronic pain right shoulder due to end-stage osteoarthritic changes.  Approximately 3 months ago patient received an glenohumeral injection with a combination of lidocaine and cortisone.  She is scheduled for right total shoulder arthroplasty in February 2024.  She returns to clinic today for a repeat injection of the right glenohumeral joint with a combination of lidocaine and cortisone.    Furthermore, patient fell in July of 2023 directly onto her right knee.  The knee was swollen directly after the fall but that has since gone away.  Currently she is not experiencing any limitations, pain or instability of the right total knee arthroplasty.      Social:   Occupation: Retired   Living situation: Lives with cat.  She has family close by.  Hobbies / Sports: walking and hiking     Smoking: No  Alcohol: Yes  Illicit drug use: No         Physical Exam:     EXAMINATION pertinent findings:   PSYCH:  Pleasant, healthy-appearing, alert, oriented x3, cooperative. Normal mood and affect.  VITAL SIGNS: Last menstrual period 01/01/1998, not currently breastfeeding.  Reviewed nursing intake notes.   There is no height or weight on file to calculate BMI.  RESP: non labored breathing   ABD: benign, soft, non-tender, no acute peritoneal findings  SKIN: grossly normal   LYMPHATIC: grossly normal, no adenopathy, no extremity edema  NEURO: grossly normal , no motor deficits  VASCULAR: satisfactory perfusion of all extremities   MUSCULOSKELETAL:     Cervical Spine: FROM, Spurling's test negative.    Shoulder right: Normal appearance. No signs of muscular atrophy of SSP, ISP or Deltoid. No tenderness to palpation over the sterno-clavicular joint or clavicle. AC joint: No tenderness to palpation. Cross body -. Abduction 120, Forward flexion 100, ER 30, IR L4. Cuff strength 5/5 for SSP/ISP and SSC. Neer, Walker, and Mariya -. Neurovascular intact RUE.  2+ radial pulse with a warm and well perfused hand. Sensation is intact to light touch in the median, radial, ulnar, musculocutaneous, and axillary nerve distribution. 5/5 , fpl, epl, io, wrist flexor, wrist extensor, biceps, triceps, and deltoid muscle strength on manual muscle testing.            Data:   All laboratory data reviewed    All imaging studies reviewed by me personally.    XR knee right 8/11/2023:  My interpretation: Status post right total knee arthroplasty.  Adequate sizing, fixation and orientation of components.  No signs of complications, fractures or dislocations.    XR shoulder right 3/9/2022:  My interpretation: Severe osteoarthritic changes of right glenohumeral joint.  Complete obliteration of joint space.  Presence of marginal osteophytes and sclerosis.  Well-preserved AC joint.  Glenoid Candelario C.    CT shoulder right 6/20/2023:  My interpretation: Severe osteoarthritic changes of right glenohumeral joint.  Presence of marginal osteophytes and  sclerosis.  Well-preserved AC joint.  No significant bone loss on glenoid side.         Assessment and Plan:   Assessment:  78-year-old female with chronic pain of right shoulder due to end-stage osteoarthritic change.  Previously responding well to intra-articular injection combination of lidocaine and cortisone.  Scheduled for right total shoulder arthroplasty February 2024.  Currently no signs of traumatic pathology right total knee arthroplasty.     Plan:  I discussed my findings with the patient.  There are no restrictions or concerns in regards to the right knee.  After obtaining informed consent the right glenohumeral joint was injected without complications.  We will follow-up with patient on an as-needed basis and see her otherwise in February 2023 for right total shoulder arthroplasty.  All questions were answered.  Patient understands and agrees to the treatment plan as set forth.      Gabriel Marrero MD, PhD     Adult Reconstruction  [facility] Department of Orthopaedic Surgery  Pager [phone]    Large Joint Injection/Arthocentesis: R glenohumeral joint    Date/Time: 8/11/2023 11:04 AM    Performed by: Gabriel Marrero MD  Authorized by: Gabriel Marrero MD    Indications:  Pain  Needle Size:  22 G  Guidance: landmark guided    Location:  Shoulder      Site:  R glenohumeral joint  Medications:  4 mL lidocaine 1 %; 3 mL lidocaine 1 %; 40 mg triamcinolone 40 MG/ML  Outcome:  Tolerated well, no immediate complications  Procedure discussed: discussed risks, benefits, and alternatives    Consent Given by:  Patient  Timeout: timeout called immediately prior to procedure    Prep: patient was prepped and draped in usual sterile fashion              Again, thank you for allowing me to participate in the care of your patient.        Sincerely,        Gabriel Marrero MD

## 2023-08-15 ENCOUNTER — THERAPY VISIT (OUTPATIENT)
Dept: PHYSICAL THERAPY | Facility: CLINIC | Age: 78
End: 2023-08-15
Payer: MEDICARE

## 2023-08-15 ENCOUNTER — MYC MEDICAL ADVICE (OUTPATIENT)
Dept: FAMILY MEDICINE | Facility: CLINIC | Age: 78
End: 2023-08-15

## 2023-08-15 DIAGNOSIS — G25.81 RESTLESS LEGS SYNDROME: Primary | ICD-10-CM

## 2023-08-15 DIAGNOSIS — M54.6 PAIN IN THORACIC SPINE: Primary | ICD-10-CM

## 2023-08-15 PROCEDURE — 97140 MANUAL THERAPY 1/> REGIONS: CPT | Mod: GP | Performed by: PHYSICAL THERAPIST

## 2023-08-15 NOTE — PROGRESS NOTES
Discharge Note    Progress reporting period is from last progress note on May 15, 2023  to Aug 15, 2023.    Patient seen for 6  visits.    SUBJECTIVE  Subjective changes noted by patient: Hawa reports having temporary improvement of mid back. Tolerating exercise program well. She will have right shoulder replacement in February 2023 and feels comfortable continuing with independent home program.   Current pain level is  0/10 - when it occurs it is intermittent (B)  6/10 .     Previous pain level was 4-5/10   .   Changes in function:  Yes (See Goal flowsheet attached for changes in current functional level)  Adverse reaction to treatment or activity: None    OBJECTIVE  Changes noted in objective findings: Thoracic rotation: 55% RR, LR: 50% with end range tightness   No pain with deep inhalation and exhalation, mild to moderate (B) erector spinae muscle tightness with palpation  Mmt: middle trapezius: 3+/5 (B)       ASSESSMENT/PLAN      Updated problem list and treatment plan:   Decreased ROM/flexibility - HEP  STG/LTGs have been met or progress has been made towards goals:  Yes, please see goal flowsheet for most current information  Assessment of Progress: current status is unknown.    Last current status:     Self Management Plans:  HEP  I have re-evaluated this patient and find that the nature, scope, duration and intensity of the therapy is appropriate for the medical condition of the patient.  Giana continues to require the following intervention to meet STG and LTG's:  HEP.    Recommendations:  Discharge with current home program.  Patient to follow up with MD as needed.    Please refer to the daily flowsheet for treatment today, total treatment time and time spent performing 1:1 timed codes.

## 2023-08-16 NOTE — TELEPHONE ENCOUNTER
Sleep Study referral faxed to [facility] @ [phone].     Sweta Kasper RN on 8/16/2023 at 8:57 AM

## 2023-10-02 ENCOUNTER — MYC MEDICAL ADVICE (OUTPATIENT)
Dept: DERMATOLOGY | Facility: CLINIC | Age: 78
End: 2023-10-02
Payer: MEDICARE

## 2023-10-04 NOTE — TELEPHONE ENCOUNTER
[facility] Call Center    Phone Message    May a detailed message be left on voicemail: yes     Reason for Call: Pt calling to reschedule CSC appt with Sukhi for MG. Wants to keep original appt date and time of 2/19 @ 1:15. Please call [phone] to confirm. Thanks!     Action Taken: Other: DERM    Travel Screening: Not Applicable

## 2023-10-09 ENCOUNTER — OFFICE VISIT (OUTPATIENT)
Dept: FAMILY MEDICINE | Facility: CLINIC | Age: 78
End: 2023-10-09
Payer: MEDICARE

## 2023-10-09 ENCOUNTER — LAB (OUTPATIENT)
Dept: LAB | Facility: CLINIC | Age: 78
End: 2023-10-09
Payer: MEDICARE

## 2023-10-09 VITALS
SYSTOLIC BLOOD PRESSURE: 117 MMHG | DIASTOLIC BLOOD PRESSURE: 65 MMHG | WEIGHT: 130.5 LBS | HEART RATE: 59 BPM | TEMPERATURE: 97.5 F | BODY MASS INDEX: 22.93 KG/M2 | RESPIRATION RATE: 18 BRPM | OXYGEN SATURATION: 98 %

## 2023-10-09 DIAGNOSIS — R73.03 PRE-DIABETES: ICD-10-CM

## 2023-10-09 DIAGNOSIS — E78.5 HYPERLIPIDEMIA LDL GOAL <100: ICD-10-CM

## 2023-10-09 DIAGNOSIS — Z23 ENCOUNTER FOR IMMUNIZATION: ICD-10-CM

## 2023-10-09 DIAGNOSIS — G25.81 RESTLESS LEGS SYNDROME: ICD-10-CM

## 2023-10-09 DIAGNOSIS — R73.03 PRE-DIABETES: Primary | ICD-10-CM

## 2023-10-09 DIAGNOSIS — N18.2 CKD (CHRONIC KIDNEY DISEASE) STAGE 2, GFR 60-89 ML/MIN: ICD-10-CM

## 2023-10-09 LAB
ANION GAP SERPL CALCULATED.3IONS-SCNC: 9 MMOL/L (ref 7–15)
BUN SERPL-MCNC: 18.5 MG/DL (ref 8–23)
CALCIUM SERPL-MCNC: 10.1 MG/DL (ref 8.8–10.2)
CHLORIDE SERPL-SCNC: 103 MMOL/L (ref 98–107)
CHOLEST SERPL-MCNC: 194 MG/DL
CREAT SERPL-MCNC: 0.99 MG/DL (ref 0.51–0.95)
DEPRECATED HCO3 PLAS-SCNC: 27 MMOL/L (ref 22–29)
EGFRCR SERPLBLD CKD-EPI 2021: 58 ML/MIN/1.73M2
FERRITIN SERPL-MCNC: 200 NG/ML (ref 11–328)
GLUCOSE SERPL-MCNC: 98 MG/DL (ref 70–99)
HBA1C MFR BLD: 5.7 %
HDLC SERPL-MCNC: 81 MG/DL
LDLC SERPL CALC-MCNC: 99 MG/DL
NONHDLC SERPL-MCNC: 113 MG/DL
POTASSIUM SERPL-SCNC: 4.3 MMOL/L (ref 3.4–5.3)
SODIUM SERPL-SCNC: 139 MMOL/L (ref 135–145)
TRIGL SERPL-MCNC: 68 MG/DL

## 2023-10-09 PROCEDURE — 90480 ADMN SARSCOV2 VAC 1/ONLY CMP: CPT | Performed by: FAMILY MEDICINE

## 2023-10-09 PROCEDURE — 99000 SPECIMEN HANDLING OFFICE-LAB: CPT | Performed by: PATHOLOGY

## 2023-10-09 PROCEDURE — 99214 OFFICE O/P EST MOD 30 MIN: CPT | Mod: 25 | Performed by: FAMILY MEDICINE

## 2023-10-09 PROCEDURE — 90662 IIV NO PRSV INCREASED AG IM: CPT | Performed by: FAMILY MEDICINE

## 2023-10-09 PROCEDURE — 83036 HEMOGLOBIN GLYCOSYLATED A1C: CPT | Performed by: FAMILY MEDICINE

## 2023-10-09 PROCEDURE — G0008 ADMIN INFLUENZA VIRUS VAC: HCPCS | Performed by: FAMILY MEDICINE

## 2023-10-09 PROCEDURE — 82728 ASSAY OF FERRITIN: CPT | Performed by: PATHOLOGY

## 2023-10-09 PROCEDURE — 36415 COLL VENOUS BLD VENIPUNCTURE: CPT | Performed by: PATHOLOGY

## 2023-10-09 PROCEDURE — 90472 IMMUNIZATION ADMIN EACH ADD: CPT | Performed by: FAMILY MEDICINE

## 2023-10-09 PROCEDURE — 80061 LIPID PANEL: CPT | Performed by: PATHOLOGY

## 2023-10-09 PROCEDURE — 80048 BASIC METABOLIC PNL TOTAL CA: CPT | Performed by: PATHOLOGY

## 2023-10-09 NOTE — PROGRESS NOTES
Assessment & Plan     Pre-diabetes    She has been working on lifestyle modification, A1c was not back prior to the end of the visit but her glucose was in normal range congratulated her on her excellent work including improvement in the lipids.  Encounter for immunization  Immunizations provided today.  - COVID-19 12+ (2023-24) (PFIZER)  - INFLUENZA VACCINE 65+ (FLUZONE HD)    Restless legs syndrome  She has a history of restless legs I indicated we check a serum ferritin to check iron stores it actually came back in normal range therefore encouraged continuation of physical activity which has been helping.  - Ferritin    Hyperlipidemia LDL goal <100  She has been doing lifestyle modification in addition to taking pravastatin 20 mg has been increasing physical activity.  Lipids are much improved.  She will be having surgery in February and needs to schedule preop towards the end of February.  30 minutes spent on the date of the encounter doing chart review, history, exam, diagnostics review, documentation, counseling and coordination of cares as noted.                Return in about 20 weeks (around 2/26/2024) for pre-op.    Gilberto Vasquez MD  [facility] [place]    Diana Shaikh is a 78 year old, presenting for the following health issues:  Follow Up (10 week follow up )    HPI   Giana Garcias 78 past medical history  balance disorder, hyperlipidemia, CKD 3, osteoarthritis, kyphosis, degenerative disc disease, venous stasis of both lower extremities, had a fall June 2023 with subdural hematoma.  She presents today for primary care follow-up.  She has been feeling very well.    Pre-diabetes  She has been working on nutritional modification for prediabetes last A1c 6.0.  She feels overall much improved and eating fruits vegetables yogurt lean chicken 2-3 meals per day and also has been working on walking at least once per day, walking somewhat limited during certain  times of the year as she is concerned for her health walking outside. She has strategies to be physically active in her home. She had stress test reviewed normal 7/2023.    Lipids  Pravastatin 20 mg    Joint pain osteoarthritis  She has not been taking sulindac or nonsteroidal anti-inflammatories per our recommendations.  She is utilizing Tylenol.   She has plans to wear compression stockings during travel.  She notes restless legs but seems improved.  SH: Non-smoker currently past history of cigarette smoking.  Two glasses of wine three nights per week. Family lives in [place]  She [place] [facility] [place] the end of [place].  SF tomorrow and [place] end of the month.    Healthcare maintenance   COVID booster & flu                       Labs reviewed in EPIC  BP Readings from Last 3 Encounters:   10/09/23 117/65   08/11/23 132/82   07/24/23 108/69    Wt Readings from Last 3 Encounters:   10/09/23 59.2 kg (130 lb 8 oz)   08/11/23 62.6 kg (138 lb)   07/24/23 64.4 kg (142 lb)            Immunization History   Administered Date(s) Administered    COVID-19 Bivalent 18+ (Moderna) 10/13/2022, 07/30/2023    COVID-19 MONOVALENT 12+ (Pfizer) 01/30/2021, 02/20/2021, 09/25/2021    COVID-19 Monovalent 18+ (Moderna) 03/29/2022    Flu, Unspecified 10/13/2009, 10/02/2014, 10/21/2015, 09/30/2016    Hepatitis A (ADULT 19+) 02/23/2018, 05/16/2019    Influenza (High Dose) 3 valent vaccine 10/28/2017    Influenza Vaccine 65+ (FLUAD) 09/25/2021    Influenza Vaccine 65+ (Fluzone HD) 10/12/2020, 10/14/2022    Influenza Vaccine, 6+MO IM (QUADRIVALENT W/PRESERVATIVES) 10/25/2018, 10/24/2019    Pneumo Conj 13-V (2010&after) 12/11/2015    Pneumococcal 23 valent 03/18/2011    TD,PF 7+ (Tenivac) 02/23/2018    TDAP Vaccine (Boostrix) 08/16/2007    Typhoid IM 05/16/2019    Yellow Fever, Live (Stamaril) 11/30/2017    Zoster recombinant adjuvanted (SHINGRIX) 05/25/2023            Patient Active Problem List   Diagnosis     Hyperlipidemia LDL goal <100    Arthritis of shoulder region, left    Primary osteoarthritis involving multiple joints    Gastroesophageal reflux disease without esophagitis    Nodule of left lung    Family history of malignant neoplasm of breast    Mass of left upper extremity    Back pain of thoracolumbar region    Balance disorder    Compression fracture of thoracic vertebra, open, initial encounter (H)    DDD (degenerative disc disease), thoracolumbar    Degenerative scoliosis in adult patient    Kyphosis (acquired) (postural)    Nuclear sclerosis    Osteopenia with high risk of fracture    Pain in left shoulder    Status post left knee replacement    Pain in thoracic spine    Osteoarthritis of right knee    Infection due to 2019 novel coronavirus    Venous stasis dermatitis of both lower extremities    Varicose veins of both lower extremities with pain    Syncopal episodes    SDH (subdural hematoma) (H)    Syncope, unspecified syncope type    CKD (chronic kidney disease) stage 2, GFR 60-89 ml/min    Chronic right shoulder pain    Bilateral impacted cerumen    Restless legs syndrome     Past Surgical History:   Procedure Laterality Date    ARTHROPLASTY KNEE Right 11/26/2021    Procedure: ARTHROPLASTY, RIGHT KNEE, TOTAL;  Surgeon: Gabriel Marrero MD;  Location: [facility] OR    ARTHROPLASTY, RIGHT KNEE, TOTAL Right 11/26/2021    CATARACT IOL, RT/LT Right 03/2019    PHACOEMULSIFICATION WITH STANDARD INTRAOCULAR LENS IMPLANT Right 3/22/2019    Procedure: Right Cataract Removal with Intraocular Lens Implant;  Surgeon: Trish Taylor MD;  Location:  OR       Social History     Tobacco Use    Smoking status: Former     Years: 35.00     Types: Cigarettes    Smokeless tobacco: Never    Tobacco comments:     Quit 24 years ago 1996   Substance Use Topics    Alcohol use: Yes     Comment: Moderate     Family History   Problem Relation Age of Onset    Arthritis Mother     Breast Cancer Mother 78    Diabetes Type 2  Father      Heart Disease Father          age 75    Alzheimer Disease Father 60    Heart Disease Brother 68    Breast Cancer Maternal Grandmother 81    Heart Disease Maternal Grandfather 54         age 81    Other - See Comments Son         schizoaffective lives in [place]    Glaucoma No family hx of     Macular Degeneration No family hx of          Current Outpatient Medications   Medication Sig Dispense Refill    Cyanocobalamin (VITAMIN B-12 PO)       famotidine (PEPCID) 40 MG tablet Take 1 tablet (40 mg) by mouth daily as needed for heartburn 90 tablet 3    ketoconazole (NIZORAL) 2 % external shampoo Apply topically three times a week 120 mL 6    pravastatin (PRAVACHOL) 20 MG tablet Take 1 tablet (20 mg) by mouth daily 90 tablet 3    prednisoLONE acetate (PRED FORTE) 1 % ophthalmic suspension Place 1 drop Into the left eye 3 times daily 15 mL 1    Vitamin D3 (CHOLECALCIFEROL) 25 mcg (1000 units) tablet Take 1 tablet by mouth daily       Allergies   Allergen Reactions    Dust Mites Itching     Runny nose, fever    Mold Itching     Fever, runny nose    Pollen Extract Itching     Fever, runny nose    Celecoxib      Other reaction(s): GI intolerance     Recent Labs   Lab Test 10/09/23  0929 23  0911 23  0755 23  1626 23  0908 22  1652 22  1428 21  1049 20  1404 20  0925 20  1009   A1C  --  6.0*  --   --   --   --   --   --   --  5.3  --    LDL 99 115*  --   --  125*  --   --   --    < >  --   --    HDL 81 94  --   --  116  --   --   --    < >  --   --    TRIG 68 103  --   --  62  --   --   --    < >  --   --    ALT  --   --  7* 13  --  6*  --   --    < >  --  20   CR 0.99* 0.97* 0.81 0.97* 0.91 1.14*  --   --    < > 0.98 0.94   GFRESTIMATED 58* 60* 74 60* 65 49*  --   --    < > 57* 60*   GFRESTBLACK  --   --   --   --   --   --   --   --   --  66 69   POTASSIUM 4.3 4.0 3.4 3.8  --  4.1  --    < >   < > 4.1 4.7   TSH  --  1.99  --   --   --   --  1.06  --    < >   --   --     < > = values in this interval not displayed.         Review of Systems   Problem list, PMH, Surgical HX, FH, SH, allergies, medications,immunizations reviewed and updated in Epic. ROS negative other than noted in HPI and ROS.       Objective    /65 (BP Location: Right arm, Patient Position: Sitting, Cuff Size: Adult Regular)   Pulse 59   Temp 97.5  F (36.4  C)   Resp 18   Wt 59.2 kg (130 lb 8 oz)   LMP 01/01/1998 (Approximate)   SpO2 98%   BMI 22.93 kg/m    Body mass index is 22.93 kg/m .  Physical Exam     GENERAL APPEARANCE: Alert and no distress, appears very well and improved health     EYES: EOMI, PERRL wearing glasses     HENT: Bilateral canals clear visualize bilateral tympanic membranes normal     NECK: no adenopathy, no asymmetry, masses, or scars and thyroid normal to palpation     RESP: lungs clear to auscultation - no rales, rhonchi or wheeze     CV: regular rate and rhythm, normal S1 S2, no S3 or S4 and no murmur, click or rub     ABDOMEN:  soft, nontender, no HSM or masses and bowel sounds normal     MS: Mild Kyphoscoliosis, Right knee osteoarthritis chronic swelling, left knee well healed incision,  Other extremities signs of arthritis moves all joints     SKIN: no suspicious lesions or rashes     NEURO: No focal findings, normal strength and tone,  mentation intact and speech normal.      PSYCH: mentation appears normal. and affect normal,appropriately groomed, happy.     PHQ-2 Score:         3/1/2023     3:00 PM 4/4/2022    10:24 AM   PHQ-2 ( 1999 Pfizer)   Q1: Little interest or pleasure in doing things 0 1   Q2: Feeling down, depressed or hopeless 0 1   PHQ-2 Score 0 2

## 2023-10-09 NOTE — NURSING NOTE
Giana Hope is a 78 year old female patient that presents today in clinic for the following:    Chief Complaint   Patient presents with    Follow Up     10 week follow up      The patient's allergies and medications were reviewed as noted. A set of vitals were recorded as noted without incident: /65 (BP Location: Right arm, Patient Position: Sitting, Cuff Size: Adult Regular)   Pulse 59   Temp 97.5  F (36.4  C)   Resp 18   Wt 59.2 kg (130 lb 8 oz)   LMP 01/01/1998 (Approximate)   SpO2 98%   BMI 22.93 kg/m  . The patient does not have any other questions for the provider.    Sherry Wen, EMT at 9:45 AM on 10/9/2023

## 2023-10-13 NOTE — RESULT ENCOUNTER NOTE
Results in normal range.  Great work on lowering your A1C to 5.7 normal.  Best wishes,  Gilberto Vasquez MD   Admitted in early labor.  Uncomplicated  and postpartum course.

## 2023-10-18 ENCOUNTER — OFFICE VISIT (OUTPATIENT)
Dept: OTOLARYNGOLOGY | Facility: CLINIC | Age: 78
End: 2023-10-18
Attending: FAMILY MEDICINE

## 2023-10-18 VITALS
HEART RATE: 84 BPM | TEMPERATURE: 98.1 F | DIASTOLIC BLOOD PRESSURE: 76 MMHG | OXYGEN SATURATION: 96 % | SYSTOLIC BLOOD PRESSURE: 114 MMHG

## 2023-10-18 DIAGNOSIS — H61.23 BILATERAL IMPACTED CERUMEN: ICD-10-CM

## 2023-10-18 PROCEDURE — 69210 REMOVE IMPACTED EAR WAX UNI: CPT | Performed by: PHYSICIAN ASSISTANT

## 2023-10-18 NOTE — PATIENT INSTRUCTIONS
1. You were seen in the ENT Clinic today by JM Torres.  If you have any questions or concerns after your appointment, please call   - Option 1: ENT Clinic: [phone]   - Option 2: Swetha (Soraya Rivas's  Nurse): [phone]                     2.   Plan to return to clinic in 1 year for ear cleaning    Swetha Rivera LPN  [facility] - Otolaryngology

## 2023-10-18 NOTE — PROGRESS NOTES
Otolaryngology Clinic  October 18, 2023    HPI:  Giana Hope is here for cerumen impaction removal.    Patient denies any otalgia, otorrhea, dizziness, history of frequent ear infections, or ear surgeries.      Otologic microscope exam:    Biocular Microscopy exam is needed due to deep impaction of cerumen of bilateral ears, requiring direct visualization for use of cleaning instruments.    Right ear was examined under the microscope.  Cerumen impaction noted. It was cleaned with suction. Once cleaned, TM visualized under microscope. Normal appearing TM, nicely aerated middle ear space.     Left ear was also examined under the microscope.  Cerumen impaction noted. It was cleaned with suction. Once cleaned, TM visualized under microscope. Normal appearing TM, nicely aerated middle ear space.     The patient noted improvement of symptoms.    Assessment and Plan:  1. Bilateral impacted cerumen    Patient presents with cerumen impaction of both ear(s).  The patient's ears are cleaned today. Return as needed/scheduled for cleaning.     Patient will follow up as needed    Patient can return to clinic for a separate clinic appointment to further discuss results of audiogram if there are any abnormal findings including asymmetric hearing loss or word recognition score, conductive hearing loss, or abnormal tympanograms.     If audiogram shows bilateral sensorineural hearing loss and patient is a candidate for hearing aids, they are medically cleared for hearing aids.     Soraya Rivas PA-C  Otolaryngology  Head & Neck Surgery  [phone]    15 minutes spent on the date of the encounter doing chart review, history and exam, documentation and further activities per the note excluding time performing ear cleaning under microscopy.

## 2023-10-18 NOTE — NURSING NOTE
Chief Complaint   Patient presents with    RECHECK     Follow up -impacted cerumen. Declined weight.       Blood pressure 114/76, pulse 84, temperature 98.1  F (36.7  C), last menstrual period 01/01/1998, SpO2 96%, not currently breastfeeding.  Alexander Alonzo LPN

## 2023-10-18 NOTE — LETTER
10/18/2023       RE: Giana Hope  [address]  [place] [address]     Dear Colleague,    Thank you for referring your patient, Giana Hope, to the [facility] [place] [facility]. Please see a copy of my visit note below.      Otolaryngology Clinic  October 18, 2023    HPI:  Giana Hope is here for cerumen impaction removal.    Patient denies any otalgia, otorrhea, dizziness, history of frequent ear infections, or ear surgeries.      Otologic microscope exam:    Biocular Microscopy exam is needed due to deep impaction of cerumen of bilateral ears, requiring direct visualization for use of cleaning instruments.    Right ear was examined under the microscope.  Cerumen impaction noted. It was cleaned with suction. Once cleaned, TM visualized under microscope. Normal appearing TM, nicely aerated middle ear space.     Left ear was also examined under the microscope.  Cerumen impaction noted. It was cleaned with suction. Once cleaned, TM visualized under microscope. Normal appearing TM, nicely aerated middle ear space.     The patient noted improvement of symptoms.    Assessment and Plan:  1. Bilateral impacted cerumen    Patient presents with cerumen impaction of both ear(s).  The patient's ears are cleaned today. Return as needed/scheduled for cleaning.     Patient will follow up as needed    Patient can return to clinic for a separate clinic appointment to further discuss results of audiogram if there are any abnormal findings including asymmetric hearing loss or word recognition score, conductive hearing loss, or abnormal tympanograms.     If audiogram shows bilateral sensorineural hearing loss and patient is a candidate for hearing aids, they are medically cleared for hearing aids.     Soraya Rivas PA-C  Otolaryngology  Head & Neck Surgery  [phone]    15 minutes spent on the date of the encounter  doing chart review, history and exam, documentation and further activities per the note excluding time performing ear cleaning under microscopy.        Again, thank you for allowing me to participate in the care of your patient.      Sincerely,    Soraya Rivas PA-C

## 2023-10-19 ENCOUNTER — ANCILLARY PROCEDURE (OUTPATIENT)
Dept: MAMMOGRAPHY | Facility: CLINIC | Age: 78
End: 2023-10-19
Attending: FAMILY MEDICINE
Payer: MEDICARE

## 2023-10-19 DIAGNOSIS — Z12.31 VISIT FOR SCREENING MAMMOGRAM: ICD-10-CM

## 2023-10-19 DIAGNOSIS — E78.5 HYPERLIPIDEMIA WITH TARGET LDL LESS THAN 130: ICD-10-CM

## 2023-10-19 DIAGNOSIS — K21.9 GASTROESOPHAGEAL REFLUX DISEASE WITHOUT ESOPHAGITIS: ICD-10-CM

## 2023-10-19 PROCEDURE — 77067 SCR MAMMO BI INCL CAD: CPT | Mod: GC

## 2023-10-19 PROCEDURE — 77063 BREAST TOMOSYNTHESIS BI: CPT | Mod: GC

## 2023-10-20 RX ORDER — PRAVASTATIN SODIUM 20 MG
20 TABLET ORAL DAILY
Qty: 90 TABLET | Refills: 3 | Status: SHIPPED | OUTPATIENT
Start: 2023-10-20 | End: 2024-07-01

## 2023-10-20 RX ORDER — FAMOTIDINE 40 MG/1
40 TABLET, FILM COATED ORAL DAILY PRN
Qty: 90 TABLET | Refills: 3 | Status: SHIPPED | OUTPATIENT
Start: 2023-10-20 | End: 2024-07-01

## 2023-10-20 NOTE — TELEPHONE ENCOUNTER
Name from pharmacy: FAMOTIDINE 40 MG TABLET         Will file in chart as: famotidine (PEPCID) 40 MG tablet    Sig: Take 1 tablet (40 mg) by mouth daily as needed for heartburn         Last Written Prescription Date:  10/3/2022  Last Fill Quantity: 90,   # refills: 3  Last Office Visit : 10/9/2023   Future Office visit:  2/26/24         Name from pharmacy: PRAVASTATIN SODIUM 20 MG TAB         Will file in chart as: pravastatin (PRAVACHOL) 20 MG tablet    Sig: TAKE 1 TABLET BY MOUTH EVERY DAY     Last Written Prescription Date:  10/3/2022   Last Fill Quantity: 90,   # refills: 3  Last Office Visit : 10/9/2023   Future Office visit:  2/26/24

## 2023-10-24 NOTE — RESULT ENCOUNTER NOTE
Congratulations. The result of your recent mammogram was normal.  Best wishes,  Gilberto Vasquez MD

## 2023-11-29 ENCOUNTER — OFFICE VISIT (OUTPATIENT)
Dept: FAMILY MEDICINE | Facility: CLINIC | Age: 78
End: 2023-11-29
Payer: MEDICARE

## 2023-11-29 VITALS
BODY MASS INDEX: 22.92 KG/M2 | TEMPERATURE: 97.5 F | OXYGEN SATURATION: 96 % | SYSTOLIC BLOOD PRESSURE: 121 MMHG | RESPIRATION RATE: 12 BRPM | WEIGHT: 130.4 LBS | HEART RATE: 80 BPM | DIASTOLIC BLOOD PRESSURE: 85 MMHG

## 2023-11-29 DIAGNOSIS — Z78.9 H/O FOREIGN TRAVEL: ICD-10-CM

## 2023-11-29 DIAGNOSIS — R19.7 DIARRHEA, UNSPECIFIED TYPE: Primary | ICD-10-CM

## 2023-11-29 DIAGNOSIS — Z00.00 HEALTH CARE MAINTENANCE: ICD-10-CM

## 2023-11-29 PROCEDURE — 99213 OFFICE O/P EST LOW 20 MIN: CPT | Performed by: FAMILY MEDICINE

## 2023-11-29 RX ORDER — RESPIRATORY SYNCYTIAL VIRUS VACCINE 120MCG/0.5
0.5 KIT INTRAMUSCULAR ONCE
Qty: 1 EACH | Refills: 0 | Status: CANCELLED | OUTPATIENT
Start: 2023-11-29 | End: 2023-11-29

## 2023-11-29 NOTE — PROGRESS NOTES
Hawa is a 78 year old that presents in clinic today for the following:     Chief Complaint   Patient presents with    Diarrhea     Pt reports diarrhea that persisted over two weeks and has slightly improved           11/29/2023     9:36 AM   Additional Questions   Roomed by JUANCARLOS MANCILLA       Screenings from encounters over the past 10 days    No data recorded       Parker Peter at 9:39 AM on 11/29/2023

## 2023-11-29 NOTE — PROGRESS NOTES
Assessment & Plan     Diarrhea, unspecified type  H/O foreign travel  Today I reviewed ways to decrease risk of foodborne illness while traveling.  Information was provided after visit summary and discussed in detail in particular eat foods prepared in front of her in particular eggs, avoid buffet, peel fruit or vegetable, use bottled water.  May take Pepto-Bismol tablets with her and Imodium as she did.  Could consider prescription for azithromycin prior to travel to areas known to have traveler's diarrhea.  Most recent symptoms of diarrhea have resolved with a probiotic and eating food in the [place]    Health care maintenance  Today reviewed health maintenance which is up-to-date including she had RSV vaccine through local pharmacy.  - REVIEW OF HEALTH MAINTENANCE PROTOCOL ORDERS  27 minutes spent on the date of the encounter doing chart review, history, exam, diagnostics review, documentation, counseling and coordination of cares as noted.       Gilberto Vasquez MD  [facility] AVINASH Shaikh is a 78 year old, presenting for the following health issues:  Diarrhea (Pt reports diarrhea that persisted over two weeks and has slightly improved)        11/29/2023     9:36 AM   Additional Questions   Roomed by LAURENT, ET       HPI     Giana S [facility] 78 past medical history  balance disorder, hyperlipidemia, CKD 3, osteoarthritis, kyphosis, degenerative disc disease, venous stasis of both lower extremities, had a fall June 2023 with subdural hematoma.  Travel to [place] with diarrhea 10/31 through 11/25 diarrhea while traveling used imodium had constipation then had loose stools when she came home. Did not check for covid. No cold symptoms. Tired related to time change  Stools are better with probiotic received in [place]. No current problem.  During travel consumed eggs that might of been cold.  Also had a salad that other people complained that he did not feel  well after eating.  Will travel to [place] she is wondering how she can prevent traveler's diarrhea.                  Labs reviewed in EPIC  BP Readings from Last 3 Encounters:   11/29/23 121/85   10/18/23 114/76   10/09/23 117/65    Wt Readings from Last 3 Encounters:   11/29/23 59.1 kg (130 lb 6.4 oz)   10/09/23 59.2 kg (130 lb 8 oz)   08/11/23 62.6 kg (138 lb)               Immunization History   Administered Date(s) Administered    COVID-19 12+ (2023-24) (Pfizer) 10/09/2023    COVID-19 Bivalent 18+ (Moderna) 10/13/2022, 07/30/2023    COVID-19 MONOVALENT 12+ (Pfizer) 01/30/2021, 02/20/2021, 09/25/2021    COVID-19 Monovalent 18+ (Moderna) 03/29/2022    Flu, Unspecified 10/13/2009, 10/02/2014, 10/21/2015, 09/30/2016    Hepatitis A (ADULT 19+) 02/23/2018, 05/16/2019    Influenza (High Dose) 3 valent vaccine 10/28/2017    Influenza Vaccine 65+ (FLUAD) 09/25/2021    Influenza Vaccine 65+ (Fluzone HD) 10/12/2020, 10/14/2022, 10/09/2023    Influenza Vaccine, 6+MO IM (QUADRIVALENT W/PRESERVATIVES) 10/25/2018, 10/24/2019    Pneumo Conj 13-V (2010&after) 12/11/2015    Pneumococcal 23 valent 03/18/2011    RSV Vaccine (Arexvy) 10/26/2023    TD,PF 7+ (Tenivac) 02/23/2018    TDAP Vaccine (Boostrix) 08/16/2007    Typhoid IM 05/16/2019    Yellow Fever, Live (Stamaril) 11/30/2017    Zoster recombinant adjuvanted (SHINGRIX) 05/25/2023, 07/28/2023         Patient Active Problem List   Diagnosis    Hyperlipidemia LDL goal <100    Arthritis of shoulder region, left    Primary osteoarthritis involving multiple joints    Gastroesophageal reflux disease without esophagitis    Nodule of left lung    Family history of malignant neoplasm of breast    Mass of left upper extremity    Back pain of thoracolumbar region    Balance disorder    Compression fracture of thoracic vertebra, open, initial encounter (H)    DDD (degenerative disc disease), thoracolumbar    Degenerative scoliosis in adult patient    Kyphosis (acquired) (postural)     Nuclear sclerosis    Osteopenia with high risk of fracture    Pain in left shoulder    Status post left knee replacement    Pain in thoracic spine    Osteoarthritis of right knee    Infection due to 2019 novel coronavirus    Venous stasis dermatitis of both lower extremities    Varicose veins of both lower extremities with pain    Syncopal episodes    SDH (subdural hematoma) (H)    Syncope, unspecified syncope type    CKD (chronic kidney disease) stage 2, GFR 60-89 ml/min    Chronic right shoulder pain    Bilateral impacted cerumen    Restless legs syndrome     Past Surgical History:   Procedure Laterality Date    ARTHROPLASTY KNEE Right 2021    Procedure: ARTHROPLASTY, RIGHT KNEE, TOTAL;  Surgeon: Gabriel Marrero MD;  Location: [facility] OR    ARTHROPLASTY, RIGHT KNEE, TOTAL Right 2021    CATARACT IOL, RT/LT Right 2019    PHACOEMULSIFICATION WITH STANDARD INTRAOCULAR LENS IMPLANT Right 3/22/2019    Procedure: Right Cataract Removal with Intraocular Lens Implant;  Surgeon: Trish Taylor MD;  Location: [facility]       Social History     Tobacco Use    Smoking status: Former     Packs/day: 1.00     Years: 35.00     Additional pack years: 0.00     Total pack years: 35.00     Types: Cigarettes    Smokeless tobacco: Never    Tobacco comments:     Quit 24 years ago    Substance Use Topics    Alcohol use: Yes     Comment: Moderate     Family History   Problem Relation Age of Onset    Arthritis Mother     Breast Cancer Mother 78    Diabetes Type 2  Father     Heart Disease Father          age 75    Alzheimer Disease Father 60    Diabetes Father     Hypertension Father     Hyperlipidemia Father     Mental Illness Father     Heart Disease Brother 68    Breast Cancer Maternal Grandmother 81    Heart Disease Maternal Grandfather 54         age 81    Other - See Comments Son         schizoaffective lives in [place]    Mental Illness Son     Glaucoma No family hx of     Macular Degeneration No family hx of           Current Outpatient Medications   Medication Sig Dispense Refill    Cyanocobalamin (VITAMIN B-12 PO)       famotidine (PEPCID) 40 MG tablet Take 1 tablet (40 mg) by mouth daily as needed for heartburn 90 tablet 3    ketoconazole (NIZORAL) 2 % external shampoo Apply topically three times a week 120 mL 6    pravastatin (PRAVACHOL) 20 MG tablet Take 1 tablet (20 mg) by mouth daily 90 tablet 3    prednisoLONE acetate (PRED FORTE) 1 % ophthalmic suspension Place 1 drop Into the left eye 3 times daily 15 mL 1    Vitamin D3 (CHOLECALCIFEROL) 25 mcg (1000 units) tablet Take 1 tablet by mouth daily       Allergies   Allergen Reactions    Dust Mites Itching     Runny nose, fever    Mold Itching     Fever, runny nose    Pollen Extract Itching     Fever, runny nose    Celecoxib      Other reaction(s): GI intolerance     Recent Labs   Lab Test 10/09/23  0929 06/20/23  0911 06/07/23  0755 06/06/23  1626 03/02/23  0908 09/20/22  1652 04/22/22  1428 11/18/21  1049 08/17/20  1404 01/21/20  0925 01/16/20  1009   A1C 5.7* 6.0*  --   --   --   --   --   --   --  5.3  --    LDL 99 115*  --   --  125*  --   --   --    < >  --   --    HDL 81 94  --   --  116  --   --   --    < >  --   --    TRIG 68 103  --   --  62  --   --   --    < >  --   --    ALT  --   --  7* 13  --  6*  --   --    < >  --  20   CR 0.99* 0.97* 0.81 0.97* 0.91 1.14*  --   --    < > 0.98 0.94   GFRESTIMATED 58* 60* 74 60* 65 49*  --   --    < > 57* 60*   GFRESTBLACK  --   --   --   --   --   --   --   --   --  66 69   POTASSIUM 4.3 4.0 3.4 3.8  --  4.1  --    < >   < > 4.1 4.7   TSH  --  1.99  --   --   --   --  1.06  --    < >  --   --     < > = values in this interval not displayed.       Review of Systems   Problem list, PMH, SH, allergies, medications,immunizations reviewed and updated in Epic. ROS negative other than noted in HPI and ROS.       Objective    /85 (BP Location: Right arm, Patient Position: Sitting, Cuff Size: Adult Regular)   Pulse 80    Temp 97.5  F (36.4  C)   Resp 12   Wt 59.1 kg (130 lb 6.4 oz)   LMP 01/01/1998 (Approximate)   SpO2 96%   BMI 22.92 kg/m    Body mass index is 22.92 kg/m .  Physical Exam     GENERAL APPEARANCE: Alert and no distress, appears very well, stable health appropriately hydrated.     EYES: EOMI, PERRL wearing glasses      RESP: lungs clear to auscultation - no rales, rhonchi or wheeze     CV: regular rate and rhythm, normal S1 S2, no S3 or S4 and no murmur, click or rub     ABDOMEN:  soft, nontender, no HSM or masses and bowel sounds normal     SKIN: no suspicious lesions or rashes     NEURO: No focal findings, normal strength and tone,  mentation intact and speech normal.      PSYCH: mentation appears normal. and affect normal,appropriately groomed, happy.

## 2023-12-01 ENCOUNTER — MYC MEDICAL ADVICE (OUTPATIENT)
Dept: ORTHOPEDICS | Facility: CLINIC | Age: 78
End: 2023-12-01
Payer: MEDICARE

## 2023-12-01 NOTE — TELEPHONE ENCOUNTER
Patient is scheduled for right total shoulder arthroplasty on 2/29/24. She was last seen 8/11/23 and received a right shoulder glenohumeral joint injection at that time. Patient is inquiring about repeating an injection prior to her surgery. Please advise.     Lizbet Cunningham MSA, ATC  Certified Athletic Trainer

## 2023-12-04 ENCOUNTER — OFFICE VISIT (OUTPATIENT)
Dept: ORTHOPEDICS | Facility: CLINIC | Age: 78
End: 2023-12-04
Payer: MEDICARE

## 2023-12-04 DIAGNOSIS — G89.29 CHRONIC RIGHT SHOULDER PAIN: Primary | ICD-10-CM

## 2023-12-04 DIAGNOSIS — M19.011 PRIMARY OSTEOARTHRITIS OF RIGHT SHOULDER: ICD-10-CM

## 2023-12-04 DIAGNOSIS — M25.511 CHRONIC RIGHT SHOULDER PAIN: Primary | ICD-10-CM

## 2023-12-04 PROCEDURE — 20610 DRAIN/INJ JOINT/BURSA W/O US: CPT | Mod: RT | Performed by: PHYSICIAN ASSISTANT

## 2023-12-04 PROCEDURE — 99213 OFFICE O/P EST LOW 20 MIN: CPT | Mod: 25 | Performed by: PHYSICIAN ASSISTANT

## 2023-12-04 RX ORDER — TRIAMCINOLONE ACETONIDE 40 MG/ML
40 INJECTION, SUSPENSION INTRA-ARTICULAR; INTRAMUSCULAR
Status: SHIPPED | OUTPATIENT
Start: 2023-12-04

## 2023-12-04 RX ORDER — LIDOCAINE HYDROCHLORIDE 10 MG/ML
5 INJECTION, SOLUTION EPIDURAL; INFILTRATION; INTRACAUDAL; PERINEURAL
Status: SHIPPED | OUTPATIENT
Start: 2023-12-04

## 2023-12-04 RX ADMIN — TRIAMCINOLONE ACETONIDE 40 MG: 40 INJECTION, SUSPENSION INTRA-ARTICULAR; INTRAMUSCULAR at 16:44

## 2023-12-04 RX ADMIN — LIDOCAINE HYDROCHLORIDE 5 ML: 10 INJECTION, SOLUTION EPIDURAL; INFILTRATION; INTRACAUDAL; PERINEURAL at 16:44

## 2023-12-04 NOTE — NURSING NOTE
Reason For Visit:   Chief Complaint   Patient presents with    RECHECK     Right shoulder GH joint injection        LMP 01/01/1998 (Approximate)     Pain Assessment  Patient Currently in Pain: Yes  0-10 Pain Scale: 5  Primary Pain Location: Shoulder (right)      Heath Chambers ATC

## 2023-12-04 NOTE — PROGRESS NOTES
[facility]  Orthopaedic Surgery Consultation by Gabriel Marrero M.D.    Giana Hope MRN# [id number]   Age: 76 year old YOB: 1945     Requesting physician: Gilberto Pandya     Background history:  DX:  History of anesthesia problem  Gastroesophageal reflux disease without esophagitis  Nodule of left lung  DDD  Risk for falls  Hypercholesterolemia  Nuclear sclerosis      TREATMENTS:  2/25/2016, Left TKA, Сергей Pang MD , [facility], [facility], and Affiliated Practices  8/25/2020, Reverse Left TSA, Ector Chapa M.D., [facility]  11/26/2021, right total knee arthroplasty,              History of Present Illness:     78-year-old female with history of right shoulder osteoarthritis who presents today requesting a steroid injection into the shoulder.  She is planning on doing a total shoulder arthroplasty in January 28, 2024.  Patient previous injection worked for 3 to 4 months before it wore off.    Social:   Occupation: Retired   Living situation: Lives with cat.  She has family close by.  Hobbies / Sports: walking and hiking     Smoking: No  Alcohol: Yes  Illicit drug use: No         Physical Exam:     EXAMINATION pertinent findings:   PSYCH: Pleasant, healthy-appearing, alert, oriented x3, cooperative. Normal mood and affect.  VITAL SIGNS: Last menstrual period 01/01/1998, not currently breastfeeding.  Reviewed nursing intake notes.   There is no height or weight on file to calculate BMI.  RESP: non labored breathing   ABD: benign, soft, non-tender, no acute peritoneal findings  SKIN: grossly normal   LYMPHATIC: grossly normal, no adenopathy, no extremity edema  NEURO: grossly normal , no motor deficits  VASCULAR: satisfactory perfusion of all extremities   MUSCULOSKELETAL:   Alignment: Neutral alignment of right lower extremity.    Shoulder right: Normal appearance. No signs of muscular  atrophy of SSP, ISP or Deltoid. No tenderness to palpation over the sterno-clavicular joint or clavicle. AC joint: No tenderness to palpation. Cross body -. Abduction 120, Forward flexion 100, ER 30, IR L4. Cuff strength 5/5 for SSP/ISP and SSC. Neer, Walker, and Mariya -. Neurovascular intact RUE.  2+ radial pulse with a warm and well perfused hand. Sensation is intact to light touch in the median, radial, ulnar, musculocutaneous, and axillary nerve distribution. 5/5 , fpl, epl, io, wrist flexor, wrist extensor, biceps, triceps, and deltoid muscle strength on manual muscle testing.            Data:   All laboratory data reviewed    All imaging studies reviewed by me personally.    Today no new imaging studies were obtained.    XR shoulder right 3/9/2022:  My interpretation: Severe osteoarthritic changes of right glenohumeral joint.  Complete obliteration of joint space.  Presence of marginal osteophytes and sclerosis.  Well-preserved AC joint.  Glenoid Candelario C.         Assessment and Plan:   Assessment:  77-year-old female with chronic right shoulder pain due to end-stage osteoarthritic changes of the right glenohumeral joint.       Plan:  Given the patient's surgery is over 12 weeks and today I agreed to inject the shoulder today.  An injection was performed per below.  She tolerated it well.  She can use the shoulder as tolerated.  All questions were answered and the patient will follow-up for scheduled right total shoulder arthroplasty in January.    Michael Karimi PA-C  Physician Assistant   Oncology and Adult Reconstructive Surgery  Dept Orthopaedic Surgery, [facility]    This note was created using dictation software and may contain errors.  Please contact the creator for any clarifications that are needed.      Total combined visit time and work time before and after clinic visit = 20 min    Large Joint Injection/Arthocentesis: R glenohumeral joint    Date/Time: 12/4/2023 4:44 PM    Performed by:  Michael Karimi PA-C  Authorized by: Michael Karimi PA-C    Indications:  Osteoarthritis  Needle Size:  25 G  Guidance: landmark guided    Approach:  Posterior  Location:  Shoulder      Site:  R glenohumeral joint  Medications:  40 mg triamcinolone 40 MG/ML; 5 mL lidocaine (PF) 1 %  Outcome:  Tolerated well, no immediate complications  Procedure discussed: discussed risks, benefits, and alternatives    Consent Given by:  Patient  Timeout: timeout called immediately prior to procedure    Prep: patient was prepped and draped in usual sterile fashion

## 2023-12-04 NOTE — LETTER
12/4/2023         RE: Giana Hope  [address]  [place] [address]        Dear Colleague,    Thank you for referring your patient, Giana Hope, to the [facility] [place]. Please see a copy of my visit note below.        [facility]  Orthopaedic Surgery Consultation by Gabriel Marrero M.D.    Giana Hope MRN# [id number]   Age: 76 year old YOB: 1945     Requesting physician: Gilberto Pandya     Background history:  DX:  History of anesthesia problem  Gastroesophageal reflux disease without esophagitis  Nodule of left lung  DDD  Risk for falls  Hypercholesterolemia  Nuclear sclerosis      TREATMENTS:  2/25/2016, Left TKA, Сергей Pang MD , [facility], [facility], and [facility]iliated Practices  8/25/2020, Reverse Left TSA, Ector Chapa M.D., [facility]  11/26/2021, right total knee arthroplasty,              History of Present Illness:     78-year-old female with history of right shoulder osteoarthritis who presents today requesting a steroid injection into the shoulder.  She is planning on doing a total shoulder arthroplasty in January 28, 2024.  Patient previous injection worked for 3 to 4 months before it wore off.    Social:   Occupation: Retired   Living situation: Lives with cat.  She has family close by.  Hobbies / Sports: walking and hiking     Smoking: No  Alcohol: Yes  Illicit drug use: No         Physical Exam:     EXAMINATION pertinent findings:   PSYCH: Pleasant, healthy-appearing, alert, oriented x3, cooperative. Normal mood and affect.  VITAL SIGNS: Last menstrual period 01/01/1998, not currently breastfeeding.  Reviewed nursing intake notes.   There is no height or weight on file to calculate BMI.  RESP: non labored breathing   ABD: benign, soft, non-tender, no acute peritoneal findings  SKIN: grossly normal   LYMPHATIC: grossly normal,  no adenopathy, no extremity edema  NEURO: grossly normal , no motor deficits  VASCULAR: satisfactory perfusion of all extremities   MUSCULOSKELETAL:   Alignment: Neutral alignment of right lower extremity.    Shoulder right: Normal appearance. No signs of muscular atrophy of SSP, ISP or Deltoid. No tenderness to palpation over the sterno-clavicular joint or clavicle. AC joint: No tenderness to palpation. Cross body -. Abduction 120, Forward flexion 100, ER 30, IR L4. Cuff strength 5/5 for SSP/ISP and SSC. Neer, Walker, and Mariya -. Neurovascular intact RUE.  2+ radial pulse with a warm and well perfused hand. Sensation is intact to light touch in the median, radial, ulnar, musculocutaneous, and axillary nerve distribution. 5/5 , fpl, epl, io, wrist flexor, wrist extensor, biceps, triceps, and deltoid muscle strength on manual muscle testing.            Data:   All laboratory data reviewed    All imaging studies reviewed by me personally.    Today no new imaging studies were obtained.    XR shoulder right 3/9/2022:  My interpretation: Severe osteoarthritic changes of right glenohumeral joint.  Complete obliteration of joint space.  Presence of marginal osteophytes and sclerosis.  Well-preserved AC joint.  Glenoid Candelario C.         Assessment and Plan:   Assessment:  77-year-old female with chronic right shoulder pain due to end-stage osteoarthritic changes of the right glenohumeral joint.       Plan:  Given the patient's surgery is over 12 weeks and today I agreed to inject the shoulder today.  An injection was performed per below.  She tolerated it well.  She can use the shoulder as tolerated.  All questions were answered and the patient will follow-up for scheduled right total shoulder arthroplasty in January.    Michael Karimi PA-C  Physician Assistant   Oncology and Adult Reconstructive Surgery  Dept Orthopaedic Surgery, [facility]    This note was created using dictation software and may contain  errors.  Please contact the creator for any clarifications that are needed.      Total combined visit time and work time before and after clinic visit = 20 min    Large Joint Injection/Arthocentesis: R glenohumeral joint    Date/Time: 12/4/2023 4:44 PM    Performed by: Michael Karimi PA-C  Authorized by: Michael Karimi PA-C    Indications:  Osteoarthritis  Needle Size:  25 G  Guidance: landmark guided    Approach:  Posterior  Location:  Shoulder      Site:  R glenohumeral joint  Medications:  40 mg triamcinolone 40 MG/ML; 5 mL lidocaine (PF) 1 %  Outcome:  Tolerated well, no immediate complications  Procedure discussed: discussed risks, benefits, and alternatives    Consent Given by:  Patient  Timeout: timeout called immediately prior to procedure    Prep: patient was prepped and draped in usual sterile fashion

## 2024-01-23 ENCOUNTER — TELEPHONE (OUTPATIENT)
Dept: ORTHOPEDICS | Facility: CLINIC | Age: 79
End: 2024-01-23
Payer: MEDICARE

## 2024-01-23 DIAGNOSIS — Z96.611 S/P SHOULDER REPLACEMENT, RIGHT: ICD-10-CM

## 2024-01-23 DIAGNOSIS — M19.011 PRIMARY OSTEOARTHRITIS OF RIGHT SHOULDER: Primary | ICD-10-CM

## 2024-01-23 NOTE — TELEPHONE ENCOUNTER
Phoned patient to review pre-op teaching for upcoming fast-track total shoulder surgery.  Patient reports she is currently in the [place] so was unable to speak at that time.  She will phone us back when she is back in [place] to review the pre-op information.      Post-op PT order entered.    Lilliana Joyner RN on 1/23/2024 at 11:56 AM

## 2024-02-09 NOTE — TELEPHONE ENCOUNTER
FUTURE VISIT INFORMATION      SURGERY INFORMATION:  Date: 2/29/24  [facility]  Surgeon:  Gabriel Marrero MD   Anesthesia Type:  Combined MAC with Interscalene Block   Procedure: ARTHROPLASTY, RIGHT SHOULDER, TOTAL   RECORDS REQUESTED FROM:       Primary Care Provider: Gilberto Vasquez MD - [facility]    Most recent EKG+ Tracing:- 6/6/23    Most recent ECHO: 6/6/23    Most recent Cardiac Stress Test: 7/25/24

## 2024-02-14 ENCOUNTER — OFFICE VISIT (OUTPATIENT)
Dept: SURGERY | Facility: CLINIC | Age: 79
End: 2024-02-14
Payer: MEDICARE

## 2024-02-14 ENCOUNTER — ANESTHESIA EVENT (OUTPATIENT)
Dept: SURGERY | Facility: CLINIC | Age: 79
End: 2024-02-14
Payer: MEDICARE

## 2024-02-14 ENCOUNTER — PRE VISIT (OUTPATIENT)
Dept: SURGERY | Facility: CLINIC | Age: 79
End: 2024-02-14

## 2024-02-14 ENCOUNTER — LAB (OUTPATIENT)
Dept: LAB | Facility: CLINIC | Age: 79
End: 2024-02-14
Payer: MEDICARE

## 2024-02-14 VITALS
BODY MASS INDEX: 23.23 KG/M2 | OXYGEN SATURATION: 97 % | SYSTOLIC BLOOD PRESSURE: 123 MMHG | TEMPERATURE: 97.5 F | HEIGHT: 63 IN | DIASTOLIC BLOOD PRESSURE: 82 MMHG | HEART RATE: 88 BPM | RESPIRATION RATE: 16 BRPM | WEIGHT: 131.1 LBS

## 2024-02-14 DIAGNOSIS — M19.011 OSTEOARTHRITIS OF GLENOHUMERAL JOINT, RIGHT: ICD-10-CM

## 2024-02-14 DIAGNOSIS — Z01.818 PREOP EXAMINATION: ICD-10-CM

## 2024-02-14 DIAGNOSIS — Z01.818 PREOP EXAMINATION: Primary | ICD-10-CM

## 2024-02-14 LAB
ANION GAP SERPL CALCULATED.3IONS-SCNC: 10 MMOL/L (ref 7–15)
BUN SERPL-MCNC: 14.3 MG/DL (ref 8–23)
CALCIUM SERPL-MCNC: 10.8 MG/DL (ref 8.8–10.2)
CHLORIDE SERPL-SCNC: 103 MMOL/L (ref 98–107)
CREAT SERPL-MCNC: 0.87 MG/DL (ref 0.51–0.95)
DEPRECATED HCO3 PLAS-SCNC: 26 MMOL/L (ref 22–29)
EGFRCR SERPLBLD CKD-EPI 2021: 68 ML/MIN/1.73M2
ERYTHROCYTE [DISTWIDTH] IN BLOOD BY AUTOMATED COUNT: 13.7 % (ref 10–15)
GLUCOSE SERPL-MCNC: 100 MG/DL (ref 70–99)
HCT VFR BLD AUTO: 40.8 % (ref 35–47)
HGB BLD-MCNC: 13.4 G/DL (ref 11.7–15.7)
MCH RBC QN AUTO: 30.5 PG (ref 26.5–33)
MCHC RBC AUTO-ENTMCNC: 32.8 G/DL (ref 31.5–36.5)
MCV RBC AUTO: 93 FL (ref 78–100)
PLATELET # BLD AUTO: 177 10E3/UL (ref 150–450)
POTASSIUM SERPL-SCNC: 4.4 MMOL/L (ref 3.4–5.3)
RBC # BLD AUTO: 4.4 10E6/UL (ref 3.8–5.2)
SODIUM SERPL-SCNC: 139 MMOL/L (ref 135–145)
WBC # BLD AUTO: 5.8 10E3/UL (ref 4–11)

## 2024-02-14 PROCEDURE — 99213 OFFICE O/P EST LOW 20 MIN: CPT

## 2024-02-14 PROCEDURE — 36415 COLL VENOUS BLD VENIPUNCTURE: CPT | Performed by: PATHOLOGY

## 2024-02-14 PROCEDURE — 80048 BASIC METABOLIC PNL TOTAL CA: CPT | Performed by: PATHOLOGY

## 2024-02-14 PROCEDURE — 85027 COMPLETE CBC AUTOMATED: CPT | Performed by: PATHOLOGY

## 2024-02-14 ASSESSMENT — ENCOUNTER SYMPTOMS
ORTHOPNEA: 0
SEIZURES: 0

## 2024-02-14 ASSESSMENT — LIFESTYLE VARIABLES: TOBACCO_USE: 1

## 2024-02-14 ASSESSMENT — PAIN SCALES - GENERAL: PAINLEVEL: NO PAIN (0)

## 2024-02-14 NOTE — PATIENT INSTRUCTIONS
Preparing for Your Surgery      Name:  Giana Hope   MRN:  [id number]   :  1945   Today's Date:  2024       Arriving for surgery:  Surgery date:  2024  Arrival time:  5:30 am    Please come to:     Please come to:      [facility] [address]  [address] [place]  [address]  The Green Ramp for patients and visitors is located beneath the [facility]. The parking facility entrance is at the intersection of [address] [place] [address] and [address]. Patients and visitors who self-park will receive the reduced hospital parking rate (no ticket validation needed).  [employer] parking, located at the [facility] main entrance on [address] [place] [address], is available Monday - Friday from 7 am to 3:30 pm.  Discounted parking pass options can be purchased from  attendants during business hours.  -Check in at the security desk in the [facility] ([facility]) Lobby. They will direct you to the correct elevators.  -Proceed to the 3rd floor, check in at the Adult Surgery Waiting Lounge. [phone]  If you are in need of directions, a wheelchair or escort please stop at the Information Desk in the lobby.  Inform the information person that you are here for surgery; a wheelchair and escort to [address] will be provided.   An escort to the Adult Surgery Waiting Lounge will be provided.    What can I eat or drink?  -  You may eat and drink normally up to 8 hours prior to arrival time. (Until 24 at 11 pm)  -  You may have clear liquids until 2 hours prior to arrival time. (Until 3:30 am)      -  No Alcohol or cannabis products for at least 24 hours before surgery.     Which medicines can I take?    Hold Aspirin for 7 days before surgery.   Hold Multivitamins for 7 days before surgery.  Hold Supplements for 7 days before surgery.  Hold Ibuprofen (Advil, Motrin) for 3 day(s)  before surgery--unless otherwise directed by surgeon.  Hold Naproxen (Aleve) for 4 days before surgery.    -  DO NOT take these medications the day of surgery:  Vitamin B 12  Vitamin D      -  PLEASE TAKE these medications the day of surgery:  Famotidine if needed  Eye drops per your routine       How do I prepare myself?  - Please take 2 showers (one the night prior to surgery and one the morning of surgery) using Scrubcare or Hibiclens soap.    Use this soap only from the neck to your toes.     Leave the soap on your skin for one minute--then rinse thoroughly.      You may use your own shampoo and conditioner. No other hair products.   - Please remove all jewelry and body piercings.  - No lotions, deodorants or fragrance.  - No makeup or fingernail polish.   - Bring your ID and insurance card.    -If you use a CPAP machine, please bring the CPAP machine, tubing, and mask to hospital.    -If you have a Deep Brain Stimulator, Spinal Cord Stimulator, or any Neuro Stimulator device---you must bring the remote control to the hospital.      ALL PATIENTS GOING HOME THE SAME DAY OF SURGERY ARE REQUIRED TO HAVE A RESPONSIBLE ADULT TO DRIVE AND BE IN ATTENDANCE WITH THEM FOR 24 HOURS FOLLOWING SURGERY.    Covid testing policy as of 12/06/2022  Your surgeon will notify and schedule you for a COVID test if one is needed before surgery--please direct any questions or COVID symptoms to your surgeon      Questions or Concerns:    - For any questions regarding the day of surgery or your hospital stay, please contact the Pre Admission Nursing Office at [phone].       - If you have health changes between today and your surgery, please call your surgeon.       - For questions after surgery, please call your surgeons office.       OPTIMAL RECOVERY AFTER SURGERY        Begin hydrating yourself by drinking at least 8-10 glasses of clear liquids for 24 hours before surgery:      Suggested clear liquids:   Water    Clear Juices    Clear Broth   Non- carbonated beverages    (Crystal Light or Jackson Aid)   Sodas    (Sprite, 7 up, ginger ale, seltzer)   Gatorade              Drink clear liquids up until 4 hours before your surgery.       We would like you to purchase a drink such as Gatorade or Ensure Clear (not the milkshake type).  Drink this before bedtime and the morning of surgery drink between 8-10 ounces or until you feel hydrated.        Keeping well hydrated leads to your veins being plump, you wake up faster, and you are less likely to be nauseated. Start drinking water as soon as you can after surgery and advance to clear liquids and food as tolerated.  IV fluids contain salt, drinking fluids will minimize the amount of IV fluids you need and decrease the amount of salt you get.                 The most common reason for the patient to be readmitted is dehydration. Staying hydrated after you go home from the hospital is very important.  Ensure or Ensure Clear are good options to keep you hydrated.            Current Visitor Guidelines    You may have 2 visitors in the pre op area.    Visiting hours: 8 a.m. to 8:30 p.m.    Patients confirmed or suspected to have symptoms of COVID 19 or flu:     No visitors allowed for adult patients.   Children (under age 18) can have 1 named visitor.     People who are sick or showing symptoms of COVID 19 or flu:    Are not allowed to visit patients--we can only make exceptions in special situations.       Please follow these guidelines for your visit:          Please maintain social distance          Masking is optional--however at times you may be asked to wear a mask for the safety of yourself and others     Clean your hands with alcohol hand . Do this when you arrive at and leave the building and patient room,    And again after you touch your mask or anything in the room.     Go directly to and from the room you are visiting.     Stay in the patient s room during your visit. Limit going  to other places in the hospital as much as possible     Leave bags and jackets at home or in the car.     For everyone s health, please don t come and go during your visit. That includes for smoking   during your visit.

## 2024-02-14 NOTE — H&P
Pre-Operative H & P     CC:  Preoperative exam to assess for increased cardiopulmonary risk while undergoing surgery and anesthesia.    Date of Encounter: 2/14/2024  Primary Care Physician:  Gilberto Vasquez     Reason for visit:   Encounter Diagnoses   Name Primary?    Preop examination Yes    Osteoarthritis of glenohumeral joint, right        HPI  Giana Hope is a 78 year old female who presents for pre-operative H & P in preparation for  Procedure Information       Case: [id number] Date/Time: 02/29/24 0730    Procedure: ARTHROPLASTY, RIGHT SHOULDER, TOTAL (Right: Shoulder)    Anesthesia type: Combined MAC with Interscalene Block    Diagnosis: Osteoarthritis of glenohumeral joint, right [M19.011]    Pre-op diagnosis: Osteoarthritis of glenohumeral joint, right [M19.011]    [facility]: [facility] / [facility]    Providers: Gabriel Marrero MD            Giana Hope is a 77 y/o female with a PMH significant for HLD, left pulmonary nodule, history of subdural hematoma, RLS, scoliosis, osteopenia, pre-diabetes, GERD, and CKD who has OA of the right shoulder. She consulted with orthopedic surgery on 12/4/23 and is now scheduled for the above surgery for further management.     History is obtained from the patient and chart review    Hx of abnormal bleeding or anti-platelet use: None.     Menstrual history: Patient's last menstrual period was 01/01/1998 (approximate).:     Past Medical History  Past Medical History:   Diagnosis Date    Chronic kidney disease, stage 3a (H) 10/03/2022    History of blood transfusion     Nonsenile cataract     Osteoarthritis     Pulmonary nodule     Syncopal episodes 06/06/2023    Uveitis        Past Surgical History  Past Surgical History:   Procedure Laterality Date    ARTHROPLASTY KNEE Right 11/26/2021    Procedure: ARTHROPLASTY, RIGHT KNEE, TOTAL;  Surgeon: Gabriel Marrero MD;  [facility]    ARTHROPLASTY, RIGHT KNEE, TOTAL Right 11/26/2021    CATARACT IOL, RT/LT Right  2019    left shoulder replacement          PHACOEMULSIFICATION WITH STANDARD INTRAOCULAR LENS IMPLANT Right 2019    Procedure: Right Cataract Removal with Intraocular Lens Implant;  Surgeon: Trish Taylor MD;  Location:  OR       Prior to Admission Medications  Current Outpatient Medications   Medication Sig Dispense Refill    Cyanocobalamin (VITAMIN B-12 PO) Take by mouth daily      famotidine (PEPCID) 40 MG tablet Take 1 tablet (40 mg) by mouth daily as needed for heartburn 90 tablet 3    ketoconazole (NIZORAL) 2 % external shampoo Apply topically three times a week 120 mL 6    pravastatin (PRAVACHOL) 20 MG tablet Take 1 tablet (20 mg) by mouth daily (Patient taking differently: Take 20 mg by mouth at bedtime) 90 tablet 3    prednisoLONE acetate (PRED FORTE) 1 % ophthalmic suspension Place 1 drop Into the left eye 3 times daily 15 mL 1    Vitamin D3 (CHOLECALCIFEROL) 25 mcg (1000 units) tablet Take 1 tablet by mouth daily         Allergies  Allergies   Allergen Reactions    Dust Mites Itching     Runny nose, fever    Mold Itching     Fever, runny nose    Pollen Extract Itching     Fever, runny nose    Celecoxib      Other reaction(s): GI intolerance       Social History  Social History     Socioeconomic History    Marital status:      Spouse name: Not on file    Number of children: Not on file    Years of education: Not on file    Highest education level: Not on file   Occupational History    Not on file   Tobacco Use    Smoking status: Former     Packs/day: 1.00     Years: 35.00     Additional pack years: 0.00     Total pack years: 35.00     Types: Cigarettes     Quit date:      Years since quittin.1    Smokeless tobacco: Never    Tobacco comments:     Quit 24 years ago    Vaping Use    Vaping Use: Never used   Substance and Sexual Activity    Alcohol use: Yes     Alcohol/week: 3.0 standard drinks of alcohol     Types: 3 Glasses of wine per week    Drug use: No    Sexual  activity: Not Currently     Partners: Male     Birth control/protection: Post-menopausal   Other Topics Concern    Parent/sibling w/ CABG, MI or angioplasty before 65F 55M? 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    Loves to travel. Moved to [place] 10/2018 to be near daughter Linda (1973) son in law and 2 granddaughters ,  live in [facility]    Her son Jeff ( 1970) lives in [place] ( [place]) Schizoaffective. She travels to visit him when able.       Social Determinants of Health     Financial Resource Strain: Low Risk  (2023)    Financial Resource Strain     Within the past 12 months, have you or your family members you live with been unable to get utilities (heat, electricity) when it was really needed?: No   Food Insecurity: Low Risk  (2023)    Food Insecurity     Within the past 12 months, did you worry that your food would run out before you got money to buy more?: No     Within the past 12 months, did the food you bought just not last and you didn t have money to get more?: No   Transportation Needs: Low Risk  (2023)    Transportation Needs     Within the past 12 months, has lack of transportation kept you from medical appointments, getting your medicines, non-medical meetings or appointments, work, or from getting things that you need?: No   Physical Activity: Sufficiently Active (2022)    Exercise Vital Sign     Days of Exercise per Week: 7 days     Minutes of Exercise per Session: 30 min   Stress: No Stress Concern Present (2022)    [place] [employer] - Occupational Stress Questionnaire     Feeling of Stress : Only a little   Social Connections: Moderately Isolated (2022)    Social Connection and Isolation Panel  [NHANES]     Frequency of Communication with Friends and Family: Three times a week     Frequency of Social Gatherings with Friends and Family: Three times a week     Attends Amish Services: Never     Active Member of Clubs or Organizations: Yes     Attends Club or Organization Meetings: More than 4 times per year     Marital Status:    Interpersonal Safety: Low Risk  (2023)    Interpersonal Safety     Do you feel physically and emotionally safe where you currently live?: Yes     Within the past 12 months, have you been hit, slapped, kicked or otherwise physically hurt by someone?: No     Within the past 12 months, have you been humiliated or emotionally abused in other ways by your partner or ex-partner?: No   Housing Stability: Low Risk  (2023)    Housing Stability     Do you have housing? : Yes     Are you worried about losing your housing?: No       Family History  Family History   Problem Relation Age of Onset    Arthritis Mother     Breast Cancer Mother 78    Diabetes Type 2  Father     Heart Disease Father          age 75    Alzheimer Disease Father 60    Diabetes Father     Hypertension Father     Hyperlipidemia Father     Mental Illness Father     Heart Disease Brother 68    Breast Cancer Maternal Grandmother 81    Heart Disease Maternal Grandfather 54         age 81    Other - See Comments Son         schizoaffective lives in [place]    Mental Illness Son     Glaucoma No family hx of     Macular Degeneration No family hx of     Anesthesia Reaction No family hx of     Venous thrombosis No family hx of        Review of Systems  The complete review of systems is negative other than noted in the HPI or here.   Anesthesia Evaluation   Pt has had prior anesthetic.     No history of anesthetic complications       ROS/MED HX  ENT/Pulmonary: Comment: - LLL pulmonary nodule, noted to be stable on CT 23    (+)                tobacco use, Past use,                    (-) HOPE risk  factors   Neurologic: Comment: - Hx of SDH from fall during spring 2023   - RLS   (-) no seizures and no CVA   Cardiovascular: Comment: - Coronary artery calcifications per chest CT 5/23/23    (+) Dyslipidemia - -   -  - -                                 Previous cardiac testing   Echo: Date: 6/7/23 Results:  Interpretation Summary  Global and regional left ventricular function is normal with an EF of 55-60%.  Right ventricular function, chamber size, wall motion, and thickness are  normal.  IVC diameter and respiratory changes fall into an intermediate range  suggesting an RA pressure of 8 mmHg.  No pericardial effusion is present.  There is no prior study for direct comparison.    Stress Test:  Date: 7/25/23 Results:  Interpretation Summary  Exercise stress echocardiogram with no inducible ischemia in the setting of  limited exercise tolerance.     Target heart rate achieved. Elevated blood pressure response to exercise.  No angina symptoms with exercise.  No ECG evidence of ischemia.  Normal segmental and global LV function with EF of approximately 55-60% at  rest; with exercise left ventricular cavity size decreases and LVEF increases  to 60-65%.  No stress induced regional wall motion abnormalities.  Less than average functional capacity for age.     No significant valvular dysfunction noted on screening 2D and Doppler  examination.    ECG Reviewed:  Date: 6/6/23 Results:  Sinus rhythm with sinus arrhythmia  Cath:  Date: Results:   (-) hypertension and orthopnea/PND   METS/Exercise Tolerance: >4 METS Comment: - Recently walking > 1 mile continuously during trip to [place] without exertional symptoms. Able to ascend 1 full flight of stairs without difficultly.    Hematologic:  - neg hematologic  ROS   (+)       history of blood transfusion, no previous transfusion reaction, Known PRBC Anitbodies:No    (-) history of blood clots   Musculoskeletal: Comment: - OA of multiple joints s/p bilateral knee replacements  -  "s/p left shoulder replacement   - Scoliosis with intermittent back pain      GI/Hepatic:     (+) GERD,                   Renal/Genitourinary:     (+) renal disease, type: CRI,            Endo: Comment: - Osteopenia    - Pre-diabetes       Psychiatric/Substance Use:  - neg psychiatric ROS     Infectious Disease:  - neg infectious disease ROS     Malignancy:  - neg malignancy ROS     Other:            /82 (BP Location: Right arm, Patient Position: Sitting, Cuff Size: Adult Regular)   Pulse 88   Temp 97.5  F (36.4  C) (Oral)   Resp 16   Ht 1.607 m (5' 3.25\")   Wt 59.5 kg (131 lb 1.6 oz)   LMP 01/01/1998 (Approximate)   SpO2 97%   BMI 23.04 kg/m      Physical Exam   Constitutional: Awake, alert, cooperative, no apparent distress, and appears stated age.  Eyes: Pupils equal, round and reactive to light, extra ocular muscles intact, sclera clear, conjunctiva normal.  HENT: Normocephalic, oral pharynx with moist mucus membranes, good dentition. No goiter appreciated.   Respiratory: Clear to auscultation bilaterally, no crackles or wheezing.  Cardiovascular: Regular rate and rhythm, normal S1 and S2, and no murmur noted.  Carotids +2, no bruits. No edema. Palpable pulses to radial arteries.   GI: Normal bowel sounds, soft, non-distended, non-tender, no masses palpated, no hepatosplenomegaly.   Lymph/Hematologic: No cervical lymphadenopathy and no supraclavicular lymphadenopathy.  Genitourinary:  Deferred.   Skin: Warm and dry.  Musculoskeletal: Full ROM of neck. There is no redness, warmth, or swelling of the joints. Gross motor strength is normal.    Neurologic: Awake, alert, oriented to name, place and time. Cranial nerves II-XII are grossly intact. Gait is normal.   Neuropsychiatric: Calm, cooperative. Normal affect.     Prior Labs/Diagnostic Studies   All labs and imaging personally reviewed     EKG/ stress test - if available please see in ROS above     The patient's records and results personally " reviewed by this provider.     Outside records reviewed from: Care Everywhere    LAB/DIAGNOSTIC STUDIES TODAY:  CBC, BMP    Component      Latest Ref Rng 2/14/2024  3:56 PM   Sodium      135 - 145 mmol/L 139    Potassium      3.4 - 5.3 mmol/L 4.4    Chloride      98 - 107 mmol/L 103    Carbon Dioxide (CO2)      22 - 29 mmol/L 26    Anion Gap      7 - 15 mmol/L 10    Urea Nitrogen      8.0 - 23.0 mg/dL 14.3    Creatinine      0.51 - 0.95 mg/dL 0.87    GFR Estimate      >60 mL/min/1.73m2 68    Calcium      8.8 - 10.2 mg/dL 10.8 (H)    Glucose      70 - 99 mg/dL 100 (H)    WBC      4.0 - 11.0 10e3/uL 5.8    RBC Count      3.80 - 5.20 10e6/uL 4.40    Hemoglobin      11.7 - 15.7 g/dL 13.4    Hematocrit      35.0 - 47.0 % 40.8    MCV      78 - 100 fL 93    MCH      26.5 - 33.0 pg 30.5    MCHC      31.5 - 36.5 g/dL 32.8    RDW      10.0 - 15.0 % 13.7    Platelet Count      150 - 450 10e3/uL 177       Legend:  (H) High    Assessment    Giana Hope is a 78 year old female seen as a PAC referral for risk assessment and optimization for anesthesia.    Plan/Recommendations  Pt will be optimized for the proposed procedure.  See below for details on the assessment, risk, and preoperative recommendations    NEUROLOGY  - No history of TIA, CVA or seizure  -Post Op delirium risk factors:  No risk identified    ENT  - No current airway concerns.  Will need to be reassessed day of surgery.  Mallampati: II  TM: > 3    CARDIAC  - No history of CAD, Hypertension, and Afib. Coronary artery calcifications noted on chest CT 5/23/23. Patient had follow-up EKG, echo, and stress test over the summer all WNL (see above). Patient is able to achieve > 4 METS and denies any symptoms of CP, chest tightness, or SOB.  - Hyperlipidemia  Well controlled on home regimen    - METS (Metabolic Equivalents)  Patient performs 4 or more METS exercise without symptoms            Total Score: 0      RCRI-Very low risk: Class 1 0.4% complication rate       "      Total Score: 0        PULMONARY  HOPE Low Risk            Total Score: 1    HOPE: Over 50 ys old      - Denies asthma or inhaler use  - LLL pulmonary nodule, noted to be stable on CT 5/23/23  - Tobacco History    History   Smoking Status    Former    Packs/day: 1.00    Years: 35.00    Types: Cigarettes    Quit date: 1996   Smokeless Tobacco    Never       GI  - GERD  Controlled on medications: H2 Blocker  PONV High Risk  Total Score: 3           1 AN PONV: Pt is Female    1 AN PONV: Patient is not a current smoker    1 AN PONV: Intended Post Op Opioids        /RENAL  - CKD with Baseline Creatinine  0.8-1.14 over past year. Rechecking today.     ENDOCRINE    - BMI: Estimated body mass index is 23.04 kg/m  as calculated from the following:    Height as of this encounter: 1.607 m (5' 3.25\").    Weight as of this encounter: 59.5 kg (131 lb 1.6 oz).  Healthy Weight (BMI 18.5-24.9)  - No history of Diabetes Mellitus    HEME  VTE Low Risk 0.26%            Total Score: 1    VTE: Greater than 59 yrs old      - No history of abnormal bleeding or antiplatelet use.    MSK  Patient is NOT Frail            Total Score: 0      - OA of multiple joints s/p bilateral knee replacements  - s/p left shoulder replacement   - Scoliosis with intermittent back pain  - OA of right shoulder (see HPI) - surgery planned as above.       Different anesthesia methods/types have been discussed with the patient, but they are aware that the final plan will be decided by the assigned anesthesia provider on the date of service.    The patient is optimized for their procedure. AVS with information on surgery time/arrival time, meds and NPO status given by nursing staff. No further diagnostic testing indicated.      On the day of service:     Prep time: 10 minutes  Visit time: 19 minutes  Documentation time: 10 minutes  ------------------------------------------  Total time: 39 minutes      NASIR Tyler CNP  Preoperative Assessment " [facility]  Phone: [phone]  Fax: [phone]

## 2024-02-19 ENCOUNTER — OFFICE VISIT (OUTPATIENT)
Dept: DERMATOLOGY | Facility: CLINIC | Age: 79
End: 2024-02-19
Payer: MEDICARE

## 2024-02-19 DIAGNOSIS — L81.4 SOLAR LENTIGO: ICD-10-CM

## 2024-02-19 DIAGNOSIS — L64.9 ANDROGENETIC ALOPECIA: Primary | ICD-10-CM

## 2024-02-19 DIAGNOSIS — L82.1 SEBORRHEIC KERATOSIS: ICD-10-CM

## 2024-02-19 DIAGNOSIS — L57.0 ACTINIC KERATOSIS: ICD-10-CM

## 2024-02-19 DIAGNOSIS — L21.9 DERMATITIS, SEBORRHEIC: ICD-10-CM

## 2024-02-19 DIAGNOSIS — L82.0 INFLAMED SEBORRHEIC KERATOSIS: ICD-10-CM

## 2024-02-19 PROCEDURE — 99213 OFFICE O/P EST LOW 20 MIN: CPT | Performed by: DERMATOLOGY

## 2024-02-19 RX ORDER — KETOCONAZOLE 20 MG/ML
SHAMPOO TOPICAL
Qty: 120 ML | Refills: 11 | Status: SHIPPED | OUTPATIENT
Start: 2024-02-19 | End: 2024-07-29

## 2024-02-19 NOTE — NURSING NOTE
Giana Hope's goals for this visit include:   Chief Complaint   Patient presents with    RECHECK     Non-scarring hair loss: No longer using minoxidil 5% foam. Seborrheic dermatitis: ketoconazole shampoo for scalp which helps with scalp redness.       She requests these members of her care team be copied on today's visit information:     PCP: Gilberto Vasquez    Referring Provider:  Referred Self, MD  No address on file    LMP 01/01/1998 (Approximate)     Do you need any medication refills at today's visit? Yessenia Moreno [facility]

## 2024-02-19 NOTE — LETTER
2/19/2024         RE: Giana Hope  [address]  [place] [address]        Dear Colleague,    Thank you for referring your patient, Giana Hope, to the [facility]. Please see a copy of my visit note below.    [facility] Dermatology Note    Encounter Date: Feb 19, 2024    Dermatology Problem List:  1. Multifactorial non-scarring hair loss (seborrheic dermatitis and androgenetic +/- telogen effluvium)  - hair labs unremarkable: CBC, TSH, vitamin D, iron studies  - current tx: fluocinolone solution, ketoconazole shampoo, minoxidil 5% foam  2. AKs. S/p cryo  3. Pink facial patch: DDx seborrheic dermatitis, actinic keratosis, macular seborrheic keratosis  - current treatment: tacrolimus ointment  - past treatment: hydrocortisone cream, ketoconazole cream    ______________________________________    Impression/Plan:  1. Androgenetic alopecia with seborrheic dermatitis  - continue ketoconazole 2% shampoo  - recommend anti-dandruff conditioner  - discussed risks/benefits of oral minoxidil vs spironolactone vs finasteride/dutasteride  - reevaluate treatment at next visit  2. AK x1 on the nose  - will do cryo at next visit  3. Reassurance provided for benign lesions not treated today including cherry angiomata, solar lentigines, seborrheic keratoses, and banal-appearing melanocytic nevi.      Follow-up in 4-6 months for FBSC.       Staff Involved:  Staff and Scribe    I, Astrid Blanco, am serving as a scribe; to document services personally performed by Jose Alberto Isbell MD -based on data collection and the provider's statements to me.    Provider Disclosure:   The documentation recorded by the scribe accurately reflects the services I personally performed and the decisions made by me.    Jose Alberto Isbell MD   of Dermatology  Department of Dermatology  [facility]      CC:   Chief Complaint   Patient  "presents with     RECHECK     Non-scarring hair loss: No longer using minoxidil 5% foam. Seborrheic dermatitis: ketoconazole shampoo for scalp which helps with scalp redness.       History of Present Illness:  Ms. Giana Hope is a 78 year old female who presents as a return patient. She is here today for non-scarring hair loss. She \"gave up\" on the Rogaine. Reports that the density has been relatively stable since last visit. She reports she is no longer using minoxidil 5% foam. She reports that the ketoconazole shampoo is helping with the seborrheic dermatitis on her scalp, has been using 3-4 times per week. She has seen improvements in itching and redness since starting this regimen.     Patient is otherwise doing well, with no additonal skin concerns.     Labs:  N/A    Physical exam:  Vitals: LMP 01/01/1998 (Approximate)   GEN: This is a well developed, well-nourished female in no acute distress, in a pleasant mood.    SKIN: Ring phototype II  - Focused examination of the scalp and face was performed.   - Scattered brown macules on sun exposed areas.  - There are waxy stuck on tan to brown papules on the head/neck, trunk, extremities.  - 1 erythematous scaly macule on the nose  - No other lesions of concern on areas examined.     Past Medical History:   Past Medical History:   Diagnosis Date     Chronic kidney disease, stage 3a (H) 10/03/2022     History of blood transfusion      Nonsenile cataract      Osteoarthritis      Pulmonary nodule      Syncopal episodes 06/06/2023     Uveitis      Past Surgical History:   Procedure Laterality Date     ARTHROPLASTY KNEE Right 11/26/2021    Procedure: ARTHROPLASTY, RIGHT KNEE, TOTAL;  Surgeon: Gabriel Marrero MD;  Location: [facility] OR     ARTHROPLASTY, RIGHT KNEE, TOTAL Right 11/26/2021     CATARACT IOL, RT/LT Right 03/2019     left shoulder replacement      2021     PHACOEMULSIFICATION WITH STANDARD INTRAOCULAR LENS IMPLANT Right 03/22/2019    Procedure: Right " Cataract Removal with Intraocular Lens Implant;  Surgeon: Trish Tyalor MD;  Location:  OR       Social History:   reports that she quit smoking about 28 years ago. Her smoking use included cigarettes. She has a 35 pack-year smoking history. She has never used smokeless tobacco. She reports current alcohol use of about 3.0 standard drinks of alcohol per week. She reports that she does not use drugs.    Family History:  Family History   Problem Relation Age of Onset     Arthritis Mother      Breast Cancer Mother 78     Diabetes Type 2  Father      Heart Disease Father          age 75     Alzheimer Disease Father 60     Diabetes Father      Hypertension Father      Hyperlipidemia Father      Mental Illness Father      Heart Disease Brother 68     Breast Cancer Maternal Grandmother 81     Heart Disease Maternal Grandfather 54         age 81     Other - See Comments Son         schizoaffective lives in [place]     Mental Illness Son      Glaucoma No family hx of      Macular Degeneration No family hx of      Anesthesia Reaction No family hx of      Venous thrombosis No family hx of        Medications:  Current Outpatient Medications   Medication Sig Dispense Refill     Cyanocobalamin (VITAMIN B-12 PO) Take by mouth daily       famotidine (PEPCID) 40 MG tablet Take 1 tablet (40 mg) by mouth daily as needed for heartburn 90 tablet 3     ketoconazole (NIZORAL) 2 % external shampoo Apply topically three times a week 120 mL 11     pravastatin (PRAVACHOL) 20 MG tablet Take 1 tablet (20 mg) by mouth daily (Patient taking differently: Take 20 mg by mouth at bedtime) 90 tablet 3     prednisoLONE acetate (PRED FORTE) 1 % ophthalmic suspension Place 1 drop Into the left eye 3 times daily (Patient taking differently: Place 1 drop Into the left eye 3 times daily as needed) 15 mL 1     Vitamin D3 (CHOLECALCIFEROL) 25 mcg (1000 units) tablet Take 1 tablet by mouth daily       Allergies   Allergen Reactions     Dust Mites  Itching     Runny nose, fever     Mold Itching     Fever, runny nose     Pollen Extract Itching     Fever, runny nose     Celecoxib      Other reaction(s): GI intolerance                 Again, thank you for allowing me to participate in the care of your patient.        Sincerely,        Jose Alberto Isbell MD

## 2024-02-19 NOTE — PROGRESS NOTES
[facility] Dermatology Note    Encounter Date: Feb 19, 2024    Dermatology Problem List:  1. Multifactorial non-scarring hair loss (seborrheic dermatitis and androgenetic +/- telogen effluvium)  - hair labs unremarkable: CBC, TSH, vitamin D, iron studies  - current tx: fluocinolone solution, ketoconazole shampoo, minoxidil 5% foam  2. AKs. S/p cryo  3. Pink facial patch: DDx seborrheic dermatitis, actinic keratosis, macular seborrheic keratosis  - current treatment: tacrolimus ointment  - past treatment: hydrocortisone cream, ketoconazole cream    ______________________________________    Impression/Plan:  1. Androgenetic alopecia with seborrheic dermatitis  - continue ketoconazole 2% shampoo  - recommend anti-dandruff conditioner  - discussed risks/benefits of oral minoxidil vs spironolactone vs finasteride/dutasteride  - reevaluate treatment at next visit  2. AK x1 on the nose  - will do cryo at next visit  3. Reassurance provided for benign lesions not treated today including cherry angiomata, solar lentigines, seborrheic keratoses, and banal-appearing melanocytic nevi.      Follow-up in 4-6 months for FBSC.       Staff Involved:  Staff and Scribe    I, Astrid Blanco, am serving as a scribe; to document services personally performed by Jose Alberto Isbell MD -based on data collection and the provider's statements to me.    Provider Disclosure:   The documentation recorded by the scribe accurately reflects the services I personally performed and the decisions made by me.    Jose Alberto Isbell MD   of Dermatology  Department of Dermatology  [facility]      CC:   Chief Complaint   Patient presents with    RECHECK     Non-scarring hair loss: No longer using minoxidil 5% foam. Seborrheic dermatitis: ketoconazole shampoo for scalp which helps with scalp redness.       History of Present Illness:  Ms. Giana Hope is a 78 year old female who  "presents as a return patient. She is here today for non-scarring hair loss. She \"gave up\" on the Rogaine. Reports that the density has been relatively stable since last visit. She reports she is no longer using minoxidil 5% foam. She reports that the ketoconazole shampoo is helping with the seborrheic dermatitis on her scalp, has been using 3-4 times per week. She has seen improvements in itching and redness since starting this regimen.     Patient is otherwise doing well, with no additonal skin concerns.     Labs:  N/A    Physical exam:  Vitals: LMP 01/01/1998 (Approximate)   GEN: This is a well developed, well-nourished female in no acute distress, in a pleasant mood.    SKIN: Ring phototype II  - Focused examination of the scalp and face was performed.   - Scattered brown macules on sun exposed areas.  - There are waxy stuck on tan to brown papules on the head/neck, trunk, extremities.  - 1 erythematous scaly macule on the nose  - No other lesions of concern on areas examined.     Past Medical History:   Past Medical History:   Diagnosis Date    Chronic kidney disease, stage 3a (H) 10/03/2022    History of blood transfusion     Nonsenile cataract     Osteoarthritis     Pulmonary nodule     Syncopal episodes 06/06/2023    Uveitis      Past Surgical History:   Procedure Laterality Date    ARTHROPLASTY KNEE Right 11/26/2021    Procedure: ARTHROPLASTY, RIGHT KNEE, TOTAL;  Surgeon: Gabriel Marrero MD;  Location:  OR    ARTHROPLASTY, RIGHT KNEE, TOTAL Right 11/26/2021    CATARACT IOL, RT/LT Right 03/2019    left shoulder replacement      2021    PHACOEMULSIFICATION WITH STANDARD INTRAOCULAR LENS IMPLANT Right 03/22/2019    Procedure: Right Cataract Removal with Intraocular Lens Implant;  Surgeon: Trish Taylor MD;  Location:  OR       Social History:   reports that she quit smoking about 28 years ago. Her smoking use included cigarettes. She has a 35 pack-year smoking history. She has never used " smokeless tobacco. She reports current alcohol use of about 3.0 standard drinks of alcohol per week. She reports that she does not use drugs.    Family History:  Family History   Problem Relation Age of Onset    Arthritis Mother     Breast Cancer Mother 78    Diabetes Type 2  Father     Heart Disease Father          age 75    Alzheimer Disease Father 60    Diabetes Father     Hypertension Father     Hyperlipidemia Father     Mental Illness Father     Heart Disease Brother 68    Breast Cancer Maternal Grandmother 81    Heart Disease Maternal Grandfather 54         age 81    Other - See Comments Son         schizoaffective lives in [place]    Mental Illness Son     Glaucoma No family hx of     Macular Degeneration No family hx of     Anesthesia Reaction No family hx of     Venous thrombosis No family hx of        Medications:  Current Outpatient Medications   Medication Sig Dispense Refill    Cyanocobalamin (VITAMIN B-12 PO) Take by mouth daily      famotidine (PEPCID) 40 MG tablet Take 1 tablet (40 mg) by mouth daily as needed for heartburn 90 tablet 3    ketoconazole (NIZORAL) 2 % external shampoo Apply topically three times a week 120 mL 11    pravastatin (PRAVACHOL) 20 MG tablet Take 1 tablet (20 mg) by mouth daily (Patient taking differently: Take 20 mg by mouth at bedtime) 90 tablet 3    prednisoLONE acetate (PRED FORTE) 1 % ophthalmic suspension Place 1 drop Into the left eye 3 times daily (Patient taking differently: Place 1 drop Into the left eye 3 times daily as needed) 15 mL 1    Vitamin D3 (CHOLECALCIFEROL) 25 mcg (1000 units) tablet Take 1 tablet by mouth daily       Allergies   Allergen Reactions    Dust Mites Itching     Runny nose, fever    Mold Itching     Fever, runny nose    Pollen Extract Itching     Fever, runny nose    Celecoxib      Other reaction(s): GI intolerance

## 2024-02-19 NOTE — PATIENT INSTRUCTIONS
Low-dose oral minoxidil  Spironolactone    Finasteride  Dutasteride    Anti-dandruff conditioner (fragrance free)

## 2024-02-19 NOTE — TELEPHONE ENCOUNTER
Teaching Flowsheet   Relevant Diagnosis: right shoulder arthritis  Teaching Topic: right total shoulder arthroplasty    Phoned patient to review pre-op teaching for fast-track total shoulder replacement. Patient was seen by PAC clinic on 2/14 for pre-op H&P.  Reviewed PT recommendations to be seen the day after surgery by outpatient PT to review donning/doffing of Ultrasling.  PT referral entered, and provided patient with scheduling line.  Patient will call to schedule.  She has had her other shoulder replaced as well.  Writer sent total shoulder and Ultrasling handout in mail today.  Patient has completed CT of right shoulder via [employer] protocol.  Reviewed dental prophylaxis recommendations.     Person(s) involved in teaching:   Patient     Motivation Level:  Asks Questions: Yes  Eager to Learn: Yes  Cooperative: Yes  Receptive (willing/able to accept information): Yes  Any cultural factors/Rastafarian beliefs that may influence understanding or compliance? No  Comments: none     Patient demonstrates understanding of the following:  Reason for the appointment, diagnosis and treatment plan: Yes  Knowledge of proper use of medications and conditions for which they are ordered (with special attention to potential side effects or drug interactions): Yes  Which situations necessitate calling provider and whom to contact: Yes       Teaching Concerns Addressed:   Comments: none     Proper use and care of (medical equip, care aids, etc.): Yes  Nutritional needs and diet plan: Yes  Pain management techniques: Yes  Wound Care: Yes  How and/when to access community resources: Yes     Instructional Materials Used/Given: Preoperative teaching packet, surgical soap x2, dental card, stoplight tool.       Time spent with patient: 30 minutes.

## 2024-02-28 ASSESSMENT — ENCOUNTER SYMPTOMS
SEIZURES: 0
ORTHOPNEA: 0

## 2024-02-28 ASSESSMENT — LIFESTYLE VARIABLES: TOBACCO_USE: 1

## 2024-02-28 NOTE — ANESTHESIA PREPROCEDURE EVALUATION
Anesthesia Pre-Procedure Evaluation    Patient: Giana Hope   MRN: [id number] : 1945        Procedure : Procedure(s):  ARTHROPLASTY, RIGHT SHOULDER, TOTAL          Past Medical History:   Diagnosis Date    Chronic kidney disease, stage 3a (H) 10/03/2022    History of blood transfusion     Nonsenile cataract     Osteoarthritis     Pulmonary nodule     Syncopal episodes 2023    Uveitis       Past Surgical History:   Procedure Laterality Date    ARTHROPLASTY KNEE Right 2021    Procedure: ARTHROPLASTY, RIGHT KNEE, TOTAL;  Surgeon: Gabriel Marrero MD;  Location: [facility] OR    ARTHROPLASTY, RIGHT KNEE, TOTAL Right 2021    CATARACT IOL, RT/LT Right 2019    left shoulder replacement          PHACOEMULSIFICATION WITH STANDARD INTRAOCULAR LENS IMPLANT Right 2019    Procedure: Right Cataract Removal with Intraocular Lens Implant;  Surgeon: Trish Taylor MD;  [facility]: [facility] OR      Allergies   Allergen Reactions    Dust Mites Itching     Runny nose, fever    Mold Itching     Fever, runny nose    Pollen Extract Itching     Fever, runny nose    Celecoxib      Other reaction(s): GI intolerance      Social History     Tobacco Use    Smoking status: Former     Packs/day: 1.00     Years: 35.00     Additional pack years: 0.00     Total pack years: 35.00     Types: Cigarettes     Quit date:      Years since quittin.1    Smokeless tobacco: Never    Tobacco comments:     Quit 24 years ago    Substance Use Topics    Alcohol use: Yes     Alcohol/week: 3.0 standard drinks of alcohol     Types: 3 Glasses of wine per week      Wt Readings from Last 1 Encounters:   24 59.5 kg (131 lb 1.6 oz)        Anesthesia Evaluation   Pt has had prior anesthetic.     No history of anesthetic complications       ROS/MED HX  ENT/Pulmonary: Comment: - LLL pulmonary nodule, noted to be stable on CT 23    (+)                tobacco use, Past use,                    (-) HOPE risk factors    Neurologic: Comment: - Hx of SDH from fall during spring 2023   - RLS   (-) no seizures and no CVA   Cardiovascular: Comment: - Coronary artery calcifications per chest CT 5/23/23    (+) Dyslipidemia - -   -  - -                                 Previous cardiac testing   Echo: Date: 6/7/23 Results:  Interpretation Summary  Global and regional left ventricular function is normal with an EF of 55-60%.  Right ventricular function, chamber size, wall motion, and thickness are  normal.  IVC diameter and respiratory changes fall into an intermediate range  suggesting an RA pressure of 8 mmHg.  No pericardial effusion is present.  There is no prior study for direct comparison.    Stress Test:  Date: 7/25/23 Results:  Interpretation Summary  Exercise stress echocardiogram with no inducible ischemia in the setting of  limited exercise tolerance.     Target heart rate achieved. Elevated blood pressure response to exercise.  No angina symptoms with exercise.  No ECG evidence of ischemia.  Normal segmental and global LV function with EF of approximately 55-60% at  rest; with exercise left ventricular cavity size decreases and LVEF increases  to 60-65%.  No stress induced regional wall motion abnormalities.  Less than average functional capacity for age.     No significant valvular dysfunction noted on screening 2D and Doppler  examination.    ECG Reviewed:  Date: 6/6/23 Results:  Sinus rhythm with sinus arrhythmia  Cath:  Date: Results:   (-) hypertension and orthopnea/PND   METS/Exercise Tolerance: >4 METS Comment: - Recently walking > 1 mile continuously during trip to [place] without exertional symptoms. Able to ascend 1 full flight of stairs without difficultly.    Hematologic:  - neg hematologic  ROS   (+)       history of blood transfusion, no previous transfusion reaction, Known PRBC Anitbodies:No    (-) history of blood clots   Musculoskeletal: Comment: - OA of multiple joints s/p bilateral knee replacements  - s/p left  "shoulder replacement   - Scoliosis with intermittent back pain      GI/Hepatic:     (+) GERD,                   Renal/Genitourinary:     (+) renal disease, type: CRI,            Endo: Comment: - Osteopenia    - Pre-diabetes       Psychiatric/Substance Use:  - neg psychiatric ROS     Infectious Disease:  - neg infectious disease ROS     Malignancy:  - neg malignancy ROS     Other:            Physical Exam    Airway        Mallampati: II       Respiratory Devices and Support         Dental       (+) Minor Abnormalities - some fillings, tiny chips      Cardiovascular          Rhythm and rate: regular     Pulmonary           breath sounds clear to auscultation           OUTSIDE LABS:  CBC:   Lab Results   Component Value Date    WBC 5.8 02/14/2024    WBC 4.6 06/20/2023    HGB 13.4 02/14/2024    HGB 13.7 06/20/2023    HCT 40.8 02/14/2024    HCT 41.9 06/20/2023     02/14/2024     06/20/2023     BMP:   Lab Results   Component Value Date     02/14/2024     10/09/2023    POTASSIUM 4.4 02/14/2024    POTASSIUM 4.3 10/09/2023    CHLORIDE 103 02/14/2024    CHLORIDE 103 10/09/2023    CO2 26 02/14/2024    CO2 27 10/09/2023    BUN 14.3 02/14/2024    BUN 18.5 10/09/2023    CR 0.87 02/14/2024    CR 0.99 (H) 10/09/2023     (H) 02/14/2024    GLC 98 10/09/2023     COAGS:   Lab Results   Component Value Date    PTT 35 06/07/2023    INR 1.07 06/07/2023     POC: No results found for: \"BGM\", \"HCG\", \"HCGS\"  HEPATIC:   Lab Results   Component Value Date    ALBUMIN 4.1 06/07/2023    PROTTOTAL 6.8 06/07/2023    ALT 7 (L) 06/07/2023    AST 24 06/07/2023    ALKPHOS 105 (H) 06/07/2023    BILITOTAL 0.5 06/07/2023     OTHER:   Lab Results   Component Value Date    LACT 0.9 06/06/2023    A1C 5.7 (H) 10/09/2023    LUIS E 10.8 (H) 02/14/2024    MAG 2.3 06/20/2023    TSH 1.99 06/20/2023       Anesthesia Plan    ASA Status:  3    NPO Status:  NPO Appropriate    Anesthesia Type: General.     - Airway: LMA   Induction: " Intravenous.   Maintenance: TIVA.        Consents    Anesthesia Plan(s) and associated risks, benefits, and realistic alternatives discussed. Questions answered and patient/representative(s) expressed understanding.     - Discussed:     - Discussed with:  Patient            Postoperative Care    Pain management: Oral pain medications, IV analgesics, Multi-modal analgesia.   PONV prophylaxis: Ondansetron (or other 5HT-3), Dexamethasone or Solumedrol     Comments:               Grayson Malik MD    I have reviewed the pertinent notes and labs in the chart from the past 30 days and (re)examined the patient.  Any updates or changes from those notes are reflected in this note.      # Hypercalcemia: Highest Ca = 10.8 mg/dL in last 30 days, will monitor as appropriate

## 2024-02-29 ENCOUNTER — ANESTHESIA (OUTPATIENT)
Dept: SURGERY | Facility: CLINIC | Age: 79
End: 2024-02-29
Payer: MEDICARE

## 2024-02-29 ENCOUNTER — HOSPITAL ENCOUNTER (OUTPATIENT)
Facility: CLINIC | Age: 79
Discharge: HOME | End: 2024-02-29
Attending: STUDENT IN AN ORGANIZED HEALTH CARE EDUCATION/TRAINING PROGRAM | Admitting: STUDENT IN AN ORGANIZED HEALTH CARE EDUCATION/TRAINING PROGRAM
Payer: MEDICARE

## 2024-02-29 ENCOUNTER — APPOINTMENT (OUTPATIENT)
Dept: GENERAL RADIOLOGY | Facility: CLINIC | Age: 79
End: 2024-02-29
Attending: STUDENT IN AN ORGANIZED HEALTH CARE EDUCATION/TRAINING PROGRAM
Payer: MEDICARE

## 2024-02-29 VITALS
OXYGEN SATURATION: 95 % | RESPIRATION RATE: 14 BRPM | HEIGHT: 63 IN | BODY MASS INDEX: 22.73 KG/M2 | WEIGHT: 128.31 LBS | DIASTOLIC BLOOD PRESSURE: 75 MMHG | SYSTOLIC BLOOD PRESSURE: 124 MMHG | HEART RATE: 87 BPM | TEMPERATURE: 97.5 F

## 2024-02-29 DIAGNOSIS — M19.011 OSTEOARTHRITIS, LOCALIZED, SHOULDER, RIGHT: Primary | ICD-10-CM

## 2024-02-29 PROBLEM — Z96.611 S/P SHOULDER REPLACEMENT, RIGHT: Status: ACTIVE | Noted: 2024-02-29

## 2024-02-29 PROCEDURE — 710N000012 HC RECOVERY PHASE 2, PER MINUTE: Performed by: STUDENT IN AN ORGANIZED HEALTH CARE EDUCATION/TRAINING PROGRAM

## 2024-02-29 PROCEDURE — 999N000065 XR SHOULDER RIGHT PORT G/E 2 VIEWS: Mod: RT

## 2024-02-29 PROCEDURE — 250N000011 HC RX IP 250 OP 636: Performed by: STUDENT IN AN ORGANIZED HEALTH CARE EDUCATION/TRAINING PROGRAM

## 2024-02-29 PROCEDURE — 271N000001 HC OR GENERAL SUPPLY NON-STERILE: Performed by: STUDENT IN AN ORGANIZED HEALTH CARE EDUCATION/TRAINING PROGRAM

## 2024-02-29 PROCEDURE — 999N000141 HC STATISTIC PRE-PROCEDURE NURSING ASSESSMENT: Performed by: STUDENT IN AN ORGANIZED HEALTH CARE EDUCATION/TRAINING PROGRAM

## 2024-02-29 PROCEDURE — 360N000077 HC SURGERY LEVEL 4, PER MIN: Performed by: STUDENT IN AN ORGANIZED HEALTH CARE EDUCATION/TRAINING PROGRAM

## 2024-02-29 PROCEDURE — 250N000013 HC RX MED GY IP 250 OP 250 PS 637: Performed by: STUDENT IN AN ORGANIZED HEALTH CARE EDUCATION/TRAINING PROGRAM

## 2024-02-29 PROCEDURE — 23472 RECONSTRUCT SHOULDER JOINT: CPT | Performed by: NURSE ANESTHETIST, CERTIFIED REGISTERED

## 2024-02-29 PROCEDURE — C9290 INJ, BUPIVACAINE LIPOSOME: HCPCS | Performed by: ANESTHESIOLOGY

## 2024-02-29 PROCEDURE — 99100 ANES PT EXTEME AGE<1 YR&>70: CPT | Performed by: ANESTHESIOLOGY

## 2024-02-29 PROCEDURE — 250N000011 HC RX IP 250 OP 636: Performed by: NURSE ANESTHETIST, CERTIFIED REGISTERED

## 2024-02-29 PROCEDURE — 23472 RECONSTRUCT SHOULDER JOINT: CPT | Mod: RT | Performed by: STUDENT IN AN ORGANIZED HEALTH CARE EDUCATION/TRAINING PROGRAM

## 2024-02-29 PROCEDURE — C1776 JOINT DEVICE (IMPLANTABLE): HCPCS | Performed by: STUDENT IN AN ORGANIZED HEALTH CARE EDUCATION/TRAINING PROGRAM

## 2024-02-29 PROCEDURE — 99100 ANES PT EXTEME AGE<1 YR&>70: CPT | Performed by: NURSE ANESTHETIST, CERTIFIED REGISTERED

## 2024-02-29 PROCEDURE — 710N000010 HC RECOVERY PHASE 1, LEVEL 2, PER MIN: Performed by: STUDENT IN AN ORGANIZED HEALTH CARE EDUCATION/TRAINING PROGRAM

## 2024-02-29 PROCEDURE — 272N000001 HC OR GENERAL SUPPLY STERILE: Performed by: STUDENT IN AN ORGANIZED HEALTH CARE EDUCATION/TRAINING PROGRAM

## 2024-02-29 PROCEDURE — C1713 ANCHOR/SCREW BN/BN,TIS/BN: HCPCS | Performed by: STUDENT IN AN ORGANIZED HEALTH CARE EDUCATION/TRAINING PROGRAM

## 2024-02-29 PROCEDURE — 23472 RECONSTRUCT SHOULDER JOINT: CPT | Performed by: ANESTHESIOLOGY

## 2024-02-29 PROCEDURE — 250N000011 HC RX IP 250 OP 636: Performed by: ANESTHESIOLOGY

## 2024-02-29 PROCEDURE — 250N000025 HC SEVOFLURANE, PER MIN: Performed by: STUDENT IN AN ORGANIZED HEALTH CARE EDUCATION/TRAINING PROGRAM

## 2024-02-29 PROCEDURE — 250N000009 HC RX 250: Performed by: NURSE ANESTHETIST, CERTIFIED REGISTERED

## 2024-02-29 PROCEDURE — 258N000003 HC RX IP 258 OP 636: Performed by: NURSE ANESTHETIST, CERTIFIED REGISTERED

## 2024-02-29 PROCEDURE — 370N000017 HC ANESTHESIA TECHNICAL FEE, PER MIN: Performed by: STUDENT IN AN ORGANIZED HEALTH CARE EDUCATION/TRAINING PROGRAM

## 2024-02-29 PROCEDURE — 278N000051 HC OR IMPLANT GENERAL: Performed by: STUDENT IN AN ORGANIZED HEALTH CARE EDUCATION/TRAINING PROGRAM

## 2024-02-29 DEVICE — IMPLANTABLE DEVICE
Type: IMPLANTABLE DEVICE | Site: SHOULDER | Status: FUNCTIONAL
Brand: ALLIANCE™ TRABECULAR METAL™

## 2024-02-29 DEVICE — IMPLANTABLE DEVICE
Type: IMPLANTABLE DEVICE | Site: SHOULDER | Status: FUNCTIONAL
Brand: COMPREHENSIVE SHOULDER SYSTEM

## 2024-02-29 DEVICE — IMPLANTABLE DEVICE
Type: IMPLANTABLE DEVICE | Site: SHOULDER | Status: FUNCTIONAL
Brand: COMPREHENSIVE® SHOULDER SYSTEM

## 2024-02-29 DEVICE — IMPLANTABLE DEVICE
Type: IMPLANTABLE DEVICE | Site: SHOULDER | Status: FUNCTIONAL
Brand: ALLIANCE™

## 2024-02-29 DEVICE — IMPLANTABLE DEVICE
Type: IMPLANTABLE DEVICE | Site: SHOULDER | Status: FUNCTIONAL
Brand: COMPREHENSIVE® REVERSE SHOULDER

## 2024-02-29 DEVICE — FULL DOSE BONE CEMENT, 10 PACK CATALOG NUMBER IS 6191-1-010
Type: IMPLANTABLE DEVICE | Site: SHOULDER | Status: FUNCTIONAL
Brand: SIMPLEX

## 2024-02-29 DEVICE — IMPLANTABLE DEVICE
Type: IMPLANTABLE DEVICE | Site: SHOULDER | Status: FUNCTIONAL
Brand: THREADED TIP STEINMANN PIN

## 2024-02-29 RX ORDER — ASPIRIN 81 MG/1
81 TABLET ORAL 2 TIMES DAILY
Qty: 60 TABLET | Refills: 0 | Status: SHIPPED | OUTPATIENT
Start: 2024-02-29 | End: 2024-07-01

## 2024-02-29 RX ORDER — ONDANSETRON 4 MG/1
4 TABLET, ORALLY DISINTEGRATING ORAL EVERY 30 MIN PRN
Status: DISCONTINUED | OUTPATIENT
Start: 2024-02-29 | End: 2024-02-29 | Stop reason: HOSPADM

## 2024-02-29 RX ORDER — FENTANYL CITRATE 50 UG/ML
25 INJECTION, SOLUTION INTRAMUSCULAR; INTRAVENOUS
Status: DISCONTINUED | OUTPATIENT
Start: 2024-02-29 | End: 2024-02-29 | Stop reason: HOSPADM

## 2024-02-29 RX ORDER — ONDANSETRON 2 MG/ML
4 INJECTION INTRAMUSCULAR; INTRAVENOUS EVERY 30 MIN PRN
Status: DISCONTINUED | OUTPATIENT
Start: 2024-02-29 | End: 2024-02-29 | Stop reason: HOSPADM

## 2024-02-29 RX ORDER — ASPIRIN 81 MG/1
81 TABLET ORAL 2 TIMES DAILY
Status: DISCONTINUED | OUTPATIENT
Start: 2024-02-29 | End: 2024-02-29 | Stop reason: HOSPADM

## 2024-02-29 RX ORDER — POLYETHYLENE GLYCOL 3350 17 G/17G
1 POWDER, FOR SOLUTION ORAL DAILY
Qty: 7 PACKET | Refills: 0 | Status: SHIPPED | OUTPATIENT
Start: 2024-02-29 | End: 2024-04-18

## 2024-02-29 RX ORDER — SODIUM CHLORIDE, SODIUM LACTATE, POTASSIUM CHLORIDE, CALCIUM CHLORIDE 600; 310; 30; 20 MG/100ML; MG/100ML; MG/100ML; MG/100ML
INJECTION, SOLUTION INTRAVENOUS CONTINUOUS PRN
Status: DISCONTINUED | OUTPATIENT
Start: 2024-02-29 | End: 2024-02-29

## 2024-02-29 RX ORDER — CEFAZOLIN SODIUM/WATER 2 G/20 ML
2 SYRINGE (ML) INTRAVENOUS SEE ADMIN INSTRUCTIONS
Status: DISCONTINUED | OUTPATIENT
Start: 2024-02-29 | End: 2024-02-29 | Stop reason: HOSPADM

## 2024-02-29 RX ORDER — NALOXONE HYDROCHLORIDE 0.4 MG/ML
0.4 INJECTION, SOLUTION INTRAMUSCULAR; INTRAVENOUS; SUBCUTANEOUS
Status: DISCONTINUED | OUTPATIENT
Start: 2024-02-29 | End: 2024-02-29 | Stop reason: HOSPADM

## 2024-02-29 RX ORDER — BUPIVACAINE HYDROCHLORIDE 5 MG/ML
INJECTION, SOLUTION EPIDURAL; INTRACAUDAL
Status: COMPLETED | OUTPATIENT
Start: 2024-02-29 | End: 2024-02-29

## 2024-02-29 RX ORDER — LIDOCAINE HYDROCHLORIDE 20 MG/ML
INJECTION, SOLUTION INFILTRATION; PERINEURAL PRN
Status: DISCONTINUED | OUTPATIENT
Start: 2024-02-29 | End: 2024-02-29

## 2024-02-29 RX ORDER — SODIUM CHLORIDE, SODIUM LACTATE, POTASSIUM CHLORIDE, CALCIUM CHLORIDE 600; 310; 30; 20 MG/100ML; MG/100ML; MG/100ML; MG/100ML
INJECTION, SOLUTION INTRAVENOUS CONTINUOUS
Status: DISCONTINUED | OUTPATIENT
Start: 2024-02-29 | End: 2024-02-29 | Stop reason: HOSPADM

## 2024-02-29 RX ORDER — LIDOCAINE 40 MG/G
CREAM TOPICAL
Status: DISCONTINUED | OUTPATIENT
Start: 2024-02-29 | End: 2024-02-29 | Stop reason: HOSPADM

## 2024-02-29 RX ORDER — CEFAZOLIN SODIUM 1 G/3ML
1 INJECTION, POWDER, FOR SOLUTION INTRAMUSCULAR; INTRAVENOUS EVERY 8 HOURS
Status: DISCONTINUED | OUTPATIENT
Start: 2024-02-29 | End: 2024-02-29 | Stop reason: HOSPADM

## 2024-02-29 RX ORDER — FLUMAZENIL 0.1 MG/ML
0.2 INJECTION, SOLUTION INTRAVENOUS
Status: DISCONTINUED | OUTPATIENT
Start: 2024-02-29 | End: 2024-02-29 | Stop reason: HOSPADM

## 2024-02-29 RX ORDER — ONDANSETRON 2 MG/ML
4 INJECTION INTRAMUSCULAR; INTRAVENOUS EVERY 6 HOURS PRN
Status: DISCONTINUED | OUTPATIENT
Start: 2024-02-29 | End: 2024-02-29 | Stop reason: HOSPADM

## 2024-02-29 RX ORDER — OXYCODONE HYDROCHLORIDE 10 MG/1
10 TABLET ORAL
Status: DISCONTINUED | OUTPATIENT
Start: 2024-02-29 | End: 2024-02-29 | Stop reason: HOSPADM

## 2024-02-29 RX ORDER — AMOXICILLIN 250 MG
1-2 CAPSULE ORAL 2 TIMES DAILY
Qty: 30 TABLET | Refills: 0 | Status: SHIPPED | OUTPATIENT
Start: 2024-02-29 | End: 2024-04-18

## 2024-02-29 RX ORDER — OXYCODONE HYDROCHLORIDE 5 MG/1
5 TABLET ORAL
Status: DISCONTINUED | OUTPATIENT
Start: 2024-02-29 | End: 2024-02-29 | Stop reason: HOSPADM

## 2024-02-29 RX ORDER — BISACODYL 10 MG
10 SUPPOSITORY, RECTAL RECTAL DAILY PRN
Status: DISCONTINUED | OUTPATIENT
Start: 2024-02-29 | End: 2024-02-29 | Stop reason: HOSPADM

## 2024-02-29 RX ORDER — NALOXONE HYDROCHLORIDE 0.4 MG/ML
0.1 INJECTION, SOLUTION INTRAMUSCULAR; INTRAVENOUS; SUBCUTANEOUS
Status: DISCONTINUED | OUTPATIENT
Start: 2024-02-29 | End: 2024-02-29 | Stop reason: HOSPADM

## 2024-02-29 RX ORDER — FENTANYL CITRATE 50 UG/ML
25-50 INJECTION, SOLUTION INTRAMUSCULAR; INTRAVENOUS
Status: DISCONTINUED | OUTPATIENT
Start: 2024-02-29 | End: 2024-02-29 | Stop reason: HOSPADM

## 2024-02-29 RX ORDER — FENTANYL CITRATE 50 UG/ML
25 INJECTION, SOLUTION INTRAMUSCULAR; INTRAVENOUS EVERY 5 MIN PRN
Status: DISCONTINUED | OUTPATIENT
Start: 2024-02-29 | End: 2024-02-29 | Stop reason: HOSPADM

## 2024-02-29 RX ORDER — PROPOFOL 10 MG/ML
INJECTION, EMULSION INTRAVENOUS CONTINUOUS PRN
Status: DISCONTINUED | OUTPATIENT
Start: 2024-02-29 | End: 2024-02-29

## 2024-02-29 RX ORDER — ONDANSETRON 4 MG/1
4 TABLET, ORALLY DISINTEGRATING ORAL EVERY 6 HOURS PRN
Status: DISCONTINUED | OUTPATIENT
Start: 2024-02-29 | End: 2024-02-29 | Stop reason: HOSPADM

## 2024-02-29 RX ORDER — ACETAMINOPHEN 325 MG/1
650 TABLET ORAL EVERY 4 HOURS PRN
Qty: 100 TABLET | Refills: 0 | Status: SHIPPED | OUTPATIENT
Start: 2024-02-29

## 2024-02-29 RX ORDER — AMOXICILLIN 250 MG
1 CAPSULE ORAL 2 TIMES DAILY
Status: DISCONTINUED | OUTPATIENT
Start: 2024-02-29 | End: 2024-02-29 | Stop reason: HOSPADM

## 2024-02-29 RX ORDER — PROCHLORPERAZINE MALEATE 5 MG
5 TABLET ORAL EVERY 6 HOURS PRN
Status: DISCONTINUED | OUTPATIENT
Start: 2024-02-29 | End: 2024-02-29 | Stop reason: HOSPADM

## 2024-02-29 RX ORDER — OXYCODONE HYDROCHLORIDE 5 MG/1
5 TABLET ORAL EVERY 4 HOURS PRN
Status: DISCONTINUED | OUTPATIENT
Start: 2024-02-29 | End: 2024-02-29 | Stop reason: HOSPADM

## 2024-02-29 RX ORDER — HYDROMORPHONE HYDROCHLORIDE 1 MG/ML
0.1 INJECTION, SOLUTION INTRAMUSCULAR; INTRAVENOUS; SUBCUTANEOUS
Status: DISCONTINUED | OUTPATIENT
Start: 2024-02-29 | End: 2024-02-29 | Stop reason: HOSPADM

## 2024-02-29 RX ORDER — FENTANYL CITRATE 50 UG/ML
50 INJECTION, SOLUTION INTRAMUSCULAR; INTRAVENOUS EVERY 5 MIN PRN
Status: DISCONTINUED | OUTPATIENT
Start: 2024-02-29 | End: 2024-02-29 | Stop reason: HOSPADM

## 2024-02-29 RX ORDER — HYDROMORPHONE HYDROCHLORIDE 1 MG/ML
0.2 INJECTION, SOLUTION INTRAMUSCULAR; INTRAVENOUS; SUBCUTANEOUS EVERY 5 MIN PRN
Status: DISCONTINUED | OUTPATIENT
Start: 2024-02-29 | End: 2024-02-29 | Stop reason: HOSPADM

## 2024-02-29 RX ORDER — FENTANYL CITRATE 50 UG/ML
INJECTION, SOLUTION INTRAMUSCULAR; INTRAVENOUS PRN
Status: DISCONTINUED | OUTPATIENT
Start: 2024-02-29 | End: 2024-02-29

## 2024-02-29 RX ORDER — HYDROMORPHONE HYDROCHLORIDE 1 MG/ML
0.2 INJECTION, SOLUTION INTRAMUSCULAR; INTRAVENOUS; SUBCUTANEOUS
Status: DISCONTINUED | OUTPATIENT
Start: 2024-02-29 | End: 2024-02-29 | Stop reason: HOSPADM

## 2024-02-29 RX ORDER — TRANEXAMIC ACID 650 MG/1
1950 TABLET ORAL ONCE
Status: COMPLETED | OUTPATIENT
Start: 2024-02-29 | End: 2024-02-29

## 2024-02-29 RX ORDER — OXYCODONE HYDROCHLORIDE 5 MG/1
5-10 TABLET ORAL EVERY 4 HOURS PRN
Qty: 26 TABLET | Refills: 0 | Status: SHIPPED | OUTPATIENT
Start: 2024-02-29 | End: 2024-04-18

## 2024-02-29 RX ORDER — DEXAMETHASONE SODIUM PHOSPHATE 4 MG/ML
INJECTION, SOLUTION INTRA-ARTICULAR; INTRALESIONAL; INTRAMUSCULAR; INTRAVENOUS; SOFT TISSUE PRN
Status: DISCONTINUED | OUTPATIENT
Start: 2024-02-29 | End: 2024-02-29

## 2024-02-29 RX ORDER — NALOXONE HYDROCHLORIDE 0.4 MG/ML
0.2 INJECTION, SOLUTION INTRAMUSCULAR; INTRAVENOUS; SUBCUTANEOUS
Status: DISCONTINUED | OUTPATIENT
Start: 2024-02-29 | End: 2024-02-29 | Stop reason: HOSPADM

## 2024-02-29 RX ORDER — CEFAZOLIN SODIUM/WATER 2 G/20 ML
2 SYRINGE (ML) INTRAVENOUS
Status: COMPLETED | OUTPATIENT
Start: 2024-02-29 | End: 2024-02-29

## 2024-02-29 RX ORDER — ACETAMINOPHEN 325 MG/1
975 TABLET ORAL EVERY 8 HOURS
Status: DISCONTINUED | OUTPATIENT
Start: 2024-02-29 | End: 2024-02-29 | Stop reason: HOSPADM

## 2024-02-29 RX ORDER — ONDANSETRON 2 MG/ML
INJECTION INTRAMUSCULAR; INTRAVENOUS PRN
Status: DISCONTINUED | OUTPATIENT
Start: 2024-02-29 | End: 2024-02-29

## 2024-02-29 RX ORDER — PROPOFOL 10 MG/ML
INJECTION, EMULSION INTRAVENOUS PRN
Status: DISCONTINUED | OUTPATIENT
Start: 2024-02-29 | End: 2024-02-29

## 2024-02-29 RX ORDER — POLYETHYLENE GLYCOL 3350 17 G/17G
17 POWDER, FOR SOLUTION ORAL DAILY
Status: DISCONTINUED | OUTPATIENT
Start: 2024-03-01 | End: 2024-02-29 | Stop reason: HOSPADM

## 2024-02-29 RX ORDER — ACETAMINOPHEN 325 MG/1
650 TABLET ORAL EVERY 4 HOURS PRN
Status: DISCONTINUED | OUTPATIENT
Start: 2024-03-03 | End: 2024-02-29 | Stop reason: HOSPADM

## 2024-02-29 RX ORDER — HYDROMORPHONE HYDROCHLORIDE 1 MG/ML
0.4 INJECTION, SOLUTION INTRAMUSCULAR; INTRAVENOUS; SUBCUTANEOUS EVERY 5 MIN PRN
Status: DISCONTINUED | OUTPATIENT
Start: 2024-02-29 | End: 2024-02-29 | Stop reason: HOSPADM

## 2024-02-29 RX ADMIN — BUPIVACAINE HYDROCHLORIDE 13 ML: 5 INJECTION, SOLUTION EPIDURAL; INTRACAUDAL at 07:05

## 2024-02-29 RX ADMIN — FENTANYL CITRATE 50 MCG: 50 INJECTION INTRAMUSCULAR; INTRAVENOUS at 08:28

## 2024-02-29 RX ADMIN — FENTANYL CITRATE 50 MCG: 50 INJECTION INTRAMUSCULAR; INTRAVENOUS at 07:14

## 2024-02-29 RX ADMIN — ONDANSETRON 4 MG: 2 INJECTION INTRAMUSCULAR; INTRAVENOUS at 09:22

## 2024-02-29 RX ADMIN — ACETAMINOPHEN 975 MG: 325 TABLET, FILM COATED ORAL at 13:16

## 2024-02-29 RX ADMIN — PHENYLEPHRINE HYDROCHLORIDE 100 MCG: 10 INJECTION INTRAVENOUS at 08:34

## 2024-02-29 RX ADMIN — PROPOFOL 25 MCG/KG/MIN: 10 INJECTION, EMULSION INTRAVENOUS at 07:52

## 2024-02-29 RX ADMIN — Medication 2 G: at 07:28

## 2024-02-29 RX ADMIN — BUPIVACAINE 10 ML: 13.3 INJECTION, SUSPENSION, LIPOSOMAL INFILTRATION at 07:05

## 2024-02-29 RX ADMIN — PHENYLEPHRINE HYDROCHLORIDE 100 MCG: 10 INJECTION INTRAVENOUS at 07:49

## 2024-02-29 RX ADMIN — SODIUM CHLORIDE, POTASSIUM CHLORIDE, SODIUM LACTATE AND CALCIUM CHLORIDE: 600; 310; 30; 20 INJECTION, SOLUTION INTRAVENOUS at 07:28

## 2024-02-29 RX ADMIN — DEXAMETHASONE SODIUM PHOSPHATE 4 MG: 4 INJECTION, SOLUTION INTRA-ARTICULAR; INTRALESIONAL; INTRAMUSCULAR; INTRAVENOUS; SOFT TISSUE at 08:02

## 2024-02-29 RX ADMIN — CEFAZOLIN 1 G: 1 INJECTION, POWDER, FOR SOLUTION INTRAMUSCULAR; INTRAVENOUS at 14:08

## 2024-02-29 RX ADMIN — PROPOFOL 150 MG: 10 INJECTION, EMULSION INTRAVENOUS at 07:35

## 2024-02-29 RX ADMIN — LIDOCAINE HYDROCHLORIDE 100 MG: 20 INJECTION, SOLUTION INFILTRATION; PERINEURAL at 07:35

## 2024-02-29 RX ADMIN — FENTANYL CITRATE 25 MCG: 50 INJECTION INTRAMUSCULAR; INTRAVENOUS at 09:03

## 2024-02-29 RX ADMIN — TRANEXAMIC ACID 1950 MG: 650 TABLET ORAL at 06:15

## 2024-02-29 RX ADMIN — PHENYLEPHRINE HYDROCHLORIDE 0.3 MCG/KG/MIN: 10 INJECTION INTRAVENOUS at 07:53

## 2024-02-29 ASSESSMENT — ACTIVITIES OF DAILY LIVING (ADL)
ADLS_ACUITY_SCORE: 34
ADLS_ACUITY_SCORE: 34
ADLS_ACUITY_SCORE: 35
ADLS_ACUITY_SCORE: 34

## 2024-02-29 NOTE — ANESTHESIA POSTPROCEDURE EVALUATION
Patient: Giana Hope    Procedure: Procedure(s):  ARTHROPLASTY, RIGHT SHOULDER, TOTAL       Anesthesia Type:  General    Note:  Disposition: Outpatient   Postop Pain Control: Uneventful            Sign Out: Well controlled pain   PONV: No   Neuro/Psych: Uneventful            Sign Out: Acceptable/Baseline neuro status   Airway/Respiratory: Uneventful            Sign Out: Acceptable/Baseline resp. status   CV/Hemodynamics: Uneventful            Sign Out: Acceptable CV status; No obvious hypovolemia; No obvious fluid overload   Other NRE: NONE   DID A NON-ROUTINE EVENT OCCUR?            Last vitals:  Vitals Value Taken Time   /66 02/29/24 1045   Temp 36.8  C (98.2  F) 02/29/24 1015   Pulse 72 02/29/24 1053   Resp 12 02/29/24 1053   SpO2 99 % 02/29/24 1053   Vitals shown include unfiled device data.    Electronically Signed By: Grayson Malik MD  February 29, 2024  10:59 AM

## 2024-02-29 NOTE — DISCHARGE INSTRUCTIONS

## 2024-02-29 NOTE — ANESTHESIA PROCEDURE NOTES
Airway       Patient location during procedure: OR  Staff -        CRNA: Asher Silver APRN CRNA       Performed By: CRNAIndications and Patient Condition       Indications for airway management: andrew-procedural       Induction type:intravenous       Mask difficulty assessment: 1 - vent by mask    Final Airway Details       Final airway type: supraglottic airway    Supraglottic Airway Details        Type: LMA       Brand: Air-Q       LMA size: 4    Post intubation assessment        Placement verified by: capnometry and equal breath sounds        Number of attempts at approach: 1       Secured with: tape       Ease of procedure: easy       Dentition: Intact and Unchanged

## 2024-02-29 NOTE — ANESTHESIA PROCEDURE NOTES
Brachial plexus Procedure Note    Pre-Procedure   Staff -        Anesthesiologist:  Grayson Malik MD       Resident/Fellow: Ector Triplett MD       Performed By: fellow       Location: pre-op       Procedure Start/Stop Times: 2/29/2024 7:05 AM and 2/29/2024 7:15 AM       Pre-Anesthestic Checklist: patient identified, IV checked, site marked, risks and benefits discussed, informed consent, monitors and equipment checked, pre-op evaluation, at physician/surgeon's request and post-op pain management  Timeout:       Correct Patient: Yes        Correct Procedure: Yes        Correct Site: Yes        Correct Position: Yes        Correct Laterality: Yes        Site Marked: Yes  Procedure Documentation  Procedure: Brachial plexus       Diagnosis: POST OP PAIN CONTROL       Laterality: right       Patient Position: sitting       Patient Prep/Sterile Barriers: sterile gloves, mask       Skin prep: Chloraprep (interscalene approach).       Needle Type: insulated       Needle Gauge: 21.        Needle Length (Inches): 2        Ultrasound guided       1. Ultrasound was used to identify targeted nerve, plexus, vascular marker, or fascial plane and place a needle adjacent to it in real-time.       2. Ultrasound was used to visualize the spread of anesthetic in close proximity to the above referenced structure.       3. A permanent image is entered into the patient's record.    Assessment/Narrative         The placement was negative for: blood aspirated, painful injection and site bleeding       Paresthesias: No.       Bolus given via needle..        Secured via.        Insertion/Infusion Method: Single Shot       Complications: none    Medication(s) Administered   Bupivacaine 0.5% PF (Infiltration) - Infiltration   13 mL - 2/29/2024 7:05:00 AM  Bupivacaine liposome (Exparel) 1.3% LA inj susp (Infiltration) - Infiltration   10 mL - 2/29/2024 7:05:00 AM  Medication Administration Time: 2/29/2024 7:05 AM      FOR [facility]  "([facility]) ONLY:   Pain Team Contact information: please page the Pain Team Via [facility]. Search \"Pain\". During daytime hours, please page the attending first. At night please page the resident first.      "

## 2024-02-29 NOTE — OR NURSING
Pt waiting for daughter to come in to visually show her sling and answer any q uestions and go over arm sling. Pt discharged with daughter after reviewing sling.

## 2024-02-29 NOTE — ANESTHESIA CARE TRANSFER NOTE
Patient: Giana Hope    Procedure: Procedure(s):  ARTHROPLASTY, RIGHT SHOULDER, TOTAL       Diagnosis: Osteoarthritis of glenohumeral joint, right [M19.011]  Diagnosis Additional Information: No value filed.    Anesthesia Type:   General     Note:    Oropharynx: spontaneously breathing  Level of Consciousness: awake  Oxygen Supplementation: face mask  Level of Supplemental Oxygen (L/min / FiO2): 8  Independent Airway: airway patency satisfactory and stable  Dentition: dentition unchanged  Vital Signs Stable: post-procedure vital signs reviewed and stable  Report to RN Given: handoff report given  Patient transferred to: PACU    Handoff Report: Identifed the Patient, Identified the Reponsible Provider, Reviewed the pertinent medical history, Discussed the surgical course, Reviewed Intra-OP anesthesia mangement and issues during anesthesia, Set expectations for post-procedure period and Allowed opportunity for questions and acknowledgement of understanding      Vitals:  Vitals Value Taken Time   /60    Temp 36.9    Pulse 80    Resp 30 02/29/24 0949   SpO2 99 % 02/29/24 0949   Vitals shown include unfiled device data.    Electronically Signed By: NASIR Terry CRNA  February 29, 2024  9:51 AM

## 2024-02-29 NOTE — OP NOTE
"[facility]    Operative Note    Pre-operative diagnosis: 78-year-old female with chronic right shoulder pain due to end-stage osteoarthritic changes of the right glenohumeral joint. Insufficiently responding to non operative management.  Post-operative diagnosis Same as pre-operative diagnosis    Procedure: ARTHROPLASTY, RIGHT SHOULDER, TOTAL, Right - Shoulder    Surgeon: Surgeon(s) and Role:     * Gabriel Marrero MD - Primary     * Kourtney Bishop PA-C - Assisting     * Dayton Schmidt MD - Resident - Assisting  Anesthesia: Combined MAC with Interscalene Block   Estimated Blood Loss: Less than 100 ml    Drains: None  Specimens: * No specimens in log *    Complications: None.  Implants:   Implant Name Type Inv. Item Serial No.  Lot No. LRB No. Used Action   BONE CEMENT SIMPLEX FULL DOSE 6191-1-001 - [id number] Cement, Bone BONE CEMENT SIMPLEX FULL DOSE 6191-1-001  [employer] [id number] Right 1 Implanted   PIN STEINMANN BIOMET REV SHLDER 3.2MM 9\" THRD SS [id number] - [id number] Wire PIN STEINMANN BIOMET REV SHLDER 3.2MM 9\" THRD SS [id number]  [employer] [id number] Right 1 Used as a Supply   Threaded Tip Li pin 1/8 x 2.5 inch    [employer] [id number] Right 1 Used as a Supply   IMP GLENOID ZIM 4 PEG MOD SZ 4 [id number] - [id number] Total Joint Component/Insert IMP GLENOID ZIM 4 PEG MOD SZ 4 [id number]  [employer] [id number] Right 1 Implanted   IMP GLENOID MOD POST ZIM ALLIANCE TM [id number] - [id number] Total Joint Component/Insert IMP GLENOID MOD POST ZIM ALLIANCE TM [id number]  [employer] [id number] Right 1 Implanted   IMP ADAPTOR STD TPR BIOMET SHLDR [id number] - [id number] Total Joint Component/Insert IMP ADAPTOR STD TPR BIOMET SHLDR [id number]  [employer] [id number] Right 1 Implanted   IMP HEAD MOD BIOMET SHLDR MILAGRO OFFSET 42MM [id number] - [id number] Total Joint Component/Insert IMP HEAD MOD BIOMET SHLDR MILAGRO OFFSET 42MM [id number]  [employer] [id number] Right " 1 Implanted   IMP STEM PRIMARY SHLDR MICRO ZIM 15MM  [id number] - [id number] Total Joint Component/Insert IMP STEM PRIMARY SHLDR MICRO ZIM 15MM  [id number]  [employer] [id number] Right 1 Implanted       Description of procedure:     Presence of ANGEL Xavier was necessary throughout the entirety of procedure in order to adequately position patient, retract wound edges and present in the surgical field.  It was necessary during the wound closure and transferring of patient.     Patient was seen in the preoperative area where procedure, risks and complications, expectations and rehabilitation were discussed once more.  All questions were answered.  Patient understands and agrees to the treatment plan as set forth.  The right upper extremity was marked.  Informed consent was obtained and patient was taken to the operating room after receiving an interscalene block.     In the operating room patient received general anesthesia and was placed in the beachchair position.  All bony prominences were well-padded.  Patient received preoperative TXA and antibiotics.       Next the right upper extremity was prepped and draped in usual sterile fashion.  At the completion of a timeout procedure a deltopectoral incision was used.  Skin was opened using a #10 blade.  Subcutaneous tissue was incised using the electrocautery.  Hemostasis was obtained.  The coracoid process was identified and the appropriate deltopectoral interval was visualized.  The cephalic vein was identified and retracted laterally.  Now the clavipectoral fascia was opened.  The adhesions underneath the deltoid and in the subacromial space were released digitally.  Once this was done the head of the biceps tendon and sulcus were identified.  The biceps tendon sheath was opened longitudinally just medial of the lesser tuberosity.  The biceps tendon was then sutured and tenodesed in situ using an 0 Vicryl suture high in the sulcus and tenotomized just proximal of  this.   Brown retractor was placed and arm was externally rotated. Next the rotator interval was identified and opened.  The previous longitudinal incision overlying the bicipital groove.  Inferiorly the 3 sisters were identified and coagulated.  The subscapularis was then cut just medial of the lesser tuberosity using electrocautery.  With progressive external rotation the capsule was released of the humeral neck.  Inferiorly the axillary nerve was identified and its location was confirmed by means of a tug test.  It was protected throughout the entirety of the procedure.  Now the subscapularis tendon was tagged with Ethibond sutures.  The humeral head was delivered up from the incision.  Using the Derra retractor the supraspinatus tendon was kept out of harm's way.   Next using the [place] comprehensive shoulder system opening reamer the entry to the intramedullary canal just medial of the articular border of the humeral head centrally located was obtained.  Incremental reaming in 1 mm step was performed until a 15 mm microplasty reamer had adequate fixation.  Now using the humeral neck cut instrumentation a humeral head cut was performed in the anatomic retroversion of 30 degrees.  Excess osteophytes especially caudally were removed using osteotome and rongeurs.  Next the 15 mm microplasty broach was used taking into account the anatomic 30 degree retroversion.  Adequate fixation and rotational stability was obtained.     Now we moved onto the glenoid.  After a fukuda  retractor was placed posterior of the glenoid.  The biceps anchor and labrum were excised circumferentially.  2 batman retractors were placed anteriorly and superiorly. Adequate 360 degree visualization of the glenoid was obtained.  The wear of the articular surface was more pronounced posteriorly. A large anterior and inferior osteofyte was removed. The location of the center pin for the glenoid component was marked using electrocautery.  Then  using the sizing guide verified.  Size 4 was thought to be a good fit.  Taking to account the appropriate direction the central guide pin was placed.  Next the size 4 reamer was used to ream the glenoid perpendicular to the native glenoid.  Remaining osteophytic rim was removed using a rongeur.  Now the aiming guide for the 3 pegs was placed.  3 drill holes were placed around the center guide hole.  A trial size 3 right component was placed.  A 42 mm  humeral head was placed with the offset in the appropriate position A. The shoulder was trialed and found to have adequate stability after reduction of the components.  A 50% posterior translation and adequate bounce back was noted.  The shoulder was stable with the arm in 90 degrees of abduction and under gravity internally rotate to approximately 30 degrees.   Next the trial components were removed.  The glenoid was once more visualized.  The bone bed was extensively irrigated with normal saline and dried with a gauze.  Then PMMA cement was pressurized into the 3 peripheral holes and the definitive component was placed.  Constant pressure was applied until the cement had fully cured and the excess cement was removed.  Now we turned our attention back to the humerus.    The bone that was gently irrigated and the definitive humeral components were impacted into place taking account the appropriate version.  Again the shoulder joint was tested and exhibited 50% posterior translation and adequate bounce back.  The joint was then copiously irrigated with normal saline.     Next the subscapularis tendon was repaired with 6 Ethibond sutures.    The rotator interval was closed using #1 Vicryl sutures.     Once the rotator cuff was repaired the arm was ranged once more.  Them could move to 100 degrees of abduction, 120 degrees of forward flexion and 30 degrees of external rotation without dislocation or signs of tension on the subscap repair.     The wound was copiously  irrigated.  The deltopectoral interval was closed using interrupted 0 Vicryl suture. Finally the subcutaneous layer was closed using 0 Vicryl sutures, 2-0 Vicryl sutures and 3-0 Monocryl for the skin.  After wound closure the incision was dressed with Steri-Strips, alginate and Tegaderm.  The arm was placed in UltraSling.  Patient was awakened and returned to recovery in good condition.     Postoperative plan:     Pain medication as needed  Discharge with family today  PT/OT  X-rays in PACU  Patient is allowed pendulum swings at week 2 and can partake in passive and active assisted range of motion exercises with forward flexion and abduction to 90 degrees and external rotation to 0 degrees. Patient can transition to active range of motion exercises after 6 weeks. At this point he can also discontinue his sling.  Aspirin for DVT prophylaxis  Immediate hand, wrist and elbow exercises.  Follow-up in clinic in 2 weeks for wound check.        Gabriel Marrero MD, PhD      Adult Reconstruction  [facility] Department of Orthopaedic Surgery  Pager [phone]

## 2024-02-29 NOTE — BRIEF OP NOTE
"[facility]    Brief Operative Note    Pre-operative diagnosis: Osteoarthritis of glenohumeral joint, right [M19.011]  Post-operative diagnosis Same as pre-operative diagnosis    Procedure: ARTHROPLASTY, RIGHT SHOULDER, TOTAL, Right - Shoulder    Surgeon: Surgeon(s) and Role:     * Gabriel Marrero MD - Primary     * Kourtney Bishop PA-C - Assisting     * Dayton Schmidt MD - Resident - Assisting  Anesthesia: Combined MAC with Interscalene Block   Estimated Blood Loss: Less than 100 ml    Drains: None  Specimens: * No specimens in log *  Findings:   See detailed operative report .  Complications: None.  Implants:   Implant Name Type Inv. Item Serial No.  Lot No. LRB No. Used Action   BONE CEMENT SIMPLEX FULL DOSE 6191-1-001 - [id number] Cement, Bone BONE CEMENT SIMPLEX FULL DOSE 6191-1-001  [employer] [id number] Right 1 Implanted   PIN STEINMANN BIOMET REV SHLDER 3.2MM 9\" THRD SS [id number] - [id number] Wire PIN STEINMANN BIOMET REV SHLDER 3.2MM 9\" THRD SS [id number]  [employer] [id number] Right 1 Used as a Supply   Threaded Tip Li pin 1/8 x 2.5 inch    [employer] [id number] Right 1 Used as a Supply   IMP GLENOID ZIM 4 PEG MOD SZ 4 [id number] - [id number] Total Joint Component/Insert IMP GLENOID ZIM 4 PEG MOD SZ 4 [id number]  [employer] [id number] Right 1 Implanted   IMP GLENOID MOD POST ZIM ALLIANCE TM [id number] - [id number] Total Joint Component/Insert IMP GLENOID MOD POST ZIM ALLIANCE TM [id number]  [employer] [id number] Right 1 Implanted   IMP ADAPTOR STD TPR BIOMET SHLDR 935686 - [id number] Total Joint Component/Insert IMP ADAPTOR STD TPR BIOMET SHLDR 088706  [employer] [id number] Right 1 Implanted   IMP HEAD MOD BIOMET SHLDR MILAGRO OFFSET 42MM [id number] - [id number] Total Joint Component/Insert IMP HEAD MOD BIOMET SHLDR MILAGRO OFFSET 42MM [id number]  [employer] [id number] Right 1 Implanted   IMP STEM PRIMARY SHLDR MICRO ZIM 15MM  [id number] - [id number] " Total Joint Component/Insert IMP STEM PRIMARY SHLDR MICRO ZIM 15MM  [id number]  [employer] [id number] Right 1 Implanted       Assessment and Plan: Giana Hope is a 78 year old female now s/p R aTSA on 2/29/2024 with Dr. Marrero.     Ortho Primary  Pain medication as needed  Discharge with family today  PT/OT  X-rays in PACU  Patient is allowed pendulum swings at week 2 and can partake in passive and active assisted range of motion exercises with forward flexion and abduction to 90 degrees and external rotation to 0 degrees. Patient can transition to active range of motion exercises after 6 weeks. At this point he can also discontinue his sling.  Aspirin for DVT prophylaxis  Immediate hand, wrist and elbow exercises.  Follow-up in clinic in 2 weeks for wound check.    Dayton Schmidt MD  Orthopaedic Surgery PGY-4

## 2024-03-01 ENCOUNTER — VIRTUAL VISIT (OUTPATIENT)
Dept: PHYSICAL THERAPY | Facility: CLINIC | Age: 79
End: 2024-03-01
Attending: STUDENT IN AN ORGANIZED HEALTH CARE EDUCATION/TRAINING PROGRAM
Payer: MEDICARE

## 2024-03-01 DIAGNOSIS — Z96.611 S/P SHOULDER REPLACEMENT, RIGHT: ICD-10-CM

## 2024-03-01 DIAGNOSIS — M19.011 PRIMARY OSTEOARTHRITIS OF RIGHT SHOULDER: ICD-10-CM

## 2024-03-01 PROCEDURE — 97161 PT EVAL LOW COMPLEX 20 MIN: CPT | Mod: GP

## 2024-03-01 PROCEDURE — 97535 SELF CARE MNGMENT TRAINING: CPT | Mod: GP

## 2024-03-01 NOTE — PROGRESS NOTES
PHYSICAL THERAPY EVALUATION  Type of Visit: Evaluation    See electronic medical record for Abuse and Falls Screening details.    Subjective   Patient presents to virtual physical therapy appointment doing well post op day 1 R TSA. Has questions regarding use of the sling.       Presenting condition or subjective complaint:    Date of onset: 02/29/24    Relevant medical history:     Dates & types of surgery:      Prior diagnostic imaging/testing results:       Prior therapy history for the same diagnosis, illness or injury:        Prior Level of Function  Transfers: Independent  Ambulation: Independent  ADL: Independent  IADL:  Independent    Living Environment  Social support:   Patient lives alone, currently daughter living with her through Wednesday 3/6/24. Patient has friends nearby who are able to help.  Type of home:     Stairs to enter the home:         Ramp:     Stairs inside the home:         Help at home:  none  Equipment owned:       Patient goals for therapy:  return to upper body strengthening, swimming, mowing the lawn    Pain assessment: See objective evaluation for additional pain details     Objective   Physical Therapy Initial Evaluation: Subjective History  Date of Surgery: 2/29/2024.    Surgical Procedure/Limb: R TSA  Surgeon Name: Dr. Marrero  Average Daily Pain Levels: mild/10 (Location: superior, lateral shoulder; Quality: ache)  Other Symptoms: numbness into R UE  Symptom Mgmt Strategies: Tylenol, oxycodone, icing  Prior orthopaedic history/procedures: L TSA in 2020  Prior non-operative management: physical therapy, activity modification  Next MD Appt Date: 3/15/2024      Functional limitations following procedure: does not have use of R UE  Previous level of function: independent    Typical Physical Activities: light UE strengthening, swimming, mowing the lawn    Post-Operative Physical Therapy Examination      Anthropometric Measures  R shoulder ROM not tested d/t post op status.  L  AROM elevation WFL, did not use goniometry d/t virtual visit.       Right Left   Deltoid Muscle Activation NT WNL per AROM     Status of Incision: not assessed d/t virtual visit, patient reports no concerns or s/sxs of infection.      Assessment & Plan   CLINICAL IMPRESSIONS  Medical Diagnosis: R TSA    Treatment Diagnosis: R TSA   Impression/Assessment: Patient is a 78 year old female with R shoulder complaints.  The following significant findings have been identified: Pain, Decreased ROM/flexibility, Decreased strength, Decreased proprioception, Impaired muscle performance, Decreased activity tolerance, and Impaired posture. These impairments interfere with their ability to perform self care tasks, recreational activities, household chores, driving , household mobility, and community mobility as compared to previous level of function.     Clinical Decision Making (Complexity):  Clinical Presentation: Stable/Uncomplicated  Clinical Presentation Rationale: based on medical and personal factors listed in PT evaluation  Clinical Decision Making (Complexity): Low complexity    PLAN OF CARE  Treatment Interventions:  Modalities: Cryotherapy  Interventions: Manual Therapy, Neuromuscular Re-education, Therapeutic Activity, Therapeutic Exercise, Self-Care/Home Management    Long Term Goals     PT Goal 1  Goal Identifier: Reaching  Goal Description: Patient will be able to reach overhead pain no greater than 1/10, ROM WNL when compared to unaffected side  Rationale: to maximize safety and independence with performance of ADLs and functional tasks;to maximize safety and independence within the home;to maximize safety and independence with self cares  Target Date: 05/24/24  PT Goal 2  Goal Identifier: Weight lifting  Goal Description: Patient will be able to participate in light UE strength training without increased pain in order to maintain UE function and strength for daily activities and yard work  Target Date:  05/24/24      Frequency of Treatment: 1x/week  Duration of Treatment: 12 weeks    Recommended Referrals to Other Professionals:  none  Education Assessment:   Learner/Method: Patient;Demonstration;Pictures/Video;No Barriers to Learning    Risks and benefits of evaluation/treatment have been explained.   Patient/Family/caregiver agrees with Plan of Care.     Evaluation Time:     PT Eval, Low Complexity Minutes (11681): 10       Signing Clinician: DAYTON Valdez [facility]                                                                                   OUTPATIENT PHYSICAL THERAPY      PLAN OF TREATMENT FOR OUTPATIENT REHABILITATION   Patient's Last Name, First Name, Giana Cooley  S YOB: 1945   Provider's Name   [facility]   Medical Record No.  [id number]     Onset Date: 02/29/24  Start of Care Date: 03/01/24     Medical Diagnosis:  R TSA      PT Treatment Diagnosis:  R TSA Plan of Treatment  Frequency/Duration: 1x/week/ 12 weeks    Certification date from 03/01/24 to 05/24/24         See note for plan of treatment details and functional goals     Soheila Sebastian PT                         I CERTIFY THE NEED FOR THESE SERVICES FURNISHED UNDER        THIS PLAN OF TREATMENT AND WHILE UNDER MY CARE     (Physician attestation of this document indicates review and certification of the therapy plan).              Referring Provider:  Gabriel Marrero    Initial Assessment  See Epic Evaluation- Start of Care Date: 03/01/24

## 2024-03-06 NOTE — PROGRESS NOTES
Chief Complaint:   Follow up, DOS 2/29/24 with Dr. Marrero    Procedures:  Right TSA    History:  Giana Hope is a 78 year old who returns for routine postoperative evaluation of the right shoulder.     Attending PT at [facility]. Pain at times, but doesn't respond well to oxycodone. She is inquiring about other options. She denies drainage from incision. Denies numbness or tingling in upper extremity. Some increase in baseline elbow pain. Is removing sling for elbow/wrist ROM exercises.    Denies fevers, chills or sweats. Denies calf pain or tenderness, chest pain or shortness of breath.     Exam:     General: Awake, Alert, and oriented. Articulates and communicates with a normal affect    Right upper extremity: Incisions clean, dry, and intact. No erythema. No drainage. Sens intact Ax/Musc/Med/Rad/Uln nerves. Motor intact EPL, FPL, and Intrinsics. Shoulder range of motion not tested due to postop restrictions. Elbow ROM supple and full for flexion/extension/pronation/supination.    Imaging:  No new imaging today.    Medications:     Current Outpatient Medications:     acetaminophen (TYLENOL) 325 MG tablet, Take 2 tablets (650 mg) by mouth every 4 hours as needed for other (mild pain), Disp: 100 tablet, Rfl: 0    aspirin 81 MG EC tablet, Take 1 tablet (81 mg) by mouth 2 times daily, Disp: 60 tablet, Rfl: 0    Cyanocobalamin (VITAMIN B-12 PO), Take by mouth daily, Disp: , Rfl:     famotidine (PEPCID) 40 MG tablet, Take 1 tablet (40 mg) by mouth daily as needed for heartburn, Disp: 90 tablet, Rfl: 3    ketoconazole (NIZORAL) 2 % external shampoo, Apply topically three times a week, Disp: 120 mL, Rfl: 11    oxyCODONE (ROXICODONE) 5 MG tablet, Take 1-2 tablets (5-10 mg) by mouth every 4 hours as needed for moderate to severe pain, Disp: 26 tablet, Rfl: 0    polyethylene glycol (MIRALAX) 17 g packet, Take 17 g by mouth daily, Disp: 7 packet, Rfl: 0    pravastatin (PRAVACHOL) 20 MG tablet, Take  1 tablet (20 mg) by mouth daily (Patient taking differently: Take 20 mg by mouth at bedtime), Disp: 90 tablet, Rfl: 3    prednisoLONE acetate (PRED FORTE) 1 % ophthalmic suspension, Place 1 drop Into the left eye 3 times daily (Patient taking differently: Place 1 drop Into the left eye 3 times daily as needed), Disp: 15 mL, Rfl: 1    senna-docusate (SENOKOT-S/PERICOLACE) 8.6-50 MG tablet, Take 1-2 tablets by mouth 2 times daily Take while on oral narcotics to prevent or treat constipation., Disp: 30 tablet, Rfl: 0    Vitamin D3 (CHOLECALCIFEROL) 25 mcg (1000 units) tablet, Take 1 tablet by mouth daily, Disp: , Rfl:     Current Facility-Administered Medications:     lidocaine (PF) (XYLOCAINE) 1 % injection 5 mL, 5 mL, , , Michael Karimi PA-C, 5 mL at 12/04/23 1644    lidocaine 1 % injection 3 mL, 3 mL, , , Gabriel Marrero MD, 3 mL at 08/11/23 1104    lidocaine 1 % injection 4 mL, 4 mL, , , Gabriel Marrero MD, 4 mL at 08/11/23 1104    triamcinolone (KENALOG-40) injection 40 mg, 40 mg, , , Michael Karimi PA-C, 40 mg at 12/04/23 1644    triamcinolone (KENALOG-40) injection 40 mg, 40 mg, , , Gabriel Marrero MD, 40 mg at 08/11/23 1104    Assessment:  Doing well 2 weeks s/p right TSA.   Basic wound cares were performed today. I did not appreciate signs of infection. We discussed the plan below, all questions were answered to the best of my ability.     Plan:   -No active ROM until 6 weeks from surgery. Continue sling until 6 weeks from surgery.   -May begin pendulum swings and passive and active assisted ROM exercises with PT guidance, forward flexion and abduction to 90 degrees, external rotation to 0 degrees.   -Discussed trial of alternative pain medication. Tramadol sent to pharmacy. She will notify if she responds better and needs refill.   -DVT prophylaxis: ASA 81mg BID x 4 weeks  -Follow up: 6 weeks with Dr. Hoogervorst, will obtain new XR    Cheyanne Skarvan, PA-C   3/6/2024 2:43 PM  Orthopedic  Surgery

## 2024-03-12 ENCOUNTER — THERAPY VISIT (OUTPATIENT)
Dept: PHYSICAL THERAPY | Facility: CLINIC | Age: 79
End: 2024-03-12
Payer: MEDICARE

## 2024-03-12 DIAGNOSIS — M19.011 PRIMARY OSTEOARTHRITIS OF RIGHT SHOULDER: ICD-10-CM

## 2024-03-12 DIAGNOSIS — Z96.611 S/P SHOULDER REPLACEMENT, RIGHT: Primary | ICD-10-CM

## 2024-03-12 PROCEDURE — 97110 THERAPEUTIC EXERCISES: CPT | Mod: GP

## 2024-03-12 PROCEDURE — 97535 SELF CARE MNGMENT TRAINING: CPT | Mod: GP

## 2024-03-12 PROCEDURE — 97530 THERAPEUTIC ACTIVITIES: CPT | Mod: GP

## 2024-03-15 ENCOUNTER — OFFICE VISIT (OUTPATIENT)
Dept: ORTHOPEDICS | Facility: CLINIC | Age: 79
End: 2024-03-15
Payer: MEDICARE

## 2024-03-15 DIAGNOSIS — Z96.611 S/P SHOULDER REPLACEMENT, RIGHT: Primary | ICD-10-CM

## 2024-03-15 PROCEDURE — 99024 POSTOP FOLLOW-UP VISIT: CPT | Performed by: PHYSICIAN ASSISTANT

## 2024-03-15 RX ORDER — TRAMADOL HYDROCHLORIDE 50 MG/1
50 TABLET ORAL EVERY 6 HOURS PRN
Qty: 15 TABLET | Refills: 0 | Status: SHIPPED | OUTPATIENT
Start: 2024-03-15 | End: 2024-03-18

## 2024-03-15 NOTE — LETTER
3/15/2024         RE: Giana Hope  [address]  [place] [address]        Dear Colleague,    Thank you for referring your patient, Giana Hope, to the [facility] [place]. Please see a copy of my visit note below.    Chief Complaint:   Follow up, DOS 2/29/24 with Dr. Marrero    Procedures:  Right TSA    History:  Giana Hope is a 78 year old who returns for routine postoperative evaluation of the right shoulder.     Attending PT at [facility]. Pain at times, but doesn't respond well to oxycodone. She is inquiring about other options. She denies drainage from incision. Denies numbness or tingling in upper extremity. Some increase in baseline elbow pain. Is removing sling for elbow/wrist ROM exercises.    Denies fevers, chills or sweats. Denies calf pain or tenderness, chest pain or shortness of breath.     Exam:     General: Awake, Alert, and oriented. Articulates and communicates with a normal affect    Right upper extremity: Incisions clean, dry, and intact. No erythema. No drainage. Sens intact Ax/Musc/Med/Rad/Uln nerves. Motor intact EPL, FPL, and Intrinsics. Shoulder range of motion not tested due to postop restrictions. Elbow ROM supple and full for flexion/extension/pronation/supination.    Imaging:  No new imaging today.    Medications:     Current Outpatient Medications:     acetaminophen (TYLENOL) 325 MG tablet, Take 2 tablets (650 mg) by mouth every 4 hours as needed for other (mild pain), Disp: 100 tablet, Rfl: 0    aspirin 81 MG EC tablet, Take 1 tablet (81 mg) by mouth 2 times daily, Disp: 60 tablet, Rfl: 0    Cyanocobalamin (VITAMIN B-12 PO), Take by mouth daily, Disp: , Rfl:     famotidine (PEPCID) 40 MG tablet, Take 1 tablet (40 mg) by mouth daily as needed for heartburn, Disp: 90 tablet, Rfl: 3    ketoconazole (NIZORAL) 2 % external shampoo, Apply topically three times a week, Disp: 120 mL, Rfl: 11    oxyCODONE  (ROXICODONE) 5 MG tablet, Take 1-2 tablets (5-10 mg) by mouth every 4 hours as needed for moderate to severe pain, Disp: 26 tablet, Rfl: 0    polyethylene glycol (MIRALAX) 17 g packet, Take 17 g by mouth daily, Disp: 7 packet, Rfl: 0    pravastatin (PRAVACHOL) 20 MG tablet, Take 1 tablet (20 mg) by mouth daily (Patient taking differently: Take 20 mg by mouth at bedtime), Disp: 90 tablet, Rfl: 3    prednisoLONE acetate (PRED FORTE) 1 % ophthalmic suspension, Place 1 drop Into the left eye 3 times daily (Patient taking differently: Place 1 drop Into the left eye 3 times daily as needed), Disp: 15 mL, Rfl: 1    senna-docusate (SENOKOT-S/PERICOLACE) 8.6-50 MG tablet, Take 1-2 tablets by mouth 2 times daily Take while on oral narcotics to prevent or treat constipation., Disp: 30 tablet, Rfl: 0    Vitamin D3 (CHOLECALCIFEROL) 25 mcg (1000 units) tablet, Take 1 tablet by mouth daily, Disp: , Rfl:     Current Facility-Administered Medications:     lidocaine (PF) (XYLOCAINE) 1 % injection 5 mL, 5 mL, , , Michael Karimi PA-C, 5 mL at 12/04/23 1644    lidocaine 1 % injection 3 mL, 3 mL, , , Gabriel Marrero MD, 3 mL at 08/11/23 1104    lidocaine 1 % injection 4 mL, 4 mL, , , Gabriel Marrero MD, 4 mL at 08/11/23 1104    triamcinolone (KENALOG-40) injection 40 mg, 40 mg, , , Michael Karimi PA-C, 40 mg at 12/04/23 1644    triamcinolone (KENALOG-40) injection 40 mg, 40 mg, , , Gabriel Marrero MD, 40 mg at 08/11/23 1104    Assessment:  Doing well 2 weeks s/p right TSA.   Basic wound cares were performed today. I did not appreciate signs of infection. We discussed the plan below, all questions were answered to the best of my ability.     Plan:   -No active ROM until 6 weeks from surgery. Continue sling until 6 weeks from surgery.   -May begin pendulum swings and passive and active assisted ROM exercises with PT guidance, forward flexion and abduction to 90 degrees, external rotation to 0 degrees.   -Discussed trial  of alternative pain medication. Tramadol sent to pharmacy. She will notify if she responds better and needs refill.   -DVT prophylaxis: ASA 81mg BID x 4 weeks  -Follow up: 6 weeks with Dr. Marrero, will obtain new XR    Cheyanne Jackson PA-C   3/6/2024 2:43 PM  Orthopedic Surgery

## 2024-03-15 NOTE — NURSING NOTE
Reason For Visit:   Chief Complaint   Patient presents with    Surgical Followup     post op 02/29/24 Right total shoulder arthroplasty with Dr. Marrero       LMP 01/01/1998 (Approximate)     Pain Assessment  Patient Currently in Pain: Yes  0-10 Pain Scale: 4  Primary Pain Location: Shoulder (Right)  Pain Descriptors: Discomfort    Ce Ferrari ATC

## 2024-03-20 ENCOUNTER — THERAPY VISIT (OUTPATIENT)
Dept: PHYSICAL THERAPY | Facility: CLINIC | Age: 79
End: 2024-03-20
Payer: MEDICARE

## 2024-03-20 DIAGNOSIS — M19.011 PRIMARY OSTEOARTHRITIS OF RIGHT SHOULDER: ICD-10-CM

## 2024-03-20 DIAGNOSIS — Z96.611 S/P SHOULDER REPLACEMENT, RIGHT: Primary | ICD-10-CM

## 2024-03-20 PROCEDURE — 97110 THERAPEUTIC EXERCISES: CPT | Mod: GP

## 2024-03-20 PROCEDURE — 97140 MANUAL THERAPY 1/> REGIONS: CPT | Mod: GP

## 2024-03-29 ENCOUNTER — MYC MEDICAL ADVICE (OUTPATIENT)
Dept: ORTHOPEDICS | Facility: CLINIC | Age: 79
End: 2024-03-29
Payer: MEDICARE

## 2024-03-29 DIAGNOSIS — Z96.611 S/P SHOULDER REPLACEMENT, RIGHT: Primary | ICD-10-CM

## 2024-04-01 ENCOUNTER — THERAPY VISIT (OUTPATIENT)
Dept: PHYSICAL THERAPY | Facility: CLINIC | Age: 79
End: 2024-04-01
Payer: MEDICARE

## 2024-04-01 DIAGNOSIS — Z96.611 S/P SHOULDER REPLACEMENT, RIGHT: Primary | ICD-10-CM

## 2024-04-01 DIAGNOSIS — M19.011 PRIMARY OSTEOARTHRITIS OF RIGHT SHOULDER: ICD-10-CM

## 2024-04-01 PROCEDURE — 97140 MANUAL THERAPY 1/> REGIONS: CPT | Mod: GP

## 2024-04-01 PROCEDURE — 97110 THERAPEUTIC EXERCISES: CPT | Mod: GP

## 2024-04-01 RX ORDER — HYDROXYZINE HYDROCHLORIDE 25 MG/1
25 TABLET, FILM COATED ORAL 3 TIMES DAILY PRN
Qty: 30 TABLET | Refills: 0 | Status: SHIPPED | OUTPATIENT
Start: 2024-04-01 | End: 2024-07-01

## 2024-04-08 ENCOUNTER — THERAPY VISIT (OUTPATIENT)
Dept: PHYSICAL THERAPY | Facility: CLINIC | Age: 79
End: 2024-04-08
Payer: MEDICARE

## 2024-04-08 DIAGNOSIS — Z96.611 S/P SHOULDER REPLACEMENT, RIGHT: Primary | ICD-10-CM

## 2024-04-08 DIAGNOSIS — M19.011 PRIMARY OSTEOARTHRITIS OF RIGHT SHOULDER: ICD-10-CM

## 2024-04-08 PROCEDURE — 97140 MANUAL THERAPY 1/> REGIONS: CPT | Mod: GP

## 2024-04-08 PROCEDURE — 97110 THERAPEUTIC EXERCISES: CPT | Mod: GP

## 2024-04-12 ENCOUNTER — ANCILLARY PROCEDURE (OUTPATIENT)
Dept: GENERAL RADIOLOGY | Facility: CLINIC | Age: 79
End: 2024-04-12
Attending: STUDENT IN AN ORGANIZED HEALTH CARE EDUCATION/TRAINING PROGRAM
Payer: MEDICARE

## 2024-04-12 ENCOUNTER — OFFICE VISIT (OUTPATIENT)
Dept: ORTHOPEDICS | Facility: CLINIC | Age: 79
End: 2024-04-12
Payer: MEDICARE

## 2024-04-12 VITALS — SYSTOLIC BLOOD PRESSURE: 134 MMHG | DIASTOLIC BLOOD PRESSURE: 81 MMHG

## 2024-04-12 DIAGNOSIS — Z96.611 S/P SHOULDER REPLACEMENT, RIGHT: ICD-10-CM

## 2024-04-12 DIAGNOSIS — Z47.89 ORTHOPEDIC AFTERCARE: Primary | ICD-10-CM

## 2024-04-12 DIAGNOSIS — Z47.89 ORTHOPEDIC AFTERCARE: ICD-10-CM

## 2024-04-12 PROCEDURE — 73030 X-RAY EXAM OF SHOULDER: CPT | Mod: TC | Performed by: RADIOLOGY

## 2024-04-12 PROCEDURE — 99024 POSTOP FOLLOW-UP VISIT: CPT | Performed by: STUDENT IN AN ORGANIZED HEALTH CARE EDUCATION/TRAINING PROGRAM

## 2024-04-12 NOTE — PROGRESS NOTES
[facility]  Orthopaedic Surgery Consultation by Gabriel Marrero M.D.    Giana Hope MRN# [id number]   Age: 76 year old YOB: 1945     Requesting physician: Gilberto Pandya     Background history:  DX:  History of anesthesia problem  Gastroesophageal reflux disease without esophagitis  Nodule of left lung  DDD  Risk for falls  Hypercholesterolemia  Nuclear sclerosis      TREATMENTS:  2/25/2016, Left TKA, Сергей Pang MD , [facility], [facility], and Affiliated Practices  8/25/2020, Reverse Left TSA, Ector Chapa M.D., [facility]  11/26/2021, right total knee arthroplasty,   8/29/2024, right total shoulder arthroplasty, Dr. Marrero             History of Present Illness:     78-year-old female with chronic pain right shoulder due to end-stage osteoarthritic changes.  Patient underwent the above-mentioned procedure approximately 6 weeks ago.  Today she states she has been doing very well until Wednesday when she may have overdone her stretching exercises.  Her presurgical pain is gone.  Her postsurgical pain is well-controlled on a regimen of over-the-counter analgesics.  She has been working with a physical therapist.  She has discarded her sling.    Social:   Occupation: Retired   Living situation: Lives with cat.  She has family close by.  Hobbies / Sports: walking and hiking     Smoking: No  Alcohol: Yes  Illicit drug use: No         Physical Exam:     EXAMINATION pertinent findings:   PSYCH: Pleasant, healthy-appearing, alert, oriented x3, cooperative. Normal mood and affect.  VITAL SIGNS: Last menstrual period 01/01/1998, not currently breastfeeding.  Reviewed nursing intake notes.   There is no height or weight on file to calculate BMI.  RESP: non labored breathing   ABD: benign, soft, non-tender, no acute peritoneal findings  SKIN: grossly normal   LYMPHATIC: grossly normal, no  adenopathy, no extremity edema  NEURO: grossly normal , no motor deficits  VASCULAR: satisfactory perfusion of all extremities   MUSCULOSKELETAL:     Shoulder right: Well-healed incision.  No signs of infection.   Abduction 50, Forward flexion 40, ER 0, IR buttock. Cuff strength 5/5 for SSP/ISP and SSC.  Neurovascular intact RUE.           Data:   All laboratory data reviewed    All imaging studies reviewed by me personally.    XR shoulder right 4/12/2024:  My interpretation: Status post right total shoulder arthroplasty.  Adequate sizing, orientation and fixation of components.  No signs of complications.         Assessment and Plan:   Assessment:  78-year-old female with chronic pain of right shoulder due to end-stage osteoarthritic changes glenohumeral joint.  Patient underwent right total shoulder arthroplasty on 2/29/2024.  Currently experiencing postsurgical adhesive capsulitis.     Plan:  I discussed my findings with the patient.  She will continue with her pain medication as needed.  She can continue with physical therapy on stretching and strengthening exercises.  She can continue to work on her range of motion.  All questions were answered.  Patient understands and agrees to the treatment plan as set forth.  Will follow-up with her in approximately 2-3 months.      Gabriel Marrero MD, PhD     Adult Reconstruction  [facility] Department of Orthopaedic Surgery  Pager [phone]

## 2024-04-12 NOTE — LETTER
4/12/2024         RE: Giana Hope  [address]  [place] [address]        Dear Colleague,    Thank you for referring your patient, Giana Hope, to the [facility] [place]. Please see a copy of my visit note below.        [facility]  Orthopaedic Surgery Consultation by Gabriel Marrero M.D.    Giana Hope MRN# [id number]   Age: 76 year old YOB: 1945     Requesting physician: Gilberto Pandya     Background history:  DX:  History of anesthesia problem  Gastroesophageal reflux disease without esophagitis  Nodule of left lung  DDD  Risk for falls  Hypercholesterolemia  Nuclear sclerosis      TREATMENTS:  2/25/2016, Left TKA, Сергей Pang MD , [facility], and Affiliated Practices  8/25/2020, Reverse Left TSA, Ector Chapa M.D., [facility]  11/26/2021, right total knee arthroplasty,   8/29/2024, right total shoulder arthroplasty, Dr. Marrero             History of Present Illness:     78-year-old female with chronic pain right shoulder due to end-stage osteoarthritic changes.  Patient underwent the above-mentioned procedure approximately 6 weeks ago.  Today she states she has been doing very well until Wednesday when she may have overdone her stretching exercises.  Her presurgical pain is gone.  Her postsurgical pain is well-controlled on a regimen of over-the-counter analgesics.  She has been working with a physical therapist.  She has discarded her sling.    Social:   Occupation: Retired   Living situation: Lives with cat.  She has family close by.  Hobbies / Sports: walking and hiking     Smoking: No  Alcohol: Yes  Illicit drug use: No         Physical Exam:     EXAMINATION pertinent findings:   PSYCH: Pleasant, healthy-appearing, alert, oriented x3, cooperative. Normal mood and affect.  VITAL SIGNS: Last menstrual period 01/01/1998,  not currently breastfeeding.  Reviewed nursing intake notes.   There is no height or weight on file to calculate BMI.  RESP: non labored breathing   ABD: benign, soft, non-tender, no acute peritoneal findings  SKIN: grossly normal   LYMPHATIC: grossly normal, no adenopathy, no extremity edema  NEURO: grossly normal , no motor deficits  VASCULAR: satisfactory perfusion of all extremities   MUSCULOSKELETAL:     Shoulder right: Well-healed incision.  No signs of infection.   Abduction 50, Forward flexion 40, ER 0, IR buttock. Cuff strength 5/5 for SSP/ISP and SSC.  Neurovascular intact RUE.           Data:   All laboratory data reviewed    All imaging studies reviewed by me personally.    XR shoulder right 4/12/2024:  My interpretation: Status post right total shoulder arthroplasty.  Adequate sizing, orientation and fixation of components.  No signs of complications.         Assessment and Plan:   Assessment:  78-year-old female with chronic pain of right shoulder due to end-stage osteoarthritic changes glenohumeral joint.  Patient underwent right total shoulder arthroplasty on 2/29/2024.  Currently experiencing postsurgical adhesive capsulitis.     Plan:  I discussed my findings with the patient.  She will continue with her pain medication as needed.  She can continue with physical therapy on stretching and strengthening exercises.  She can continue to work on her range of motion.  All questions were answered.  Patient understands and agrees to the treatment plan as set forth.  Will follow-up with her in approximately 2-3 months.      Gabriel Marrero MD, PhD     Adult Reconstruction  [facility] Department of Orthopaedic Surgery  Pager [phone]              Again, thank you for allowing me to participate in the care of your patient.        Sincerely,        Gabriel Marrero MD

## 2024-04-12 NOTE — PATIENT INSTRUCTIONS
1. Orthopedic aftercare        Follow up with Dr. Marrero, Michael Karimi, or Tommy Silva in 2 months.     Call my office with any questions or concerns, [phone].

## 2024-04-12 NOTE — PROGRESS NOTES
Patient status update: Patient's pain level overnight has improved and she is sleeping better without use of the abduction pillow. Passive ROM flexion to 90 deg and ER to 0 deg, with limited progress in elevation ROM thus far.     04/08/24 0500   Appointment Info   Signing clinician's name / credentials Soheila Sebastian DPT   Total/Authorized Visits E&T 12 POC   Visits Used 5   Medical Diagnosis R TSA   PT Tx Diagnosis R TSA   Precautions/Limitations Per MD note: You can increase the passive range of motion to 120 degrees of forward elevation but not past 0 degrees of external rotation. Patient can transition to active range of motion exercises after 6 weeks.   Quick Adds Certification   Progress Note/Certification   Start of Care Date 03/01/24   Onset of illness/injury or Date of Surgery 02/29/24   Therapy Frequency 1x/week   Predicted Duration 12 weeks   Certification date from 03/01/24   Certification date to 05/24/24   Progress Note Due Date 04/30/24   Progress Note Completed Date 03/01/24   GOALS   PT Goals 2   PT Goal 1   Goal Identifier Reaching   Goal Description Patient will be able to reach overhead pain no greater than 1/10, ROM WNL when compared to unaffected side   Rationale to maximize safety and independence with performance of ADLs and functional tasks;to maximize safety and independence within the home;to maximize safety and independence with self cares   Goal Progress No change due to post-op status   Target Date 05/24/24   PT Goal 2   Goal Identifier Weight lifting   Goal Description Patient will be able to participate in light UE strength training without increased pain in order to maintain UE function and strength for daily activities and yard work   Goal Progress No change due to post op status   Target Date 05/24/24   Subjective Report   Subjective Report Has slept better without the abduction pillow, though still not great. Has elbow pain with wand exercises.   Objective Measures   Objective  Measures Objective Measure 1;Objective Measure 2   Objective Measure 1   Objective Measure Elbow PROM   Details no pain elbow ROM, full ROM   Objective Measure 2   Objective Measure Shoulder PROM   Details ER to 0 deg from neutral; flexion 90 deg   Treatment Interventions (PT)   Interventions Therapeutic Procedure/Exercise;Manual Therapy   Therapeutic Procedure/Exercise   Therapeutic Procedures: strength, endurance, ROM, flexibility minutes (06344) 30   Therapeutic Procedures Ther Proc 2   Ther Proc 1 pulleys   Ther Proc 1 - Details scaption 10x   Ther Proc 2 Shoulder PROM   Ther Proc 2 - Details flexion, scaption, ER within postop restrictions.   PTRx Ther Proc 1 Elbow Active Range of Motion Combined Extension and Flexion   PTRx Ther Proc 1 - Details 10x for review no elbow tightness noted   PTRx Ther Proc 2 Forearm Active Range of Motion Combined Pronation and Supination   PTRx Ther Proc 2 - Details 10x for review of form   PTRx Ther Proc 3 Wrist Active Range of Motion Extension and Flexion Combined   PTRx Ther Proc 3 - Details VR continue HEP   PTRx Ther Proc 4 Hand Strengthening Gripping   PTRx Ther Proc 4 - Details VR continue HEP   PTRx Ther Proc 5 Pendulum/Codmans   PTRx Ther Proc 5 - Details VR continue HEP   PTRx Ther Proc 6 Periscapular Table Slide Flexion   PTRx Ther Proc 6 - Details 10x gets to about 90 deg instruction on what she should feel   PTRx Ther Proc 7 Wand Shoulder Flexion in Standing   PTRx Ther Proc 7 - Details completed in supine and standing 2x6 reps about 90 deg   PTRx Ther Proc 8 Periscapular Table Slide Abduction   PTRx Ther Proc 8 - Details NT   PTRx Ther Proc 9 Upper Trapezius Stretch   PTRx Ther Proc 9 - Details VR continue HEP   PTRx Ther Proc 10 Levator Scapulae Stretch   PTRx Ther Proc 10 - Details VR continue HEP   PTRx Ther Proc 11 Thoracic Extension   PTRx Ther Proc 11 - Details 10x needed review of form and purpose   PTRx Ther Proc 12 Scapular Retraction/Depression   PTRx Ther  Proc 12 - Details 10x vc and tc for scap movement   Skilled Intervention instruction on what pt should feel with stretching and what is too much pain   Patient Response/Progress tolerated well with some soreness in shoulder. also experiences intermittent sharp elbow pain   Manual Therapy   Manual Therapy: Mobilization, MFR, MLD, friction massage minutes (28562) 10   Manual Therapy 1 MFR   Manual Therapy 1 - Details upper trap, levator, pec minor, rhomboids for pain relief/tightness with sling use   Skilled Intervention appropriate implementation of technique and patient assessment   Patient Response/Progress relief after   Education   Learner/Method Patient;Demonstration;Pictures/Video;No Barriers to Learning   Plan   Home program online   Updates to plan of care scap retraction, thoracic extension   Plan for next session continue progressing ROM   Total Session Time   Timed Code Treatment Minutes 40   Total Treatment Time (sum of timed and untimed services) 40       PLAN  Continue therapy per current plan of care.    Beginning/End Dates of Progress Note Reporting Period:  03/01/24 to 04/08/2024    Referring Provider:  Gabriel Marrero

## 2024-04-15 ENCOUNTER — THERAPY VISIT (OUTPATIENT)
Dept: PHYSICAL THERAPY | Facility: CLINIC | Age: 79
End: 2024-04-15
Payer: MEDICARE

## 2024-04-15 DIAGNOSIS — Z96.611 S/P SHOULDER REPLACEMENT, RIGHT: Primary | ICD-10-CM

## 2024-04-15 DIAGNOSIS — M19.011 PRIMARY OSTEOARTHRITIS OF RIGHT SHOULDER: ICD-10-CM

## 2024-04-15 PROCEDURE — 97110 THERAPEUTIC EXERCISES: CPT | Mod: GP

## 2024-04-15 PROCEDURE — 97140 MANUAL THERAPY 1/> REGIONS: CPT | Mod: GP

## 2024-04-18 ENCOUNTER — ANCILLARY PROCEDURE (OUTPATIENT)
Dept: GENERAL RADIOLOGY | Facility: CLINIC | Age: 79
End: 2024-04-18
Attending: FAMILY MEDICINE
Payer: MEDICARE

## 2024-04-18 ENCOUNTER — OFFICE VISIT (OUTPATIENT)
Dept: ORTHOPEDICS | Facility: CLINIC | Age: 79
End: 2024-04-18
Payer: MEDICARE

## 2024-04-18 ENCOUNTER — PRE VISIT (OUTPATIENT)
Dept: ORTHOPEDICS | Facility: CLINIC | Age: 79
End: 2024-04-18

## 2024-04-18 DIAGNOSIS — M19.021 OSTEOARTHROSIS OF RIGHT ELBOW: Primary | ICD-10-CM

## 2024-04-18 DIAGNOSIS — S46.311A STRAIN OF RIGHT TRICEPS TENDON, INITIAL ENCOUNTER: ICD-10-CM

## 2024-04-18 DIAGNOSIS — Z96.611 S/P SHOULDER REPLACEMENT, RIGHT: ICD-10-CM

## 2024-04-18 PROCEDURE — 73080 X-RAY EXAM OF ELBOW: CPT | Mod: RT | Performed by: RADIOLOGY

## 2024-04-18 PROCEDURE — 99213 OFFICE O/P EST LOW 20 MIN: CPT | Mod: 24 | Performed by: FAMILY MEDICINE

## 2024-04-18 NOTE — LETTER
4/18/2024      RE: Giana Hope  [address]  [place] [address]     Dear Colleague,    Thank you for referring your patient, Giana Hope, to the [facility] [place]. Please see a copy of my visit note below.    ASSESSMENT/PLAN:    (M19.021) Osteoarthrosis of right elbow  (primary encounter diagnosis)  Comment: exam, imaging consistent w/ moderate radiocapitellar OA; will trial OT; f/up in 6-8 wks; if no better, consider guided injection  Plan: Hand Therapy Referral          (S46.311A) Strain of right triceps tendon, initial encounter  Comment: see above; may be related to being in sling post-op or referred from underling OA; OT ordered  Plan: XR Elbow Right G/E 3 Views, Hand Therapy Referral          (Z96.611) S/P shoulder replacement, right  Comment: see above  Plan: Hand Therapy Referral          Iain Vela MD  April 18, 2024  4:12 PM        Pt is a 78 year old female w/ hx of R shoulder arthroplasty here today for:     HPI:   R Elbow pain :   Location: Olecranon process  Duration: 2 week  -> noted once she weaned out of her sling; she is 7 weeks out from shoulder replacement surgery  Radiation: no    Numbness/ Tingling? No    Weakness? Yes    Swelling? No    Imaging? No    Treatment tried: Ice    Pain worse with moving her arm up and bring her arm down. No pain in with flexion or extension of the elbow. No radiation.       Past Medical History:   Diagnosis Date     Chronic kidney disease, stage 3a (H) 10/03/2022     History of blood transfusion      Nonsenile cataract      Osteoarthritis      Pulmonary nodule      Syncopal episodes 06/06/2023     Uveitis       Past Surgical History:   Procedure Laterality Date     ARTHROPLASTY KNEE Right 11/26/2021    Procedure: ARTHROPLASTY, RIGHT KNEE, TOTAL;  Surgeon: Gabriel Marrero MD;  [facility]     ARTHROPLASTY SHOULDER Right 2/29/2024    Procedure: Right total shoulder arthroplasty;  Surgeon: Elida  MD Gabriel;  [facility]     ARTHROPLASTY, RIGHT KNEE, TOTAL Right 11/26/2021     CATARACT IOL, RT/LT Right 03/2019     left shoulder replacement      2021     PHACOEMULSIFICATION WITH STANDARD INTRAOCULAR LENS IMPLANT Right 03/22/2019    Procedure: Right Cataract Removal with Intraocular Lens Implant;  Surgeon: Trish Taylor MD;  [facility]      Current Outpatient Medications   Medication Sig Dispense Refill     acetaminophen (TYLENOL) 325 MG tablet Take 2 tablets (650 mg) by mouth every 4 hours as needed for other (mild pain) 100 tablet 0     aspirin 81 MG EC tablet Take 1 tablet (81 mg) by mouth 2 times daily (Patient not taking: Reported on 4/12/2024) 60 tablet 0     Cyanocobalamin (VITAMIN B-12 PO) Take by mouth daily       famotidine (PEPCID) 40 MG tablet Take 1 tablet (40 mg) by mouth daily as needed for heartburn 90 tablet 3     hydrOXYzine HCl (ATARAX) 25 MG tablet Take 1 tablet (25 mg) by mouth 3 times daily as needed for other (pain) (Patient not taking: Reported on 4/12/2024) 30 tablet 0     ketoconazole (NIZORAL) 2 % external shampoo Apply topically three times a week 120 mL 11     oxyCODONE (ROXICODONE) 5 MG tablet Take 1-2 tablets (5-10 mg) by mouth every 4 hours as needed for moderate to severe pain (Patient not taking: Reported on 4/12/2024) 26 tablet 0     polyethylene glycol (MIRALAX) 17 g packet Take 17 g by mouth daily (Patient not taking: Reported on 4/12/2024) 7 packet 0     pravastatin (PRAVACHOL) 20 MG tablet Take 1 tablet (20 mg) by mouth daily (Patient taking differently: Take 20 mg by mouth at bedtime) 90 tablet 3     prednisoLONE acetate (PRED FORTE) 1 % ophthalmic suspension Place 1 drop Into the left eye 3 times daily (Patient taking differently: Place 1 drop Into the left eye 3 times daily as needed) 15 mL 1     senna-docusate (SENOKOT-S/PERICOLACE) 8.6-50 MG tablet Take 1-2 tablets by mouth 2 times daily Take while on oral narcotics to prevent or treat constipation.  (Patient not taking: Reported on 4/12/2024) 30 tablet 0     Vitamin D3 (CHOLECALCIFEROL) 25 mcg (1000 units) tablet Take 1 tablet by mouth daily        Allergies   Allergen Reactions     Dust Mites Itching     Runny nose, fever     Mold Itching     Fever, runny nose     Pollen Extract Itching     Fever, runny nose     Celecoxib      Other reaction(s): GI intolerance      ROS:   Gen- no fevers/chills   Derm - no rash/ redness   Neuro - no numbness, no tingling   Remainder of ROS negative.     Exam:   LMP 01/01/1998 (Approximate)        R ELBOW:   Swelling: No   ROM: Flexion - Full/ extension - Full/ pronation - Full / supination- Full; pain w/ resisted elbow extension  Strength: 5/5 w/ elbow flexion/ extension   Bony tenderness: + tenderness at medial epicondyle and olecranon   Neuro: Negative ulnar tinel test.   Maneuvers: No pain w/ resisted wrist flexion/ pronation ; No pain w/ resisted wrist extension/ supination/ long finger extension; +TTP at distal triceps      Xray of R elbow on April 18, 2024 at [facility] [place] location - films personally reviewed with patient at time of visit     My impression: moderate radiocapitellar OA       Official read:  Impression:  1. Contour alteration moral and neck junction, maybe due to  age-indeterminate impaction fracture. Otherwise, no acute osseous  abnormality.  2. Moderate to severe degenerative changes of the radiocapitellar joint.  3. Joint effusion is present.  4. Osteopenic bones.      Again, thank you for allowing me to participate in the care of your patient.      Sincerely,    Iain Vela MD

## 2024-04-18 NOTE — TELEPHONE ENCOUNTER
DIAGNOSIS: r elbow pain / self referred / medicare / no img- per te dr huerta recommended sports provider.     APPOINTMENT DATE: 4.18.24   NOTES STATUS DETAILS   OFFICE NOTE from referring provider Internal 4.12.24  Elida  [facility]   MEDICATION LIST Internal

## 2024-04-18 NOTE — PROGRESS NOTES
ASSESSMENT/PLAN:    (M19.021) Osteoarthrosis of right elbow  (primary encounter diagnosis)  Comment: exam, imaging consistent w/ moderate radiocapitellar OA; will trial OT; f/up in 6-8 wks; if no better, consider guided injection  Plan: Hand Therapy Referral          (S46.311A) Strain of right triceps tendon, initial encounter  Comment: see above; may be related to being in sling post-op or referred from underling OA; OT ordered  Plan: XR Elbow Right G/E 3 Views, Hand Therapy Referral          (Z96.611) S/P shoulder replacement, right  Comment: see above  Plan: Hand Therapy Referral          Iain Vela MD  April 18, 2024  4:12 PM        Pt is a 78 year old female w/ hx of R shoulder arthroplasty here today for:     HPI:   R Elbow pain :   Location: Olecranon process  Duration: 2 week  -> noted once she weaned out of her sling; she is 7 weeks out from shoulder replacement surgery  Radiation: no    Numbness/ Tingling? No    Weakness? Yes    Swelling? No    Imaging? No    Treatment tried: Ice    Pain worse with moving her arm up and bring her arm down. No pain in with flexion or extension of the elbow. No radiation.       Past Medical History:   Diagnosis Date     Chronic kidney disease, stage 3a (H) 10/03/2022     History of blood transfusion      Nonsenile cataract      Osteoarthritis      Pulmonary nodule      Syncopal episodes 06/06/2023     Uveitis       Past Surgical History:   Procedure Laterality Date     ARTHROPLASTY KNEE Right 11/26/2021    Procedure: ARTHROPLASTY, RIGHT KNEE, TOTAL;  Surgeon: Gabriel Marrero MD;  [facility]     ARTHROPLASTY SHOULDER Right 2/29/2024    Procedure: Right total shoulder arthroplasty;  Surgeon: Gabriel Marrero MD;  [facility]     ARTHROPLASTY, RIGHT KNEE, TOTAL Right 11/26/2021     CATARACT IOL, RT/LT Right 03/2019     left shoulder replacement      2021     PHACOEMULSIFICATION WITH STANDARD INTRAOCULAR LENS IMPLANT Right 03/22/2019    Procedure: Right Cataract  Removal with Intraocular Lens Implant;  Surgeon: Trish Taylor MD;  [facility]      Current Outpatient Medications   Medication Sig Dispense Refill     acetaminophen (TYLENOL) 325 MG tablet Take 2 tablets (650 mg) by mouth every 4 hours as needed for other (mild pain) 100 tablet 0     aspirin 81 MG EC tablet Take 1 tablet (81 mg) by mouth 2 times daily (Patient not taking: Reported on 4/12/2024) 60 tablet 0     Cyanocobalamin (VITAMIN B-12 PO) Take by mouth daily       famotidine (PEPCID) 40 MG tablet Take 1 tablet (40 mg) by mouth daily as needed for heartburn 90 tablet 3     hydrOXYzine HCl (ATARAX) 25 MG tablet Take 1 tablet (25 mg) by mouth 3 times daily as needed for other (pain) (Patient not taking: Reported on 4/12/2024) 30 tablet 0     ketoconazole (NIZORAL) 2 % external shampoo Apply topically three times a week 120 mL 11     oxyCODONE (ROXICODONE) 5 MG tablet Take 1-2 tablets (5-10 mg) by mouth every 4 hours as needed for moderate to severe pain (Patient not taking: Reported on 4/12/2024) 26 tablet 0     polyethylene glycol (MIRALAX) 17 g packet Take 17 g by mouth daily (Patient not taking: Reported on 4/12/2024) 7 packet 0     pravastatin (PRAVACHOL) 20 MG tablet Take 1 tablet (20 mg) by mouth daily (Patient taking differently: Take 20 mg by mouth at bedtime) 90 tablet 3     prednisoLONE acetate (PRED FORTE) 1 % ophthalmic suspension Place 1 drop Into the left eye 3 times daily (Patient taking differently: Place 1 drop Into the left eye 3 times daily as needed) 15 mL 1     senna-docusate (SENOKOT-S/PERICOLACE) 8.6-50 MG tablet Take 1-2 tablets by mouth 2 times daily Take while on oral narcotics to prevent or treat constipation. (Patient not taking: Reported on 4/12/2024) 30 tablet 0     Vitamin D3 (CHOLECALCIFEROL) 25 mcg (1000 units) tablet Take 1 tablet by mouth daily        Allergies   Allergen Reactions     Dust Mites Itching     Runny nose, fever     Mold Itching     Fever, runny nose      Pollen Extract Itching     Fever, runny nose     Celecoxib      Other reaction(s): GI intolerance      ROS:   Gen- no fevers/chills   Derm - no rash/ redness   Neuro - no numbness, no tingling   Remainder of ROS negative.     Exam:   LMP 01/01/1998 (Approximate)        R ELBOW:   Swelling: No   ROM: Flexion - Full/ extension - Full/ pronation - Full / supination- Full; pain w/ resisted elbow extension  Strength: 5/5 w/ elbow flexion/ extension   Bony tenderness: + tenderness at medial epicondyle and olecranon   Neuro: Negative ulnar tinel test.   Maneuvers: No pain w/ resisted wrist flexion/ pronation ; No pain w/ resisted wrist extension/ supination/ long finger extension; +TTP at distal triceps      Xray of R elbow on April 18, 2024 at [facility] [place] location - films personally reviewed with patient at time of visit     My impression: moderate radiocapitellar OA       Official read:  Impression:  1. Contour alteration moral and neck junction, maybe due to  age-indeterminate impaction fracture. Otherwise, no acute osseous  abnormality.  2. Moderate to severe degenerative changes of the radiocapitellar joint.  3. Joint effusion is present.  4. Osteopenic bones.

## 2024-04-22 ENCOUNTER — THERAPY VISIT (OUTPATIENT)
Dept: PHYSICAL THERAPY | Facility: CLINIC | Age: 79
End: 2024-04-22
Payer: MEDICARE

## 2024-04-22 DIAGNOSIS — Z96.611 S/P SHOULDER REPLACEMENT, RIGHT: Primary | ICD-10-CM

## 2024-04-22 DIAGNOSIS — M19.011 PRIMARY OSTEOARTHRITIS OF RIGHT SHOULDER: ICD-10-CM

## 2024-04-22 PROCEDURE — 97110 THERAPEUTIC EXERCISES: CPT | Mod: GP

## 2024-04-23 ENCOUNTER — TELEPHONE (OUTPATIENT)
Dept: ORTHOPEDICS | Facility: CLINIC | Age: 79
End: 2024-04-23
Payer: MEDICARE

## 2024-04-23 NOTE — TELEPHONE ENCOUNTER
Patient confirmed scheduled appointment:  Date: 5/13  Time: 1:40  Visit type: Procedure  Provider: Dr. Vigil

## 2024-05-01 ENCOUNTER — THERAPY VISIT (OUTPATIENT)
Dept: PHYSICAL THERAPY | Facility: CLINIC | Age: 79
End: 2024-05-01
Payer: MEDICARE

## 2024-05-01 DIAGNOSIS — Z96.611 S/P SHOULDER REPLACEMENT, RIGHT: Primary | ICD-10-CM

## 2024-05-01 DIAGNOSIS — M19.011 PRIMARY OSTEOARTHRITIS OF RIGHT SHOULDER: ICD-10-CM

## 2024-05-01 PROCEDURE — 97110 THERAPEUTIC EXERCISES: CPT | Mod: GP

## 2024-05-01 NOTE — PROGRESS NOTES
2nd Req:TAMMY faxed to [facility] for colonoscopy completed with Dr. Germain in 2021      [facility]    Medicine Progress Note - Hospitalist Service       Date of Admission:  11/26/2021      CC  Knee pain     Assessment & Plan         Giana Hope is a 76 year old female admitted on 11/26/2021 for elective knee surgery.      # osteoarthritis   -status post  Right total knee arthroplasty  -per orthopedic surgery  , doing well       # pulmonary nodules   -defer back to  PMD   - last chest CT 11/18/2021   And shows stability over past 2 years      # GERD   -continue pepcid takes PRN      # hyperlipidemia  -continue statins on discharge       # DJD of spine  - Also thoracic MRI shows moderate neural foraminal narrowing at T5-6 T10-11   -defer back to PMD      # COVID-19 screen negative 11/23       disp, per orthopedic surgery , ok for discharge  Any time form my stand , filled out discharge  Form         Diet: Advance Diet as Tolerated: Regular Diet Adult  Discharge Instruction - Regular Diet Adult    DVT Prophylaxis: per orthopedic surgery    Obrien Catheter: Not present  Central Lines: None  Code Status: Full Code             Clarisa Mayo MD  Hospitalist Service  [facility]  Securely message with the Vocera Web Console (learn more here)  Text page via [employer] Paging/Directory                     ______________________________________________________________________    Interval History      Feels ok, pain under control, denies any chest pain  No SOB no nausea vomiting     Data reviewed today: I reviewed all medications, new labs and imaging results over the last 24 hours.   Physical Exam   Vital Signs: Temp: (!) 96.2  F (35.7  C) Temp src: Axillary BP: 120/49 Pulse: 67   Resp: 16 SpO2: 98 % O2 Device: None (Room air) Oxygen Delivery: 2 LPM  Weight: 148 lbs 12.97 oz     General appearence: awake alert  no apparent distress    HEENT: EOMI, PEARLA, sclera nonicteric,  moist,  mucus membranes,   NECK left  supple  RESPIRATORY: lungs clear to auscultation bilateral,  no wheezing or crackles   CARDIOVASCULAR:S1 S2 regular rate and rhythm, no rubs gallops or murmurs appreciated  GASTROINTESTINAL:soft, non-distended , non-tender , + bowel sounds, no masses felt   SKIN: warm and dry, no mottling noted   NEUROLOGIC; awake alert and oriented, no focal deficits found  EXTREMITIES: no clubbing, cyanosis or edema , moves all extremity, good pedal pulses   MUSCULOSKELETAL: without deformity , right knee wrapped       Data   Recent Labs   Lab 11/27/21  0644 11/27/21  0631 11/26/21  0634   HGB  --  12.0  --    GLC 90  --  86

## 2024-05-05 ENCOUNTER — HEALTH MAINTENANCE LETTER (OUTPATIENT)
Age: 79
End: 2024-05-05

## 2024-05-06 ENCOUNTER — THERAPY VISIT (OUTPATIENT)
Dept: PHYSICAL THERAPY | Facility: CLINIC | Age: 79
End: 2024-05-06
Payer: MEDICARE

## 2024-05-06 DIAGNOSIS — M19.011 PRIMARY OSTEOARTHRITIS OF RIGHT SHOULDER: ICD-10-CM

## 2024-05-06 DIAGNOSIS — Z96.611 S/P SHOULDER REPLACEMENT, RIGHT: Primary | ICD-10-CM

## 2024-05-06 PROCEDURE — 97140 MANUAL THERAPY 1/> REGIONS: CPT | Mod: GP

## 2024-05-06 PROCEDURE — 97110 THERAPEUTIC EXERCISES: CPT | Mod: GP

## 2024-05-13 ENCOUNTER — THERAPY VISIT (OUTPATIENT)
Dept: PHYSICAL THERAPY | Facility: CLINIC | Age: 79
End: 2024-05-13
Payer: MEDICARE

## 2024-05-13 ENCOUNTER — OFFICE VISIT (OUTPATIENT)
Dept: ORTHOPEDICS | Facility: CLINIC | Age: 79
End: 2024-05-13
Payer: MEDICARE

## 2024-05-13 DIAGNOSIS — M19.021 OSTEOARTHROSIS OF RIGHT ELBOW: Primary | ICD-10-CM

## 2024-05-13 DIAGNOSIS — Z96.611 S/P SHOULDER REPLACEMENT, RIGHT: Primary | ICD-10-CM

## 2024-05-13 DIAGNOSIS — M19.011 PRIMARY OSTEOARTHRITIS OF RIGHT SHOULDER: ICD-10-CM

## 2024-05-13 PROCEDURE — 97110 THERAPEUTIC EXERCISES: CPT | Mod: GP

## 2024-05-13 PROCEDURE — 20606 DRAIN/INJ JOINT/BURSA W/US: CPT | Mod: RT | Performed by: FAMILY MEDICINE

## 2024-05-13 RX ORDER — TRIAMCINOLONE ACETONIDE 40 MG/ML
40 INJECTION, SUSPENSION INTRA-ARTICULAR; INTRAMUSCULAR
Status: SHIPPED | OUTPATIENT
Start: 2024-05-13

## 2024-05-13 RX ORDER — LIDOCAINE HYDROCHLORIDE 10 MG/ML
1 INJECTION, SOLUTION EPIDURAL; INFILTRATION; INTRACAUDAL; PERINEURAL
Status: SHIPPED | OUTPATIENT
Start: 2024-05-13

## 2024-05-13 RX ADMIN — LIDOCAINE HYDROCHLORIDE 1 ML: 10 INJECTION, SOLUTION EPIDURAL; INFILTRATION; INTRACAUDAL; PERINEURAL at 14:19

## 2024-05-13 RX ADMIN — TRIAMCINOLONE ACETONIDE 40 MG: 40 INJECTION, SUSPENSION INTRA-ARTICULAR; INTRAMUSCULAR at 14:19

## 2024-05-13 NOTE — PROGRESS NOTES
[facility]  SPORTS & ORTHOPEDIC CLINIC VISIT     May 13, 2024      Ultrasound-guided right radiocapitellar joint injection    Date/Time: 5/13/2024 2:19 PM    Performed by: Rod Vigil MD  Authorized by: Rod Vigil MD    Indications:  Pain  Needle Size:  25 G  Guidance: ultrasound    Location:  Elbow  Location comment:  R Radiocapitellar   Site:  R elbow  Medications:  40 mg triamcinolone 40 MG/ML; 1 mL lidocaine (PF) 1 %  Outcome:  Tolerated well, no immediate complications  Procedure discussed: discussed risks, benefits, and alternatives    Consent Given by:  Patient  Timeout: timeout called immediately prior to procedure    Prep: patient was prepped and draped in usual sterile fashion     After informed consent was obtained the patient was positioned reclined on the exam table with right upper extremity propped on table and elbow pronated and slightly flexed to 90 degrees.  With ultrasound guidance the radiocapitellar joint was identified.  Ultrasound was necessary due to the small size of joint space secondary to osteoarthritis.  The area was cleaned with a chlorhexidine swab.  Next with sterile technique and direct and continuous ultrasound guidance a 25-gauge needle was introduced into the radiocapitellar joint and out of plane approach.  Next a solution of 40 Mg triamcinolone and 1 mL 1% lidocaine was injected and seen flowing into the joint space.  Images were captured and saved to the permanent record.  Patient tolerated the procedure well and there were no immediate complications.  Patient was instructed to follow-up with any dramatic increase in pain, redness, drainage from the injection site.  Otherwise routine postinjection instructions were given.    Rod Vigil MD

## 2024-05-13 NOTE — LETTER
5/13/2024      RE: Giana Hope  [address] [place] [facility] [address]     Dear Colleague,    Thank you for referring your patient, Giana Hope, to the [facility]IC[facility] [place]. Please see a copy of my visit note below.    [facility]  SPORTS & ORTHOPEDIC CLINIC VISIT     May 13, 2024      Ultrasound-guided right radiocapitellar joint injection    Date/Time: 5/13/2024 2:19 PM    Performed by: Rod Vigil MD  Authorized by: Rod Vigil MD    Indications:  Pain  Needle Size:  25 G  Guidance: ultrasound    Location:  Elbow  Location comment:  R Radiocapitellar   Site:  R elbow  Medications:  40 mg triamcinolone 40 MG/ML; 1 mL lidocaine (PF) 1 %  Outcome:  Tolerated well, no immediate complications  Procedure discussed: discussed risks, benefits, and alternatives    Consent Given by:  Patient  Timeout: timeout called immediately prior to procedure    Prep: patient was prepped and draped in usual sterile fashion     After informed consent was obtained the patient was positioned reclined on the exam table with right upper extremity propped on table and elbow pronated and slightly flexed to 90 degrees.  With ultrasound guidance the radiocapitellar joint was identified.  Ultrasound was necessary due to the small size of joint space secondary to osteoarthritis.  The area was cleaned with a chlorhexidine swab.  Next with sterile technique and direct and continuous ultrasound guidance a 25-gauge needle was introduced into the radiocapitellar joint and out of plane approach.  Next a solution of 40 Mg triamcinolone and 1 mL 1% lidocaine was injected and seen flowing into the joint space.  Images were captured and saved to the permanent record.  Patient tolerated the procedure well and there were no immediate complications.  Patient was instructed to follow-up with any dramatic increase in pain, redness, drainage from the injection site.   Otherwise routine postinjection instructions were given.    Rod Vigil MD          Again, thank you for allowing me to participate in the care of your patient.      Sincerely,    Rod Vigil MD

## 2024-05-13 NOTE — NURSING NOTE
[address] [address]  Dept: [phone]  ______________________________________________________________________________    Patient: Giana Hope   : 1945   MRN: [id number]   May 13, 2024    INVASIVE PROCEDURE SAFETY CHECKLIST    Date: May 13, 2024  Procedure:R Radiocapitellar USG CSI   Patient Name: Giana Hope  MRN: [id number]  YOB: 1945    Action: Complete sections as appropriate. Any discrepancy results in a HARD COPY until resolved.     PRE PROCEDURE:  Patient ID verified with 2 identifiers (name and  or MRN): Yes  Procedure and site verified with patient/designee (when able): Yes  Accurate consent documentation in medical record: Yes  H&P (or appropriate assessment) documented in medical record: Yes  H&P must be up to 20 days prior to procedure and updates within 24 hours of procedure as applicable: Yes  Relevant diagnostic and radiology test results appropriately labeled and displayed as applicable: Yes  Procedure site(s) marked with provider initials: NA    TIMEOUT:  Time-Out performed immediately prior to starting procedure, including verbal and active participation of all team members addressing the following:Yes  * Correct patient identify  * Confirmed that the correct side and site are marked  * An accurate procedure consent form  * Agreement on the procedure to be done  * Correct patient position  * Relevant images and results are properly labeled and appropriately displayed  * The need to administer antibiotics or fluids for irrigation purposes during the procedure as applicable   * Safety precautions based on patient history or medication use    DURING PROCEDURE: Verification of correct person, site, and procedures any time the responsibility for care of the patient is transferred to another member of the care team.       Prior to injection, verified patient identity using patient's name and date of  birth.  Due to injection administration, patient instructed to remain in clinic for 15 minutes  afterwards, and to report any adverse reaction to me immediately.    Joint injection was performed.      Drug Amount Wasted:  None.  Vial/Syringe: Single dose vial  Expiration Date:  1/1/26 5/1/27      Rupal Moreno, ATC  May 13, 2024

## 2024-05-20 ENCOUNTER — THERAPY VISIT (OUTPATIENT)
Dept: PHYSICAL THERAPY | Facility: CLINIC | Age: 79
End: 2024-05-20
Payer: MEDICARE

## 2024-05-20 DIAGNOSIS — Z96.611 S/P SHOULDER REPLACEMENT, RIGHT: Primary | ICD-10-CM

## 2024-05-20 DIAGNOSIS — M19.011 PRIMARY OSTEOARTHRITIS OF RIGHT SHOULDER: ICD-10-CM

## 2024-05-20 PROCEDURE — 97110 THERAPEUTIC EXERCISES: CPT | Mod: GP

## 2024-05-20 PROCEDURE — 97140 MANUAL THERAPY 1/> REGIONS: CPT | Mod: GP

## 2024-05-28 ENCOUNTER — THERAPY VISIT (OUTPATIENT)
Dept: PHYSICAL THERAPY | Facility: CLINIC | Age: 79
End: 2024-05-28
Payer: MEDICARE

## 2024-05-28 DIAGNOSIS — M19.011 PRIMARY OSTEOARTHRITIS OF RIGHT SHOULDER: ICD-10-CM

## 2024-05-28 DIAGNOSIS — Z96.611 S/P SHOULDER REPLACEMENT, RIGHT: Primary | ICD-10-CM

## 2024-05-28 PROCEDURE — 97110 THERAPEUTIC EXERCISES: CPT | Mod: GP

## 2024-06-03 ENCOUNTER — THERAPY VISIT (OUTPATIENT)
Dept: PHYSICAL THERAPY | Facility: CLINIC | Age: 79
End: 2024-06-03
Payer: MEDICARE

## 2024-06-03 DIAGNOSIS — M19.011 PRIMARY OSTEOARTHRITIS OF RIGHT SHOULDER: ICD-10-CM

## 2024-06-03 DIAGNOSIS — Z96.611 S/P SHOULDER REPLACEMENT, RIGHT: Primary | ICD-10-CM

## 2024-06-03 PROCEDURE — 97140 MANUAL THERAPY 1/> REGIONS: CPT | Mod: GP

## 2024-06-03 PROCEDURE — 97110 THERAPEUTIC EXERCISES: CPT | Mod: GP

## 2024-06-05 ENCOUNTER — OFFICE VISIT (OUTPATIENT)
Dept: ORTHOPEDICS | Facility: CLINIC | Age: 79
End: 2024-06-05
Payer: MEDICARE

## 2024-06-05 VITALS
HEIGHT: 63 IN | WEIGHT: 128 LBS | DIASTOLIC BLOOD PRESSURE: 68 MMHG | SYSTOLIC BLOOD PRESSURE: 129 MMHG | BODY MASS INDEX: 22.68 KG/M2

## 2024-06-05 DIAGNOSIS — Z96.611 S/P SHOULDER REPLACEMENT, RIGHT: Primary | ICD-10-CM

## 2024-06-05 DIAGNOSIS — G57.02 PIRIFORMIS SYNDROME, LEFT: ICD-10-CM

## 2024-06-05 PROCEDURE — 99213 OFFICE O/P EST LOW 20 MIN: CPT | Performed by: STUDENT IN AN ORGANIZED HEALTH CARE EDUCATION/TRAINING PROGRAM

## 2024-06-05 NOTE — PROGRESS NOTES
[facility]  Orthopaedic Surgery Consultation by Gabriel Marrero M.D.    Giana Hope MRN# [id number]   Age: 76 year old YOB: 1945     Requesting physician: Gilberto Pandya     Background history:  DX:  History of anesthesia problem  Gastroesophageal reflux disease without esophagitis  Nodule of left lung  DDD  Risk for falls  Hypercholesterolemia  Nuclear sclerosis      TREATMENTS:  2/25/2016, Left TKA, Сергей Pang MD , [facility]  8/25/2020, Reverse Left TSA, Ector Chapa M.D., [facility]  11/26/2021, right total knee arthroplasty,   8/29/2024, right total shoulder arthroplasty, Dr. Marrero             History of Present Illness:     78-year-old female with chronic pain right shoulder due to end-stage osteoarthritic changes.  Patient underwent the above-mentioned procedure approximately 9 months ago.  Today she states she has been doing better in regards to her range of motion.  She is continuing to work with physical therapy.  She is able to touch the back of her head, put on her mascara, eat and reach her buttocks.  Her presurgical pain is gone.  She has not been experiencing any postsurgical pain.  Does report discomfort after exercising.  The incision has well-healed.    Today patient also reports pain and discomfort in the left lateral posterior hip.  This pain has been coming and going for many months.  No antecedent trauma.  No groin pain.  Reports significant back pain.  No treatment initiated.    Social:   Occupation: Retired   Living situation: Lives with cat.  She has family close by.  Hobbies / Sports: walking and hiking     Smoking: No  Alcohol: Yes  Illicit drug use: No         Physical Exam:     EXAMINATION pertinent findings:   PSYCH: Pleasant, healthy-appearing, alert, oriented x3, cooperative. Normal mood and affect.  VITAL SIGNS:  "Blood pressure 129/68, height 1.6 m (5' 3\"), weight 58.1 kg (128 lb), last menstrual period 01/01/1998, not currently breastfeeding.  Reviewed nursing intake notes.   Body mass index is 22.67 kg/m .  RESP: non labored breathing   ABD: benign, soft, non-tender, no acute peritoneal findings  SKIN: grossly normal   LYMPHATIC: grossly normal, no adenopathy, no extremity edema  NEURO: grossly normal , no motor deficits  VASCULAR: satisfactory perfusion of all extremities   MUSCULOSKELETAL:     Shoulder right: Well-healed incision.  No signs of infection.   Abduction 100, Forward flexion 120, ER 30, IR buttock. Cuff strength 5/5 for SSP/ISP and SSC.  Neurovascular intact RUE.      Hip left: Full pain-free range of motion.  This excess is negative.  Some tenderness to palpation of greater trochanteric region.  Piriformis.           Data:   All laboratory data reviewed    All imaging studies reviewed by me personally.    Today no new imaging studies were obtained.    XR shoulder right 4/12/2024:  My interpretation: Status post right total shoulder arthroplasty.  Adequate sizing, orientation and fixation of components.  No signs of complications.         Assessment and Plan:   Assessment:  78-year-old female with chronic pain of right shoulder due to end-stage osteoarthritic changes glenohumeral joint.  Patient underwent right total shoulder arthroplasty on 2/29/2024.  Today presenting with significantly improved range of motion right shoulder.  Also presenting with intermittent pain and discomfort most likely due to piriformis syndrome/greater trochanteric bursitis.    Plan:  I discussed my findings with the patient.  She will continue with physical therapy on stretching and strengthening exercises.  She can continue to work on her range of motion.  It is my anticipation that she will continue to make gains in regards to her range of motion.  Regards to the left hip we discussed the diagnosis of piriformis " syndrome/trochanteric bursitis and the first-line treatment of physical therapy.  Patient cannot take anti-inflammatories.  A referral to physical therapist for strengthening, stretching, gait training was provided.  All questions were answered.  Patient understands and agrees to the treatment plan as set forth.  Will follow-up with her on an as-needed basis.      Gabriel Marrero MD, PhD     Adult Reconstruction  [facility] Department of Orthopaedic Surgery  Pager [phone]

## 2024-06-05 NOTE — LETTER
6/5/2024      Giana Hope  [address] [place] [address]      Dear Colleague,    Thank you for referring your patient, Giana Hope, to the [facility] [place]. Please see a copy of my visit note below.        [facility]  Orthopaedic Surgery Consultation by Gabriel Marrero M.D.    Giana Hope MRN# [id number]   Age: 76 year old YOB: 1945     Requesting physician: Gilberto Pandya     Background history:  DX:  History of anesthesia problem  Gastroesophageal reflux disease without esophagitis  Nodule of left lung  DDD  Risk for falls  Hypercholesterolemia  Nuclear sclerosis      TREATMENTS:  2/25/2016, Left TKA, Сергей Pang MD , [facility], [facility]  8/25/2020, Reverse Left TSA, Ector Chaap M.D., [facility]  11/26/2021, right total knee arthroplasty,   8/29/2024, right total shoulder arthroplasty, Dr. Marrero             History of Present Illness:     78-year-old female with chronic pain right shoulder due to end-stage osteoarthritic changes.  Patient underwent the above-mentioned procedure approximately 9 months ago.  Today she states she has been doing better in regards to her range of motion.  She is continuing to work with physical therapy.  She is able to touch the back of her head, put on her mascara, eat and reach her buttocks.  Her presurgical pain is gone.  She has not been experiencing any postsurgical pain.  Does report discomfort after exercising.  The incision has well-healed.    Today patient also reports pain and discomfort in the left lateral posterior hip.  This pain has been coming and going for many months.  No antecedent trauma.  No groin pain.  Reports significant back pain.  No treatment initiated.    Social:   Occupation: Retired   Living situation: Lives with cat.  She has family close  "by.  Hobbies / Sports: walking and hiking     Smoking: No  Alcohol: Yes  Illicit drug use: No         Physical Exam:     EXAMINATION pertinent findings:   PSYCH: Pleasant, healthy-appearing, alert, oriented x3, cooperative. Normal mood and affect.  VITAL SIGNS: Blood pressure 129/68, height 1.6 m (5' 3\"), weight 58.1 kg (128 lb), last menstrual period 01/01/1998, not currently breastfeeding.  Reviewed nursing intake notes.   Body mass index is 22.67 kg/m .  RESP: non labored breathing   ABD: benign, soft, non-tender, no acute peritoneal findings  SKIN: grossly normal   LYMPHATIC: grossly normal, no adenopathy, no extremity edema  NEURO: grossly normal , no motor deficits  VASCULAR: satisfactory perfusion of all extremities   MUSCULOSKELETAL:     Shoulder right: Well-healed incision.  No signs of infection.   Abduction 100, Forward flexion 120, ER 30, IR buttock. Cuff strength 5/5 for SSP/ISP and SSC.  Neurovascular intact RUE.      Hip left: Full pain-free range of motion.  This excess is negative.  Some tenderness to palpation of greater trochanteric region.  Piriformis.           Data:   All laboratory data reviewed    All imaging studies reviewed by me personally.    Today no new imaging studies were obtained.    XR shoulder right 4/12/2024:  My interpretation: Status post right total shoulder arthroplasty.  Adequate sizing, orientation and fixation of components.  No signs of complications.         Assessment and Plan:   Assessment:  78-year-old female with chronic pain of right shoulder due to end-stage osteoarthritic changes glenohumeral joint.  Patient underwent right total shoulder arthroplasty on 2/29/2024.  Today presenting with significantly improved range of motion right shoulder.  Also presenting with intermittent pain and discomfort most likely due to piriformis syndrome/greater trochanteric bursitis.    Plan:  I discussed my findings with the patient.  She will continue with physical therapy on " stretching and strengthening exercises.  She can continue to work on her range of motion.  It is my anticipation that she will continue to make gains in regards to her range of motion.  Regards to the left hip we discussed the diagnosis of piriformis syndrome/trochanteric bursitis and the first-line treatment of physical therapy.  Patient cannot take anti-inflammatories.  A referral to physical therapist for strengthening, stretching, gait training was provided.  All questions were answered.  Patient understands and agrees to the treatment plan as set forth.  Will follow-up with her on an as-needed basis.      Gabriel Marrero MD, PhD     Adult Reconstruction  [facility] Department of Orthopaedic Surgery  Pager [phone]              Again, thank you for allowing me to participate in the care of your patient.        Sincerely,        Gabriel Marrero MD

## 2024-06-24 ENCOUNTER — THERAPY VISIT (OUTPATIENT)
Dept: PHYSICAL THERAPY | Facility: CLINIC | Age: 79
End: 2024-06-24
Payer: MEDICARE

## 2024-06-24 DIAGNOSIS — Z96.611 S/P SHOULDER REPLACEMENT, RIGHT: Primary | ICD-10-CM

## 2024-06-24 DIAGNOSIS — M19.011 PRIMARY OSTEOARTHRITIS OF RIGHT SHOULDER: ICD-10-CM

## 2024-06-24 PROCEDURE — 97110 THERAPEUTIC EXERCISES: CPT | Mod: GP

## 2024-06-24 NOTE — PROGRESS NOTES
06/24/24 0500   Appointment Info   Signing clinician's name / credentials Soheila Sebastian DPT   Total/Authorized Visits E&T 12 POC   Visits Used 14   Medical Diagnosis R TSA   PT Tx Diagnosis R TSA   Precautions/Limitations Patient can transition to active range of motion exercises after 6 weeks. May begin isometrics and light strengthening flexion, ER, ABD. Still no resisted IR/backward extension until 3 months post-op.   Quick Adds Certification   Progress Note/Certification   Start of Care Date 03/01/24   Onset of illness/injury or Date of Surgery 02/29/24   Therapy Frequency 1x/week   Predicted Duration 4 weeks tapering to 2x/month for 8 weeks   Certification date from 05/25/25   Certification date to 08/16/24   Progress Note Due Date 05/24/24   Progress Note Completed Date 04/12/24   GOALS   PT Goals 2   PT Goal 1   Goal Identifier Reaching   Goal Description Patient will be able to reach overhead pain no greater than 1/10, ROM WNL when compared to unaffected side   Rationale to maximize safety and independence with performance of ADLs and functional tasks;to maximize safety and independence within the home;to maximize safety and independence with self cares   Goal Progress flexion 95 deg AROM, date extended   Target Date 08/16/24   PT Goal 2   Goal Identifier Weight lifting   Goal Description Patient will be able to participate in light UE strength training without increased pain in order to maintain UE function and strength for daily activities and yard work   Goal Progress have initiated strengthenign with good tolerated   Target Date 05/24/24   Date Met 05/28/24   Subjective Report   Subjective Report Shoulder is feeling pretty good. Saw surgeon, good report from him, he thinks she can still gain ROM.   Objective Measures   Objective Measures Objective Measure 1;Objective Measure 2   Objective Measure 1   Objective Measure shoulder AROM   Details shoulder flexion 95, abd 90 (with anterior drift),  IR/ext L5.   Objective Measure 2   Objective Measure Shoulder PROM   Details Supine flexion 130 AAROM (improved), PROM ER 50 deg, ABD 95 deg - no change.   Treatment Interventions (PT)   Interventions Therapeutic Procedure/Exercise   Therapeutic Procedure/Exercise   Therapeutic Procedures: strength, endurance, ROM, flexibility minutes (55334) 40   Therapeutic Procedures Ther Proc 2   Ther Proc 2 Shoulder PROM   Ther Proc 2 - Details ER and ABD 10x each   PTRx Ther Proc 1 Pendulum/Codmans   PTRx Ther Proc 1 - Details continue HEP   PTRx Ther Proc 2 Thoracic Extension   PTRx Ther Proc 2 - Details continue HEP for mobility   PTRx Ther Proc 3 Wand Shoulder External Rotation in Standing   PTRx Ther Proc 3 - Details NT   PTRx Ther Proc 4 Shoulder Flexion   PTRx Ther Proc 4 - Details 10x full ROM cues to avoid shrug   PTRx Ther Proc 5 Shoulder Scaption Full Can   PTRx Ther Proc 5 - Details 10x full ROM   PTRx Ther Proc 6 Shoulder Flexion (Short Lever)   PTRx Ther Proc 6 - Details 10x (prayer position)   PTRx Ther Proc 7 Internal Rotation Behind Back with Overpressure   PTRx Ther Proc 7 - Details reviewed compared ROM bilaterally and appears equal continue HEP   PTRx Ther Proc 8 Standing Passive Shoulder Flexion   PTRx Ther Proc 8 - Details NT   PTRx Ther Proc 9 Supine AAROM Shoulder Flexion   PTRx Ther Proc 9 - Details 5x5 sec holds - instruction to focus on which ever flexion stretch feels the best    PTRx Ther Proc 10 Shoulder Theraband Rows   PTRx Ther Proc 10 - Details blue band used and pt demonstrated proper form throughout. 10 reps completed.   PTRx Ther Proc 11 Shoulder Theraband Internal Rotation   PTRx Ther Proc 11 - Details red band used with proper form used throughout. 10 reps completed.   PTRx Ther Proc 12 Shoulder Theraband External Rotation   PTRx Ther Proc 12 - Details 10x YTB 5x red cues for full ROM   PTRx Ther Proc 13 Shoulder Theraband Low Row/Pulldown   PTRx Ther Proc 13 - Details Green band used for  entire exercise. 10 reps completed.   PTRx Ther Proc 14 Four Corner Stretch Flexion   PTRx Ther Proc 14 - Details 5x continue HEP   PTRx Ther Proc 15 Four Corner Stretch External Rotation at Side   PTRx Ther Proc 15 - Details NT   PTRx Ther Proc 16 Four Corner Stretch External Rotation With Abduction   PTRx Ther Proc 16 - Details 5x continue HEP - cues for longer holds   PTRx Ther Proc 17 Wall Slides Abduction   PTRx Ther Proc 17 - Details 5x continue HEP cues for longer holds   PTRx Ther Proc 18 Theraband Shoulder External Rotation at 90/90   PTRx Ther Proc 18 - Details 10 reps no band completed BON   Skilled Intervention Edu on how to prioritize exercises and adjust stretching/strengthening appropriately   Patient Response/Progress tolerated well without elbow pain today   Manual Therapy   Manual Therapy 1 MFR   Manual Therapy 1 - Details upper trap, levator, rhomboids, pec major for pain relief with stretching   Skilled Intervention appropriate implementation of technique and patient assessment   Education   Learner/Method Patient;Demonstration;Pictures/Video;No Barriers to Learning   Plan   Home program online   Updates to plan of care progressed band resistance, added active 90/90 ER   Plan for next session manual work for ROM, progress ROM/strengthening as able   Total Session Time   Timed Code Treatment Minutes 40   Total Treatment Time (sum of timed and untimed services) 40       PLAN  Continue therapy per current plan of care.    Beginning/End Dates of Progress Note Reporting Period:  04/12/24 to 06/24/2024    Referring Provider:  Gabriel Marrero

## 2024-06-30 SDOH — HEALTH STABILITY: PHYSICAL HEALTH: ON AVERAGE, HOW MANY DAYS PER WEEK DO YOU ENGAGE IN MODERATE TO STRENUOUS EXERCISE (LIKE A BRISK WALK)?: 5 DAYS

## 2024-06-30 SDOH — HEALTH STABILITY: PHYSICAL HEALTH: ON AVERAGE, HOW MANY MINUTES DO YOU ENGAGE IN EXERCISE AT THIS LEVEL?: 40 MIN

## 2024-06-30 ASSESSMENT — SOCIAL DETERMINANTS OF HEALTH (SDOH): HOW OFTEN DO YOU GET TOGETHER WITH FRIENDS OR RELATIVES?: ONCE A WEEK

## 2024-07-01 ENCOUNTER — LAB (OUTPATIENT)
Dept: LAB | Facility: CLINIC | Age: 79
End: 2024-07-01
Payer: MEDICARE

## 2024-07-01 ENCOUNTER — OFFICE VISIT (OUTPATIENT)
Dept: FAMILY MEDICINE | Facility: CLINIC | Age: 79
End: 2024-07-01
Payer: MEDICARE

## 2024-07-01 VITALS
WEIGHT: 129.7 LBS | SYSTOLIC BLOOD PRESSURE: 113 MMHG | OXYGEN SATURATION: 98 % | BODY MASS INDEX: 22.98 KG/M2 | DIASTOLIC BLOOD PRESSURE: 69 MMHG | TEMPERATURE: 97.3 F | HEART RATE: 85 BPM

## 2024-07-01 DIAGNOSIS — K21.9 GASTROESOPHAGEAL REFLUX DISEASE WITHOUT ESOPHAGITIS: ICD-10-CM

## 2024-07-01 DIAGNOSIS — Z12.31 VISIT FOR SCREENING MAMMOGRAM: ICD-10-CM

## 2024-07-01 DIAGNOSIS — K59.01 SLOW TRANSIT CONSTIPATION: ICD-10-CM

## 2024-07-01 DIAGNOSIS — E83.52 HYPERCALCEMIA: ICD-10-CM

## 2024-07-01 DIAGNOSIS — E78.5 HYPERLIPIDEMIA WITH TARGET LDL LESS THAN 130: ICD-10-CM

## 2024-07-01 DIAGNOSIS — Z00.00 ENCOUNTER FOR MEDICARE ANNUAL WELLNESS EXAM: Primary | ICD-10-CM

## 2024-07-01 DIAGNOSIS — Z00.00 ENCOUNTER FOR MEDICARE ANNUAL WELLNESS EXAM: ICD-10-CM

## 2024-07-01 DIAGNOSIS — Z96.611 S/P SHOULDER REPLACEMENT, RIGHT: ICD-10-CM

## 2024-07-01 DIAGNOSIS — Z23 ENCOUNTER FOR IMMUNIZATION: ICD-10-CM

## 2024-07-01 DIAGNOSIS — N18.2 CKD (CHRONIC KIDNEY DISEASE) STAGE 2, GFR 60-89 ML/MIN: ICD-10-CM

## 2024-07-01 DIAGNOSIS — M62.838 SPASM OF ABDOMINAL MUSCLES OF LEFT SIDE: ICD-10-CM

## 2024-07-01 LAB
ALBUMIN SERPL BCG-MCNC: 4.3 G/DL (ref 3.5–5.2)
ALP SERPL-CCNC: 91 U/L (ref 40–150)
ALT SERPL W P-5'-P-CCNC: 9 U/L (ref 0–50)
ANION GAP SERPL CALCULATED.3IONS-SCNC: 11 MMOL/L (ref 7–15)
AST SERPL W P-5'-P-CCNC: 19 U/L (ref 0–45)
BILIRUB SERPL-MCNC: 0.3 MG/DL
BUN SERPL-MCNC: 21.5 MG/DL (ref 8–23)
CALCIUM SERPL-MCNC: 10 MG/DL (ref 8.8–10.2)
CHLORIDE SERPL-SCNC: 103 MMOL/L (ref 98–107)
CREAT SERPL-MCNC: 0.96 MG/DL (ref 0.51–0.95)
DEPRECATED HCO3 PLAS-SCNC: 24 MMOL/L (ref 22–29)
EGFRCR SERPLBLD CKD-EPI 2021: 60 ML/MIN/1.73M2
GLUCOSE SERPL-MCNC: 96 MG/DL (ref 70–99)
HBA1C MFR BLD: 6 %
POTASSIUM SERPL-SCNC: 4.6 MMOL/L (ref 3.4–5.3)
PROT SERPL-MCNC: 7 G/DL (ref 6.4–8.3)
PTH-INTACT SERPL-MCNC: 45 PG/ML (ref 15–65)
SODIUM SERPL-SCNC: 138 MMOL/L (ref 135–145)
VIT D+METAB SERPL-MCNC: 87 NG/ML (ref 20–50)

## 2024-07-01 PROCEDURE — 83970 ASSAY OF PARATHORMONE: CPT | Performed by: PATHOLOGY

## 2024-07-01 PROCEDURE — 80053 COMPREHEN METABOLIC PANEL: CPT | Performed by: PATHOLOGY

## 2024-07-01 PROCEDURE — 91320 SARSCV2 VAC 30MCG TRS-SUC IM: CPT | Performed by: FAMILY MEDICINE

## 2024-07-01 PROCEDURE — 90480 ADMN SARSCOV2 VAC 1/ONLY CMP: CPT | Performed by: FAMILY MEDICINE

## 2024-07-01 PROCEDURE — 99000 SPECIMEN HANDLING OFFICE-LAB: CPT | Performed by: PATHOLOGY

## 2024-07-01 PROCEDURE — 99214 OFFICE O/P EST MOD 30 MIN: CPT | Mod: 25 | Performed by: FAMILY MEDICINE

## 2024-07-01 PROCEDURE — G0439 PPPS, SUBSEQ VISIT: HCPCS | Performed by: FAMILY MEDICINE

## 2024-07-01 PROCEDURE — 36415 COLL VENOUS BLD VENIPUNCTURE: CPT | Performed by: PATHOLOGY

## 2024-07-01 PROCEDURE — 82306 VITAMIN D 25 HYDROXY: CPT | Performed by: FAMILY MEDICINE

## 2024-07-01 PROCEDURE — 83036 HEMOGLOBIN GLYCOSYLATED A1C: CPT | Performed by: FAMILY MEDICINE

## 2024-07-01 RX ORDER — FAMOTIDINE 40 MG/1
40 TABLET, FILM COATED ORAL DAILY PRN
Qty: 90 TABLET | Refills: 3 | Status: SHIPPED | OUTPATIENT
Start: 2024-07-01

## 2024-07-01 RX ORDER — PRAVASTATIN SODIUM 20 MG
20 TABLET ORAL DAILY
Qty: 90 TABLET | Refills: 3 | Status: SHIPPED | OUTPATIENT
Start: 2024-07-01

## 2024-07-01 NOTE — PROGRESS NOTES
Preventive Care Visit  [facility]  Gilberto Vasquez MD, Family Medicine  Jul 1, 2024      Assessment & Plan     Encounter for Medicare annual wellness exam  She presents for Medicare wellness also has concerns about her health including left sided pain which likely is musculoskeletal versus constipation.  She has been using her left arm for most work including pulling suitcases and carrying heavy things because of recent right arm surgery.  She has home Voltaren gel she could try and areas of discomfort.  - Comprehensive metabolic panel (BMP + Alb, Alk Phos, ALT, AST, Total. Bili, TP)  - Vitamin D Deficiency  - Hemoglobin A1c    CKD (chronic kidney disease) stage 2, GFR 60-89 ml/min  History of renal impairment due for labs  - Albumin Random Urine Quantitative with Creat Ratio    Visit for screening mammogram  Reviewed screening and family history of breast cancer due for mammography in October 2024, normal clinical breast exam today.  - MA Screen Bilateral w/Pedro    Slow transit constipation  Left-sided pain may be related to constipation would recommend psyllium reviewed different types she prefers capsules with a large glass of water each day adjust dose to have soft bowel movement every day.  - psyllium (METAMUCIL/KONSYL) capsule  Dispense: 90 capsule; Refill: 3    S/P shoulder replacement, right  Stable at this time    Encounter for immunization  Provided  - COVID-19 12+ (2023-24) (PFIZER)    Hypercalcemia  Previous calcium level elevated she discontinue calcium supplementation she still is taking vitamin D results came back after visit she should discontinue vitamin D supplementation calcium was in normal range.  - Comprehensive metabolic panel (BMP + Alb, Alk Phos, ALT, AST, Total. Bili, TP)  - Vitamin D Deficiency  - Parathyroid Hormone Intact    Hyperlipidemia with target LDL less than 130  Refill provided  - pravastatin (PRAVACHOL) 20 MG tablet  Dispense: 90  tablet; Refill: 3    Gastroesophageal reflux disease without esophagitis  Refill provided  - famotidine (PEPCID) 40 MG tablet  Dispense: 90 tablet; Refill: 3    Spasm of abdominal muscles of left side  Send diclofenac to assist with left musculoskeletal pain.  - diclofenac (VOLTAREN) 1 % topical gel  Dispense: 150 g; Refill: 1      Patient has been advised of split billing requirements and indicates understanding: Yes        Counseling  Appropriate preventive services were discussed with this patient, including applicable screening as appropriate for fall prevention, nutrition, physical activity, Tobacco-use cessation, weight loss and cognition.  Checklist reviewing preventive services available has been given to the patient.  Reviewed patient's diet, addressing concerns and/or questions.   Patient reported safety concerns were addressed today.The patient was provided with written information regarding signs of hearing loss.   Information on urinary incontinence and treatment options given to patient.            Additional 30 minutes spent on the date of the encounter doing chart review, history, exam, diagnostics review, documentation, evaluation & management, counseling and coordination of cares as noted exclusive of preventive care.  Gilberto Vasquez MD     Diana Shaikh is a 78 year old, presenting for the following:  Physical (Annual wellness /)        7/1/2024    10:27 AM   Additional Questions   Roomed by MR         Health Care Directive  Patient does not have a Health Care Directive or Living Will: Patient states has Advance Directive and will bring in a copy to clinic.    ELIU Faria S Archea 78 past medical history  balance disorder, hyperlipidemia, CKD 3, osteoarthritis, kyphosis, degenerative disc disease, venous stasis of both lower extremities, had a fall June 2023 with subdural hematoma, Right shoulder replacement 2/2024 following with PT presents today for medicare wellness she is  several concerns about her health.  Split bill discussed.  She has noted left-sided lateral pain  Dull ache 2/10 sometimes 4/10 on left side sometimes noted with movement.  She has been using the left side much more frequently because she had previous right shoulder repair lifting bags of mulch,  put litter box in the car full very heavy.  She had to lift suitcase down three flights of stairs with her left arm.  Some constipation may have a bowel movement daily sometimes skips a day. No nausea or vomiting.  Not currently taking fiber supplement.  While she was on a recent trip Travel to [place] noted left-sided neck pain and left jaw pain with movement to her left side Tylenol helped.  Review of chart shows elevated calcium level February 14, 2024 at 10.8 she discontinued calcium supplementation.  Due for recheck of abnormal calcium.  She currently takes vitamin D supplementation 1000 international units daily.  Mood  She feels somewhat isolated living in [place] daughter currently lives in the [facility] her son lives in [facility].  She moved to [place] to be closer to family who then moved away she does not have close friends in this area.  She likes to travel and contemplating next steps for her life.  Hearing impairment   Has a hearing aid does not use it.    HCM  Mammogram 10/23/2023    Sh: Move to [place] has not been good for her as she feels isolated. Family in [place]. Son in [place].            6/30/2024   General Health   How would you rate your overall physical health? Good   Feel stress (tense, anxious, or unable to sleep) To some extent      (!) STRESS CONCERN      6/30/2024   Nutrition   Diet: Regular (no restrictions)            6/30/2024   Exercise   Days per week of moderate/strenous exercise 5 days   Average minutes spent exercising at this level 40 min            6/30/2024   Social Factors   Frequency of gathering with friends or relatives Once a week   Worry food  won't last until get money to buy more No   Food not last or not have enough money for food? No   Do you have housing? (Housing is defined as stable permanent housing and does not include staying ouside in a car, in a tent, in an abandoned building, in an overnight shelter, or couch-surfing.) Yes   Are you worried about losing your housing? No   Lack of transportation? No   Unable to get utilities (heat,electricity)? No            7/1/2024   Fall Risk   Reason Gait Speed Test Not Completed Patient declines               6/30/2024   Activities of Daily Living- Home Safety   Needs help with the following daily activites None of the above   Safety concerns in the home No grab bars in the bathroom            6/30/2024   Dental   Dentist two times every year? Yes            6/30/2024   Hearing Screening   Hearing concerns? (!) I NEED TO ASK PEOPLE TO SPEAK UP OR REPEAT THEMSELVES.    (!) IT'S HARD TO FOLLOW A CONVERSATION IN A NOISY RESTAURANT OR CROWDED ROOM.    (!) TROUBLE UNDERSTANDING SOFT OR WHISPERED SPEECH.       Multiple values from one day are sorted in reverse-chronological order         6/30/2024   Driving Risk Screening   Patient/family members have concerns about driving No            6/30/2024   General Alertness/Fatigue Screening   Have you been more tired than usual lately? No            6/30/2024   Urinary Incontinence Screening   Bothered by leaking urine in past 6 months Yes            6/30/2024   TB Screening   Were you born outside of the [place]? No            Today's PHQ-2 Score:       6/30/2024     7:17 PM   PHQ-2 ( 1999 Pfizer)   Q1: Little interest or pleasure in doing things 1   Q2: Feeling down, depressed or hopeless 1   PHQ-2 Score 2   Q1: Little interest or pleasure in doing things Several days   Q2: Feeling down, depressed or hopeless Several days   PHQ-2 Score 2           6/30/2024   Substance Use   Alcohol more than 3/day or more than 7/wk No   Do you have a current opioid prescription? No    How severe/bad is pain from 1 to 10? 3/10   Do you use any other substances recreationally? No        Social History     Tobacco Use    Smoking status: Former     Current packs/day: 0.00     Average packs/day: 1 pack/day for 35.0 years (35.0 ttl pk-yrs)     Types: Cigarettes     Start date:      Quit date:      Years since quittin.5    Smokeless tobacco: Never    Tobacco comments:     Quit 24 years ago    Vaping Use    Vaping status: Never Used   Substance Use Topics    Alcohol use: Yes     Alcohol/week: 3.0 standard drinks of alcohol     Types: 3 Glasses of wine per week    Drug use: No           10/19/2023   LAST FHS-7 RESULTS   1st degree relative breast or ovarian cancer Yes   Any relative bilateral breast cancer No   Any male have breast cancer No   Any ONE woman have BOTH breast AND ovarian cancer No   Any woman with breast cancer before 50yrs No   2 or more relatives with breast AND/OR ovarian cancer Yes   2 or more relatives with breast AND/OR bowel cancer No               ASCVD Risk   The 10-year ASCVD risk score (Alayna JOINER, et al., 2019) is: 18.4%    Values used to calculate the score:      Age: 78 years      Sex: Female      Is Non- : No      Diabetic: No      Tobacco smoker: No      Systolic Blood Pressure: 113 mmHg      Is BP treated: No      HDL Cholesterol: 81 mg/dL      Total Cholesterol: 194 mg/dL            Reviewed and updated as needed this visit by Provider   Tobacco  Allergies  Meds  Problems  Med Hx  Surg Hx  Fam Hx            Labs reviewed in EPIC  BP Readings from Last 3 Encounters:   24 113/69   24 129/68   24 134/81    Wt Readings from Last 3 Encounters:   24 58.8 kg (129 lb 11.2 oz)   24 58.1 kg (128 lb)   24 58.2 kg (128 lb 4.9 oz)            Immunization History   Administered Date(s) Administered    COVID-19 12+ (-) (Pfizer) 10/09/2023    COVID-19 Bivalent 18+ (Moderna) 10/13/2022,  07/30/2023    COVID-19 MONOVALENT 12+ (Pfizer) 01/30/2021, 02/20/2021, 09/25/2021    COVID-19 Monovalent 18+ (Moderna) 03/29/2022    Flu, Unspecified 10/13/2009, 10/02/2014, 10/21/2015, 09/30/2016    Hepatitis A (ADULT 19+) 02/23/2018, 05/16/2019    Influenza (High Dose) 3 valent vaccine 10/28/2017    Influenza Vaccine 65+ (FLUAD) 09/25/2021    Influenza Vaccine 65+ (Fluzone HD) 10/12/2020, 10/14/2022, 10/09/2023    Influenza Vaccine, 6+MO IM (QUADRIVALENT W/PRESERVATIVES) 10/25/2018, 10/24/2019    Pneumo Conj 13-V (2010&after) 12/11/2015    Pneumococcal 23 valent 03/18/2011    RSV Vaccine (Arexvy) 10/26/2023    TD,PF 7+ (Tenivac) 02/23/2018    TDAP Vaccine (Boostrix) 08/16/2007    Typhoid IM 05/16/2019    Yellow Fever, Live (Stamaril) 11/30/2017    Zoster recombinant adjuvanted (SHINGRIX) 05/25/2023, 07/28/2023            Patient Active Problem List   Diagnosis    Hyperlipidemia LDL goal <100    Arthritis of shoulder region, left    Primary osteoarthritis involving multiple joints    Gastroesophageal reflux disease without esophagitis    Nodule of left lung    Family history of malignant neoplasm of breast    Mass of left upper extremity    Back pain of thoracolumbar region    Balance disorder    Compression fracture of thoracic vertebra, open, initial encounter (H)    DDD (degenerative disc disease), thoracolumbar    Degenerative scoliosis in adult patient    Kyphosis (acquired) (postural)    Nuclear sclerosis    Osteopenia with high risk of fracture    Pain in left shoulder    Status post left knee replacement    Pain in thoracic spine    Osteoarthritis of right knee    Infection due to 2019 novel coronavirus    Venous stasis dermatitis of both lower extremities    Varicose veins of both lower extremities with pain    Syncopal episodes    SDH (subdural hematoma) (H)    Syncope, unspecified syncope type    CKD (chronic kidney disease) stage 2, GFR 60-89 ml/min    Chronic right shoulder pain    Bilateral impacted  jenn    Restless legs syndrome    S/P shoulder replacement, right    Primary osteoarthritis of right shoulder     Past Surgical History:   Procedure Laterality Date    ARTHROPLASTY KNEE Right 2021    Procedure: ARTHROPLASTY, RIGHT KNEE, TOTAL;  Surgeon: Gabriel Marrero MD;  Location: [facility] OR    ARTHROPLASTY SHOULDER Right 2024    Procedure: Right total shoulder arthroplasty;  Surgeon: Gabriel Marrero MD;  Location: [facility] OR    ARTHROPLASTY, RIGHT KNEE, TOTAL Right 2021    CATARACT IOL, RT/LT Right 2019    left shoulder replacement          PHACOEMULSIFICATION WITH STANDARD INTRAOCULAR LENS IMPLANT Right 2019    Procedure: Right Cataract Removal with Intraocular Lens Implant;  Surgeon: Trish Taylor MD;  Location:  OR       Social History     Tobacco Use    Smoking status: Former     Current packs/day: 0.00     Average packs/day: 1 pack/day for 35.0 years (35.0 ttl pk-yrs)     Types: Cigarettes     Start date:      Quit date:      Years since quittin.5    Smokeless tobacco: Never    Tobacco comments:     Quit 24 years ago    Substance Use Topics    Alcohol use: Yes     Alcohol/week: 3.0 standard drinks of alcohol     Types: 3 Glasses of wine per week     Family History   Problem Relation Age of Onset    Arthritis Mother     Breast Cancer Mother 78    Diabetes Type 2  Father     Heart Disease Father          age 75    Alzheimer Disease Father 60    Diabetes Father     Hypertension Father     Hyperlipidemia Father     Mental Illness Father     Heart Disease Brother 68    Breast Cancer Maternal Grandmother 81    Heart Disease Maternal Grandfather 54         age 81    Other - See Comments Son         schizoaffective lives in [place]    Mental Illness Son     Glaucoma No family hx of     Macular Degeneration No family hx of     Anesthesia Reaction No family hx of     Venous thrombosis No family hx of          Current Outpatient Medications   Medication Sig  Dispense Refill    Cyanocobalamin (VITAMIN B-12 PO) Take by mouth daily      famotidine (PEPCID) 40 MG tablet Take 1 tablet (40 mg) by mouth daily as needed for heartburn 90 tablet 3    ketoconazole (NIZORAL) 2 % external shampoo Apply topically three times a week 120 mL 11    pravastatin (PRAVACHOL) 20 MG tablet Take 1 tablet (20 mg) by mouth daily 90 tablet 3    Vitamin D3 (CHOLECALCIFEROL) 25 mcg (1000 units) tablet Take 1 tablet by mouth daily      acetaminophen (TYLENOL) 325 MG tablet Take 2 tablets (650 mg) by mouth every 4 hours as needed for other (mild pain) (Patient not taking: Reported on 7/1/2024) 100 tablet 0    aspirin 81 MG EC tablet Take 1 tablet (81 mg) by mouth 2 times daily (Patient not taking: Reported on 4/12/2024) 60 tablet 0    hydrOXYzine HCl (ATARAX) 25 MG tablet Take 1 tablet (25 mg) by mouth 3 times daily as needed for other (pain) (Patient not taking: Reported on 4/12/2024) 30 tablet 0    prednisoLONE acetate (PRED FORTE) 1 % ophthalmic suspension Place 1 drop Into the left eye 3 times daily (Patient not taking: Reported on 4/18/2024) 15 mL 1     Allergies   Allergen Reactions    Dust Mites Itching     Runny nose, fever    Mold Itching     Fever, runny nose    Pollen Extract Itching     Fever, runny nose    Celecoxib      Other reaction(s): GI intolerance     Recent Labs   Lab Test 02/14/24  1556 10/09/23  0929 06/20/23  0911 06/07/23  0755 06/06/23  1626 03/02/23  0908 09/20/22  1652 04/22/22  1428 11/18/21  1049 08/17/20  1404 01/21/20  0925 01/16/20  1009   A1C  --  5.7* 6.0*  --   --   --   --   --   --   --  5.3  --    LDL  --  99 115*  --   --  125*  --   --   --    < >  --   --    HDL  --  81 94  --   --  116  --   --   --    < >  --   --    TRIG  --  68 103  --   --  62  --   --   --    < >  --   --    ALT  --   --   --  7* 13  --  6*  --   --    < >  --  20   CR 0.87 0.99* 0.97* 0.81 0.97* 0.91 1.14*  --   --    < > 0.98 0.94   GFRESTIMATED 68 58* 60* 74 60* 65 49*  --   --    <  > 57* 60*   GFRESTBLACK  --   --   --   --   --   --   --   --   --   --  66 69   POTASSIUM 4.4 4.3 4.0 3.4 3.8  --  4.1  --    < >   < > 4.1 4.7   TSH  --   --  1.99  --   --   --   --  1.06  --    < >  --   --     < > = values in this interval not displayed.      Current providers sharing in care for this patient include:  Patient Care Team:  Gilberto Vasquez MD as PCP - General (Family Practice)  Manuel Carrasco OD as MD (Optometry)  Nadira López MD as MD (Orthopedics)  Dayton Zepeda MD as Referring Physician (Family Practice)  Trish Taylor MD as MD (Ophthalmology)  Jose Alberto Isbell MD as MD (Dermatology)  Brenda Ramirez MD as MD (Dermatology)  Gilberto Vasquez MD as Assigned PCP  None  Gabriel Marrero MD as Assigned Musculoskeletal Provider  Brenda Ramirez MD as MD (Dermatology)  Faviola Corbin PA-C as Assigned Neuroscience Provider  Jose Alberto Isbell MD as MD (Dermatology)  DINAH Barrett MD as Assigned Heart and Vascular Provider  Nissen, Rick L, MD as MD (Otolaryngology)  Miley Bradley AuD as Audiologist (Audiology)  Jose Alberto Isbell MD as Assigned Surgical Provider    The following health maintenance items are reviewed in Epic and correct as of today:  Health Maintenance   Topic Date Due    COVID-19 Vaccine (8 - 2023-24 season) 02/09/2024    MICROALBUMIN  06/20/2024    INFLUENZA VACCINE (1) 09/01/2024    LIPID  10/09/2024    ANNUAL REVIEW OF HM ORDERS  11/29/2024    BMP  02/14/2025    MEDICARE ANNUAL WELLNESS VISIT  07/01/2025    FALL RISK ASSESSMENT  07/01/2025    GLUCOSE  02/14/2027    ADVANCE CARE PLANNING  10/03/2027    DTAP/TDAP/TD IMMUNIZATION (3 - Td or Tdap) 02/23/2028    PHQ-2 (once per calendar year)  Completed    Pneumococcal Vaccine: 65+ Years  Completed    URINALYSIS  Completed    ZOSTER IMMUNIZATION  Completed    RSV VACCINE (Pregnancy & 60+)  Completed    IPV IMMUNIZATION  Aged Out    HPV IMMUNIZATION  Aged Out    MENINGITIS IMMUNIZATION   "Aged Out    RSV MONOCLONAL ANTIBODY  Aged Out    DEXA  Discontinued    URINE DRUG SCREEN  Discontinued    HEPATITIS C SCREENING  Discontinued    MAMMO SCREENING  Discontinued    COLORECTAL CANCER SCREENING  Discontinued    LUNG CANCER SCREENING  Discontinued         Review of Systems  Problem list, PMH, Surgical HX, FH, SH, allergies, medications,immunizations reviewed and updated in Epic.  ROS noted in HPI and ROS,staff / patient questionnaires reviewed by me.        Objective    Exam  /69 (BP Location: Right arm, Patient Position: Sitting, Cuff Size: Adult Regular)   Pulse 85   Temp 97.3  F (36.3  C)   Wt 58.8 kg (129 lb 11.2 oz)   LMP 01/01/1998 (Approximate)   SpO2 98%   BMI 22.98 kg/m     Estimated body mass index is 22.98 kg/m  as calculated from the following:    Height as of 6/5/24: 1.6 m (5' 3\").    Weight as of this encounter: 58.8 kg (129 lb 11.2 oz).    Physical Exam    GENERAL APPEARANCE: healthy, alert and no distress,      EYES: EOMI, PERRL     HENT: Bilateral tympanic membranes normal no significant cerumen, mouth without ulcers or lesions. Teeth yellowing     NECK: no adenopathy, no asymmetry, masses, or scars and thyroid normal to palpation     RESP: lungs clear to auscultation - no rales, rhonchi or wheezes     Breast no masses no axillary adenopathy     CV: regular rate and rhythm, normal S1 S2, no S3 or S4 and no murmur, click or rub peripheral pulses intact.     ABDOMEN:  soft, nontender, no HSM or masses and bowel sounds normal     MS: Mild Kyphoscoliosis, Right knee osteoarthritis chronic swelling, left knee well healed incision, right shoulder surgical intervention, left shoulder normal range of motion with some pulling of left subscapularis with range of motion, other extremities signs of arthritis moves all joints     SKIN: no suspicious lesions or rashes     NEURO: Normal strength and tone,  mentation intact and speech normal     PSYCH: mentation appears normal. and affect " normal     LYMPHATICS: No cervical adenopathy      Foot: Previous bunion surgery cause her great toe to be hyperflexed thickening to great nail.     Results for orders placed or performed in visit on 07/01/24   Comprehensive metabolic panel (BMP + Alb, Alk Phos, ALT, AST, Total. Bili, TP)     Status: Abnormal   Result Value Ref Range    Sodium 138 135 - 145 mmol/L    Potassium 4.6 3.4 - 5.3 mmol/L    Carbon Dioxide (CO2) 24 22 - 29 mmol/L    Anion Gap 11 7 - 15 mmol/L    Urea Nitrogen 21.5 8.0 - 23.0 mg/dL    Creatinine 0.96 (H) 0.51 - 0.95 mg/dL    GFR Estimate 60 (L) >60 mL/min/1.73m2    Calcium 10.0 8.8 - 10.2 mg/dL    Chloride 103 98 - 107 mmol/L    Glucose 96 70 - 99 mg/dL    Alkaline Phosphatase 91 40 - 150 U/L    AST 19 0 - 45 U/L    ALT 9 0 - 50 U/L    Protein Total 7.0 6.4 - 8.3 g/dL    Albumin 4.3 3.5 - 5.2 g/dL    Bilirubin Total 0.3 <=1.2 mg/dL   Vitamin D Deficiency     Status: Abnormal   Result Value Ref Range    Vitamin D, Total (25-Hydroxy) 87 (H) 20 - 50 ng/mL    Narrative    Season, race, dietary intake, and treatment affect the concentration of 25-hydroxy-Vitamin D. Values may decrease during winter months and increase during summer months.    Vitamin D determination is routinely performed by an immunoassay specific for 25 hydroxyvitamin D3.  If an individual is on vitamin D2(ergocalciferol) supplementation, please specify 25 OH vitamin D2 and D3 level determination by LCMSMS test [id number].     Parathyroid Hormone Intact     Status: Normal   Result Value Ref Range    Parathyroid Hormone Intact 45 15 - 65 pg/mL    Narrative    This result was obtained with the Roche Elecsys PTH STAT assay.   This reference range differs from PTH assays used in other [facility] laboratories.   Hemoglobin A1c     Status: Abnormal   Result Value Ref Range    Hemoglobin A1C 6.0 (H) <5.7 %   Above results back after visit.            7/1/2024   Mini Cog   Mini-Cog Not Completed (choose reason) Patient declines                  Signed Electronically by: Gilberto Vasquez MD

## 2024-07-01 NOTE — PATIENT INSTRUCTIONS
"Patient Education   Preventive Care Advice   This is general advice we often give to help people stay healthy. Your care team may have specific advice just for you. Please talk to your care team about your own preventive care needs.  Lifestyle  Exercise at least 150 minutes each week (30 minutes a day, 5 days a week).  Do muscle strengthening activities 2 days a week. These help control your weight and prevent disease.  No smoking.  Wear sunscreen to prevent skin cancer.  Have your home tested for radon every 2 to 5 years. Radon is a colorless, odorless gas that can harm your lungs. To learn more, go to www.health.[facility].[place].us and search for \"Radon in Homes.\"  Keep guns unloaded and locked up in a safe place like a safe or gun vault, or, use a gun lock and hide the keys. Always lock away bullets separately. To learn more, visit [place].gov and search for \"safe gun storage.\"  Nutrition  Eat 5 or more servings of fruits and vegetables each day.  Try wheat bread, brown rice and whole grain pasta (instead of white bread, rice, and pasta).  Get enough calcium and vitamin D. Check the label on foods and aim for 100% of the RDA (recommended daily allowance).  Regular exams  Have a dental exam and cleaning every 6 months.  See your health care team every year to talk about:  Any changes in your health.  Any medicines your care team has prescribed.  Preventive care, family planning, and ways to prevent chronic diseases.  Shots (vaccines)   HPV shots (up to age 26), if you've never had them before.  Hepatitis B shots (up to age 59), if you've never had them before.  COVID-19 shot: Get this shot when it's due.  Flu shot: Get a flu shot every year.  Tetanus shot: Get a tetanus shot every 10 years.  Pneumococcal, hepatitis A, and RSV shots: Ask your care team if you need these based on your risk.  Shingles shot (for age 50 and up).  General health tests  Diabetes screening:  Starting at age 35, Get screened for diabetes at least " every 3 years.  If you are younger than age 35, ask your care team if you should be screened for diabetes.  Cholesterol test: At age 39, start having a cholesterol test every 5 years, or more often if advised.  Bone density scan (DEXA): At age 50, ask your care team if you should have this scan for osteoporosis (brittle bones).  Hepatitis C: Get tested at least once in your life.  Abdominal aortic aneurysm screening: Talk to your doctor about having this screening if you:  Have ever smoked; and  Are biologically male; and  Are between the ages of 65 and 75.  STIs (sexually transmitted infections)  Before age 24: Ask your care team if you should be screened for STIs.  After age 24: Get screened for STIs if you're at risk. You are at risk for STIs (including HIV) if:  You are sexually active with more than one person.  You don't use condoms every time.  You or a partner was diagnosed with a sexually transmitted infection.  If you are at risk for HIV, ask about PrEP medicine to prevent HIV.  Get tested for HIV at least once in your life, whether you are at risk for HIV or not.  Cancer screening tests  Cervical cancer screening: If you have a cervix, begin getting regular cervical cancer screening tests at age 21. Most people who have regular screenings with normal results can stop after age 65. Talk about this with your provider.  Breast cancer scan (mammogram): If you've ever had breasts, begin having regular mammograms starting at age 40. This is a scan to check for breast cancer.  Colon cancer screening: It is important to start screening for colon cancer at age 45.  Have a colonoscopy test every 10 years (or more often if you're at risk) Or, ask your provider about stool tests like a FIT test every year or Cologuard test every 3 years.  To learn more about your testing options, visit: www.[employer]/929810.pdf.  For help making a decision, visit: fabian/et44810.  Prostate cancer screening test: If you have a  prostate and are age 55 to 69, ask your provider if you would benefit from a yearly prostate cancer screening test.  Lung cancer screening: If you are a current or former smoker age 50 to 80, ask your care team if ongoing lung cancer screenings are right for you.  For informational purposes only. Not to replace the advice of your health care provider. Copyright   2023 [facility]. All rights reserved. Clinically reviewed by the [facility] Transitions Program. [employer] 510308 - REV 04/24.  Learning About Activities of Daily Living  What are activities of daily living?     Activities of daily living (ADLs) are the basic self-care tasks you do every day. These include eating, bathing, dressing, and moving around.  As you age, and if you have health problems, you may find that it's harder to do some of these tasks. If so, your doctor can suggest ideas that may help.  To measure what kind of help you may need, your doctor will ask how well you are able to do ADLs. Let your doctor know if there are any tasks that you are having trouble doing. This is an important first step to getting help. And when you have the help you need, you can stay as independent as possible.  How will a doctor assess your ADLs?  Asking about ADLs is part of a routine health checkup your doctor will likely do as you age. Your health check might be done in a doctor's office, in your home, or at a hospital. The goal is to find out if you are having any problems that could make it hard to care for yourself or that make it unsafe for you to be on your own.  To measure your ADLs, your doctor will ask how hard it is for you to do routine tasks. Your doctor may also want to know if you have changed the way you do a task because of a health problem. Your doctor may watch how you:  Walk back and forth.  Keep your balance while you stand or walk.  Move from sitting to standing or from a bed to a chair.  Button or unbutton a shirt or  sweater.  Remove and put on your shoes.  It's common to feel a little worried or anxious if you find you can't do all the things you used to be able to do. Talking with your doctor about ADLs is a way to make sure you're as safe as possible and able to care for yourself as well as you can. You may want to bring a caregiver, friend, or family member to your checkup. They can help you talk to your doctor.  Follow-up care is a key part of your treatment and safety. Be sure to make and go to all appointments, and call your doctor if you are having problems. It's also a good idea to know your test results and keep a list of the medicines you take.  Current as of: October 24, 2023               Content Version: 14.0    3931-3879 [employer].   Care instructions adapted under license by your healthcare professional. If you have questions about a medical condition or this instruction, always ask your healthcare professional. [employer] disclaims any warranty or liability for your use of this information.      Preventing Falls: Care Instructions  Injuries and health problems such as trouble walking or poor eyesight can increase your risk of falling. So can some medicines. But there are things you can do to help prevent falls. You can exercise to get stronger. You can also arrange your home to make it safer.    Talk to your doctor about the medicines you take. Ask if any of them increase the risk of falls and whether they can be changed or stopped.   Try to exercise regularly. It can help improve your strength and balance. This can help lower your risk of falling.     Practice fall safety and prevention.    Wear low-heeled shoes that fit well and give your feet good support. Talk to your doctor if you have foot problems that make this hard.  Carry a cellphone or wear a medical alert device that you can use to call for help.  Use stepladders instead of chairs to reach high objects. Don't climb if  "you're at risk for falls. Ask for help, if needed.  Wear the correct eyeglasses, if you need them.    Make your home safer.    Remove rugs, cords, clutter, and furniture from walkways.  Keep your house well lit. Use night-lights in hallways and bathrooms.  Install and use sturdy handrails on stairways.  Wear nonskid footwear, even inside. Don't walk barefoot or in socks without shoes.    Be safe outside.    Use handrails, curb cuts, and ramps whenever possible.  Keep your hands free by using a shoulder bag or backpack.  Try to walk in well-lit areas. Watch out for uneven ground, changes in pavement, and debris.  Be careful in the winter. Walk on the grass or gravel when sidewalks are slippery. Use de-icer on steps and walkways. Add non-slip devices to shoes.    Put grab bars and nonskid mats in your shower or tub and near the toilet. Try to use a shower chair or bath bench when bathing.   Get into a tub or shower by putting in your weaker leg first. Get out with your strong side first. Have a phone or medical alert device in the bathroom with you.   Where can you learn more?  Go to https://www.[employer].net/patiented  Enter G117 in the search box to learn more about \"Preventing Falls: Care Instructions.\"  Current as of: July 17, 2023               Content Version: 14.0    5667-8821 [employer].   Care instructions adapted under license by your healthcare professional. If you have questions about a medical condition or this instruction, always ask your healthcare professional. [employer] disclaims any warranty or liability for your use of this information.      Hearing Loss: Care Instructions  Overview     Hearing loss is a sudden or slow decrease in how well you hear. It can range from slight to profound. Permanent hearing loss can occur with aging. It also can happen when you are exposed long-term to loud noise. Examples include listening to loud music, riding motorcycles, or being " around other loud machines.  Hearing loss can affect your work and home life. It can make you feel lonely or depressed. You may feel that you have lost your independence. But hearing aids and other devices can help you hear better and feel connected to others.  Follow-up care is a key part of your treatment and safety. Be sure to make and go to all appointments, and call your doctor if you are having problems. It's also a good idea to know your test results and keep a list of the medicines you take.  How can you care for yourself at home?  Avoid loud noises whenever possible. This helps keep your hearing from getting worse.  Always wear hearing protection around loud noises.  Wear a hearing aid as directed.  A professional can help you pick a hearing aid that will work best for you.  You can also get hearing aids over the counter for mild to moderate hearing loss.  Have hearing tests as your doctor suggests. They can show whether your hearing has changed. Your hearing aid may need to be adjusted.  Use other devices as needed. These may include:  Telephone amplifiers and hearing aids that can connect to a television, stereo, radio, or microphone.  Devices that use lights or vibrations. These alert you to the doorbell, a ringing telephone, or a baby monitor.  Television closed-captioning. This shows the words at the bottom of the screen. Most new TVs can do this.  TTY (text telephone). This lets you type messages back and forth on the telephone instead of talking or listening. These devices are also called TDD. When messages are typed on the keyboard, they are sent over the phone line to a receiving TTY. The message is shown on a monitor.  Use text messaging, social media, and email if it is hard for you to communicate by telephone.  Try to learn a listening technique called speechreading. It is not lipreading. You pay attention to people's gestures, expressions, posture, and tone of voice. These clues can help you  "understand what a person is saying. Face the person you are talking to, and have them face you. Make sure the lighting is good. You need to see the other person's face clearly.  Think about counseling if you need help to adjust to your hearing loss.  When should you call for help?  Watch closely for changes in your health, and be sure to contact your doctor if:    You think your hearing is getting worse.     You have new symptoms, such as dizziness or nausea.   Where can you learn more?  Go to https://www.[employer].net/patiented  Enter R798 in the search box to learn more about \"Hearing Loss: Care Instructions.\"  Current as of: September 27, 2023               Content Version: 14.0    9529-4296 [employer].   Care instructions adapted under license by your healthcare professional. If you have questions about a medical condition or this instruction, always ask your healthcare professional. [employer] disclaims any warranty or liability for your use of this information.      Bladder Training: Care Instructions  Your Care Instructions     Bladder training is used to treat urge incontinence and stress incontinence. Urge incontinence means that the need to urinate comes on so fast that you can't get to a toilet in time. Stress incontinence means that you leak urine because of pressure on your bladder. For example, it may happen when you laugh, cough, or lift something heavy.  Bladder training can increase how long you can wait before you have to urinate. It can also help your bladder hold more urine. And it can give you better control over the urge to urinate.  It is important to remember that bladder training takes a few weeks to a few months to make a difference. You may not see results right away, but don't give up.  Follow-up care is a key part of your treatment and safety. Be sure to make and go to all appointments, and call your doctor if you are having problems. It's also a good idea to " know your test results and keep a list of the medicines you take.  How can you care for yourself at home?  Work with your doctor to come up with a bladder training program that is right for you. You may use one or more of the following methods.  Delayed urination  In the beginning, try to keep from urinating for 5 minutes after you first feel the need to go.  While you wait, take deep, slow breaths to relax. Kegel exercises can also help you delay the need to go to the bathroom.  After some practice, when you can easily wait 5 minutes to urinate, try to wait 10 minutes before you urinate.  Slowly increase the waiting period until you are able to control when you have to urinate.  Scheduled urination  Empty your bladder when you first wake up in the morning.  Schedule times throughout the day when you will urinate.  Start by going to the bathroom every hour, even if you don't need to go.  Slowly increase the time between trips to the bathroom.  When you have found a schedule that works well for you, keep doing it.  If you wake up during the night and have to urinate, do it. Apply your schedule to waking hours only.  Kegel exercises  These tighten and strengthen pelvic muscles, which can help you control the flow of urine. (If doing these exercises causes pain, stop doing them and talk with your doctor.) To do Kegel exercises:  Squeeze your muscles as if you were trying not to pass gas. Or squeeze your muscles as if you were stopping the flow of urine. Your belly, legs, and buttocks shouldn't move.  Hold the squeeze for 3 seconds, then relax for 5 to 10 seconds.  Start with 3 seconds, then add 1 second each week until you are able to squeeze for 10 seconds.  Repeat the exercise 10 times a session. Do 3 to 8 sessions a day.  When should you call for help?  Watch closely for changes in your health, and be sure to contact your doctor if:    Your incontinence is getting worse.     You do not get better as expected.  "  Where can you learn more?  Go to https://www.healthMediaPhy.net/patiented  Enter V684 in the search box to learn more about \"Bladder Training: Care Instructions.\"  Current as of: November 15, 2023               Content Version: 14.0    1212-6224 [employer].   Care instructions adapted under license by your healthcare professional. If you have questions about a medical condition or this instruction, always ask your healthcare professional. [employer], [employer] disclaims any warranty or liability for your use of this information.         "

## 2024-07-02 NOTE — RESULT ENCOUNTER NOTE
Your vitamin D level was too high therefore please discontinue Vitamin D supplementation. Calcium level returned to normal range and Parathyroid is normal.  Three month measure of glucose control A1c 6 in prediabetes range  Recheck recommended in 3-6 months.    Lower portion size, lower carbohydrate choices ( less bread, pasta, rice, potatoes, sugar containing foods and eliminate sugar containing beverages.) Also important is daily physical activity such as walking or stationary bike 30 minutes daily.    Your  liver, blood sugar,calcium, sodium and potassium were normal or within acceptable range.   Gilberto Vasquez MD

## 2024-07-03 ENCOUNTER — THERAPY VISIT (OUTPATIENT)
Dept: PHYSICAL THERAPY | Facility: CLINIC | Age: 79
End: 2024-07-03
Payer: MEDICARE

## 2024-07-03 DIAGNOSIS — Z96.611 S/P SHOULDER REPLACEMENT, RIGHT: Primary | ICD-10-CM

## 2024-07-03 DIAGNOSIS — M19.011 PRIMARY OSTEOARTHRITIS OF RIGHT SHOULDER: ICD-10-CM

## 2024-07-03 PROCEDURE — 97140 MANUAL THERAPY 1/> REGIONS: CPT | Mod: GP

## 2024-07-03 PROCEDURE — 97110 THERAPEUTIC EXERCISES: CPT | Mod: GP

## 2024-07-08 ENCOUNTER — THERAPY VISIT (OUTPATIENT)
Dept: PHYSICAL THERAPY | Facility: CLINIC | Age: 79
End: 2024-07-08
Payer: MEDICARE

## 2024-07-08 ENCOUNTER — TELEPHONE (OUTPATIENT)
Dept: FAMILY MEDICINE | Facility: CLINIC | Age: 79
End: 2024-07-08

## 2024-07-08 DIAGNOSIS — M19.011 PRIMARY OSTEOARTHRITIS OF RIGHT SHOULDER: ICD-10-CM

## 2024-07-08 DIAGNOSIS — Z96.611 S/P SHOULDER REPLACEMENT, RIGHT: Primary | ICD-10-CM

## 2024-07-08 PROCEDURE — 97110 THERAPEUTIC EXERCISES: CPT | Mod: GP

## 2024-07-08 NOTE — TELEPHONE ENCOUNTER
Left Voicemail (1st Attempt) and Sent Mychart (1st Attempt) for the patient to call back and schedule the following:    Appointment type: Mammogram, lab, P Return  Provider: Dr. Vasquez  Return date: mammogram and lab, next available. [facility] Return 7/2/25

## 2024-07-10 NOTE — TELEPHONE ENCOUNTER
Patient confirmed scheduled appointment:  Date: 7/7/24  Time: 10:00am  Visit type: [facility] return  Provider: Dr. Vasquez  Transferred to imaging

## 2024-07-15 ENCOUNTER — THERAPY VISIT (OUTPATIENT)
Dept: PHYSICAL THERAPY | Facility: CLINIC | Age: 79
End: 2024-07-15
Payer: MEDICARE

## 2024-07-15 DIAGNOSIS — Z96.611 S/P SHOULDER REPLACEMENT, RIGHT: Primary | ICD-10-CM

## 2024-07-15 DIAGNOSIS — M19.011 PRIMARY OSTEOARTHRITIS OF RIGHT SHOULDER: ICD-10-CM

## 2024-07-15 PROCEDURE — 97110 THERAPEUTIC EXERCISES: CPT | Mod: GP

## 2024-07-15 PROCEDURE — 97140 MANUAL THERAPY 1/> REGIONS: CPT | Mod: GP

## 2024-07-29 ENCOUNTER — OFFICE VISIT (OUTPATIENT)
Dept: DERMATOLOGY | Facility: CLINIC | Age: 79
End: 2024-07-29
Attending: DERMATOLOGY
Payer: MEDICARE

## 2024-07-29 ENCOUNTER — THERAPY VISIT (OUTPATIENT)
Dept: PHYSICAL THERAPY | Facility: CLINIC | Age: 79
End: 2024-07-29
Payer: MEDICARE

## 2024-07-29 ENCOUNTER — LAB (OUTPATIENT)
Dept: LAB | Facility: CLINIC | Age: 79
End: 2024-07-29
Payer: MEDICARE

## 2024-07-29 DIAGNOSIS — L81.4 SOLAR LENTIGO: ICD-10-CM

## 2024-07-29 DIAGNOSIS — L82.0 INFLAMED SEBORRHEIC KERATOSIS: ICD-10-CM

## 2024-07-29 DIAGNOSIS — D18.01 CHERRY ANGIOMA: ICD-10-CM

## 2024-07-29 DIAGNOSIS — L21.9 DERMATITIS, SEBORRHEIC: ICD-10-CM

## 2024-07-29 DIAGNOSIS — N18.2 CKD (CHRONIC KIDNEY DISEASE) STAGE 2, GFR 60-89 ML/MIN: ICD-10-CM

## 2024-07-29 DIAGNOSIS — M19.011 PRIMARY OSTEOARTHRITIS OF RIGHT SHOULDER: ICD-10-CM

## 2024-07-29 DIAGNOSIS — Z96.611 S/P SHOULDER REPLACEMENT, RIGHT: Primary | ICD-10-CM

## 2024-07-29 DIAGNOSIS — L64.9 ANDROGENETIC ALOPECIA: Primary | ICD-10-CM

## 2024-07-29 DIAGNOSIS — L57.0 ACTINIC KERATOSIS: ICD-10-CM

## 2024-07-29 DIAGNOSIS — L82.1 SEBORRHEIC KERATOSIS: ICD-10-CM

## 2024-07-29 DIAGNOSIS — D22.9 MULTIPLE MELANOCYTIC NEVI: ICD-10-CM

## 2024-07-29 LAB
CREAT UR-MCNC: 29 MG/DL
MICROALBUMIN UR-MCNC: <12 MG/L
MICROALBUMIN/CREAT UR: NORMAL MG/G{CREAT}

## 2024-07-29 PROCEDURE — 17003 DESTRUCT PREMALG LES 2-14: CPT | Mod: XS | Performed by: DERMATOLOGY

## 2024-07-29 PROCEDURE — 82043 UR ALBUMIN QUANTITATIVE: CPT

## 2024-07-29 PROCEDURE — 82570 ASSAY OF URINE CREATININE: CPT

## 2024-07-29 PROCEDURE — 99213 OFFICE O/P EST LOW 20 MIN: CPT | Mod: 25 | Performed by: DERMATOLOGY

## 2024-07-29 PROCEDURE — 17000 DESTRUCT PREMALG LESION: CPT | Mod: XS | Performed by: DERMATOLOGY

## 2024-07-29 PROCEDURE — 17110 DESTRUCTION B9 LES UP TO 14: CPT | Performed by: DERMATOLOGY

## 2024-07-29 PROCEDURE — 97110 THERAPEUTIC EXERCISES: CPT | Mod: GP

## 2024-07-29 RX ORDER — KETOCONAZOLE 20 MG/ML
SHAMPOO TOPICAL
Qty: 120 ML | Refills: 12 | Status: SHIPPED | OUTPATIENT
Start: 2024-07-29

## 2024-07-29 RX ORDER — FLUOCINOLONE ACETONIDE 0.1 MG/ML
SOLUTION TOPICAL 2 TIMES DAILY
Qty: 120 ML | Refills: 12 | Status: SHIPPED | OUTPATIENT
Start: 2024-07-29

## 2024-07-29 ASSESSMENT — PAIN SCALES - GENERAL: PAINLEVEL: NO PAIN (0)

## 2024-07-29 NOTE — LETTER
7/29/2024      Giana Hope  [address]  [place] [address]      Dear Colleague,    Thank you for referring your patient, Giana Hope, to the [facility]. Please see a copy of my visit note below.    [facility] Dermatology Note    Encounter Date: Jul 29, 2024    Dermatology Problem List:  1. Multifactorial non-scarring hair loss (seborrheic dermatitis and androgenetic +/- telogen effluvium)  - hair labs unremarkable: CBC, TSH, vitamin D, iron studies  - current tx: fluocinolone solution, ketoconazole shampoo, minoxidil 5% foam  2. AKs. S/p cryo  3. Pink facial patch: DDx seborrheic dermatitis, actinic keratosis, macular seborrheic keratosis  - current treatment: tacrolimus ointment  - past treatment: hydrocortisone cream, ketoconazole cream    ______________________________________    Impression/Plan:  1. Multifactorial non-scarring hair loss (seborrheic dermatitis and androgenetic +/- telogen effluvium), stable  - Continue current topical regimen of fluocinolone solution, ketoconazole shampoo, minoxidil 5% foam  - Discussed risks/benefits of oral medications, patient defers at this time    2. ISK x3  Cryotherapy procedure note: After verbal consent and discussion of risks and benefits including, but not limited to, dyspigmentation/scar, blister, and pain, 2 was(were) treated with 1-2 mm freeze border for 1-2 cycles with liquid nitrogen. Post cryotherapy instructions were provided.     3. AK x3 on the nose  Cryotherapy procedure note: After verbal consent and discussion of risks and benefits including, but not limited to, dyspigmentation/scar, blister, and pain, 3 was(were) treated with 1-2 mm freeze border for 1-2 cycles with liquid nitrogen. Post cryotherapy instructions were provided.     4. Reassurance provided for benign lesions not treated today including cherry angiomata, solar lentigines, seborrheic keratoses, and banal-appearing melanocytic  "nevi.        Follow-up in 6 months.       Staff Involved:  Staff and Scribe    I, Astrid Francis, am serving as a scribe; to document services personally performed by Jose Alberto Isbell MD -based on data collection and the provider's statements to me.    Provider Disclosure:   The documentation recorded by the scribe accurately reflects the services I personally performed and the decisions made by me.    Jose Alberto Isbell MD   of Dermatology  Department of Dermatology  [facility] [employer]      CC:   Chief Complaint   Patient presents with     Skin Check     FBSE, area of concern on forehead and nose.  HX of AK.        History of Present Illness:  Ms. Giana Hope is a 79 year old female who presents as a return patient here for a full body skin check. Patient reports that her hair has become \"wild\" lately, but is not losing as much hair as she did in the winter. She is primarily using topicals. She thinks the hair loss has been relatively stable. Patient notes that her scalp is itchy sometimes, but feels better when she washes it with the ketoconazole shampoo. She has an area of concern on the forehead and nose. Patient has a history of AK.     Patient is otherwise feeling well, with no additional skin concerns.    Labs:  N/A    Physical exam:  Vitals: LMP 01/01/1998 (Approximate)   GEN: This is a well developed, well-nourished female in no acute distress, in a pleasant mood.    SKIN: Ring phototype II  - Full skin, which includes the head/face, both arms, chest, back, abdomen,both legs, genitalia and/or groin buttocks, digits and/or nails, was examined.  - There are dome shaped bright red papules on the head/neck, trunk, extremities.   - Multiple regular brown pigmented macules and papules are identified on the head/neck, trunk, extremities.   - Scattered brown macules on sun exposed areas.  - There are waxy stuck on tan to brown papules on the head/neck, trunk, " extremities.  - 3 erythematous scaly macules on the nose  - 3 erythematous stuck on papules on the face and scalp.  - diffuse mild erythema on the scalp  - No other lesions of concern on areas examined.     Past Medical History:   Past Medical History:   Diagnosis Date     Chronic kidney disease, stage 3a (H) 10/03/2022     History of blood transfusion      Nonsenile cataract      Osteoarthritis      Pulmonary nodule      Syncopal episodes 2023     Uveitis      Past Surgical History:   Procedure Laterality Date     ARTHROPLASTY KNEE Right 2021    Procedure: ARTHROPLASTY, RIGHT KNEE, TOTAL;  Surgeon: Gabriel Marrero MD;  Location: [facility] OR     ARTHROPLASTY SHOULDER Right 2024    Procedure: Right total shoulder arthroplasty;  Surgeon: Gabriel Marrero MD;  [facility]: [facility]     ARTHROPLASTY, RIGHT KNEE, TOTAL Right 2021     CATARACT IOL, RT/LT Right 2019     left shoulder replacement           PHACOEMULSIFICATION WITH STANDARD INTRAOCULAR LENS IMPLANT Right 2019    Procedure: Right Cataract Removal with Intraocular Lens Implant;  Surgeon: Trish Taylor MD;  Location:  OR       Social History:   reports that she quit smoking about 28 years ago. Her smoking use included cigarettes. She started smoking about 63 years ago. She has a 35 pack-year smoking history. She has never used smokeless tobacco. She reports current alcohol use of about 3.0 standard drinks of alcohol per week. She reports that she does not use drugs.    Family History:  Family History   Problem Relation Age of Onset     Arthritis Mother      Breast Cancer Mother 78     Diabetes Type 2  Father      Heart Disease Father          age 75     Alzheimer Disease Father 60     Diabetes Father      Hypertension Father      Hyperlipidemia Father      Mental Illness Father      Heart Disease Brother 68     Breast Cancer Maternal Grandmother 81     Heart Disease Maternal Grandfather 54         age 81     Other -  See Comments Son         schizoaffective lives in [place]     Mental Illness Son      Glaucoma No family hx of      Macular Degeneration No family hx of      Anesthesia Reaction No family hx of      Venous thrombosis No family hx of        Medications:  Current Outpatient Medications   Medication Sig Dispense Refill     Cyanocobalamin (VITAMIN B-12 PO) Take by mouth daily       diclofenac (VOLTAREN) 1 % topical gel Apply 2 g topically 3 times daily as needed for moderate pain 150 g 1     famotidine (PEPCID) 40 MG tablet Take 1 tablet (40 mg) by mouth daily as needed for heartburn 90 tablet 3     ketoconazole (NIZORAL) 2 % external shampoo Apply topically three times a week 120 mL 11     pravastatin (PRAVACHOL) 20 MG tablet Take 1 tablet (20 mg) by mouth daily 90 tablet 3     psyllium (METAMUCIL/KONSYL) capsule Take 1 capsule by mouth daily 90 capsule 3     Vitamin D3 (CHOLECALCIFEROL) 25 mcg (1000 units) tablet Take 1 tablet by mouth daily       acetaminophen (TYLENOL) 325 MG tablet Take 2 tablets (650 mg) by mouth every 4 hours as needed for other (mild pain) (Patient not taking: Reported on 7/1/2024) 100 tablet 0     prednisoLONE acetate (PRED FORTE) 1 % ophthalmic suspension Place 1 drop Into the left eye 3 times daily (Patient not taking: Reported on 4/18/2024) 15 mL 1     Allergies   Allergen Reactions     Dust Mites Itching     Runny nose, fever     Mold Itching     Fever, runny nose     Pollen Extract Itching     Fever, runny nose     Celecoxib      Other reaction(s): GI intolerance                 Again, thank you for allowing me to participate in the care of your patient.        Sincerely,        Jose Alberto Isbell MD

## 2024-07-29 NOTE — PROGRESS NOTES
[facility] Dermatology Note    Encounter Date: Jul 29, 2024    Dermatology Problem List:  1. Multifactorial non-scarring hair loss (seborrheic dermatitis and androgenetic +/- telogen effluvium)  - hair labs unremarkable: CBC, TSH, vitamin D, iron studies  - current tx: fluocinolone solution, ketoconazole shampoo, minoxidil 5% foam  2. AKs. S/p cryo  3. Pink facial patch: DDx seborrheic dermatitis, actinic keratosis, macular seborrheic keratosis  - current treatment: tacrolimus ointment  - past treatment: hydrocortisone cream, ketoconazole cream    ______________________________________    Impression/Plan:  1. Multifactorial non-scarring hair loss (seborrheic dermatitis and androgenetic +/- telogen effluvium), stable  - Continue current topical regimen of fluocinolone solution, ketoconazole shampoo, minoxidil 5% foam  - Discussed risks/benefits of oral medications, patient defers at this time    2. ISK x3  Cryotherapy procedure note: After verbal consent and discussion of risks and benefits including, but not limited to, dyspigmentation/scar, blister, and pain, 2 was(were) treated with 1-2 mm freeze border for 1-2 cycles with liquid nitrogen. Post cryotherapy instructions were provided.     3. AK x3 on the nose  Cryotherapy procedure note: After verbal consent and discussion of risks and benefits including, but not limited to, dyspigmentation/scar, blister, and pain, 3 was(were) treated with 1-2 mm freeze border for 1-2 cycles with liquid nitrogen. Post cryotherapy instructions were provided.     4. Reassurance provided for benign lesions not treated today including cherry angiomata, solar lentigines, seborrheic keratoses, and banal-appearing melanocytic nevi.        Follow-up in 6 months.       Staff Involved:  Staff and Scribe    I, Astrid Blanco, matt serving as a scribe; to document services personally performed by Jose Alberto Isbell MD -based on data collection and the provider's statements to  "me.    Provider Disclosure:   The documentation recorded by the scribe accurately reflects the services I personally performed and the decisions made by me.    Jose Alberto Isbell MD   of Dermatology  Department of Dermatology  [facility] [employer]      CC:   Chief Complaint   Patient presents with    Skin Check     FBSE, area of concern on forehead and nose.  HX of AK.        History of Present Illness:  Ms. Giana Hope is a 79 year old female who presents as a return patient here for a full body skin check. Patient reports that her hair has become \"wild\" lately, but is not losing as much hair as she did in the winter. She is primarily using topicals. She thinks the hair loss has been relatively stable. Patient notes that her scalp is itchy sometimes, but feels better when she washes it with the ketoconazole shampoo. She has an area of concern on the forehead and nose. Patient has a history of AK.     Patient is otherwise feeling well, with no additional skin concerns.    Labs:  N/A    Physical exam:  Vitals: LMP 01/01/1998 (Approximate)   GEN: This is a well developed, well-nourished female in no acute distress, in a pleasant mood.    SKIN: Ring phototype II  - Full skin, which includes the head/face, both arms, chest, back, abdomen,both legs, genitalia and/or groin buttocks, digits and/or nails, was examined.  - There are dome shaped bright red papules on the head/neck, trunk, extremities.   - Multiple regular brown pigmented macules and papules are identified on the head/neck, trunk, extremities.   - Scattered brown macules on sun exposed areas.  - There are waxy stuck on tan to brown papules on the head/neck, trunk, extremities.  - 3 erythematous scaly macules on the nose  - 3 erythematous stuck on papules on the face and scalp.  - diffuse mild erythema on the scalp  - No other lesions of concern on areas examined.     Past Medical History:   Past Medical " History:   Diagnosis Date    Chronic kidney disease, stage 3a (H) 10/03/2022    History of blood transfusion     Nonsenile cataract     Osteoarthritis     Pulmonary nodule     Syncopal episodes 2023    Uveitis      Past Surgical History:   Procedure Laterality Date    ARTHROPLASTY KNEE Right 2021    Procedure: ARTHROPLASTY, RIGHT KNEE, TOTAL;  Surgeon: Gabriel Marrero MD;  [facility]    ARTHROPLASTY SHOULDER Right 2024    Procedure: Right total shoulder arthroplasty;  Surgeon: Gabriel Marrero MD;  [facility]    ARTHROPLASTY, RIGHT KNEE, TOTAL Right 2021    CATARACT IOL, RT/LT Right 2019    left shoulder replacement          PHACOEMULSIFICATION WITH STANDARD INTRAOCULAR LENS IMPLANT Right 2019    Procedure: Right Cataract Removal with Intraocular Lens Implant;  Surgeon: Trish Taylor MD;  Location:  OR       Social History:   reports that she quit smoking about 28 years ago. Her smoking use included cigarettes. She started smoking about 63 years ago. She has a 35 pack-year smoking history. She has never used smokeless tobacco. She reports current alcohol use of about 3.0 standard drinks of alcohol per week. She reports that she does not use drugs.    Family History:  Family History   Problem Relation Age of Onset    Arthritis Mother     Breast Cancer Mother 78    Diabetes Type 2  Father     Heart Disease Father          age 75    Alzheimer Disease Father 60    Diabetes Father     Hypertension Father     Hyperlipidemia Father     Mental Illness Father     Heart Disease Brother 68    Breast Cancer Maternal Grandmother 81    Heart Disease Maternal Grandfather 54         age 81    Other - See Comments Son         schizoaffective lives in [place]    Mental Illness Son     Glaucoma No family hx of     Macular Degeneration No family hx of     Anesthesia Reaction No family hx of     Venous thrombosis No family hx of        Medications:  Current Outpatient  Medications   Medication Sig Dispense Refill    Cyanocobalamin (VITAMIN B-12 PO) Take by mouth daily      diclofenac (VOLTAREN) 1 % topical gel Apply 2 g topically 3 times daily as needed for moderate pain 150 g 1    famotidine (PEPCID) 40 MG tablet Take 1 tablet (40 mg) by mouth daily as needed for heartburn 90 tablet 3    ketoconazole (NIZORAL) 2 % external shampoo Apply topically three times a week 120 mL 11    pravastatin (PRAVACHOL) 20 MG tablet Take 1 tablet (20 mg) by mouth daily 90 tablet 3    psyllium (METAMUCIL/KONSYL) capsule Take 1 capsule by mouth daily 90 capsule 3    Vitamin D3 (CHOLECALCIFEROL) 25 mcg (1000 units) tablet Take 1 tablet by mouth daily      acetaminophen (TYLENOL) 325 MG tablet Take 2 tablets (650 mg) by mouth every 4 hours as needed for other (mild pain) (Patient not taking: Reported on 7/1/2024) 100 tablet 0    prednisoLONE acetate (PRED FORTE) 1 % ophthalmic suspension Place 1 drop Into the left eye 3 times daily (Patient not taking: Reported on 4/18/2024) 15 mL 1     Allergies   Allergen Reactions    Dust Mites Itching     Runny nose, fever    Mold Itching     Fever, runny nose    Pollen Extract Itching     Fever, runny nose    Celecoxib      Other reaction(s): GI intolerance

## 2024-07-29 NOTE — NURSING NOTE
Giana Hope's goals for this visit include:   Chief Complaint   Patient presents with    Skin Check     FBSE, area of concern on forehead and nose.  HX of AK.        She requests these members of her care team be copied on today's visit information:     PCP: Gilberto Vasquez    Referring Provider:  Jose Alberto Isbell MD  [address] [place] [address]    [facility] 01/01/1998 (Approximate)     Do you need any medication refills at today's visit?     Catia Boland on 7/29/2024 at 11:58 AM

## 2024-08-05 ENCOUNTER — MYC MEDICAL ADVICE (OUTPATIENT)
Dept: ORTHOPEDICS | Facility: CLINIC | Age: 79
End: 2024-08-05
Payer: MEDICARE

## 2024-08-05 DIAGNOSIS — Z96.611 S/P SHOULDER REPLACEMENT, RIGHT: Primary | ICD-10-CM

## 2024-08-05 RX ORDER — AMOXICILLIN 500 MG/1
TABLET, FILM COATED ORAL
Qty: 4 TABLET | Refills: 1 | Status: SHIPPED | OUTPATIENT
Start: 2024-08-05

## 2024-08-12 ENCOUNTER — THERAPY VISIT (OUTPATIENT)
Dept: PHYSICAL THERAPY | Facility: CLINIC | Age: 79
End: 2024-08-12
Payer: MEDICARE

## 2024-08-12 DIAGNOSIS — M19.011 PRIMARY OSTEOARTHRITIS OF RIGHT SHOULDER: ICD-10-CM

## 2024-08-12 DIAGNOSIS — Z96.611 S/P SHOULDER REPLACEMENT, RIGHT: Primary | ICD-10-CM

## 2024-08-12 PROCEDURE — 97110 THERAPEUTIC EXERCISES: CPT | Mod: GP

## 2024-08-12 NOTE — PROGRESS NOTES
08/12/24 0500   Appointment Info   Signing clinician's name / credentials Soheila Sebastian DPT/Richard Goldsmith, SPT   Total/Authorized Visits E&T 12 POC   Visits Used 19   Medical Diagnosis R TSA   PT Tx Diagnosis R TSA   Precautions/Limitations Patient can transition to active range of motion exercises after 6 weeks. May begin isometrics and light strengthening flexion, ER, ABD. Still no resisted IR/backward extension until 3 months post-op.   Quick Adds Certification;Student Supervision   Progress Note/Certification   Start of Care Date 03/01/24   Onset of illness/injury or Date of Surgery 02/29/24   Therapy Frequency 1x/month   Predicted Duration 12 weeks   Certification date from 08/12/24   Certification date to 11/04/24   Progress Note Due Date 10/11/24   Progress Note Completed Date 06/24/24   Supervision   Student Supervision Direct supervision provided;Direct Patient Contact Provided   GOALS   PT Goals 2   PT Goal 1   Goal Identifier Reaching   Goal Description Patient will be able to reach overhead pain no greater than 1/10, ROM WNL when compared to unaffected side   Rationale to maximize safety and independence with performance of ADLs and functional tasks;to maximize safety and independence within the home;to maximize safety and independence with self cares   Goal Progress slightly less than contralateral, but improving, date extended   Target Date 11/04/24   PT Goal 2   Goal Identifier Weight lifting   Goal Description Patient will be able to participate in light UE strength training without increased pain in order to maintain UE function and strength for daily activities and yard work   Goal Progress have initiated strengthening with good tolerance   Target Date 05/24/24   Date Met 05/28/24   Subjective Report   Subjective Report She states she is okay today. She is taking care of a neighbors cats and that has all gone well. She states to have some residual shoulder pain that came out of blue.    Objective Measures   Objective Measures Objective Measure 1;Objective Measure 2   Objective Measure 1   Objective Measure shoulder AROM   Details shoulder flexion 105   Objective Measure 2   Objective Measure Shoulder PROM   Details Supine flexion 135 AAROM.   Treatment Interventions (PT)   Interventions Therapeutic Procedure/Exercise   Therapeutic Procedure/Exercise   Therapeutic Procedures: strength, endurance, ROM, flexibility minutes (97286) 30   Therapeutic Procedures Ther Proc 2   Ther Proc 1 blaze pods   Ther Proc 1 - Details NT   Ther Proc 2 Shoulder PROM   Ther Proc 2 - Details DC   PTRx Ther Proc 1 Thoracic Extension   PTRx Ther Proc 1 - Details NT - complete as needed   PTRx Ther Proc 2 Wall Slides Abduction   PTRx Ther Proc 2 - Details DC   PTRx Ther Proc 4 Pendulum/Codmans   PTRx Ther Proc 4 - Details NT   PTRx Ther Proc 5 Four Corner Stretch External Rotation at Side   PTRx Ther Proc 5 - Details 5x; VC and TC given for proper form and to keep arm against wall.   PTRx Ther Proc 6 Supine AAROM Shoulder Flexion   PTRx Ther Proc 6 - Details x5 reps reaching in standing with a goal to reach as far overhead as possible. VR to also complete in supine.   PTRx Ther Proc 7 Wand Shoulder External Rotation in Standing   PTRx Ther Proc 7 - Details NT   PTRx Ther Proc 8 Shoulder Flexion   PTRx Ther Proc 8 - Details NT   PTRx Ther Proc 9 Shoulder Scaption Full Can   PTRx Ther Proc 9 - Details 10x full ROM w/2 pounds   PTRx Ther Proc 10 Internal Rotation Behind Back with Overpressure   PTRx Ther Proc 10 - Details NT   PTRx Ther Proc 11 Standing Passive Shoulder Flexion   PTRx Ther Proc 11 - Details NT   PTRx Ther Proc 12 Shoulder Theraband Rows   PTRx Ther Proc 12 - Details NT   PTRx Ther Proc 13 Shoulder Theraband Internal Rotation   PTRx Ther Proc 13 - Details x10 Green band used with proper form used throughout.   PTRx Ther Proc 14 Shoulder Theraband External Rotation   PTRx Ther Proc 14 - Details 10x GTB; vc to  keep body from rotating   PTRx Ther Proc 15 Standing Theraband T's   PTRx Ther Proc 15 - Details x10 w/YTB; VC to squeeze shoulders together   Skilled Intervention Edu on how to prioritize exercises and adjust stretching/strengthening appropriately   Patient Response/Progress tolerated well without significant pain   Manual Therapy   Manual Therapy Manual Therapy 2   Manual Therapy 1 MFR   Manual Therapy 1 - Details NT   Manual Therapy 2 GH joint mobs   Manual Therapy 2 - Details NT   Skilled Intervention appropriate implementation of technique and patient assessment   Patient Response/Progress tolerated well, no signifcant change ROM after   Education   Learner/Method Patient;Demonstration;Pictures/Video;No Barriers to Learning   Plan   Home program online   Updates to plan of care Removed exercises and refined HEP   Plan for next session Check on HEP   Total Session Time   Timed Code Treatment Minutes 30   Total Treatment Time (sum of timed and untimed services) 30       PLAN  Continue therapy per current plan of care.    Beginning/End Dates of Progress Note Reporting Period:  06/24/24 to 08/12/2024    Referring Provider:  Gabriel Marrero        [facility]                                                                                   OUTPATIENT PHYSICAL THERAPY    PLAN OF TREATMENT FOR OUTPATIENT REHABILITATION   Patient's Last Name, First Name, GEETASherieDEBBIESherie  Giana Hope  S YOB: 1945   Provider's Name   [facility]   Medical Record No.  [id number]     Onset Date: 02/29/24  Start of Care Date: 03/01/24     Medical Diagnosis:  R TSA      PT Treatment Diagnosis:  R TSA Plan of Treatment  Frequency/Duration: 1x/month/ 12 weeks    Certification date from 08/12/24 to 11/04/24         See note for plan of treatment details and functional goals     Soheila Sebastian, PT                         I CERTIFY THE NEED FOR THESE SERVICES FURNISHED UNDER         THIS PLAN OF TREATMENT AND WHILE UNDER MY CARE     (Physician attestation of this document indicates review and certification of the therapy plan).              Referring Provider:  Gabriel Marrero    Initial Assessment  See Epic Evaluation- Start of Care Date: 03/01/24

## 2024-08-19 ENCOUNTER — TELEPHONE (OUTPATIENT)
Dept: ORTHOPEDICS | Facility: CLINIC | Age: 79
End: 2024-08-19
Payer: MEDICARE

## 2024-08-20 DIAGNOSIS — M19.071 OSTEOARTHRITIS OF MIDFOOT, RIGHT: Primary | ICD-10-CM

## 2024-08-20 NOTE — TELEPHONE ENCOUNTER
Writer called and informed pt that the injection order has been placed and is being faxed to [facility].     Maame Sanchez LPN

## 2024-08-21 ENCOUNTER — TELEPHONE (OUTPATIENT)
Dept: OTOLARYNGOLOGY | Facility: CLINIC | Age: 79
End: 2024-08-21
Payer: MEDICARE

## 2024-09-30 ENCOUNTER — THERAPY VISIT (OUTPATIENT)
Dept: PHYSICAL THERAPY | Facility: CLINIC | Age: 79
End: 2024-09-30
Payer: MEDICARE

## 2024-09-30 DIAGNOSIS — M19.011 PRIMARY OSTEOARTHRITIS OF RIGHT SHOULDER: ICD-10-CM

## 2024-09-30 DIAGNOSIS — Z96.611 S/P SHOULDER REPLACEMENT, RIGHT: Primary | ICD-10-CM

## 2024-09-30 PROCEDURE — 97110 THERAPEUTIC EXERCISES: CPT | Mod: GP

## 2024-10-04 ENCOUNTER — MYC MEDICAL ADVICE (OUTPATIENT)
Dept: FAMILY MEDICINE | Facility: CLINIC | Age: 79
End: 2024-10-04
Payer: MEDICARE

## 2024-10-04 DIAGNOSIS — Z78.9 H/O FOREIGN TRAVEL: Primary | ICD-10-CM

## 2024-10-29 ENCOUNTER — TELEPHONE (OUTPATIENT)
Dept: FAMILY MEDICINE | Facility: CLINIC | Age: 79
End: 2024-10-29
Payer: MEDICARE

## 2024-10-29 NOTE — TELEPHONE ENCOUNTER
Left Voicemail (2nd Attempt) for the patient to call back and schedule the following:    Appointment type: EPP  Provider: Dr. Vasquez  Return date: July 2025  Specialty phone number: [phone]  Additional appointment(s) needed: -  Additonal Notes: provider jail unable to reach pt to rs

## 2024-11-11 ENCOUNTER — ANCILLARY PROCEDURE (OUTPATIENT)
Dept: MAMMOGRAPHY | Facility: CLINIC | Age: 79
End: 2024-11-11
Attending: FAMILY MEDICINE
Payer: MEDICARE

## 2024-11-11 DIAGNOSIS — Z12.31 VISIT FOR SCREENING MAMMOGRAM: ICD-10-CM

## 2024-11-11 PROCEDURE — 77063 BREAST TOMOSYNTHESIS BI: CPT | Mod: GC | Performed by: RADIOLOGY

## 2024-11-11 PROCEDURE — 77067 SCR MAMMO BI INCL CAD: CPT | Mod: GC | Performed by: RADIOLOGY

## 2024-12-09 ENCOUNTER — THERAPY VISIT (OUTPATIENT)
Dept: PHYSICAL THERAPY | Facility: CLINIC | Age: 79
End: 2024-12-09
Payer: MEDICARE

## 2024-12-09 DIAGNOSIS — M19.011 PRIMARY OSTEOARTHRITIS OF RIGHT SHOULDER: ICD-10-CM

## 2024-12-09 DIAGNOSIS — Z96.611 S/P SHOULDER REPLACEMENT, RIGHT: Primary | ICD-10-CM

## 2024-12-09 PROCEDURE — 97110 THERAPEUTIC EXERCISES: CPT | Mod: GP

## 2024-12-10 NOTE — PROGRESS NOTES
12/09/24 0500   Appointment Info   Signing clinician's name / credentials Soheila Sebastian DPT   Total/Authorized Visits E&T 12 POC   Visits Used 21   Medical Diagnosis R TSA   PT Tx Diagnosis R TSA   Precautions/Limitations Patient can transition to active range of motion exercises after 6 weeks. May begin isometrics and light strengthening flexion, ER, ABD. Still no resisted IR/backward extension until 3 months post-op.   Progress Note/Certification   Start of Care Date 03/01/24   Onset of illness/injury or Date of Surgery 02/29/24   Therapy Frequency 1x/month   Predicted Duration 1 month   Certification date from 11/05/24   Certification date to 12/09/24   Progress Note Completed Date 08/12/24   GOALS   PT Goals 2   PT Goal 1   Goal Identifier Reaching   Goal Description Patient will be able to reach overhead pain no greater than 1/10, ROM WNL when compared to unaffected side   Rationale to maximize safety and independence with performance of ADLs and functional tasks;to maximize safety and independence within the home;to maximize safety and independence with self cares   Goal Progress slightly less than contralateral, but functional for patient. partially met.   Target Date 11/04/24   PT Goal 2   Goal Identifier Weight lifting   Goal Description Patient will be able to participate in light UE strength training without increased pain in order to maintain UE function and strength for daily activities and yard work   Target Date 05/24/24   Date Met 05/28/24   Subjective Report   Subjective Report Shoulder doing overall pretty well. It will occasionally hurt. Has been working with 5# weights. Not quite as flexible as other shoulder. Most daily activities going well. Feels she is getting stronger.   Objective Measures   Objective Measures Objective Measure 1;Objective Measure 2;Objective Measure 3   Objective Measure 1   Objective Measure shoulder AROM   Details R shoulder flexion 115 deg, improved from last  session. (L 120)   Objective Measure 2   Objective Measure Shoulder PROM   Details Supine flexion 140 AAROM.   Objective Measure 3   Objective Measure MMT R shoulder   Details flexion 5/5, abduction 5/5, ER 4/5, IR 5/5   Treatment Interventions (PT)   Interventions Therapeutic Procedure/Exercise   Therapeutic Procedure/Exercise   Therapeutic Procedures: strength, endurance, ROM, flexibility minutes (20498) 30   Therapeutic Procedures Ther Proc 2   PTRx Ther Proc 1 Thoracic Extension   PTRx Ther Proc 1 - Details switched from chair to supine with towel roll, patient feels this better   PTRx Ther Proc 2 pulldown   PTRx Ther Proc 2 - Details 15x green TB, cues for proper form, edu on purpose   PTRx Ther Proc 3 Supine AAROM Shoulder Flexion   PTRx Ther Proc 3 - Details VR continue as needed   PTRx Ther Proc 4 Shoulder Scaption Full Can   PTRx Ther Proc 4 - Details 10x 3#, easy. 8x w/ 5#, challenging but completes w/o compensation or pain.   PTRx Ther Proc 7 Standing Theraband T's   PTRx Ther Proc 7 - Details 2x10 red and YTB cues for positioning   PTRx Ther Proc 8 Bilateral Rotation With Theraband   PTRx Ther Proc 8 - Details VR continue   PTRx Ther Proc 9 Bicep Curls with Dumbbells   PTRx Ther Proc 9 - Details 10x 5# no pain, decr difficulty than before   PTRx Ther Proc 10 Sit to Stand   PTRx Ther Proc 10 - Details VR   PTRx Ther Proc 11 Lateral Step Down   PTRx Ther Proc 11 - Details VR   PTRx Ther Proc 12 Four Corner Stretch Flexion   PTRx Ther Proc 12 - Details VR   Skilled Intervention Edu on benefits of continuing HEP, how to adjust IND, ideal frequency.   Patient Response/Progress tolerated well without significant pain   Manual Therapy   Manual Therapy Manual Therapy 2   Manual Therapy 1 MFR   Manual Therapy 1 - Details NT   Manual Therapy 2 GH joint mobs   Manual Therapy 2 - Details NT   Skilled Intervention appropriate implementation of technique and patient assessment   Patient Response/Progress tolerated  well, no signifcant change ROM after   Education   Learner/Method Patient;Demonstration;Pictures/Video;No Barriers to Learning   Plan   Home program online   Plan for next session Discharge   Total Session Time   Timed Code Treatment Minutes 30   Total Treatment Time (sum of timed and untimed services) 30       [facility]                                                                                   OUTPATIENT PHYSICAL THERAPY    PLAN OF TREATMENT FOR OUTPATIENT REHABILITATION   Patient's Last Name, First Name, Giana Cooley YOB: 1945   Provider's Name   [facility]   Medical Record No.  [id number]     Onset Date: 02/29/24  Start of Care Date: 03/01/24     Medical Diagnosis:  R TSA      PT Treatment Diagnosis:  R TSA Plan of Treatment  Frequency/Duration: 1x/month/ 1 month    Certification date from 11/05/24 to 12/09/24         See note for plan of treatment details and functional goals     Soheila Sebastian, [place]                         I CERTIFY THE NEED FOR THESE SERVICES FURNISHED UNDER        THIS PLAN OF TREATMENT AND WHILE UNDER MY CARE     (Physician attestation of this document indicates review and certification of the therapy plan).              Referring Provider:  Gabriel Marrero    Initial Assessment  See Epic Evaluation- Start of Care Date: 03/01/24        DISCHARGE  Reason for Discharge: Patient has met all goals.    Equipment Issued: none    Discharge Plan: Patient to continue home program.    Referring Provider:  Gabriel Marrero

## 2025-02-03 ENCOUNTER — OFFICE VISIT (OUTPATIENT)
Dept: DERMATOLOGY | Facility: CLINIC | Age: 80
End: 2025-02-03
Attending: DERMATOLOGY
Payer: MEDICARE

## 2025-02-03 DIAGNOSIS — L82.1 SEBORRHEIC KERATOSIS: ICD-10-CM

## 2025-02-03 DIAGNOSIS — R20.8 DYSESTHESIA OF SCALP: ICD-10-CM

## 2025-02-03 DIAGNOSIS — L81.4 SOLAR LENTIGO: ICD-10-CM

## 2025-02-03 DIAGNOSIS — L21.9 DERMATITIS, SEBORRHEIC: Primary | ICD-10-CM

## 2025-02-03 DIAGNOSIS — L57.8 PHOTOAGING OF SKIN: ICD-10-CM

## 2025-02-03 PROCEDURE — 99213 OFFICE O/P EST LOW 20 MIN: CPT | Performed by: DERMATOLOGY

## 2025-02-03 RX ORDER — TRETINOIN 0.25 MG/G
CREAM TOPICAL AT BEDTIME
Qty: 45 G | Refills: 3 | Status: SHIPPED | OUTPATIENT
Start: 2025-02-03

## 2025-02-03 RX ORDER — KETOCONAZOLE 20 MG/ML
SHAMPOO, SUSPENSION TOPICAL
Qty: 120 ML | Refills: 12 | Status: SHIPPED | OUTPATIENT
Start: 2025-02-03

## 2025-02-03 ASSESSMENT — PAIN SCALES - GENERAL: PAINLEVEL_OUTOF10: NO PAIN (0)

## 2025-02-03 NOTE — LETTER
2/3/2025      Giana Hope  [address]  [place] [address]      Dear Colleague,    Thank you for referring your patient, Giana Hope, to the [facility]. Please see a copy of my visit note below.    [facility] Dermatology Note    Encounter Date: Feb 3, 2025    Dermatology Problem List:  1. Multifactorial non-scarring hair loss (seborrheic dermatitis and androgenetic +/- telogen effluvium)  - hair labs unremarkable: CBC, TSH, vitamin D, iron studies  - current tx: ketoconazole shampoo, topical gabapentin solution  - prior tx:  fluocinolone solution, minoxidil 5% foam  2. AKs. S/p cryo  3. Pink facial patch: DDx seborrheic dermatitis, actinic keratosis, macular seborrheic keratosis  - current treatment: tacrolimus ointment  - past treatment: hydrocortisone cream, ketoconazole cream  4. Solar lentigines  - current tx: tretinoin cream    ______________________________________    Impression/Plan:  1. Scalp dysesthesia/pruritus with multifactorial non-scarring hair loss (seborrheic dermatitis and androgenetic +/- telogen effluvium), stable  - discussed risk/benefit of topical gabapentin in detail   - start topical gabapentin solution 6%-apply once a day to scalp  - continue ketoconazole shampoo 2%- shampoo scalp 2-3 times a week  - Continue current topical regimen of fluocinolone solution, ketoconazole shampoo, minoxidil 5% foam    2. Photoaging  - potentially interested in procedural intervention  - Referral sent to Dr. Woods     3. Solar Lentigines  - discussed topical treatment in detail (tretinoin cream)  - start tretinoin cream 0.025%-apply to face at bedtime      Follow-up in 6 months.       Staff Involved:  Staff and Scribe    Scribe Disclosure:   IGoldie, am serving as a scribe; to document services personally performed by Jose Alberto Isbell MD -based on data collection and the provider's statements to me.     Provider Disclosure:   The  documentation recorded by the scribe accurately reflects the services I personally performed and the decisions made by me.    Jose Alberto Isbell MD   of Dermatology  Department of Dermatology  [facility] [employer] [facility]        CC:   Chief Complaint   Patient presents with     Derm Problem     Pt still experiencing seborrheic dermatitis and having some spots around the face, including nose.        History of Present Illness:  Ms. Giana Hope is a 79 year old female who presents as a return patient.    Here for itching on the scalp, which she is using the ketoconazole shampoo every other day. She reports, some spots on the face and nose, that was previously treated with cryotherapy.     Labs:  N/a    Physical exam:  Vitals: LMP 01/01/1998 (Approximate)   GEN: This is a well developed, well-nourished female in no acute distress, in a pleasant mood.    SKIN: Ring phototype II  - Focused examination of the face and scalp was performed.  - There is macular erythema of the scalp with mild flaky white scale.  - scattered tan macules on the face  - few stuck on tan papules on the face  - No other lesions of concern on areas examined.     Past Medical History:   Past Medical History:   Diagnosis Date     Chronic kidney disease, stage 3a (H) 10/03/2022     History of blood transfusion      Nonsenile cataract      Osteoarthritis      Pulmonary nodule      Syncopal episodes 06/06/2023     Uveitis      Past Surgical History:   Procedure Laterality Date     ARTHROPLASTY KNEE Right 11/26/2021    Procedure: ARTHROPLASTY, RIGHT KNEE, TOTAL;  Surgeon: Gabriel Marrero MD;  Location: [facility] OR     ARTHROPLASTY SHOULDER Right 2/29/2024    Procedure: Right total shoulder arthroplasty;  Surgeon: Gabriel Marrero MD;  [facility]: [facility] OR     ARTHROPLASTY, RIGHT KNEE, TOTAL Right 11/26/2021     CATARACT IOL, RT/LT Right 03/2019     left shoulder replacement      2021     PHACOEMULSIFICATION WITH  STANDARD INTRAOCULAR LENS IMPLANT Right 2019    Procedure: Right Cataract Removal with Intraocular Lens Implant;  Surgeon: Trish Taylor MD;  Location:  OR       Social History:   reports that she quit smoking about 29 years ago. Her smoking use included cigarettes. She started smoking about 64 years ago. She has a 35 pack-year smoking history. She has never used smokeless tobacco. She reports current alcohol use of about 3.0 standard drinks of alcohol per week. She reports that she does not use drugs.    Family History:  Family History   Problem Relation Age of Onset     Arthritis Mother      Breast Cancer Mother 78     Diabetes Type 2  Father      Heart Disease Father          age 75     Alzheimer Disease Father 60     Diabetes Father      Hypertension Father      Hyperlipidemia Father      Mental Illness Father      Heart Disease Brother 68     Breast Cancer Maternal Grandmother 81     Heart Disease Maternal Grandfather 54         age 81     Other - See Comments Son         schizoaffective lives in [place]     Mental Illness Son      Glaucoma No family hx of      Macular Degeneration No family hx of      Anesthesia Reaction No family hx of      Venous thrombosis No family hx of        Medications:  Current Outpatient Medications   Medication Sig Dispense Refill     acetaminophen (TYLENOL) 325 MG tablet Take 2 tablets (650 mg) by mouth every 4 hours as needed for other (mild pain) 100 tablet 0     Cyanocobalamin (VITAMIN B-12 PO) Take by mouth daily       famotidine (PEPCID) 40 MG tablet Take 1 tablet (40 mg) by mouth daily as needed for heartburn 90 tablet 3     ketoconazole (NIZORAL) 2 % external shampoo Apply topically three times a week 120 mL 12     pravastatin (PRAVACHOL) 20 MG tablet Take 1 tablet (20 mg) by mouth daily 90 tablet 3     prednisoLONE acetate (PRED FORTE) 1 % ophthalmic suspension Place 1 drop Into the left eye 3 times daily (Patient taking differently: Place 1 drop Into the  left eye as needed.) 15 mL 1     amoxicillin (AMOXIL) 500 MG tablet Administer 4 tabs (2,000 mg), 1 hour prior to dental visit and/or non-sterile procedure (Patient not taking: Reported on 2/3/2025) 4 tablet 1     diclofenac (VOLTAREN) 1 % topical gel Apply 2 g topically 3 times daily as needed for moderate pain (Patient not taking: Reported on 2/3/2025) 150 g 1     fluocinolone (SYNALAR) 0.01 % solution Apply topically 2 times daily (Patient not taking: Reported on 2/3/2025) 120 mL 12     psyllium (METAMUCIL/KONSYL) capsule Take 1 capsule by mouth daily (Patient not taking: Reported on 2/3/2025) 90 capsule 3     Vitamin D3 (CHOLECALCIFEROL) 25 mcg (1000 units) tablet Take 1 tablet by mouth daily (Patient not taking: Reported on 2/3/2025)       Allergies   Allergen Reactions     Dust Mites Itching     Runny nose, fever     Mold Itching     Fever, runny nose     Pollen Extract Itching     Fever, runny nose     Celecoxib      Other reaction(s): GI intolerance         Again, thank you for allowing me to participate in the care of your patient.        Sincerely,    Jose Alberto Isbell MD    Electronically signed

## 2025-02-03 NOTE — NURSING NOTE
Giana Hope's chief complaint for this visit includes:  Chief Complaint   Patient presents with    Derm Problem     Pt still experiencing seborrheic dermatitis and having some spots around the face, including nose.      PCP: Rome Razo    Referring Provider:  Jose Alberto Isbell MD  [address] [place] [id number]    [facility] 01/01/1998 (Approximate)   No Pain (0)        Allergies   Allergen Reactions    Dust Mites Itching     Runny nose, fever    Mold Itching     Fever, runny nose    Pollen Extract Itching     Fever, runny nose    Celecoxib      Other reaction(s): GI intolerance         Do you need any medication refills at today's visit?    Kirsty Srinivasan on 2/3/2025 at 11:41 AM

## 2025-02-03 NOTE — PROGRESS NOTES
[facility] Dermatology Note    Encounter Date: Feb 3, 2025    Dermatology Problem List:  1. Multifactorial non-scarring hair loss (seborrheic dermatitis and androgenetic +/- telogen effluvium)  - hair labs unremarkable: CBC, TSH, vitamin D, iron studies  - current tx: ketoconazole shampoo, topical gabapentin solution  - prior tx:  fluocinolone solution, minoxidil 5% foam  2. AKs. S/p cryo  3. Pink facial patch: DDx seborrheic dermatitis, actinic keratosis, macular seborrheic keratosis  - current treatment: tacrolimus ointment  - past treatment: hydrocortisone cream, ketoconazole cream  4. Solar lentigines  - current tx: tretinoin cream    ______________________________________    Impression/Plan:  1. Scalp dysesthesia/pruritus with multifactorial non-scarring hair loss (seborrheic dermatitis and androgenetic +/- telogen effluvium), stable  - discussed risk/benefit of topical gabapentin in detail   - start topical gabapentin solution 6%-apply once a day to scalp  - continue ketoconazole shampoo 2%- shampoo scalp 2-3 times a week  - Continue current topical regimen of fluocinolone solution, ketoconazole shampoo, minoxidil 5% foam    2. Photoaging  - potentially interested in procedural intervention  - Referral sent to Dr. Woods     3. Solar Lentigines  - discussed topical treatment in detail (tretinoin cream)  - start tretinoin cream 0.025%-apply to face at bedtime      Follow-up in 6 months.       Staff Involved:  Staff and Scribe    Scribe Disclosure:   I, Goldie Sierra, am serving as a scribe; to document services personally performed by Jose Alberto Isbell MD -based on data collection and the provider's statements to me.     Provider Disclosure:   The documentation recorded by the scribe accurately reflects the services I personally performed and the decisions made by me.    Jose Alberto Isbell MD   of Dermatology  Department of Dermatology  [facility]  Medicine        CC:   Chief Complaint   Patient presents with    Derm Problem     Pt still experiencing seborrheic dermatitis and having some spots around the face, including nose.        History of Present Illness:  Ms. Giana Hope is a 79 year old female who presents as a return patient.    Here for itching on the scalp, which she is using the ketoconazole shampoo every other day. She reports, some spots on the face and nose, that was previously treated with cryotherapy.     Labs:  N/a    Physical exam:  Vitals: LMP 01/01/1998 (Approximate)   GEN: This is a well developed, well-nourished female in no acute distress, in a pleasant mood.    SKIN: Ring phototype II  - Focused examination of the face and scalp was performed.  - There is macular erythema of the scalp with mild flaky white scale.  - scattered tan macules on the face  - few stuck on tan papules on the face  - No other lesions of concern on areas examined.     Past Medical History:   Past Medical History:   Diagnosis Date    Chronic kidney disease, stage 3a (H) 10/03/2022    History of blood transfusion     Nonsenile cataract     Osteoarthritis     Pulmonary nodule     Syncopal episodes 06/06/2023    Uveitis      Past Surgical History:   Procedure Laterality Date    ARTHROPLASTY KNEE Right 11/26/2021    Procedure: ARTHROPLASTY, RIGHT KNEE, TOTAL;  Surgeon: Gabriel Marrero MD;  [facility]    ARTHROPLASTY SHOULDER Right 2/29/2024    Procedure: Right total shoulder arthroplasty;  Surgeon: Gabriel Marrero MD;  [facility]    ARTHROPLASTY, RIGHT KNEE, TOTAL Right 11/26/2021    CATARACT IOL, RT/LT Right 03/2019    left shoulder replacement      2021    PHACOEMULSIFICATION WITH STANDARD INTRAOCULAR LENS IMPLANT Right 03/22/2019    Procedure: Right Cataract Removal with Intraocular Lens Implant;  Surgeon: Trish Taylor MD;  [facility]       Social History:   reports that she quit smoking about 29 years ago. Her smoking use  included cigarettes. She started smoking about 64 years ago. She has a 35 pack-year smoking history. She has never used smokeless tobacco. She reports current alcohol use of about 3.0 standard drinks of alcohol per week. She reports that she does not use drugs.    Family History:  Family History   Problem Relation Age of Onset    Arthritis Mother     Breast Cancer Mother 78    Diabetes Type 2  Father     Heart Disease Father          age 75    Alzheimer Disease Father 60    Diabetes Father     Hypertension Father     Hyperlipidemia Father     Mental Illness Father     Heart Disease Brother 68    Breast Cancer Maternal Grandmother 81    Heart Disease Maternal Grandfather 54         age 81    Other - See Comments Son         schizoaffective lives in [place]    Mental Illness Son     Glaucoma No family hx of     Macular Degeneration No family hx of     Anesthesia Reaction No family hx of     Venous thrombosis No family hx of        Medications:  Current Outpatient Medications   Medication Sig Dispense Refill    acetaminophen (TYLENOL) 325 MG tablet Take 2 tablets (650 mg) by mouth every 4 hours as needed for other (mild pain) 100 tablet 0    Cyanocobalamin (VITAMIN B-12 PO) Take by mouth daily      famotidine (PEPCID) 40 MG tablet Take 1 tablet (40 mg) by mouth daily as needed for heartburn 90 tablet 3    ketoconazole (NIZORAL) 2 % external shampoo Apply topically three times a week 120 mL 12    pravastatin (PRAVACHOL) 20 MG tablet Take 1 tablet (20 mg) by mouth daily 90 tablet 3    prednisoLONE acetate (PRED FORTE) 1 % ophthalmic suspension Place 1 drop Into the left eye 3 times daily (Patient taking differently: Place 1 drop Into the left eye as needed.) 15 mL 1    amoxicillin (AMOXIL) 500 MG tablet Administer 4 tabs (2,000 mg), 1 hour prior to dental visit and/or non-sterile procedure (Patient not taking: Reported on 2/3/2025) 4 tablet 1    diclofenac (VOLTAREN) 1 % topical gel Apply 2 g topically 3 times daily  as needed for moderate pain (Patient not taking: Reported on 2/3/2025) 150 g 1    fluocinolone (SYNALAR) 0.01 % solution Apply topically 2 times daily (Patient not taking: Reported on 2/3/2025) 120 mL 12    psyllium (METAMUCIL/KONSYL) capsule Take 1 capsule by mouth daily (Patient not taking: Reported on 2/3/2025) 90 capsule 3    Vitamin D3 (CHOLECALCIFEROL) 25 mcg (1000 units) tablet Take 1 tablet by mouth daily (Patient not taking: Reported on 2/3/2025)       Allergies   Allergen Reactions    Dust Mites Itching     Runny nose, fever    Mold Itching     Fever, runny nose    Pollen Extract Itching     Fever, runny nose    Celecoxib      Other reaction(s): GI intolerance

## 2025-03-27 ENCOUNTER — TELEPHONE (OUTPATIENT)
Dept: DERMATOLOGY | Facility: CLINIC | Age: 80
End: 2025-03-27
Payer: MEDICARE

## 2025-03-27 NOTE — TELEPHONE ENCOUNTER
PA request for Tretinoin 0.025% cream from [facility] pharmacy #[id number] in [facility] on [address] in [place]. Obtain PA using [employer] help desk at [phone].  Prescriber Order Number: [id number]  Rx reference #: [id number]  Sender message ID: [id number]  TRX code: [id number]  Tel: [phone]  Fax: [phone]    Wilman Murphy MA on 3/27/2025 at 9:41 AM

## 2025-03-28 NOTE — TELEPHONE ENCOUNTER
PA Initiation    Medication: TRETINOIN 0.025 % EX CREA  Insurance Company: [employer] - Phone [phone] Fax [phone]  Pharmacy Filling the Rx: [id number] - [place], [place] - [address]  Filling Pharmacy Phone: [phone]  Filling Pharmacy Fax: [phone]  Start Date: 3/28/2025

## 2025-04-01 NOTE — TELEPHONE ENCOUNTER
PRIOR AUTHORIZATION DENIED    Medication: TRETINOIN 0.025 % EX CREA  Insurance Company: [employer] - Phone [phone] Fax [phone]  Denial Date: 3/29/2025  Denial Reason(s):       Appeal Information:       Patient Notified: NO

## 2025-04-22 ENCOUNTER — OFFICE VISIT (OUTPATIENT)
Dept: URGENT CARE | Facility: URGENT CARE | Age: 80
End: 2025-04-22
Payer: MEDICARE

## 2025-04-22 ENCOUNTER — TELEPHONE (OUTPATIENT)
Dept: INTERNAL MEDICINE | Facility: CLINIC | Age: 80
End: 2025-04-22
Payer: MEDICARE

## 2025-04-22 VITALS
WEIGHT: 135 LBS | DIASTOLIC BLOOD PRESSURE: 74 MMHG | HEART RATE: 65 BPM | HEIGHT: 63 IN | OXYGEN SATURATION: 99 % | BODY MASS INDEX: 23.92 KG/M2 | TEMPERATURE: 98.6 F | SYSTOLIC BLOOD PRESSURE: 125 MMHG | RESPIRATION RATE: 18 BRPM

## 2025-04-22 DIAGNOSIS — M54.50 LEFT LUMBAR PAIN: ICD-10-CM

## 2025-04-22 DIAGNOSIS — R35.0 INCREASED URINARY FREQUENCY: ICD-10-CM

## 2025-04-22 DIAGNOSIS — R42 LIGHTHEADEDNESS: Primary | ICD-10-CM

## 2025-04-22 LAB
ALBUMIN UR-MCNC: NEGATIVE MG/DL
ANION GAP SERPL CALCULATED.3IONS-SCNC: 8 MMOL/L (ref 3–14)
APPEARANCE UR: CLEAR
BASOPHILS # BLD AUTO: 0 10E3/UL (ref 0–0.2)
BASOPHILS NFR BLD AUTO: 0 %
BILIRUB UR QL STRIP: NEGATIVE
BUN SERPL-MCNC: 17 MG/DL (ref 7–30)
CALCIUM SERPL-MCNC: 10.6 MG/DL (ref 8.5–10.1)
CHLORIDE BLD-SCNC: 109 MMOL/L (ref 94–109)
CO2 SERPL-SCNC: 23 MMOL/L (ref 20–32)
COLOR UR AUTO: YELLOW
CREAT SERPL-MCNC: 0.9 MG/DL (ref 0.52–1.04)
EGFRCR SERPLBLD CKD-EPI 2021: 65 ML/MIN/1.73M2
EOSINOPHIL # BLD AUTO: 0.1 10E3/UL (ref 0–0.7)
EOSINOPHIL NFR BLD AUTO: 1 %
ERYTHROCYTE [DISTWIDTH] IN BLOOD BY AUTOMATED COUNT: 12.2 % (ref 10–15)
GLUCOSE BLD-MCNC: 93 MG/DL (ref 70–99)
GLUCOSE UR STRIP-MCNC: NEGATIVE MG/DL
HCT VFR BLD AUTO: 43.3 % (ref 35–47)
HGB BLD-MCNC: 14 G/DL (ref 11.7–15.7)
HGB UR QL STRIP: NEGATIVE
IMM GRANULOCYTES # BLD: 0 10E3/UL
IMM GRANULOCYTES NFR BLD: 0 %
KETONES UR STRIP-MCNC: NEGATIVE MG/DL
LEUKOCYTE ESTERASE UR QL STRIP: NEGATIVE
LYMPHOCYTES # BLD AUTO: 1.9 10E3/UL (ref 0.8–5.3)
LYMPHOCYTES NFR BLD AUTO: 27 %
MCH RBC QN AUTO: 30.2 PG (ref 26.5–33)
MCHC RBC AUTO-ENTMCNC: 32.3 G/DL (ref 31.5–36.5)
MCV RBC AUTO: 94 FL (ref 78–100)
MONOCYTES # BLD AUTO: 0.3 10E3/UL (ref 0–1.3)
MONOCYTES NFR BLD AUTO: 5 %
NEUTROPHILS # BLD AUTO: 4.9 10E3/UL (ref 1.6–8.3)
NEUTROPHILS NFR BLD AUTO: 68 %
NITRATE UR QL: NEGATIVE
PH UR STRIP: 7.5 [PH] (ref 5–7)
PLATELET # BLD AUTO: 204 10E3/UL (ref 150–450)
POTASSIUM BLD-SCNC: 4.3 MMOL/L (ref 3.4–5.3)
RBC # BLD AUTO: 4.63 10E6/UL (ref 3.8–5.2)
SODIUM SERPL-SCNC: 140 MMOL/L (ref 135–145)
SP GR UR STRIP: 1.01 (ref 1–1.03)
UROBILINOGEN UR STRIP-ACNC: 0.2 E.U./DL
WBC # BLD AUTO: 7.3 10E3/UL (ref 4–11)

## 2025-04-22 PROCEDURE — 36415 COLL VENOUS BLD VENIPUNCTURE: CPT | Performed by: FAMILY MEDICINE

## 2025-04-22 PROCEDURE — 80048 BASIC METABOLIC PNL TOTAL CA: CPT | Performed by: FAMILY MEDICINE

## 2025-04-22 PROCEDURE — 85025 COMPLETE CBC W/AUTO DIFF WBC: CPT | Performed by: FAMILY MEDICINE

## 2025-04-22 PROCEDURE — 81003 URINALYSIS AUTO W/O SCOPE: CPT | Performed by: FAMILY MEDICINE

## 2025-04-22 NOTE — PROGRESS NOTES
Urgent Care Clinic Visit    Chief Complaint   Patient presents with    Dizziness     X2 weeks ago had episode of lightheaded/dizziness and chest discomfort which subsided   This morning after getting out of bed had another episode of lightheaded/dizziness but no chest discomfort this morning, while in waiting room started to have chest discomfort and dizziness   Pt denies fainting or feeling any irregular/fast heart beat     Urgent Care    Back Pain     Lower left side back pain                4/22/2025     4:10 PM   Additional Questions   Roomed by linda phillips

## 2025-04-22 NOTE — TELEPHONE ENCOUNTER
[facility] Call Center    Phone Message    May a detailed message be left on voicemail: yes     Reason for Call: Symptoms or Concerns     If patient has red-flag symptoms, warm transfer to triage line    Current symptom or concern: Patient calling about back and side pain, dry mouth, lightheadedness, laying in bed for 12 hours, her head still doesn't feel right, energy level is low.  Please call her back to discuss further. Thank you     She also had lightheadedness 2 weeks ago. Frequent urination.   Symptoms have been present for: 12 hour(s)    Has patient previously been seen for this? No    By NA:     Date: NA    Are there any new or worsening symptoms? No    Action Taken: Message routed to:  [facility]    Travel Screening: Not Applicable     Date of Service:

## 2025-04-22 NOTE — PROGRESS NOTES
Assessment & Plan     Lightheadedness  ***  - EKG 12-lead complete w/read - Clinics  - CBC with platelets and differential  - Basic metabolic panel  (Ca, Cl, CO2, Creat, Gluc, K, Na, BUN)  - CBC with platelets and differential  - Basic metabolic panel  (Ca, Cl, CO2, Creat, Gluc, K, Na, BUN)    Increased urinary frequency  ***  - UA Macroscopic with reflex to Microscopic and Culture - Clinic Collect    Left lumbar pain  ***     On file MRI 2017 and 2020 lumbar spine        Pillo Spaulding MD   [facility]    Diana Shaikh is a 79 year old female who presents to clinic today for the following health issues:  Chief Complaint   Patient presents with    Dizziness     X2 weeks ago had episode of lightheaded/dizziness and chest discomfort which subsided   This morning after getting out of bed had another episode of lightheaded/dizziness but no chest discomfort this morning, while in waiting room started to have chest discomfort and dizziness   Pt denies fainting or feeling any irregular/fast heart beat     Urgent Care    Back Pain     Lower left side back pain     Urinary Problem     X1 week of frequent need to urinate      HPI  Denies room spinning with this 2 hour episode of dizziness. Has not had any chest pain.   Denies heart palpitations    Patient is a pre-diabetic    Never had a UTI/bladder infection.     Patient noted the triage team earlier today that she had episode of lightheadedness with a feeling of discomfort in her chest this resolved call today was advised to be evaluated that she had return of lightheadedness prompting her phone call today.  She has not passed out or lost consciousness    Pain is minimal in the back. Can't take NSAIDs    Denies arrhythmias or acute coronary syndrome.     Denies Lower leg swelling, wears compression stockings    Denies recent illnesses or concerns for dehydration    Family in [place], will be going on a trip May 8th to visit family in  [place]  Patient Active Problem List    Diagnosis Date Noted    Hypercalcemia 07/01/2024     Priority: Medium    Primary osteoarthritis of right shoulder 03/01/2024     Priority: Medium    S/P shoulder replacement, right 02/29/2024     Priority: Medium    Restless legs syndrome 08/15/2023     Priority: Medium    Bilateral impacted cerumen 07/24/2023     Priority: Medium    CKD (chronic kidney disease) stage 2, GFR 60-89 ml/min 06/19/2023     Priority: Medium    Chronic right shoulder pain 06/19/2023     Priority: Medium    Syncopal episodes 06/06/2023     Priority: Medium    SDH (subdural hematoma) (H) 06/06/2023     Priority: Medium    Syncope, unspecified syncope type 06/06/2023     Priority: Medium    Infection due to 2019 novel coronavirus 07/27/2022     Priority: Medium     6/5/2022 while in [place]      Venous stasis dermatitis of both lower extremities 07/27/2022     Priority: Medium    Varicose veins of both lower extremities with pain 07/27/2022     Priority: Medium    Osteoarthritis of right knee 11/26/2021     Priority: Medium    Pain in thoracic spine 04/16/2021     Priority: Medium    Mass of left upper extremity 07/07/2020     Priority: Medium    Primary osteoarthritis involving multiple joints 04/20/2020     Priority: Medium    Gastroesophageal reflux disease without esophagitis 04/20/2020     Priority: Medium    Family history of malignant neoplasm of breast 04/20/2020     Priority: Medium    Arthritis of shoulder region, left 12/17/2019     Priority: Medium     Added automatically from request for surgery [id number]      Nodule of left lung 06/19/2018     Priority: Medium     Overview:   5 mm nodule on CT scan 6/2018. Rec repeat in 1 year.    Formatting of this note might be different from the original.  5 mm nodule on CT scan 6/2018. Rec repeat in 1 year.      Pain in left shoulder 02/12/2018     Priority: Medium    Compression fracture of thoracic vertebra, open, initial encounter (H) 10/17/2017      Priority: Medium    Back pain of thoracolumbar region 09/19/2017     Priority: Medium    DDD (degenerative disc disease), thoracolumbar 09/19/2017     Priority: Medium    Degenerative scoliosis in adult patient 09/19/2017     Priority: Medium    Kyphosis (acquired) (postural) 09/19/2017     Priority: Medium    Osteopenia with high risk of fracture 09/19/2017     Priority: Medium    Hyperlipidemia LDL goal <100 06/07/2017     Priority: Medium    Nuclear sclerosis 03/22/2017     Priority: Medium    Status post left knee replacement 04/05/2016     Priority: Medium    Balance disorder 09/20/2013     Priority: Medium       Current Outpatient Medications   Medication Sig Dispense Refill    acetaminophen (TYLENOL) 325 MG tablet Take 2 tablets (650 mg) by mouth every 4 hours as needed for other (mild pain) 100 tablet 0    Cyanocobalamin (VITAMIN B-12 PO) Take by mouth daily      famotidine (PEPCID) 40 MG tablet Take 1 tablet (40 mg) by mouth daily as needed for heartburn 90 tablet 3    gabapentin 6 % SOLN solution Apply 1 mL topically 2 times daily. 120 mL 5    ketoconazole (NIZORAL) 2 % external shampoo Apply topically three times a week. 120 mL 12    pravastatin (PRAVACHOL) 20 MG tablet Take 1 tablet (20 mg) by mouth daily 90 tablet 3    prednisoLONE acetate (PRED FORTE) 1 % ophthalmic suspension Place 1 drop Into the left eye 3 times daily (Patient taking differently: Place 1 drop Into the left eye as needed.) 15 mL 1    tretinoin (RETIN-A) 0.025 % external cream Apply topically at bedtime. Pea-sized amount to whole face 45 g 3    amoxicillin (AMOXIL) 500 MG tablet Administer 4 tabs (2,000 mg), 1 hour prior to dental visit and/or non-sterile procedure (Patient not taking: Reported on 4/22/2025) 4 tablet 1    diclofenac (VOLTAREN) 1 % topical gel Apply 2 g topically 3 times daily as needed for moderate pain (Patient not taking: Reported on 4/22/2025) 150 g 1    fluocinolone (SYNALAR) 0.01 % solution Apply topically 2  "times daily (Patient not taking: Reported on 4/22/2025) 120 mL 12    psyllium (METAMUCIL/KONSYL) capsule Take 1 capsule by mouth daily (Patient not taking: Reported on 4/22/2025) 90 capsule 3    Vitamin D3 (CHOLECALCIFEROL) 25 mcg (1000 units) tablet Take 1 tablet by mouth daily (Patient not taking: Reported on 4/22/2025)       No current facility-administered medications for this visit.           Objective    /74 (BP Location: Right arm, Patient Position: Supine)   Pulse 65   Temp 98.6  F (37  C) (Temporal)   Resp 18   Ht 1.6 m (5' 3\")   Wt 61.2 kg (135 lb)   LMP 01/01/1998 (Approximate)   SpO2 99%   BMI 23.91 kg/m    Physical Exam   As noted above and including:   GEN: ***  CV: RRR no m/r/g  Pulm: clear bilaterally  Back: Points to left lower back as area of discomfort  No CVA tenderness  Neuro; no pronator drift, normal FNF bilaterally  EKG: rate 66, Qtc 404, sinus  Results for orders placed or performed in visit on 04/22/25   Basic metabolic panel  (Ca, Cl, CO2, Creat, Gluc, K, Na, BUN)     Status: Abnormal   Result Value Ref Range    Sodium 140 135 - 145 mmol/L    Potassium 4.3 3.4 - 5.3 mmol/L    Chloride 109 94 - 109 mmol/L    Carbon Dioxide (CO2) 23 20 - 32 mmol/L    Anion Gap 8 3 - 14 mmol/L    Urea Nitrogen 17 7 - 30 mg/dL    Creatinine 0.90 0.52 - 1.04 mg/dL    GFR Estimate 65 >60 mL/min/1.73m2    Calcium 10.6 (H) 8.5 - 10.1 mg/dL    Glucose 93 70 - 99 mg/dL   CBC with platelets and differential     Status: None   Result Value Ref Range    WBC Count 7.3 4.0 - 11.0 10e3/uL    RBC Count 4.63 3.80 - 5.20 10e6/uL    Hemoglobin 14.0 11.7 - 15.7 g/dL    Hematocrit 43.3 35.0 - 47.0 %    MCV 94 78 - 100 fL    MCH 30.2 26.5 - 33.0 pg    MCHC 32.3 31.5 - 36.5 g/dL    RDW 12.2 10.0 - 15.0 %    Platelet Count 204 150 - 450 10e3/uL    % Neutrophils 68 %    % Lymphocytes 27 %    % Monocytes 5 %    % Eosinophils 1 %    % Basophils 0 %    % Immature Granulocytes 0 %    Absolute Neutrophils 4.9 1.6 - 8.3 " 10e3/uL    Absolute Lymphocytes 1.9 0.8 - 5.3 10e3/uL    Absolute Monocytes 0.3 0.0 - 1.3 10e3/uL    Absolute Eosinophils 0.1 0.0 - 0.7 10e3/uL    Absolute Basophils 0.0 0.0 - 0.2 10e3/uL    Absolute Immature Granulocytes 0.0 <=0.4 10e3/uL   UA Macroscopic with reflex to Microscopic and Culture - Clinic Collect     Status: Abnormal    Specimen: Urine, Clean Catch   Result Value Ref Range    Color Urine Yellow Colorless, Straw, Light Yellow, Yellow    Appearance Urine Clear Clear    Glucose Urine Negative Negative mg/dL    Bilirubin Urine Negative Negative    Ketones Urine Negative Negative mg/dL    Specific Gravity Urine 1.015 1.003 - 1.035    Blood Urine Negative Negative    pH Urine 7.5 (H) 5.0 - 7.0    Protein Albumin Urine Negative Negative mg/dL    Urobilinogen Urine 0.2 0.2, 1.0 E.U./dL    Nitrite Urine Negative Negative    Leukocyte Esterase Urine Negative Negative    Narrative    Microscopic not indicated   CBC with platelets and differential     Status: None    Narrative    The following orders were created for panel order CBC with platelets and differential.  Procedure                               Abnormality         Status                     ---------                               -----------         ------                     CBC with platelets and ...[[id number]]                      Final result                 Please view results for these tests on the individual orders.                     The use of Dragon/[employer] services may have been used to construct the content in this note; any grammatical or spelling errors are non-intentional. Please contact the author of this note directly if you are in need of any clarification.

## 2025-05-02 ENCOUNTER — OFFICE VISIT (OUTPATIENT)
Dept: INTERNAL MEDICINE | Facility: CLINIC | Age: 80
End: 2025-05-02
Payer: MEDICARE

## 2025-05-02 VITALS
SYSTOLIC BLOOD PRESSURE: 107 MMHG | OXYGEN SATURATION: 96 % | HEART RATE: 79 BPM | DIASTOLIC BLOOD PRESSURE: 68 MMHG | TEMPERATURE: 97.7 F | RESPIRATION RATE: 16 BRPM

## 2025-05-02 DIAGNOSIS — Z78.9 HX OF FOREIGN TRAVEL: Primary | ICD-10-CM

## 2025-05-02 DIAGNOSIS — K21.9 GASTROESOPHAGEAL REFLUX DISEASE WITHOUT ESOPHAGITIS: ICD-10-CM

## 2025-05-02 DIAGNOSIS — Z23 NEED FOR VACCINATION: ICD-10-CM

## 2025-05-02 PROCEDURE — 99213 OFFICE O/P EST LOW 20 MIN: CPT | Mod: 25 | Performed by: INTERNAL MEDICINE

## 2025-05-02 PROCEDURE — 90480 ADMN SARSCOV2 VAC 1/ONLY CMP: CPT | Performed by: INTERNAL MEDICINE

## 2025-05-02 PROCEDURE — 3078F DIAST BP <80 MM HG: CPT | Performed by: INTERNAL MEDICINE

## 2025-05-02 PROCEDURE — 3074F SYST BP LT 130 MM HG: CPT | Performed by: INTERNAL MEDICINE

## 2025-05-02 PROCEDURE — 91320 SARSCV2 VAC 30MCG TRS-SUC IM: CPT | Performed by: INTERNAL MEDICINE

## 2025-05-02 RX ORDER — ATOVAQUONE AND PROGUANIL HYDROCHLORIDE 250; 100 MG/1; MG/1
TABLET, FILM COATED ORAL
Qty: 24 TABLET | Refills: 0 | Status: SHIPPED | OUTPATIENT
Start: 2025-05-02

## 2025-05-02 RX ORDER — FAMOTIDINE 40 MG/1
40 TABLET, FILM COATED ORAL DAILY PRN
Qty: 90 TABLET | Refills: 3 | Status: SHIPPED | OUTPATIENT
Start: 2025-05-02

## 2025-05-02 RX ORDER — AZITHROMYCIN 500 MG/1
500 TABLET, FILM COATED ORAL DAILY PRN
Qty: 10 TABLET | Refills: 0 | Status: SHIPPED | OUTPATIENT
Start: 2025-05-02 | End: 2025-05-03

## 2025-05-02 NOTE — PROGRESS NOTES
HPI  79-year-old presents today for planned travel to [facility].  She is requesting malaria prophylaxis.  She is also susceptible to traveler's diarrhea has experienced this frequently in the past and we discussed the use of azithromycin on an as-needed basis for this and she agreed.  We reviewed the CDC website and recommended that she update her COVID booster and that she get a typhoid vaccine.  She is concerned somewhat about the cost of the typhoid vaccine as she had a very expensive bill from a travel clinic years ago.  She is on famotidine for GERD this is working well she is requesting a refill of this.  Otherwise she has been feeling well has no other complaints or problems.  Past Medical History:   Diagnosis Date    Chronic kidney disease, stage 3a (H) 10/03/2022    History of blood transfusion     Nonsenile cataract     Osteoarthritis     Pulmonary nodule     Syncopal episodes 2023    Uveitis      Past Surgical History:   Procedure Laterality Date    ARTHROPLASTY KNEE Right 2021    Procedure: ARTHROPLASTY, RIGHT KNEE, TOTAL;  Surgeon: Gabriel Marrero MD;  Location: [facility] OR    ARTHROPLASTY SHOULDER Right 2024    Procedure: Right total shoulder arthroplasty;  Surgeon: Gabriel Marrero MD;  Location: [facility] OR    ARTHROPLASTY, RIGHT KNEE, TOTAL Right 2021    CATARACT IOL, RT/LT Right 2019    left shoulder replacement          PHACOEMULSIFICATION WITH STANDARD INTRAOCULAR LENS IMPLANT Right 2019    Procedure: Right Cataract Removal with Intraocular Lens Implant;  Surgeon: Trish Taylor MD;  Location: [facility] OR     Family History   Problem Relation Age of Onset    Arthritis Mother     Breast Cancer Mother 78    Diabetes Type 2  Father     Heart Disease Father          age 75    Alzheimer Disease Father 60    Diabetes Father     Hypertension Father     Hyperlipidemia Father     Mental Illness Father     Heart Disease Brother 68    Breast Cancer Maternal Grandmother 81     Heart Disease Maternal Grandfather 54         age 81    Other - See Comments Son         schizoaffective lives in [place]    Mental Illness Son     Glaucoma No family hx of     Macular Degeneration No family hx of     Anesthesia Reaction No family hx of     Venous thrombosis No family hx of          Exam:  /68 (BP Location: Right arm, Patient Position: Sitting, Cuff Size: Adult Regular)   Pulse 79   Temp 97.7  F (36.5  C) (Oral)   Resp 16   LMP 1998 (Approximate)   SpO2 96%     The patient is alert, oriented with a clear sensorium.   Skin shows no lesions or rashes and good turgor.   Head is normocephalic and atraumatic.        ASSESSMENT  Upcoming travel to [facility]  [place]T  Hyperlipidemia on pravastatin  GERD on famotidine    Plan  Will give her the COVID booster give her Malarone to start 1 to 2 days prior and to continue 1 week after for 2 weeks and [facility].  Recommended typhoid vaccine and will investigate the cost of this.  Refilled famotidine.  This note was completed using Dragon voice recognition software.      Grayson Henriquez MD  General Internal Medicine  [facility]  [phone]

## 2025-05-12 ENCOUNTER — TELEPHONE (OUTPATIENT)
Dept: DERMATOLOGY | Facility: CLINIC | Age: 80
End: 2025-05-12
Payer: MEDICARE

## 2025-07-01 ENCOUNTER — PRE VISIT (OUTPATIENT)
Dept: INTERNAL MEDICINE | Facility: CLINIC | Age: 80
End: 2025-07-01
Payer: MEDICARE

## 2025-07-06 SDOH — HEALTH STABILITY: PHYSICAL HEALTH: ON AVERAGE, HOW MANY MINUTES DO YOU ENGAGE IN EXERCISE AT THIS LEVEL?: 30 MIN

## 2025-07-06 SDOH — HEALTH STABILITY: PHYSICAL HEALTH: ON AVERAGE, HOW MANY DAYS PER WEEK DO YOU ENGAGE IN MODERATE TO STRENUOUS EXERCISE (LIKE A BRISK WALK)?: 4 DAYS

## 2025-07-06 ASSESSMENT — SOCIAL DETERMINANTS OF HEALTH (SDOH): HOW OFTEN DO YOU GET TOGETHER WITH FRIENDS OR RELATIVES?: TWICE A WEEK

## 2025-07-07 ENCOUNTER — LAB (OUTPATIENT)
Dept: LAB | Facility: CLINIC | Age: 80
End: 2025-07-07
Payer: MEDICARE

## 2025-07-07 ENCOUNTER — ANCILLARY PROCEDURE (OUTPATIENT)
Dept: GENERAL RADIOLOGY | Facility: CLINIC | Age: 80
End: 2025-07-07
Attending: INTERNAL MEDICINE
Payer: MEDICARE

## 2025-07-07 ENCOUNTER — OFFICE VISIT (OUTPATIENT)
Dept: INTERNAL MEDICINE | Facility: CLINIC | Age: 80
End: 2025-07-07
Payer: MEDICARE

## 2025-07-07 VITALS
BODY MASS INDEX: 23.97 KG/M2 | DIASTOLIC BLOOD PRESSURE: 75 MMHG | SYSTOLIC BLOOD PRESSURE: 121 MMHG | TEMPERATURE: 97.5 F | HEIGHT: 63 IN | RESPIRATION RATE: 16 BRPM | WEIGHT: 135.3 LBS | HEART RATE: 60 BPM | OXYGEN SATURATION: 96 %

## 2025-07-07 DIAGNOSIS — E83.52 HYPERCALCEMIA: ICD-10-CM

## 2025-07-07 DIAGNOSIS — H61.23 BILATERAL IMPACTED CERUMEN: ICD-10-CM

## 2025-07-07 DIAGNOSIS — E78.5 HYPERLIPIDEMIA WITH TARGET LDL LESS THAN 130: ICD-10-CM

## 2025-07-07 DIAGNOSIS — M25.571 PAIN IN JOINT INVOLVING ANKLE AND FOOT, RIGHT: ICD-10-CM

## 2025-07-07 DIAGNOSIS — Z13.6 SCREENING FOR CARDIOVASCULAR CONDITION: ICD-10-CM

## 2025-07-07 DIAGNOSIS — M62.838 SPASM OF ABDOMINAL MUSCLES OF LEFT SIDE: ICD-10-CM

## 2025-07-07 DIAGNOSIS — R91.8 PULMONARY NODULES: ICD-10-CM

## 2025-07-07 DIAGNOSIS — K21.9 GASTROESOPHAGEAL REFLUX DISEASE WITHOUT ESOPHAGITIS: ICD-10-CM

## 2025-07-07 DIAGNOSIS — E78.5 HYPERLIPIDEMIA LDL GOAL <100: ICD-10-CM

## 2025-07-07 DIAGNOSIS — N18.2 CKD (CHRONIC KIDNEY DISEASE) STAGE 2, GFR 60-89 ML/MIN: ICD-10-CM

## 2025-07-07 DIAGNOSIS — Z00.00 ENCOUNTER FOR MEDICARE ANNUAL WELLNESS EXAM: Primary | ICD-10-CM

## 2025-07-07 LAB
ALBUMIN SERPL BCG-MCNC: 4.3 G/DL (ref 3.5–5.2)
CALCIUM SERPL-MCNC: 9.9 MG/DL (ref 8.8–10.4)
CHOLEST SERPL-MCNC: 208 MG/DL
CREAT UR-MCNC: 36.1 MG/DL
FASTING STATUS PATIENT QL REPORTED: NO
HDLC SERPL-MCNC: 95 MG/DL
LDLC SERPL CALC-MCNC: 99 MG/DL
MICROALBUMIN UR-MCNC: 18.5 MG/L
MICROALBUMIN/CREAT UR: 51.25 MG/G CR (ref 0–25)
NONHDLC SERPL-MCNC: 113 MG/DL
TRIGL SERPL-MCNC: 72 MG/DL
VIT D+METAB SERPL-MCNC: 44 NG/ML (ref 20–50)

## 2025-07-07 PROCEDURE — 82043 UR ALBUMIN QUANTITATIVE: CPT | Performed by: INTERNAL MEDICINE

## 2025-07-07 PROCEDURE — 99214 OFFICE O/P EST MOD 30 MIN: CPT | Mod: 25 | Performed by: INTERNAL MEDICINE

## 2025-07-07 PROCEDURE — 82040 ASSAY OF SERUM ALBUMIN: CPT | Performed by: PATHOLOGY

## 2025-07-07 PROCEDURE — 82310 ASSAY OF CALCIUM: CPT | Performed by: PATHOLOGY

## 2025-07-07 PROCEDURE — 80061 LIPID PANEL: CPT | Performed by: PATHOLOGY

## 2025-07-07 PROCEDURE — 73610 X-RAY EXAM OF ANKLE: CPT | Mod: RT | Performed by: RADIOLOGY

## 2025-07-07 PROCEDURE — 3074F SYST BP LT 130 MM HG: CPT | Performed by: INTERNAL MEDICINE

## 2025-07-07 PROCEDURE — 3078F DIAST BP <80 MM HG: CPT | Performed by: INTERNAL MEDICINE

## 2025-07-07 PROCEDURE — 69209 REMOVE IMPACTED EAR WAX UNI: CPT | Mod: 50 | Performed by: INTERNAL MEDICINE

## 2025-07-07 PROCEDURE — 99000 SPECIMEN HANDLING OFFICE-LAB: CPT | Performed by: PATHOLOGY

## 2025-07-07 PROCEDURE — 36415 COLL VENOUS BLD VENIPUNCTURE: CPT | Performed by: PATHOLOGY

## 2025-07-07 PROCEDURE — 82306 VITAMIN D 25 HYDROXY: CPT | Performed by: INTERNAL MEDICINE

## 2025-07-07 PROCEDURE — G0439 PPPS, SUBSEQ VISIT: HCPCS | Performed by: INTERNAL MEDICINE

## 2025-07-07 RX ORDER — FAMOTIDINE 40 MG/1
40 TABLET, FILM COATED ORAL DAILY PRN
Qty: 90 TABLET | Refills: 3 | Status: SHIPPED | OUTPATIENT
Start: 2025-07-07

## 2025-07-07 RX ORDER — PRAVASTATIN SODIUM 20 MG
20 TABLET ORAL DAILY
Qty: 90 TABLET | Refills: 3 | Status: SHIPPED | OUTPATIENT
Start: 2025-07-07

## 2025-07-07 NOTE — PATIENT INSTRUCTIONS
The following imaging has been ordered: chest CT.  You can call [phone] to schedule or set it up on [employer]      Patient Education   Preventive Care Advice   This is general advice given by our system to help you stay healthy. However, your care team may have specific advice just for you. Please talk to your care team about your preventive care needs.  Nutrition  Eat 5 or more servings of fruits and vegetables each day.  Try wheat bread, brown rice and whole grain pasta (instead of white bread, rice, and pasta).  Get enough calcium and vitamin D. Check the label on foods and aim for 100% of the RDA (recommended daily allowance).  Lifestyle  Exercise at least 150 minutes each week  (30 minutes a day, 5 days a week).  Do muscle strengthening activities 2 days a week. These help control your weight and prevent disease.  No smoking.  Wear sunscreen to prevent skin cancer.  Have a dental exam and cleaning every 6 months.  Yearly exams  See your health care team every year to talk about:  Any changes in your health.  Any medicines your care team has prescribed.  Preventive care, family planning, and ways to prevent chronic diseases.  Shots (vaccines)   HPV shots (up to age 26), if you've never had them before.  Hepatitis B shots (up to age 59), if you've never had them before.  COVID-19 shot: Get this shot when it's due.  Flu shot: Get a flu shot every year.  Tetanus shot: Get a tetanus shot every 10 years.  Pneumococcal, hepatitis A, and RSV shots: Ask your care team if you need these based on your risk.  Shingles shot (for age 50 and up)  General health tests  Diabetes screening:  Starting at age 35, Get screened for diabetes at least every 3 years.  If you are younger than age 35, ask your care team if you should be screened for diabetes.  Cholesterol test: At age 39, start having a cholesterol test every 5 years, or more often if advised.  Bone density scan (DEXA): At age 50, ask your care team if you should  have this scan for osteoporosis (brittle bones).  Hepatitis C: Get tested at least once in your life.  STIs (sexually transmitted infections)  Before age 24: Ask your care team if you should be screened for STIs.  After age 24: Get screened for STIs if you're at risk. You are at risk for STIs (including HIV) if:  You are sexually active with more than one person.  You don't use condoms every time.  You or a partner was diagnosed with a sexually transmitted infection.  If you are at risk for HIV, ask about PrEP medicine to prevent HIV.  Get tested for HIV at least once in your life, whether you are at risk for HIV or not.  Cancer screening tests  Cervical cancer screening: If you have a cervix, begin getting regular cervical cancer screening tests starting at age 21.  Breast cancer scan (mammogram): If you've ever had breasts, begin having regular mammograms starting at age 40. This is a scan to check for breast cancer.  Colon cancer screening: It is important to start screening for colon cancer at age 45.  Have a colonoscopy test every 10 years (or more often if you're at risk) Or, ask your provider about stool tests like a FIT test every year or Cologuard test every 3 years.  To learn more about your testing options, visit:   .  For help making a decision, visit:   https://bit.ly/tp79474.  Prostate cancer screening test: If you have a prostate, ask your care team if a prostate cancer screening test (PSA) at age 55 is right for you.  Lung cancer screening: If you are a current or former smoker ages 50 to 80, ask your care team if ongoing lung cancer screenings are right for you.  For informational purposes only. Not to replace the advice of your health care provider. Copyright   2023 [employer]. All rights reserved. Clinically reviewed by the [facility]. ev-social [id number] - REV 01/24.  Learning About Activities of Daily Living  What are activities of daily living?      Activities of daily living (ADLs) are the basic self-care tasks you do every day. These include eating, bathing, dressing, and moving around.  As you age, and if you have health problems, you may find that it's harder to do some of these tasks. If so, your doctor can suggest ideas that may help.  To measure what kind of help you may need, your doctor will ask how well you are able to do ADLs. Let your doctor know if there are any tasks that you are having trouble doing. This is an important first step to getting help. And when you have the help you need, you can stay as independent as possible.  How will a doctor assess your ADLs?  Asking about ADLs is part of a routine health checkup your doctor will likely do as you age. Your health check might be done in a doctor's office, in your home, or at a hospital. The goal is to find out if you are having any problems that could make it hard to care for yourself or that make it unsafe for you to be on your own.  To measure your ADLs, your doctor will ask how hard it is for you to do routine tasks. Your doctor may also want to know if you have changed the way you do a task because of a health problem. Your doctor may watch how you:  Walk back and forth.  Keep your balance while you stand or walk.  Move from sitting to standing or from a bed to a chair.  Button or unbutton a shirt or sweater.  Remove and put on your shoes.  It's common to feel a little worried or anxious if you find you can't do all the things you used to be able to do. Talking with your doctor about ADLs is a way to make sure you're as safe as possible and able to care for yourself as well as you can. You may want to bring a caregiver, friend, or family member to your checkup. They can help you talk to your doctor.  Follow-up care is a key part of your treatment and safety. Be sure to make and go to all appointments, and call your doctor if you are having problems. It's also a good idea to know your test  results and keep a list of the medicines you take.  Current as of: October 24, 2024  Content Version: 14.5    5806-2792 [employer].   Care instructions adapted under license by your healthcare professional. If you have questions about a medical condition or this instruction, always ask your healthcare professional. [employer] disclaims any warranty or liability for your use of this information.    Preventing Falls: Care Instructions  Injuries and health problems such as trouble walking or poor eyesight can increase your risk of falling. So can some medicines. But there are things you can do to help prevent falls. You can exercise to get stronger. You can also arrange your home to make it safer.    Talk to your doctor about the medicines you take. Ask if any of them increase the risk of falls and whether they can be changed or stopped.   Try to exercise regularly. It can help improve your strength and balance. This can help lower your risk of falling.         Practice fall safety and prevention.   Wear low-heeled shoes that fit well and give your feet good support. Talk to your doctor if you have foot problems that make this hard.  Carry a cellphone or wear a medical alert device that you can use to call for help.  Use stepladders instead of chairs to reach high objects. Don't climb if you're at risk for falls. Ask for help, if needed.  Wear the correct eyeglasses, if you need them.        Make your home safer.   Remove rugs, cords, clutter, and furniture from walkways.  Keep your house well lit. Use night-lights in hallways and bathrooms.  Install and use sturdy handrails on stairways.  Wear nonskid footwear, even inside. Don't walk barefoot or in socks without shoes.        Be safe outside.   Use handrails, curb cuts, and ramps whenever possible.  Keep your hands free by using a shoulder bag or backpack.  Try to walk in well-lit areas. Watch out for uneven ground, changes in pavement, and  "debris.  Be careful in the winter. Walk on the grass or gravel when sidewalks are slippery. Use de-icer on steps and walkways. Add non-slip devices to shoes.    Put grab bars and nonskid mats in your shower or tub and near the toilet. Try to use a shower chair or bath bench when bathing.   Get into a tub or shower by putting in your weaker leg first. Get out with your strong side first. Have a phone or medical alert device in the bathroom with you.   Where can you learn more?  Go to https://www.myfab5.net/patiented  Enter G117 in the search box to learn more about \"Preventing Falls: Care Instructions.\"  Current as of: July 31, 2024  Content Version: 14.5 2024-2025 [employer].   Care instructions adapted under license by your healthcare professional. If you have questions about a medical condition or this instruction, always ask your healthcare professional. [employer] disclaims any warranty or liability for your use of this information.    Hearing Loss: Care Instructions  Overview     Hearing loss is a sudden or slow decrease in how well you hear. It can range from slight to profound. Permanent hearing loss can occur with aging. It also can happen when you are exposed long-term to loud noise. Examples include listening to loud music, riding motorcycles, or being around other loud machines.  Hearing loss can affect your work and home life. It can make you feel lonely or depressed. You may feel that you have lost your independence. But hearing aids and other devices can help you hear better and feel connected to others.  Follow-up care is a key part of your treatment and safety. Be sure to make and go to all appointments, and call your doctor if you are having problems. It's also a good idea to know your test results and keep a list of the medicines you take.  How can you care for yourself at home?  Avoid loud noises whenever possible. This helps keep your hearing from getting " "worse.  Always wear hearing protection around loud noises.  Wear a hearing aid as directed.  A professional can help you pick a hearing aid that will work best for you.  You can also get hearing aids over the counter for mild to moderate hearing loss.  Have hearing tests as your doctor suggests. They can show whether your hearing has changed. Your hearing aid may need to be adjusted.  Use other devices as needed. These may include:  Telephone amplifiers and hearing aids that can connect to a television, stereo, radio, or microphone.  Devices that use lights or vibrations. These alert you to the doorbell, a ringing telephone, or a baby monitor.  Television closed-captioning. This shows the words at the bottom of the screen. Most new TVs can do this.  TTY (text telephone). This lets you type messages back and forth on the telephone instead of talking or listening. These devices are also called TDD. When messages are typed on the keyboard, they are sent over the phone line to a receiving TTY. The message is shown on a monitor.  Use text messaging, social media, and email if it is hard for you to communicate by telephone.  Try to learn a listening technique called speechreading. It is not lipreading. You pay attention to people's gestures, expressions, posture, and tone of voice. These clues can help you understand what a person is saying. Face the person you are talking to, and have them face you. Make sure the lighting is good. You need to see the other person's face clearly.  Think about counseling if you need help to adjust to your hearing loss.  When should you call for help?  Watch closely for changes in your health, and be sure to contact your doctor if:    You think your hearing is getting worse.     You have new symptoms, such as dizziness or nausea.   Where can you learn more?  Go to https://www.healthwise.net/patiented  Enter R798 in the search box to learn more about \"Hearing Loss: Care " "Instructions.\"  Current as of: October 27, 2024  Content Version: 14.5    [id number] [employer].   Care instructions adapted under license by your healthcare professional. If you have questions about a medical condition or this instruction, always ask your healthcare professional. [employer] disclaims any warranty or liability for your use of this information.    Learning About Stress  What is stress?     Stress is your body's response to a hard situation. Your body can have a physical, emotional, or mental response. Stress is a fact of life for most people, and it affects everyone differently. What causes stress for you may not be stressful for someone else.  A lot of things can cause stress. You may feel stress when you go on a job interview, take a test, or run a race. This kind of short-term stress is normal and even useful. It can help you if you need to work hard or react quickly. For example, stress can help you finish an important job on time.  Long-term stress is caused by ongoing stressful situations or events. Examples of long-term stress include long-term health problems, ongoing problems at work, or conflicts in your family. Long-term stress can harm your health.  How does stress affect your health?  When you are stressed, your body responds as though you are in danger. It makes hormones that speed up your heart, make you breathe faster, and give you a burst of energy. This is called the fight-or-flight stress response. If the stress is over quickly, your body goes back to normal and no harm is done.  But if stress happens too often or lasts too long, it can have bad effects. Long-term stress can make you more likely to get sick, and it can make symptoms of some diseases worse. If you tense up when you are stressed, you may develop neck, shoulder, or low back pain. Stress is linked to high blood pressure and heart disease.  Stress also harms your emotional health. It can make you " sigala, tense, or depressed. Your relationships may suffer, and you may not do well at work or school.  What can you do to manage stress?  You can try these things to help manage stress:   Do something active. Exercise or activity can help reduce stress. Walking is a great way to get started. Even everyday activities such as housecleaning or yard work can help.  Try yoga or theresa chi. These techniques combine exercise and meditation. You may need some training at first to learn them.  Do something you enjoy. For example, listen to music or go to a movie. Practice your hobby or do volunteer work.  Meditate. This can help you relax, because you are not worrying about what happened before or what may happen in the future.  Do guided imagery. Imagine yourself in any setting that helps you feel calm. You can use online videos, books, or a teacher to guide you.  Do breathing exercises. For example:  From a standing position, bend forward from the waist with your knees slightly bent. Let your arms dangle close to the floor.  Breathe in slowly and deeply as you return to a standing position. Roll up slowly and lift your head last.  Hold your breath for just a few seconds in the standing position.  Breathe out slowly and bend forward from the waist.  Let your feelings out. Talk, laugh, cry, and express anger when you need to. Talking with supportive friends or family, a counselor, or a brandy leader about your feelings is a healthy way to relieve stress. Avoid discussing your feelings with people who make you feel worse.  Write. It may help to write about things that are bothering you. This helps you find out how much stress you feel and what is causing it. When you know this, you can find better ways to cope.  What can you do to prevent stress?  You might try some of these things to help prevent stress:  Manage your time. This helps you find time to do the things you want and need to do.  Get enough sleep. Your body recovers  "from the stresses of the day while you are sleeping.  Get support. Your family, friends, and community can make a difference in how you experience stress.  Limit your news feed. Avoid or limit time on social media or news that may make you feel stressed.  Do something active. Exercise or activity can help reduce stress. Walking is a great way to get started.  Where can you learn more?  Go to https://www.Swyft.net/patiented  Enter N032 in the search box to learn more about \"Learning About Stress.\"  Current as of: October 24, 2024  Content Version: 14.5 2024-2025 [employer].   Care instructions adapted under license by your healthcare professional. If you have questions about a medical condition or this instruction, always ask your healthcare professional. [employer] disclaims any warranty or liability for your use of this information.    Bladder Training: Care Instructions  Your Care Instructions     Bladder training is used to treat urge incontinence and stress incontinence. Urge incontinence means that the need to urinate comes on so fast that you can't get to a toilet in time. Stress incontinence means that you leak urine because of pressure on your bladder. For example, it may happen when you laugh, cough, or lift something heavy.  Bladder training can increase how long you can wait before you have to urinate. It can also help your bladder hold more urine. And it can give you better control over the urge to urinate.  It is important to remember that bladder training takes a few weeks to a few months to make a difference. You may not see results right away, but don't give up.  Follow-up care is a key part of your treatment and safety. Be sure to make and go to all appointments, and call your doctor if you are having problems. It's also a good idea to know your test results and keep a list of the medicines you take.  How can you care for yourself at home?  Work with your doctor to " "come up with a bladder training program that is right for you. You may use one or more of the following methods.  Delayed urination  In the beginning, try to keep from urinating for 5 minutes after you first feel the need to go.  While you wait, take deep, slow breaths to relax. Kegel exercises can also help you delay the need to go to the bathroom.  After some practice, when you can easily wait 5 minutes to urinate, try to wait 10 minutes before you urinate.  Slowly increase the waiting period until you are able to control when you have to urinate.  Scheduled urination  Empty your bladder when you first wake up in the morning.  Schedule times throughout the day when you will urinate.  Start by going to the bathroom every hour, even if you don't need to go.  Slowly increase the time between trips to the bathroom.  When you have found a schedule that works well for you, keep doing it.  If you wake up during the night and have to urinate, do it. Apply your schedule to waking hours only.  Kegel exercises  These tighten and strengthen pelvic muscles, which can help you control the flow of urine. (If doing these exercises causes pain, stop doing them and talk with your doctor.) To do Kegel exercises:  Squeeze your muscles as if you were trying not to pass gas. Or squeeze your muscles as if you were stopping the flow of urine. Your belly, legs, and buttocks shouldn't move.  Hold the squeeze for 3 seconds, then relax for 5 to 10 seconds.  Start with 3 seconds, then add 1 second each week until you are able to squeeze for 10 seconds.  Repeat the exercise 10 times a session. Do 3 to 8 sessions a day.  When should you call for help?  Watch closely for changes in your health, and be sure to contact your doctor if:    Your incontinence is getting worse.     You do not get better as expected.   Where can you learn more?  Go to https://www.healthwise.net/patiented  Enter V684 in the search box to learn more about \"Bladder " "Training: Care Instructions.\"  Current as of: April 30, 2024  Content Version: 14.5 2024-2025 [employer] [facility].   Care instructions adapted under license by your healthcare professional. If you have questions about a medical condition or this instruction, always ask your healthcare professional. [facility] disclaims any warranty or liability for your use of this information.       "

## 2025-07-07 NOTE — PROGRESS NOTES
Preventive Care Visit  [facility]  Rome Razo MD, Internal Medicine  Jul 7, 2025      Assessment & Plan     Medicare Wellness  Immunizations: UTD  Lab Studies: as below  DEXA: osteopenia on scan in 2023, recommended repeat in 3 years  Advanced care planning: has at home, advised to send copy     Ankle arthritis and pain:  - Pain in the right ankle could be due to arthritis due to history of arthritis elsewhere or ligament issues due to location of pain.  - Order an ankle X-ray to check for arthritis. Consider physical therapy after returning from travel. Injection previously ordered for pain management in the dorsal foot.    Pulmonary nodules:  - History of lung nodules with previous benign findings. Pt has concern due to smoking history and she would like to repeat scanning  - Order a chest CT scan for further evaluation.    Gastroesophageal reflux disease:  - GERD managed with famotidine.  - Refill prescription for famotidine.    Hyperlipidemia:  - Hyperlipidemia with a target LDL goal.  - Refill prescription for pravastatin. Check cholesterol levels today.    Hypercalcemia:  - Elevated calcium levels noted.  Stopped vitamin D supplement since level was high last year  - Recheck calcium and vitamin D levels today.    Impacted cerumen of both ears:    Bilateral canals were occluded with cerumen as visualized by otoscope.  The EMT/MA irrigated the canal(s), with good results on the right, needed to use cerumen spoon on left.  Procedure time 10 minutes.       Consent was obtained from the patient to use an AI documentation tool in the creation of this note.      Counseling  Appropriate preventive services were addressed with this patient via screening, questionnaire, or discussion as appropriate for fall prevention, nutrition, physical activity, Tobacco-use cessation, social engagement, weight loss and cognition.  Checklist reviewing preventive services available has  been given to the patient.  Reviewed patient's diet, addressing concerns and/or questions.   She is at risk for psychosocial distress and has been provided with information to reduce risk.   Patient reported safety concerns were addressed today.The patient was provided with written information regarding signs of hearing loss.   Information on urinary incontinence and treatment options given to patient.         Subjective   Hawa is a 79 year old, presenting for the following:  Physical (Arthritis on right ankle./Nodules in lungs./Medication refill. Traveling soon. Aug 2nd)        7/7/2025    11:00 AM   Additional Questions   Roomed by KTR          HPI    History of Present Illness-  Hawa Hope, age 79 years    Right Ankle Pain/Arthritis  - Onset less than one month ago  - Reports sharp pain at times, other times described as uncomfortable  - Pain localized around the sides of the right ankle  - No known injury or change in activity at onset  - No swelling or redness noted  - Symptoms worse at night, particularly bothersome when going to bed  - Has used topical Voltaren (diclofenac gel) and oral paracetamol for pain, uncertain of effectiveness  - Has not used ice for this episode  - History of generalized arthritis affecting multiple joints  - Reports occasional muscle cramps; has tried magnesium, especially for restless leg syndrome during flights, with variable benefit    Lung Nodules  - History of lung nodules first identified on chest X-ray during initial episode of uveitis  - Nodules described as very small initially; followed for a couple of years  - Reports being told that nodules are stable but notes that there are now more and they are larger  - Smoked for 35 years; quit almost 30 years ago  - Expresses concern about lung cancer due to smoking history and recent social exposure to peers diagnosed with lung cancer    Heartburn  - Takes famotidine once daily for heartburn, reports good control of  symptoms  - Has some remaining supply at home    Scalp Itching/Seborrheic Dermatitis  - History of scalp itching  - Previously prescribed fluocinolone steroid solution about a year ago, but prefers to use ketoconazole shampoo for control  - Finds the steroid solution oily and does not use it  - Was prescribed topical gabapentin for scalp itching but did not fill the prescription  - Reports that shampoo is effective in controlling symptoms    Restless Leg Syndrome  - Experiences restless leg syndrome, particularly problematic during flights  - Has tried magnesium for symptoms, with inconsistent benefit    Hearing Loss/Ear Wax  - Reports chronic decreased hearing on the left side, present since adolescence  - History of swimmer s ear as a child, did not seek treatment at the time  - Has previously undergone professional ear wax removal by ENT, found it expensive    Balance Concerns  - Reports increased caution with balance, especially after recent travel involving uneven terrain ([place])  - No recent falls    Hypercalcemia/High Vitamin D  - Reports previously elevated vitamin D and calcium levels  - Not currently taking vitamin D supplements due to high level last year    General Health  - Reports no trouble with stool, no blood in urine, no urinary or vaginal pain  - Recently completed an advanced healthcare directive as part of will preparation    Dermatologic Surveillance  - Had a full body skin exam by dermatology within the past year    Travel Plans  - Plans to travel to [place] soon and [place] for two months; concerned about medication supply during travel    *    Right ankle x-ray 2022:  IMPRESSION:  1. Moderate degenerative change first metatarsophalangeal joint.  2. Severe degenerative change at the second tarsometatarsal joint.     *    Chest CT 2023:  IMPRESSION:   1. Stable 6 mm pulmonary nodule in the left lower lobe since 2019,  consistent with benign etiology. No new or enlarging  pulmonary  nodules.  2. Three-vessel coronary artery calcifications.          Advance Care Planning    Patient states has Health Care Directive and will send to [employer].        7/6/2025   General Health   How would you rate your overall physical health? Good   Feel stress (tense, anxious, or unable to sleep) To some extent   (!) STRESS CONCERN      7/6/2025   Nutrition   Diet: Low fat/cholesterol         7/6/2025   Exercise   Days per week of moderate/strenous exercise 4 days   Average minutes spent exercising at this level 30 min         7/6/2025   Social Factors   Frequency of gathering with friends or relatives Twice a week   Worry food won't last until get money to buy more No   Food not last or not have enough money for food? No   Do you have housing? (Housing is defined as stable permanent housing and does not include staying outside in a car, in a tent, in an abandoned building, in an overnight shelter, or couch-surfing.) Yes   Are you worried about losing your housing? No   Lack of transportation? No   Unable to get utilities (heat,electricity)? No         7/7/2025   Fall Risk   Gait Speed Test (Document in seconds) 2   Gait Speed Test Interpretation Less than or equal to 5.00 seconds - PASS          7/6/2025   Activities of Daily Living- Home Safety   Needs help with the following daily activites None of the above   Safety concerns in the home No grab bars in the bathroom         7/6/2025   Dental   Dentist two times every year? Yes         7/6/2025   Hearing Screening   Hearing concerns? (!) I NEED TO ASK PEOPLE TO SPEAK UP OR REPEAT THEMSELVES.    (!) IT'S HARD TO FOLLOW A CONVERSATION IN A NOISY RESTAURANT OR CROWDED ROOM.    (!) TROUBLE UNDERSTANDING SOFT OR WHISPERED SPEECH.   Would you like a referral for hearing testing? I already have hearing aids       Multiple values from one day are sorted in reverse-chronological order         7/6/2025   Driving Risk Screening   Patient/family members  have concerns about driving No         2025   General Alertness/Fatigue Screening   Have you been more tired than usual lately? No         2025   Urinary Incontinence Screening   Bothered by leaking urine in past 6 months Yes         Today's PHQ-2 Score:       2025     1:05 PM   PHQ-2 (  [employer])   Q1: Little interest or pleasure in doing things 0   Q2: Feeling down, depressed or hopeless 1   PHQ-2 Score 1    Q1: Little interest or pleasure in doing things Not at all   Q2: Feeling down, depressed or hopeless Several days   PHQ-2 Score 1       Patient-reported           2025   Substance Use   Alcohol more than 3/day or more than 7/wk No   Do you have a current opioid prescription? No   How severe/bad is pain from 1 to 10? 3/10   Do you use any other substances recreationally? No     Social History     Tobacco Use    Smoking status: Former     Current packs/day: 0.00     Average packs/day: 1 pack/day for 35.0 years (35.0 ttl pk-yrs)     Types: Cigarettes     Start date:      Quit date:      Years since quittin.5    Smokeless tobacco: Never    Tobacco comments:     Quit 24 years ago    Vaping Use    Vaping status: Never Used   Substance Use Topics    Alcohol use: Yes     Alcohol/week: 3.0 standard drinks of alcohol     Types: 3 Glasses of wine per week    Drug use: No           2024   LAST FHS-7 RESULTS   1st degree relative breast or ovarian cancer Yes   Any relative bilateral breast cancer No   Any male have breast cancer No   Any ONE woman have BOTH breast AND ovarian cancer No   Any woman with breast cancer before 50yrs No   2 or more relatives with breast AND/OR ovarian cancer Yes   2 or more relatives with breast AND/OR bowel cancer No            ASCVD Risk   The 10-year ASCVD risk score (Alayna JOINER, et al., 2019) is: 23.3%    Values used to calculate the score:      Age: 79 years      Sex: Female      Is Non- : No      Diabetic: No       Tobacco smoker: No      Systolic Blood Pressure: 121 mmHg      Is BP treated: No      HDL Cholesterol: 81 mg/dL      Total Cholesterol: 194 mg/dL          Reviewed and updated as needed this visit by Provider     Meds  Problems               Current providers sharing in care for this patient include:  Patient Care Team:  Rome Razo MD as PCP - General (Internal Medicine)  Manuel Carrasco OD as MD (Optometry)  Nadira López MD as MD (Orthopedics)  Dayton Zepeda MD as Referring Physician (Family Practice)  Trish Taylor MD as MD (Ophthalmology)  Jose Albreto Isbell MD as MD (Dermatology)  Brenda Ramirez MD as MD (Dermatology)  Gabriel Colindres MD as Assigned Musculoskeletal Provider  Brenda Ramirez MD as MD (Dermatology)  Jose Alberto Isbell MD as MD (Dermatology)  Nissen, Rick L, MD as MD (Otolaryngology)  Miley Bradley AuD as Audiologist (Audiology)  Jose Alberto Isbell MD as Assigned Dermatology Provider  Grayson Henriquez MD as Assigned PCP    The following health maintenance items are reviewed in Epic and correct as of today:  Health Maintenance   Topic Date Due    LIPID  10/09/2024    ANNUAL REVIEW OF HM ORDERS  11/29/2024    MICROALBUMIN  07/29/2025    INFLUENZA VACCINE (1) 09/01/2025    BMP  04/22/2026    MEDICARE ANNUAL WELLNESS VISIT  07/07/2026    FALL RISK ASSESSMENT  07/07/2026    DTAP/TDAP/TD VACCINE (3 - Td or Tdap) 02/23/2028    DIABETES SCREENING  04/22/2028    ADVANCE CARE PLANNING  07/01/2029    PHQ-2 (once per calendar year)  Completed    PNEUMOCOCCAL VACCINE 50+ YEARS  Completed    URINALYSIS  Completed    ZOSTER VACCINE  Completed    RSV VACCINE  Completed    COVID-19 VACCINE  Completed    HPV VACCINE  Aged Out    MENINGITIS VACCINE  Aged Out    DEXA  Discontinued    URINE DRUG SCREEN  Discontinued    HEPATITIS C SCREENING  Discontinued    MAMMO SCREENING  Discontinued    COLORECTAL CANCER SCREENING  Discontinued    LUNG CANCER SCREENING   "Discontinued        Objective    Exam  /75 (BP Location: Right arm, Patient Position: Sitting, Cuff Size: Adult Regular)   Pulse 60   Temp 97.5  F (36.4  C) (Temporal)   Resp 16   Ht 1.6 m (5' 3\")   Wt 61.4 kg (135 lb 4.8 oz)   LMP 01/01/1998 (Approximate)   SpO2 96%   BMI 23.97 kg/m     Estimated body mass index is 23.97 kg/m  as calculated from the following:    Height as of this encounter: 1.6 m (5' 3\").    Weight as of this encounter: 61.4 kg (135 lb 4.8 oz).    Physical Exam  GENERAL: alert and no distress  EYES: Eyes grossly normal to inspection, PERRL and conjunctivae and sclerae normal  HENT: normal cephalic/atraumatic, right ear: partially occluded with wax but visualized portion of TM normal, left ear: occluded with wax, nose and mouth without ulcers or lesions, oropharynx clear, and oral mucous membranes moist  NECK: no adenopathy, no asymmetry, masses, or scars  RESP: lungs clear to auscultation - no rales, rhonchi or wheezes  CV: regular rate and rhythm, normal S1 S2, no S3 or S4, no murmur, click or rub, no peripheral edema  ABDOMEN: soft, nontender, no hepatosplenomegaly, no masses and bowel sounds normal  MS: Right ankle: pain to palpation anterior to lateral malleolus and posterior to medial malleolus, no redness or swelling  SKIN: no suspicious lesions or rashes  NEURO: Normal strength and tone, mentation intact and speech normal  PSYCH: mentation appears normal, affect normal/bright    Gait and balance assessed per Gait Speed Test.  Result as above.        7/7/2025   Mini Cog   Mini-Cog Not Completed (choose reason) Patient declines       Patient declines, there are NO concerns for cognitive deficits.           Signed Electronically by: Rome Razo MD    "

## (undated) DEVICE — DRSG TEGADERM 4X10" 1627

## (undated) DEVICE — PREP DURAPREP 26ML APL 8630

## (undated) DEVICE — EYE PREP BETADINE 5% SOLUTION 30ML 0065-0411-30

## (undated) DEVICE — BLADE SAW SAGITTAL STRK 18X90X0.89MM  6118-089-090

## (undated) DEVICE — PREP DURAPREP REMOVER 4OZ 8611

## (undated) DEVICE — GOWN IMPERVIOUS SPECIALTY XLG/XLONG 32474

## (undated) DEVICE — BONE CLEANING TIP INTERPULSE  0210-010-000

## (undated) DEVICE — SYR 50ML CATH TIP W/O NDL 309620

## (undated) DEVICE — SOL NACL 0.9% IRRIG 1000ML BOTTLE 2F7124

## (undated) DEVICE — DRAPE U-DRAPE 1015NSD NON-STERILE

## (undated) DEVICE — SU FIBERWIRE 2 38" T-8 NDL  AR-7206

## (undated) DEVICE — BLADE CLIPPER SGL USE 9680

## (undated) DEVICE — SU FIBERWIRE 2 38"  AR-7200

## (undated) DEVICE — SOL WATER IRRIG 1000ML BOTTLE 2F7114

## (undated) DEVICE — SOL NACL 0.9% IRRIG 3000ML BAG 2B7477

## (undated) DEVICE — ESU PENCIL W/SMOKE EVAC NEPTUNE STRYKER 0703-046-000

## (undated) DEVICE — BONE CEMENT MIXEVAC HI VAC W/CARTRIDGE 0306-563-000

## (undated) DEVICE — SU MONOCRYL 3-0 PS-2 18" UND Y497G

## (undated) DEVICE — EYE KNIFE STILETTO VISITEC 1.1MM ANG 45DEG SIDEPORT 376620

## (undated) DEVICE — DRAPE U-POUCH 34X29" 1067

## (undated) DEVICE — SU VICRYL 0 CT-1 CR 8X27" UND JJ41G

## (undated) DEVICE — BIT DRILL BIOMET CENTRAL SCR 3.2MM SS 405883

## (undated) DEVICE — SU VICRYL 1 CT-1 CR 8X18" J741D

## (undated) DEVICE — GLOVE PROTEXIS BLUE W/NEU-THERA 8.0  2D73EB80

## (undated) DEVICE — DRSG DRAIN 4X4" 7086

## (undated) DEVICE — ESU ELEC NDL 1" E1552

## (undated) DEVICE — EYE CANN IRR 25GA CYSTOTOME 581610

## (undated) DEVICE — IMM KIT SHOULDER TMAX MASK FACE 7210559

## (undated) DEVICE — GLOVE BIOGEL PI MICRO INDICATOR UNDERGLOVE SZ 8.0 48980

## (undated) DEVICE — EYE CANN IRR 27GA ANTERIOR CHAMBER 581280

## (undated) DEVICE — BIT DRILL BIOMET PERIPHERAL SCR 2.7MM SS 405889

## (undated) DEVICE — PREP CHLORAPREP 26ML TINTED HI-LITE ORANGE 930815

## (undated) DEVICE — EYE TIP IRRIGATION & ASPIRATION POLYMER CVD 0.3MM 8065751512

## (undated) DEVICE — DRSG TEGADERM ALGINATE AG 4X5" 90303

## (undated) DEVICE — BLADE SAW SAGITTAL STRK 18X90X1.27MM HD SYS 6 6118-127-090

## (undated) DEVICE — SUTURE VICRYL+ 1 CT-1 CR 8X18" VIO VCP741D

## (undated) DEVICE — PREP SKIN SCRUB TRAY 4461A

## (undated) DEVICE — GLOVE PROTEXIS MICRO 6.5  2D73PM65

## (undated) DEVICE — DECANTER VIAL 2006S

## (undated) DEVICE — SUCTION IRR SYSTEM W/O TIP INTERPULSE HANDPIECE 0210-100-000

## (undated) DEVICE — LINEN DRAPE 54X72" 5467

## (undated) DEVICE — SUCTION MANIFOLD NEPTUNE 2 SYS 4 PORT 0702-020-000

## (undated) DEVICE — ESU CLEANER TIP 31142717

## (undated) DEVICE — COVER CAMERA IN-LIGHT DISP LT-C02

## (undated) DEVICE — BONE CEMENT MIXEVAC III HI VAC KIT  0206-015-000

## (undated) DEVICE — BRUSH SURGICAL SCRUB W/4% CHLORHEXIDINE GLUCONATE SOL 4458A

## (undated) DEVICE — EYE PACK CUSTOM ANTERIOR 30DEG TIP CENTURION PPK6682-04

## (undated) DEVICE — RESTRAINT LIMB HOLDER ANKLE/WRIST FOAM W/QUICK RELEASE 2533

## (undated) DEVICE — PACK SET-UP STD 9102

## (undated) DEVICE — EYE SHIELD PLASTIC

## (undated) DEVICE — SU VICRYL 0 CT-1 27" J340H

## (undated) DEVICE — EYE KNIFE SLIT XSTAR VISITEC 2.6MM 45DEG 373726

## (undated) DEVICE — PACK CATARACT CUSTOM ASC SEY15CPUMC

## (undated) DEVICE — LINEN BACK PACK 5440

## (undated) DEVICE — DRAPE POUCH INSTRUMENT 1018

## (undated) DEVICE — SU SILK 2-0 TIE 12X30" A305H

## (undated) DEVICE — Device

## (undated) DEVICE — SU VICRYL 0 CT-1 CR 8X18" J740D

## (undated) DEVICE — PREP POVIDONE-IODINE 7.5% SCRUB 4OZ BOTTLE MDS093945

## (undated) DEVICE — GLOVE PROTEXIS W/NEU-THERA 7.5  2D73TE75

## (undated) DEVICE — POSITIONER ARMBOARD FOAM 1PAIR LF FP-ARMB1

## (undated) DEVICE — ESU GROUND PAD ADULT W/CORD E7507

## (undated) DEVICE — SPONGE COTTONOID 1/2X1" 80-1402

## (undated) DEVICE — GLOVE BIOGEL PI ULTRATOUCH G SZ 7.5 42175

## (undated) DEVICE — SU PDS II 3-0 PS-1 18" Z683G

## (undated) DEVICE — HOOD FLYTE W/PEELAWAY 408-800-100

## (undated) DEVICE — BASIN SET MAJOR

## (undated) DEVICE — DRSG STERI STRIP 1/2X4" R1547

## (undated) DEVICE — STRAP KNEE/BODY 31143004

## (undated) DEVICE — DRAPE IOBAN INCISE 23X17" 6650EZ

## (undated) DEVICE — SU VICRYL 2-0 CT-1 27" UND J259H

## (undated) DEVICE — TOURNIQUET CUFF 30" REPRO BLUE 60-7070-105

## (undated) DEVICE — LINEN TOWEL PACK X5 5464

## (undated) DEVICE — SPONGE LAP 18X18" X8435

## (undated) DEVICE — SYR BULB IRRIG DOVER 60 ML LATEX FREE 67000

## (undated) DEVICE — SU ETHIBOND 1 CTX CR 8/18" CX30D

## (undated) RX ORDER — ROPIVACAINE HYDROCHLORIDE 5 MG/ML
INJECTION, SOLUTION EPIDURAL; INFILTRATION; PERINEURAL
Status: DISPENSED
Start: 2018-12-20

## (undated) RX ORDER — LIDOCAINE HYDROCHLORIDE 10 MG/ML
INJECTION, SOLUTION EPIDURAL; INFILTRATION; INTRACAUDAL; PERINEURAL
Status: DISPENSED
Start: 2023-12-04

## (undated) RX ORDER — LIDOCAINE HYDROCHLORIDE 10 MG/ML
INJECTION, SOLUTION INFILTRATION; PERINEURAL
Status: DISPENSED
Start: 2022-09-28

## (undated) RX ORDER — TRIAMCINOLONE ACETONIDE 40 MG/ML
INJECTION, SUSPENSION INTRA-ARTICULAR; INTRAMUSCULAR
Status: DISPENSED
Start: 2021-08-31

## (undated) RX ORDER — FENTANYL CITRATE 50 UG/ML
INJECTION, SOLUTION INTRAMUSCULAR; INTRAVENOUS
Status: DISPENSED
Start: 2021-11-26

## (undated) RX ORDER — PROPOFOL 10 MG/ML
INJECTION, EMULSION INTRAVENOUS
Status: DISPENSED
Start: 2024-02-29

## (undated) RX ORDER — TRIAMCINOLONE ACETONIDE 40 MG/ML
INJECTION, SUSPENSION INTRA-ARTICULAR; INTRAMUSCULAR
Status: DISPENSED
Start: 2024-05-13

## (undated) RX ORDER — ACETAMINOPHEN 325 MG/1
TABLET ORAL
Status: DISPENSED
Start: 2021-11-26

## (undated) RX ORDER — SODIUM CHLORIDE, SODIUM LACTATE, POTASSIUM CHLORIDE, CALCIUM CHLORIDE 600; 310; 30; 20 MG/100ML; MG/100ML; MG/100ML; MG/100ML
INJECTION, SOLUTION INTRAVENOUS
Status: DISPENSED
Start: 2021-11-26

## (undated) RX ORDER — FENTANYL CITRATE 50 UG/ML
INJECTION, SOLUTION INTRAMUSCULAR; INTRAVENOUS
Status: DISPENSED
Start: 2024-02-29

## (undated) RX ORDER — HYDROMORPHONE HYDROCHLORIDE 1 MG/ML
INJECTION, SOLUTION INTRAMUSCULAR; INTRAVENOUS; SUBCUTANEOUS
Status: DISPENSED
Start: 2021-11-26

## (undated) RX ORDER — TRANEXAMIC ACID 650 MG/1
TABLET ORAL
Status: DISPENSED
Start: 2021-11-26

## (undated) RX ORDER — LIDOCAINE HYDROCHLORIDE 10 MG/ML
INJECTION, SOLUTION EPIDURAL; INFILTRATION; INTRACAUDAL; PERINEURAL
Status: DISPENSED
Start: 2021-08-31

## (undated) RX ORDER — TRIAMCINOLONE ACETONIDE 40 MG/ML
INJECTION, SUSPENSION INTRA-ARTICULAR; INTRAMUSCULAR
Status: DISPENSED
Start: 2023-12-04

## (undated) RX ORDER — LIDOCAINE HYDROCHLORIDE 20 MG/ML
INJECTION, SOLUTION EPIDURAL; INFILTRATION; INTRACAUDAL; PERINEURAL
Status: DISPENSED
Start: 2021-11-26

## (undated) RX ORDER — VANCOMYCIN HYDROCHLORIDE 1 G/20ML
INJECTION, POWDER, LYOPHILIZED, FOR SOLUTION INTRAVENOUS
Status: DISPENSED
Start: 2024-02-29

## (undated) RX ORDER — CEFAZOLIN SODIUM 2 G/100ML
INJECTION, SOLUTION INTRAVENOUS
Status: DISPENSED
Start: 2021-11-26

## (undated) RX ORDER — ONDANSETRON 2 MG/ML
INJECTION INTRAMUSCULAR; INTRAVENOUS
Status: DISPENSED
Start: 2021-11-26

## (undated) RX ORDER — ACETAMINOPHEN 325 MG/1
TABLET ORAL
Status: DISPENSED
Start: 2024-02-29

## (undated) RX ORDER — TRANEXAMIC ACID 650 MG/1
TABLET ORAL
Status: DISPENSED
Start: 2024-02-29

## (undated) RX ORDER — TRIAMCINOLONE ACETONIDE 40 MG/ML
INJECTION, SUSPENSION INTRA-ARTICULAR; INTRAMUSCULAR
Status: DISPENSED
Start: 2021-06-01

## (undated) RX ORDER — FENTANYL CITRATE-0.9 % NACL/PF 10 MCG/ML
PLASTIC BAG, INJECTION (ML) INTRAVENOUS
Status: DISPENSED
Start: 2024-02-29

## (undated) RX ORDER — TRIAMCINOLONE ACETONIDE 40 MG/ML
INJECTION, SUSPENSION INTRA-ARTICULAR; INTRAMUSCULAR
Status: DISPENSED
Start: 2022-09-28

## (undated) RX ORDER — PROPOFOL 10 MG/ML
INJECTION, EMULSION INTRAVENOUS
Status: DISPENSED
Start: 2021-11-26

## (undated) RX ORDER — CEFAZOLIN SODIUM/WATER 2 G/20 ML
SYRINGE (ML) INTRAVENOUS
Status: DISPENSED
Start: 2024-02-29

## (undated) RX ORDER — TRIAMCINOLONE ACETONIDE 40 MG/ML
INJECTION, SUSPENSION INTRA-ARTICULAR; INTRAMUSCULAR
Status: DISPENSED
Start: 2018-12-20

## (undated) RX ORDER — CEFAZOLIN SODIUM 1 G/3ML
INJECTION, POWDER, FOR SOLUTION INTRAMUSCULAR; INTRAVENOUS
Status: DISPENSED
Start: 2024-02-29

## (undated) RX ORDER — ONDANSETRON 2 MG/ML
INJECTION INTRAMUSCULAR; INTRAVENOUS
Status: DISPENSED
Start: 2024-02-29

## (undated) RX ORDER — DEXAMETHASONE SODIUM PHOSPHATE 4 MG/ML
INJECTION, SOLUTION INTRA-ARTICULAR; INTRALESIONAL; INTRAMUSCULAR; INTRAVENOUS; SOFT TISSUE
Status: DISPENSED
Start: 2024-02-29

## (undated) RX ORDER — FENTANYL CITRATE-0.9 % NACL/PF 10 MCG/ML
PLASTIC BAG, INJECTION (ML) INTRAVENOUS
Status: DISPENSED
Start: 2021-11-26

## (undated) RX ORDER — LIDOCAINE HYDROCHLORIDE 10 MG/ML
INJECTION, SOLUTION EPIDURAL; INFILTRATION; INTRACAUDAL; PERINEURAL
Status: DISPENSED
Start: 2024-05-13

## (undated) RX ORDER — LIDOCAINE HYDROCHLORIDE 10 MG/ML
INJECTION, SOLUTION EPIDURAL; INFILTRATION; INTRACAUDAL; PERINEURAL
Status: DISPENSED
Start: 2021-06-01

## (undated) RX ORDER — ACETAMINOPHEN 325 MG/1
TABLET ORAL
Status: DISPENSED
Start: 2019-03-22

## (undated) RX ORDER — ROPIVACAINE HYDROCHLORIDE 5 MG/ML
INJECTION, SOLUTION EPIDURAL; INFILTRATION; PERINEURAL
Status: DISPENSED
Start: 2019-05-23

## (undated) RX ORDER — TIMOLOL MALEATE 5 MG/ML
SOLUTION/ DROPS OPHTHALMIC
Status: DISPENSED
Start: 2019-03-22

## (undated) RX ORDER — TRIAMCINOLONE ACETONIDE 40 MG/ML
INJECTION, SUSPENSION INTRA-ARTICULAR; INTRAMUSCULAR
Status: DISPENSED
Start: 2019-05-23